# Patient Record
Sex: MALE | Race: WHITE | NOT HISPANIC OR LATINO | Employment: OTHER | URBAN - METROPOLITAN AREA
[De-identification: names, ages, dates, MRNs, and addresses within clinical notes are randomized per-mention and may not be internally consistent; named-entity substitution may affect disease eponyms.]

---

## 2017-04-13 ENCOUNTER — HOSPITAL ENCOUNTER (OUTPATIENT)
Dept: FAMILY MEDICINE CLINIC | Facility: CLINIC | Age: 47
Setting detail: SPECIMEN
Discharge: HOME | End: 2017-04-13
Attending: PHYSICIAN ASSISTANT | Admitting: PHYSICIAN ASSISTANT

## 2017-04-13 LAB
ALBUMIN SERPL-MCNC: 3.8 G/DL (ref 3.5–4.8)
ALBUMIN/GLOB SERPL: 1.4 {RATIO} (ref 1–1.7)
ALP SERPL-CCNC: 61 IU/L (ref 32–91)
ALT SERPL-CCNC: 16 IU/L (ref 17–63)
ANION GAP SERPL CALC-SCNC: 12 MMOL/L (ref 10–20)
AST SERPL-CCNC: 16 IU/L (ref 15–41)
BASOPHILS # BLD AUTO: 0.1 10*3/UL (ref 0–0.2)
BASOPHILS NFR BLD AUTO: 1 % (ref 0–2)
BILIRUB SERPL-MCNC: 0.4 MG/DL (ref 0.3–1.2)
BUN SERPL-MCNC: 9 MG/DL (ref 8–20)
BUN/CREAT SERPL: 10 (ref 6.2–20.3)
CALCIUM SERPL-MCNC: 9.4 MG/DL (ref 8.9–10.3)
CHLORIDE SERPL-SCNC: 109 MMOL/L (ref 101–111)
CHOLEST SERPL-MCNC: 123 MG/DL
CHOLEST/HDLC SERPL: 3.2 {RATIO}
CONV CO2: 26 MMOL/L (ref 22–32)
CONV LDL CHOLESTEROL DIRECT: 71 MG/DL (ref 0–100)
CONV PLATELETS GIANT IN BLOOD BY LIGHT MICROSCOPY: (no result)
CONV SMEAR REVIEW: (no result)
CONV TOTAL PROTEIN: 6.6 G/DL (ref 6.1–7.9)
CREAT UR-MCNC: 0.9 MG/DL (ref 0.7–1.2)
DIFFERENTIAL METHOD BLD: (no result)
EOSINOPHIL # BLD AUTO: 0.1 10*3/UL (ref 0–0.3)
EOSINOPHIL # BLD AUTO: 1 % (ref 0–3)
ERYTHROCYTE [DISTWIDTH] IN BLOOD BY AUTOMATED COUNT: 13.9 % (ref 11.5–14.5)
GLOBULIN UR ELPH-MCNC: 2.8 G/DL (ref 2.5–3.8)
GLUCOSE SERPL-MCNC: 108 MG/DL (ref 65–99)
HCT VFR BLD AUTO: 44.1 % (ref 40–54)
HDLC SERPL-MCNC: 39 MG/DL
HGB BLD-MCNC: 15.2 G/DL (ref 14–18)
LDLC/HDLC SERPL: 1.8 {RATIO}
LIPID INTERPRETATION: ABNORMAL
LYMPHOCYTES # BLD AUTO: 1.9 10*3/UL (ref 0.8–4.8)
LYMPHOCYTES NFR BLD AUTO: 23 % (ref 18–42)
MCH RBC QN AUTO: 32.2 PG (ref 26–32)
MCHC RBC AUTO-ENTMCNC: 34.5 G/DL (ref 32–36)
MCV RBC AUTO: 93.3 FL (ref 80–94)
MONOCYTES # BLD AUTO: 0.6 10*3/UL (ref 0.1–1.3)
MONOCYTES NFR BLD AUTO: 7 % (ref 2–11)
NEUTROPHILS # BLD AUTO: 5.5 10*3/UL (ref 2.3–8.6)
NEUTROPHILS NFR BLD AUTO: 68 % (ref 50–75)
NRBC BLD AUTO-RTO: 0 /100{WBCS}
PLATELET # BLD AUTO: 193 10*3/UL (ref 150–450)
PMV BLD AUTO: 9.8 FL (ref 7.4–10.4)
POTASSIUM SERPL-SCNC: 4 MMOL/L (ref 3.6–5.1)
PSA SERPL-MCNC: 1.5 NG/ML (ref 0–4)
RBC # BLD AUTO: 4.73 10*6/UL (ref 4.6–6)
SODIUM SERPL-SCNC: 143 MMOL/L (ref 136–144)
TRIGL SERPL-MCNC: 87 MG/DL
VLDLC SERPL CALC-MCNC: 13.8 MG/DL
WBC # BLD AUTO: 8.2 10*3/UL (ref 4.5–11.5)

## 2017-05-03 ENCOUNTER — HOSPITAL ENCOUNTER (OUTPATIENT)
Dept: PAIN MEDICINE | Facility: HOSPITAL | Age: 47
Discharge: HOME | End: 2017-05-03
Attending: ANESTHESIOLOGY | Admitting: ANESTHESIOLOGY

## 2017-05-03 LAB
AMPHETAMINES UR QL SCN: NEGATIVE
BZE UR QL SCN: NEGATIVE
CREAT 24H UR-MCNC: NORMAL MG/DL
METHADONE UR QL SCN: NEGATIVE
OPIATE CONFIRMATION URINE: NORMAL
THC SERPLBLD CFM-MCNC: NEGATIVE NG/ML

## 2017-05-22 ENCOUNTER — HOSPITAL ENCOUNTER (OUTPATIENT)
Dept: PAIN MEDICINE | Facility: HOSPITAL | Age: 47
Discharge: HOME | End: 2017-05-22
Attending: ANESTHESIOLOGY | Admitting: ANESTHESIOLOGY

## 2017-05-31 ENCOUNTER — HOSPITAL ENCOUNTER (OUTPATIENT)
Dept: PHYSICAL THERAPY | Facility: HOSPITAL | Age: 47
Setting detail: RECURRING SERIES
Discharge: HOME | End: 2017-07-24
Attending: FAMILY MEDICINE | Admitting: FAMILY MEDICINE

## 2017-07-21 ENCOUNTER — HOSPITAL ENCOUNTER (OUTPATIENT)
Dept: PAIN MEDICINE | Facility: HOSPITAL | Age: 47
Discharge: HOME | End: 2017-07-21
Attending: ANESTHESIOLOGY | Admitting: ANESTHESIOLOGY

## 2017-08-10 ENCOUNTER — HOSPITAL ENCOUNTER (OUTPATIENT)
Dept: OTHER | Facility: HOSPITAL | Age: 47
Discharge: HOME | End: 2017-08-10
Attending: INTERNAL MEDICINE | Admitting: INTERNAL MEDICINE

## 2017-08-10 LAB
ALBUMIN SERPL-MCNC: 4 G/DL (ref 3.5–4.8)
ALBUMIN/GLOB SERPL: 1.2 {RATIO} (ref 1–1.7)
ALP SERPL-CCNC: 73 IU/L (ref 32–91)
ALT SERPL-CCNC: 18 IU/L (ref 17–63)
ANION GAP SERPL CALC-SCNC: 13.2 MMOL/L (ref 10–20)
AST SERPL-CCNC: 21 IU/L (ref 15–41)
BASOPHILS # BLD AUTO: 0.1 10*3/UL (ref 0–0.2)
BASOPHILS NFR BLD AUTO: 2 % (ref 0–2)
BILIRUB SERPL-MCNC: 0.3 MG/DL (ref 0.3–1.2)
BUN SERPL-MCNC: 6 MG/DL (ref 8–20)
BUN/CREAT SERPL: 6.7 (ref 6.2–20.3)
CALCIUM SERPL-MCNC: 9.5 MG/DL (ref 8.9–10.3)
CHLORIDE SERPL-SCNC: 106 MMOL/L (ref 101–111)
CONV CO2: 26 MMOL/L (ref 22–32)
CONV TOTAL PROTEIN: 7.3 G/DL (ref 6.1–7.9)
CREAT UR-MCNC: 0.9 MG/DL (ref 0.7–1.2)
CRP SERPL-MCNC: 0.17 MG/DL (ref 0–0.7)
DIFFERENTIAL METHOD BLD: (no result)
EOSINOPHIL # BLD AUTO: 0.1 10*3/UL (ref 0–0.3)
EOSINOPHIL # BLD AUTO: 2 % (ref 0–3)
ERYTHROCYTE [DISTWIDTH] IN BLOOD BY AUTOMATED COUNT: 14.1 % (ref 11.5–14.5)
ERYTHROCYTE [SEDIMENTATION RATE] IN BLOOD BY WESTERGREN METHOD: 12 MM/HR (ref 0–15)
GLOBULIN UR ELPH-MCNC: 3.3 G/DL (ref 2.5–3.8)
GLUCOSE SERPL-MCNC: 118 MG/DL (ref 65–99)
HCT VFR BLD AUTO: 46.9 % (ref 40–54)
HGB BLD-MCNC: 15.8 G/DL (ref 14–18)
LYMPHOCYTES # BLD AUTO: 2.3 10*3/UL (ref 0.8–4.8)
LYMPHOCYTES NFR BLD AUTO: 28 % (ref 18–42)
MCH RBC QN AUTO: 32.2 PG (ref 26–32)
MCHC RBC AUTO-ENTMCNC: 33.6 G/DL (ref 32–36)
MCV RBC AUTO: 95.8 FL (ref 80–94)
MONOCYTES # BLD AUTO: 0.6 10*3/UL (ref 0.1–1.3)
MONOCYTES NFR BLD AUTO: 8 % (ref 2–11)
NEUTROPHILS # BLD AUTO: 4.9 10*3/UL (ref 2.3–8.6)
NEUTROPHILS NFR BLD AUTO: 60 % (ref 50–75)
NRBC BLD AUTO-RTO: 0 /100{WBCS}
NRBC/RBC NFR BLD MANUAL: 0 10*3/UL
PLATELET # BLD AUTO: 231 10*3/UL (ref 150–450)
PMV BLD AUTO: 9.3 FL (ref 7.4–10.4)
POTASSIUM SERPL-SCNC: 4.2 MMOL/L (ref 3.6–5.1)
RBC # BLD AUTO: 4.89 10*6/UL (ref 4.6–6)
SODIUM SERPL-SCNC: 141 MMOL/L (ref 136–144)
URATE SERPL-MCNC: 6.1 MG/DL (ref 4.8–8.7)
WBC # BLD AUTO: 8.1 10*3/UL (ref 4.5–11.5)

## 2017-08-11 LAB
HIV1+2 AB SERPL QL IA: NORMAL

## 2017-08-21 ENCOUNTER — HOSPITAL ENCOUNTER (OUTPATIENT)
Dept: PAIN MEDICINE | Facility: HOSPITAL | Age: 47
Discharge: HOME | End: 2017-08-21
Attending: ANESTHESIOLOGY | Admitting: ANESTHESIOLOGY

## 2017-09-07 ENCOUNTER — HOSPITAL ENCOUNTER (OUTPATIENT)
Dept: LAB | Facility: HOSPITAL | Age: 47
Discharge: HOME | End: 2017-09-07
Attending: INTERNAL MEDICINE | Admitting: INTERNAL MEDICINE

## 2017-09-21 ENCOUNTER — CONVERSION ENCOUNTER (OUTPATIENT)
Dept: BEHAVIORAL HEALTH | Facility: OTHER | Age: 47
End: 2017-09-21

## 2017-10-09 ENCOUNTER — CONVERSION ENCOUNTER (OUTPATIENT)
Dept: BEHAVIORAL HEALTH | Facility: OTHER | Age: 47
End: 2017-10-09

## 2017-10-20 ENCOUNTER — HOSPITAL ENCOUNTER (OUTPATIENT)
Dept: PAIN MEDICINE | Facility: HOSPITAL | Age: 47
Discharge: HOME | End: 2017-10-20
Attending: ANESTHESIOLOGY | Admitting: ANESTHESIOLOGY

## 2017-11-16 ENCOUNTER — CONVERSION ENCOUNTER (OUTPATIENT)
Dept: BEHAVIORAL HEALTH | Facility: OTHER | Age: 47
End: 2017-11-16

## 2017-12-14 ENCOUNTER — CONVERSION ENCOUNTER (OUTPATIENT)
Dept: BEHAVIORAL HEALTH | Facility: OTHER | Age: 47
End: 2017-12-14

## 2017-12-23 ENCOUNTER — HOSPITAL ENCOUNTER (OUTPATIENT)
Dept: MRI IMAGING | Facility: HOSPITAL | Age: 47
Discharge: HOME | End: 2017-12-23
Attending: ORTHOPAEDIC SURGERY | Admitting: ORTHOPAEDIC SURGERY

## 2018-01-17 ENCOUNTER — CONVERSION ENCOUNTER (OUTPATIENT)
Dept: BEHAVIORAL HEALTH | Facility: OTHER | Age: 48
End: 2018-01-17

## 2018-01-18 ENCOUNTER — HOSPITAL ENCOUNTER (OUTPATIENT)
Dept: LAB | Facility: HOSPITAL | Age: 48
Discharge: HOME | End: 2018-01-18
Attending: INTERNAL MEDICINE | Admitting: INTERNAL MEDICINE

## 2018-01-18 ENCOUNTER — HOSPITAL ENCOUNTER (OUTPATIENT)
Dept: PAIN MEDICINE | Facility: HOSPITAL | Age: 48
Discharge: HOME | End: 2018-01-18
Attending: ANESTHESIOLOGY | Admitting: ANESTHESIOLOGY

## 2018-01-18 LAB
ALBUMIN SERPL-MCNC: 3.8 G/DL (ref 3.5–4.8)
ALBUMIN/GLOB SERPL: 1.2 {RATIO} (ref 1–1.7)
ALP SERPL-CCNC: 51 IU/L (ref 32–91)
ALT SERPL-CCNC: 13 IU/L (ref 17–63)
AMPHETAMINES UR QL SCN: NEGATIVE
ANION GAP SERPL CALC-SCNC: 12.1 MMOL/L (ref 10–20)
AST SERPL-CCNC: 19 IU/L (ref 15–41)
BASOPHILS # BLD AUTO: 0.1 10*3/UL (ref 0–0.2)
BASOPHILS NFR BLD AUTO: 1 % (ref 0–2)
BILIRUB SERPL-MCNC: 0.3 MG/DL (ref 0.3–1.2)
BUN SERPL-MCNC: 14 MG/DL (ref 8–20)
BUN/CREAT SERPL: 15.6 (ref 6.2–20.3)
BZE UR QL SCN: NEGATIVE
CALCIUM SERPL-MCNC: 9.3 MG/DL (ref 8.9–10.3)
CHLORIDE SERPL-SCNC: 108 MMOL/L (ref 101–111)
CONV CO2: 23 MMOL/L (ref 22–32)
CONV TOTAL PROTEIN: 6.9 G/DL (ref 6.1–7.9)
CREAT 24H UR-MCNC: NORMAL MG/DL
CREAT UR-MCNC: 0.9 MG/DL (ref 0.7–1.2)
CRP SERPL-MCNC: 0.73 MG/DL (ref 0–0.7)
DIFFERENTIAL METHOD BLD: (no result)
EOSINOPHIL # BLD AUTO: 0.1 10*3/UL (ref 0–0.3)
EOSINOPHIL # BLD AUTO: 1 % (ref 0–3)
ERYTHROCYTE [DISTWIDTH] IN BLOOD BY AUTOMATED COUNT: 13.9 % (ref 11.5–14.5)
ERYTHROCYTE [SEDIMENTATION RATE] IN BLOOD BY WESTERGREN METHOD: 19 MM/HR (ref 0–15)
GLOBULIN UR ELPH-MCNC: 3.1 G/DL (ref 2.5–3.8)
GLUCOSE SERPL-MCNC: 71 MG/DL (ref 65–99)
HCT VFR BLD AUTO: 40.9 % (ref 40–54)
HGB BLD-MCNC: 13.9 G/DL (ref 14–18)
LYMPHOCYTES # BLD AUTO: 3.1 10*3/UL (ref 0.8–4.8)
LYMPHOCYTES NFR BLD AUTO: 36 % (ref 18–42)
MCH RBC QN AUTO: 32 PG (ref 26–32)
MCHC RBC AUTO-ENTMCNC: 33.9 G/DL (ref 32–36)
MCV RBC AUTO: 94.6 FL (ref 80–94)
METHADONE UR QL SCN: NEGATIVE
MONOCYTES # BLD AUTO: 0.7 10*3/UL (ref 0.1–1.3)
MONOCYTES NFR BLD AUTO: 8 % (ref 2–11)
NEUTROPHILS # BLD AUTO: 4.7 10*3/UL (ref 2.3–8.6)
NEUTROPHILS NFR BLD AUTO: 54 % (ref 50–75)
NRBC BLD AUTO-RTO: 0 /100{WBCS}
NRBC/RBC NFR BLD MANUAL: 0 10*3/UL
OPIATE CONFIRMATION URINE: NORMAL
PLATELET # BLD AUTO: 256 10*3/UL (ref 150–450)
PMV BLD AUTO: 9 FL (ref 7.4–10.4)
POTASSIUM SERPL-SCNC: 4.1 MMOL/L (ref 3.6–5.1)
RBC # BLD AUTO: 4.33 10*6/UL (ref 4.6–6)
SODIUM SERPL-SCNC: 139 MMOL/L (ref 136–144)
THC SERPLBLD CFM-MCNC: NEGATIVE NG/ML
WBC # BLD AUTO: 8.8 10*3/UL (ref 4.5–11.5)

## 2018-01-19 ENCOUNTER — HOSPITAL ENCOUNTER (OUTPATIENT)
Dept: LAB | Facility: HOSPITAL | Age: 48
Discharge: HOME | End: 2018-01-19
Attending: INTERNAL MEDICINE | Admitting: INTERNAL MEDICINE

## 2018-01-20 LAB
AMPICILLIN SUSC ISLT: ABNORMAL
AZTREONAM SUSC ISLT: ABNORMAL
BACTERIA ISLT: ABNORMAL
BACTERIA SPEC AEROBE CULT: ABNORMAL
BILIRUB UR QL STRIP: NEGATIVE MG/DL
CASTS URNS QL MICRO: ABNORMAL /[LPF]
CEFAZOLIN SUSC ISLT: ABNORMAL
CEFEPIME SUSC ISLT: ABNORMAL
CEFTRIAXONE SUSC ISLT: ABNORMAL
CIPROFLOXACIN SUSC ISLT: ABNORMAL
COLONY COUNT: ABNORMAL
COLOR UR: YELLOW
CONV BACTERIA IN URINE MICRO: ABNORMAL
CONV CLARITY OF URINE: ABNORMAL
CONV HYALINE CASTS IN URINE MICRO: 5 /[LPF] (ref 0–5)
CONV PROTEIN IN URINE BY AUTOMATED TEST STRIP: NEGATIVE MG/DL
CONV SMALL ROUND CELLS: ABNORMAL /[HPF]
CONV UROBILINOGEN IN URINE BY AUTOMATED TEST STRIP: 0.2 MG/DL
CULTURE INDICATED?: ABNORMAL
ERTAPENEM SUSC ISLT: ABNORMAL
GLUCOSE UR QL: NEGATIVE MG/DL
HGB UR QL STRIP: ABNORMAL
KETONES UR QL STRIP: NEGATIVE MG/DL
LEUKOCYTE ESTERASE UR QL STRIP: ABNORMAL
LEVOFLOXACIN SUSC ISLT: ABNORMAL
Lab: ABNORMAL
MEROPENEM SUSC ISLT: ABNORMAL
MICRO REPORT STATUS: ABNORMAL
NITRITE UR QL STRIP: NEGATIVE
NITROFURANTOIN SUSC ISLT: ABNORMAL
PH UR STRIP.AUTO: 6 [PH] (ref 4.5–8)
PIP+TAZO SUSC ISLT: ABNORMAL
RBC #/AREA URNS HPF: 1 /[HPF] (ref 0–3)
SP GR UR: 1.02 (ref 1–1.03)
SPECIMEN SOURCE: ABNORMAL
SPERM URNS QL MICRO: ABNORMAL /[HPF]
SQUAMOUS SPT QL MICRO: 0 /[HPF] (ref 0–5)
SUSC METH SPEC: ABNORMAL
TETRACYCLINE SUSC ISLT: ABNORMAL
TOBRAMYCIN SUSC ISLT: ABNORMAL
TRIMETHOPRIM/SULFA: ABNORMAL
UNIDENT CRYS URNS QL MICRO: ABNORMAL /[HPF]
WBC #/AREA URNS HPF: 51 /[HPF] (ref 0–5)
YEAST SPEC QL WET PREP: ABNORMAL /[HPF]

## 2018-05-15 ENCOUNTER — HOSPITAL ENCOUNTER (OUTPATIENT)
Dept: PAIN MEDICINE | Facility: HOSPITAL | Age: 48
Discharge: HOME | End: 2018-05-15
Attending: ANESTHESIOLOGY | Admitting: ANESTHESIOLOGY

## 2018-09-06 ENCOUNTER — HOSPITAL ENCOUNTER (OUTPATIENT)
Dept: FAMILY MEDICINE CLINIC | Facility: CLINIC | Age: 48
Setting detail: SPECIMEN
Discharge: HOME | End: 2018-09-06
Attending: NURSE PRACTITIONER | Admitting: NURSE PRACTITIONER

## 2018-09-21 ENCOUNTER — HOSPITAL ENCOUNTER (OUTPATIENT)
Dept: LAB | Facility: HOSPITAL | Age: 48
Discharge: HOME | End: 2018-09-21
Attending: NURSE PRACTITIONER | Admitting: NURSE PRACTITIONER

## 2018-09-21 LAB
ALBUMIN SERPL-MCNC: 3.8 G/DL (ref 3.5–4.8)
ALBUMIN/GLOB SERPL: 1.2 {RATIO} (ref 1–1.7)
ALP SERPL-CCNC: 61 IU/L (ref 32–91)
ALT SERPL-CCNC: 14 IU/L (ref 17–63)
ANION GAP SERPL CALC-SCNC: 10 MMOL/L (ref 10–20)
AST SERPL-CCNC: 18 IU/L (ref 15–41)
BILIRUB SERPL-MCNC: 0.9 MG/DL (ref 0.3–1.2)
BUN SERPL-MCNC: 18 MG/DL (ref 8–20)
BUN/CREAT SERPL: 18 (ref 6.2–20.3)
CALCIUM SERPL-MCNC: 9.1 MG/DL (ref 8.9–10.3)
CHLORIDE SERPL-SCNC: 109 MMOL/L (ref 101–111)
CHOLEST SERPL-MCNC: 103 MG/DL
CHOLEST/HDLC SERPL: 3.7 {RATIO}
CONV CO2: 25 MMOL/L (ref 22–32)
CONV LDL CHOLESTEROL DIRECT: 54 MG/DL (ref 0–100)
CONV TOTAL PROTEIN: 7 G/DL (ref 6.1–7.9)
CREAT UR-MCNC: 1 MG/DL (ref 0.7–1.2)
GLOBULIN UR ELPH-MCNC: 3.2 G/DL (ref 2.5–3.8)
GLUCOSE SERPL-MCNC: 98 MG/DL (ref 65–99)
HDLC SERPL-MCNC: 28 MG/DL
LDLC/HDLC SERPL: 1.9 {RATIO}
LIPID INTERPRETATION: ABNORMAL
POTASSIUM SERPL-SCNC: 4 MMOL/L (ref 3.6–5.1)
SODIUM SERPL-SCNC: 140 MMOL/L (ref 136–144)
TRIGL SERPL-MCNC: 112 MG/DL
VLDLC SERPL CALC-MCNC: 21.6 MG/DL

## 2018-10-11 ENCOUNTER — HOSPITAL ENCOUNTER (OUTPATIENT)
Dept: PAIN MEDICINE | Facility: HOSPITAL | Age: 48
Discharge: HOME | End: 2018-10-11
Attending: ANESTHESIOLOGY | Admitting: ANESTHESIOLOGY

## 2018-10-11 LAB
AMPHETAMINES UR QL SCN: NEGATIVE
BARBITURATES UR QL SCN: NEGATIVE
BENZODIAZ UR QL SCN: NEGATIVE
BZE UR QL SCN: NEGATIVE
CREAT 24H UR-MCNC: 195.5 MG/DL
METHADONE UR QL SCN: NEGATIVE
OPIATE CONFIRMATION URINE: NORMAL
OPIATES TESTED UR SCN: NEGATIVE
PCP UR QL: NEGATIVE
THC SERPLBLD CFM-MCNC: NEGATIVE NG/ML

## 2018-12-10 ENCOUNTER — HOSPITAL ENCOUNTER (OUTPATIENT)
Dept: PAIN MEDICINE | Facility: HOSPITAL | Age: 48
Discharge: HOME | End: 2018-12-10
Attending: ANESTHESIOLOGY | Admitting: ANESTHESIOLOGY

## 2019-03-15 ENCOUNTER — HOSPITAL ENCOUNTER (OUTPATIENT)
Dept: FAMILY MEDICINE CLINIC | Facility: CLINIC | Age: 49
Setting detail: SPECIMEN
Discharge: HOME | End: 2019-03-15
Attending: NURSE PRACTITIONER | Admitting: NURSE PRACTITIONER

## 2019-03-15 LAB
ALBUMIN SERPL-MCNC: 3.8 G/DL (ref 3.5–4.8)
ALBUMIN/GLOB SERPL: 1 {RATIO} (ref 1–1.7)
ALP SERPL-CCNC: 64 IU/L (ref 32–91)
ALT SERPL-CCNC: 14 IU/L (ref 17–63)
ANION GAP SERPL CALC-SCNC: 14.9 MMOL/L (ref 10–20)
AST SERPL-CCNC: 20 IU/L (ref 15–41)
BILIRUB SERPL-MCNC: 0.5 MG/DL (ref 0.3–1.2)
BUN SERPL-MCNC: 14 MG/DL (ref 8–20)
BUN/CREAT SERPL: 12.7 (ref 6.2–20.3)
CALCIUM SERPL-MCNC: 9.3 MG/DL (ref 8.9–10.3)
CHLORIDE SERPL-SCNC: 101 MMOL/L (ref 101–111)
CHOLEST SERPL-MCNC: 120 MG/DL
CHOLEST/HDLC SERPL: 3.4 {RATIO}
CONV CO2: 23 MMOL/L (ref 22–32)
CONV LDL CHOLESTEROL DIRECT: 67 MG/DL (ref 0–100)
CONV TOTAL PROTEIN: 7.5 G/DL (ref 6.1–7.9)
CREAT UR-MCNC: 1.1 MG/DL (ref 0.7–1.2)
GLOBULIN UR ELPH-MCNC: 3.7 G/DL (ref 2.5–3.8)
GLUCOSE SERPL-MCNC: 111 MG/DL (ref 65–99)
HDLC SERPL-MCNC: 35 MG/DL
LDLC/HDLC SERPL: 1.9 {RATIO}
LIPID INTERPRETATION: ABNORMAL
POTASSIUM SERPL-SCNC: 3.9 MMOL/L (ref 3.6–5.1)
SODIUM SERPL-SCNC: 135 MMOL/L (ref 136–144)
TRIGL SERPL-MCNC: 137 MG/DL
VLDLC SERPL CALC-MCNC: 17.6 MG/DL

## 2019-04-29 ENCOUNTER — HOSPITAL ENCOUNTER (OUTPATIENT)
Dept: PAIN MEDICINE | Facility: HOSPITAL | Age: 49
Discharge: HOME | End: 2019-04-29
Attending: ANESTHESIOLOGY | Admitting: ANESTHESIOLOGY

## 2019-04-29 LAB
AMPHETAMINES UR QL SCN: NEGATIVE
BARBITURATES UR QL SCN: NEGATIVE
BENZODIAZ UR QL SCN: NEGATIVE
BZE UR QL SCN: NEGATIVE
CREAT 24H UR-MCNC: NORMAL MG/DL
METHADONE UR QL SCN: NEGATIVE
OPIATE CONFIRMATION URINE: NORMAL
OPIATES TESTED UR SCN: NEGATIVE
PCP UR QL: NEGATIVE
THC SERPLBLD CFM-MCNC: NEGATIVE NG/ML

## 2019-06-04 VITALS
HEART RATE: 85 BPM | HEART RATE: 65 BPM | SYSTOLIC BLOOD PRESSURE: 121 MMHG | WEIGHT: 157 LBS | BODY MASS INDEX: 20.15 KG/M2 | WEIGHT: 157 LBS | HEIGHT: 74 IN | DIASTOLIC BLOOD PRESSURE: 83 MMHG | DIASTOLIC BLOOD PRESSURE: 74 MMHG | HEART RATE: 85 BPM | DIASTOLIC BLOOD PRESSURE: 83 MMHG | BODY MASS INDEX: 20.79 KG/M2 | BODY MASS INDEX: 20.15 KG/M2 | SYSTOLIC BLOOD PRESSURE: 109 MMHG | HEART RATE: 54 BPM | SYSTOLIC BLOOD PRESSURE: 108 MMHG | DIASTOLIC BLOOD PRESSURE: 76 MMHG | HEIGHT: 74 IN | HEART RATE: 82 BPM | BODY MASS INDEX: 20.02 KG/M2 | SYSTOLIC BLOOD PRESSURE: 124 MMHG | HEIGHT: 74 IN | WEIGHT: 156 LBS | WEIGHT: 162 LBS | HEIGHT: 74 IN | DIASTOLIC BLOOD PRESSURE: 76 MMHG | SYSTOLIC BLOOD PRESSURE: 117 MMHG

## 2019-06-13 NOTE — PROGRESS NOTES
History of Present Illness:   Chief Complaint: Patient is a 47 year old white male who arrived on time today for routine follow up for medication management and monitoring of his depression and anxiety. He is single and lives with his GF of 1 year.  He is on disability.  He is living   in [place] again in .  He was seeing a therapist, named Floyd, in [place], but he would like to see someone in [place].  He reports feeling restless at night, and he thinks his medication needs adjusting.  He also has psoriatic arthritis and the   rheumatologist is starting him on injection of Cimizia.  He has chronic back pain, as well, and walks with a cane.  He has CAD with CABG in 2015.      Symptomatology: Depression 7/10  Anxiety 5/10  Not sleeping well, restless and nightmares.   Denies SI/HI  No AVH      PAST PSYCHIATRIC HISTORY     Previous Psychiatric Diagnoses   Patient has no prior history of psychiatric illness or treatment.    Past Hospitalizations or Residential Treatment   Comments: Never hospitalized     Past Outpatient Treatment   Treatment Type: Psychotherapy  [facility].   Comments: He says Floyd Guerrero at [facility]    Prior Psychiatric Medications   Comments: Brintellex    SOCIAL HISTORY   Single  Number of marriages: 1  Number of children: 1  Number of grandkids: 0  Comments: Single  Children: one  Occupation: Disabled    Current Living Situation   Lives with: children, significant other    Education   Level: high school graduate    Employment   Job Status: unemployed  Job length: out of work 2 years     Alcohol use   Freq. drinking: nondrinker  Smoking History   Smoking Hx: Current every day smoker  Pack per day: 1/2    Illicit Drug Use   Illicit Drugs used: no    FAMILY HISTORY OF MENTAL DISORDERS   fh Grandparents: Unknown or Undiagnosed Psychiatric Disorder  fh Mother: Unknown or Undiagnosed Psychiatric Disorder  fh Father: Unknown or Undiagnosed Psychiatric  Disorder  fh Siblings: Unknown or Undiagnosed Psychiatric Disorder  Comments: His daughters has ADHD     Approx length of hospitalization     Interval History No Change    Active Medications (reviewed today):  VITAMIN D (ERGOCALCIFEROL) 11018 UNIT ORAL CAPS (ERGOCALCIFEROL) Take 1 tablet weekly for 12 weeks then take 2000 IU daily over the counter  VOLTAREN 1 % TRANS GEL (DICLOFENAC SODIUM) Apply 4 g to painful area twice daily  VITAMIN D3 1000 UNIT ORAL TABS (CHOLECALCIFEROL) Take 1 tablet by mouth daily  PREDNISONE 10 MG ORAL TABS (PREDNISONE) take one tablet po daily  TIZANIDINE HCL 4 MG ORAL TABS (TIZANIDINE HCL) take one tab at bedtime  LYRICA 200 MG ORAL CAPS (PREGABALIN) One p.o. twice a day  TRAZODONE  MG ORAL TABS (TRAZODONE HCL) Take one tablet at bedtime  TRINTELLIX 20 MG ORAL TABS (VORTIOXETINE HBR) Take 1 tablet by mouth in the morning  ATORVASTATIN CALCIUM 10 MG ORAL TABS (ATORVASTATIN CALCIUM) Take 1 tablet by mouth daily  PRAZOSIN HCL 2 MG ORAL CAPS (PRAZOSIN HCL) Take one capsule at bedtime for nightmares.  METOPROLOL TARTRATE 25 MG ORAL TABS (METOPROLOL TARTRATE) Take 1 tablet by mouth daily  MONTELUKAST SODIUM 10 MG ORAL TABS (MONTELUKAST SODIUM) Take 1 tablet by mouth daily  SPIRIVA RESPIMAT 2.5 MCG/ACT INH AERS (TIOTROPIUM BROMIDE MONOHYDRATE) 2 inhalations once daily  VENTOLIN  (90 BASE) MCG/ACT INH AERS (ALBUTEROL SULFATE) 1 puff every 4-6 hours as needed  SYMBICORT 160-4.5 MCG/ACT INH AERO (BUDESONIDE-FORMOTEROL FUMARATE) inhale 2 puffs twice daily  ASPIR-81 81 MG ORAL TBEC (ASPIRIN) Take 1 tablet by mouth daily  NITROSTAT 0.4 MG SL SUBL (NITROGLYCERIN) One tablet sublingual every 5 minutes as needed Chest Pain; If no relief after 3 doses, call MD    Current Allergies (reviewed today):  * NKDA (Critical)    Current Medications (including medications started today):   VITAMIN D (ERGOCALCIFEROL) 80319 UNIT ORAL CAPS (ERGOCALCIFEROL) Take 1 tablet weekly for 12 weeks then take 2000  IU daily over the counter  VOLTAREN 1 % TRANS GEL (DICLOFENAC SODIUM) Apply 4 g to painful area twice daily  VITAMIN D3 1000 UNIT ORAL TABS (CHOLECALCIFEROL) Take 1 tablet by mouth daily  PREDNISONE 10 MG ORAL TABS (PREDNISONE) take one tablet po daily  TIZANIDINE HCL 4 MG ORAL TABS (TIZANIDINE HCL) take one tab at bedtime  LYRICA 200 MG ORAL CAPS (PREGABALIN) One p.o. twice a day  TRAZODONE  MG ORAL TABS (TRAZODONE HCL) Take one tablet at bedtime  TRINTELLIX 20 MG ORAL TABS (VORTIOXETINE HBR) Take 1 tablet by mouth in the morning  ATORVASTATIN CALCIUM 10 MG ORAL TABS (ATORVASTATIN CALCIUM) Take 1 tablet by mouth daily  PRAZOSIN HCL 2 MG ORAL CAPS (PRAZOSIN HCL) Take one capsule at bedtime for nightmares.  METOPROLOL TARTRATE 25 MG ORAL TABS (METOPROLOL TARTRATE) Take 1 tablet by mouth daily  MONTELUKAST SODIUM 10 MG ORAL TABS (MONTELUKAST SODIUM) Take 1 tablet by mouth daily  SPIRIVA RESPIMAT 2.5 MCG/ACT INH AERS (TIOTROPIUM BROMIDE MONOHYDRATE) 2 inhalations once daily  VENTOLIN  (90 BASE) MCG/ACT INH AERS (ALBUTEROL SULFATE) 1 puff every 4-6 hours as needed  SYMBICORT 160-4.5 MCG/ACT INH AERO (BUDESONIDE-FORMOTEROL FUMARATE) inhale 2 puffs twice daily  ASPIR-81 81 MG ORAL TBEC (ASPIRIN) Take 1 tablet by mouth daily  NITROSTAT 0.4 MG SL SUBL (NITROGLYCERIN) One tablet sublingual every 5 minutes as needed Chest Pain; If no relief after 3 doses, call MD    Medication Side Effects None    Mental Status Examination    General Appearance:   WNL      Behavior: WNL, good eye contact      Attitude/Cooperation:   WNL, cooperative and appropriately verbal      Speech  : WNL, normal rate      Thought Processes:   WNL, goal directed and associations logical      Thought Content/Perceptions A: WNL      Thought Content/Perceptions B:   WNL      Risk Assessment:    Suicidality:   not present    Homicidality:   not present      Affect:   expressive and appro. to content with full range      Mood:   depressed,  anxious      Insight/Judgement:   intact insight and judgement      Cognitive Functioning and Sensorium:   WNL      Fund of Knowledge:   adequate      Significant Personality Traits:   no evidence of pathological traits at this time    judged reliable      Assessment  Problem List Updates for today's visit   1. Assessed Mood disorder disorder 2nd to a [facility] (ICD-296.90) as stable  2. Assessed Insomnia, unspecified (ICD-780.52) as stable  3. Assessed POSTTRAUMATIC STRESS DISORDER (ICD-309.81) as stable  4. Assessed Chronic pain syndrome (ICD-338.4) as stable    Plan  Medication Comments for today's visit: Risks and benefits of medications were discussed.  Patient verbalized understanding.    Orders for today's visit  1. Ordered Ofc Vst, Est Level III [64151]  Instructions for today's visit  Patient reports feeling more restless at night, so he feels worse during the day since he is not sleeping.    Increase Prazosin to 2mg qhs for nightmares.  Increase Trazodone to 100mg qhs  Will continue Trintellix at 20mg for now, but may need to add another medication/change at next follow up.    Disposition: return to clinic in 3 months    Medications:  TRINTELLIX 20 MG ORAL TABS (VORTIOXETINE HBR) Take 1 tablet by mouth in the morning  #30[Tablet] x 2      Entered by: Carlita CABRERA      Authorized by:  Renetta Cole MD      Electronically signed by:   Carlita CABRERA on 09/21/2017      Method used:    [facility]               [id number] (retail)              [address], [place]  [id number]              Ph: [phone]              Fax: [phone]      Note to Pharmacy: Route: ORAL;       RxID:   [id number]  TRAZODONE  MG ORAL TABS (TRAZODONE HCL) Take one tablet at bedtime  #30[Tablet] x 2      Entered by: Carlita CABRERA      Authorized by:  Renetta Cole MD      Electronically signed by:   Carlita CABRERA on 09/21/2017      Method used:     Electronically to               [facility] [id number]* (retail)              [address], [place]  [id number]              Ph: [phone]              Fax: [phone]      Note to Pharmacy: Route: ORAL;       RxID:   [id number]  PRAZOSIN HCL 2 MG ORAL CAPS (PRAZOSIN HCL) Take one capsule at bedtime for nightmares.  #30[Capsule] x 1      Entered by: Carlita CABRERA      Authorized by:  Renetta Cole MD      Electronically signed by:   Carlita CABRERA on 09/21/2017      Method used:    Electronically to               [facility] [id number]* (retail)              [address] [facility], [place]  [id number]              Ph: [phone]              Fax: [phone]      Note to Pharmacy: Route: ORAL;       RxID:   [id number]        ]      Electronically signed by Carlita CABRERA on 09/21/2017 at 11:49 AM  Electronically signed by Renetta Cole MD on 09/21/2017 at 1:01 PM  ________________________________________________________________________       Disclaimer: Converted Note message may not contain all data elements that existed in the legacy source system. Please see [employer] [facility] for the original note details.

## 2019-06-21 RX ORDER — ERGOCALCIFEROL 1.25 MG/1
CAPSULE ORAL
Qty: 12 CAPSULE | Refills: 0 | Status: SHIPPED | OUTPATIENT
Start: 2019-06-21 | End: 2019-10-21 | Stop reason: ALTCHOICE

## 2019-06-23 RX ORDER — AMITRIPTYLINE HYDROCHLORIDE 25 MG/1
TABLET, FILM COATED ORAL
Qty: 90 TABLET | Refills: 0 | Status: SHIPPED | OUTPATIENT
Start: 2019-06-23 | End: 2019-08-08 | Stop reason: DRUGHIGH

## 2019-07-15 RX ORDER — NITROGLYCERIN 0.4 MG/1
TABLET SUBLINGUAL
Qty: 25 TABLET | Refills: 0 | Status: SHIPPED | OUTPATIENT
Start: 2019-07-15 | End: 2019-11-22 | Stop reason: SDUPTHER

## 2019-07-22 ENCOUNTER — OFFICE VISIT (OUTPATIENT)
Dept: FAMILY MEDICINE CLINIC | Facility: CLINIC | Age: 49
End: 2019-07-22

## 2019-07-22 VITALS
RESPIRATION RATE: 20 BRPM | SYSTOLIC BLOOD PRESSURE: 127 MMHG | DIASTOLIC BLOOD PRESSURE: 84 MMHG | WEIGHT: 161.2 LBS | BODY MASS INDEX: 29.66 KG/M2 | HEART RATE: 66 BPM | TEMPERATURE: 98.5 F | OXYGEN SATURATION: 100 % | HEIGHT: 62 IN

## 2019-07-22 DIAGNOSIS — F17.200 TOBACCO DEPENDENCE SYNDROME: ICD-10-CM

## 2019-07-22 DIAGNOSIS — K30 INDIGESTION: Primary | ICD-10-CM

## 2019-07-22 PROBLEM — M79.7 FIBROMYOSITIS: Status: ACTIVE | Noted: 2018-12-10

## 2019-07-22 PROBLEM — M79.2 NEUROPATHIC PAIN: Status: ACTIVE | Noted: 2017-05-03

## 2019-07-22 PROBLEM — E55.9 VITAMIN D DEFICIENCY: Status: ACTIVE | Noted: 2017-08-11

## 2019-07-22 PROBLEM — Z79.899 OTHER LONG TERM (CURRENT) DRUG THERAPY: Status: ACTIVE | Noted: 2017-05-03

## 2019-07-22 PROBLEM — Z95.1 STATUS POST CORONARY ARTERY BYPASS GRAFT: Status: ACTIVE | Noted: 2018-09-26

## 2019-07-22 PROBLEM — J44.9 CHRONIC OBSTRUCTIVE PULMONARY DISEASE: Status: ACTIVE | Noted: 2017-04-13

## 2019-07-22 PROBLEM — G89.4 CHRONIC PAIN DISORDER: Status: ACTIVE | Noted: 2017-05-19

## 2019-07-22 PROBLEM — I25.810 ATHEROSCLEROSIS OF CORONARY ARTERY BYPASS GRAFT: Status: ACTIVE | Noted: 2017-04-13

## 2019-07-22 PROBLEM — Z95.5 STATUS POST CORONARY ARTERY STENT PLACEMENT: Status: ACTIVE | Noted: 2018-09-26

## 2019-07-22 PROBLEM — I10 BENIGN ESSENTIAL HYPERTENSION: Status: ACTIVE | Noted: 2017-04-13

## 2019-07-22 PROBLEM — L40.50 PSORIATIC ARTHRITIS: Status: ACTIVE | Noted: 2017-08-09

## 2019-07-22 PROBLEM — Z23 NEED FOR PROPHYLACTIC VACCINATION AGAINST STREPTOCOCCUS PNEUMONIAE (PNEUMOCOCCUS) AND INFLUENZA: Status: ACTIVE | Noted: 2017-08-09

## 2019-07-22 PROBLEM — G43.909 MIGRAINE HEADACHE: Status: ACTIVE | Noted: 2017-11-28

## 2019-07-22 PROBLEM — J30.2 SEASONAL ALLERGIES: Status: ACTIVE | Noted: 2017-11-28

## 2019-07-22 PROCEDURE — 99213 OFFICE O/P EST LOW 20 MIN: CPT | Performed by: NURSE PRACTITIONER

## 2019-07-22 RX ORDER — MONTELUKAST SODIUM 10 MG/1
10 TABLET ORAL DAILY
Refills: 0 | COMMUNITY
Start: 2019-05-31 | End: 2019-08-20 | Stop reason: SDUPTHER

## 2019-07-22 RX ORDER — PRAZOSIN HYDROCHLORIDE 2 MG/1
CAPSULE ORAL
COMMUNITY
Start: 2018-12-27 | End: 2019-08-08 | Stop reason: SDUPTHER

## 2019-07-22 RX ORDER — ATORVASTATIN CALCIUM 20 MG/1
20 TABLET, FILM COATED ORAL DAILY
Refills: 0 | COMMUNITY
Start: 2019-05-24 | End: 2019-08-20 | Stop reason: SDUPTHER

## 2019-07-22 RX ORDER — CYCLOBENZAPRINE HCL 10 MG
TABLET ORAL
Refills: 5 | COMMUNITY
Start: 2019-04-16 | End: 2019-09-23

## 2019-07-22 RX ORDER — OMEPRAZOLE 20 MG/1
20 CAPSULE, DELAYED RELEASE ORAL DAILY
Qty: 90 CAPSULE | Refills: 1 | Status: SHIPPED | OUTPATIENT
Start: 2019-07-22 | End: 2020-02-12

## 2019-07-22 RX ORDER — NAPROXEN 500 MG/1
500 TABLET ORAL 2 TIMES DAILY
Refills: 3 | COMMUNITY
Start: 2019-06-12 | End: 2019-11-21 | Stop reason: SDUPTHER

## 2019-07-22 RX ORDER — ASPIRIN 81 MG/1
TABLET ORAL
COMMUNITY
Start: 2017-04-13 | End: 2021-01-06 | Stop reason: SDUPTHER

## 2019-07-22 RX ORDER — OMEPRAZOLE 20 MG/1
20 CAPSULE, DELAYED RELEASE ORAL DAILY
Qty: 30 CAPSULE | Refills: 1 | Status: SHIPPED | OUTPATIENT
Start: 2019-07-22 | End: 2019-07-22 | Stop reason: SDUPTHER

## 2019-07-22 NOTE — PROGRESS NOTES
Subjective   Frank Barger is a 48 y.o. male.     Chief Complaint   Patient presents with   • Nausea     Epigastric / Spomach Pain usually after eating, Omeprazole and Dicyclome helps        HPI  Having nausea after eating. He took omeprazole and dicyclomine that was his SO. He had his teeth removed of recent has new teeth. He drinks soft drinks all the time.       The following portions of the patient's history were reviewed and updated as appropriate: allergies, current medications, past family history, past medical history, past social history, past surgical history and problem list.      Current Outpatient Medications:   •  amitriptyline (ELAVIL) 25 MG tablet, TAKE 1 TABLET BY MOUTH EVERY NIGHT AT BEDTIME FOR MIGRAINE PROPHYLAXIS, Disp: 90 tablet, Rfl: 0  •  aspirin (ASPIR) 81 MG EC tablet, ASPIRIN 81 81 MG TBEC, Disp: , Rfl:   •  metoprolol tartrate (LOPRESSOR) 25 MG tablet, Daily., Disp: , Rfl:   •  nitroglycerin (NITROSTAT) 0.4 MG SL tablet, DISSOLVE 1 TABLET UNDER THE TONGUE EVERY 5 MINUTES AS NEEDED FOR CHEST PAIN UP TO TOTAL 3 TABLETS. IF NO RELIEF GO TO EMERGENCY ROOM, Disp: 25 tablet, Rfl: 0  •  prazosin (MINIPRESS) 2 MG capsule, PRAZOSIN HCL 2 MG CAPS, Disp: , Rfl:   •  vitamin D (ERGOCALCIFEROL) 65469 units capsule capsule, TAKE 1 CAPSULE BY MOUTH EVERY WEEK WITH FOOD, Disp: 12 capsule, Rfl: 0  •  Vortioxetine HBr (TRINTELLIX) 20 MG tablet, Daily., Disp: , Rfl:   •  atorvastatin (LIPITOR) 20 MG tablet, Take 20 mg by mouth Daily., Disp: , Rfl: 0  •  cyclobenzaprine (FLEXERIL) 10 MG tablet, TK 1 T PO  AT BEDTIME FOR 1 WEEK THEN 3 TIMES DAILY, Disp: , Rfl: 5  •  LYRICA 200 MG capsule, TK ONE C PO  TID, Disp: , Rfl: 5  •  montelukast (SINGULAIR) 10 MG tablet, Take 10 mg by mouth Daily., Disp: , Rfl: 0  •  naproxen (NAPROSYN) 500 MG tablet, Take 500 mg by mouth 2 (Two) Times a Day., Disp: , Rfl: 3  •  omeprazole (PRILOSEC) 20 MG capsule, Take 1 capsule by mouth Daily., Disp: 30 capsule, Rfl:  1    Recent Results (from the past 4032 hour(s))   Lipid Panel    Collection Time: 03/15/19  1:34 PM   Result Value Ref Range    Total Cholesterol 120 <200 mg/dL    Triglycerides 137 <150 mg/dL    HDL Cholesterol 35 (L) >39 mg/dL    LDL Cholesterol  67 0 - 100 mg/dL    VLDL Cholesterol 17.6 <21 mg/dL    LDL/HDL Ratio 1.9     Chol/HDL Ratio 3.4     Lipid Interpretation       Total Cholesterol, Total Triglycerides, LDL Cholesterol,and HDL Cholesterol:     Lipid Interpretation       TOTAL CHOLESTEROL                    HDL CHOLESTEROL  Desirable:               Lipid Interpretation       <200 mg/dL     Low HDL:            <40 mg/dL  Borderline High:   200-239 mg/dL     Lipid Interpretation          Normal:           40-60 mg/dL  High:           > or = 240 mg/dL        Lipid Interpretation       Desirable:          >60 mg/dL  TOTAL TRIGLYCERIDE                   LDL    Lipid Interpretation       CHOLESTEROL  Normal:               <150 mg/dL     Optimal:           <100 mg/dL    Lipid Interpretation        Borderline High:   150-199 mg/dL     Low Risk:       100-129 mg/dL  High:         Lipid Interpretation               200-499 mg/dL     Borderline High:130-159 mg/dL  Very High:      > or =    Lipid Interpretation       500 mg/dL     High:           160-189 mg/dL                                        Lipid Interpretation         Very High:   > or = 190 mg/dL  The following ratios of LDL to HDL and Total    Lipid Interpretation       Cholesterol to HDL are for information only:  LDL/HDL RATIO                        Lipid Interpretation          TOTAL CHOLESTEROL/HDL RATIO  Desirable:              <5           Low Risk:     Lipid Interpretation            3.3-4.4   Optimal:        < or = 3.5           Average Risk:   4.4-7.1        Lipid Interpretation                                          Medium Risk:    7.1-11                          Lipid Interpretation                   High Risk:         >11       Comprehensive  Metabolic Panel    Collection Time: 03/15/19  1:34 PM   Result Value Ref Range    Sodium 135 (L) 136 - 144 mmol/L    Potassium 3.9 3.6 - 5.1 mmol/L    Chloride 101 101 - 111 mmol/L    CO2 23 22 - 32 mmol/L    Glucose 111 (H) 65 - 99 mg/dL    BUN 14 8 - 20 mg/dL    Creatinine 1.1 0.7 - 1.2 mg/dl    Calcium 9.3 8.9 - 10.3 mg/dL    Total Protein 7.5 6.1 - 7.9 g/dL    Albumin 3.8 3.5 - 4.8 g/dL    Total Bilirubin 0.5 0.3 - 1.2 mg/dL    Alkaline Phosphatase 64 32 - 91 IU/L    AST (SGOT) 20 15 - 41 IU/L    ALT (SGPT) 14 (L) 17 - 63 IU/L    Anion Gap 14.9 10 - 20    BUN/Creatinine Ratio 12.7 6.2 - 20.3    GFR MDRD Non African American >60 >60 mL/min/1.73m2    GFR MDRD African American >60 >60 mL/min/1.73m2    Globulin 3.7 2.5 - 3.8 G/dL    A/G Ratio 1.0 1.0 - 1.7   Pain Management Profile (13 Drugs) Urine -    Collection Time: 04/29/19  2:50 PM   Result Value Ref Range    Creatinine, Urine  mg/dL     127.6Urine creatinines <20 mg/dL may express a dilution effect which can cause false negatives for the drug screen.    Amphetamine, Urine Qual  NEGATIVE NEGATIVE    Barbiturates Screen, Urine  NEGATIVE NEGATIVE    Cocaine Screen, Urine  NEGATIVE NEGATIVE    Opiate Screen, Urine  NEGATIVE NEGATIVE    Benzodiazepine Screen, Urine  NEGATIVE NEGATIVE    Phencyclidine (PCP), Urine  NEGATIVE NEGATIVE    THC, Screen  NEGATIVE NEGATIVE    Methadone Screen, Urine  NEGATIVE NEGATIVE    Opiate Confirmation, Urine PENDING          Review of Systems   Constitutional: Negative for chills and fever.   HENT: Negative for congestion and sinus pressure.    Eyes: Negative for blurred vision and pain.   Respiratory: Negative for cough and shortness of breath.    Cardiovascular: Negative for chest pain and leg swelling.   Gastrointestinal: Positive for GERD and indigestion. Negative for abdominal pain and nausea.   Endocrine: Negative for cold intolerance, heat intolerance, polydipsia, polyphagia and polyuria.   Skin: Negative for dry skin, rash  "and bruise.   Psychiatric/Behavioral: Negative for dysphoric mood and stress.       Objective     /84 (BP Location: Left arm, Patient Position: Sitting, Cuff Size: Adult)   Pulse 66   Temp 98.5 °F (36.9 °C) (Oral)   Resp 20   Ht 157.5 cm (62\")   Wt 73.1 kg (161 lb 3.2 oz)   SpO2 100%   BMI 29.48 kg/m²     Physical Exam   Constitutional: He is oriented to person, place, and time. He appears well-developed and well-nourished.   HENT:   Head: Normocephalic and atraumatic.   Eyes: Conjunctivae and EOM are normal. Pupils are equal, round, and reactive to light.   Neck: Normal range of motion. Neck supple.   Cardiovascular: Normal rate, regular rhythm, normal heart sounds and intact distal pulses.   Pulmonary/Chest: Effort normal and breath sounds normal.   Abdominal: Soft. Bowel sounds are normal. He exhibits no distension and no ascites. There is tenderness in the epigastric area.       Tender with palpation epigastric region. Bowel sounds all quads.      Musculoskeletal: Normal range of motion.   Neurological: He is alert and oriented to person, place, and time.   Skin: Skin is warm and dry.   Psychiatric: He has a normal mood and affect. His behavior is normal.   Nursing note and vitals reviewed.        Assessment/Plan   Frank was seen today for nausea.    Diagnoses and all orders for this visit:    Indigestion  -     Ambulatory Referral to Gastroenterology    Tobacco dependence syndrome    Other orders  -     omeprazole (PRILOSEC) 20 MG capsule; Take 1 capsule by mouth Daily.      Patient Instructions   Stop smoking   Drink water stop soft drinks  Follow up with [facility] for evalutation      Amada Marte, APRN    07/22/19      "

## 2019-08-08 ENCOUNTER — OFFICE VISIT (OUTPATIENT)
Dept: PSYCHIATRY | Facility: CLINIC | Age: 49
End: 2019-08-08

## 2019-08-08 DIAGNOSIS — F43.10 POSTTRAUMATIC STRESS DISORDER: ICD-10-CM

## 2019-08-08 DIAGNOSIS — F43.10 POSTTRAUMATIC STRESS DISORDER: Primary | ICD-10-CM

## 2019-08-08 DIAGNOSIS — F33.1 MAJOR DEPRESSIVE DISORDER, RECURRENT EPISODE, MODERATE (HCC): ICD-10-CM

## 2019-08-08 PROCEDURE — 99213 OFFICE O/P EST LOW 20 MIN: CPT | Performed by: PHYSICIAN ASSISTANT

## 2019-08-08 RX ORDER — AMITRIPTYLINE HYDROCHLORIDE 75 MG/1
75 TABLET, FILM COATED ORAL NIGHTLY
Qty: 90 TABLET | Refills: 1 | Status: SHIPPED | OUTPATIENT
Start: 2019-08-08 | End: 2020-01-27

## 2019-08-08 RX ORDER — PRAZOSIN HYDROCHLORIDE 2 MG/1
2 CAPSULE ORAL NIGHTLY
Qty: 30 CAPSULE | Refills: 3 | Status: SHIPPED | OUTPATIENT
Start: 2019-08-08 | End: 2019-08-08 | Stop reason: SDUPTHER

## 2019-08-08 RX ORDER — AMITRIPTYLINE HYDROCHLORIDE 75 MG/1
75 TABLET, FILM COATED ORAL NIGHTLY
Qty: 30 TABLET | Refills: 3 | Status: SHIPPED | OUTPATIENT
Start: 2019-08-08 | End: 2019-08-08 | Stop reason: SDUPTHER

## 2019-08-08 RX ORDER — ARIPIPRAZOLE 5 MG/1
5 TABLET ORAL NIGHTLY
Qty: 90 TABLET | Refills: 1 | Status: SHIPPED | OUTPATIENT
Start: 2019-08-08 | End: 2020-01-27

## 2019-08-08 RX ORDER — PRAZOSIN HYDROCHLORIDE 2 MG/1
2 CAPSULE ORAL NIGHTLY
Qty: 90 CAPSULE | Refills: 1 | Status: SHIPPED | OUTPATIENT
Start: 2019-08-08 | End: 2021-01-06 | Stop reason: SDUPTHER

## 2019-08-08 RX ORDER — ARIPIPRAZOLE 5 MG/1
5 TABLET ORAL NIGHTLY
Qty: 30 TABLET | Refills: 3 | Status: SHIPPED | OUTPATIENT
Start: 2019-08-08 | End: 2019-08-08 | Stop reason: SDUPTHER

## 2019-08-08 NOTE — PROGRESS NOTES
Subjective   Frank Barger is a 49 y.o.white male who presents today for follow up    Chief Complaint:  Depression, anxiety and PTSD    History of Present Illness:   He is now going to therapist at [facility]  Still has dreams once in a while about the kid he had to shoot while in the   Feeling more depressed, cries  Anxious  Trouble sleeping, still taking elavil for migraines at 25mg    The following portions of the patient's history were reviewed and updated as appropriate: allergies, current medications, past family history, past medical history, past social history, past surgical history and problem list.    PAST PSYCHIATRIC HISTORY  Axis I  Affective/Bipoloar Disorder, Anxiety/Panic Disorder, Posttraumatic Stress  Axis II  None    PAST OUTPATIENT TREATMENT  Diagnosis treated:  Affective Disorder, Anxiety/Panic Disorder, Post-Traumatic Stress  Treatment Type:  Individual Therapy, Medication Management  Prior Psychiatric Medications:  Zoloft  Brintellix  Trintellix   Support Groups:  None  Sequelae Of Mental Disorder:  social isolation, family disruption, emotional distress      Interval History  No Change    Side Effects  None      Past Medical History:  Past Medical History:   Diagnosis Date   • Back pain        Social History:  Social History     Socioeconomic History   • Marital status: Single     Spouse name: Not on file   • Number of children: Not on file   • Years of education: Not on file   • Highest education level: Not on file   Tobacco Use   • Smoking status: Current Every Day Smoker   Substance and Sexual Activity   • Alcohol use: No     Frequency: Never   • Drug use: No   • Sexual activity: Defer       Family History:  Family History   Problem Relation Age of Onset   • Diabetes Mother    • Heart disease Father        Past Surgical History:  Past Surgical History:   Procedure Laterality Date   • CORONARY ANGIOPLASTY WITH STENT PLACEMENT     • CORONARY ARTERY BYPASS GRAFT         Problem  List:  Patient Active Problem List   Diagnosis   • Atherosclerosis of coronary artery bypass graft   • Benign essential hypertension   • Chronic obstructive pulmonary disease (CMS/HCC)   • Chronic pain disorder   • Coronary artery disease   • Degeneration of intervertebral disc of lumbar region   • Fibromyositis   • Hyperlipidemia   • Migraine headache   • Need for prophylactic vaccination against Streptococcus pneumoniae (pneumococcus) and influenza   • Neuropathic pain   • Other long term (current) drug therapy   • Posttraumatic stress disorder   • Psoriasis   • Psoriatic arthritis (CMS/HCC)   • Seasonal allergies   • Vitamin D deficiency   • Tobacco dependence syndrome   • Status post coronary artery stent placement   • Status post coronary artery bypass graft   • Indigestion       Allergy:   Allergies   Allergen Reactions   • Hydrocodone-Acetaminophen GI Intolerance   • Oxycodone GI Intolerance   • Hydrocodone Nausea And Vomiting        Discontinued Medications:  Medications Discontinued During This Encounter   Medication Reason   • amitriptyline (ELAVIL) 25 MG tablet Dose adjustment   • Vortioxetine HBr (TRINTELLIX) 20 MG tablet Reorder   • prazosin (MINIPRESS) 2 MG capsule Reorder       Current Medications:   Current Outpatient Medications   Medication Sig Dispense Refill   • prazosin (MINIPRESS) 2 MG capsule Take 1 capsule by mouth Every Night. 30 capsule 3   • Vortioxetine HBr (TRINTELLIX) 20 MG tablet Take 20 mg by mouth Daily. 30 tablet 3   • amitriptyline (ELAVIL) 75 MG tablet Take 1 tablet by mouth Every Night. 30 tablet 3   • ARIPiprazole (ABILIFY) 5 MG tablet Take 1 tablet by mouth Every Night. 30 tablet 3   • aspirin (ASPIR) 81 MG EC tablet ASPIRIN 81 81 MG TBEC     • atorvastatin (LIPITOR) 20 MG tablet Take 20 mg by mouth Daily.  0   • cyclobenzaprine (FLEXERIL) 10 MG tablet TK 1 T PO  AT BEDTIME FOR 1 WEEK THEN 3 TIMES DAILY  5   • LYRICA 200 MG capsule TK ONE C PO  TID  5   • metoprolol tartrate  (LOPRESSOR) 25 MG tablet Daily.     • montelukast (SINGULAIR) 10 MG tablet Take 10 mg by mouth Daily.  0   • naproxen (NAPROSYN) 500 MG tablet Take 500 mg by mouth 2 (Two) Times a Day.  3   • nitroglycerin (NITROSTAT) 0.4 MG SL tablet DISSOLVE 1 TABLET UNDER THE TONGUE EVERY 5 MINUTES AS NEEDED FOR CHEST PAIN UP TO TOTAL 3 TABLETS. IF NO RELIEF GO TO EMERGENCY ROOM 25 tablet 0   • omeprazole (priLOSEC) 20 MG capsule TAKE 1 CAPSULE BY MOUTH DAILY 90 capsule 1   • vitamin D (ERGOCALCIFEROL) 40092 units capsule capsule TAKE 1 CAPSULE BY MOUTH EVERY WEEK WITH FOOD 12 capsule 0     No current facility-administered medications for this visit.          Review of Symptoms:    Psychiatric/Behavioral: Negative for agitation, behavioral problems, confusion, decreased concentration, dysphoric mood, hallucinations, self-injury, sleep disturbance and suicidal ideas. The patient is not nervous/anxious and is not hyperactive.        Physical Exam:   There were no vitals taken for this visit.    Mental Status Exam:   Hygiene:   good  Cooperation:  Cooperative  Eye Contact:  Good  Psychomotor Behavior:  Appropriate  Affect:  Appropriate  Mood: anxious  Hopelessness: Denies  Speech:  Normal  Thought Process:  Goal directed  Thought Content:  Normal  Suicidal:  None  Homicidal:  None  Hallucinations:  None  Delusion:  None  Memory:  Intact  Orientation:  Person, Place, Time and Situation  Reliability:  good  Insight:  Good  Judgement:  Good  Impulse Control:  Good  Physical/Medical Issues:  Yes arthritis       PHQ-9 Depression Screening  Little interest or pleasure in doing things? 2   Feeling down, depressed, or hopeless? 2   Trouble falling or staying asleep, or sleeping too much? 2   Feeling tired or having little energy? 1   Poor appetite or overeating? 1   Feeling bad about yourself - or that you are a failure or have let yourself or your family down? 1   Trouble concentrating on things, such as reading the newspaper or watching  television? 1   Moving or speaking so slowly that other people could have noticed? Or the opposite - being so fidgety or restless that you have been moving around a lot more than usual? 0   Thoughts that you would be better off dead, or of hurting yourself in some way? 0   PHQ-9 Total Score 10   If you checked off any problems, how difficult have these problems made it for you to do your work, take care of things at home, or get along with other people? Somewhat difficult           Current every day smoker less than 3 minutes spent counseling Not agreeable to stopping    I advised Frank of the risks of tobacco use.     Lab Results:   No visits with results within 3 Month(s) from this visit.   Latest known visit with results is:   Hospital Outpatient Visit on 04/29/2019   Component Date Value Ref Range Status   • Creatinine, Urine 04/29/2019 127.6Urine creatinines <20 mg/dL may express a dilution effect which can cause false negatives for the drug screen.  mg/dL Final   • Amphetamine, Urine Qual 04/29/2019  NEGATIVE  NEGATIVE Final   • Barbiturates Screen, Urine 04/29/2019  NEGATIVE  NEGATIVE Final   • Cocaine Screen, Urine 04/29/2019  NEGATIVE  NEGATIVE Final   • Opiate Screen, Urine 04/29/2019  NEGATIVE  NEGATIVE Final   • Benzodiazepine Screen, Urine 04/29/2019  NEGATIVE  NEGATIVE Final   • Phencyclidine (PCP), Urine 04/29/2019  NEGATIVE  NEGATIVE Final   • THC, Screen 04/29/2019  NEGATIVE  NEGATIVE Final   • Methadone Screen, Urine 04/29/2019  NEGATIVE  NEGATIVE Final   • Opiate Confirmation, Urine 04/29/2019 PENDING   Incomplete       Assessment/Plan   Problems Addressed this Visit        Other    Posttraumatic stress disorder - Primary    Relevant Medications    amitriptyline (ELAVIL) 75 MG tablet    Vortioxetine HBr (TRINTELLIX) 20 MG tablet    prazosin (MINIPRESS) 2 MG capsule    ARIPiprazole (ABILIFY) 5 MG tablet      Other Visit Diagnoses     Major depressive disorder, recurrent episode, moderate  (CMS/[facility])        Relevant Medications    amitriptyline (ELAVIL) 75 MG tablet    Vortioxetine HBr (TRINTELLIX) 20 MG tablet    ARIPiprazole (ABILIFY) 5 MG tablet          Visit Diagnoses:    ICD-10-CM ICD-9-CM   1. Posttraumatic stress disorder F43.10 309.81   2. Major depressive disorder, recurrent episode, moderate (CMS/HCC) F33.1 296.32       TREATMENT PLAN/GOALS: Continue supportive psychotherapy efforts and medications as indicated. Treatment and medication options discussed during today's visit. Patient ackowledged and verbally consented to continue with current treatment plan and was educated on the importance of compliance with treatment and follow-up appointments.    MEDICATION ISSUES:  INSPECT reviewed as expected  Discussed medication options and treatment plan of prescribed medication as well as the risks, benefits, and side effects including potential falls, possible impaired driving and metabolic adversities among others. Patient is agreeable to call the office with any worsening of symptoms or onset of side effects. Patient is agreeable to call 911 or go to the nearest ER should he/she begin having SI/HI. No medication side effects or related complaints today.     Patient has been doing fairly well, but reports some increasing depression.  Continue counseling at [facility]  Increase Elavil to 75 mg at bedtime  Continue prazosin and Trintellix Owen Lakisha at current dosages  Add Abilify 5 mg nightly    MEDS ORDERED DURING VISIT:  New Medications Ordered This Visit   Medications   • amitriptyline (ELAVIL) 75 MG tablet     Sig: Take 1 tablet by mouth Every Night.     Dispense:  30 tablet     Refill:  3   • Vortioxetine HBr (TRINTELLIX) 20 MG tablet     Sig: Take 20 mg by mouth Daily.     Dispense:  30 tablet     Refill:  3   • prazosin (MINIPRESS) 2 MG capsule     Sig: Take 1 capsule by mouth Every Night.     Dispense:  30 capsule     Refill:  3   • ARIPiprazole (ABILIFY) 5 MG tablet     Sig: Take 1 tablet  by mouth Every Night.     Dispense:  30 tablet     Refill:  3       Return in about 3 months (around 11/8/2019).         This document has been electronically signed by Carlita Mcdermott PA-C  August 8, 2019 4:37 PM

## 2019-08-14 ENCOUNTER — TRANSCRIBE ORDERS (OUTPATIENT)
Dept: ADMINISTRATIVE | Facility: HOSPITAL | Age: 49
End: 2019-08-14

## 2019-08-14 ENCOUNTER — ON CAMPUS - OUTPATIENT (AMBULATORY)
Dept: URBAN - METROPOLITAN AREA HOSPITAL 2 | Facility: HOSPITAL | Age: 49
End: 2019-08-14

## 2019-08-14 VITALS
SYSTOLIC BLOOD PRESSURE: 119 MMHG | TEMPERATURE: 97.5 F | HEART RATE: 71 BPM | DIASTOLIC BLOOD PRESSURE: 71 MMHG | RESPIRATION RATE: 16 BRPM | DIASTOLIC BLOOD PRESSURE: 82 MMHG | SYSTOLIC BLOOD PRESSURE: 111 MMHG | HEART RATE: 69 BPM | WEIGHT: 161 LBS | DIASTOLIC BLOOD PRESSURE: 69 MMHG | DIASTOLIC BLOOD PRESSURE: 74 MMHG | DIASTOLIC BLOOD PRESSURE: 87 MMHG | SYSTOLIC BLOOD PRESSURE: 134 MMHG | DIASTOLIC BLOOD PRESSURE: 83 MMHG | HEART RATE: 72 BPM | HEART RATE: 70 BPM | HEIGHT: 74 IN | RESPIRATION RATE: 18 BRPM | OXYGEN SATURATION: 100 % | OXYGEN SATURATION: 96 % | OXYGEN SATURATION: 98 % | SYSTOLIC BLOOD PRESSURE: 121 MMHG

## 2019-08-14 DIAGNOSIS — R10.13 EPIGASTRIC PAIN: Primary | ICD-10-CM

## 2019-08-14 DIAGNOSIS — R10.13 EPIGASTRIC PAIN: ICD-10-CM

## 2019-08-14 DIAGNOSIS — K21.9 GASTRO-ESOPHAGEAL REFLUX DISEASE WITHOUT ESOPHAGITIS: ICD-10-CM

## 2019-08-14 PROBLEM — R13.10 DYSPHAGIA: Status: ACTIVE | Noted: 2019-08-14

## 2019-08-14 PROCEDURE — 43235 EGD DIAGNOSTIC BRUSH WASH: CPT | Performed by: INTERNAL MEDICINE

## 2019-08-16 ENCOUNTER — HOSPITAL ENCOUNTER (OUTPATIENT)
Dept: CT IMAGING | Facility: HOSPITAL | Age: 49
Discharge: HOME | End: 2019-08-16
Admitting: INTERNAL MEDICINE

## 2019-08-16 DIAGNOSIS — R10.13 EPIGASTRIC PAIN: ICD-10-CM

## 2019-08-16 LAB — CREAT BLDA-MCNC: 1 MG/DL (ref 0.6–1.3)

## 2019-08-16 PROCEDURE — 74160 CT ABDOMEN W/CONTRAST: CPT

## 2019-08-16 PROCEDURE — 82565 ASSAY OF CREATININE: CPT

## 2019-08-16 PROCEDURE — 0 IOPAMIDOL PER 1 ML: Performed by: INTERNAL MEDICINE

## 2019-08-16 RX ADMIN — IOPAMIDOL 100 ML: 755 INJECTION, SOLUTION INTRAVENOUS at 15:45

## 2019-08-21 RX ORDER — MONTELUKAST SODIUM 10 MG/1
TABLET ORAL
Qty: 90 TABLET | Refills: 0 | Status: SHIPPED | OUTPATIENT
Start: 2019-08-21 | End: 2019-09-23 | Stop reason: SDUPTHER

## 2019-08-21 RX ORDER — ATORVASTATIN CALCIUM 20 MG/1
TABLET, FILM COATED ORAL
Qty: 90 TABLET | Refills: 0 | Status: SHIPPED | OUTPATIENT
Start: 2019-08-21 | End: 2019-11-17 | Stop reason: SDUPTHER

## 2019-09-16 ENCOUNTER — OFFICE VISIT (OUTPATIENT)
Dept: FAMILY MEDICINE CLINIC | Facility: CLINIC | Age: 49
End: 2019-09-16

## 2019-09-16 VITALS
BODY MASS INDEX: 22.39 KG/M2 | WEIGHT: 165.3 LBS | SYSTOLIC BLOOD PRESSURE: 137 MMHG | OXYGEN SATURATION: 100 % | HEART RATE: 105 BPM | RESPIRATION RATE: 20 BRPM | DIASTOLIC BLOOD PRESSURE: 97 MMHG | TEMPERATURE: 98 F | HEIGHT: 72 IN

## 2019-09-16 DIAGNOSIS — F43.10 POSTTRAUMATIC STRESS DISORDER: ICD-10-CM

## 2019-09-16 DIAGNOSIS — E55.9 VITAMIN D DEFICIENCY: ICD-10-CM

## 2019-09-16 DIAGNOSIS — E78.5 HYPERLIPIDEMIA, UNSPECIFIED HYPERLIPIDEMIA TYPE: ICD-10-CM

## 2019-09-16 DIAGNOSIS — F17.200 TOBACCO DEPENDENCE SYNDROME: ICD-10-CM

## 2019-09-16 DIAGNOSIS — I25.118 CORONARY ARTERY DISEASE OF NATIVE HEART WITH STABLE ANGINA PECTORIS, UNSPECIFIED VESSEL OR LESION TYPE (HCC): Primary | ICD-10-CM

## 2019-09-16 DIAGNOSIS — G89.4 CHRONIC PAIN DISORDER: ICD-10-CM

## 2019-09-16 DIAGNOSIS — K30 INDIGESTION: ICD-10-CM

## 2019-09-16 PROCEDURE — 99214 OFFICE O/P EST MOD 30 MIN: CPT | Performed by: NURSE PRACTITIONER

## 2019-09-23 ENCOUNTER — TELEPHONE (OUTPATIENT)
Dept: PAIN MEDICINE | Facility: HOSPITAL | Age: 49
End: 2019-09-23

## 2019-09-23 RX ORDER — TIZANIDINE 4 MG/1
4 TABLET ORAL NIGHTLY PRN
Qty: 90 TABLET | Refills: 0 | Status: SHIPPED | OUTPATIENT
Start: 2019-09-23 | End: 2020-04-13

## 2019-09-24 RX ORDER — MONTELUKAST SODIUM 10 MG/1
TABLET ORAL
Qty: 90 TABLET | Refills: 0 | Status: SHIPPED | OUTPATIENT
Start: 2019-09-24 | End: 2020-02-12

## 2019-10-15 ENCOUNTER — TELEPHONE (OUTPATIENT)
Dept: PAIN MEDICINE | Facility: CLINIC | Age: 49
End: 2019-10-15

## 2019-10-15 RX ORDER — PREGABALIN 200 MG/1
CAPSULE ORAL
Qty: 90 CAPSULE | Refills: 0 | OUTPATIENT
Start: 2019-10-15

## 2019-10-16 RX ORDER — PREGABALIN 200 MG/1
CAPSULE ORAL
Qty: 90 CAPSULE | Refills: 0 | OUTPATIENT
Start: 2019-10-16

## 2019-10-21 ENCOUNTER — OFFICE VISIT (OUTPATIENT)
Dept: PAIN MEDICINE | Facility: CLINIC | Age: 49
End: 2019-10-21

## 2019-10-21 VITALS
TEMPERATURE: 97.4 F | WEIGHT: 172 LBS | SYSTOLIC BLOOD PRESSURE: 129 MMHG | HEIGHT: 74 IN | HEART RATE: 67 BPM | RESPIRATION RATE: 16 BRPM | BODY MASS INDEX: 22.07 KG/M2 | DIASTOLIC BLOOD PRESSURE: 79 MMHG

## 2019-10-21 DIAGNOSIS — Z79.899 HIGH RISK MEDICATION USE: ICD-10-CM

## 2019-10-21 DIAGNOSIS — M47.817 LUMBOSACRAL SPONDYLOSIS WITHOUT MYELOPATHY: ICD-10-CM

## 2019-10-21 DIAGNOSIS — G89.4 CHRONIC PAIN SYNDROME: ICD-10-CM

## 2019-10-21 DIAGNOSIS — L40.50 PSORIATIC ARTHRITIS (HCC): ICD-10-CM

## 2019-10-21 DIAGNOSIS — M54.16 LUMBAR RADICULITIS: ICD-10-CM

## 2019-10-21 DIAGNOSIS — M79.2 NEUROPATHIC PAIN: ICD-10-CM

## 2019-10-21 DIAGNOSIS — M25.50 POLYARTHRALGIA: ICD-10-CM

## 2019-10-21 PROCEDURE — 99214 OFFICE O/P EST MOD 30 MIN: CPT | Performed by: ANESTHESIOLOGY

## 2019-10-21 PROCEDURE — G0463 HOSPITAL OUTPT CLINIC VISIT: HCPCS | Performed by: ANESTHESIOLOGY

## 2019-10-21 RX ORDER — MULTIPLE VITAMINS W/ MINERALS TAB 9MG-400MCG
1 TAB ORAL DAILY
COMMUNITY
End: 2021-01-19

## 2019-10-21 RX ORDER — PREGABALIN 200 MG/1
200 CAPSULE ORAL 3 TIMES DAILY
Qty: 90 CAPSULE | Refills: 5 | Status: SHIPPED | OUTPATIENT
Start: 2019-10-21 | End: 2020-04-13 | Stop reason: SDUPTHER

## 2019-10-21 NOTE — PROGRESS NOTES
CC back pain, bilateral leg pain,    49-year-old male with multiple comorbidities including CAD S/P CABG 2015 on Plavix, chronic pain from  psoriatic arthritis lumbar DDD spondylosis and stenosis with neurogenic claudication, BLE neuropathic pain,   psoriasis arthritis on DMARD here for follow up.   Continued  polyarthralgia,  generalized myofascial pain,  low back pain radiating to bilateral hips and Right  lower extremity has sharp shooting pain worse with walking and prolonged standing relieved by rest.  Denies new weakness saddle anesthesia bladder  or bowel incontinence.  Tried PT with mild relief.  Utilizing cane for ambulation   declines injections    Utilizes   Flexeril, Lyrica  with good relief functional benefit.    L-spine MRI 2015:  Degenerative changes worse at L4-5 with central disc protrusion and annular tear.  Mild foraminal narrowing.  L5-S1 with small right paracentral focal disc protrusion and annular tear.  Mild neural foraminal narrowing right more than  Left. Mild-to-moderate facet arthropathy throughout.  T-spine MRI 2015:  Degenerative changes, T7-T8  small left paracentral disc protrusion with slight flattening of the anterior surface of or aid on the left.    Pain Assessment   Location of Pain: R Shoulder, L Shoulder, Upper Back, Lower Back, R Hip, L Hip, R Leg, L Leg  Description of Pain: Dull/Aching, Throbbing, Stabbing  Previous Pain Rating : 10  Current Pain Ratin  Aggravating Factors: Activity  Alleviating Factors: Rest, Medication  Previous Treatments: Physical Therapy  Previous Diagnostic Studies: MRI    The following were reviewed   has a past medical history of Back pain and Leg pain.     has a past surgical history that includes Coronary artery bypass graft and Coronary angioplasty with stent.    Social History     Tobacco Use   • Smoking status: Current Every Day Smoker     Packs/day: 0.50   • Smokeless tobacco: Never Used   Substance Use Topics   • Alcohol use: No      Frequency: Never       Review of Systems        See HPI    General       Denies fever/chills, fatigue and sleep problems.    Eyes       Denies blurry vision and double vision.    ENT       Denies decreased hearing, sore throat and ears ringing.    CV       Denies chest pain and fainting.    Resp       Denies shortness of breath and cough.    GI       Denies heartburn, constipation, nausea, vomiting and diarrhea.           Denies pain on urination, incontinence and increased frequency.    MS       Denies joint swelling, cramps and weakness.       Back pain, bilateral lower extremity pain.    Derm       Denies rash and itching.    Neuro       Denies numbness, tingling, loss of balance and history of seizures.    Psych       Denies anxiety and depression.    Endo       Denies weight change and thirsty all the time.    Heme       Denies easy bruising, bleeding and enlarged lymph nodes.    Vitals:    10/21/19 1301   BP: 129/79   Pulse: 67   Resp: 16   Temp: 97.4 °F (36.3 °C)       Physical Exam   General  General appearance: Poorly groomed, looking older than stated age, no acute distress  Gait and station: Assistive Device  Comment: cane    Mental Status Exam   Mental Status: AAO x3  Behavior: Appropriate    Thoracolumbar   ROM: decreased rotation/extension  Straight Leg Raise: Positive  Radiculopathy: Right, Left  Palpation: Tender  Paravertebral    Neuro   Reflexes: R Arm 1+  L arm 1+  R Leg 1+  L Leg 1+    Strength: Arms Normal  Strength: Legs Normal     Eyes:      Clear conjunctivae,      Pupils rounds and reactive  ENT:      Clear oropharynx,       Mucosa moist/pink       Nose: no deformity  Chest Wall:      Non tender      No deformities or masses noted.    Respiratory:          No dyspnea  Heart:      Regular rate and rhythm, no murmurs, rubs, or gallops   Pulses:      Pulses 2+, symmetric  Extremities:      No clubbing, cyanosis, edema, or deformity noted   Neurologic:      No focal deficits      Skin:       Intact without lesions or rashes.  No mass  Cervical Nodes:      No adenopathy noted.    Global pain scale and PHQ-9 on chart                         opioid risk tool low risk        Assessment /Plan  Frank was seen today for back pain, leg pain and follow-up.    Diagnoses and all orders for this visit:    Psoriatic arthritis (CMS/HCC)    Neuropathic pain    Chronic pain syndrome    Lumbosacral spondylosis without myelopathy    Lumbar radiculitis    Polyarthralgia    High risk medication use    Other orders  -     pregabalin (LYRICA) 200 MG capsule; Take 1 capsule by mouth 3 (Three) Times a Day.    Summary  49-year-old male with multiple comorbidities including CAD status post CABG on Plavix, chronic back pain from DDD spondylosis with radiculitis and stenosis with neurogenic claudication, BLE neuropathic pain seen in f/u.  Chronic pain from polyarthralgia /psoriatic arthritis  back pain from DDD spondylosis.  Currently off DMARD      Continue Lyrica  200 mg 3 times daily.  UDS  and inspect reviewed.  Discussed risk of tolerance, dependence, respiratory depression, coma and death associated with use of oral opioids for treatment of chronic nonmalignant pain.       continue flexeril  Continue compounded anti-inflammatory/neuropathic cream with ketamine.    RTC in 6 months.

## 2019-11-12 ENCOUNTER — OFFICE VISIT (OUTPATIENT)
Dept: PSYCHIATRY | Facility: CLINIC | Age: 49
End: 2019-11-12

## 2019-11-12 DIAGNOSIS — F43.10 POSTTRAUMATIC STRESS DISORDER: Primary | ICD-10-CM

## 2019-11-12 DIAGNOSIS — F33.1 MAJOR DEPRESSIVE DISORDER, RECURRENT EPISODE, MODERATE (HCC): ICD-10-CM

## 2019-11-12 PROCEDURE — 99213 OFFICE O/P EST LOW 20 MIN: CPT | Performed by: PHYSICIAN ASSISTANT

## 2019-11-12 NOTE — PROGRESS NOTES
Subjective   Frank Barger is a 49 y.o.white male who presents today for follow up    Chief Complaint:  Depression, anxiety and PTSD    History of Present Illness:   He continues to go totherapist at [facility], sees her tomorrow, every two or three weeks, Laura  His girlfriend is with him today, says he is doing much better.  Adding Abilify helped his mood a lot.  No nightmares since taking Prazoin  Depression is improved  Denies Si/HI  Anxiety 4/10  Sleeping much better    The following portions of the patient's history were reviewed and updated as appropriate: allergies, current medications, past family history, past medical history, past social history, past surgical history and problem list.    PAST PSYCHIATRIC HISTORY  Axis I  Affective/Bipoloar Disorder, Anxiety/Panic Disorder, Posttraumatic Stress  Axis II  None    PAST OUTPATIENT TREATMENT  Diagnosis treated:  Affective Disorder, Anxiety/Panic Disorder, Post-Traumatic Stress  Treatment Type:  Individual Therapy, Medication Management  Prior Psychiatric Medications:  Zoloft  Brintellix  Trintellix   Prazosin  Elavil  Abilify  Support Groups:  None  Sequelae Of Mental Disorder:  social isolation, family disruption, emotional distress      Interval History  No Change    Side Effects  None      Past Medical History:  Past Medical History:   Diagnosis Date   • Anxiety    • Back pain    • Depression    • Leg pain        Social History:  Social History     Socioeconomic History   • Marital status: Single     Spouse name: Not on file   • Number of children: Not on file   • Years of education: Not on file   • Highest education level: Not on file   Tobacco Use   • Smoking status: Current Every Day Smoker     Packs/day: 0.25   • Smokeless tobacco: Never Used   Substance and Sexual Activity   • Alcohol use: No     Frequency: Never   • Drug use: No   • Sexual activity: Yes     Partners: Female       Family History:  Family History   Problem Relation Age of Onset    • Diabetes Mother    • Heart disease Father    • Anxiety disorder Daughter    • Depression Daughter        Past Surgical History:  Past Surgical History:   Procedure Laterality Date   • CORONARY ANGIOPLASTY WITH STENT PLACEMENT     • CORONARY ARTERY BYPASS GRAFT         Problem List:  Patient Active Problem List   Diagnosis   • Atherosclerosis of coronary artery bypass graft   • Benign essential hypertension   • Chronic obstructive pulmonary disease (CMS/HCC)   • Chronic pain disorder   • Coronary artery disease   • Degeneration of intervertebral disc of lumbar region   • Fibromyositis   • Hyperlipidemia   • Migraine headache   • Need for prophylactic vaccination against Streptococcus pneumoniae (pneumococcus) and influenza   • Neuropathic pain   • Other long term (current) drug therapy   • Posttraumatic stress disorder   • Psoriasis   • Psoriatic arthritis (CMS/HCC)   • Seasonal allergies   • Tobacco dependence syndrome   • Status post coronary artery stent placement   • Status post coronary artery bypass graft   • Indigestion       Allergy:   Allergies   Allergen Reactions   • Hydrocodone-Acetaminophen GI Intolerance   • Oxycodone GI Intolerance   • Hydrocodone Nausea And Vomiting        Discontinued Medications:  There are no discontinued medications.    Current Medications:   Current Outpatient Medications   Medication Sig Dispense Refill   • amitriptyline (ELAVIL) 75 MG tablet TAKE 1 TABLET BY MOUTH EVERY NIGHT 90 tablet 1   • ARIPiprazole (ABILIFY) 5 MG tablet TAKE 1 TABLET BY MOUTH EVERY NIGHT 90 tablet 1   • aspirin (ASPIR) 81 MG EC tablet ASPIRIN 81 81 MG TBEC     • atorvastatin (LIPITOR) 20 MG tablet TAKE 1 TABLET BY MOUTH DAILY 90 tablet 0   • metoprolol tartrate (LOPRESSOR) 25 MG tablet TAKE 1 TABLET BY MOUTH DAILY 90 tablet 0   • montelukast (SINGULAIR) 10 MG tablet TAKE 1 TABLET BY MOUTH DAILY 90 tablet 0   • Multiple Vitamins-Minerals (MULTIVITAMIN WITH MINERALS) tablet tablet Take 1 tablet by mouth  Daily.     • naproxen (NAPROSYN) 500 MG tablet Take 500 mg by mouth 2 (Two) Times a Day.  3   • nitroglycerin (NITROSTAT) 0.4 MG SL tablet DISSOLVE 1 TABLET UNDER THE TONGUE EVERY 5 MINUTES AS NEEDED FOR CHEST PAIN UP TO TOTAL 3 TABLETS. IF NO RELIEF GO TO EMERGENCY ROOM 25 tablet 0   • omeprazole (priLOSEC) 20 MG capsule TAKE 1 CAPSULE BY MOUTH DAILY 90 capsule 1   • prazosin (MINIPRESS) 2 MG capsule TAKE 1 CAPSULE BY MOUTH EVERY NIGHT 90 capsule 1   • pregabalin (LYRICA) 200 MG capsule Take 1 capsule by mouth 3 (Three) Times a Day. 90 capsule 5   • tiZANidine (ZANAFLEX) 4 MG tablet Take 1 tablet by mouth At Night As Needed for Muscle Spasms. 90 tablet 0   • Vortioxetine HBr (TRINTELLIX) 20 MG tablet Take 20 mg by mouth Daily. 30 tablet 3     No current facility-administered medications for this visit.          Review of Symptoms:    Psychiatric/Behavioral: Negative for agitation, behavioral problems, confusion, decreased concentration, dysphoric mood, hallucinations, self-injury, sleep disturbance and suicidal ideas. The patient is not nervous/anxious and is not hyperactive.        Physical Exam:   There were no vitals taken for this visit.    Mental Status Exam:   Hygiene:   good  Cooperation:  Cooperative  Eye Contact:  Good  Psychomotor Behavior:  Appropriate  Affect:  Appropriate  Mood: Normal  Hopelessness: Denies  Speech:  Normal  Thought Process:  Goal directed  Thought Content:  Normal  Suicidal:  None  Homicidal:  None  Hallucinations:  None  Delusion:  None  Memory:  Intact  Orientation:  Person, Place, Time and Situation  Reliability:  good  Insight:  Good  Judgement:  Good  Impulse Control:  Good  Physical/Medical Issues:  Yes arthritis       PHQ-9 Depression Screening  Little interest or pleasure in doing things? 1   Feeling down, depressed, or hopeless? 1   Trouble falling or staying asleep, or sleeping too much? 1   Feeling tired or having little energy? 2   Poor appetite or overeating? 0   Feeling  bad about yourself - or that you are a failure or have let yourself or your family down? 1   Trouble concentrating on things, such as reading the newspaper or watching television? 1   Moving or speaking so slowly that other people could have noticed? Or the opposite - being so fidgety or restless that you have been moving around a lot more than usual? 0   Thoughts that you would be better off dead, or of hurting yourself in some way? 0   PHQ-9 Total Score 7   If you checked off any problems, how difficult have these problems made it for you to do your work, take care of things at home, or get along with other people?             Currently smoking, has decreased to 1/4 ppd and trying to quit.      I advised Frank of the risks of tobacco use.     Lab Results:   Hospital Outpatient Visit on 08/16/2019   Component Date Value Ref Range Status   • Creatinine 08/16/2019 1.00  0.60 - 1.30 mg/dL Final    Serial Number: [id number]:  [id number]   • GFR MDRD  08/16/2019   0 - 60 mL/min/1.73 sq.M Final    Serial Number: [id number]:  [id number]   • GFR MDRD Non  08/16/2019   0 - 60 mL/min/1.73 sq.M Final    Serial Number: [id number]:  [id number]       Assessment/Plan   Problems Addressed this Visit     None          Visit Diagnoses:  No diagnosis found.    TREATMENT PLAN/GOALS: Continue supportive psychotherapy efforts and medications as indicated. Treatment and medication options discussed during today's visit. Patient ackowledged and verbally consented to continue with current treatment plan and was educated on the importance of compliance with treatment and follow-up appointments.    MEDICATION ISSUES:  INSPECT reviewed as expected  Discussed medication options and treatment plan of prescribed medication as well as the risks, benefits, and side effects including potential falls, possible impaired driving and metabolic adversities among others. Patient is agreeable to call the office with any  worsening of symptoms or onset of side effects. Patient is agreeable to call 911 or go to the nearest ER should he/she begin having SI/HI. No medication side effects or related complaints today.     Adding  Abilify 5 mg daily has really helped, continue at current dosing but no refills needed today.  Continue Trintellix, Elavil and prazosin at current dosages with no changes.  No refills needed today.      MEDS ORDERED DURING VISIT:  No orders of the defined types were placed in this encounter.      Return in about 4 months (around 3/12/2020).         This document has been electronically signed by Carlita Mcdermott PA-C  November 12, 2019 3:30 PM

## 2019-11-14 ENCOUNTER — TELEPHONE (OUTPATIENT)
Dept: FAMILY MEDICINE CLINIC | Facility: CLINIC | Age: 49
End: 2019-11-14

## 2019-11-18 RX ORDER — ATORVASTATIN CALCIUM 20 MG/1
TABLET, FILM COATED ORAL
Qty: 90 TABLET | Refills: 0 | Status: SHIPPED | OUTPATIENT
Start: 2019-11-18 | End: 2020-02-12

## 2019-11-22 RX ORDER — NAPROXEN 500 MG/1
TABLET ORAL
Qty: 60 TABLET | Refills: 0 | Status: SHIPPED | OUTPATIENT
Start: 2019-11-22 | End: 2019-12-22

## 2019-11-22 RX ORDER — NITROGLYCERIN 0.4 MG/1
TABLET SUBLINGUAL
Qty: 25 TABLET | Refills: 0 | Status: SHIPPED | OUTPATIENT
Start: 2019-11-22 | End: 2021-01-12 | Stop reason: SDUPTHER

## 2019-12-18 RX ORDER — TIZANIDINE 4 MG/1
4 TABLET ORAL NIGHTLY PRN
Qty: 90 TABLET | Refills: 0 | OUTPATIENT
Start: 2019-12-18

## 2019-12-22 RX ORDER — NAPROXEN 500 MG/1
TABLET ORAL
Qty: 60 TABLET | Refills: 0 | Status: SHIPPED | OUTPATIENT
Start: 2019-12-22 | End: 2020-01-19

## 2020-01-19 RX ORDER — NAPROXEN 500 MG/1
TABLET ORAL
Qty: 60 TABLET | Refills: 0 | Status: SHIPPED | OUTPATIENT
Start: 2020-01-19 | End: 2020-03-14

## 2020-01-26 DIAGNOSIS — F33.1 MAJOR DEPRESSIVE DISORDER, RECURRENT EPISODE, MODERATE (HCC): ICD-10-CM

## 2020-01-26 DIAGNOSIS — F43.10 POSTTRAUMATIC STRESS DISORDER: ICD-10-CM

## 2020-01-27 RX ORDER — ARIPIPRAZOLE 5 MG/1
5 TABLET ORAL NIGHTLY
Qty: 90 TABLET | Refills: 0 | Status: SHIPPED | OUTPATIENT
Start: 2020-01-27 | End: 2020-03-26

## 2020-01-27 RX ORDER — AMITRIPTYLINE HYDROCHLORIDE 75 MG/1
75 TABLET, FILM COATED ORAL NIGHTLY
Qty: 90 TABLET | Refills: 0 | Status: SHIPPED | OUTPATIENT
Start: 2020-01-27 | End: 2020-03-26

## 2020-02-12 RX ORDER — OMEPRAZOLE 20 MG/1
CAPSULE, DELAYED RELEASE ORAL
Qty: 90 CAPSULE | Refills: 1 | Status: SHIPPED | OUTPATIENT
Start: 2020-02-12 | End: 2020-07-31

## 2020-02-12 RX ORDER — MONTELUKAST SODIUM 10 MG/1
TABLET ORAL
Qty: 90 TABLET | Refills: 0 | Status: SHIPPED | OUTPATIENT
Start: 2020-02-12 | End: 2020-05-07

## 2020-02-12 RX ORDER — ATORVASTATIN CALCIUM 20 MG/1
TABLET, FILM COATED ORAL
Qty: 90 TABLET | Refills: 0 | Status: SHIPPED | OUTPATIENT
Start: 2020-02-12 | End: 2020-05-07

## 2020-02-19 ENCOUNTER — OFFICE VISIT (OUTPATIENT)
Dept: PSYCHIATRY | Facility: CLINIC | Age: 50
End: 2020-02-19

## 2020-02-19 DIAGNOSIS — F33.1 MAJOR DEPRESSIVE DISORDER, RECURRENT EPISODE, MODERATE (HCC): ICD-10-CM

## 2020-02-19 DIAGNOSIS — F43.10 POSTTRAUMATIC STRESS DISORDER: Primary | ICD-10-CM

## 2020-02-19 PROCEDURE — 99213 OFFICE O/P EST LOW 20 MIN: CPT | Performed by: PHYSICIAN ASSISTANT

## 2020-02-19 NOTE — PROGRESS NOTES
Subjective   Frank Barger is a 49 y.o.white male who presents today for follow up    Chief Complaint:  Depression, anxiety and PTSD    History of Present Illness:   He continues to see therapist at [facility], sees her every two or three weeks, Laura  His girlfriend is with him again today, says he is still doing well  Adding Abilify helped his mood a lot.  Depression low 2/10  Denies Si/HI  Anxiety 4/10  Sleeping well, no nightmares  Had hearing check, losing his hearing, Dr. Valdes is sending him to ENT    The following portions of the patient's history were reviewed and updated as appropriate: allergies, current medications, past family history, past medical history, past social history, past surgical history and problem list.    PAST PSYCHIATRIC HISTORY  Axis I  Affective/Bipoloar Disorder, Anxiety/Panic Disorder, Posttraumatic Stress  Axis II  None    PAST OUTPATIENT TREATMENT  Diagnosis treated:  Affective Disorder, Anxiety/Panic Disorder, Post-Traumatic Stress  Treatment Type:  Individual Therapy, Medication Management  Prior Psychiatric Medications:  Zoloft  Brintellix  Trintellix   Prazosin  Elavil  Abilify  Support Groups:  None  Sequelae Of Mental Disorder:  social isolation, family disruption, emotional distress      Interval History  No Change    Side Effects  None    Past psychiatric history reviewed and compared to 11/12/1 on visit and appropriate updates were made.    Past Medical History:  Past Medical History:   Diagnosis Date   • Anxiety    • Back pain    • Depression    • Leg pain        Social History:  Social History     Socioeconomic History   • Marital status: Single     Spouse name: Not on file   • Number of children: Not on file   • Years of education: Not on file   • Highest education level: Not on file   Tobacco Use   • Smoking status: Current Every Day Smoker     Packs/day: 0.25   • Smokeless tobacco: Never Used   Substance and Sexual Activity   • Alcohol use: No     Frequency:  Never   • Drug use: No   • Sexual activity: Yes     Partners: Female       Family History:  Family History   Problem Relation Age of Onset   • Diabetes Mother    • Heart disease Father    • Anxiety disorder Daughter    • Depression Daughter        Past Surgical History:  Past Surgical History:   Procedure Laterality Date   • CORONARY ANGIOPLASTY WITH STENT PLACEMENT     • CORONARY ARTERY BYPASS GRAFT         Problem List:  Patient Active Problem List   Diagnosis   • Atherosclerosis of coronary artery bypass graft   • Benign essential hypertension   • Chronic obstructive pulmonary disease (CMS/HCC)   • Chronic pain disorder   • Coronary artery disease   • Degeneration of intervertebral disc of lumbar region   • Fibromyositis   • Hyperlipidemia   • Migraine headache   • Need for prophylactic vaccination against Streptococcus pneumoniae (pneumococcus) and influenza   • Neuropathic pain   • Other long term (current) drug therapy   • Posttraumatic stress disorder   • Psoriasis   • Psoriatic arthritis (CMS/HCC)   • Seasonal allergies   • Tobacco dependence syndrome   • Status post coronary artery stent placement   • Status post coronary artery bypass graft   • Indigestion       Allergy:   Allergies   Allergen Reactions   • Hydrocodone-Acetaminophen GI Intolerance   • Oxycodone GI Intolerance   • Hydrocodone Nausea And Vomiting        Discontinued Medications:  Medications Discontinued During This Encounter   Medication Reason   • Vortioxetine HBr (TRINTELLIX) 20 MG tablet Reorder       Current Medications:   Current Outpatient Medications   Medication Sig Dispense Refill   • amitriptyline (ELAVIL) 75 MG tablet TAKE 1 TABLET BY MOUTH EVERY NIGHT 90 tablet 0   • ARIPiprazole (ABILIFY) 5 MG tablet TAKE 1 TABLET BY MOUTH EVERY NIGHT 90 tablet 0   • aspirin (ASPIR) 81 MG EC tablet ASPIRIN 81 81 MG TBEC     • atorvastatin (LIPITOR) 20 MG tablet TAKE 1 TABLET BY MOUTH DAILY 90 tablet 0   • metoprolol tartrate (LOPRESSOR) 25 MG  tablet TAKE 1 TABLET BY MOUTH DAILY 90 tablet 0   • montelukast (SINGULAIR) 10 MG tablet TAKE 1 TABLET BY MOUTH DAILY 90 tablet 0   • Multiple Vitamins-Minerals (MULTIVITAMIN WITH MINERALS) tablet tablet Take 1 tablet by mouth Daily.     • naproxen (NAPROSYN) 500 MG tablet TAKE 1 TABLET BY MOUTH TWICE DAILY 60 tablet 0   • nitroglycerin (NITROSTAT) 0.4 MG SL tablet DISSOLVE 1 TABLET UNDER THE TONGUE EVERY 5 MINUTES AS NEEDED FOR CHEST PAIN UP TO TOTAL 3 TABLETS. IF NO RELIEF GO TO EMERGENCY ROOM 25 tablet 0   • omeprazole (priLOSEC) 20 MG capsule TAKE 1 CAPSULE BY MOUTH EVERY DAY 90 capsule 1   • prazosin (MINIPRESS) 2 MG capsule TAKE 1 CAPSULE BY MOUTH EVERY NIGHT 90 capsule 1   • pregabalin (LYRICA) 200 MG capsule Take 1 capsule by mouth 3 (Three) Times a Day. 90 capsule 5   • tiZANidine (ZANAFLEX) 4 MG tablet Take 1 tablet by mouth At Night As Needed for Muscle Spasms. 90 tablet 0   • Vortioxetine HBr (TRINTELLIX) 20 MG tablet Take 20 mg by mouth Daily. 90 tablet 1     No current facility-administered medications for this visit.          Review of Symptoms:    Psychiatric/Behavioral: Negative for agitation, behavioral problems, confusion, decreased concentration, dysphoric mood, hallucinations, self-injury, sleep disturbance and suicidal ideas. The patient is not nervous/anxious and is not hyperactive.        Physical Exam:   There were no vitals taken for this visit.    Mental Status Exam:   Hygiene:   good  Cooperation:  Cooperative  Eye Contact:  Good  Psychomotor Behavior:  Appropriate  Affect:  Appropriate  Mood: Normal  Hopelessness: Denies  Speech:  Normal  Thought Process:  Goal directed  Thought Content:  Normal  Suicidal:  None  Homicidal:  None  Hallucinations:  None  Delusion:  None  Memory:  Intact  Orientation:  Person, Place, Time and Situation  Reliability:  good  Insight:  Good  Judgement:  Good  Impulse Control:  Good  Physical/Medical Issues:  Yes arthritis     Mental status exam was reviewed  and compared to 11/12/19 visit and no changes were necessary, the exam was the same.    PHQ-9 Depression Screening  Little interest or pleasure in doing things? 1   Feeling down, depressed, or hopeless? 1   Trouble falling or staying asleep, or sleeping too much? 0   Feeling tired or having little energy? 1   Poor appetite or overeating? 0   Feeling bad about yourself - or that you are a failure or have let yourself or your family down? 0   Trouble concentrating on things, such as reading the newspaper or watching television? 1   Moving or speaking so slowly that other people could have noticed? Or the opposite - being so fidgety or restless that you have been moving around a lot more than usual? 0   Thoughts that you would be better off dead, or of hurting yourself in some way? 0   PHQ-9 Total Score 4   If you checked off any problems, how difficult have these problems made it for you to do your work, take care of things at home, or get along with other people? Not difficult at all           Currently smoking, has decreased to 1/4 ppd and trying to quit.      I advised Frank of the risks of tobacco use.     Lab Results:   No visits with results within 3 Month(s) from this visit.   Latest known visit with results is:   Hospital Outpatient Visit on 08/16/2019   Component Date Value Ref Range Status   • Creatinine 08/16/2019 1.00  0.60 - 1.30 mg/dL Final    Serial Number: [id number]:  [id number]   • GFR MDRD  08/16/2019   0 - 60 mL/min/1.73 sq.M Final    Serial Number: [id number]:  [id number]   • GFR MDRD Non  08/16/2019   0 - 60 mL/min/1.73 sq.M Final    Serial Number: [id number]:  [id number]       Assessment/Plan   Problems Addressed this Visit        Other    Posttraumatic stress disorder - Primary    Relevant Medications    Vortioxetine HBr (TRINTELLIX) 20 MG tablet      Other Visit Diagnoses     Major depressive disorder, recurrent episode, moderate (CMS/HCC)        Relevant  Medications    Vortioxetine HBr (TRINTELLIX) 20 MG tablet          Visit Diagnoses:    ICD-10-CM ICD-9-CM   1. Posttraumatic stress disorder F43.10 309.81   2. Major depressive disorder, recurrent episode, moderate (CMS/HCC) F33.1 296.32       TREATMENT PLAN/GOALS: Continue supportive psychotherapy efforts and medications as indicated. Treatment and medication options discussed during today's visit. Patient ackowledged and verbally consented to continue with current treatment plan and was educated on the importance of compliance with treatment and follow-up appointments.    MEDICATION ISSUES:  INSPECT reviewed as expected  Discussed medication options and treatment plan of prescribed medication as well as the risks, benefits, and side effects including potential falls, possible impaired driving and metabolic adversities among others. Patient is agreeable to call the office with any worsening of symptoms or onset of side effects. Patient is agreeable to call 911 or go to the nearest ER should he/she begin having SI/HI. No medication side effects or related complaints today.     Continue current medication with no changes.  The patient and his girlfriend both agree he is doing well.  Continue Abilify, Trintellix and prazosin at current dosages, but only the Trintellix needed refills today.    MEDS ORDERED DURING VISIT:  New Medications Ordered This Visit   Medications   • Vortioxetine HBr (TRINTELLIX) 20 MG tablet     Sig: Take 20 mg by mouth Daily.     Dispense:  90 tablet     Refill:  1       Return in about 3 months (around 5/19/2020).         This document has been electronically signed by Carlita Mcdermott PA-C  February 19, 2020 3:08 PM

## 2020-03-02 ENCOUNTER — OFFICE VISIT (OUTPATIENT)
Dept: FAMILY MEDICINE CLINIC | Facility: CLINIC | Age: 50
End: 2020-03-02

## 2020-03-02 VITALS
DIASTOLIC BLOOD PRESSURE: 79 MMHG | RESPIRATION RATE: 18 BRPM | TEMPERATURE: 97.5 F | HEART RATE: 74 BPM | HEIGHT: 74 IN | BODY MASS INDEX: 22.48 KG/M2 | SYSTOLIC BLOOD PRESSURE: 122 MMHG | OXYGEN SATURATION: 99 % | WEIGHT: 175.2 LBS

## 2020-03-02 DIAGNOSIS — I25.118 CORONARY ARTERY DISEASE OF NATIVE HEART WITH STABLE ANGINA PECTORIS, UNSPECIFIED VESSEL OR LESION TYPE (HCC): ICD-10-CM

## 2020-03-02 DIAGNOSIS — G89.4 CHRONIC PAIN DISORDER: ICD-10-CM

## 2020-03-02 DIAGNOSIS — I10 BENIGN ESSENTIAL HYPERTENSION: Primary | ICD-10-CM

## 2020-03-02 DIAGNOSIS — H93.12 TINNITUS OF LEFT EAR: ICD-10-CM

## 2020-03-02 DIAGNOSIS — E78.5 HYPERLIPIDEMIA, UNSPECIFIED HYPERLIPIDEMIA TYPE: ICD-10-CM

## 2020-03-02 PROBLEM — K30 INDIGESTION: Status: RESOLVED | Noted: 2019-07-22 | Resolved: 2020-03-02

## 2020-03-02 PROBLEM — Z79.899 OTHER LONG TERM (CURRENT) DRUG THERAPY: Status: RESOLVED | Noted: 2017-05-03 | Resolved: 2020-03-02

## 2020-03-02 PROBLEM — G43.909 MIGRAINE HEADACHE: Status: RESOLVED | Noted: 2017-11-28 | Resolved: 2020-03-02

## 2020-03-02 PROBLEM — J44.9 CHRONIC OBSTRUCTIVE PULMONARY DISEASE (HCC): Status: RESOLVED | Noted: 2017-04-13 | Resolved: 2020-03-02

## 2020-03-02 PROBLEM — K21.9 GASTROESOPHAGEAL REFLUX DISEASE WITHOUT ESOPHAGITIS: Status: ACTIVE | Noted: 2020-03-02

## 2020-03-02 PROCEDURE — 99214 OFFICE O/P EST MOD 30 MIN: CPT | Performed by: FAMILY MEDICINE

## 2020-03-02 RX ORDER — NEOMYCIN SULFATE, POLYMYXIN B SULFATE AND DEXAMETHASONE 3.5; 10000; 1 MG/ML; [USP'U]/ML; MG/ML
SUSPENSION/ DROPS OPHTHALMIC
COMMUNITY
Start: 2020-01-17 | End: 2021-01-19

## 2020-03-02 NOTE — PATIENT INSTRUCTIONS
Continue your current medications and treatment.    Have the follow up labs done and call for results.    Follow up with the ENT doctor and PT.    Encourage you to stop smoking.    Follow up in the office in 6 months.

## 2020-03-02 NOTE — PROGRESS NOTES
Subjective   Frank Barger is a 49 y.o. male.     Chief Complaint   Patient presents with   • Hypertension     6 MTH F/U, wants ENT referral   • Hyperlipidemia   • Back Pain     wants PT referral       HPI  Chief complaint: Hypertension hyperlipidemia coronary artery disease gastroesophageal reflux disease tinnitus fibromyositis posttraumatic stress disorder    The patient is a 49-year-old white male comes in for follow-up and maintenance of his current problems which include    1.  Hypertension-stable-patient is on prazosin 2 mg daily and metoprolol 25 mg twice a day..  He denied headache lightheadedness dizziness or chest pain.    2.  Hyperlipidemia-stable-patient on Lipitor 20 mg daily.  He denies myalgias and arthralgias.  He denied nausea or anorexia.    3.  Coronary artery disease-stable-patient on aspirin and metoprolol.  He denies chest pain shortness of breath orthopnea or PND.    4.  Chronic pain syndrome-stable-patient is currently on Zanaflex Lyrica and amitriptyline.  He states his back pain is worse today.  He denied fever bowel or bladder problems.  He denied radicular symptoms.  He requests a referral to physical therapy.    5.  Tinnitus-new-patient states he developed sudden onset of ringing in the left ear several days ago.  He denied deafness.  He denied decreased hearing.  He denied whirling dizziness.  Patient requested referral to ENT.    6.  Bipolar disease-stable-patient is currently on Abilify 5 mg daily Trintellix 20 mg daily and amitriptyline.  He denied anxiousness agitation depressed or suicidal thoughts.      The following portions of the patient's history were reviewed and updated as appropriate: allergies, current medications, past family history, past medical history, past social history, past surgical history and problem list.    Review of Systems    Objective     /79 (BP Location: Right arm, Patient Position: Sitting, Cuff Size: Adult)   Pulse 74   Temp 97.5 °F (36.4  "°C) (Oral)   Resp 18   Ht 188 cm (74\")   Wt 79.5 kg (175 lb 3.2 oz)   SpO2 99%   BMI 22.49 kg/m²     Physical Exam   Constitutional: He is oriented to person, place, and time.   Chronically ill appearing   HENT:   Head: Normocephalic and atraumatic.   edentulous   Eyes: Pupils are equal, round, and reactive to light. Conjunctivae and EOM are normal.   Neck: Normal range of motion. Neck supple.   Cardiovascular: Normal rate, regular rhythm, normal heart sounds and intact distal pulses.   Pulmonary/Chest: Effort normal and breath sounds normal.   Abdominal: Soft. Bowel sounds are normal.   Musculoskeletal: Normal range of motion.   Neurological: He is alert and oriented to person, place, and time.   Skin: Skin is warm and dry.   Psychiatric: He has a normal mood and affect. His behavior is normal.   Nursing note and vitals reviewed.        Assessment/Plan   Frank was seen today for hypertension, hyperlipidemia and back pain.    Diagnoses and all orders for this visit:    Benign essential hypertension  -     CBC & Differential; Future  -     Comprehensive Metabolic Panel; Future    Hyperlipidemia, unspecified hyperlipidemia type  -     Lipid Panel; Future    Coronary artery disease of native heart with stable angina pectoris, unspecified vessel or lesion type (CMS/HCC)    Chronic pain disorder  -     Ambulatory Referral to Physical Therapy Evaluate and treat    Tinnitus of left ear  -     Ambulatory Referral to ENT (Otolaryngology)      Patient Instructions   Continue your current medications and treatment.    Have the follow up labs done and call for results.    Follow up with the ENT doctor and PT.    Encourage you to stop smoking.    Follow up in the office in 6 months.      Bashir Valdes Jr., MD    03/02/20  "

## 2020-03-04 ENCOUNTER — LAB (OUTPATIENT)
Dept: LAB | Facility: HOSPITAL | Age: 50
End: 2020-03-04

## 2020-03-04 DIAGNOSIS — I10 BENIGN ESSENTIAL HYPERTENSION: ICD-10-CM

## 2020-03-04 DIAGNOSIS — E78.5 HYPERLIPIDEMIA, UNSPECIFIED HYPERLIPIDEMIA TYPE: ICD-10-CM

## 2020-03-04 LAB
ALBUMIN SERPL-MCNC: 4.5 G/DL (ref 3.5–5.2)
ALBUMIN/GLOB SERPL: 1.6 G/DL
ALP SERPL-CCNC: 69 U/L (ref 39–117)
ALT SERPL W P-5'-P-CCNC: 12 U/L (ref 1–41)
ANION GAP SERPL CALCULATED.3IONS-SCNC: 13.5 MMOL/L (ref 5–15)
AST SERPL-CCNC: 18 U/L (ref 1–40)
BASOPHILS # BLD AUTO: 0.1 10*3/MM3 (ref 0–0.2)
BASOPHILS NFR BLD AUTO: 1.8 % (ref 0–1.5)
BILIRUB SERPL-MCNC: 0.2 MG/DL (ref 0.2–1.2)
BUN BLD-MCNC: 17 MG/DL (ref 6–20)
BUN/CREAT SERPL: 17.9 (ref 7–25)
CALCIUM SPEC-SCNC: 9.8 MG/DL (ref 8.6–10.5)
CHLORIDE SERPL-SCNC: 103 MMOL/L (ref 98–107)
CHOLEST SERPL-MCNC: 111 MG/DL (ref 0–200)
CO2 SERPL-SCNC: 22.5 MMOL/L (ref 22–29)
CREAT BLD-MCNC: 0.95 MG/DL (ref 0.76–1.27)
DEPRECATED RDW RBC AUTO: 51.7 FL (ref 37–54)
EOSINOPHIL # BLD AUTO: 0.11 10*3/MM3 (ref 0–0.4)
EOSINOPHIL NFR BLD AUTO: 2 % (ref 0.3–6.2)
ERYTHROCYTE [DISTWIDTH] IN BLOOD BY AUTOMATED COUNT: 18.6 % (ref 12.3–15.4)
GFR SERPL CREATININE-BSD FRML MDRD: 84 ML/MIN/1.73
GLOBULIN UR ELPH-MCNC: 2.9 GM/DL
GLUCOSE BLD-MCNC: 112 MG/DL (ref 65–99)
HCT VFR BLD AUTO: 34.4 % (ref 37.5–51)
HDLC SERPL-MCNC: 38 MG/DL (ref 40–60)
HGB BLD-MCNC: 10.5 G/DL (ref 13–17.7)
IMM GRANULOCYTES # BLD AUTO: 0.01 10*3/MM3 (ref 0–0.05)
IMM GRANULOCYTES NFR BLD AUTO: 0.2 % (ref 0–0.5)
LDLC SERPL CALC-MCNC: 54 MG/DL (ref 0–100)
LDLC/HDLC SERPL: 1.43 {RATIO}
LYMPHOCYTES # BLD AUTO: 1.94 10*3/MM3 (ref 0.7–3.1)
LYMPHOCYTES NFR BLD AUTO: 35.1 % (ref 19.6–45.3)
MCH RBC QN AUTO: 23.3 PG (ref 26.6–33)
MCHC RBC AUTO-ENTMCNC: 30.5 G/DL (ref 31.5–35.7)
MCV RBC AUTO: 76.4 FL (ref 79–97)
MONOCYTES # BLD AUTO: 0.61 10*3/MM3 (ref 0.1–0.9)
MONOCYTES NFR BLD AUTO: 11.1 % (ref 5–12)
NEUTROPHILS # BLD AUTO: 2.75 10*3/MM3 (ref 1.7–7)
NEUTROPHILS NFR BLD AUTO: 49.8 % (ref 42.7–76)
NRBC BLD AUTO-RTO: 0 /100 WBC (ref 0–0.2)
PLATELET # BLD AUTO: 275 10*3/MM3 (ref 140–450)
PMV BLD AUTO: 10.5 FL (ref 6–12)
POTASSIUM BLD-SCNC: 4.3 MMOL/L (ref 3.5–5.2)
PROT SERPL-MCNC: 7.4 G/DL (ref 6–8.5)
RBC # BLD AUTO: 4.5 10*6/MM3 (ref 4.14–5.8)
SODIUM BLD-SCNC: 139 MMOL/L (ref 136–145)
TRIGL SERPL-MCNC: 94 MG/DL (ref 0–150)
VLDLC SERPL-MCNC: 18.8 MG/DL (ref 5–40)
WBC NRBC COR # BLD: 5.52 10*3/MM3 (ref 3.4–10.8)

## 2020-03-04 PROCEDURE — 80053 COMPREHEN METABOLIC PANEL: CPT

## 2020-03-04 PROCEDURE — 80061 LIPID PANEL: CPT

## 2020-03-04 PROCEDURE — 36415 COLL VENOUS BLD VENIPUNCTURE: CPT

## 2020-03-04 PROCEDURE — 85025 COMPLETE CBC W/AUTO DIFF WBC: CPT

## 2020-03-10 ENCOUNTER — TREATMENT (OUTPATIENT)
Dept: PHYSICAL THERAPY | Facility: CLINIC | Age: 50
End: 2020-03-10

## 2020-03-10 DIAGNOSIS — G89.4 CHRONIC PAIN DISORDER: Primary | ICD-10-CM

## 2020-03-10 PROCEDURE — 97530 THERAPEUTIC ACTIVITIES: CPT | Performed by: PHYSICAL THERAPIST

## 2020-03-10 PROCEDURE — 97163 PT EVAL HIGH COMPLEX 45 MIN: CPT | Performed by: PHYSICAL THERAPIST

## 2020-03-10 NOTE — PROGRESS NOTES
Physical Therapy Initial Evaluation and Plan of Care    Patient: Frank Barger   : 1970  Diagnosis/ICD-10 Code:  Chronic pain disorder [G89.4]  Referring practitioner: Bashir Valdes Jr., MD  Date of Initial Visit: 3/10/2020  Today's Date: 3/10/2020  Patient seen for 1 sessions           Subjective Questionnaire: QuickDASH: 77%  Oswestry: 66%    Subjective Evaluation    History of Present Illness  Mechanism of injury: Presents today with back and right LE pain. Reports he had MRI down 2-3 years ago that showed L4-5 disc is gone and has degeneration all the way up his spine - says no surgeon will touch his back. Reports low back pain and gets shooting pain down his right leg. Is currently seeing pain management. Says he had therapy before and it made him worse, says the only thing that helped temporarily was a TENS unit - has one at home but lost it. Has some ok days, but mostly has really bad days. Walking, sit to stand, and picking up items makes his pain worse. Pain started in , insidious onset - had very physical jobs. Difficulty getting dressed and taking care of himself due to pain. Bending forward makes his back worse. Reports N/T in his right leg on the front of his thigh and numbness from the knee down (been there since )      Patient Occupation: on disability Quality of life: fair    Pain  Current pain ratin  At best pain ratin  At worst pain rating: 10  Alleviating factors: sitting back in recliner.  Aggravating factors: ambulation, repetitive movement, outstretched reach, standing, stairs, lifting and movement  Progression: no change    Patient Goals  Patient goals for therapy: decreased pain, increased strength and independence with ADLs/IADLs         O2: 99%   HR: 100 BPM     Precautions: hx of CABG, HTN, COPD, emphysema     Objective       Postural Observations  Seated posture: poor  Standing posture: poor  Correction of posture: makes symptoms worse    Additional Postural  Observation Details  Increased thoracic kyphosis, shifted to LLE, decreased lumbar lordosis, forward flexed posture overall.     Transfers: increased time to complete and requires BUE assist.   Bed mobility: increased time to complete and painful    Neurological Testing     Sensation     Lumbar   Left   Intact: light touch    Right   Paresthesia: light touch    Comments   Right light touch: reports tingling anterior thigh & numbess anterior shin    Active Range of Motion     Additional Active Range of Motion Details  In standing: reports LBP and posterior RLE shooting pain.   Lumbar AROM:  Flexion: major limitation and painful  Extension: major limitation and painful  Left lateral flexion: moderate limitation and painful        Right lateral flexion: moderate limitation and painful.     Hip AROM:  RLE flexion: 70 deg  LLE flexion: 100 deg    Strength/Myotome Testing     Additional Strength Details  Grossly BLE at least 3/5 - unable to tolerate formal MMT due to high pain level.    Tests     Lumbar     Right   Positive passive SLR.        Assessment & Plan     Assessment  Impairments: abnormal or restricted ROM, activity intolerance, impaired physical strength, lacks appropriate home exercise program and pain with function  Assessment details: The patient is a 49 y.o. male who presents to physical therapy today for low back and RLE pain. Patient was very painful throughout session and therefore examination findings were limited during today's session. Upon initial evaluation, the patient demonstrates the following impairments: decreased lumbar ROM, decreased hip ROM, decreased strength, impaired posture, impaired mechanics with transitional movements. Due to these impairments, the patient is unable to complete any functional activities or movements without high pain level. The patient would benefit from skilled PT services to address functional limitations and impairments and to improve patient quality of life.       Prognosis: fair  Functional Limitations: carrying objects, lifting, sleeping, walking, pulling, pushing, uncomfortable because of pain, moving in bed, sitting, standing, stooping, reaching overhead and unable to perform repetitive tasks  Goals  Plan Goals: STG:  Pt will be independent and compliant with initial HEP in 3 weeks.  Pt will report a 25% improvement in symptoms since starting therapy in 3 weeks.  Pt will report pain level at worst <8 during standing activity in 3 weeks.  Pt will be independent with postural corrections in 2 weeks in order to decrease strain on back.   LTG:  Pt will be independent with final HEP for self-management of condition by DC.  Pt will improve score on Oswesty to less than 50% by DC.   Pt will report a 50% improvement in symptoms by DC in order to allow return to PLOF.      Plan  Therapy options: will be seen for skilled physical therapy services  Planned modality interventions: cryotherapy, electrical stimulation/Russian stimulation, TENS, traction and thermotherapy (hydrocollator packs)  Planned therapy interventions: abdominal trunk stabilization, body mechanics training, flexibility, functional ROM exercises, gait training, joint mobilization, home exercise program, manual therapy, neuromuscular re-education, postural training, soft tissue mobilization, spinal/joint mobilization, strengthening, stretching, therapeutic activities and motor coordination training  Frequency: 2x week  Treatment plan discussed with: patient  Plan details: 30 visits        See flowsheets for treatment detail. Extensive education provided to patient x 10 minutes regarding posture in sitting and standing as well as body mechanics when picking items up off floor and maintaining lumbar lordosis with functional activities.        History # of Personal Factors and/or Comorbidities: HIGH (3+)  Examination of Body System(s): # of elements: HIGH (4+)  Clinical Presentation: UNSTABLE   Clinical Decision  Making: HIGH      Timed:         Manual Therapy:         mins  57147;     Therapeutic Exercise:         mins  99425;     Neuromuscular Miranda:        mins  47163;    Therapeutic Activity:     10     mins  81548;     Gait Training:           mins  89783;     Ultrasound:          mins  39864;    Ionto                                   mins   95665  Self Care                            mins   54692      Un-Timed:  Electrical Stimulation:         mins  89251 ( );  Dry Needling          mins self-pay  Traction          mins 92084  Low Eval          Mins  65065  Mod Eval          Mins  42672  High Eval                       45     Mins  09929  Re-Eval                               mins  76459      Timed Treatment:   10   mins   Total Treatment:     55   mins    PT SIGNATURE: Pamela Godfrey, BEN   DATE TREATMENT INITIATED: 3/10/2020    Initial Certification  Certification Period: 6/8/2020  I certify that the therapy services are furnished while this patient is under my care.  The services outlined above are required by this patient, and will be reviewed every 90 days.     PHYSICIAN: Bashir Valdes Jr., MD      DATE:     Please sign and return via fax to [phone].. Thank you, [facility].

## 2020-03-14 RX ORDER — NAPROXEN 500 MG/1
TABLET ORAL
Qty: 60 TABLET | Refills: 0 | Status: SHIPPED | OUTPATIENT
Start: 2020-03-14 | End: 2020-04-13

## 2020-03-26 DIAGNOSIS — F33.1 MAJOR DEPRESSIVE DISORDER, RECURRENT EPISODE, MODERATE (HCC): ICD-10-CM

## 2020-03-26 DIAGNOSIS — F43.10 POSTTRAUMATIC STRESS DISORDER: ICD-10-CM

## 2020-03-26 RX ORDER — ARIPIPRAZOLE 5 MG/1
5 TABLET ORAL NIGHTLY
Qty: 90 TABLET | Refills: 0 | Status: SHIPPED | OUTPATIENT
Start: 2020-03-26 | End: 2020-05-07

## 2020-03-26 RX ORDER — AMITRIPTYLINE HYDROCHLORIDE 75 MG/1
75 TABLET, FILM COATED ORAL NIGHTLY
Qty: 90 TABLET | Refills: 0 | Status: SHIPPED | OUTPATIENT
Start: 2020-03-26 | End: 2020-07-01

## 2020-04-13 ENCOUNTER — RESULTS ENCOUNTER (OUTPATIENT)
Dept: PAIN MEDICINE | Facility: CLINIC | Age: 50
End: 2020-04-13

## 2020-04-13 ENCOUNTER — OFFICE VISIT (OUTPATIENT)
Dept: PAIN MEDICINE | Facility: CLINIC | Age: 50
End: 2020-04-13

## 2020-04-13 VITALS
RESPIRATION RATE: 16 BRPM | DIASTOLIC BLOOD PRESSURE: 88 MMHG | HEART RATE: 79 BPM | SYSTOLIC BLOOD PRESSURE: 138 MMHG | OXYGEN SATURATION: 100 % | HEIGHT: 74 IN | TEMPERATURE: 98.7 F | BODY MASS INDEX: 22.2 KG/M2 | WEIGHT: 173 LBS

## 2020-04-13 DIAGNOSIS — L40.9 PSORIASIS: ICD-10-CM

## 2020-04-13 DIAGNOSIS — M25.50 POLYARTHRALGIA: ICD-10-CM

## 2020-04-13 DIAGNOSIS — Z79.899 HIGH RISK MEDICATION USE: ICD-10-CM

## 2020-04-13 DIAGNOSIS — G89.4 CHRONIC PAIN SYNDROME: Primary | ICD-10-CM

## 2020-04-13 DIAGNOSIS — M79.2 NEUROPATHIC PAIN: ICD-10-CM

## 2020-04-13 PROCEDURE — 99214 OFFICE O/P EST MOD 30 MIN: CPT | Performed by: ANESTHESIOLOGY

## 2020-04-13 RX ORDER — PREGABALIN 200 MG/1
200 CAPSULE ORAL 3 TIMES DAILY
Qty: 90 CAPSULE | Refills: 5 | Status: SHIPPED | OUTPATIENT
Start: 2020-04-13 | End: 2020-10-08 | Stop reason: SDUPTHER

## 2020-04-13 RX ORDER — CYCLOBENZAPRINE HCL 10 MG
10 TABLET ORAL 3 TIMES DAILY PRN
Qty: 90 TABLET | Refills: 5 | Status: SHIPPED | OUTPATIENT
Start: 2020-04-13 | End: 2021-01-19

## 2020-04-13 RX ORDER — NAPROXEN 500 MG/1
TABLET ORAL
Qty: 60 TABLET | Refills: 0 | Status: SHIPPED | OUTPATIENT
Start: 2020-04-13 | End: 2020-05-11

## 2020-04-13 RX ORDER — CYCLOBENZAPRINE HCL 10 MG
10 TABLET ORAL 3 TIMES DAILY
COMMUNITY
Start: 2020-04-10 | End: 2020-04-13 | Stop reason: SDUPTHER

## 2020-04-13 NOTE — PROGRESS NOTES
CC back pain, bilateral leg pain,    49-year-old male with multiple comorbidities including CAD S/P CABG 2015 on Plavix, chronic pain from  psoriatic arthritis lumbar DDD spondylosis and stenosis with neurogenic claudication, BLE neuropathic pain,   psoriasis arthritis on DMARD here for follow up.  Denies new complaints today  Chronic  polyarthralgia,  generalized myofascial pain,  low back pain radiating to bilateral hips and Right  lower extremity has sharp shooting pain worse with walking and prolonged standing relieved by rest.  Denies new weakness saddle anesthesia bladder  or bowel incontinence.  Tried PT with mild relief.  Utilizing cane for ambulation   declines injections    Utilizes   Flexeril, Lyrica  with good relief functional benefit.    L-spine MRI 2015:  Degenerative changes worse at L4-5 with central disc protrusion and annular tear.  Mild foraminal narrowing.  L5-S1 with small right paracentral focal disc protrusion and annular tear.  Mild neural foraminal narrowing right more than  Left. Mild-to-moderate facet arthropathy throughout.  T-spine MRI 2015:  Degenerative changes, T7-T8  small left paracentral disc protrusion with slight flattening of the anterior surface of or aid on the left.    Pain Assessment   Location of Pain: R Shoulder, L Shoulder, Upper Back, Lower Back, R Hip, L Hip, R Leg, L Leg  Description of Pain: Dull/Aching, Throbbing, Stabbing  Previous Pain Rating : 10  Current Pain Ratin  Aggravating Factors: Activity  Alleviating Factors: Rest, Medication  Previous Treatments: Physical Therapy  Previous Diagnostic Studies: MRI  PEG Assessment   What number best describes your pain on average in the past week?9  What number best describes how, during the past week, pain has interfered with your enjoyment of life?9  What number best describes how, during the past week, pain has interfered with your general activity? 10    The following were reviewed   has a past medical history of  Allergic, Anxiety, Arthritis, Asthma, Back pain, Depression, Emphysema of lung (CMS/HCC), Headache, Injury of back, Leg pain, and Shortness of breath.     has a past surgical history that includes Coronary artery bypass graft and Coronary angioplasty with stent.    Social History     Tobacco Use   • Smoking status: Current Every Day Smoker     Packs/day: 0.25   • Smokeless tobacco: Never Used   Substance Use Topics   • Alcohol use: No     Frequency: Never       Review of Systems        See HPI    General       Denies fever/chills, fatigue and sleep problems.    Eyes       Denies blurry vision and double vision.    ENT       Denies decreased hearing, sore throat and ears ringing.    CV       Denies chest pain and fainting.    Resp       Denies shortness of breath and cough.    GI       Denies heartburn, constipation, nausea, vomiting and diarrhea.           Denies pain on urination, incontinence and increased frequency.    MS       Denies joint swelling, cramps and weakness.       Back pain, bilateral lower extremity pain.    Derm       Denies rash and itching.    Neuro       Denies numbness, tingling, loss of balance and history of seizures.    Psych       Denies anxiety and depression.    Endo       Denies weight change and thirsty all the time.    Heme       Denies easy bruising, bleeding and enlarged lymph nodes.    Vitals:    04/13/20 1327   BP: 138/88   Pulse: 79   Resp: 16   Temp: 98.7 °F (37.1 °C)   SpO2: 100%       Physical Exam   General  General appearance: Poorly groomed, looking older than stated age, no acute distress  Gait and station: Assistive Device  Comment: cane    Mental Status Exam   Mental Status: AAO x3  Behavior: Appropriate    Thoracolumbar   ROM: decreased rotation/extension  Straight Leg Raise: Positive  Radiculopathy: Right, Left  Palpation: Tender  Paravertebral    Neuro   Reflexes: R Arm 1+  L arm 1+  R Leg 1+  L Leg 1+    Strength: Arms Normal  Strength: Legs Normal     Eyes:       Clear conjunctivae,      Pupils rounds and reactive  ENT:      Clear oropharynx,       Mucosa moist/pink       Nose: no deformity  Chest Wall:      Non tender      No deformities or masses noted.    Respiratory:          No dyspnea  Heart:      Regular rate and rhythm, no murmurs, rubs, or gallops   Pulses:      Pulses 2+, symmetric  Extremities:      No clubbing, cyanosis, edema, or deformity noted   Neurologic:      No focal deficits      Skin:      Intact without lesions or rashes.  No mass  Cervical Nodes:      No adenopathy noted.    PHQ-9 on chart                         opioid risk tool low risk      Assessment /Plan  Frank was seen today for back pain, leg pain, elbow pain and follow-up.    Diagnoses and all orders for this visit:    Chronic pain syndrome  -     pregabalin (LYRICA) 200 MG capsule; Take 1 capsule by mouth 3 (Three) Times a Day.    Polyarthralgia  -     pregabalin (LYRICA) 200 MG capsule; Take 1 capsule by mouth 3 (Three) Times a Day.    Neuropathic pain  -     pregabalin (LYRICA) 200 MG capsule; Take 1 capsule by mouth 3 (Three) Times a Day.    Psoriasis    High risk medication use    Other orders  -     cyclobenzaprine (FLEXERIL) 10 MG tablet; Take 1 tablet by mouth 3 (Three) Times a Day As Needed for Muscle Spasms.    Summary  49-year-old male with multiple comorbidities including CAD status post CABG on Plavix, chronic back pain from DDD spondylosis with radiculitis and stenosis with neurogenic claudication, BLE neuropathic pain seen in f/u.  Chronic pain from polyarthralgia /psoriatic arthritis  back pain from DDD spondylosis.  Currently off DMARD      Continue Lyrica  200 mg 3 times daily.  UDS  and inspect reviewed.  Discussed risk of tolerance, dependence, respiratory depression, coma and death associated with use of oral opioids for treatment of chronic nonmalignant pain.       continue flexeril  Continue compounded anti-inflammatory/neuropathic cream with ketamine.     Discussed  CDC guidelines/precautions regarding coronavirus epidemic    RTC in 6 months.

## 2020-05-07 DIAGNOSIS — F43.10 POSTTRAUMATIC STRESS DISORDER: ICD-10-CM

## 2020-05-07 DIAGNOSIS — F33.1 MAJOR DEPRESSIVE DISORDER, RECURRENT EPISODE, MODERATE (HCC): ICD-10-CM

## 2020-05-07 RX ORDER — ATORVASTATIN CALCIUM 20 MG/1
TABLET, FILM COATED ORAL
Qty: 90 TABLET | Refills: 1 | Status: SHIPPED | OUTPATIENT
Start: 2020-05-07 | End: 2020-10-25

## 2020-05-07 RX ORDER — ARIPIPRAZOLE 5 MG/1
5 TABLET ORAL NIGHTLY
Qty: 90 TABLET | Refills: 0 | Status: SHIPPED | OUTPATIENT
Start: 2020-05-07 | End: 2020-09-24

## 2020-05-07 RX ORDER — MONTELUKAST SODIUM 10 MG/1
TABLET ORAL
Qty: 90 TABLET | Refills: 1 | Status: SHIPPED | OUTPATIENT
Start: 2020-05-07 | End: 2020-10-25

## 2020-05-11 RX ORDER — NAPROXEN 500 MG/1
TABLET ORAL
Qty: 60 TABLET | Refills: 0 | Status: SHIPPED | OUTPATIENT
Start: 2020-05-11 | End: 2020-06-08

## 2020-05-27 ENCOUNTER — OFFICE VISIT (OUTPATIENT)
Dept: PSYCHIATRY | Facility: CLINIC | Age: 50
End: 2020-05-27

## 2020-05-27 DIAGNOSIS — F33.1 MAJOR DEPRESSIVE DISORDER, RECURRENT EPISODE, MODERATE (HCC): ICD-10-CM

## 2020-05-27 DIAGNOSIS — F43.10 POSTTRAUMATIC STRESS DISORDER: Primary | ICD-10-CM

## 2020-05-27 PROCEDURE — 99442 PR PHYS/QHP TELEPHONE EVALUATION 11-20 MIN: CPT | Performed by: PHYSICIAN ASSISTANT

## 2020-05-27 NOTE — PROGRESS NOTES
Subjective   Frank Barger is a 49 y.o.white male who presents today for follow up     You have chosen to receive care through a telephone visit. Do you consent to use a telephone visit for your medical care today? Yes    Chief Complaint:  Depression, anxiety and PTSD    History of Present Illness:   He got  on Mother's Day, which is also his fiance's birthday  [facility] but now every 4 wks, going phone visits during COVID  Adding Abilify helped his mood a lot.  Depression low 2/10  Denies Si/HI  Anxiety 4/10  Sleeping well, no nightmares, holding the prazosin for now    The following portions of the patient's history were reviewed and updated as appropriate: allergies, current medications, past family history, past medical history, past social history, past surgical history and problem list.    PAST PSYCHIATRIC HISTORY  Axis I  Affective/Bipoloar Disorder, Anxiety/Panic Disorder, Posttraumatic Stress  Axis II  None    PAST OUTPATIENT TREATMENT  Diagnosis treated:  Affective Disorder, Anxiety/Panic Disorder, Post-Traumatic Stress  Treatment Type:  Individual Therapy, Medication Management  Prior Psychiatric Medications:  Zoloft  Brintellix  Trintellix   Prazosin  Elavil  Abilify  Support Groups:  None  Sequelae Of Mental Disorder:  social isolation, family disruption, emotional distress      Interval History  No Change    Side Effects  None    Past psychiatric history reviewed and compared to 2/19/20 visit and appropriate updates were made.    Past Medical History:  Past Medical History:   Diagnosis Date   • Allergic    • Anxiety    • Arthritis    • Asthma    • Back pain    • Depression    • Emphysema of lung (CMS/HCC)    • Headache    • Injury of back    • Leg pain    • Shortness of breath        Social History:  Social History     Socioeconomic History   • Marital status: Single     Spouse name: Not on file   • Number of children: Not on file   • Years of  education: Not on file   • Highest education level: Not on file   Tobacco Use   • Smoking status: Current Every Day Smoker     Packs/day: 0.25   • Smokeless tobacco: Never Used   Substance and Sexual Activity   • Alcohol use: No     Frequency: Never   • Drug use: No   • Sexual activity: Yes     Partners: Female       Family History:  Family History   Problem Relation Age of Onset   • Diabetes Mother    • Heart disease Father    • Anxiety disorder Daughter    • Depression Daughter        Past Surgical History:  Past Surgical History:   Procedure Laterality Date   • CORONARY ANGIOPLASTY WITH STENT PLACEMENT     • CORONARY ARTERY BYPASS GRAFT         Problem List:  Patient Active Problem List   Diagnosis   • Atherosclerosis of coronary artery bypass graft   • Benign essential hypertension   • Chronic pain disorder   • Coronary artery disease   • Degeneration of intervertebral disc of lumbar region   • Fibromyositis   • Hyperlipidemia   • Need for prophylactic vaccination against Streptococcus pneumoniae (pneumococcus) and influenza   • Neuropathic pain   • Posttraumatic stress disorder   • Psoriasis   • Psoriatic arthritis (CMS/HCC)   • Seasonal allergies   • Tobacco dependence syndrome   • Status post coronary artery stent placement   • Status post coronary artery bypass graft   • Gastroesophageal reflux disease without esophagitis   • Tinnitus of left ear       Allergy:   Allergies   Allergen Reactions   • Hydrocodone Nausea And Vomiting   • Hydrocodone-Acetaminophen GI Intolerance   • Oxycodone GI Intolerance        Discontinued Medications:  There are no discontinued medications.    Current Medications:   Current Outpatient Medications   Medication Sig Dispense Refill   • amitriptyline (ELAVIL) 75 MG tablet TAKE 1 TABLET BY MOUTH EVERY NIGHT 90 tablet 0   • ARIPiprazole (ABILIFY) 5 MG tablet TAKE 1 TABLET BY MOUTH EVERY NIGHT 90 tablet 0   • aspirin (ASPIR) 81 MG EC tablet ASPIRIN 81 81 MG TBEC     • atorvastatin  (LIPITOR) 20 MG tablet TAKE 1 TABLET BY MOUTH DAILY 90 tablet 1   • cyclobenzaprine (FLEXERIL) 10 MG tablet Take 1 tablet by mouth 3 (Three) Times a Day As Needed for Muscle Spasms. 90 tablet 5   • metoprolol tartrate (LOPRESSOR) 25 MG tablet TAKE 1 TABLET BY MOUTH DAILY 90 tablet 1   • montelukast (SINGULAIR) 10 MG tablet TAKE 1 TABLET BY MOUTH DAILY 90 tablet 1   • Multiple Vitamins-Minerals (MULTIVITAMIN WITH MINERALS) tablet tablet Take 1 tablet by mouth Daily.     • naproxen (NAPROSYN) 500 MG tablet TAKE 1 TABLET BY MOUTH TWICE DAILY 60 tablet 0   • neomycin-polymyxin-dexamethasone (MAXITROL) 3.5-59085-9.1 ophthalmic suspension INSTILL 1 DROP INTO THE OS QID X5 DAYS     • nitroglycerin (NITROSTAT) 0.4 MG SL tablet DISSOLVE 1 TABLET UNDER THE TONGUE EVERY 5 MINUTES AS NEEDED FOR CHEST PAIN UP TO TOTAL 3 TABLETS. IF NO RELIEF GO TO EMERGENCY ROOM 25 tablet 0   • omeprazole (priLOSEC) 20 MG capsule TAKE 1 CAPSULE BY MOUTH EVERY DAY 90 capsule 1   • prazosin (MINIPRESS) 2 MG capsule TAKE 1 CAPSULE BY MOUTH EVERY NIGHT 90 capsule 1   • pregabalin (LYRICA) 200 MG capsule Take 1 capsule by mouth 3 (Three) Times a Day. 90 capsule 5   • Vortioxetine HBr (TRINTELLIX) 20 MG tablet Take 20 mg by mouth Daily. 90 tablet 1     No current facility-administered medications for this visit.          Review of Symptoms:    Psychiatric/Behavioral: Negative for agitation, behavioral problems, confusion, decreased concentration, dysphoric mood, hallucinations, self-injury, sleep disturbance and suicidal ideas. The patient is not nervous/anxious and is not hyperactive.        Physical Exam:   There were no vitals taken for this visit.    Mental Status Exam:   Hygiene:   Unable to evaluate via telephone  Cooperation:  Cooperative  Eye Contact: No eye contact via telephone  Psychomotor Behavior:  Appropriate  Affect:  Appropriate  Mood: Normal  Hopelessness: Denies  Speech:  Normal  Thought Process:  Goal directed  Thought Content:   Normal  Suicidal:  None  Homicidal:  None  Hallucinations:  None  Delusion:  None  Memory:  Intact  Orientation:  Person, Place, Time and Situation  Reliability:  good  Insight:  Good  Judgement:  Good  Impulse Control:  Good  Physical/Medical Issues:  Yes arthritis     Mental status exam was reviewed and compared to 2/19/20 visit and appropriate updates were made.    PHQ-9 Depression Screening  Little interest or pleasure in doing things? 0   Feeling down, depressed, or hopeless? 1   Trouble falling or staying asleep, or sleeping too much?     Feeling tired or having little energy?     Poor appetite or overeating?     Feeling bad about yourself - or that you are a failure or have let yourself or your family down?     Trouble concentrating on things, such as reading the newspaper or watching television?     Moving or speaking so slowly that other people could have noticed? Or the opposite - being so fidgety or restless that you have been moving around a lot more than usual?     Thoughts that you would be better off dead, or of hurting yourself in some way?     PHQ-9 Total Score 1   If you checked off any problems, how difficult have these problems made it for you to do your work, take care of things at home, or get along with other people?             Currently smoking, has decreased to 1/4 ppd and trying to quit.      I advised Frank of the risks of tobacco use.     Lab Results:   Lab on 03/04/2020   Component Date Value Ref Range Status   • Glucose 03/04/2020 112* 65 - 99 mg/dL Final   • BUN 03/04/2020 17  6 - 20 mg/dL Final   • Creatinine 03/04/2020 0.95  0.76 - 1.27 mg/dL Final   • Sodium 03/04/2020 139  136 - 145 mmol/L Final   • Potassium 03/04/2020 4.3  3.5 - 5.2 mmol/L Final   • Chloride 03/04/2020 103  98 - 107 mmol/L Final   • CO2 03/04/2020 22.5  22.0 - 29.0 mmol/L Final   • Calcium 03/04/2020 9.8  8.6 - 10.5 mg/dL Final   • Total Protein 03/04/2020 7.4  6.0 - 8.5 g/dL Final   • Albumin 03/04/2020 4.50   3.50 - 5.20 g/dL Final   • ALT (SGPT) 03/04/2020 12  1 - 41 U/L Final   • AST (SGOT) 03/04/2020 18  1 - 40 U/L Final   • Alkaline Phosphatase 03/04/2020 69  39 - 117 U/L Final   • Total Bilirubin 03/04/2020 0.2  0.2 - 1.2 mg/dL Final   • eGFR Non African Amer 03/04/2020 84  >60 mL/min/1.73 Final   • Globulin 03/04/2020 2.9  gm/dL Final   • A/G Ratio 03/04/2020 1.6  g/dL Final   • BUN/Creatinine Ratio 03/04/2020 17.9  7.0 - 25.0 Final   • Anion Gap 03/04/2020 13.5  5.0 - 15.0 mmol/L Final   • Total Cholesterol 03/04/2020 111  0 - 200 mg/dL Final   • Triglycerides 03/04/2020 94  0 - 150 mg/dL Final   • HDL Cholesterol 03/04/2020 38* 40 - 60 mg/dL Final   • LDL Cholesterol  03/04/2020 54  0 - 100 mg/dL Final   • VLDL Cholesterol 03/04/2020 18.8  5 - 40 mg/dL Final   • LDL/HDL Ratio 03/04/2020 1.43   Final   • WBC 03/04/2020 5.52  3.40 - 10.80 10*3/mm3 Final   • RBC 03/04/2020 4.50  4.14 - 5.80 10*6/mm3 Final   • Hemoglobin 03/04/2020 10.5* 13.0 - 17.7 g/dL Final   • Hematocrit 03/04/2020 34.4* 37.5 - 51.0 % Final   • MCV 03/04/2020 76.4* 79.0 - 97.0 fL Final   • MCH 03/04/2020 23.3* 26.6 - 33.0 pg Final   • MCHC 03/04/2020 30.5* 31.5 - 35.7 g/dL Final   • RDW 03/04/2020 18.6* 12.3 - 15.4 % Final   • RDW-SD 03/04/2020 51.7  37.0 - 54.0 fl Final   • MPV 03/04/2020 10.5  6.0 - 12.0 fL Final   • Platelets 03/04/2020 275  140 - 450 10*3/mm3 Final   • Neutrophil % 03/04/2020 49.8  42.7 - 76.0 % Final   • Lymphocyte % 03/04/2020 35.1  19.6 - 45.3 % Final   • Monocyte % 03/04/2020 11.1  5.0 - 12.0 % Final   • Eosinophil % 03/04/2020 2.0  0.3 - 6.2 % Final   • Basophil % 03/04/2020 1.8* 0.0 - 1.5 % Final   • Immature Grans % 03/04/2020 0.2  0.0 - 0.5 % Final   • Neutrophils, Absolute 03/04/2020 2.75  1.70 - 7.00 10*3/mm3 Final   • Lymphocytes, Absolute 03/04/2020 1.94  0.70 - 3.10 10*3/mm3 Final   • Monocytes, Absolute 03/04/2020 0.61  0.10 - 0.90 10*3/mm3 Final   • Eosinophils, Absolute 03/04/2020 0.11  0.00 - 0.40 10*3/mm3  Final   • Basophils, Absolute 03/04/2020 0.10  0.00 - 0.20 10*3/mm3 Final   • Immature Grans, Absolute 03/04/2020 0.01  0.00 - 0.05 10*3/mm3 Final   • nRBC 03/04/2020 0.0  0.0 - 0.2 /100 WBC Final       Assessment/Plan   Problems Addressed this Visit        Other    Posttraumatic stress disorder - Primary      Other Visit Diagnoses     Major depressive disorder, recurrent episode, moderate (CMS/HCC)              Visit Diagnoses:    ICD-10-CM ICD-9-CM   1. Posttraumatic stress disorder F43.10 309.81   2. Major depressive disorder, recurrent episode, moderate (CMS/HCC) F33.1 296.32       TREATMENT PLAN/GOALS: Continue supportive psychotherapy efforts and medications as indicated. Treatment and medication options discussed during today's visit. Patient ackowledged and verbally consented to continue with current treatment plan and was educated on the importance of compliance with treatment and follow-up appointments.    MEDICATION ISSUES:  INSPECT reviewed as expected  Discussed medication options and treatment plan of prescribed medication as well as the risks, benefits, and side effects including potential falls, possible impaired driving and metabolic adversities among others. Patient is agreeable to call the office with any worsening of symptoms or onset of side effects. Patient is agreeable to call 911 or go to the nearest ER should he/she begin having SI/HI. No medication side effects or related complaints today.     Continue current medication with no changes.  The patient and his girlfriend both agree he is doing well.  Continue Abilify, Trintellix and Elavil at current dosages, but no refills needed today.  He has some prazosin remaining but holding it for now b/c has not had nightmares since .      MEDS ORDERED DURING VISIT:  No orders of the defined types were placed in this encounter.      Return in about 4 months (around 9/27/2020).    This visit has been rescheduled as a phone visit to comply with  patient safety concerns in accordance with CDC recommendations. Total time of discussion was 15 minutes.      This document has been electronically signed by Carlita Mcdermott PA-C  May 27, 2020 16:02

## 2020-06-08 RX ORDER — NAPROXEN 500 MG/1
TABLET ORAL
Qty: 60 TABLET | Refills: 2 | Status: SHIPPED | OUTPATIENT
Start: 2020-06-08 | End: 2020-08-31

## 2020-06-28 RX ORDER — AMITRIPTYLINE HYDROCHLORIDE 25 MG/1
TABLET, FILM COATED ORAL
Qty: 90 TABLET | Refills: 1 | Status: SHIPPED | OUTPATIENT
Start: 2020-06-28 | End: 2020-07-01 | Stop reason: DRUGHIGH

## 2020-07-01 DIAGNOSIS — F33.1 MAJOR DEPRESSIVE DISORDER, RECURRENT EPISODE, MODERATE (HCC): ICD-10-CM

## 2020-07-01 RX ORDER — AMITRIPTYLINE HYDROCHLORIDE 75 MG/1
75 TABLET, FILM COATED ORAL NIGHTLY
Qty: 90 TABLET | Refills: 0 | Status: SHIPPED | OUTPATIENT
Start: 2020-07-01 | End: 2020-09-24

## 2020-07-07 ENCOUNTER — TELEPHONE (OUTPATIENT)
Dept: FAMILY MEDICINE CLINIC | Facility: CLINIC | Age: 50
End: 2020-07-07

## 2020-07-07 DIAGNOSIS — R19.7 DIARRHEA, UNSPECIFIED TYPE: Primary | ICD-10-CM

## 2020-07-07 NOTE — TELEPHONE ENCOUNTER
The patient came in the office today stating his wife is currently admitted in the hospital with C-Diff. They told him it's highly contagious and he would like to be tested for it to make sure he does not have it. Currently is not having any symptoms.

## 2020-07-08 ENCOUNTER — LAB (OUTPATIENT)
Dept: LAB | Facility: HOSPITAL | Age: 50
End: 2020-07-08

## 2020-07-08 DIAGNOSIS — R19.7 DIARRHEA, UNSPECIFIED TYPE: ICD-10-CM

## 2020-07-08 LAB
ADV 40+41 DNA STL QL NAA+NON-PROBE: NOT DETECTED
ASTRO TYP 1-8 RNA STL QL NAA+NON-PROBE: NOT DETECTED
C CAYETANENSIS DNA STL QL NAA+NON-PROBE: NOT DETECTED
CAMPY SP DNA.DIARRHEA STL QL NAA+PROBE: NOT DETECTED
CRYPTOSP STL CULT: NOT DETECTED
E COLI DNA SPEC QL NAA+PROBE: NOT DETECTED
E HISTOLYT AG STL-ACNC: NOT DETECTED
EAEC PAA PLAS AGGR+AATA ST NAA+NON-PRB: DETECTED
EC STX1 + STX2 GENES STL NAA+PROBE: NOT DETECTED
EPEC EAE GENE STL QL NAA+NON-PROBE: NOT DETECTED
ETEC LTA+ST1A+ST1B TOX ST NAA+NON-PROBE: NOT DETECTED
G LAMBLIA DNA SPEC QL NAA+PROBE: NOT DETECTED
LACTOFERRIN STL QL LA: NEGATIVE
NOROVIRUS GI+II RNA STL QL NAA+NON-PROBE: NOT DETECTED
P SHIGELLOIDES DNA STL QL NAA+PROBE: NOT DETECTED
RV RNA STL NAA+PROBE: NOT DETECTED
SALMONELLA DNA SPEC QL NAA+PROBE: NOT DETECTED
SAPO I+II+IV+V RNA STL QL NAA+NON-PROBE: NOT DETECTED
SHIGELLA SP+EIEC IPAH STL QL NAA+PROBE: NOT DETECTED
V CHOLERAE DNA SPEC QL NAA+PROBE: NOT DETECTED
VIBRIO DNA SPEC NAA+PROBE: NOT DETECTED
YERSINIA STL CULT: NOT DETECTED

## 2020-07-08 PROCEDURE — 0097U HC BIOFIRE FILMARRAY GI PANEL: CPT

## 2020-07-08 PROCEDURE — 83630 LACTOFERRIN FECAL (QUAL): CPT

## 2020-07-09 ENCOUNTER — TELEPHONE (OUTPATIENT)
Dept: FAMILY MEDICINE CLINIC | Facility: CLINIC | Age: 50
End: 2020-07-09

## 2020-07-09 NOTE — TELEPHONE ENCOUNTER
I spoke with the patient.  He has traveler's diarrhea.  He has no fever or bloody stools.  He is getting better on his own.  I recommended fluids.  He is to call if his symptoms do not improve.

## 2020-07-14 ENCOUNTER — TELEPHONE (OUTPATIENT)
Dept: FAMILY MEDICINE CLINIC | Facility: CLINIC | Age: 50
End: 2020-07-14

## 2020-07-14 ENCOUNTER — OFFICE VISIT (OUTPATIENT)
Dept: FAMILY MEDICINE CLINIC | Facility: CLINIC | Age: 50
End: 2020-07-14

## 2020-07-14 VITALS
RESPIRATION RATE: 20 BRPM | HEIGHT: 74 IN | SYSTOLIC BLOOD PRESSURE: 121 MMHG | BODY MASS INDEX: 22.66 KG/M2 | OXYGEN SATURATION: 98 % | WEIGHT: 176.6 LBS | HEART RATE: 82 BPM | DIASTOLIC BLOOD PRESSURE: 82 MMHG

## 2020-07-14 DIAGNOSIS — G43.011 INTRACTABLE MIGRAINE WITHOUT AURA AND WITH STATUS MIGRAINOSUS: Primary | ICD-10-CM

## 2020-07-14 PROCEDURE — 96372 THER/PROPH/DIAG INJ SC/IM: CPT | Performed by: NURSE PRACTITIONER

## 2020-07-14 PROCEDURE — 99213 OFFICE O/P EST LOW 20 MIN: CPT | Performed by: NURSE PRACTITIONER

## 2020-07-14 RX ORDER — KETOROLAC TROMETHAMINE 30 MG/ML
60 INJECTION, SOLUTION INTRAMUSCULAR; INTRAVENOUS ONCE
Status: COMPLETED | OUTPATIENT
Start: 2020-07-14 | End: 2020-07-14

## 2020-07-14 RX ORDER — SUMATRIPTAN 50 MG/1
TABLET, FILM COATED ORAL
Qty: 10 TABLET | Refills: 0 | Status: SHIPPED | OUTPATIENT
Start: 2020-07-14 | End: 2020-08-03

## 2020-07-14 RX ADMIN — KETOROLAC TROMETHAMINE 60 MG: 30 INJECTION, SOLUTION INTRAMUSCULAR; INTRAVENOUS at 13:58

## 2020-07-14 NOTE — PROGRESS NOTES
Subjective   Frank Barger is a 49 y.o. male.     Chief Complaint   Patient presents with   • Migraine       HPI  Patient is here for his migraine headache. Hurts temple to temple and goes up . Has had 2 this month.  Noise makes it worse. Eyes are sensitive to light.   No nausea or vomiting. No fever. Can make him nauseated.   Pain scale 6. Took otc migraine tablets did not help.  He has had migraines since he was child.   Currently on on lyrica for pain and naproxyn.   No head injury. No loc.           The following portions of the patient's history were reviewed and updated as appropriate: allergies, current medications, past family history, past medical history, past social history, past surgical history and problem list.      Current Outpatient Medications:   •  amitriptyline (ELAVIL) 75 MG tablet, Take 1 tablet by mouth Every Night., Disp: 90 tablet, Rfl: 0  •  ARIPiprazole (ABILIFY) 5 MG tablet, TAKE 1 TABLET BY MOUTH EVERY NIGHT, Disp: 90 tablet, Rfl: 0  •  aspirin (ASPIR) 81 MG EC tablet, ASPIRIN 81 81 MG TBEC, Disp: , Rfl:   •  atorvastatin (LIPITOR) 20 MG tablet, TAKE 1 TABLET BY MOUTH DAILY, Disp: 90 tablet, Rfl: 1  •  cyclobenzaprine (FLEXERIL) 10 MG tablet, Take 1 tablet by mouth 3 (Three) Times a Day As Needed for Muscle Spasms., Disp: 90 tablet, Rfl: 5  •  metoprolol tartrate (LOPRESSOR) 25 MG tablet, TAKE 1 TABLET BY MOUTH DAILY, Disp: 90 tablet, Rfl: 1  •  montelukast (SINGULAIR) 10 MG tablet, TAKE 1 TABLET BY MOUTH DAILY, Disp: 90 tablet, Rfl: 1  •  Multiple Vitamins-Minerals (MULTIVITAMIN WITH MINERALS) tablet tablet, Take 1 tablet by mouth Daily., Disp: , Rfl:   •  naproxen (NAPROSYN) 500 MG tablet, TAKE 1 TABLET BY MOUTH TWICE DAILY, Disp: 60 tablet, Rfl: 2  •  neomycin-polymyxin-dexamethasone (MAXITROL) 3.5-29897-5.1 ophthalmic suspension, INSTILL 1 DROP INTO THE OS QID X5 DAYS, Disp: , Rfl:   •  nitroglycerin (NITROSTAT) 0.4 MG SL tablet, DISSOLVE 1 TABLET UNDER THE TONGUE EVERY 5 MINUTES  AS NEEDED FOR CHEST PAIN UP TO TOTAL 3 TABLETS. IF NO RELIEF GO TO EMERGENCY ROOM, Disp: 25 tablet, Rfl: 0  •  omeprazole (priLOSEC) 20 MG capsule, TAKE 1 CAPSULE BY MOUTH EVERY DAY, Disp: 90 capsule, Rfl: 1  •  prazosin (MINIPRESS) 2 MG capsule, TAKE 1 CAPSULE BY MOUTH EVERY NIGHT, Disp: 90 capsule, Rfl: 1  •  pregabalin (LYRICA) 200 MG capsule, Take 1 capsule by mouth 3 (Three) Times a Day., Disp: 90 capsule, Rfl: 5  •  SUMAtriptan (Imitrex) 50 MG tablet, Take one tablet at onset of headache. May repeat dose one time in 2 hours if headache not relieved., Disp: 10 tablet, Rfl: 0  •  Vortioxetine HBr (TRINTELLIX) 20 MG tablet, Take 20 mg by mouth Daily., Disp: 90 tablet, Rfl: 1    Current Facility-Administered Medications:   •  ketorolac (TORADOL) injection 60 mg, 60 mg, Intramuscular, Once, Amada Marte, APRN    Recent Results (from the past 4032 hour(s))   Comprehensive Metabolic Panel    Collection Time: 03/04/20 10:00 AM   Result Value Ref Range    Glucose 112 (H) 65 - 99 mg/dL    BUN 17 6 - 20 mg/dL    Creatinine 0.95 0.76 - 1.27 mg/dL    Sodium 139 136 - 145 mmol/L    Potassium 4.3 3.5 - 5.2 mmol/L    Chloride 103 98 - 107 mmol/L    CO2 22.5 22.0 - 29.0 mmol/L    Calcium 9.8 8.6 - 10.5 mg/dL    Total Protein 7.4 6.0 - 8.5 g/dL    Albumin 4.50 3.50 - 5.20 g/dL    ALT (SGPT) 12 1 - 41 U/L    AST (SGOT) 18 1 - 40 U/L    Alkaline Phosphatase 69 39 - 117 U/L    Total Bilirubin 0.2 0.2 - 1.2 mg/dL    eGFR Non African Amer 84 >60 mL/min/1.73    Globulin 2.9 gm/dL    A/G Ratio 1.6 g/dL    BUN/Creatinine Ratio 17.9 7.0 - 25.0    Anion Gap 13.5 5.0 - 15.0 mmol/L   Lipid Panel    Collection Time: 03/04/20 10:00 AM   Result Value Ref Range    Total Cholesterol 111 0 - 200 mg/dL    Triglycerides 94 0 - 150 mg/dL    HDL Cholesterol 38 (L) 40 - 60 mg/dL    LDL Cholesterol  54 0 - 100 mg/dL    VLDL Cholesterol 18.8 5 - 40 mg/dL    LDL/HDL Ratio 1.43    CBC Auto Differential    Collection Time: 03/04/20 10:00 AM   Result  Value Ref Range    WBC 5.52 3.40 - 10.80 10*3/mm3    RBC 4.50 4.14 - 5.80 10*6/mm3    Hemoglobin 10.5 (L) 13.0 - 17.7 g/dL    Hematocrit 34.4 (L) 37.5 - 51.0 %    MCV 76.4 (L) 79.0 - 97.0 fL    MCH 23.3 (L) 26.6 - 33.0 pg    MCHC 30.5 (L) 31.5 - 35.7 g/dL    RDW 18.6 (H) 12.3 - 15.4 %    RDW-SD 51.7 37.0 - 54.0 fl    MPV 10.5 6.0 - 12.0 fL    Platelets 275 140 - 450 10*3/mm3    Neutrophil % 49.8 42.7 - 76.0 %    Lymphocyte % 35.1 19.6 - 45.3 %    Monocyte % 11.1 5.0 - 12.0 %    Eosinophil % 2.0 0.3 - 6.2 %    Basophil % 1.8 (H) 0.0 - 1.5 %    Immature Grans % 0.2 0.0 - 0.5 %    Neutrophils, Absolute 2.75 1.70 - 7.00 10*3/mm3    Lymphocytes, Absolute 1.94 0.70 - 3.10 10*3/mm3    Monocytes, Absolute 0.61 0.10 - 0.90 10*3/mm3    Eosinophils, Absolute 0.11 0.00 - 0.40 10*3/mm3    Basophils, Absolute 0.10 0.00 - 0.20 10*3/mm3    Immature Grans, Absolute 0.01 0.00 - 0.05 10*3/mm3    nRBC 0.0 0.0 - 0.2 /100 WBC   Gastrointestinal Panel, PCR - Stool, Per Rectum    Collection Time: 07/08/20  9:00 AM   Result Value Ref Range    Campylobacter Not Detected Not Detected    Plesiomonas shigelloides Not Detected Not Detected    Salmonella Not Detected Not Detected    Vibrio Not Detected Not Detected    Vibrio cholerae Not Detected Not Detected    Yersinia enterocolitica Not Detected Not Detected    Enteroaggregative E. coli (EAEC) Detected (A) Not Detected    Enteropathogenic E. coli (EPEC) Not Detected Not Detected    Enterotoxigenic E. coli (ETEC) lt/st Not Detected Not Detected    Shiga-like toxin-producing E. coli (STEC) stx1/stx2 Not Detected Not Detected    E. coli O157 Not Detected Not Detected    Shigella/Enteroinvasive E. coli (EIEC) Not Detected Not Detected    Cryptosporidium Not Detected Not Detected    Cyclospora cayetanensis Not Detected Not Detected    Entamoeba histolytica Not Detected Not Detected    Giardia lamblia Not Detected Not Detected    Adenovirus F40/41 Not Detected Not Detected    Astrovirus Not Detected  "Not Detected    Norovirus GI/GII Not Detected Not Detected    Rotavirus A Not Detected Not Detected    Sapovirus (I, II, IV or V) Not Detected Not Detected   Fecal Lactoferrin - Stool, Per Rectum    Collection Time: 07/08/20 10:28 AM   Result Value Ref Range    Lactoferrin, Qual Negative Negative         Review of Systems   Constitutional: Negative for fever.   Gastrointestinal: Positive for nausea.   Genitourinary: Negative for frequency.   Neurological: Positive for headache. Negative for seizures, syncope and numbness.       Objective     /82 (BP Location: Left arm, Patient Position: Sitting, Cuff Size: Adult)   Pulse 82   Resp 20   Ht 188 cm (74\")   Wt 80.1 kg (176 lb 9.6 oz)   SpO2 98%   BMI 22.67 kg/m²     Physical Exam   Constitutional: He is oriented to person, place, and time. He appears well-developed and well-nourished.   HENT:   Head: Normocephalic and atraumatic.   Right Ear: External ear normal.   Left Ear: External ear normal.   Mouth/Throat: No oropharyngeal exudate.   Eyes: Pupils are equal, round, and reactive to light. Conjunctivae and EOM are normal.   Neck: Normal range of motion.   Cardiovascular: Normal rate and regular rhythm.   Pulmonary/Chest: Effort normal and breath sounds normal.   Abdominal: Soft. Bowel sounds are normal.   Musculoskeletal: Normal range of motion.   Neurological: He is alert and oriented to person, place, and time. He displays normal reflexes. No cranial nerve deficit or sensory deficit. He exhibits normal muscle tone.   Skin: Skin is warm and dry.   Psychiatric: He has a normal mood and affect. His behavior is normal. Judgment and thought content normal.   Nursing note and vitals reviewed.        Assessment/Plan   Frank was seen today for migraine.    Diagnoses and all orders for this visit:    Intractable migraine without aura and with status migrainosus  -     ketorolac (TORADOL) injection 60 mg    Other orders  -     SUMAtriptan (Imitrex) 50 MG tablet; " Take one tablet at onset of headache. May repeat dose one time in 2 hours if headache not relieved.      Patient Instructions   Stop smoking  Use imitrex for migraines.         Amada Marte, APRN    07/14/20

## 2020-07-31 DIAGNOSIS — F33.1 MAJOR DEPRESSIVE DISORDER, RECURRENT EPISODE, MODERATE (HCC): ICD-10-CM

## 2020-07-31 RX ORDER — OMEPRAZOLE 20 MG/1
CAPSULE, DELAYED RELEASE ORAL
Qty: 90 CAPSULE | Refills: 0 | Status: SHIPPED | OUTPATIENT
Start: 2020-07-31 | End: 2020-10-25

## 2020-08-02 RX ORDER — VORTIOXETINE 20 MG/1
TABLET, FILM COATED ORAL
Qty: 90 TABLET | Refills: 1 | Status: SHIPPED | OUTPATIENT
Start: 2020-08-02 | End: 2021-01-06 | Stop reason: SDUPTHER

## 2020-08-03 RX ORDER — SUMATRIPTAN 50 MG/1
TABLET, FILM COATED ORAL
Qty: 10 TABLET | Refills: 0 | Status: SHIPPED | OUTPATIENT
Start: 2020-08-03

## 2020-08-10 ENCOUNTER — TELEPHONE (OUTPATIENT)
Dept: FAMILY MEDICINE CLINIC | Facility: CLINIC | Age: 50
End: 2020-08-10

## 2020-08-10 NOTE — TELEPHONE ENCOUNTER
I spoke with the patient.  I am familiar with provider's writing notes excusing peiple from wearing masks.

## 2020-08-10 NOTE — TELEPHONE ENCOUNTER
Patient states he needs a letter stating he has health problems that prevent him from being able to wear a mask to [employer]. Please call patient.

## 2020-08-31 RX ORDER — NAPROXEN 500 MG/1
TABLET ORAL
Qty: 60 TABLET | Refills: 0 | Status: SHIPPED | OUTPATIENT
Start: 2020-08-31 | End: 2020-09-24

## 2020-09-02 ENCOUNTER — LAB (OUTPATIENT)
Dept: LAB | Facility: HOSPITAL | Age: 50
End: 2020-09-02

## 2020-09-02 ENCOUNTER — OFFICE VISIT (OUTPATIENT)
Dept: FAMILY MEDICINE CLINIC | Facility: CLINIC | Age: 50
End: 2020-09-02

## 2020-09-02 VITALS
RESPIRATION RATE: 20 BRPM | WEIGHT: 178.4 LBS | HEIGHT: 74 IN | DIASTOLIC BLOOD PRESSURE: 68 MMHG | OXYGEN SATURATION: 98 % | BODY MASS INDEX: 22.89 KG/M2 | TEMPERATURE: 97.5 F | HEART RATE: 69 BPM | SYSTOLIC BLOOD PRESSURE: 106 MMHG

## 2020-09-02 DIAGNOSIS — F43.10 POSTTRAUMATIC STRESS DISORDER: ICD-10-CM

## 2020-09-02 DIAGNOSIS — K21.9 GASTROESOPHAGEAL REFLUX DISEASE WITHOUT ESOPHAGITIS: ICD-10-CM

## 2020-09-02 DIAGNOSIS — L40.50 PSORIATIC ARTHRITIS (HCC): ICD-10-CM

## 2020-09-02 DIAGNOSIS — I25.118 CORONARY ARTERY DISEASE OF NATIVE HEART WITH STABLE ANGINA PECTORIS, UNSPECIFIED VESSEL OR LESION TYPE (HCC): ICD-10-CM

## 2020-09-02 DIAGNOSIS — E78.5 HYPERLIPIDEMIA, UNSPECIFIED HYPERLIPIDEMIA TYPE: ICD-10-CM

## 2020-09-02 DIAGNOSIS — I10 BENIGN ESSENTIAL HYPERTENSION: ICD-10-CM

## 2020-09-02 DIAGNOSIS — G89.4 CHRONIC PAIN DISORDER: ICD-10-CM

## 2020-09-02 DIAGNOSIS — I10 BENIGN ESSENTIAL HYPERTENSION: Primary | ICD-10-CM

## 2020-09-02 DIAGNOSIS — L40.9 PSORIASIS: ICD-10-CM

## 2020-09-02 PROBLEM — Z23 NEED FOR PROPHYLACTIC VACCINATION AGAINST STREPTOCOCCUS PNEUMONIAE (PNEUMOCOCCUS) AND INFLUENZA: Status: RESOLVED | Noted: 2017-08-09 | Resolved: 2020-09-02

## 2020-09-02 LAB
ALBUMIN SERPL-MCNC: 4.2 G/DL (ref 3.5–5.2)
ALBUMIN/GLOB SERPL: 1.4 G/DL
ALP SERPL-CCNC: 83 U/L (ref 39–117)
ALT SERPL W P-5'-P-CCNC: 14 U/L (ref 1–41)
ANION GAP SERPL CALCULATED.3IONS-SCNC: 9.1 MMOL/L (ref 5–15)
AST SERPL-CCNC: 19 U/L (ref 1–40)
BASOPHILS # BLD AUTO: 0.09 10*3/MM3 (ref 0–0.2)
BASOPHILS NFR BLD AUTO: 1.3 % (ref 0–1.5)
BILIRUB SERPL-MCNC: 0.3 MG/DL (ref 0–1.2)
BUN SERPL-MCNC: 11 MG/DL (ref 6–20)
BUN/CREAT SERPL: 9.7 (ref 7–25)
CALCIUM SPEC-SCNC: 9.4 MG/DL (ref 8.6–10.5)
CHLORIDE SERPL-SCNC: 104 MMOL/L (ref 98–107)
CO2 SERPL-SCNC: 24.9 MMOL/L (ref 22–29)
CREAT SERPL-MCNC: 1.13 MG/DL (ref 0.76–1.27)
DEPRECATED RDW RBC AUTO: 51.8 FL (ref 37–54)
EOSINOPHIL # BLD AUTO: 0.11 10*3/MM3 (ref 0–0.4)
EOSINOPHIL NFR BLD AUTO: 1.6 % (ref 0.3–6.2)
ERYTHROCYTE [DISTWIDTH] IN BLOOD BY AUTOMATED COUNT: 17.3 % (ref 12.3–15.4)
GFR SERPL CREATININE-BSD FRML MDRD: 69 ML/MIN/1.73
GLOBULIN UR ELPH-MCNC: 2.9 GM/DL
GLUCOSE SERPL-MCNC: 68 MG/DL (ref 65–99)
HCT VFR BLD AUTO: 34.9 % (ref 37.5–51)
HGB BLD-MCNC: 10.8 G/DL (ref 13–17.7)
IMM GRANULOCYTES # BLD AUTO: 0.02 10*3/MM3 (ref 0–0.05)
IMM GRANULOCYTES NFR BLD AUTO: 0.3 % (ref 0–0.5)
LYMPHOCYTES # BLD AUTO: 2.48 10*3/MM3 (ref 0.7–3.1)
LYMPHOCYTES NFR BLD AUTO: 35.1 % (ref 19.6–45.3)
MCH RBC QN AUTO: 25.3 PG (ref 26.6–33)
MCHC RBC AUTO-ENTMCNC: 30.9 G/DL (ref 31.5–35.7)
MCV RBC AUTO: 81.7 FL (ref 79–97)
MONOCYTES # BLD AUTO: 0.61 10*3/MM3 (ref 0.1–0.9)
MONOCYTES NFR BLD AUTO: 8.6 % (ref 5–12)
NEUTROPHILS NFR BLD AUTO: 3.75 10*3/MM3 (ref 1.7–7)
NEUTROPHILS NFR BLD AUTO: 53.1 % (ref 42.7–76)
NRBC BLD AUTO-RTO: 0 /100 WBC (ref 0–0.2)
PLATELET # BLD AUTO: 291 10*3/MM3 (ref 140–450)
PMV BLD AUTO: 10.7 FL (ref 6–12)
POTASSIUM SERPL-SCNC: 4.1 MMOL/L (ref 3.5–5.2)
PROT SERPL-MCNC: 7.1 G/DL (ref 6–8.5)
RBC # BLD AUTO: 4.27 10*6/MM3 (ref 4.14–5.8)
SODIUM SERPL-SCNC: 138 MMOL/L (ref 136–145)
WBC # BLD AUTO: 7.06 10*3/MM3 (ref 3.4–10.8)

## 2020-09-02 PROCEDURE — 99214 OFFICE O/P EST MOD 30 MIN: CPT | Performed by: FAMILY MEDICINE

## 2020-09-02 PROCEDURE — 80053 COMPREHEN METABOLIC PANEL: CPT

## 2020-09-02 PROCEDURE — 85025 COMPLETE CBC W/AUTO DIFF WBC: CPT

## 2020-09-02 PROCEDURE — 36415 COLL VENOUS BLD VENIPUNCTURE: CPT

## 2020-09-02 NOTE — PATIENT INSTRUCTIONS
Continue you current medications and treatment.    Have the follow up labs done and call for results.    Get a colonoscopy.    Follow up in the office in 3 months.    Follow up with ENT regarding the hearing loss and the ringing in your ears.    Use the ear drops and follow up in the office in 10 days for ear irrigation.

## 2020-09-02 NOTE — PROGRESS NOTES
"Subjective   Frank Barger is a 50 y.o. male.     Chief Complaint   Patient presents with   • Hypertension   • Hyperlipidemia   • Heartburn       HPI  Chief complaint: Hypertension hyperlipidemia allergic rhinitis coronary artery disease depression tinnitus    The patient is a 50-year-old white male comes in for follow-up and maintenance of his current problems which include    1.  Hypertension-stable-patient metoprolol 25 mg twice a day.  Denies headache lightheadedness dizziness or chest pain.    2.  Hyperlipidemia-stable-patient on Lipitor 20 mg daily.  Denies myalgias no arthralgias.  Denies nausea or anorexia    3.  Coronary artery disease-stable-patient on aspirin 81 mg daily metoprolol.  He denies chest pain shortness breath orthopnea or PND.    4.  Depression-stable-patient is currently on Trintellix 20 mg daily Lyrica 200 mg 3 times a day Minipress 2 mg at at nighttime.  He denies anxiousness agitation depressed or suicidal thoughts.    5.  Psoriasis-stable-patient was going to see dermatology or rheumatology because of psoriasis.  Patient is not currently taking any medication list.  Uses topical creams for this.  He had psoriatic lesions in his scalp and in the area around his navel.    6.  Tinnitus-new-patient states he has had problems with tinnitus for the past several weeks.  He states he does have slight decreased hearing in the left ear.  I recommended with ENT for electronystagmogram and hearing testing.    7.  Gastroesophageal reflux disease-stable-patient on Prilosec.      The following portions of the patient's history were reviewed and updated as appropriate: allergies, current medications, past family history, past medical history, past social history, past surgical history and problem list.    Review of Systems    Objective     /68 (BP Location: Left arm, Patient Position: Sitting, Cuff Size: Adult)   Pulse 69   Temp 97.5 °F (36.4 °C) (Infrared)   Resp 20   Ht 188 cm (74\")   " Wt 80.9 kg (178 lb 6.4 oz)   SpO2 98%   BMI 22.91 kg/m²     Physical Exam   Constitutional: He is oriented to person, place, and time. He appears well-developed and well-nourished.   HENT:   Head: Normocephalic and atraumatic.   Right Ear: cerumen impaction is present.  Left Ear: An impacted cerumen is present.  Eyes: Pupils are equal, round, and reactive to light. Conjunctivae and EOM are normal.   Neck: Normal range of motion. Neck supple.   Cardiovascular: Normal rate, regular rhythm, normal heart sounds and intact distal pulses.   Pulmonary/Chest: Effort normal and breath sounds normal.   Abdominal: Soft. Bowel sounds are normal.   Musculoskeletal: Normal range of motion.   Neurological: He is alert and oriented to person, place, and time.   Skin: Skin is warm and dry.   Patient has skin lesions consistent with psoriasis in the scalp and in the area around the bellybutton.   Psychiatric: He has a normal mood and affect. His behavior is normal.   Nursing note and vitals reviewed.        Assessment/Plan   Frank was seen today for hypertension, hyperlipidemia and heartburn.    Diagnoses and all orders for this visit:    Benign essential hypertension  -     CBC & Differential; Future  -     Comprehensive Metabolic Panel; Future    Psoriatic arthritis (CMS/HCC)    Hyperlipidemia, unspecified hyperlipidemia type    Coronary artery disease of native heart with stable angina pectoris, unspecified vessel or lesion type (CMS/HCC)    Gastroesophageal reflux disease without esophagitis    Chronic pain disorder    Psoriasis    Posttraumatic stress disorder    Other orders  -     carbamide peroxide (Debrox) 6.5 % otic solution; Administer 5 drops into both ears 2 (Two) Times a Day.      Patient Instructions   Continue you current medications and treatment.    Have the follow up labs done and call for results.    Get a colonoscopy.    Follow up in the office in 3 months.    Follow up with ENT regarding the hearing loss and  the ringing in your ears.    Use the ear drops and follow up in the office in 10 days for ear irrigation.        Bashir Valdes Jr., MD    09/02/20

## 2020-09-10 ENCOUNTER — OFFICE VISIT (OUTPATIENT)
Dept: FAMILY MEDICINE CLINIC | Facility: CLINIC | Age: 50
End: 2020-09-10

## 2020-09-10 VITALS
HEIGHT: 74 IN | OXYGEN SATURATION: 99 % | HEART RATE: 79 BPM | BODY MASS INDEX: 23.05 KG/M2 | DIASTOLIC BLOOD PRESSURE: 76 MMHG | WEIGHT: 179.6 LBS | TEMPERATURE: 97.3 F | SYSTOLIC BLOOD PRESSURE: 129 MMHG | RESPIRATION RATE: 20 BRPM

## 2020-09-10 DIAGNOSIS — H93.12 TINNITUS OF LEFT EAR: Primary | ICD-10-CM

## 2020-09-10 DIAGNOSIS — H61.23 CERUMEN DEBRIS ON TYMPANIC MEMBRANE OF BOTH EARS: ICD-10-CM

## 2020-09-10 DIAGNOSIS — H91.93 BILATERAL DEAFNESS: ICD-10-CM

## 2020-09-10 PROCEDURE — 99213 OFFICE O/P EST LOW 20 MIN: CPT | Performed by: FAMILY MEDICINE

## 2020-09-10 PROCEDURE — 69210 REMOVE IMPACTED EAR WAX UNI: CPT | Performed by: FAMILY MEDICINE

## 2020-09-10 NOTE — PATIENT INSTRUCTIONS
Continue your current medications and treatment.    Follow upin the office in 6 months.    Have the follow up hearing tests done and call for results.    Follow up in the office in 6 months.

## 2020-09-10 NOTE — PROGRESS NOTES
"Subjective   Frank Barger is a 50 y.o. male.     Chief Complaint   Patient presents with   • Cerumen Impaction   • Tinnitus   • Hearing Problem       HPI  Chief complaint: Cerumen impaction tinnitus hearing loss    The patient is a 50-year-old white male comes in for follow-up and maintenance of his current problems which include    1.  Cerumen impaction-stable-patient was seen by me several days ago in the office for follow-up.  During that visit he was found to have a cerumen impaction.  The patient was treated with Debrox drops.  The patient is here for follow-up irrigation.    2.  Tinnitus-unchanged-patient has a history of tinnitus.  Evaluation is been ongoing.  Patient does have hearing loss left worse on the left than the right.  He is being evaluated for possible hearing aids.    3.  Deafness-unchanged-as noted above patient has a history of hearing loss.  He has been evaluated by ear nose and throat for possible hearing aids.  Patient's hearing loss is worse on the left than the right.      The following portions of the patient's history were reviewed and updated as appropriate: allergies, current medications, past family history, past medical history, past social history, past surgical history and problem list.    Review of Systems    Objective     /76 (BP Location: Right arm, Patient Position: Sitting, Cuff Size: Adult)   Pulse 79   Temp 97.3 °F (36.3 °C) (Temporal)   Resp 20   Ht 188 cm (74\")   Wt 81.5 kg (179 lb 9.6 oz)   SpO2 99%   BMI 23.06 kg/m²     Physical Exam   Constitutional: He is oriented to person, place, and time. He appears well-developed and well-nourished.   HENT:   Head: Normocephalic and atraumatic.   Right Ear: Decreased hearing is noted. cerumen impaction is present.  Left Ear: Decreased hearing is noted. An impacted cerumen is present.  Eyes: Pupils are equal, round, and reactive to light. Conjunctivae and EOM are normal.   Neck: Normal range of motion. Neck " supple.   Cardiovascular: Normal rate, regular rhythm, normal heart sounds and intact distal pulses.   Pulmonary/Chest: Effort normal and breath sounds normal.   Abdominal: Soft. Bowel sounds are normal.   Musculoskeletal: Normal range of motion.   Neurological: He is alert and oriented to person, place, and time.   Skin: Skin is warm and dry.   Psychiatric: He has a normal mood and affect. His behavior is normal.   Nursing note and vitals reviewed.        Assessment/Plan   Frank was seen today for cerumen impaction, tinnitus and hearing problem.    Diagnoses and all orders for this visit:    Tinnitus of left ear    Bilateral deafness-the patient was advised to follow-up with ENT for hearing testing as part of evaluation for hearing aids.    Cerumen debris on tympanic membrane of both ears-I remove the earwax from both ears using irrigation and instrumentation.      Patient Instructions   Continue your current medications and treatment.    Follow upin the office in 6 months.    Have the follow up hearing tests done and call for results.    Follow up in the office in 6 months.      Bashir Valdes Jr., MD    09/10/20

## 2020-09-24 DIAGNOSIS — F43.10 POSTTRAUMATIC STRESS DISORDER: ICD-10-CM

## 2020-09-24 DIAGNOSIS — F33.1 MAJOR DEPRESSIVE DISORDER, RECURRENT EPISODE, MODERATE (HCC): ICD-10-CM

## 2020-09-24 RX ORDER — AMITRIPTYLINE HYDROCHLORIDE 75 MG/1
75 TABLET, FILM COATED ORAL NIGHTLY
Qty: 90 TABLET | Refills: 0 | Status: SHIPPED | OUTPATIENT
Start: 2020-09-24 | End: 2020-10-08 | Stop reason: ALTCHOICE

## 2020-09-24 RX ORDER — ARIPIPRAZOLE 5 MG/1
5 TABLET ORAL NIGHTLY
Qty: 90 TABLET | Refills: 0 | Status: SHIPPED | OUTPATIENT
Start: 2020-09-24 | End: 2020-12-11

## 2020-09-24 RX ORDER — NAPROXEN 500 MG/1
TABLET ORAL
Qty: 60 TABLET | Refills: 0 | Status: SHIPPED | OUTPATIENT
Start: 2020-09-24 | End: 2020-10-25

## 2020-09-27 DIAGNOSIS — G89.4 CHRONIC PAIN SYNDROME: ICD-10-CM

## 2020-09-27 DIAGNOSIS — M79.2 NEUROPATHIC PAIN: ICD-10-CM

## 2020-09-27 DIAGNOSIS — M25.50 POLYARTHRALGIA: ICD-10-CM

## 2020-09-28 RX ORDER — PREGABALIN 200 MG/1
CAPSULE ORAL
Qty: 90 CAPSULE | OUTPATIENT
Start: 2020-09-28

## 2020-09-29 ENCOUNTER — TELEPHONE (OUTPATIENT)
Dept: PAIN MEDICINE | Facility: HOSPITAL | Age: 50
End: 2020-09-29

## 2020-09-29 RX ORDER — AMITRIPTYLINE HYDROCHLORIDE 25 MG/1
TABLET, FILM COATED ORAL
Qty: 90 TABLET | Refills: 1 | OUTPATIENT
Start: 2020-09-29

## 2020-10-08 ENCOUNTER — OFFICE VISIT (OUTPATIENT)
Dept: PAIN MEDICINE | Facility: CLINIC | Age: 50
End: 2020-10-08

## 2020-10-08 VITALS — HEIGHT: 74 IN | BODY MASS INDEX: 21.94 KG/M2 | WEIGHT: 171 LBS | RESPIRATION RATE: 16 BRPM

## 2020-10-08 DIAGNOSIS — L40.50 PSORIATIC ARTHRITIS (HCC): ICD-10-CM

## 2020-10-08 DIAGNOSIS — M79.2 NEUROPATHIC PAIN: ICD-10-CM

## 2020-10-08 DIAGNOSIS — Z79.899 HIGH RISK MEDICATION USE: ICD-10-CM

## 2020-10-08 DIAGNOSIS — M25.50 POLYARTHRALGIA: ICD-10-CM

## 2020-10-08 DIAGNOSIS — G89.4 CHRONIC PAIN SYNDROME: Primary | ICD-10-CM

## 2020-10-08 PROCEDURE — 99443 PR PHYS/QHP TELEPHONE EVALUATION 21-30 MIN: CPT | Performed by: ANESTHESIOLOGY

## 2020-10-08 RX ORDER — PREGABALIN 200 MG/1
200 CAPSULE ORAL 3 TIMES DAILY
Qty: 90 CAPSULE | Refills: 5 | Status: SHIPPED | OUTPATIENT
Start: 2020-10-08 | End: 2022-07-12

## 2020-10-08 RX ORDER — AMITRIPTYLINE HYDROCHLORIDE 25 MG/1
TABLET, FILM COATED ORAL
COMMUNITY
Start: 2020-09-27 | End: 2020-12-16 | Stop reason: SDUPTHER

## 2020-10-12 NOTE — PROGRESS NOTES
CC back pain, bilateral leg pain,  50-year-old male with multiple comorbidities including CAD S/P CABG 2015 on Plavix, chronic pain from  psoriatic arthritis lumbar DDD spondylosis and stenosis with neurogenic claudication, BLE neuropathic pain,   psoriasis arthritis on DMARD here for follow up.  Continues to have good relief with Lyrica.  No major changes in the last 6 months.    Chronic  polyarthralgia,  generalized myofascial pain,  low back pain radiating to bilateral hips and Right  lower extremity has sharp shooting pain worse with walking and prolonged standing relieved by rest.  Denies new weakness saddle anesthesia bladder  or bowel incontinence.  Tried PT with mild relief.  Utilizing cane for ambulation   declines injections    Utilizes   Flexeril, Lyrica  with good relief functional benefit.    L-spine MRI 2015:  Degenerative changes worse at L4-5 with central disc protrusion and annular tear.  Mild foraminal narrowing.  L5-S1 with small right paracentral focal disc protrusion and annular tear.  Mild neural foraminal narrowing right more than  Left. Mild-to-moderate facet arthropathy throughout.  T-spine MRI 2015:  Degenerative changes, T7-T8  small left paracentral disc protrusion with slight flattening of the anterior surface of or aid on the left.    Pain Assessment   Location of Pain: R Shoulder, L Shoulder, Upper Back, Lower Back, R Hip, L Hip, R Leg, L Leg  Description of Pain: Dull/Aching, Throbbing, Stabbing  Previous Pain Rating : 8  Current Pain Ratin  Aggravating Factors: Activity  Alleviating Factors: Rest, Medication  Previous Treatments: Physical Therapy  Previous Diagnostic Studies: MRI  PEG Assessment   What number best describes your pain on average in the past week?7  What number best describes how, during the past week, pain has interfered with your enjoyment of life?9  What number best describes how, during the past week, pain has interfered with your general activity? 7    The  following were reviewed   has a past medical history of Allergic, Allergies, Anxiety, Arthritis, Asthma, Back pain, Depression, Emphysema of lung (CMS/HCC), Headache, Injury of back, Leg pain, and Shortness of breath.     has a past surgical history that includes Coronary artery bypass graft and Coronary angioplasty with stent.    Social History     Tobacco Use   • Smoking status: Current Every Day Smoker     Packs/day: 0.50   • Smokeless tobacco: Never Used   Substance Use Topics   • Alcohol use: No     Frequency: Never       Review of Systems        See HPI    General       Denies fever/chills, fatigue and sleep problems.    Eyes       Denies blurry vision and double vision.    ENT       Denies decreased hearing, sore throat and ears ringing.    CV       Denies chest pain and fainting.    Resp       Denies shortness of breath and cough.    GI       Denies heartburn, constipation, nausea, vomiting and diarrhea.           Denies pain on urination, incontinence and increased frequency.    MS       Denies joint swelling, cramps and weakness.       Back pain, bilateral lower extremity pain.    Derm       Denies rash and itching.    Neuro       Denies numbness, tingling, loss of balance and history of seizures.    Psych       Denies anxiety and depression.    Endo       Denies weight change and thirsty all the time.    Heme       Denies easy bruising, bleeding and enlarged lymph nodes.    Vitals:    10/08/20 1317   Resp: 16       Physical Exam   No acute distress    PHQ-9 on chart                         opioid risk tool low risk      Assessment /Plan  Frank was seen today for back pain, joint pain, neuralgia and follow-up.    Diagnoses and all orders for this visit:    Chronic pain syndrome  -     pregabalin (LYRICA) 200 MG capsule; Take 1 capsule by mouth 3 (Three) Times a Day.    Polyarthralgia    Neuropathic pain  -     pregabalin (LYRICA) 200 MG capsule; Take 1 capsule by mouth 3 (Three) Times a  Day.    Psoriatic arthritis (CMS/HCC)    High risk medication use    Summary  50-year-old male with multiple comorbidities including CAD status post CABG on Plavix, chronic back pain from DDD spondylosis with radiculitis and stenosis with neurogenic claudication, BLE neuropathic pain seen in f/u.  Chronic pain from polyarthralgia /psoriatic arthritis  back pain from DDD spondylosis.  On  DMARD      Continue Lyrica  200 mg 3 times daily.  UDS  and inspect reviewed.  Discussed risk of tolerance, dependence, respiratory depression, coma and death associated with use of oral opioids for treatment of chronic nonmalignant pain.       continue flexeril  Continue compounded anti-inflammatory/neuropathic cream with ketamine.     telemedicine done to avoid coronavirus exposure.   A phone visit was used to complete visit. Total time  25 minutes    RTC in 6 months.

## 2020-10-25 RX ORDER — MONTELUKAST SODIUM 10 MG/1
TABLET ORAL
Qty: 90 TABLET | Refills: 1 | Status: SHIPPED | OUTPATIENT
Start: 2020-10-25 | End: 2020-12-16 | Stop reason: SDUPTHER

## 2020-10-25 RX ORDER — OMEPRAZOLE 20 MG/1
CAPSULE, DELAYED RELEASE ORAL
Qty: 90 CAPSULE | Refills: 0 | Status: SHIPPED | OUTPATIENT
Start: 2020-10-25 | End: 2020-12-16 | Stop reason: SDUPTHER

## 2020-10-25 RX ORDER — NAPROXEN 500 MG/1
TABLET ORAL
Qty: 60 TABLET | Refills: 0 | Status: SHIPPED | OUTPATIENT
Start: 2020-10-25 | End: 2020-12-16 | Stop reason: SDUPTHER

## 2020-10-25 RX ORDER — ATORVASTATIN CALCIUM 20 MG/1
TABLET, FILM COATED ORAL
Qty: 90 TABLET | Refills: 1 | Status: SHIPPED | OUTPATIENT
Start: 2020-10-25 | End: 2020-12-16 | Stop reason: SDUPTHER

## 2020-11-23 RX ORDER — NAPROXEN 500 MG/1
TABLET ORAL
Qty: 60 TABLET | Refills: 0 | OUTPATIENT
Start: 2020-11-23

## 2020-12-11 DIAGNOSIS — F33.1 MAJOR DEPRESSIVE DISORDER, RECURRENT EPISODE, MODERATE (HCC): ICD-10-CM

## 2020-12-11 DIAGNOSIS — F43.10 POSTTRAUMATIC STRESS DISORDER: ICD-10-CM

## 2020-12-11 RX ORDER — AMITRIPTYLINE HYDROCHLORIDE 75 MG/1
75 TABLET, FILM COATED ORAL NIGHTLY
Qty: 90 TABLET | Refills: 0 | OUTPATIENT
Start: 2020-12-11

## 2020-12-11 RX ORDER — ARIPIPRAZOLE 5 MG/1
5 TABLET ORAL NIGHTLY
Qty: 90 TABLET | Refills: 0 | Status: SHIPPED | OUTPATIENT
Start: 2020-12-11 | End: 2021-01-13

## 2020-12-11 NOTE — TELEPHONE ENCOUNTER
I got refill requests from his pharmacy for the Abilify and Elavil 75mg daily but note says it was discontinued at another provider's office.  Please clarify if he is taking Elavil or not.  I did send refills of the Abilify

## 2020-12-16 ENCOUNTER — OFFICE VISIT (OUTPATIENT)
Dept: FAMILY MEDICINE CLINIC | Facility: CLINIC | Age: 50
End: 2020-12-16

## 2020-12-16 VITALS
HEIGHT: 73 IN | DIASTOLIC BLOOD PRESSURE: 87 MMHG | WEIGHT: 181 LBS | OXYGEN SATURATION: 100 % | TEMPERATURE: 96.6 F | HEART RATE: 75 BPM | BODY MASS INDEX: 23.99 KG/M2 | SYSTOLIC BLOOD PRESSURE: 132 MMHG

## 2020-12-16 DIAGNOSIS — I25.9 ISCHEMIC HEART DISEASE DUE TO CORONARY ARTERY OBSTRUCTION (HCC): Primary | ICD-10-CM

## 2020-12-16 DIAGNOSIS — E78.2 MIXED HYPERLIPIDEMIA: Primary | ICD-10-CM

## 2020-12-16 DIAGNOSIS — J30.89 ENVIRONMENTAL AND SEASONAL ALLERGIES: ICD-10-CM

## 2020-12-16 DIAGNOSIS — G89.4 CHRONIC PAIN DISORDER: ICD-10-CM

## 2020-12-16 DIAGNOSIS — F32.1 MODERATE MAJOR DEPRESSION (HCC): ICD-10-CM

## 2020-12-16 DIAGNOSIS — I24.0 ISCHEMIC HEART DISEASE DUE TO CORONARY ARTERY OBSTRUCTION (HCC): Primary | ICD-10-CM

## 2020-12-16 DIAGNOSIS — L40.9 PSORIASIS: ICD-10-CM

## 2020-12-16 DIAGNOSIS — I25.9 ISCHEMIC HEART DISEASE DUE TO CORONARY ARTERY OBSTRUCTION (HCC): ICD-10-CM

## 2020-12-16 DIAGNOSIS — Z87.891 PERSONAL HISTORY OF TOBACCO USE, PRESENTING HAZARDS TO HEALTH: ICD-10-CM

## 2020-12-16 DIAGNOSIS — I24.0 ISCHEMIC HEART DISEASE DUE TO CORONARY ARTERY OBSTRUCTION (HCC): ICD-10-CM

## 2020-12-16 DIAGNOSIS — F43.10 POSTTRAUMATIC STRESS DISORDER: ICD-10-CM

## 2020-12-16 PROCEDURE — 99214 OFFICE O/P EST MOD 30 MIN: CPT | Performed by: FAMILY MEDICINE

## 2020-12-16 RX ORDER — CLOBETASOL PROPIONATE 0.5 MG/G
OINTMENT TOPICAL 2 TIMES DAILY
Qty: 60 G | Refills: 1 | Status: SHIPPED | OUTPATIENT
Start: 2020-12-16 | End: 2021-08-16

## 2020-12-16 RX ORDER — MONTELUKAST SODIUM 10 MG/1
10 TABLET ORAL NIGHTLY
Qty: 90 TABLET | Refills: 1 | Status: SHIPPED | OUTPATIENT
Start: 2020-12-16 | End: 2021-06-15

## 2020-12-16 RX ORDER — OMEPRAZOLE 20 MG/1
20 CAPSULE, DELAYED RELEASE ORAL
Qty: 90 CAPSULE | Refills: 1 | Status: SHIPPED | OUTPATIENT
Start: 2020-12-16 | End: 2021-06-22

## 2020-12-16 RX ORDER — AMITRIPTYLINE HYDROCHLORIDE 75 MG/1
75 TABLET, FILM COATED ORAL NIGHTLY
Qty: 30 TABLET | Refills: 2 | Status: SHIPPED | OUTPATIENT
Start: 2020-12-16 | End: 2021-03-02

## 2020-12-16 RX ORDER — NAPROXEN 500 MG/1
500 TABLET ORAL 2 TIMES DAILY PRN
Qty: 60 TABLET | Refills: 2 | Status: SHIPPED | OUTPATIENT
Start: 2020-12-16 | End: 2021-03-02

## 2020-12-16 RX ORDER — ATORVASTATIN CALCIUM 20 MG/1
20 TABLET, FILM COATED ORAL NIGHTLY
Qty: 90 TABLET | Refills: 1 | Status: SHIPPED | OUTPATIENT
Start: 2020-12-16 | End: 2021-01-06 | Stop reason: SDUPTHER

## 2020-12-16 NOTE — TELEPHONE ENCOUNTER
PT CALLED AND CAME BY THE OFFICE TODAY. HE STATES HE IS STILL TAKING THE amitriptyline 75 MG . PLEASE SEND TO PHARMACY.

## 2020-12-16 NOTE — TELEPHONE ENCOUNTER
Lab Results   Component Value Date    CHOL 111 03/04/2020    TRIG 94 03/04/2020    HDL 38 (L) 03/04/2020    LDL 54 03/04/2020     The ASCVD Risk score ([place] STRONG Jr., et al., 2013) failed to calculate for the following reasons:    The valid total cholesterol range is 130 to 320 mg/dL

## 2020-12-18 ENCOUNTER — TELEPHONE (OUTPATIENT)
Dept: FAMILY MEDICINE CLINIC | Facility: CLINIC | Age: 50
End: 2020-12-18

## 2020-12-18 NOTE — TELEPHONE ENCOUNTER
Chantix requires prior authorization. bupropioin hcl is an alternative that does not require prior authorization. Do you want to change or complete PA?

## 2020-12-21 NOTE — TELEPHONE ENCOUNTER
Please call patient and let him know that insurance will not cover Chantix without him first failing Wellbutrin. Would he be agreeable to trying it?

## 2021-01-06 ENCOUNTER — PATIENT MESSAGE (OUTPATIENT)
Dept: FAMILY MEDICINE CLINIC | Facility: CLINIC | Age: 51
End: 2021-01-06

## 2021-01-06 DIAGNOSIS — I24.0 ISCHEMIC HEART DISEASE DUE TO CORONARY ARTERY OBSTRUCTION (HCC): ICD-10-CM

## 2021-01-06 DIAGNOSIS — Z87.891 PERSONAL HISTORY OF TOBACCO USE, PRESENTING HAZARDS TO HEALTH: Primary | ICD-10-CM

## 2021-01-06 DIAGNOSIS — F33.1 MAJOR DEPRESSIVE DISORDER, RECURRENT EPISODE, MODERATE (HCC): ICD-10-CM

## 2021-01-06 DIAGNOSIS — I25.9 ISCHEMIC HEART DISEASE DUE TO CORONARY ARTERY OBSTRUCTION (HCC): ICD-10-CM

## 2021-01-06 DIAGNOSIS — F43.10 POSTTRAUMATIC STRESS DISORDER: ICD-10-CM

## 2021-01-06 DIAGNOSIS — E78.2 MIXED HYPERLIPIDEMIA: ICD-10-CM

## 2021-01-06 RX ORDER — VORTIOXETINE 20 MG/1
1 TABLET, FILM COATED ORAL DAILY
Qty: 30 TABLET | Refills: 0 | Status: SHIPPED | OUTPATIENT
Start: 2021-01-06 | End: 2021-08-23 | Stop reason: SDUPTHER

## 2021-01-06 RX ORDER — ATORVASTATIN CALCIUM 20 MG/1
20 TABLET, FILM COATED ORAL NIGHTLY
Qty: 90 TABLET | Refills: 1 | Status: SHIPPED | OUTPATIENT
Start: 2021-01-06 | End: 2021-09-10

## 2021-01-06 RX ORDER — PRAZOSIN HYDROCHLORIDE 2 MG/1
2 CAPSULE ORAL NIGHTLY
Qty: 30 CAPSULE | Refills: 0 | Status: SHIPPED | OUTPATIENT
Start: 2021-01-06 | End: 2021-01-31

## 2021-01-06 RX ORDER — ASPIRIN 81 MG/1
81 TABLET ORAL DAILY
Qty: 90 TABLET | Refills: 1 | Status: SHIPPED | OUTPATIENT
Start: 2021-01-06 | End: 2021-11-16

## 2021-01-06 NOTE — TELEPHONE ENCOUNTER
From: Frank Bargre  To: Brittni Shell MD  Sent: 1/6/2021 7:29 AM EST  Subject: Prescription Question    The following medication renewals have been denied:   - amitriptyline (ELAVIL) 75 MG tablet [Pharmacy Med Name: AMITRIPTYLINE 75MG TABLETS]   Or is this for heart

## 2021-01-06 NOTE — TELEPHONE ENCOUNTER
From: Frank Barger  To: Brittni Shell MD  Sent: 1/6/2021 7:26 AM EST  Subject: Prescription Question    I need for u to send whatever dr Valdes had me on new prescriptions. An the Leyla need an approval from you and if anything else please make sure I get meds

## 2021-01-10 RX ORDER — BUPROPION HYDROCHLORIDE 150 MG/1
150 TABLET ORAL DAILY
Qty: 90 TABLET | Refills: 1 | Status: SHIPPED | OUTPATIENT
Start: 2021-01-10 | End: 2021-06-22

## 2021-01-12 ENCOUNTER — PATIENT MESSAGE (OUTPATIENT)
Dept: FAMILY MEDICINE CLINIC | Facility: CLINIC | Age: 51
End: 2021-01-12

## 2021-01-12 ENCOUNTER — OFFICE VISIT (OUTPATIENT)
Dept: CARDIOLOGY | Facility: CLINIC | Age: 51
End: 2021-01-12

## 2021-01-12 VITALS
WEIGHT: 179 LBS | SYSTOLIC BLOOD PRESSURE: 121 MMHG | OXYGEN SATURATION: 99 % | BODY MASS INDEX: 23.72 KG/M2 | TEMPERATURE: 97.1 F | DIASTOLIC BLOOD PRESSURE: 82 MMHG | HEART RATE: 73 BPM | HEIGHT: 73 IN

## 2021-01-12 DIAGNOSIS — Z95.1 S/P CABG (CORONARY ARTERY BYPASS GRAFT): ICD-10-CM

## 2021-01-12 DIAGNOSIS — R07.89 CHEST DISCOMFORT: Primary | ICD-10-CM

## 2021-01-12 DIAGNOSIS — I10 ESSENTIAL HYPERTENSION: ICD-10-CM

## 2021-01-12 DIAGNOSIS — F33.1 MAJOR DEPRESSIVE DISORDER, RECURRENT EPISODE, MODERATE (HCC): ICD-10-CM

## 2021-01-12 DIAGNOSIS — Z95.820 STATUS POST ANGIOPLASTY WITH STENT: ICD-10-CM

## 2021-01-12 DIAGNOSIS — F43.10 POSTTRAUMATIC STRESS DISORDER: ICD-10-CM

## 2021-01-12 DIAGNOSIS — E78.5 DYSLIPIDEMIA: ICD-10-CM

## 2021-01-12 DIAGNOSIS — Z95.1 HX OF CABG: ICD-10-CM

## 2021-01-12 PROCEDURE — 93000 ELECTROCARDIOGRAM COMPLETE: CPT | Performed by: INTERNAL MEDICINE

## 2021-01-12 PROCEDURE — 99214 OFFICE O/P EST MOD 30 MIN: CPT | Performed by: INTERNAL MEDICINE

## 2021-01-12 RX ORDER — NITROGLYCERIN 0.4 MG/1
0.4 TABLET SUBLINGUAL
Qty: 25 TABLET | Refills: 0 | Status: SHIPPED | OUTPATIENT
Start: 2021-01-12 | End: 2021-01-21

## 2021-01-12 NOTE — PROGRESS NOTES
Encounter Date:01/12/2021      Patient ID: Frank Barger is a 50 y.o. male.    Chief Complaint:  New patient  Status post CABG  Status post stent  Hypertension  Dyslipidemia      History of Present Illness  The patient is a pleasant 50-year-old white male smoker he is here for evaluation of cardiovascular status and chest discomfort.  Patient had previous CABG and stent placement.  Back in November patient had an episode of chest discomfort substernal heaviness and tightness sensation relieved by 1 sublingual nitroglycerin.  The pain was somewhat similar to what he had prior to having stent and CABG.  Patient did not have further chest discomfort.  Substernal heaviness and tightness without any radiation of the discomfort into the neck or into the arms.  No other associated aggravating or elevating factors.    The patient is not having any shortness of breath, palpitations, dizziness or syncope.  Denies having any headache ,abdominal pain ,nausea, vomiting , diarrhea constipation, loss of weight or loss of appetite.  Denies having any excessive bruising ,hematuria or blood in the stool.    Review of all systems negative except as indicated.    Reviewed ROS.      Assessment and Plan     ]]]]]]]]]]]]]]]]]  Impression  ==========  -Chest discomfort suggestive of angina pectoris    -Status post CABG (2015) (triple-vessel according to patient) at [facility].    -Status post stent placement (details not available)-2014    -Dyslipidemia hypertension COPD PTSD    -Psoriasis    -Family history of coronary artery disease    -Smoker-abstinence from smoking    -Allergic to oxycodone hydrocodone  ==========  Plan  =========  EKG showed sinus rhythm without any ischemic changes.  Patient is having chest discomfort suggestive of angina pectoris.  Stress Cardiolite test  Echocardiogram  Medications were reviewed and updated.  Recent labs were reviewed.  9/2/2020-normal CBC normal CMP.  Continue  metoprolol 25 mg twice a day  Follow-up in the office on the same day.  Further plan will depend on patient's progress.  ]]]]]]]]]]]]]]           Diagnosis Plan   1. Chest discomfort  Adult Transthoracic Echo Complete W/ Cont if Necessary Per Protocol    Stress Test With Myocardial Perfusion One Day   2. S/P CABG (coronary artery bypass graft)  Adult Transthoracic Echo Complete W/ Cont if Necessary Per Protocol    Stress Test With Myocardial Perfusion One Day   3. Essential hypertension  Adult Transthoracic Echo Complete W/ Cont if Necessary Per Protocol    Stress Test With Myocardial Perfusion One Day   4. Dyslipidemia  Adult Transthoracic Echo Complete W/ Cont if Necessary Per Protocol    Stress Test With Myocardial Perfusion One Day   5. Status post angioplasty with stent     6. Hx of CABG     LAB RESULTS (LAST 7 DAYS)    CBC        BMP        CMP         BNP        TROPONIN        CoAg        Creatinine Clearance  CrCl cannot be calculated (Patient's most recent lab result is older than the maximum 30 days allowed.).    ABG        Radiology  No radiology results for the last day                The following portions of the patient's history were reviewed and updated as appropriate: allergies, current medications, past family history, past medical history, past social history, past surgical history and problem list.    Review of Systems   Constitution: Negative for malaise/fatigue.   Cardiovascular: Negative for chest pain, leg swelling, palpitations and syncope.   Respiratory: Negative for shortness of breath.    Skin: Negative for rash.   Gastrointestinal: Negative for nausea and vomiting.   Neurological: Negative for dizziness, light-headedness and numbness.         Current Outpatient Medications:   •  amitriptyline (ELAVIL) 75 MG tablet, Take 1 tablet by mouth Every Night., Disp: 30 tablet, Rfl: 2  •  ARIPiprazole (ABILIFY) 5 MG tablet, TAKE 1 TABLET BY MOUTH EVERY NIGHT, Disp: 90 tablet, Rfl: 0  •  aspirin  (aspirin) 81 MG EC tablet, Take 1 tablet by mouth Daily., Disp: 90 tablet, Rfl: 1  •  atorvastatin (LIPITOR) 20 MG tablet, Take 1 tablet by mouth Every Night., Disp: 90 tablet, Rfl: 1  •  buPROPion XL (Wellbutrin XL) 150 MG 24 hr tablet, Take 1 tablet by mouth Daily., Disp: 90 tablet, Rfl: 1  •  clobetasol (TEMOVATE) 0.05 % ointment, Apply  topically to the appropriate area as directed 2 (Two) Times a Day., Disp: 60 g, Rfl: 1  •  cyclobenzaprine (FLEXERIL) 10 MG tablet, Take 1 tablet by mouth 3 (Three) Times a Day As Needed for Muscle Spasms., Disp: 90 tablet, Rfl: 5  •  metoprolol tartrate (LOPRESSOR) 25 MG tablet, Take 1 tablet by mouth 2 (Two) Times a Day., Disp: 180 tablet, Rfl: 1  •  montelukast (SINGULAIR) 10 MG tablet, Take 1 tablet by mouth Every Night., Disp: 90 tablet, Rfl: 1  •  Multiple Vitamins-Minerals (MULTIVITAMIN WITH MINERALS) tablet tablet, Take 1 tablet by mouth Daily., Disp: , Rfl:   •  naproxen (NAPROSYN) 500 MG tablet, Take 1 tablet by mouth 2 (Two) Times a Day As Needed for Moderate Pain . Take with food!, Disp: 60 tablet, Rfl: 2  •  neomycin-polymyxin-dexamethasone (MAXITROL) 3.5-28997-2.1 ophthalmic suspension, INSTILL 1 DROP INTO THE OS QID X5 DAYS, Disp: , Rfl:   •  nitroglycerin (NITROSTAT) 0.4 MG SL tablet, Place 1 tablet under the tongue Every 5 (Five) Minutes As Needed for Chest Pain. Take no more than 3 doses in 15 minutes., Disp: 25 tablet, Rfl: 0  •  NON FORMULARY, Allergy injections, Disp: , Rfl:   •  omeprazole (priLOSEC) 20 MG capsule, Take 1 capsule by mouth Every Morning Before Breakfast. Take on an empty stomach!, Disp: 90 capsule, Rfl: 1  •  prazosin (MINIPRESS) 2 MG capsule, Take 1 capsule by mouth Every Night., Disp: 30 capsule, Rfl: 0  •  pregabalin (LYRICA) 200 MG capsule, Take 1 capsule by mouth 3 (Three) Times a Day., Disp: 90 capsule, Rfl: 5  •  SUMAtriptan (IMITREX) 50 MG tablet, TAKE 1 TABLET BY MOUTH AT ONSET OF HEADACHE. MAY REPEAT DOSE 1 TIME IN 2 HOURS IF  HEADACHE NOT RELIEVED, Disp: 10 tablet, Rfl: 0  •  varenicline (Chantix Starting Month Pak) 0.5 MG X 11 & 1 MG X 42 tablet, Take 0.5 mg one daily on days 1-3 and and 0.5 mg twice daily on days 4-7.Then 1 mg twice daily for a total of 12 weeks., Disp: 53 tablet, Rfl: 0  •  Vortioxetine HBr (Trintellix) 20 MG tablet, Take 20 mg by mouth Daily., Disp: 30 tablet, Rfl: 0    Allergies   Allergen Reactions   • Hydrocodone Nausea And Vomiting   • Hydrocodone-Acetaminophen GI Intolerance   • Oxycodone GI Intolerance       Family History   Problem Relation Age of Onset   • Diabetes Mother    • Heart disease Father    • Anxiety disorder Daughter    • Depression Daughter        Past Surgical History:   Procedure Laterality Date   • CORONARY ANGIOPLASTY WITH STENT PLACEMENT  04/2014   • CORONARY ARTERY BYPASS GRAFT  12/2015       Past Medical History:   Diagnosis Date   • Allergies     takes allergy injections   • Anxiety    • Arthritis    • Asthma    • Back pain    • Depression    • Emphysema of lung (CMS/HCC)    • Headache    • Leg pain        Family History   Problem Relation Age of Onset   • Diabetes Mother    • Heart disease Father    • Anxiety disorder Daughter    • Depression Daughter        Social History     Socioeconomic History   • Marital status: Single     Spouse name: Not on file   • Number of children: Not on file   • Years of education: Not on file   • Highest education level: Not on file   Tobacco Use   • Smoking status: Current Every Day Smoker     Packs/day: 0.50   • Smokeless tobacco: Never Used   Substance and Sexual Activity   • Alcohol use: No     Frequency: Never   • Drug use: No   • Sexual activity: Yes     Partners: Female           ECG 12 Lead    Date/Time: 1/12/2021 11:46 AM  Performed by: Jaimie Silva MD  Authorized by: Jaimie Silva MD   Comparison: compared with previous ECG   Comparison to previous ECG: Normal sinus rhythm nonspecific ST-T wave changes 60/min normal axis normal intervals  "no ectopy no prior tracing available for comparison.                Objective:       Physical Exam    /82 (BP Location: Left arm, Patient Position: Sitting, Cuff Size: Large Adult)   Pulse 73   Temp 97.1 °F (36.2 °C)   Ht 185.4 cm (73\")   Wt 81.2 kg (179 lb)   SpO2 99%   BMI 23.62 kg/m²   The patient is alert, oriented and in no distress.    Vital signs as noted above.    Head and neck revealed no carotid bruits or jugular venous distension.  No thyromegaly or lymphadenopathy is present.    Lungs clear.  No wheezing.  Breath sounds are normal bilaterally.    Heart normal first and second heart sounds.  No murmur..  No pericardial rub is present.  No gallop is present.    Abdomen soft and nontender.  No organomegaly is present.    Extremities revealed good peripheral pulses without any pedal edema.    Skin warm and dry.  Psoriatic patches.    Musculoskeletal system is grossly normal.    CNS grossly normal.        "

## 2021-01-13 RX ORDER — ARIPIPRAZOLE 5 MG/1
5 TABLET ORAL NIGHTLY
Qty: 90 TABLET | Refills: 0 | Status: SHIPPED | OUTPATIENT
Start: 2021-01-13 | End: 2021-05-22

## 2021-01-13 NOTE — TELEPHONE ENCOUNTER
From: Frank Barger  To: Brittni Shell MD  Sent: 1/12/2021 12:32 PM EST  Subject: Prescription Question    Aripiprazole 5mg Tablets  Price: Pending  Qty: 90  Waiting for prescriber approval  That's from [facility]

## 2021-01-14 ENCOUNTER — HOSPITAL ENCOUNTER (OUTPATIENT)
Dept: CARDIOLOGY | Facility: HOSPITAL | Age: 51
Discharge: HOME | End: 2021-01-14

## 2021-01-14 ENCOUNTER — OFFICE VISIT (OUTPATIENT)
Dept: CARDIOLOGY | Facility: CLINIC | Age: 51
End: 2021-01-14

## 2021-01-14 VITALS
WEIGHT: 179 LBS | BODY MASS INDEX: 23.72 KG/M2 | SYSTOLIC BLOOD PRESSURE: 134 MMHG | DIASTOLIC BLOOD PRESSURE: 71 MMHG | HEIGHT: 73 IN

## 2021-01-14 VITALS
HEART RATE: 71 BPM | HEIGHT: 73 IN | DIASTOLIC BLOOD PRESSURE: 84 MMHG | SYSTOLIC BLOOD PRESSURE: 120 MMHG | BODY MASS INDEX: 23.72 KG/M2 | WEIGHT: 179 LBS

## 2021-01-14 DIAGNOSIS — E78.5 DYSLIPIDEMIA: ICD-10-CM

## 2021-01-14 DIAGNOSIS — I10 ESSENTIAL HYPERTENSION: ICD-10-CM

## 2021-01-14 DIAGNOSIS — Z95.1 S/P CABG (CORONARY ARTERY BYPASS GRAFT): ICD-10-CM

## 2021-01-14 DIAGNOSIS — Z95.820 STATUS POST ANGIOPLASTY WITH STENT: ICD-10-CM

## 2021-01-14 DIAGNOSIS — R07.89 CHEST DISCOMFORT: ICD-10-CM

## 2021-01-14 DIAGNOSIS — R07.89 CHEST DISCOMFORT: Primary | ICD-10-CM

## 2021-01-14 DIAGNOSIS — R94.39 ABNORMAL NUCLEAR STRESS TEST: ICD-10-CM

## 2021-01-14 LAB
BH CV ECHO MEAS - ACS: 2.4 CM
BH CV ECHO MEAS - AO MAX PG (FULL): 1.4 MMHG
BH CV ECHO MEAS - AO MAX PG: 4.3 MMHG
BH CV ECHO MEAS - AO MEAN PG (FULL): 1 MMHG
BH CV ECHO MEAS - AO MEAN PG: 2.4 MMHG
BH CV ECHO MEAS - AO ROOT AREA (BSA CORRECTED): 1.8
BH CV ECHO MEAS - AO ROOT AREA: 10.5 CM^2
BH CV ECHO MEAS - AO ROOT DIAM: 3.7 CM
BH CV ECHO MEAS - AO V2 MAX: 103.4 CM/SEC
BH CV ECHO MEAS - AO V2 MEAN: 74.7 CM/SEC
BH CV ECHO MEAS - AO V2 VTI: 18.3 CM
BH CV ECHO MEAS - AVA(I,A): 3.4 CM^2
BH CV ECHO MEAS - AVA(I,D): 3.4 CM^2
BH CV ECHO MEAS - AVA(V,A): 3.2 CM^2
BH CV ECHO MEAS - AVA(V,D): 3.2 CM^2
BH CV ECHO MEAS - BSA(HAYCOCK): 2 M^2
BH CV ECHO MEAS - BSA: 2.1 M^2
BH CV ECHO MEAS - BZI_BMI: 23.6 KILOGRAMS/M^2
BH CV ECHO MEAS - BZI_METRIC_HEIGHT: 185.4 CM
BH CV ECHO MEAS - BZI_METRIC_WEIGHT: 81.2 KG
BH CV ECHO MEAS - EDV(CUBED): 66.1 ML
BH CV ECHO MEAS - EDV(MOD-SP4): 128.4 ML
BH CV ECHO MEAS - EDV(TEICH): 71.9 ML
BH CV ECHO MEAS - EF(CUBED): 23.4 %
BH CV ECHO MEAS - EF(MOD-SP4): 53.1 %
BH CV ECHO MEAS - EF(TEICH): 19 %
BH CV ECHO MEAS - ESV(CUBED): 50.7 ML
BH CV ECHO MEAS - ESV(MOD-SP4): 60.2 ML
BH CV ECHO MEAS - ESV(TEICH): 58.2 ML
BH CV ECHO MEAS - FS: 8.5 %
BH CV ECHO MEAS - IVS/LVPW: 1.2
BH CV ECHO MEAS - IVSD: 0.99 CM
BH CV ECHO MEAS - LA DIMENSION: 2.5 CM
BH CV ECHO MEAS - LA/AO: 0.68
BH CV ECHO MEAS - LV DIASTOLIC VOL/BSA (35-75): 62.5 ML/M^2
BH CV ECHO MEAS - LV MASS(C)D: 112.1 GRAMS
BH CV ECHO MEAS - LV MASS(C)DI: 54.6 GRAMS/M^2
BH CV ECHO MEAS - LV MAX PG: 2.9 MMHG
BH CV ECHO MEAS - LV MEAN PG: 1.3 MMHG
BH CV ECHO MEAS - LV SYSTOLIC VOL/BSA (12-30): 29.3 ML/M^2
BH CV ECHO MEAS - LV V1 MAX: 84.6 CM/SEC
BH CV ECHO MEAS - LV V1 MEAN: 53.7 CM/SEC
BH CV ECHO MEAS - LV V1 VTI: 15.7 CM
BH CV ECHO MEAS - LVIDD: 4 CM
BH CV ECHO MEAS - LVIDS: 3.7 CM
BH CV ECHO MEAS - LVOT AREA: 3.9 CM^2
BH CV ECHO MEAS - LVOT DIAM: 2.2 CM
BH CV ECHO MEAS - LVPWD: 0.82 CM
BH CV ECHO MEAS - MV A MAX VEL: 64.5 CM/SEC
BH CV ECHO MEAS - MV DEC SLOPE: 238 CM/SEC^2
BH CV ECHO MEAS - MV DEC TIME: 0.23 SEC
BH CV ECHO MEAS - MV E MAX VEL: 54.2 CM/SEC
BH CV ECHO MEAS - MV E/A: 0.84
BH CV ECHO MEAS - MV MAX PG: 2.2 MMHG
BH CV ECHO MEAS - MV MEAN PG: 1 MMHG
BH CV ECHO MEAS - MV V2 MAX: 74.4 CM/SEC
BH CV ECHO MEAS - MV V2 MEAN: 48 CM/SEC
BH CV ECHO MEAS - MV V2 VTI: 20.8 CM
BH CV ECHO MEAS - MVA(VTI): 3 CM^2
BH CV ECHO MEAS - PA ACC TIME: 0.18 SEC
BH CV ECHO MEAS - PA MAX PG (FULL): 3.8 MMHG
BH CV ECHO MEAS - PA MAX PG: 5.7 MMHG
BH CV ECHO MEAS - PA PR(ACCEL): -0.41 MMHG
BH CV ECHO MEAS - PA V2 MAX: 118.9 CM/SEC
BH CV ECHO MEAS - PI END-D VEL: 112.3 CM/SEC
BH CV ECHO MEAS - PI MAX PG: 10.7 MMHG
BH CV ECHO MEAS - PI MAX VEL: 163.2 CM/SEC
BH CV ECHO MEAS - RV MAX PG: 1.8 MMHG
BH CV ECHO MEAS - RV MEAN PG: 1.1 MMHG
BH CV ECHO MEAS - RV V1 MAX: 67.8 CM/SEC
BH CV ECHO MEAS - RV V1 MEAN: 51.4 CM/SEC
BH CV ECHO MEAS - RV V1 VTI: 13.2 CM
BH CV ECHO MEAS - RVDD: 3.1 CM
BH CV ECHO MEAS - SI(AO): 93.9 ML/M^2
BH CV ECHO MEAS - SI(CUBED): 7.5 ML/M^2
BH CV ECHO MEAS - SI(LVOT): 30.3 ML/M^2
BH CV ECHO MEAS - SI(MOD-SP4): 33.2 ML/M^2
BH CV ECHO MEAS - SI(TEICH): 6.7 ML/M^2
BH CV ECHO MEAS - SV(AO): 192.7 ML
BH CV ECHO MEAS - SV(CUBED): 15.4 ML
BH CV ECHO MEAS - SV(LVOT): 62.1 ML
BH CV ECHO MEAS - SV(MOD-SP4): 68.1 ML
BH CV ECHO MEAS - SV(TEICH): 13.7 ML
BH CV STRESS COMMENTS STAGE 1: NORMAL
BH CV STRESS DOSE REGADENOSON STAGE 1: 0.4
BH CV STRESS DURATION MIN STAGE 1: 0
BH CV STRESS DURATION SEC STAGE 1: 10
BH CV STRESS PROTOCOL 1: NORMAL
BH CV STRESS RECOVERY BP: NORMAL MMHG
BH CV STRESS RECOVERY HR: 97 BPM
BH CV STRESS STAGE 1: 1
LV EF 2D ECHO EST: 50 %
MAXIMAL PREDICTED HEART RATE: 170 BPM
MAXIMAL PREDICTED HEART RATE: 170 BPM
STRESS BASELINE BP: NORMAL MMHG
STRESS BASELINE HR: 72 BPM
STRESS TARGET HR: 145 BPM
STRESS TARGET HR: 145 BPM

## 2021-01-14 PROCEDURE — 0 TECHNETIUM SESTAMIBI: Performed by: INTERNAL MEDICINE

## 2021-01-14 PROCEDURE — 78452 HT MUSCLE IMAGE SPECT MULT: CPT | Performed by: INTERNAL MEDICINE

## 2021-01-14 PROCEDURE — 93016 CV STRESS TEST SUPVJ ONLY: CPT | Performed by: INTERNAL MEDICINE

## 2021-01-14 PROCEDURE — 93306 TTE W/DOPPLER COMPLETE: CPT

## 2021-01-14 PROCEDURE — A9500 TC99M SESTAMIBI: HCPCS | Performed by: INTERNAL MEDICINE

## 2021-01-14 PROCEDURE — 93018 CV STRESS TEST I&R ONLY: CPT | Performed by: INTERNAL MEDICINE

## 2021-01-14 PROCEDURE — 93017 CV STRESS TEST TRACING ONLY: CPT

## 2021-01-14 PROCEDURE — 78452 HT MUSCLE IMAGE SPECT MULT: CPT

## 2021-01-14 PROCEDURE — 99215 OFFICE O/P EST HI 40 MIN: CPT | Performed by: INTERNAL MEDICINE

## 2021-01-14 PROCEDURE — 93306 TTE W/DOPPLER COMPLETE: CPT | Performed by: INTERNAL MEDICINE

## 2021-01-14 PROCEDURE — 25010000002 REGADENOSON 0.4 MG/5ML SOLUTION: Performed by: INTERNAL MEDICINE

## 2021-01-14 RX ADMIN — REGADENOSON 0.4 MG: 0.08 INJECTION, SOLUTION INTRAVENOUS at 14:15

## 2021-01-14 RX ADMIN — TECHNETIUM TC 99M SESTAMIBI 1 DOSE: 1 INJECTION INTRAVENOUS at 13:15

## 2021-01-14 NOTE — PROGRESS NOTES
Encounter Date:01/14/2021  Last seen 1/12/2021      Patient ID: Frank Barger is a 50 y.o. male.    Chief Complaint:  Status post CABG  Status post stent  Hypertension  Dyslipidemia        History of Present Illness  Patient was recently seen with following history.  The patient is a pleasant 50-year-old white male smoker he is here for evaluation of cardiovascular status and chest discomfort.  Patient had previous CABG and stent placement.  Back in November patient had an episode of chest discomfort substernal heaviness and tightness sensation relieved by 1 sublingual nitroglycerin.  The pain was somewhat similar to what he had prior to having stent and CABG.  Patient did not have further chest discomfort.  Substernal heaviness and tightness without any radiation of the discomfort into the neck or into the arms.  No other associated aggravating or elevating factors.     The patient is not having any shortness of breath, palpitations, dizziness or syncope.  Denies having any headache ,abdominal pain ,nausea, vomiting , diarrhea constipation, loss of weight or loss of appetite.  Denies having any excessive bruising ,hematuria or blood in the stool.     Review of all systems negative except as indicated.     Reviewed ROS.        Assessment and Plan      ]]]]]]]]]]]]]]]]]  Impression  ==========  -Chest discomfort suggestive of angina pectoris    Echocardiogram 1/14/2021 revealed mild paradoxical septal motion and inferior wall hypokinesis with ejection fraction of 50%.  Lexiscan Cardiolite test-abnormal with mild posterior and inferior proximal ischemia-1/14/2021     -Status post CABG (2015) (triple-vessel according to patient) at [facility].     -Status post stent placement (details not available)-2014     -Dyslipidemia hypertension COPD PTSD     -Psoriasis     -Family history of coronary artery disease     -Smoker-abstinence from smoking     -Allergic to oxycodone  hydrocodone  ==========  Plan  =========  Recent EKG showed sinus rhythm without any ischemic changes.  Patient is having chest discomfort suggestive of angina pectoris.  Stress Cardiolite test-as above  Echocardiogram-as above  Medications were reviewed and updated.  Recent labs were reviewed.  9/2/2020-normal CBC normal CMP.  Continue metoprolol 25 mg twice a day    Hypertension  Continue metoprolol    Dyslipidemia-on statins    In view of symptoms and abnormal stress Cardiolite test patient was advised cardiac catheterization and coronary arteriography.  Risks and benefits pros and cons of the procedure were discussed with patient.    Further plan will depend on patient's progress.  ]]]]]]]]]]]]]]              No diagnosis found.LAB RESULTS (LAST 7 DAYS)    CBC        BMP        CMP         BNP        TROPONIN        CoAg        Creatinine Clearance  CrCl cannot be calculated (Patient's most recent lab result is older than the maximum 30 days allowed.).    ABG        Radiology  No radiology results for the last day                The following portions of the patient's history were reviewed and updated as appropriate: allergies, current medications, past family history, past medical history, past social history, past surgical history and problem list.    Review of Systems   Constitution: Negative for malaise/fatigue.   Cardiovascular: Negative for chest pain, leg swelling, palpitations and syncope.   Respiratory: Negative for shortness of breath.    Skin: Negative for rash.   Gastrointestinal: Negative for nausea and vomiting.   Neurological: Negative for dizziness, light-headedness and numbness.         Current Outpatient Medications:   •  amitriptyline (ELAVIL) 75 MG tablet, Take 1 tablet by mouth Every Night., Disp: 30 tablet, Rfl: 2  •  ARIPiprazole (ABILIFY) 5 MG tablet, TAKE 1 TABLET BY MOUTH EVERY NIGHT, Disp: 90 tablet, Rfl: 0  •  aspirin (aspirin) 81 MG EC tablet, Take 1 tablet by mouth Daily., Disp: 90  tablet, Rfl: 1  •  atorvastatin (LIPITOR) 20 MG tablet, Take 1 tablet by mouth Every Night., Disp: 90 tablet, Rfl: 1  •  buPROPion XL (Wellbutrin XL) 150 MG 24 hr tablet, Take 1 tablet by mouth Daily., Disp: 90 tablet, Rfl: 1  •  clobetasol (TEMOVATE) 0.05 % ointment, Apply  topically to the appropriate area as directed 2 (Two) Times a Day., Disp: 60 g, Rfl: 1  •  cyclobenzaprine (FLEXERIL) 10 MG tablet, Take 1 tablet by mouth 3 (Three) Times a Day As Needed for Muscle Spasms., Disp: 90 tablet, Rfl: 5  •  metoprolol tartrate (LOPRESSOR) 25 MG tablet, Take 1 tablet by mouth 2 (Two) Times a Day., Disp: 180 tablet, Rfl: 1  •  montelukast (SINGULAIR) 10 MG tablet, Take 1 tablet by mouth Every Night., Disp: 90 tablet, Rfl: 1  •  Multiple Vitamins-Minerals (MULTIVITAMIN WITH MINERALS) tablet tablet, Take 1 tablet by mouth Daily., Disp: , Rfl:   •  naproxen (NAPROSYN) 500 MG tablet, Take 1 tablet by mouth 2 (Two) Times a Day As Needed for Moderate Pain . Take with food!, Disp: 60 tablet, Rfl: 2  •  neomycin-polymyxin-dexamethasone (MAXITROL) 3.5-69208-9.1 ophthalmic suspension, INSTILL 1 DROP INTO THE OS QID X5 DAYS, Disp: , Rfl:   •  nitroglycerin (NITROSTAT) 0.4 MG SL tablet, Place 1 tablet under the tongue Every 5 (Five) Minutes As Needed for Chest Pain. Take no more than 3 doses in 15 minutes., Disp: 25 tablet, Rfl: 0  •  NON FORMULARY, Allergy injections, Disp: , Rfl:   •  omeprazole (priLOSEC) 20 MG capsule, Take 1 capsule by mouth Every Morning Before Breakfast. Take on an empty stomach!, Disp: 90 capsule, Rfl: 1  •  prazosin (MINIPRESS) 2 MG capsule, Take 1 capsule by mouth Every Night., Disp: 30 capsule, Rfl: 0  •  pregabalin (LYRICA) 200 MG capsule, Take 1 capsule by mouth 3 (Three) Times a Day., Disp: 90 capsule, Rfl: 5  •  SUMAtriptan (IMITREX) 50 MG tablet, TAKE 1 TABLET BY MOUTH AT ONSET OF HEADACHE. MAY REPEAT DOSE 1 TIME IN 2 HOURS IF HEADACHE NOT RELIEVED, Disp: 10 tablet, Rfl: 0  •  varenicline (Chantix  Starting Month Pak) 0.5 MG X 11 & 1 MG X 42 tablet, Take 0.5 mg one daily on days 1-3 and and 0.5 mg twice daily on days 4-7.Then 1 mg twice daily for a total of 12 weeks., Disp: 53 tablet, Rfl: 0  •  Vortioxetine HBr (Trintellix) 20 MG tablet, Take 20 mg by mouth Daily., Disp: 30 tablet, Rfl: 0  No current facility-administered medications for this visit.     Facility-Administered Medications Ordered in Other Visits:   •  [COMPLETED] regadenoson (LEXISCAN) injection 0.4 mg, 0.4 mg, Intravenous, Once in imaging, Jaimie Silva MD, 0.4 mg at 01/14/21 1415    Allergies   Allergen Reactions   • Hydrocodone Nausea And Vomiting   • Hydrocodone-Acetaminophen GI Intolerance   • Oxycodone GI Intolerance       Family History   Problem Relation Age of Onset   • Diabetes Mother    • Heart disease Father    • Anxiety disorder Daughter    • Depression Daughter        Past Surgical History:   Procedure Laterality Date   • CORONARY ANGIOPLASTY WITH STENT PLACEMENT  04/2014   • CORONARY ARTERY BYPASS GRAFT  12/2015       Past Medical History:   Diagnosis Date   • Allergies     takes allergy injections   • Anxiety    • Arthritis    • Asthma    • Back pain    • Depression    • Emphysema of lung (CMS/HCC)    • Headache    • Leg pain        Family History   Problem Relation Age of Onset   • Diabetes Mother    • Heart disease Father    • Anxiety disorder Daughter    • Depression Daughter        Social History     Socioeconomic History   • Marital status: Single     Spouse name: Not on file   • Number of children: Not on file   • Years of education: Not on file   • Highest education level: Not on file   Tobacco Use   • Smoking status: Current Every Day Smoker     Packs/day: 0.50   • Smokeless tobacco: Never Used   Substance and Sexual Activity   • Alcohol use: No     Frequency: Never   • Drug use: No   • Sexual activity: Yes     Partners: Female         Procedures      Objective:       Physical Exam    /84   Pulse 71   Ht  "185.4 cm (73\")   Wt 81.2 kg (179 lb)   BMI 23.62 kg/m²   The patient is alert, oriented and in no distress.    Vital signs as noted above.    Head and neck revealed no carotid bruits or jugular venous distension.  No thyromegaly or lymphadenopathy is present.    Lungs clear.  No wheezing.  Breath sounds are normal bilaterally.    Heart normal first and second heart sounds.  No murmur..  No pericardial rub is present.  No gallop is present.    Abdomen soft and nontender.  No organomegaly is present.    Extremities revealed good peripheral pulses without any pedal edema.    Skin warm and dry.    Musculoskeletal system is grossly normal.    CNS grossly normal.    Reviewed and unchanged from last visit.        "

## 2021-01-18 ENCOUNTER — APPOINTMENT (OUTPATIENT)
Dept: GENERAL RADIOLOGY | Facility: HOSPITAL | Age: 51
End: 2021-01-18

## 2021-01-18 ENCOUNTER — HOSPITAL ENCOUNTER (INPATIENT)
Facility: HOSPITAL | Age: 51
LOS: 3 days | Discharge: HOME | End: 2021-01-21
Attending: EMERGENCY MEDICINE | Admitting: HOSPITALIST

## 2021-01-18 DIAGNOSIS — I10 ESSENTIAL HYPERTENSION: ICD-10-CM

## 2021-01-18 DIAGNOSIS — R07.9 CHEST PAIN WITH HIGH RISK OF ACUTE CORONARY SYNDROME: ICD-10-CM

## 2021-01-18 DIAGNOSIS — Z95.1 S/P CABG (CORONARY ARTERY BYPASS GRAFT): Primary | ICD-10-CM

## 2021-01-18 PROCEDURE — 93005 ELECTROCARDIOGRAM TRACING: CPT | Performed by: EMERGENCY MEDICINE

## 2021-01-18 PROCEDURE — 85610 PROTHROMBIN TIME: CPT | Performed by: EMERGENCY MEDICINE

## 2021-01-18 PROCEDURE — 71045 X-RAY EXAM CHEST 1 VIEW: CPT

## 2021-01-18 PROCEDURE — 25010000002 MORPHINE PER 10 MG: Performed by: EMERGENCY MEDICINE

## 2021-01-18 PROCEDURE — 85025 COMPLETE CBC W/AUTO DIFF WBC: CPT | Performed by: EMERGENCY MEDICINE

## 2021-01-18 PROCEDURE — 99285 EMERGENCY DEPT VISIT HI MDM: CPT

## 2021-01-18 PROCEDURE — 85730 THROMBOPLASTIN TIME PARTIAL: CPT | Performed by: EMERGENCY MEDICINE

## 2021-01-18 PROCEDURE — 25010000002 ONDANSETRON PER 1 MG: Performed by: EMERGENCY MEDICINE

## 2021-01-18 PROCEDURE — 93005 ELECTROCARDIOGRAM TRACING: CPT

## 2021-01-18 RX ORDER — ONDANSETRON 2 MG/ML
4 INJECTION INTRAMUSCULAR; INTRAVENOUS ONCE
Status: COMPLETED | OUTPATIENT
Start: 2021-01-18 | End: 2021-01-18

## 2021-01-18 RX ORDER — DEXTROSE MONOHYDRATE 25 G/50ML
25-50 INJECTION, SOLUTION INTRAVENOUS
Status: DISCONTINUED | OUTPATIENT
Start: 2021-01-18 | End: 2021-01-19

## 2021-01-18 RX ORDER — ASPIRIN 325 MG
325 TABLET ORAL ONCE
Status: DISCONTINUED | OUTPATIENT
Start: 2021-01-18 | End: 2021-01-20 | Stop reason: SDUPTHER

## 2021-01-18 RX ORDER — NITROGLYCERIN 20 MG/100ML
10-50 INJECTION INTRAVENOUS
Status: DISCONTINUED | OUTPATIENT
Start: 2021-01-18 | End: 2021-01-20

## 2021-01-18 RX ORDER — MORPHINE SULFATE 4 MG/ML
4 INJECTION, SOLUTION INTRAMUSCULAR; INTRAVENOUS ONCE
Status: COMPLETED | OUTPATIENT
Start: 2021-01-18 | End: 2021-01-18

## 2021-01-18 RX ADMIN — NITROGLYCERIN 10 MCG/MIN: 20 INJECTION INTRAVENOUS at 23:54

## 2021-01-18 RX ADMIN — ONDANSETRON 4 MG: 2 INJECTION, SOLUTION INTRAMUSCULAR; INTRAVENOUS at 23:57

## 2021-01-18 RX ADMIN — MORPHINE SULFATE 4 MG: 4 INJECTION INTRAVENOUS at 23:57

## 2021-01-19 PROBLEM — R07.9 CHEST PAIN WITH HIGH RISK OF ACUTE CORONARY SYNDROME: Status: ACTIVE | Noted: 2021-01-19

## 2021-01-19 LAB
ALBUMIN SERPL-MCNC: 3.8 G/DL (ref 3.5–5.2)
ALBUMIN/GLOB SERPL: 1.3 G/DL
ALP SERPL-CCNC: 87 U/L (ref 39–117)
ALT SERPL W P-5'-P-CCNC: 7 U/L (ref 1–41)
ANION GAP SERPL CALCULATED.3IONS-SCNC: 12 MMOL/L (ref 5–15)
ANION GAP SERPL CALCULATED.3IONS-SCNC: 8 MMOL/L (ref 5–15)
APTT PPP: 21.6 SECONDS (ref 24–31)
APTT PPP: 27.4 SECONDS (ref 61–76.5)
APTT PPP: 50.4 SECONDS (ref 61–76.5)
AST SERPL-CCNC: 11 U/L (ref 1–40)
BASOPHILS # BLD AUTO: 0.1 10*3/MM3 (ref 0–0.2)
BASOPHILS NFR BLD AUTO: 1 % (ref 0–1.5)
BASOPHILS NFR BLD AUTO: 1.4 % (ref 0–1.5)
BASOPHILS NFR BLD AUTO: 1.4 % (ref 0–1.5)
BILIRUB SERPL-MCNC: 0.2 MG/DL (ref 0–1.2)
BUN SERPL-MCNC: 13 MG/DL (ref 6–20)
BUN SERPL-MCNC: 15 MG/DL (ref 6–20)
BUN/CREAT SERPL: 14 (ref 7–25)
BUN/CREAT SERPL: 17.2 (ref 7–25)
CALCIUM SPEC-SCNC: 8.8 MG/DL (ref 8.6–10.5)
CALCIUM SPEC-SCNC: 8.9 MG/DL (ref 8.6–10.5)
CHLORIDE SERPL-SCNC: 97 MMOL/L (ref 98–107)
CHLORIDE SERPL-SCNC: 97 MMOL/L (ref 98–107)
CO2 SERPL-SCNC: 24 MMOL/L (ref 22–29)
CO2 SERPL-SCNC: 25 MMOL/L (ref 22–29)
CREAT SERPL-MCNC: 0.87 MG/DL (ref 0.76–1.27)
CREAT SERPL-MCNC: 0.93 MG/DL (ref 0.76–1.27)
DEPRECATED RDW RBC AUTO: 49.4 FL (ref 37–54)
DEPRECATED RDW RBC AUTO: 49.9 FL (ref 37–54)
DEPRECATED RDW RBC AUTO: 50.3 FL (ref 37–54)
EOSINOPHIL # BLD AUTO: 0.1 10*3/MM3 (ref 0–0.4)
EOSINOPHIL NFR BLD AUTO: 1 % (ref 0.3–6.2)
EOSINOPHIL NFR BLD AUTO: 1.7 % (ref 0.3–6.2)
EOSINOPHIL NFR BLD AUTO: 1.7 % (ref 0.3–6.2)
ERYTHROCYTE [DISTWIDTH] IN BLOOD BY AUTOMATED COUNT: 17.9 % (ref 12.3–15.4)
ERYTHROCYTE [DISTWIDTH] IN BLOOD BY AUTOMATED COUNT: 18 % (ref 12.3–15.4)
ERYTHROCYTE [DISTWIDTH] IN BLOOD BY AUTOMATED COUNT: 18 % (ref 12.3–15.4)
GFR SERPL CREATININE-BSD FRML MDRD: 86 ML/MIN/1.73
GFR SERPL CREATININE-BSD FRML MDRD: 93 ML/MIN/1.73
GLOBULIN UR ELPH-MCNC: 2.9 GM/DL
GLUCOSE BLDC GLUCOMTR-MCNC: 110 MG/DL (ref 70–105)
GLUCOSE SERPL-MCNC: 102 MG/DL (ref 65–99)
GLUCOSE SERPL-MCNC: 109 MG/DL (ref 65–99)
HCT VFR BLD AUTO: 33.4 % (ref 37.5–51)
HCT VFR BLD AUTO: 35.2 % (ref 37.5–51)
HCT VFR BLD AUTO: 36.1 % (ref 37.5–51)
HGB BLD-MCNC: 11.2 G/DL (ref 13–17.7)
HGB BLD-MCNC: 11.7 G/DL (ref 13–17.7)
HGB BLD-MCNC: 11.8 G/DL (ref 13–17.7)
INR PPP: 0.99 (ref 0.93–1.1)
INR PPP: 1.04 (ref 0.93–1.1)
LYMPHOCYTES # BLD AUTO: 1.9 10*3/MM3 (ref 0.7–3.1)
LYMPHOCYTES # BLD AUTO: 2.2 10*3/MM3 (ref 0.7–3.1)
LYMPHOCYTES # BLD AUTO: 3 10*3/MM3 (ref 0.7–3.1)
LYMPHOCYTES NFR BLD AUTO: 25.5 % (ref 19.6–45.3)
LYMPHOCYTES NFR BLD AUTO: 34.1 % (ref 19.6–45.3)
LYMPHOCYTES NFR BLD AUTO: 35.7 % (ref 19.6–45.3)
MAGNESIUM SERPL-MCNC: 1.9 MG/DL (ref 1.6–2.6)
MCH RBC QN AUTO: 26.3 PG (ref 26.6–33)
MCH RBC QN AUTO: 26.3 PG (ref 26.6–33)
MCH RBC QN AUTO: 26.4 PG (ref 26.6–33)
MCHC RBC AUTO-ENTMCNC: 32.8 G/DL (ref 31.5–35.7)
MCHC RBC AUTO-ENTMCNC: 33.2 G/DL (ref 31.5–35.7)
MCHC RBC AUTO-ENTMCNC: 33.6 G/DL (ref 31.5–35.7)
MCV RBC AUTO: 78.4 FL (ref 79–97)
MCV RBC AUTO: 79.5 FL (ref 79–97)
MCV RBC AUTO: 80.3 FL (ref 79–97)
MONOCYTES # BLD AUTO: 0.6 10*3/MM3 (ref 0.1–0.9)
MONOCYTES # BLD AUTO: 0.6 10*3/MM3 (ref 0.1–0.9)
MONOCYTES # BLD AUTO: 0.9 10*3/MM3 (ref 0.1–0.9)
MONOCYTES NFR BLD AUTO: 10.6 % (ref 5–12)
MONOCYTES NFR BLD AUTO: 8.3 % (ref 5–12)
MONOCYTES NFR BLD AUTO: 9.3 % (ref 5–12)
NEUTROPHILS NFR BLD AUTO: 3.5 10*3/MM3 (ref 1.7–7)
NEUTROPHILS NFR BLD AUTO: 4.2 10*3/MM3 (ref 1.7–7)
NEUTROPHILS NFR BLD AUTO: 4.8 10*3/MM3 (ref 1.7–7)
NEUTROPHILS NFR BLD AUTO: 50.6 % (ref 42.7–76)
NEUTROPHILS NFR BLD AUTO: 53.5 % (ref 42.7–76)
NEUTROPHILS NFR BLD AUTO: 64.2 % (ref 42.7–76)
NRBC BLD AUTO-RTO: 0 /100 WBC (ref 0–0.2)
NRBC BLD AUTO-RTO: 0.1 /100 WBC (ref 0–0.2)
NRBC BLD AUTO-RTO: 0.2 /100 WBC (ref 0–0.2)
PLATELET # BLD AUTO: 233 10*3/MM3 (ref 140–450)
PLATELET # BLD AUTO: 244 10*3/MM3 (ref 140–450)
PLATELET # BLD AUTO: 274 10*3/MM3 (ref 140–450)
PMV BLD AUTO: 8.1 FL (ref 6–12)
PMV BLD AUTO: 8.1 FL (ref 6–12)
PMV BLD AUTO: 9.2 FL (ref 6–12)
POTASSIUM SERPL-SCNC: 4.1 MMOL/L (ref 3.5–5.2)
POTASSIUM SERPL-SCNC: 4.2 MMOL/L (ref 3.5–5.2)
PROT SERPL-MCNC: 6.7 G/DL (ref 6–8.5)
PROTHROMBIN TIME: 10.9 SECONDS (ref 9.6–11.7)
PROTHROMBIN TIME: 11.4 SECONDS (ref 9.6–11.7)
QT INTERVAL: 369 MS
QT INTERVAL: 371 MS
RBC # BLD AUTO: 4.26 10*6/MM3 (ref 4.14–5.8)
RBC # BLD AUTO: 4.43 10*6/MM3 (ref 4.14–5.8)
RBC # BLD AUTO: 4.5 10*6/MM3 (ref 4.14–5.8)
SARS-COV-2 RNA PNL SPEC NAA+PROBE: NOT DETECTED
SODIUM SERPL-SCNC: 130 MMOL/L (ref 136–145)
SODIUM SERPL-SCNC: 133 MMOL/L (ref 136–145)
TROPONIN T SERPL-MCNC: <0.01 NG/ML (ref 0–0.03)
WBC # BLD AUTO: 6.5 10*3/MM3 (ref 3.4–10.8)
WBC # BLD AUTO: 7.4 10*3/MM3 (ref 3.4–10.8)
WBC # BLD AUTO: 8.3 10*3/MM3 (ref 3.4–10.8)

## 2021-01-19 PROCEDURE — 25010000002 MORPHINE PER 10 MG: Performed by: HOSPITALIST

## 2021-01-19 PROCEDURE — 25010000002 MORPHINE PER 10 MG: Performed by: INTERNAL MEDICINE

## 2021-01-19 PROCEDURE — 84484 ASSAY OF TROPONIN QUANT: CPT | Performed by: EMERGENCY MEDICINE

## 2021-01-19 PROCEDURE — G0378 HOSPITAL OBSERVATION PER HR: HCPCS

## 2021-01-19 PROCEDURE — 83735 ASSAY OF MAGNESIUM: CPT | Performed by: INTERNAL MEDICINE

## 2021-01-19 PROCEDURE — 25010000002 ENOXAPARIN PER 10 MG: Performed by: PHYSICIAN ASSISTANT

## 2021-01-19 PROCEDURE — 85610 PROTHROMBIN TIME: CPT | Performed by: INTERNAL MEDICINE

## 2021-01-19 PROCEDURE — 82962 GLUCOSE BLOOD TEST: CPT

## 2021-01-19 PROCEDURE — 84484 ASSAY OF TROPONIN QUANT: CPT | Performed by: PHYSICIAN ASSISTANT

## 2021-01-19 PROCEDURE — 85730 THROMBOPLASTIN TIME PARTIAL: CPT | Performed by: INTERNAL MEDICINE

## 2021-01-19 PROCEDURE — 25010000002 HEPARIN (PORCINE) 25000-0.45 UT/250ML-% SOLUTION: Performed by: INTERNAL MEDICINE

## 2021-01-19 PROCEDURE — 85025 COMPLETE CBC W/AUTO DIFF WBC: CPT | Performed by: INTERNAL MEDICINE

## 2021-01-19 PROCEDURE — 80053 COMPREHEN METABOLIC PANEL: CPT | Performed by: EMERGENCY MEDICINE

## 2021-01-19 PROCEDURE — 93005 ELECTROCARDIOGRAM TRACING: CPT | Performed by: INTERNAL MEDICINE

## 2021-01-19 PROCEDURE — 99233 SBSQ HOSP IP/OBS HIGH 50: CPT | Performed by: INTERNAL MEDICINE

## 2021-01-19 PROCEDURE — 85730 THROMBOPLASTIN TIME PARTIAL: CPT | Performed by: HOSPITALIST

## 2021-01-19 PROCEDURE — 85025 COMPLETE CBC W/AUTO DIFF WBC: CPT | Performed by: PHYSICIAN ASSISTANT

## 2021-01-19 PROCEDURE — 87635 SARS-COV-2 COVID-19 AMP PRB: CPT | Performed by: EMERGENCY MEDICINE

## 2021-01-19 PROCEDURE — 99223 1ST HOSP IP/OBS HIGH 75: CPT | Performed by: HOSPITALIST

## 2021-01-19 RX ORDER — BUPROPION HYDROCHLORIDE 150 MG/1
150 TABLET ORAL DAILY
Status: DISCONTINUED | OUTPATIENT
Start: 2021-01-19 | End: 2021-01-21 | Stop reason: HOSPADM

## 2021-01-19 RX ORDER — SODIUM CHLORIDE 0.9 % (FLUSH) 0.9 %
3-10 SYRINGE (ML) INJECTION AS NEEDED
Status: DISCONTINUED | OUTPATIENT
Start: 2021-01-19 | End: 2021-01-20 | Stop reason: HOSPADM

## 2021-01-19 RX ORDER — ARIPIPRAZOLE 5 MG/1
5 TABLET ORAL NIGHTLY
Status: DISCONTINUED | OUTPATIENT
Start: 2021-01-19 | End: 2021-01-21 | Stop reason: HOSPADM

## 2021-01-19 RX ORDER — ATORVASTATIN CALCIUM 20 MG/1
20 TABLET, FILM COATED ORAL NIGHTLY
Status: DISCONTINUED | OUTPATIENT
Start: 2021-01-19 | End: 2021-01-21 | Stop reason: HOSPADM

## 2021-01-19 RX ORDER — CYCLOBENZAPRINE HCL 10 MG
10 TABLET ORAL NIGHTLY
Status: DISCONTINUED | OUTPATIENT
Start: 2021-01-19 | End: 2021-01-21 | Stop reason: HOSPADM

## 2021-01-19 RX ORDER — CYCLOBENZAPRINE HCL 10 MG
TABLET ORAL
Qty: 90 TABLET | Refills: 5 | Status: SHIPPED | OUTPATIENT
Start: 2021-01-19 | End: 2021-08-16

## 2021-01-19 RX ORDER — ONDANSETRON 4 MG/1
4 TABLET, FILM COATED ORAL EVERY 6 HOURS PRN
Status: DISCONTINUED | OUTPATIENT
Start: 2021-01-19 | End: 2021-01-21 | Stop reason: HOSPADM

## 2021-01-19 RX ORDER — POTASSIUM CHLORIDE 20 MEQ/1
40 TABLET, EXTENDED RELEASE ORAL AS NEEDED
Status: DISCONTINUED | OUTPATIENT
Start: 2021-01-19 | End: 2021-01-21 | Stop reason: HOSPADM

## 2021-01-19 RX ORDER — SODIUM CHLORIDE 0.9 % (FLUSH) 0.9 %
3 SYRINGE (ML) INJECTION EVERY 12 HOURS SCHEDULED
Status: DISCONTINUED | OUTPATIENT
Start: 2021-01-19 | End: 2021-01-20 | Stop reason: HOSPADM

## 2021-01-19 RX ORDER — MAGNESIUM SULFATE HEPTAHYDRATE 40 MG/ML
2 INJECTION, SOLUTION INTRAVENOUS AS NEEDED
Status: DISCONTINUED | OUTPATIENT
Start: 2021-01-19 | End: 2021-01-21 | Stop reason: HOSPADM

## 2021-01-19 RX ORDER — BUTALBITAL, ACETAMINOPHEN AND CAFFEINE 50; 325; 40 MG/1; MG/1; MG/1
1 TABLET ORAL 3 TIMES DAILY PRN
Status: DISCONTINUED | OUTPATIENT
Start: 2021-01-19 | End: 2021-01-21 | Stop reason: HOSPADM

## 2021-01-19 RX ORDER — TERAZOSIN 5 MG/1
5 CAPSULE ORAL NIGHTLY
Status: DISCONTINUED | OUTPATIENT
Start: 2021-01-19 | End: 2021-01-21 | Stop reason: HOSPADM

## 2021-01-19 RX ORDER — PANTOPRAZOLE SODIUM 40 MG/1
40 TABLET, DELAYED RELEASE ORAL EVERY MORNING
Status: DISCONTINUED | OUTPATIENT
Start: 2021-01-20 | End: 2021-01-21 | Stop reason: HOSPADM

## 2021-01-19 RX ORDER — ASPIRIN 81 MG/1
81 TABLET ORAL DAILY
Status: DISCONTINUED | OUTPATIENT
Start: 2021-01-20 | End: 2021-01-21 | Stop reason: HOSPADM

## 2021-01-19 RX ORDER — ACETAMINOPHEN 325 MG/1
650 TABLET ORAL EVERY 4 HOURS PRN
Status: DISCONTINUED | OUTPATIENT
Start: 2021-01-19 | End: 2021-01-21 | Stop reason: HOSPADM

## 2021-01-19 RX ORDER — HEPARIN SODIUM 10000 [USP'U]/100ML
12 INJECTION, SOLUTION INTRAVENOUS
Status: DISCONTINUED | OUTPATIENT
Start: 2021-01-19 | End: 2021-01-20

## 2021-01-19 RX ORDER — MORPHINE SULFATE 4 MG/ML
1 INJECTION, SOLUTION INTRAMUSCULAR; INTRAVENOUS EVERY 4 HOURS PRN
Status: DISCONTINUED | OUTPATIENT
Start: 2021-01-19 | End: 2021-01-21 | Stop reason: HOSPADM

## 2021-01-19 RX ORDER — ONDANSETRON 2 MG/ML
4 INJECTION INTRAMUSCULAR; INTRAVENOUS EVERY 6 HOURS PRN
Status: DISCONTINUED | OUTPATIENT
Start: 2021-01-19 | End: 2021-01-21 | Stop reason: HOSPADM

## 2021-01-19 RX ORDER — OXCARBAZEPINE 150 MG/1
150 TABLET, FILM COATED ORAL EVERY 12 HOURS SCHEDULED
Status: DISCONTINUED | OUTPATIENT
Start: 2021-01-19 | End: 2021-01-21 | Stop reason: HOSPADM

## 2021-01-19 RX ORDER — BUTALBITAL, ACETAMINOPHEN AND CAFFEINE 50; 325; 40 MG/1; MG/1; MG/1
1 TABLET ORAL 3 TIMES DAILY PRN
COMMUNITY
End: 2021-06-15

## 2021-01-19 RX ORDER — LORATADINE 10 MG/1
10 TABLET ORAL DAILY
COMMUNITY

## 2021-01-19 RX ORDER — METOPROLOL SUCCINATE 25 MG/1
25 TABLET, EXTENDED RELEASE ORAL EVERY 12 HOURS SCHEDULED
Status: DISCONTINUED | OUTPATIENT
Start: 2021-01-19 | End: 2021-01-19

## 2021-01-19 RX ORDER — ACETAMINOPHEN 650 MG/1
650 SUPPOSITORY RECTAL EVERY 4 HOURS PRN
Status: DISCONTINUED | OUTPATIENT
Start: 2021-01-19 | End: 2021-01-21 | Stop reason: HOSPADM

## 2021-01-19 RX ORDER — NITROGLYCERIN 0.4 MG/1
0.4 TABLET SUBLINGUAL
Status: DISCONTINUED | OUTPATIENT
Start: 2021-01-19 | End: 2021-01-21 | Stop reason: HOSPADM

## 2021-01-19 RX ORDER — ALUMINA, MAGNESIA, AND SIMETHICONE 2400; 2400; 240 MG/30ML; MG/30ML; MG/30ML
15 SUSPENSION ORAL EVERY 6 HOURS PRN
Status: DISCONTINUED | OUTPATIENT
Start: 2021-01-19 | End: 2021-01-21 | Stop reason: HOSPADM

## 2021-01-19 RX ORDER — SODIUM CHLORIDE 0.9 % (FLUSH) 0.9 %
10 SYRINGE (ML) INJECTION EVERY 12 HOURS SCHEDULED
Status: DISCONTINUED | OUTPATIENT
Start: 2021-01-19 | End: 2021-01-21 | Stop reason: HOSPADM

## 2021-01-19 RX ORDER — SODIUM CHLORIDE 0.9 % (FLUSH) 0.9 %
10 SYRINGE (ML) INJECTION AS NEEDED
Status: DISCONTINUED | OUTPATIENT
Start: 2021-01-19 | End: 2021-01-21 | Stop reason: HOSPADM

## 2021-01-19 RX ORDER — PREGABALIN 100 MG/1
200 CAPSULE ORAL EVERY 8 HOURS SCHEDULED
Status: DISCONTINUED | OUTPATIENT
Start: 2021-01-19 | End: 2021-01-21 | Stop reason: HOSPADM

## 2021-01-19 RX ORDER — OXCARBAZEPINE 300 MG/1
150 TABLET, FILM COATED ORAL 2 TIMES DAILY
COMMUNITY
End: 2021-03-25

## 2021-01-19 RX ORDER — CHOLECALCIFEROL (VITAMIN D3) 125 MCG
5 CAPSULE ORAL NIGHTLY PRN
Status: DISCONTINUED | OUTPATIENT
Start: 2021-01-19 | End: 2021-01-21 | Stop reason: HOSPADM

## 2021-01-19 RX ORDER — ACETAMINOPHEN 160 MG/5ML
650 SOLUTION ORAL EVERY 4 HOURS PRN
Status: DISCONTINUED | OUTPATIENT
Start: 2021-01-19 | End: 2021-01-21 | Stop reason: HOSPADM

## 2021-01-19 RX ORDER — MAGNESIUM SULFATE 1 G/100ML
1 INJECTION INTRAVENOUS AS NEEDED
Status: DISCONTINUED | OUTPATIENT
Start: 2021-01-19 | End: 2021-01-21 | Stop reason: HOSPADM

## 2021-01-19 RX ORDER — MONTELUKAST SODIUM 10 MG/1
10 TABLET ORAL NIGHTLY
Status: DISCONTINUED | OUTPATIENT
Start: 2021-01-19 | End: 2021-01-21 | Stop reason: HOSPADM

## 2021-01-19 RX ADMIN — MORPHINE SULFATE 1 MG: 4 INJECTION INTRAVENOUS at 23:30

## 2021-01-19 RX ADMIN — ACETAMINOPHEN 650 MG: 325 TABLET, FILM COATED ORAL at 13:57

## 2021-01-19 RX ADMIN — OXCARBAZEPINE 150 MG: 150 TABLET, FILM COATED ORAL at 21:36

## 2021-01-19 RX ADMIN — METOPROLOL SUCCINATE 25 MG: 25 TABLET, EXTENDED RELEASE ORAL at 12:20

## 2021-01-19 RX ADMIN — AMITRIPTYLINE HYDROCHLORIDE 75 MG: 50 TABLET, FILM COATED ORAL at 21:36

## 2021-01-19 RX ADMIN — Medication 3 ML: at 21:43

## 2021-01-19 RX ADMIN — ARIPIPRAZOLE 5 MG: 5 TABLET ORAL at 21:35

## 2021-01-19 RX ADMIN — MORPHINE SULFATE 1 MG: 4 INJECTION INTRAVENOUS at 19:28

## 2021-01-19 RX ADMIN — TERAZOSIN HYDROCHLORIDE 5 MG: 5 CAPSULE ORAL at 21:35

## 2021-01-19 RX ADMIN — Medication 10 ML: at 21:35

## 2021-01-19 RX ADMIN — PREGABALIN 200 MG: 100 CAPSULE ORAL at 21:35

## 2021-01-19 RX ADMIN — Medication 10 ML: at 06:46

## 2021-01-19 RX ADMIN — MONTELUKAST 10 MG: 10 TABLET, FILM COATED ORAL at 21:35

## 2021-01-19 RX ADMIN — CYCLOBENZAPRINE HYDROCHLORIDE 10 MG: 10 TABLET, FILM COATED ORAL at 21:36

## 2021-01-19 RX ADMIN — MORPHINE SULFATE 1 MG: 4 INJECTION INTRAVENOUS at 14:27

## 2021-01-19 RX ADMIN — BUPROPION HYDROCHLORIDE 150 MG: 150 TABLET, EXTENDED RELEASE ORAL at 18:02

## 2021-01-19 RX ADMIN — ATORVASTATIN CALCIUM 20 MG: 20 TABLET, FILM COATED ORAL at 21:35

## 2021-01-19 RX ADMIN — METOPROLOL TARTRATE 25 MG: 25 TABLET, FILM COATED ORAL at 21:36

## 2021-01-19 RX ADMIN — HEPARIN SODIUM 12 UNITS/KG/HR: 10000 INJECTION, SOLUTION INTRAVENOUS at 15:19

## 2021-01-19 RX ADMIN — Medication 10 ML: at 11:25

## 2021-01-19 RX ADMIN — HEPARIN SODIUM 13 UNITS/KG/HR: 10000 INJECTION, SOLUTION INTRAVENOUS at 23:05

## 2021-01-19 RX ADMIN — ENOXAPARIN SODIUM 80 MG: 80 INJECTION SUBCUTANEOUS at 06:46

## 2021-01-19 NOTE — PLAN OF CARE
Problem: Adult Inpatient Plan of Care  Goal: Plan of Care Review  Outcome: Ongoing, Progressing  Goal: Patient-Specific Goal (Individualized)  Outcome: Ongoing, Progressing  Goal: Absence of Hospital-Acquired Illness or Injury  Outcome: Ongoing, Progressing  Intervention: Identify and Manage Fall Risk  Recent Flowsheet Documentation  Taken 1/19/2021 1600 by Ina Burris, RN  Safety Promotion/Fall Prevention: safety round/check completed  Goal: Optimal Comfort and Wellbeing  Outcome: Ongoing, Progressing  Goal: Readiness for Transition of Care  Outcome: Ongoing, Progressing  Intervention: Mutually Develop Transition Plan  Recent Flowsheet Documentation  Taken 1/19/2021 1656 by Ina Burris, RN  Transportation Anticipated: car, drives self  Patient/Family Anticipated Services at Transition: none  Patient/Family Anticipates Transition to: home  Taken 1/19/2021 1655 by Ina Burris, RN  Equipment Currently Used at Home: none   Goal Outcome Evaluation:

## 2021-01-19 NOTE — NURSING NOTE
Dr Silva saw patient states cardiac cath in am on  1/20. Ok for [facility] diet and NPO at midnight.

## 2021-01-19 NOTE — ED NOTES
L sided chest pain that started x1 hour ago, describes the pain as a squeezing pain that radiates into abdomen. Reports that he was scheduled to undergo a heart cath this Friday due to an abnormal stress test. Sees [facility] for cardiology. Reports some nausea, no vomiting. History of triple bypass and stent placement in the past.      Florecita Akins, LPN  01/18/21 2295

## 2021-01-19 NOTE — ED PROVIDER NOTES
Subjective   50-year-old male complaining of chest pain associated with shortness of breath and brief diaphoresis this evening.  He states that he was not exerting himself when the pain started.  He states it improved with nitroglycerin.  The patient states he recently saw Dr. Agosto for recurrent episodes of chest pain and decreasing exercise tolerance.  He was told that he needed a cardiac catheterization and has one scheduled in the coming week.  He reports no fever chills or cough.  Reports no leg pain or swelling.  He reports no paroxysmal nocturnal dyspnea he reports that he has had to decrease his activity in the last couple of days due to fears of having more chest pain          Review of Systems   Constitutional: Positive for fatigue. Negative for chills and fever.   HENT: Negative for trouble swallowing.    Respiratory: Positive for chest tightness and shortness of breath.    Cardiovascular: Positive for chest pain. Negative for leg swelling.   Gastrointestinal: Positive for nausea. Negative for blood in stool.   Hematological: Does not bruise/bleed easily.   All other systems reviewed and are negative.      Past Medical History:   Diagnosis Date   • Allergies     takes allergy injections   • Anxiety    • Arthritis    • Asthma    • Back pain    • Depression    • Emphysema of lung (CMS/HCC)    • Headache    • Leg pain    Patient had coronary bypass grafting in 2015 he had stents prior to that.  He has had no stress test or cardiac testing since his CABG  Allergies   Allergen Reactions   • Hydrocodone Nausea And Vomiting   • Hydrocodone-Acetaminophen GI Intolerance   • Oxycodone GI Intolerance       Past Surgical History:   Procedure Laterality Date   • CORONARY ANGIOPLASTY WITH STENT PLACEMENT  04/2014   • CORONARY ARTERY BYPASS GRAFT  12/2015       Family History   Problem Relation Age of Onset   • Diabetes Mother    • Heart disease Father    • Anxiety disorder Daughter    • Depression Daughter         Social History     Socioeconomic History   • Marital status: Single     Spouse name: Not on file   • Number of children: Not on file   • Years of education: Not on file   • Highest education level: Not on file   Tobacco Use   • Smoking status: Current Every Day Smoker     Packs/day: 0.50   • Smokeless tobacco: Never Used   Substance and Sexual Activity   • Alcohol use: No     Frequency: Never   • Drug use: No   • Sexual activity: Yes     Partners: Female     States that he still smokes between a half and a pack a day      Objective   Physical Exam  Alert Lm Coma Scale 15   HEENT: Pupils equal and reactive to light. Conjunctivae are not injected. normal tympanic membranes. Oropharynx and nares are normal.   Neck: Supple. Midline trachea. No JVD. No goiter.   Chest: Clear and equal breath sounds bilaterally regular rate and rhythm without murmur or rub.  Nontender chest wall no S3 or S4   Abdomen: Positive bowel sounds nontender nondistended. No rebound or peritoneal signs. No CVA tenderness.   Extremities no clubbing cyanosis or edema motor sensory exam is normal the full range of motion is intact   skin: Warm and dry, no rashes or petechia.   Lymphatic: No regional lymphadenopathy. No calf pain, swelling or Zuleyma's sign    Procedures           ED Course  ED Course as of Jan 19 0234   Tue Jan 19, 2021   0017 I examined the patient using the appropriate personal protective equipment.          [TH]      ED Course User Index  [TH] Dmitry Bazzi MD                                   Labs Reviewed   COMPREHENSIVE METABOLIC PANEL - Abnormal; Notable for the following components:       Result Value    Glucose 102 (*)     Sodium 130 (*)     Chloride 97 (*)     All other components within normal limits    Narrative:     GFR Normal >60  Chronic Kidney Disease <60  Kidney Failure <15     APTT - Abnormal; Notable for the following components:    PTT 21.6 (*)     All other components within normal limits   CBC WITH  AUTO DIFFERENTIAL - Abnormal; Notable for the following components:    Hemoglobin 11.8 (*)     Hematocrit 36.1 (*)     MCH 26.3 (*)     RDW 18.0 (*)     All other components within normal limits   POCT GLUCOSE FINGERSTICK - Abnormal; Notable for the following components:    Glucose 110 (*)     All other components within normal limits   COVID-19,[facility],NASAL SWAB NO TRANSPORT MEDIA 2 HR TAT - Normal    Narrative:     Fact sheet for providers: https://www.fda.gov/media/559847/download     Fact sheet for patients: https://www.fda.gov/media/106342/download    Test performed by PCR.    Consider negative results in combination with clinical observations, patient history, and epidemiological information.   PROTIME-INR - Normal   TROPONIN (IN-HOUSE) - Normal    Narrative:     Troponin T Reference Range:  <= 0.03 ng/mL-   Negative for AMI  >0.03 ng/mL-     Abnormal for myocardial necrosis.  Clinicians would have to utilize clinical acumen, EKG, Troponin and serial changes to determine if it is an Acute Myocardial Infarction or myocardial injury due to an underlying chronic condition.       Results may be falsely decreased if patient taking Biotin.     CBC AND DIFFERENTIAL    Narrative:     The following orders were created for panel order CBC & Differential.  Procedure                               Abnormality         Status                     ---------                               -----------         ------                     CBC Auto Differential[[id number]]        Abnormal            Final result                 Please view results for these tests on the individual orders.     Medications   nitroglycerin (TRIDIL) 200 mcg/ml infusion (10 mcg/min Intravenous Currently Infusing 1/19/21 0212)   aspirin tablet 325 mg (325 mg Oral Not Given 1/18/21 2357)   enoxaparin (LOVENOX) syringe 80 mg (has no administration in time range)   Morphine sulfate (PF) injection 4 mg (4 mg Intravenous Given 1/18/21 2357)    ondansetron (ZOFRAN) injection 4 mg (4 mg Intravenous Given 1/18/21 1647)     ER care was greatly prolonged by the lab's difficulty in obtaining and resulting a comprehensive metabolic panel and troponin        MDM  Number of Diagnoses or Management Options     Amount and/or Complexity of Data Reviewed  Clinical lab tests: reviewed  Tests in the radiology section of CPT®: reviewed  Tests in the medicine section of CPT®: reviewed  Independent visualization of images, tracings, or specimens: yes    Risk of Complications, Morbidity, and/or Mortality  Presenting problems: high  Diagnostic procedures: high  Management options: high  General comments: Pain was controlled with IV nitroglycerin and patient was given a dose of enoxaparin he also received aspirin.  The patient will be admitted and we obtain cardiology consultation as well as serial troponin and EKG.  The patient was agreeable to this plan of treatment        Final diagnoses:   Chest pain with high risk of acute coronary syndrome            Dmitry Bazzi MD  01/19/21 1251

## 2021-01-19 NOTE — H&P (VIEW-ONLY)
Referring Provider: Bebeto Iraheta,*  Reason for Consultation:  Chest pain  Unstable angina  Status post CABG    Patient Care Team:  Brittni Shell MD as PCP - General (Family Medicine)  Jaimie Silva MD as Consulting Physician (Cardiology)    Chief complaint  Chest pain    Subjective .     History of present illness:  Frank Barger is a 50 y.o. male who presents with history of multiple cardiac and noncardiac problems came to the emergency room with history of chest discomfort and shortness of breath and sweating yesterday evening.  Patient was not exerting himself when he started having the chest discomfort.  Chest discomfort was relieved by nitroglycerin.  Patient recently was having increased chest discomfort and recently had abnormal stress Cardiolite test.  Patient was scheduled to have cardiac catheterization this Friday however patient started having this chest discomfort and came to the emergency room.  EKG showed no acute changes.  Patient was started on intravenous nitroglycerin.  Troponin levels are negative.  No other associated aggravating or elevating factors.  Symptoms were significant and intermittent..           ROS      Patient is not having any palpitations, dizziness or syncope.  Denies having any headache ,abdominal pain ,nausea, vomiting , diarrhea constipation, loss of weight or loss of appetite.  Denies having any excessive bruising ,hematuria or blood in the stool.    Review of all systems negative except as indicated      History  Past Medical History:   Diagnosis Date   • Allergies     takes allergy injections   • Anxiety    • Arthritis    • Asthma    • Back pain    • Depression    • Emphysema of lung (CMS/[facility])    • Headache    • Leg pain        Past Surgical History:   Procedure Laterality Date   • CORONARY ANGIOPLASTY WITH STENT PLACEMENT  04/2014   • CORONARY ARTERY BYPASS GRAFT  12/2015       Family History   Problem Relation Age of Onset   • Diabetes Mother  "   • Heart disease Father    • Anxiety disorder Daughter    • Depression Daughter        Social History     Tobacco Use   • Smoking status: Current Every Day Smoker     Packs/day: 0.50   • Smokeless tobacco: Never Used   Substance Use Topics   • Alcohol use: No     Frequency: Never   • Drug use: No        (Not in a hospital admission)        Hydrocodone, Hydrocodone-acetaminophen, and Oxycodone    Scheduled Meds:aspirin, 325 mg, Oral, Once  enoxaparin, 1 mg/kg, Subcutaneous, Once  sodium chloride, 10 mL, Intravenous, Q12H      Continuous Infusions:nitroglycerin, 10-50 mcg/min, Last Rate: 10 mcg/min (01/19/21 0444)      PRN Meds:.•  acetaminophen **OR** acetaminophen **OR** acetaminophen  •  aluminum-magnesium hydroxide-simethicone  •  magnesium sulfate **OR** magnesium sulfate in D5W 1g/100mL (PREMIX)  •  melatonin  •  nitroglycerin  •  ondansetron **OR** ondansetron  •  potassium chloride  •  sodium chloride    Objective     VITAL SIGNS  Vitals:    01/19/21 0206 01/19/21 0336 01/19/21 0436 01/19/21 0606   BP: 107/64 105/67 114/72 110/60   Pulse: 69 71 66 67   Resp: 15 15 16 15   Temp:       TempSrc:       SpO2: 92% 92% 94% 92%   Weight:       Height:           Flowsheet Rows      First Filed Value   Admission Height  188 cm (74\") Documented at 01/18/2021 2302   Admission Weight  80.7 kg (178 lb) Documented at 01/18/2021 2302            Intake/Output Summary (Last 24 hours) at 1/19/2021 0635  Last data filed at 1/19/2021 0600  Gross per 24 hour   Intake --   Output 600 ml   Net -600 ml        TELEMETRY: Sinus rhythm morning    Physical Exam:  The patient is alert, oriented and in no distress.  Vital signs as noted above.  Head and neck revealed no carotid bruits or jugular venous distention.  No thyromegaly or lymph adenopathy is present  Lungs clear.  No wheezing.  Breath sounds are normal bilaterally.  Heart normal first and second heart sounds.No murmur.  No precordial rub is present.  No gallop is " present.  Abdomen soft and nontender.  No organomegaly is present.  Extremities with good peripheral pulses without any pedal edema.  Skin warm and dry.  Musculoskeletal system is grossly normal  CNS grossly normal      Results Review:   I reviewed the patient's new clinical results.  Lab Results (last 24 hours)     Procedure Component Value Units Date/Time    Troponin [[id number]]  (Normal) Collected: 01/19/21 0300    Specimen: Blood Updated: 01/19/21 0342     Troponin T <0.010 ng/mL     Narrative:      Troponin T Reference Range:  <= 0.03 ng/mL-   Negative for AMI  >0.03 ng/mL-     Abnormal for myocardial necrosis.  Clinicians would have to utilize clinical acumen, EKG, Troponin and serial changes to determine if it is an Acute Myocardial Infarction or myocardial injury due to an underlying chronic condition.       Results may be falsely decreased if patient taking Biotin.      Basic Metabolic Panel [[id number]]  (Abnormal) Collected: 01/19/21 0300    Specimen: Blood Updated: 01/19/21 0340     Glucose 109 mg/dL      BUN 15 mg/dL      Creatinine 0.87 mg/dL      Sodium 133 mmol/L      Potassium 4.2 mmol/L      Chloride 97 mmol/L      CO2 24.0 mmol/L      Calcium 8.8 mg/dL      eGFR Non African Amer 93 mL/min/1.73      BUN/Creatinine Ratio 17.2     Anion Gap 12.0 mmol/L     Narrative:      GFR Normal >60  Chronic Kidney Disease <60  Kidney Failure <15      CBC Auto Differential [[id number]]  (Abnormal) Collected: 01/19/21 0300    Specimen: Blood Updated: 01/19/21 0315     WBC 6.50 10*3/mm3      RBC 4.26 10*6/mm3      Hemoglobin 11.2 g/dL      Hematocrit 33.4 %      MCV 78.4 fL      MCH 26.3 pg      MCHC 33.6 g/dL      RDW 17.9 %      RDW-SD 49.4 fl      MPV 8.1 fL      Platelets 233 10*3/mm3      Neutrophil % 53.5 %      Lymphocyte % 34.1 %      Monocyte % 9.3 %      Eosinophil % 1.7 %      Basophil % 1.4 %      Neutrophils, Absolute 3.50 10*3/mm3      Lymphocytes, Absolute 2.20 10*3/mm3      Monocytes, Absolute 0.60  10*3/mm3      Eosinophils, Absolute 0.10 10*3/mm3      Basophils, Absolute 0.10 10*3/mm3      nRBC 0.2 /100 WBC     Comprehensive Metabolic Panel [[id number]]  (Abnormal) Collected: 01/19/21 0122    Specimen: Blood Updated: 01/19/21 0157     Glucose 102 mg/dL      BUN 13 mg/dL      Creatinine 0.93 mg/dL      Sodium 130 mmol/L      Potassium 4.1 mmol/L      Chloride 97 mmol/L      CO2 25.0 mmol/L      Calcium 8.9 mg/dL      Total Protein 6.7 g/dL      Albumin 3.80 g/dL      ALT (SGPT) 7 U/L      AST (SGOT) 11 U/L      Alkaline Phosphatase 87 U/L      Total Bilirubin 0.2 mg/dL      eGFR Non African Amer 86 mL/min/1.73      Globulin 2.9 gm/dL      A/G Ratio 1.3 g/dL      BUN/Creatinine Ratio 14.0     Anion Gap 8.0 mmol/L     Narrative:      GFR Normal >60  Chronic Kidney Disease <60  Kidney Failure <15      Troponin [[id number]]  (Normal) Collected: 01/19/21 0122    Specimen: Blood Updated: 01/19/21 0157     Troponin T <0.010 ng/mL     Narrative:      Troponin T Reference Range:  <= 0.03 ng/mL-   Negative for AMI  >0.03 ng/mL-     Abnormal for myocardial necrosis.  Clinicians would have to utilize clinical acumen, EKG, Troponin and serial changes to determine if it is an Acute Myocardial Infarction or myocardial injury due to an underlying chronic condition.       Results may be falsely decreased if patient taking Biotin.      COVID-19,[facility] IN-HOUSE,Nasal Swab No Transport Media 3-4 HR TAT - Swab, Nasal Cavity [[id number]]  (Normal) Collected: 01/19/21 0010    Specimen: Swab from Nasal Cavity Updated: 01/19/21 0057     COVID19 Not Detected    Narrative:      Fact sheet for providers: https://www.fda.gov/media/902957/download     Fact sheet for patients: https://www.fda.gov/media/448525/download    Test performed by PCR.    Consider negative results in combination with clinical observations, patient history, and epidemiological information.    Protime-INR [[id number]]  (Normal) Collected: 01/18/21 4932    Specimen:  Blood Updated: 01/19/21 0013     Protime 10.9 Seconds      INR 0.99    aPTT [[id number]]  (Abnormal) Collected: 01/18/21 2355    Specimen: Blood Updated: 01/19/21 0013     PTT 21.6 seconds     POC Glucose Once [[id number]]  (Abnormal) Collected: 01/19/21 0010    Specimen: Blood Updated: 01/19/21 0011     Glucose 110 mg/dL      Comment: Serial Number: [id number]:  [id number]       CBC & Differential [[id number]]  (Abnormal) Collected: 01/18/21 2355    Specimen: Blood Updated: 01/19/21 0001    Narrative:      The following orders were created for panel order CBC & Differential.  Procedure                               Abnormality         Status                     ---------                               -----------         ------                     CBC Auto Differential[[id number]]        Abnormal            Final result                 Please view results for these tests on the individual orders.    CBC Auto Differential [[id number]]  (Abnormal) Collected: 01/18/21 2355    Specimen: Blood Updated: 01/19/21 0001     WBC 8.30 10*3/mm3      RBC 4.50 10*6/mm3      Hemoglobin 11.8 g/dL      Hematocrit 36.1 %      MCV 80.3 fL      MCH 26.3 pg      MCHC 32.8 g/dL      RDW 18.0 %      RDW-SD 50.3 fl      MPV 9.2 fL      Platelets 274 10*3/mm3      Neutrophil % 50.6 %      Lymphocyte % 35.7 %      Monocyte % 10.6 %      Eosinophil % 1.7 %      Basophil % 1.4 %      Neutrophils, Absolute 4.20 10*3/mm3      Lymphocytes, Absolute 3.00 10*3/mm3      Monocytes, Absolute 0.90 10*3/mm3      Eosinophils, Absolute 0.10 10*3/mm3      Basophils, Absolute 0.10 10*3/mm3      nRBC 0.1 /100 WBC           Imaging Results (Last 24 Hours)     Procedure Component Value Units Date/Time    XR Chest 1 View [[id number]] Resulted: 01/19/21 0133     Updated: 01/19/21 0133      LAB RESULTS (LAST 7 DAYS)    CBC  Results from last 7 days   Lab Units 01/19/21  0300 01/18/21 2355   WBC 10*3/mm3 6.50 8.30   RBC 10*6/mm3 4.26 4.50   HEMOGLOBIN g/dL 11.2*  11.8*   HEMATOCRIT % 33.4* 36.1*   MCV fL 78.4* 80.3   PLATELETS 10*3/mm3 233 274       BMP  Results from last 7 days   Lab Units 01/19/21  0300 01/19/21  0122   SODIUM mmol/L 133* 130*   POTASSIUM mmol/L 4.2 4.1   CHLORIDE mmol/L 97* 97*   CO2 mmol/L 24.0 25.0   BUN mg/dL 15 13   CREATININE mg/dL 0.87 0.93   GLUCOSE mg/dL 109* 102*       CMP   Results from last 7 days   Lab Units 01/19/21  0300 01/19/21  0122   SODIUM mmol/L 133* 130*   POTASSIUM mmol/L 4.2 4.1   CHLORIDE mmol/L 97* 97*   CO2 mmol/L 24.0 25.0   BUN mg/dL 15 13   CREATININE mg/dL 0.87 0.93   GLUCOSE mg/dL 109* 102*   ALBUMIN g/dL  --  3.80   BILIRUBIN mg/dL  --  0.2   ALK PHOS U/L  --  87   AST (SGOT) U/L  --  11   ALT (SGPT) U/L  --  7         BNP        TROPONIN  Results from last 7 days   Lab Units 01/19/21  0300   TROPONIN T ng/mL <0.010       CoAg  Results from last 7 days   Lab Units 01/18/21  2355   INR  0.99   APTT seconds 21.6*       Creatinine Clearance  Estimated Creatinine Clearance: 115.9 mL/min (by C-G formula based on SCr of 0.87 mg/dL).    ABG        Radiology  No radiology results for the last day            EKG          I personally viewed and interpreted the patient's EKG/Telemetry data: Normal sinus rhythm normal ECG    ECHOCARDIOGRAM:    Results for orders placed during the hospital encounter of 01/14/21   Adult Transthoracic Echo Complete W/ Cont if Necessary Per Protocol    Narrative · Estimated left ventricular EF = 50% Left ventricular systolic function   is normal.     Indications  Chest pain    Technically satisfactory study.  Mitral valve is structurally normal.  Tricuspid valve is structurally normal.  Aortic valve is structurally normal.  Pulmonic valve could not be well visualized.  No evidence for mitral tricuspid or aortic regurgitation is seen by   Doppler study.  Left atrium is normal in size.  Right atrium is normal in size.  Left ventricle is normal in size with mild paradoxical septal motion and   inferior wall  hypokinesis with ejection fraction of 50%.  Right ventricle is normal in size.  Atrial septum is intact.  Aorta is normal.  No pericardial effusion or intracardiac thrombus is seen.    Impression  Structurally and functionally normal cardiac valves.  Left ventricular is normal in size and mild paradoxical septal motion is   present with inferior wall hypokinesis with ejection fraction of 50%.               Cardiolite (Tc-99m Sestamibi) stress test      OTHER:     Assessment/Plan     Active Problems:    Chest pain with high risk of acute coronary syndrome           Assessment and Plan      ]]]]]]]]]]]]]]]]]  Impression  ==========  -Chest discomfort suggestive of unstable angina pectoris.  EKG showed no acute changes.  Troponin levels are negative.     Echocardiogram 1/14/2021 revealed mild paradoxical septal motion and inferior wall hypokinesis with ejection fraction of 50%.  Lexiscan Cardiolite test-abnormal with mild posterior and inferior proximal ischemia-1/14/2021     -Status post CABG (2015) (triple-vessel according to patient) at [facility].     -Status post stent placement (details not available)-2014     -Dyslipidemia hypertension COPD PTSD     -Psoriasis     -Family history of coronary artery disease     -Smoker-abstinence from smoking     -Allergic to oxycodone hydrocodone  ==========  Plan  =========  Unstable angina  EKG showed no acute changes.  Troponin levels are negative.  Recent stress Cardiolite test-as above  Recent echocardiogram-as above  Recent labs were reviewed.  9/2/2020-normal CBC normal CMP.  Continue intravenous nitroglycerin.  Patient to have cardiac catheterization and coronary arteriography soon possible.  Recent benefits pros and cons of the procedure were discussed with patient.     Hypertension  Continue metoprolol     Dyslipidemia-on statins     Patient is scheduled to have cardiac catheterization tomorrow around 10-11 AM.    Further plan will depend on  patient's progress.  ]]]]]]]]]]]]]]              Jaimie Silva MD  01/19/21  06:35 EST

## 2021-01-19 NOTE — H&P
[facility] Medicine Services      Patient Name: Frank Barger  : 1970  MRN: [id number]  Primary Care Physician: Brittni Shell MD  Date of admission: 2021    Patient Care Team:  Brittni Shell MD as PCP - General (Family Medicine)  Jaimie Silva MD as Consulting Physician (Cardiology)          Subjective   History Present Illness     Chief Complaint:   Chief Complaint   Patient presents with   • Chest Pain     The patient is a 50 years old male with history of CAD s/p CABG x3, COPD, PTSD, psoriasis and chronic pain.    The patient experienced intermittent left-sided chest pain that radiated to the left lateral wall while sitting on the couch in the evening of 2021.  Chest pain was relieved with nitroglycerin and currently is on nitroglycerin drip.    The patient denies fevers, chills, sweats, nausea, vomiting, abdominal pain, constipation or diarrhea      Review of Systems   All other systems reviewed and are negative.          Personal History     Past Medical History:   Past Medical History:   Diagnosis Date   • Allergies     takes allergy injections   • Anxiety    • Arthritis    • Asthma    • Back pain    • Depression    • Emphysema of lung (CMS/[facility] [place])    • Headache    • Leg pain        Surgical History:      Past Surgical History:   Procedure Laterality Date   • CORONARY ANGIOPLASTY WITH STENT PLACEMENT  2014   • CORONARY ARTERY BYPASS GRAFT  2015           Family History: family history includes Anxiety disorder in his daughter; Depression in his daughter; Diabetes in his mother; Heart disease in his father. Otherwise pertinent FHx was reviewed and unremarkable.     Social History:  reports that he has been smoking. He has been smoking about 0.50 packs per day. He has never used smokeless tobacco. He reports that he does not drink alcohol or use drugs.      Medications:  Prior to Admission medications    Medication Sig Start Date End Date  Taking? Authorizing Provider   amitriptyline (ELAVIL) 75 MG tablet Take 1 tablet by mouth Every Night. 12/16/20  Yes Carlita Mcdermott PA-C   ARIPiprazole (ABILIFY) 5 MG tablet TAKE 1 TABLET BY MOUTH EVERY NIGHT 1/13/21  Yes Carlita Mcdermott PA-C   aspirin (aspirin) 81 MG EC tablet Take 1 tablet by mouth Daily. 1/6/21  Yes Brittni Shell MD   atorvastatin (LIPITOR) 20 MG tablet Take 1 tablet by mouth Every Night. 1/6/21  Yes Brittni Shell MD   buPROPion XL (Wellbutrin XL) 150 MG 24 hr tablet Take 1 tablet by mouth Daily. 1/10/21  Yes Brittni Shell MD   butalbital-acetaminophen-caffeine (FIORICET, ESGIC) -40 MG per tablet Take 1 tablet by mouth 3 (Three) Times a Day As Needed for Headache.   Yes Brandon Adams MD   clobetasol (TEMOVATE) 0.05 % ointment Apply  topically to the appropriate area as directed 2 (Two) Times a Day. 12/16/20  Yes Brittni Shell MD   loratadine (CLARITIN) 10 MG tablet Take 10 mg by mouth Daily.   Yes Brandon Adams MD   metoprolol tartrate (LOPRESSOR) 25 MG tablet Take 1 tablet by mouth 2 (Two) Times a Day. 1/6/21  Yes Brittni Shell MD   montelukast (SINGULAIR) 10 MG tablet Take 1 tablet by mouth Every Night. 12/16/20  Yes Brittni Shell MD   naproxen (NAPROSYN) 500 MG tablet Take 1 tablet by mouth 2 (Two) Times a Day As Needed for Moderate Pain . Take with food! 12/16/20  Yes Brittni Shell MD   nitroglycerin (NITROSTAT) 0.4 MG SL tablet Place 1 tablet under the tongue Every 5 (Five) Minutes As Needed for Chest Pain. Take no more than 3 doses in 15 minutes. 1/12/21  Yes Jaimie Silva MD   NON FORMULARY Allergy injections   Yes Brandon Adams MD   omeprazole (priLOSEC) 20 MG capsule Take 1 capsule by mouth Every Morning Before Breakfast. Take on an empty stomach! 12/16/20  Yes Brittni Shell MD   OXcarbazepine (TRILEPTAL) 300 MG tablet Take 150 mg by mouth 2 (Two) Times a Day.   Yes Provider, Historical,  MD   prazosin (MINIPRESS) 2 MG capsule Take 1 capsule by mouth Every Night. 1/6/21  Yes Carlita Mcdermott PA-C   pregabalin (LYRICA) 200 MG capsule Take 1 capsule by mouth 3 (Three) Times a Day. 10/8/20  Yes Charleen Garcia MD   SUMAtriptan (IMITREX) 50 MG tablet TAKE 1 TABLET BY MOUTH AT ONSET OF HEADACHE. MAY REPEAT DOSE 1 TIME IN 2 HOURS IF HEADACHE NOT RELIEVED 8/3/20  Yes Bashir Valdes Jr., MD   cyclobenzaprine (FLEXERIL) 10 MG tablet TAKE 1 TABLET BY MOUTH EVERY NIGHT AT BEDTIME FOR 1 WEEK THEN THREE TIMES DAILY 1/19/21   Charleen Garcia MD   varenicline (Chantix Starting Month Pak) 0.5 MG X 11 & 1 MG X 42 tablet Take 0.5 mg one daily on days 1-3 and and 0.5 mg twice daily on days 4-7.Then 1 mg twice daily for a total of 12 weeks. 12/16/20   Brittni Shell MD   Vortioxetine HBr (Trintellix) 20 MG tablet Take 20 mg by mouth Daily. 1/6/21   Carlita Mcdermott PA-C   cyclobenzaprine (FLEXERIL) 10 MG tablet Take 1 tablet by mouth 3 (Three) Times a Day As Needed for Muscle Spasms. 4/13/20 1/19/21  Charleen Garcia MD   Multiple Vitamins-Minerals (MULTIVITAMIN WITH MINERALS) tablet tablet Take 1 tablet by mouth Daily.  1/19/21  Brandon Adams MD   neomycin-polymyxin-dexamethasone (MAXITROL) 3.5-73734-3.1 ophthalmic suspension INSTILL 1 DROP INTO THE OS QID X5 DAYS 1/17/20 1/19/21  Brandon Adams MD       Allergies:    Allergies   Allergen Reactions   • Hydrocodone Nausea And Vomiting   • Hydrocodone-Acetaminophen GI Intolerance   • Oxycodone GI Intolerance       Objective   Objective     Vital Signs  Temp:  [98.1 °F (36.7 °C)] 98.1 °F (36.7 °C)  Heart Rate:  [64-86] 84  Resp:  [15-18] 16  BP: (105-147)/(60-87) 123/78  SpO2:  [92 %-98 %] 95 %  on   ;      Body mass index is 22.85 kg/m².    Physical Exam  HENT:      Head: Normocephalic.      Nose: Nose normal.   Eyes:      Extraocular Movements: Extraocular movements intact.      Pupils: Pupils are equal, round, and reactive to light.    Neck:      Musculoskeletal: Normal range of motion.   Cardiovascular:      Rate and Rhythm: Normal rate.   Pulmonary:      Effort: Pulmonary effort is normal.   Abdominal:      General: Bowel sounds are normal.      Palpations: Abdomen is soft.   Musculoskeletal: Normal range of motion.   Lymphadenopathy:      Cervical: No cervical adenopathy.   Skin:     General: Skin is warm.      Findings: No rash.   Neurological:      Mental Status: He is alert and oriented to person, place, and time.   Psychiatric:         Mood and Affect: Mood is anxious.           Results Review:  I have personally reviewed most recent lab results .    Results from last 7 days   Lab Units 01/19/21  0300 01/18/21  2355   WBC 10*3/mm3 6.50 8.30   HEMOGLOBIN g/dL 11.2* 11.8*   HEMATOCRIT % 33.4* 36.1*   PLATELETS 10*3/mm3 233 274   INR   --  0.99     Results from last 7 days   Lab Units 01/19/21  0752 01/19/21  0300 01/19/21  0122   SODIUM mmol/L  --  133* 130*   POTASSIUM mmol/L  --  4.2 4.1   CHLORIDE mmol/L  --  97* 97*   CO2 mmol/L  --  24.0 25.0   BUN mg/dL  --  15 13   CREATININE mg/dL  --  0.87 0.93   GLUCOSE mg/dL  --  109* 102*   CALCIUM mg/dL  --  8.8 8.9   ALT (SGPT) U/L  --   --  7   AST (SGOT) U/L  --   --  11   TROPONIN T ng/mL <0.010 <0.010 <0.010     Estimated Creatinine Clearance: 115.9 mL/min (by C-G formula based on SCr of 0.87 mg/dL).  Brief Urine Lab Results     None          Microbiology Results (last 10 days)     Procedure Component Value - Date/Time    COVID-19,[facility] IN-HOUSE,Nasal Swab No Transport Media 3-4 HR TAT - Swab, Nasal Cavity [[id number]]  (Normal) Collected: 01/19/21 0010    Lab Status: Final result Specimen: Swab from Nasal Cavity Updated: 01/19/21 0057     COVID19 Not Detected    Narrative:      Fact sheet for providers: https://www.fda.gov/media/907344/download     Fact sheet for patients: https://www.fda.gov/media/717006/download    Test performed by PCR.    Consider negative results in combination  with clinical observations, patient history, and epidemiological information.          ECG/EMG Results (most recent)     Procedure Component Value Units Date/Time    ECG 12 Lead [[id number]] Collected: 01/18/21 2259     Updated: 01/18/21 2301     QT Interval 371 ms     Narrative:      HEART RATE= 82  bpm  RR Interval= 728  ms  [place] Interval= 157  ms  P Horizontal Axis= 47  deg  P Front Axis= 53  deg  QRSD Interval= 99  ms  QT Interval= 371  ms  QRS Axis= 2  deg  T Wave Axis= 25  deg  - NORMAL ECG -  Sinus rhythm  Electronically Signed By:   Date and Time of Study: 2021-01-18 22:59:56    ECG 12 Lead [[id number]] Collected: 01/19/21 1007     Updated: 01/19/21 1012     QT Interval 369 ms     Narrative:      HEART RATE= 75  bpm  RR Interval= 796  ms  KY Interval= 166  ms  P Horizontal Axis= 47  deg  P Front Axis= 40  deg  QRSD Interval= 88  ms  QT Interval= 369  ms  QRS Axis= -14  deg  T Wave Axis= 15  deg  - NORMAL ECG -  Sinus rhythm  When compared with ECG of 18-Jan-2021 22:59:56,  No significant change  Electronically Signed By:   Date and Time of Study: 2021-01-19 10:07:35               Results for orders placed during the hospital encounter of 01/14/21   Adult Transthoracic Echo Complete W/ Cont if Necessary Per Protocol    Narrative · Estimated left ventricular EF = 50% Left ventricular systolic function   is normal.     Indications  Chest pain    Technically satisfactory study.  Mitral valve is structurally normal.  Tricuspid valve is structurally normal.  Aortic valve is structurally normal.  Pulmonic valve could not be well visualized.  No evidence for mitral tricuspid or aortic regurgitation is seen by   Doppler study.  Left atrium is normal in size.  Right atrium is normal in size.  Left ventricle is normal in size with mild paradoxical septal motion and   inferior wall hypokinesis with ejection fraction of 50%.  Right ventricle is normal in size.  Atrial septum is intact.  Aorta is normal.  No pericardial  effusion or intracardiac thrombus is seen.    Impression  Structurally and functionally normal cardiac valves.  Left ventricular is normal in size and mild paradoxical septal motion is   present with inferior wall hypokinesis with ejection fraction of 50%.       Xr Chest 1 View    Result Date: 1/19/2021  Questionable mild pulmonary vascular congestion.  Electronically Signed By-Malik Slade MD On:1/19/2021 7:15 AM This report was finalized on [id number] by  Malik Slade MD.        Estimated Creatinine Clearance: 115.9 mL/min (by C-G formula based on SCr of 0.87 mg/dL).    Assessment/Plan   Assessment/Plan       Active Hospital Problems    Diagnosis  POA   • Chest pain with high risk of acute coronary syndrome [R07.9]  Yes   • S/P CABG (coronary artery bypass graft) [Z95.1]  Not Applicable   • Essential hypertension [I10]  Unknown      Resolved Hospital Problems   No resolved problems to display.     1.  Unstable angina:  -Continue to cycle cardiac enzymes every 6 hours  -Cardiology, consulted  -Possible LHC in a.m.    2. CAD s/p CABG x3 (2015):  -On aspirin, Lipitor, metoprolol at home  -Stress test 1/12/2021  -TTE 1/14/2021--> LVEF 50%    3.  COPD:  -Not in exacerbation    4.  Anxiety/PTSD:  -Continue Abilify            VTE Prophylaxis -   Mechanical Order History:      Ordered        01/19/21 0242  Place Sequential Compression Device  Once         01/19/21 0242  Maintain Sequential Compression Device  Continuous                 Pharmalogical Order History:      Ordered     Dose Route Frequency Stop    01/19/21 0218  enoxaparin (LOVENOX) syringe 80 mg      1 mg/kg SC Once 01/19/21 0646                CODE STATUS:    Code Status and Medical Interventions:   Ordered at: 01/19/21 0234     Code Status:    CPR     Medical Interventions (Level of Support Prior to Arrest):    Full       This patient has been examined wearing appropriate Personal Protective Equipment and discussed with nursing. 01/19/21      I discussed  the patient's findings and my recommendations with patient.      Signature: Electronically signed by Bebeto Iraheta DO, 01/19/21, 1:23 PM EST.    [facility] Hospitalist Team

## 2021-01-19 NOTE — CONSULTS
Referring Provider: Bebeto Iraheta,*  Reason for Consultation:  Chest pain  Unstable angina  Status post CABG    Patient Care Team:  Brittni Shell MD as PCP - General (Family Medicine)  Jaimie Silva MD as Consulting Physician (Cardiology)    Chief complaint  Chest pain    Subjective .     History of present illness:  Frank Barger is a 50 y.o. male who presents with history of multiple cardiac and noncardiac problems came to the emergency room with history of chest discomfort and shortness of breath and sweating yesterday evening.  Patient was not exerting himself when he started having the chest discomfort.  Chest discomfort was relieved by nitroglycerin.  Patient recently was having increased chest discomfort and recently had abnormal stress Cardiolite test.  Patient was scheduled to have cardiac catheterization this Friday however patient started having this chest discomfort and came to the emergency room.  EKG showed no acute changes.  Patient was started on intravenous nitroglycerin.  Troponin levels are negative.  No other associated aggravating or elevating factors.  Symptoms were significant and intermittent..           ROS      Patient is not having any palpitations, dizziness or syncope.  Denies having any headache ,abdominal pain ,nausea, vomiting , diarrhea constipation, loss of weight or loss of appetite.  Denies having any excessive bruising ,hematuria or blood in the stool.    Review of all systems negative except as indicated      History  Past Medical History:   Diagnosis Date   • Allergies     takes allergy injections   • Anxiety    • Arthritis    • Asthma    • Back pain    • Depression    • Emphysema of lung (CMS/[facility])    • Headache    • Leg pain        Past Surgical History:   Procedure Laterality Date   • CORONARY ANGIOPLASTY WITH STENT PLACEMENT  04/2014   • CORONARY ARTERY BYPASS GRAFT  12/2015       Family History   Problem Relation Age of Onset   • Diabetes Mother  "   • Heart disease Father    • Anxiety disorder Daughter    • Depression Daughter        Social History     Tobacco Use   • Smoking status: Current Every Day Smoker     Packs/day: 0.50   • Smokeless tobacco: Never Used   Substance Use Topics   • Alcohol use: No     Frequency: Never   • Drug use: No        (Not in a hospital admission)        Hydrocodone, Hydrocodone-acetaminophen, and Oxycodone    Scheduled Meds:aspirin, 325 mg, Oral, Once  enoxaparin, 1 mg/kg, Subcutaneous, Once  sodium chloride, 10 mL, Intravenous, Q12H      Continuous Infusions:nitroglycerin, 10-50 mcg/min, Last Rate: 10 mcg/min (01/19/21 0444)      PRN Meds:.•  acetaminophen **OR** acetaminophen **OR** acetaminophen  •  aluminum-magnesium hydroxide-simethicone  •  magnesium sulfate **OR** magnesium sulfate in D5W 1g/100mL (PREMIX)  •  melatonin  •  nitroglycerin  •  ondansetron **OR** ondansetron  •  potassium chloride  •  sodium chloride    Objective     VITAL SIGNS  Vitals:    01/19/21 0206 01/19/21 0336 01/19/21 0436 01/19/21 0606   BP: 107/64 105/67 114/72 110/60   Pulse: 69 71 66 67   Resp: 15 15 16 15   Temp:       TempSrc:       SpO2: 92% 92% 94% 92%   Weight:       Height:           Flowsheet Rows      First Filed Value   Admission Height  188 cm (74\") Documented at 01/18/2021 2302   Admission Weight  80.7 kg (178 lb) Documented at 01/18/2021 2302            Intake/Output Summary (Last 24 hours) at 1/19/2021 0635  Last data filed at 1/19/2021 0600  Gross per 24 hour   Intake --   Output 600 ml   Net -600 ml        TELEMETRY: Sinus rhythm morning    Physical Exam:  The patient is alert, oriented and in no distress.  Vital signs as noted above.  Head and neck revealed no carotid bruits or jugular venous distention.  No thyromegaly or lymph adenopathy is present  Lungs clear.  No wheezing.  Breath sounds are normal bilaterally.  Heart normal first and second heart sounds.No murmur.  No precordial rub is present.  No gallop is " present.  Abdomen soft and nontender.  No organomegaly is present.  Extremities with good peripheral pulses without any pedal edema.  Skin warm and dry.  Musculoskeletal system is grossly normal  CNS grossly normal      Results Review:   I reviewed the patient's new clinical results.  Lab Results (last 24 hours)     Procedure Component Value Units Date/Time    Troponin [[id number]]  (Normal) Collected: 01/19/21 0300    Specimen: Blood Updated: 01/19/21 0342     Troponin T <0.010 ng/mL     Narrative:      Troponin T Reference Range:  <= 0.03 ng/mL-   Negative for AMI  >0.03 ng/mL-     Abnormal for myocardial necrosis.  Clinicians would have to utilize clinical acumen, EKG, Troponin and serial changes to determine if it is an Acute Myocardial Infarction or myocardial injury due to an underlying chronic condition.       Results may be falsely decreased if patient taking Biotin.      Basic Metabolic Panel [[id number]]  (Abnormal) Collected: 01/19/21 0300    Specimen: Blood Updated: 01/19/21 0340     Glucose 109 mg/dL      BUN 15 mg/dL      Creatinine 0.87 mg/dL      Sodium 133 mmol/L      Potassium 4.2 mmol/L      Chloride 97 mmol/L      CO2 24.0 mmol/L      Calcium 8.8 mg/dL      eGFR Non African Amer 93 mL/min/1.73      BUN/Creatinine Ratio 17.2     Anion Gap 12.0 mmol/L     Narrative:      GFR Normal >60  Chronic Kidney Disease <60  Kidney Failure <15      CBC Auto Differential [[id number]]  (Abnormal) Collected: 01/19/21 0300    Specimen: Blood Updated: 01/19/21 0315     WBC 6.50 10*3/mm3      RBC 4.26 10*6/mm3      Hemoglobin 11.2 g/dL      Hematocrit 33.4 %      MCV 78.4 fL      MCH 26.3 pg      MCHC 33.6 g/dL      RDW 17.9 %      RDW-SD 49.4 fl      MPV 8.1 fL      Platelets 233 10*3/mm3      Neutrophil % 53.5 %      Lymphocyte % 34.1 %      Monocyte % 9.3 %      Eosinophil % 1.7 %      Basophil % 1.4 %      Neutrophils, Absolute 3.50 10*3/mm3      Lymphocytes, Absolute 2.20 10*3/mm3      Monocytes, Absolute 0.60  10*3/mm3      Eosinophils, Absolute 0.10 10*3/mm3      Basophils, Absolute 0.10 10*3/mm3      nRBC 0.2 /100 WBC     Comprehensive Metabolic Panel [[id number]]  (Abnormal) Collected: 01/19/21 0122    Specimen: Blood Updated: 01/19/21 0157     Glucose 102 mg/dL      BUN 13 mg/dL      Creatinine 0.93 mg/dL      Sodium 130 mmol/L      Potassium 4.1 mmol/L      Chloride 97 mmol/L      CO2 25.0 mmol/L      Calcium 8.9 mg/dL      Total Protein 6.7 g/dL      Albumin 3.80 g/dL      ALT (SGPT) 7 U/L      AST (SGOT) 11 U/L      Alkaline Phosphatase 87 U/L      Total Bilirubin 0.2 mg/dL      eGFR Non African Amer 86 mL/min/1.73      Globulin 2.9 gm/dL      A/G Ratio 1.3 g/dL      BUN/Creatinine Ratio 14.0     Anion Gap 8.0 mmol/L     Narrative:      GFR Normal >60  Chronic Kidney Disease <60  Kidney Failure <15      Troponin [[id number]]  (Normal) Collected: 01/19/21 0122    Specimen: Blood Updated: 01/19/21 0157     Troponin T <0.010 ng/mL     Narrative:      Troponin T Reference Range:  <= 0.03 ng/mL-   Negative for AMI  >0.03 ng/mL-     Abnormal for myocardial necrosis.  Clinicians would have to utilize clinical acumen, EKG, Troponin and serial changes to determine if it is an Acute Myocardial Infarction or myocardial injury due to an underlying chronic condition.       Results may be falsely decreased if patient taking Biotin.      COVID-19,[facility] IN-HOUSE,Nasal Swab No Transport Media 3-4 HR TAT - Swab, Nasal Cavity [[id number]]  (Normal) Collected: 01/19/21 0010    Specimen: Swab from Nasal Cavity Updated: 01/19/21 0057     COVID19 Not Detected    Narrative:      Fact sheet for providers: https://www.fda.gov/media/869239/download     Fact sheet for patients: https://www.fda.gov/media/426576/download    Test performed by PCR.    Consider negative results in combination with clinical observations, patient history, and epidemiological information.    Protime-INR [[id number]]  (Normal) Collected: 01/18/21 0174    Specimen:  Blood Updated: 01/19/21 0013     Protime 10.9 Seconds      INR 0.99    aPTT [[id number]]  (Abnormal) Collected: 01/18/21 2355    Specimen: Blood Updated: 01/19/21 0013     PTT 21.6 seconds     POC Glucose Once [[id number]]  (Abnormal) Collected: 01/19/21 0010    Specimen: Blood Updated: 01/19/21 0011     Glucose 110 mg/dL      Comment: Serial Number: [id number]:  [id number]       CBC & Differential [[id number]]  (Abnormal) Collected: 01/18/21 2355    Specimen: Blood Updated: 01/19/21 0001    Narrative:      The following orders were created for panel order CBC & Differential.  Procedure                               Abnormality         Status                     ---------                               -----------         ------                     CBC Auto Differential[[id number]]        Abnormal            Final result                 Please view results for these tests on the individual orders.    CBC Auto Differential [[id number]]  (Abnormal) Collected: 01/18/21 2355    Specimen: Blood Updated: 01/19/21 0001     WBC 8.30 10*3/mm3      RBC 4.50 10*6/mm3      Hemoglobin 11.8 g/dL      Hematocrit 36.1 %      MCV 80.3 fL      MCH 26.3 pg      MCHC 32.8 g/dL      RDW 18.0 %      RDW-SD 50.3 fl      MPV 9.2 fL      Platelets 274 10*3/mm3      Neutrophil % 50.6 %      Lymphocyte % 35.7 %      Monocyte % 10.6 %      Eosinophil % 1.7 %      Basophil % 1.4 %      Neutrophils, Absolute 4.20 10*3/mm3      Lymphocytes, Absolute 3.00 10*3/mm3      Monocytes, Absolute 0.90 10*3/mm3      Eosinophils, Absolute 0.10 10*3/mm3      Basophils, Absolute 0.10 10*3/mm3      nRBC 0.1 /100 WBC           Imaging Results (Last 24 Hours)     Procedure Component Value Units Date/Time    XR Chest 1 View [[id number]] Resulted: 01/19/21 0133     Updated: 01/19/21 0133      LAB RESULTS (LAST 7 DAYS)    CBC  Results from last 7 days   Lab Units 01/19/21  0300 01/18/21 2355   WBC 10*3/mm3 6.50 8.30   RBC 10*6/mm3 4.26 4.50   HEMOGLOBIN g/dL 11.2*  11.8*   HEMATOCRIT % 33.4* 36.1*   MCV fL 78.4* 80.3   PLATELETS 10*3/mm3 233 274       BMP  Results from last 7 days   Lab Units 01/19/21  0300 01/19/21  0122   SODIUM mmol/L 133* 130*   POTASSIUM mmol/L 4.2 4.1   CHLORIDE mmol/L 97* 97*   CO2 mmol/L 24.0 25.0   BUN mg/dL 15 13   CREATININE mg/dL 0.87 0.93   GLUCOSE mg/dL 109* 102*       CMP   Results from last 7 days   Lab Units 01/19/21  0300 01/19/21  0122   SODIUM mmol/L 133* 130*   POTASSIUM mmol/L 4.2 4.1   CHLORIDE mmol/L 97* 97*   CO2 mmol/L 24.0 25.0   BUN mg/dL 15 13   CREATININE mg/dL 0.87 0.93   GLUCOSE mg/dL 109* 102*   ALBUMIN g/dL  --  3.80   BILIRUBIN mg/dL  --  0.2   ALK PHOS U/L  --  87   AST (SGOT) U/L  --  11   ALT (SGPT) U/L  --  7         BNP        TROPONIN  Results from last 7 days   Lab Units 01/19/21  0300   TROPONIN T ng/mL <0.010       CoAg  Results from last 7 days   Lab Units 01/18/21  2355   INR  0.99   APTT seconds 21.6*       Creatinine Clearance  Estimated Creatinine Clearance: 115.9 mL/min (by C-G formula based on SCr of 0.87 mg/dL).    ABG        Radiology  No radiology results for the last day            EKG          I personally viewed and interpreted the patient's EKG/Telemetry data: Normal sinus rhythm normal ECG    ECHOCARDIOGRAM:    Results for orders placed during the hospital encounter of 01/14/21   Adult Transthoracic Echo Complete W/ Cont if Necessary Per Protocol    Narrative · Estimated left ventricular EF = 50% Left ventricular systolic function   is normal.     Indications  Chest pain    Technically satisfactory study.  Mitral valve is structurally normal.  Tricuspid valve is structurally normal.  Aortic valve is structurally normal.  Pulmonic valve could not be well visualized.  No evidence for mitral tricuspid or aortic regurgitation is seen by   Doppler study.  Left atrium is normal in size.  Right atrium is normal in size.  Left ventricle is normal in size with mild paradoxical septal motion and   inferior wall  hypokinesis with ejection fraction of 50%.  Right ventricle is normal in size.  Atrial septum is intact.  Aorta is normal.  No pericardial effusion or intracardiac thrombus is seen.    Impression  Structurally and functionally normal cardiac valves.  Left ventricular is normal in size and mild paradoxical septal motion is   present with inferior wall hypokinesis with ejection fraction of 50%.               Cardiolite (Tc-99m Sestamibi) stress test      OTHER:     Assessment/Plan     Active Problems:    Chest pain with high risk of acute coronary syndrome           Assessment and Plan      ]]]]]]]]]]]]]]]]]  Impression  ==========  -Chest discomfort suggestive of unstable angina pectoris.  EKG showed no acute changes.  Troponin levels are negative.     Echocardiogram 1/14/2021 revealed mild paradoxical septal motion and inferior wall hypokinesis with ejection fraction of 50%.  Lexiscan Cardiolite test-abnormal with mild posterior and inferior proximal ischemia-1/14/2021     -Status post CABG (2015) (triple-vessel according to patient) at [facility].     -Status post stent placement (details not available)-2014     -Dyslipidemia hypertension COPD PTSD     -Psoriasis     -Family history of coronary artery disease     -Smoker-abstinence from smoking     -Allergic to oxycodone hydrocodone  ==========  Plan  =========  Unstable angina  EKG showed no acute changes.  Troponin levels are negative.  Recent stress Cardiolite test-as above  Recent echocardiogram-as above  Recent labs were reviewed.  9/2/2020-normal CBC normal CMP.  Continue intravenous nitroglycerin.  Patient to have cardiac catheterization and coronary arteriography soon possible.  Recent benefits pros and cons of the procedure were discussed with patient.     Hypertension  Continue metoprolol     Dyslipidemia-on statins     Patient is scheduled to have cardiac catheterization tomorrow around 10-11 AM.    Further plan will depend on  patient's progress.  ]]]]]]]]]]]]]]              Jaimie Silva MD  01/19/21  06:35 EST

## 2021-01-20 PROBLEM — I20.0 UNSTABLE ANGINA (HCC): Status: ACTIVE | Noted: 2021-01-20

## 2021-01-20 LAB
ACT BLD: 136 SECONDS (ref 89–137)
ACT BLD: 153 SECONDS (ref 89–137)
ACT BLD: 180 SECONDS (ref 89–137)
ACT BLD: 180 SECONDS (ref 89–137)
ANION GAP SERPL CALCULATED.3IONS-SCNC: 7 MMOL/L (ref 5–15)
APTT PPP: 59.1 SECONDS (ref 61–76.5)
BASOPHILS # BLD AUTO: 0.1 10*3/MM3 (ref 0–0.2)
BASOPHILS NFR BLD AUTO: 1.9 % (ref 0–1.5)
BUN SERPL-MCNC: 11 MG/DL (ref 6–20)
BUN/CREAT SERPL: 9.9 (ref 7–25)
CALCIUM SPEC-SCNC: 9.1 MG/DL (ref 8.6–10.5)
CHLORIDE SERPL-SCNC: 103 MMOL/L (ref 98–107)
CO2 SERPL-SCNC: 29 MMOL/L (ref 22–29)
CREAT SERPL-MCNC: 1.11 MG/DL (ref 0.76–1.27)
DEPRECATED RDW RBC AUTO: 49.4 FL (ref 37–54)
EOSINOPHIL # BLD AUTO: 0.1 10*3/MM3 (ref 0–0.4)
EOSINOPHIL NFR BLD AUTO: 1.8 % (ref 0.3–6.2)
ERYTHROCYTE [DISTWIDTH] IN BLOOD BY AUTOMATED COUNT: 17.8 % (ref 12.3–15.4)
GFR SERPL CREATININE-BSD FRML MDRD: 70 ML/MIN/1.73
GLUCOSE SERPL-MCNC: 117 MG/DL (ref 65–99)
HCT VFR BLD AUTO: 35.1 % (ref 37.5–51)
HGB BLD-MCNC: 11.5 G/DL (ref 13–17.7)
INR PPP: 1.04 (ref 0.93–1.1)
LYMPHOCYTES # BLD AUTO: 2.4 10*3/MM3 (ref 0.7–3.1)
LYMPHOCYTES NFR BLD AUTO: 42.4 % (ref 19.6–45.3)
MAGNESIUM SERPL-MCNC: 2.1 MG/DL (ref 1.6–2.6)
MCH RBC QN AUTO: 25.9 PG (ref 26.6–33)
MCHC RBC AUTO-ENTMCNC: 32.8 G/DL (ref 31.5–35.7)
MCV RBC AUTO: 79 FL (ref 79–97)
MONOCYTES # BLD AUTO: 0.6 10*3/MM3 (ref 0.1–0.9)
MONOCYTES NFR BLD AUTO: 9.6 % (ref 5–12)
NEUTROPHILS NFR BLD AUTO: 2.5 10*3/MM3 (ref 1.7–7)
NEUTROPHILS NFR BLD AUTO: 44.3 % (ref 42.7–76)
NRBC BLD AUTO-RTO: 0.1 /100 WBC (ref 0–0.2)
PLATELET # BLD AUTO: 237 10*3/MM3 (ref 140–450)
PMV BLD AUTO: 8.3 FL (ref 6–12)
POTASSIUM SERPL-SCNC: 4.3 MMOL/L (ref 3.5–5.2)
PROTHROMBIN TIME: 11.4 SECONDS (ref 9.6–11.7)
RBC # BLD AUTO: 4.44 10*6/MM3 (ref 4.14–5.8)
SODIUM SERPL-SCNC: 139 MMOL/L (ref 136–145)
TROPONIN T SERPL-MCNC: <0.01 NG/ML (ref 0–0.03)
TROPONIN T SERPL-MCNC: <0.01 NG/ML (ref 0–0.03)
WBC # BLD AUTO: 5.7 10*3/MM3 (ref 3.4–10.8)

## 2021-01-20 PROCEDURE — C1887 CATHETER, GUIDING: HCPCS | Performed by: INTERNAL MEDICINE

## 2021-01-20 PROCEDURE — C1894 INTRO/SHEATH, NON-LASER: HCPCS | Performed by: INTERNAL MEDICINE

## 2021-01-20 PROCEDURE — 25010000002 HEPARIN (PORCINE) PER 1000 UNITS: Performed by: INTERNAL MEDICINE

## 2021-01-20 PROCEDURE — B2111ZZ FLUOROSCOPY OF MULTIPLE CORONARY ARTERIES USING LOW OSMOLAR CONTRAST: ICD-10-PCS | Performed by: INTERNAL MEDICINE

## 2021-01-20 PROCEDURE — 93459 L HRT ART/GRFT ANGIO: CPT | Performed by: INTERNAL MEDICINE

## 2021-01-20 PROCEDURE — B2131ZZ FLUOROSCOPY OF MULTIPLE CORONARY ARTERY BYPASS GRAFTS USING LOW OSMOLAR CONTRAST: ICD-10-PCS | Performed by: INTERNAL MEDICINE

## 2021-01-20 PROCEDURE — C1769 GUIDE WIRE: HCPCS | Performed by: INTERNAL MEDICINE

## 2021-01-20 PROCEDURE — 99233 SBSQ HOSP IP/OBS HIGH 50: CPT | Performed by: INTERNAL MEDICINE

## 2021-01-20 PROCEDURE — 4A023N7 MEASUREMENT OF CARDIAC SAMPLING AND PRESSURE, LEFT HEART, PERCUTANEOUS APPROACH: ICD-10-PCS | Performed by: INTERNAL MEDICINE

## 2021-01-20 PROCEDURE — B2181ZZ FLUOROSCOPY OF LEFT INTERNAL MAMMARY BYPASS GRAFT USING LOW OSMOLAR CONTRAST: ICD-10-PCS | Performed by: INTERNAL MEDICINE

## 2021-01-20 PROCEDURE — 85730 THROMBOPLASTIN TIME PARTIAL: CPT | Performed by: HOSPITALIST

## 2021-01-20 PROCEDURE — 92928 PRQ TCAT PLMT NTRAC ST 1 LES: CPT | Performed by: INTERNAL MEDICINE

## 2021-01-20 PROCEDURE — 25010000002 MIDAZOLAM PER 1 MG: Performed by: INTERNAL MEDICINE

## 2021-01-20 PROCEDURE — 80048 BASIC METABOLIC PNL TOTAL CA: CPT | Performed by: INTERNAL MEDICINE

## 2021-01-20 PROCEDURE — C9600 PERC DRUG-EL COR STENT SING: HCPCS | Performed by: INTERNAL MEDICINE

## 2021-01-20 PROCEDURE — 84484 ASSAY OF TROPONIN QUANT: CPT | Performed by: PHYSICIAN ASSISTANT

## 2021-01-20 PROCEDURE — 027034Z DILATION OF CORONARY ARTERY, ONE ARTERY WITH DRUG-ELUTING INTRALUMINAL DEVICE, PERCUTANEOUS APPROACH: ICD-10-PCS | Performed by: INTERNAL MEDICINE

## 2021-01-20 PROCEDURE — 25010000002 FENTANYL CITRATE (PF) 100 MCG/2ML SOLUTION: Performed by: INTERNAL MEDICINE

## 2021-01-20 PROCEDURE — 0 IOPAMIDOL PER 1 ML: Performed by: INTERNAL MEDICINE

## 2021-01-20 PROCEDURE — 02JA3ZZ INSPECTION OF HEART, PERCUTANEOUS APPROACH: ICD-10-PCS | Performed by: INTERNAL MEDICINE

## 2021-01-20 PROCEDURE — 83735 ASSAY OF MAGNESIUM: CPT | Performed by: PHYSICIAN ASSISTANT

## 2021-01-20 PROCEDURE — 99152 MOD SED SAME PHYS/QHP 5/>YRS: CPT | Performed by: INTERNAL MEDICINE

## 2021-01-20 PROCEDURE — B2151ZZ FLUOROSCOPY OF LEFT HEART USING LOW OSMOLAR CONTRAST: ICD-10-PCS | Performed by: INTERNAL MEDICINE

## 2021-01-20 PROCEDURE — 85610 PROTHROMBIN TIME: CPT | Performed by: INTERNAL MEDICINE

## 2021-01-20 PROCEDURE — C1874 STENT, COATED/COV W/DEL SYS: HCPCS | Performed by: INTERNAL MEDICINE

## 2021-01-20 PROCEDURE — 99232 SBSQ HOSP IP/OBS MODERATE 35: CPT | Performed by: HOSPITALIST

## 2021-01-20 PROCEDURE — 85025 COMPLETE CBC W/AUTO DIFF WBC: CPT | Performed by: INTERNAL MEDICINE

## 2021-01-20 PROCEDURE — 99153 MOD SED SAME PHYS/QHP EA: CPT | Performed by: INTERNAL MEDICINE

## 2021-01-20 PROCEDURE — 85347 COAGULATION TIME ACTIVATED: CPT

## 2021-01-20 DEVICE — XIENCE SIERRA™ EVEROLIMUS ELUTING CORONARY STENT SYSTEM 2.25 MM X 28 MM / RAPID-EXCHANGE
Type: IMPLANTABLE DEVICE | Status: FUNCTIONAL
Brand: XIENCE SIERRA™

## 2021-01-20 RX ORDER — ALUMINA, MAGNESIA, AND SIMETHICONE 2400; 2400; 240 MG/30ML; MG/30ML; MG/30ML
15 SUSPENSION ORAL EVERY 6 HOURS PRN
Status: DISCONTINUED | OUTPATIENT
Start: 2021-01-20 | End: 2021-01-20 | Stop reason: SDUPTHER

## 2021-01-20 RX ORDER — LIDOCAINE HYDROCHLORIDE 20 MG/ML
INJECTION, SOLUTION INFILTRATION; PERINEURAL AS NEEDED
Status: DISCONTINUED | OUTPATIENT
Start: 2021-01-20 | End: 2021-01-20 | Stop reason: HOSPADM

## 2021-01-20 RX ORDER — CLOPIDOGREL BISULFATE 75 MG/1
TABLET ORAL AS NEEDED
Status: DISCONTINUED | OUTPATIENT
Start: 2021-01-20 | End: 2021-01-20 | Stop reason: HOSPADM

## 2021-01-20 RX ORDER — ONDANSETRON 4 MG/1
4 TABLET, FILM COATED ORAL EVERY 6 HOURS PRN
Status: DISCONTINUED | OUTPATIENT
Start: 2021-01-20 | End: 2021-01-20 | Stop reason: SDUPTHER

## 2021-01-20 RX ORDER — MIDAZOLAM HYDROCHLORIDE 1 MG/ML
INJECTION INTRAMUSCULAR; INTRAVENOUS AS NEEDED
Status: DISCONTINUED | OUTPATIENT
Start: 2021-01-20 | End: 2021-01-20 | Stop reason: HOSPADM

## 2021-01-20 RX ORDER — ONDANSETRON 2 MG/ML
4 INJECTION INTRAMUSCULAR; INTRAVENOUS EVERY 6 HOURS PRN
Status: DISCONTINUED | OUTPATIENT
Start: 2021-01-20 | End: 2021-01-20 | Stop reason: SDUPTHER

## 2021-01-20 RX ORDER — FENTANYL CITRATE 50 UG/ML
INJECTION, SOLUTION INTRAMUSCULAR; INTRAVENOUS AS NEEDED
Status: DISCONTINUED | OUTPATIENT
Start: 2021-01-20 | End: 2021-01-20 | Stop reason: HOSPADM

## 2021-01-20 RX ORDER — ACETAMINOPHEN 325 MG/1
650 TABLET ORAL EVERY 4 HOURS PRN
Status: DISCONTINUED | OUTPATIENT
Start: 2021-01-20 | End: 2021-01-20 | Stop reason: SDUPTHER

## 2021-01-20 RX ORDER — ASPIRIN 81 MG/1
81 TABLET ORAL DAILY
Status: DISCONTINUED | OUTPATIENT
Start: 2021-01-20 | End: 2021-01-20 | Stop reason: SDUPTHER

## 2021-01-20 RX ORDER — HEPARIN SODIUM 1000 [USP'U]/ML
INJECTION, SOLUTION INTRAVENOUS; SUBCUTANEOUS AS NEEDED
Status: DISCONTINUED | OUTPATIENT
Start: 2021-01-20 | End: 2021-01-20 | Stop reason: HOSPADM

## 2021-01-20 RX ORDER — CLOPIDOGREL BISULFATE 75 MG/1
75 TABLET ORAL DAILY
Status: DISCONTINUED | OUTPATIENT
Start: 2021-01-21 | End: 2021-01-21 | Stop reason: HOSPADM

## 2021-01-20 RX ORDER — SODIUM CHLORIDE 9 MG/ML
250 INJECTION, SOLUTION INTRAVENOUS ONCE AS NEEDED
Status: DISCONTINUED | OUTPATIENT
Start: 2021-01-20 | End: 2021-01-21 | Stop reason: HOSPADM

## 2021-01-20 RX ADMIN — Medication 10 ML: at 21:40

## 2021-01-20 RX ADMIN — AMITRIPTYLINE HYDROCHLORIDE 75 MG: 50 TABLET, FILM COATED ORAL at 21:39

## 2021-01-20 RX ADMIN — ASPIRIN 81 MG: 81 TABLET, COATED ORAL at 08:09

## 2021-01-20 RX ADMIN — ATORVASTATIN CALCIUM 20 MG: 20 TABLET, FILM COATED ORAL at 21:39

## 2021-01-20 RX ADMIN — MONTELUKAST 10 MG: 10 TABLET, FILM COATED ORAL at 21:39

## 2021-01-20 RX ADMIN — PREGABALIN 200 MG: 100 CAPSULE ORAL at 21:39

## 2021-01-20 RX ADMIN — Medication 3 ML: at 08:10

## 2021-01-20 RX ADMIN — Medication 10 ML: at 08:10

## 2021-01-20 RX ADMIN — PREGABALIN 200 MG: 100 CAPSULE ORAL at 14:48

## 2021-01-20 RX ADMIN — METOPROLOL TARTRATE 25 MG: 25 TABLET, FILM COATED ORAL at 08:09

## 2021-01-20 RX ADMIN — ARIPIPRAZOLE 5 MG: 5 TABLET ORAL at 21:39

## 2021-01-20 RX ADMIN — PANTOPRAZOLE SODIUM 40 MG: 40 TABLET, DELAYED RELEASE ORAL at 08:09

## 2021-01-20 RX ADMIN — OXCARBAZEPINE 150 MG: 150 TABLET, FILM COATED ORAL at 21:39

## 2021-01-20 RX ADMIN — METOPROLOL TARTRATE 25 MG: 25 TABLET, FILM COATED ORAL at 21:39

## 2021-01-20 RX ADMIN — BUPROPION HYDROCHLORIDE 150 MG: 150 TABLET, EXTENDED RELEASE ORAL at 08:09

## 2021-01-20 RX ADMIN — TERAZOSIN HYDROCHLORIDE 5 MG: 5 CAPSULE ORAL at 21:43

## 2021-01-20 RX ADMIN — CYCLOBENZAPRINE HYDROCHLORIDE 10 MG: 10 TABLET, FILM COATED ORAL at 21:39

## 2021-01-20 RX ADMIN — OXCARBAZEPINE 150 MG: 150 TABLET, FILM COATED ORAL at 08:09

## 2021-01-20 NOTE — PROGRESS NOTES
Referring Provider: Bebeto Iraheta,*    Reason for follow-up:  Unstable angina  Status post CABG     Patient Care Team:  Brittni Shell MD as PCP - General (Family Medicine)  Jaimie Silva MD as Consulting Physician (Cardiology)    Subjective .      ROS    Since I have last seen, the patient has been without any chest discomfort ,shortness of breath, palpitations, dizziness or syncope.  Denies having any headache ,abdominal pain ,nausea, vomiting , diarrhea constipation, loss of weight or loss of appetite.  Denies having any excessive bruising ,hematuria or blood in the stool.    Review of all systems negative except as indicated    History  Past Medical History:   Diagnosis Date   • Allergies     takes allergy injections   • Anxiety    • Arthritis    • Asthma    • Back pain    • Depression    • Emphysema of lung (CMS/HCC)    • Headache    • Leg pain        Past Surgical History:   Procedure Laterality Date   • CORONARY ANGIOPLASTY WITH STENT PLACEMENT  04/2014   • CORONARY ARTERY BYPASS GRAFT  12/2015       Family History   Problem Relation Age of Onset   • Diabetes Mother    • Heart disease Father    • Anxiety disorder Daughter    • Depression Daughter        Social History     Tobacco Use   • Smoking status: Current Every Day Smoker     Packs/day: 0.50   • Smokeless tobacco: Never Used   Substance Use Topics   • Alcohol use: No     Frequency: Never   • Drug use: No        Medications Prior to Admission   Medication Sig Dispense Refill Last Dose   • amitriptyline (ELAVIL) 75 MG tablet Take 1 tablet by mouth Every Night. 30 tablet 2    • ARIPiprazole (ABILIFY) 5 MG tablet TAKE 1 TABLET BY MOUTH EVERY NIGHT 90 tablet 0    • aspirin (aspirin) 81 MG EC tablet Take 1 tablet by mouth Daily. 90 tablet 1    • atorvastatin (LIPITOR) 20 MG tablet Take 1 tablet by mouth Every Night. 90 tablet 1    • buPROPion XL (Wellbutrin XL) 150 MG 24 hr tablet Take 1 tablet by mouth Daily. 90 tablet 1    •  butalbital-acetaminophen-caffeine (FIORICET, ESGIC) -40 MG per tablet Take 1 tablet by mouth 3 (Three) Times a Day As Needed for Headache.      • clobetasol (TEMOVATE) 0.05 % ointment Apply  topically to the appropriate area as directed 2 (Two) Times a Day. 60 g 1    • loratadine (CLARITIN) 10 MG tablet Take 10 mg by mouth Daily.      • metoprolol tartrate (LOPRESSOR) 25 MG tablet Take 1 tablet by mouth 2 (Two) Times a Day. 180 tablet 1    • montelukast (SINGULAIR) 10 MG tablet Take 1 tablet by mouth Every Night. 90 tablet 1    • naproxen (NAPROSYN) 500 MG tablet Take 1 tablet by mouth 2 (Two) Times a Day As Needed for Moderate Pain . Take with food! 60 tablet 2    • nitroglycerin (NITROSTAT) 0.4 MG SL tablet Place 1 tablet under the tongue Every 5 (Five) Minutes As Needed for Chest Pain. Take no more than 3 doses in 15 minutes. 25 tablet 0    • NON FORMULARY Allergy injections      • omeprazole (priLOSEC) 20 MG capsule Take 1 capsule by mouth Every Morning Before Breakfast. Take on an empty stomach! 90 capsule 1    • OXcarbazepine (TRILEPTAL) 300 MG tablet Take 150 mg by mouth 2 (Two) Times a Day.      • prazosin (MINIPRESS) 2 MG capsule Take 1 capsule by mouth Every Night. 30 capsule 0    • pregabalin (LYRICA) 200 MG capsule Take 1 capsule by mouth 3 (Three) Times a Day. 90 capsule 5    • SUMAtriptan (IMITREX) 50 MG tablet TAKE 1 TABLET BY MOUTH AT ONSET OF HEADACHE. MAY REPEAT DOSE 1 TIME IN 2 HOURS IF HEADACHE NOT RELIEVED 10 tablet 0    • cyclobenzaprine (FLEXERIL) 10 MG tablet TAKE 1 TABLET BY MOUTH EVERY NIGHT AT BEDTIME FOR 1 WEEK THEN THREE TIMES DAILY 90 tablet 5    • varenicline (Chantix Starting Month Pak) 0.5 MG X 11 & 1 MG X 42 tablet Take 0.5 mg one daily on days 1-3 and and 0.5 mg twice daily on days 4-7.Then 1 mg twice daily for a total of 12 weeks. 53 tablet 0    • Vortioxetine HBr (Trintellix) 20 MG tablet Take 20 mg by mouth Daily. 30 tablet 0        Allergies  Hydrocodone,  "Hydrocodone-acetaminophen, and Oxycodone    Scheduled Meds:amitriptyline, 75 mg, Oral, Nightly  ARIPiprazole, 5 mg, Oral, Nightly  aspirin, 81 mg, Oral, Daily  aspirin, 325 mg, Oral, Once  atorvastatin, 20 mg, Oral, Nightly  buPROPion XL, 150 mg, Oral, Daily  cyclobenzaprine, 10 mg, Oral, Nightly  metoprolol tartrate, 25 mg, Oral, BID  montelukast, 10 mg, Oral, Nightly  OXcarbazepine, 150 mg, Oral, Q12H  pantoprazole, 40 mg, Oral, QAM  pregabalin, 200 mg, Oral, Q8H  sodium chloride, 10 mL, Intravenous, Q12H  sodium chloride, 3 mL, Intravenous, Q12H  terazosin, 5 mg, Oral, Nightly      Continuous Infusions:heparin, 12 Units/kg/hr, Last Rate: 13 Units/kg/hr (01/20/21 0516)  nitroglycerin, 10-50 mcg/min, Last Rate: 15 mcg/min (01/20/21 0518)      PRN Meds:.•  acetaminophen **OR** acetaminophen **OR** acetaminophen  •  aluminum-magnesium hydroxide-simethicone  •  butalbital-acetaminophen-caffeine  •  heparin  •  heparin  •  magnesium sulfate **OR** magnesium sulfate in D5W 1g/100mL (PREMIX)  •  melatonin  •  Morphine  •  nitroglycerin  •  ondansetron **OR** ondansetron  •  potassium chloride  •  sodium chloride  •  sodium chloride    Objective     VITAL SIGNS  Vitals:    01/19/21 2135 01/19/21 2214 01/20/21 0254 01/20/21 0539   BP: 111/63 107/56 95/58 97/57   BP Location:  Left arm Left arm    Patient Position:  Lying Lying    Pulse: 66 81 63 65   Resp:  20 13 12   Temp:  97.5 °F (36.4 °C) 97.4 °F (36.3 °C) 97.7 °F (36.5 °C)   TempSrc:  Oral Oral    SpO2:  90% 93% 93%   Weight:    81 kg (178 lb 9.2 oz)   Height:           Flowsheet Rows      First Filed Value   Admission Height  188 cm (74\") Documented at 01/18/2021 2302   Admission Weight  80.7 kg (178 lb) Documented at 01/18/2021 2302            Intake/Output Summary (Last 24 hours) at 1/20/2021 0618  Last data filed at 1/19/2021 2214  Gross per 24 hour   Intake --   Output 1100 ml   Net -1100 ml        TELEMETRY: Sinus rhythm    Physical Exam:  The patient is alert, " oriented and in no distress.  Vital signs as noted above.  Head and neck revealed no carotid bruits or jugular venous distention.  No thyromegaly or lymphadenopathy is present  Lungs clear.  No wheezing.  Breath sounds are normal bilaterally.  Heart normal first and second heart sounds.  No murmur. No precordial rub is present.  No gallop is present.  Abdomen soft and nontender.  No organomegaly is present.  Extremities with good peripheral pulses without any pedal edema.  Skin warm and dry.  Musculoskeletal system is grossly normal  CNS grossly normal      Results Review:   I reviewed the patient's new clinical results.  Lab Results (last 24 hours)     Procedure Component Value Units Date/Time    Basic Metabolic Panel [[id number]]  (Abnormal) Collected: 01/20/21 0247    Specimen: Blood Updated: 01/20/21 0318     Glucose 117 mg/dL      BUN 11 mg/dL      Creatinine 1.11 mg/dL      Sodium 139 mmol/L      Potassium 4.3 mmol/L      Chloride 103 mmol/L      CO2 29.0 mmol/L      Calcium 9.1 mg/dL      eGFR Non African Amer 70 mL/min/1.73      BUN/Creatinine Ratio 9.9     Anion Gap 7.0 mmol/L     Narrative:      GFR Normal >60  Chronic Kidney Disease <60  Kidney Failure <15      Troponin [[id number]]  (Normal) Collected: 01/20/21 0247    Specimen: Blood Updated: 01/20/21 0318     Troponin T <0.010 ng/mL     Narrative:      Troponin T Reference Range:  <= 0.03 ng/mL-   Negative for AMI  >0.03 ng/mL-     Abnormal for myocardial necrosis.  Clinicians would have to utilize clinical acumen, EKG, Troponin and serial changes to determine if it is an Acute Myocardial Infarction or myocardial injury due to an underlying chronic condition.       Results may be falsely decreased if patient taking Biotin.      Magnesium [[id number]]  (Normal) Collected: 01/20/21 0247    Specimen: Blood Updated: 01/20/21 0318     Magnesium 2.1 mg/dL     CBC & Differential [[id number]]  (Abnormal) Collected: 01/20/21 0247    Specimen: Blood Updated:  01/20/21 0253    Narrative:      The following orders were created for panel order CBC & Differential.  Procedure                               Abnormality         Status                     ---------                               -----------         ------                     CBC Auto Differential[[id number]]        Abnormal            Final result                 Please view results for these tests on the individual orders.    CBC Auto Differential [[id number]]  (Abnormal) Collected: 01/20/21 0247    Specimen: Blood Updated: 01/20/21 0253     WBC 5.70 10*3/mm3      RBC 4.44 10*6/mm3      Hemoglobin 11.5 g/dL      Hematocrit 35.1 %      MCV 79.0 fL      MCH 25.9 pg      MCHC 32.8 g/dL      RDW 17.8 %      RDW-SD 49.4 fl      MPV 8.3 fL      Platelets 237 10*3/mm3      Neutrophil % 44.3 %      Lymphocyte % 42.4 %      Monocyte % 9.6 %      Eosinophil % 1.8 %      Basophil % 1.9 %      Neutrophils, Absolute 2.50 10*3/mm3      Lymphocytes, Absolute 2.40 10*3/mm3      Monocytes, Absolute 0.60 10*3/mm3      Eosinophils, Absolute 0.10 10*3/mm3      Basophils, Absolute 0.10 10*3/mm3      nRBC 0.1 /100 WBC     aPTT [[id number]]  (Abnormal) Collected: 01/19/21 2151    Specimen: Blood Updated: 01/19/21 2248     PTT 50.4 seconds     Protime-INR [[id number]]  (Normal) Collected: 01/19/21 1427    Specimen: Blood Updated: 01/19/21 1458     Protime 11.4 Seconds      INR 1.04    aPTT [[id number]]  (Abnormal) Collected: 01/19/21 1427    Specimen: Blood Updated: 01/19/21 1458     PTT 27.4 seconds     CBC & Differential [[id number]]  (Abnormal) Collected: 01/19/21 1427    Specimen: Blood Updated: 01/19/21 1443    Narrative:      The following orders were created for panel order CBC & Differential.  Procedure                               Abnormality         Status                     ---------                               -----------         ------                     CBC Auto Differential[[id number]]        Abnormal            Final  result                 Please view results for these tests on the individual orders.    CBC Auto Differential [[id number]]  (Abnormal) Collected: 01/19/21 1427    Specimen: Blood Updated: 01/19/21 1443     WBC 7.40 10*3/mm3      RBC 4.43 10*6/mm3      Hemoglobin 11.7 g/dL      Hematocrit 35.2 %      MCV 79.5 fL      MCH 26.4 pg      MCHC 33.2 g/dL      RDW 18.0 %      RDW-SD 49.9 fl      MPV 8.1 fL      Platelets 244 10*3/mm3      Neutrophil % 64.2 %      Lymphocyte % 25.5 %      Monocyte % 8.3 %      Eosinophil % 1.0 %      Basophil % 1.0 %      Neutrophils, Absolute 4.80 10*3/mm3      Lymphocytes, Absolute 1.90 10*3/mm3      Monocytes, Absolute 0.60 10*3/mm3      Eosinophils, Absolute 0.10 10*3/mm3      Basophils, Absolute 0.10 10*3/mm3      nRBC 0.0 /100 WBC     Magnesium [[id number]]  (Normal) Collected: 01/19/21 0752    Specimen: Blood Updated: 01/19/21 1022     Magnesium 1.9 mg/dL     Troponin [[id number]]  (Normal) Collected: 01/19/21 0752    Specimen: Blood Updated: 01/19/21 0841     Troponin T <0.010 ng/mL     Narrative:      Troponin T Reference Range:  <= 0.03 ng/mL-   Negative for AMI  >0.03 ng/mL-     Abnormal for myocardial necrosis.  Clinicians would have to utilize clinical acumen, EKG, Troponin and serial changes to determine if it is an Acute Myocardial Infarction or myocardial injury due to an underlying chronic condition.       Results may be falsely decreased if patient taking Biotin.            Imaging Results (Last 24 Hours)     Procedure Component Value Units Date/Time    XR Chest 1 View [[id number]] Collected: 01/19/21 0714     Updated: 01/19/21 0717    Narrative:      DATE OF EXAM:  1/18/2021 11:29 PM     PROCEDURE:  XR CHEST 1 VW-     INDICATIONS:  Chest pain, hypertension, heart disease.      COMPARISON:  8/10/2017.     TECHNIQUE:   Single radiographic view of the chest was obtained.     FINDINGS:  The heart size is normal for technique with postsurgical changes  apparent. The lung volumes  are slightly diminished. There may be slight  pulmonary vascular congestion when compared to the previous exam. There  is no airspace disease.  There is no pleural effusion or pneumothorax.       Impression:      Questionable mild pulmonary vascular congestion.     Electronically Signed By-Malik Slade MD On:1/19/2021 7:15 AM  This report was finalized on [id number] by  Malik Slade MD.      LAB RESULTS (LAST 7 DAYS)    CBC  Results from last 7 days   Lab Units 01/20/21  0247 01/19/21  1427 01/19/21  0300 01/18/21  2355   WBC 10*3/mm3 5.70 7.40 6.50 8.30   RBC 10*6/mm3 4.44 4.43 4.26 4.50   HEMOGLOBIN g/dL 11.5* 11.7* 11.2* 11.8*   HEMATOCRIT % 35.1* 35.2* 33.4* 36.1*   MCV fL 79.0 79.5 78.4* 80.3   PLATELETS 10*3/mm3 237 244 233 274       BMP  Results from last 7 days   Lab Units 01/20/21  0247 01/19/21  0752 01/19/21  0300 01/19/21  0122   SODIUM mmol/L 139  --  133* 130*   POTASSIUM mmol/L 4.3  --  4.2 4.1   CHLORIDE mmol/L 103  --  97* 97*   CO2 mmol/L 29.0  --  24.0 25.0   BUN mg/dL 11  --  15 13   CREATININE mg/dL 1.11  --  0.87 0.93   GLUCOSE mg/dL 117*  --  109* 102*   MAGNESIUM mg/dL 2.1 1.9  --   --        CMP   Results from last 7 days   Lab Units 01/20/21  0247 01/19/21  0300 01/19/21  0122   SODIUM mmol/L 139 133* 130*   POTASSIUM mmol/L 4.3 4.2 4.1   CHLORIDE mmol/L 103 97* 97*   CO2 mmol/L 29.0 24.0 25.0   BUN mg/dL 11 15 13   CREATININE mg/dL 1.11 0.87 0.93   GLUCOSE mg/dL 117* 109* 102*   ALBUMIN g/dL  --   --  3.80   BILIRUBIN mg/dL  --   --  0.2   ALK PHOS U/L  --   --  87   AST (SGOT) U/L  --   --  11   ALT (SGPT) U/L  --   --  7         BNP        TROPONIN  Results from last 7 days   Lab Units 01/20/21  0247   TROPONIN T ng/mL <0.010       CoAg  Results from last 7 days   Lab Units 01/19/21  2151 01/19/21  1427 01/18/21  2355   INR   --  1.04 0.99   APTT seconds 50.4* 27.4* 21.6*       Creatinine Clearance  Estimated Creatinine Clearance: 91.2 mL/min (by C-G formula based on SCr of 1.11  mg/dL).    ABG        Radiology  Xr Chest 1 View    Result Date: 1/19/2021  Questionable mild pulmonary vascular congestion.  Electronically Signed By-Malik Slade MD On:1/19/2021 7:15 AM This report was finalized on [id number] by  Malik Slade MD.              EKG              I personally viewed and interpreted the patient's EKG/Telemetry data:    ECHOCARDIOGRAM:    Results for orders placed during the hospital encounter of 01/14/21   Adult Transthoracic Echo Complete W/ Cont if Necessary Per Protocol    Narrative · Estimated left ventricular EF = 50% Left ventricular systolic function   is normal.     Indications  Chest pain    Technically satisfactory study.  Mitral valve is structurally normal.  Tricuspid valve is structurally normal.  Aortic valve is structurally normal.  Pulmonic valve could not be well visualized.  No evidence for mitral tricuspid or aortic regurgitation is seen by   Doppler study.  Left atrium is normal in size.  Right atrium is normal in size.  Left ventricle is normal in size with mild paradoxical septal motion and   inferior wall hypokinesis with ejection fraction of 50%.  Right ventricle is normal in size.  Atrial septum is intact.  Aorta is normal.  No pericardial effusion or intracardiac thrombus is seen.    Impression  Structurally and functionally normal cardiac valves.  Left ventricular is normal in size and mild paradoxical septal motion is   present with inferior wall hypokinesis with ejection fraction of 50%.           STRESS MYOVIEW:    Cardiolite (Tc-99m Sestamibi) stress test    CARDIAC CATHETERIZATION:            OTHER:         Assessment/Plan     Active Problems:    S/P CABG (coronary artery bypass graft)    Essential hypertension    Chest pain with high risk of acute coronary syndrome        ]]]]]]]]]]]]]]]]]  Impression  ==========  -Chest discomfort suggestive of unstable angina pectoris.  EKG showed no acute changes.  Troponin levels are negative.     Echocardiogram  1/14/2021 revealed mild paradoxical septal motion and inferior wall hypokinesis with ejection fraction of 50%.  Lexiscan Cardiolite test-abnormal with mild posterior and inferior proximal ischemia-1/14/2021     -Status post CABG (2015) (triple-vessel according to patient) at [facility].     -Status post stent placement (details not available)-2014     -Dyslipidemia hypertension COPD PTSD     -Psoriasis     -Family history of coronary artery disease     -Smoker-abstinence from smoking     -Allergic to oxycodone hydrocodone  ==========  Plan  =========  Unstable angina  EKG showed no acute changes.  Troponin levels are negative.  Recent stress Cardiolite test-as above  Recent echocardiogram-as above  Recent labs were reviewed.  9/2/2020-normal CBC normal CMP.  Continue intravenous nitroglycerin.  Patient to have cardiac catheterization and coronary arteriography soon possible.  Recent benefits pros and cons of the procedure were discussed with patient.     Hypertension  Continue metoprolol     Dyslipidemia-on statins     Patient is scheduled to have cardiac catheterization.     Further plan will depend on patient's progress.  ]]]]]]]]]]]]]]     Cardiac catheterization  Left ventricle size and contractility is normal with ejection fraction of 60%.    Left main coronary artery is normal.  Left anterior descending artery provided a moderate sized diagonal branch and left anterior descending artery has 80% disease proximal to the LIMA landing site.  Circumflex coronary artery is a moderately large vessel that provided a large marginal branch.  Circumflex coronary artery has 60 to 70% disease just distal to the marginal branch.  Ramus intermedius is a relatively small caliber vessel that has ostial 80 to 90% disease.  Right coronary artery is a large and dominant vessel.  Left ventricular branch has 60 to 70% disease at its ostium.    LIMA to left anterior descending artery is very small and atretic  with probably 90 to 95% disease just proximal to the insertion site.  SVG to diagonal branch is patent.  SVG to presumably ramus intermedius is totally occluded in the proximal segment.    RECOMMENDATIONS:  Patient has symptoms of unstable angina pectoris.  2 out of 3 grafts are patent.  However patent LIMA is extremely small in caliber and atraumatic with significant disease near the landing site.  Difficult to tell the source of patient's symptoms but likely from left anterior descending artery.  Patient to have intervention to left antidescending artery distal to the diagonal branch and proximal to the LIMA landing site.  Other source of pain could be from circumflex coronary artery left ventricular branch of RCA and ramus intermedius.  Suggest maximize medical treatment for disease in these vessels.  Modification of risk factors.    Jaimie Silva MD  01/20/21  06:18 EST

## 2021-01-20 NOTE — CONSULTS
Pt seen and given Cardiac Rehab brochure. Also given CIG, and handouts on HTN, HHD, Stress, Triglycerides, Cholesterol, and Anti platelets. Pt very drowsy. Ed materials left at bedside.

## 2021-01-20 NOTE — PROGRESS NOTES
"      [facility] Daily Progress Note      Hospitalist Team  LOS 0 days      Patient Care Team:  Brittni Shell MD as PCP - General (Family Medicine)  Jaimie Silva MD as Consulting Physician (Cardiology)    Patient Location: 2105/1      Subjective   Subjective     Chief Complaint / Subjective  Chief Complaint   Patient presents with   • Chest Pain         Brief Synopsis of Hospital Course/HPI    The patient is a 50 years old male with history of CAD s/p CABG x3, COPD, PTSD, psoriasis and chronic pain.     The patient experienced intermittent left-sided chest pain that radiated to the left lateral wall while sitting on the couch in the evening of 1/18/2021.  Chest pain was relieved with nitroglycerin and currently is on nitroglycerin drip.     The patient denied fevers, chills, sweats, nausea, vomiting, abdominal pain, constipation or diarrhea         Date::    1/20/21: Chest pain resolved s/p LHC with EBENEZER to LAD      Review of Systems   All other systems reviewed and are negative.        Objective   Objective      Vital Signs  Temp:  [97 °F (36.1 °C)-97.7 °F (36.5 °C)] 97.7 °F (36.5 °C)  Heart Rate:  [63-84] 65  Resp:  [12-20] 12  BP: ()/(56-83) 114/69  Oxygen Therapy  SpO2: 93 %  Pulse Oximetry Type: Continuous  Device (Oxygen Therapy): nasal cannula  Device (Oxygen Therapy): nasal cannula  Flow (L/min): 2  Flowsheet Rows      First Filed Value   Admission Height  188 cm (74\") Documented at 01/18/2021 2302   Admission Weight  80.7 kg (178 lb) Documented at 01/18/2021 2302        Intake & Output (last 3 days)       01/17 0701 - 01/18 0700 01/18 0701 - 01/19 0700 01/19 0701 - 01/20 0700 01/20 0701 - 01/21 0700    Urine (mL/kg/hr)  600 1100 (0.6) 1000 (1.8)    Total Output  600 1100 1000    Net  -600 -1100 -1000            Urine Unmeasured Occurrence   1 x         Lines, Drains & Airways    Active LDAs     Name:   Placement date:   Placement time:   Site:   Days: "    Peripheral IV 01/18/21 2308 Right Antecubital   01/18/21    2308    Antecubital   1    Arterial Sheath 6 Fr. Right Femoral   01/20/21    0956    Femoral   less than 1                  Physical Exam:    Physical Exam  HENT:      Head: Normocephalic.      Mouth/Throat:      Mouth: Mucous membranes are moist.   Eyes:      General: No scleral icterus.     Extraocular Movements: Extraocular movements intact.      Pupils: Pupils are equal, round, and reactive to light.   Neck:      Musculoskeletal: Normal range of motion.   Cardiovascular:      Rate and Rhythm: Normal rate and regular rhythm.   Pulmonary:      Effort: Pulmonary effort is normal.      Breath sounds: Normal breath sounds.   Abdominal:      General: Bowel sounds are normal.      Palpations: Abdomen is soft.   Musculoskeletal: Normal range of motion.   Skin:     General: Skin is warm and dry.   Neurological:      Mental Status: He is alert and oriented to person, place, and time. Mental status is at baseline.   Psychiatric:         Mood and Affect: Mood normal.               Procedures:    Procedure(s):  Left Heart Cath and coronary angiogram  Saphenous Vein Graft  Stent EBENEZER coronary          Results Review:     I reviewed the patient's new clinical results.      Lab Results (last 24 hours)     Procedure Component Value Units Date/Time    POC Activated Clotting Time [[id number]]  (Abnormal) Collected: 01/20/21 1229    Specimen: Arterial Blood Updated: 01/20/21 1244     Activated Clotting Time  180 Seconds      Comment: Serial Number: [id number]:  [id number]       POC Activated Clotting Time [[id number]]  (Abnormal) Collected: 01/20/21 1037    Specimen: Blood Updated: 01/20/21 1044     Activated Clotting Time  180 Seconds      Comment: Serial Number: [id number]:  [id number]       POC Activated Clotting Time [[id number]]  (Normal) Collected: 01/20/21 0958    Specimen: Arterial Blood Updated: 01/20/21 1044     Activated Clotting Time  136 Seconds      Comment:  Serial Number: [id number]:  [id number]       Troponin [[id number]]  (Normal) Collected: 01/20/21 0802    Specimen: Blood Updated: 01/20/21 0827     Troponin T <0.010 ng/mL     Narrative:      Troponin T Reference Range:  <= 0.03 ng/mL-   Negative for AMI  >0.03 ng/mL-     Abnormal for myocardial necrosis.  Clinicians would have to utilize clinical acumen, EKG, Troponin and serial changes to determine if it is an Acute Myocardial Infarction or myocardial injury due to an underlying chronic condition.       Results may be falsely decreased if patient taking Biotin.      aPTT [[id number]]  (Abnormal) Collected: 01/20/21 0635    Specimen: Blood from Arm, Left Updated: 01/20/21 0650     PTT 59.1 seconds     Protime-INR [[id number]]  (Normal) Collected: 01/20/21 0635    Specimen: Blood Updated: 01/20/21 0649     Protime 11.4 Seconds      INR 1.04    Basic Metabolic Panel [[id number]]  (Abnormal) Collected: 01/20/21 0247    Specimen: Blood Updated: 01/20/21 0318     Glucose 117 mg/dL      BUN 11 mg/dL      Creatinine 1.11 mg/dL      Sodium 139 mmol/L      Potassium 4.3 mmol/L      Chloride 103 mmol/L      CO2 29.0 mmol/L      Calcium 9.1 mg/dL      eGFR Non African Amer 70 mL/min/1.73      BUN/Creatinine Ratio 9.9     Anion Gap 7.0 mmol/L     Narrative:      GFR Normal >60  Chronic Kidney Disease <60  Kidney Failure <15      Troponin [[id number]]  (Normal) Collected: 01/20/21 0247    Specimen: Blood Updated: 01/20/21 0318     Troponin T <0.010 ng/mL     Narrative:      Troponin T Reference Range:  <= 0.03 ng/mL-   Negative for AMI  >0.03 ng/mL-     Abnormal for myocardial necrosis.  Clinicians would have to utilize clinical acumen, EKG, Troponin and serial changes to determine if it is an Acute Myocardial Infarction or myocardial injury due to an underlying chronic condition.       Results may be falsely decreased if patient taking Biotin.      Magnesium [[id number]]  (Normal) Collected: 01/20/21 0247    Specimen: Blood Updated:  01/20/21 0318     Magnesium 2.1 mg/dL     CBC & Differential [[id number]]  (Abnormal) Collected: 01/20/21 0247    Specimen: Blood Updated: 01/20/21 0253    Narrative:      The following orders were created for panel order CBC & Differential.  Procedure                               Abnormality         Status                     ---------                               -----------         ------                     CBC Auto Differential[[id number]]        Abnormal            Final result                 Please view results for these tests on the individual orders.    CBC Auto Differential [[id number]]  (Abnormal) Collected: 01/20/21 0247    Specimen: Blood Updated: 01/20/21 0253     WBC 5.70 10*3/mm3      RBC 4.44 10*6/mm3      Hemoglobin 11.5 g/dL      Hematocrit 35.1 %      MCV 79.0 fL      MCH 25.9 pg      MCHC 32.8 g/dL      RDW 17.8 %      RDW-SD 49.4 fl      MPV 8.3 fL      Platelets 237 10*3/mm3      Neutrophil % 44.3 %      Lymphocyte % 42.4 %      Monocyte % 9.6 %      Eosinophil % 1.8 %      Basophil % 1.9 %      Neutrophils, Absolute 2.50 10*3/mm3      Lymphocytes, Absolute 2.40 10*3/mm3      Monocytes, Absolute 0.60 10*3/mm3      Eosinophils, Absolute 0.10 10*3/mm3      Basophils, Absolute 0.10 10*3/mm3      nRBC 0.1 /100 WBC     aPTT [[id number]]  (Abnormal) Collected: 01/19/21 2151    Specimen: Blood Updated: 01/19/21 2248     PTT 50.4 seconds     Protime-INR [[id number]]  (Normal) Collected: 01/19/21 1427    Specimen: Blood Updated: 01/19/21 1458     Protime 11.4 Seconds      INR 1.04    aPTT [[id number]]  (Abnormal) Collected: 01/19/21 1427    Specimen: Blood Updated: 01/19/21 1458     PTT 27.4 seconds     CBC & Differential [[id number]]  (Abnormal) Collected: 01/19/21 1427    Specimen: Blood Updated: 01/19/21 1443    Narrative:      The following orders were created for panel order CBC & Differential.  Procedure                               Abnormality         Status                     ---------                                -----------         ------                     CBC Auto Differential[[id number]]        Abnormal            Final result                 Please view results for these tests on the individual orders.    CBC Auto Differential [[id number]]  (Abnormal) Collected: 01/19/21 1427    Specimen: Blood Updated: 01/19/21 1443     WBC 7.40 10*3/mm3      RBC 4.43 10*6/mm3      Hemoglobin 11.7 g/dL      Hematocrit 35.2 %      MCV 79.5 fL      MCH 26.4 pg      MCHC 33.2 g/dL      RDW 18.0 %      RDW-SD 49.9 fl      MPV 8.1 fL      Platelets 244 10*3/mm3      Neutrophil % 64.2 %      Lymphocyte % 25.5 %      Monocyte % 8.3 %      Eosinophil % 1.0 %      Basophil % 1.0 %      Neutrophils, Absolute 4.80 10*3/mm3      Lymphocytes, Absolute 1.90 10*3/mm3      Monocytes, Absolute 0.60 10*3/mm3      Eosinophils, Absolute 0.10 10*3/mm3      Basophils, Absolute 0.10 10*3/mm3      nRBC 0.0 /100 WBC         No results found for: HGBA1C  Results from last 7 days   Lab Units 01/20/21  0635 01/19/21  1427 01/18/21  2355   INR  1.04 1.04 0.99           No results found for: LIPASE  Lab Results   Component Value Date    CHOL 111 03/04/2020    TRIG 94 03/04/2020    HDL 38 (L) 03/04/2020    LDL 54 03/04/2020       No results found for: INTRAOP, PREDX, FINALDX, COMDX    Microbiology Results (last 10 days)     Procedure Component Value - Date/Time    COVID-19,[facility] IN-HOUSE,Nasal Swab No Transport Media 3-4 HR TAT - Swab, Nasal Cavity [[id number]]  (Normal) Collected: 01/19/21 0010    Lab Status: Final result Specimen: Swab from Nasal Cavity Updated: 01/19/21 0057     COVID19 Not Detected    Narrative:      Fact sheet for providers: https://www.fda.gov/media/578613/download     Fact sheet for patients: https://www.fda.gov/media/614795/download    Test performed by PCR.    Consider negative results in combination with clinical observations, patient history, and epidemiological information.          ECG/EMG Results (most recent)      Procedure Component Value Units Date/Time    ECG 12 Lead [[id number]] Collected: 01/19/21 1007     Updated: 01/19/21 1012     QT Interval 369 ms     Narrative:      HEART RATE= 75  bpm  RR Interval= 796  ms  MS Interval= 166  ms  P Horizontal Axis= 47  deg  P Front Axis= 40  deg  QRSD Interval= 88  ms  QT Interval= 369  ms  QRS Axis= -14  deg  T Wave Axis= 15  deg  - NORMAL ECG -  Sinus rhythm  When compared with ECG of 18-Jan-2021 22:59:56,  No significant change  Electronically Signed By:   Date and Time of Study: 2021-01-19 10:07:35    ECG 12 Lead [[id number]] Collected: 01/18/21 2259     Updated: 01/19/21 1626     QT Interval 371 ms     Narrative:      HEART RATE= 82  bpm  RR Interval= 728  ms  MS Interval= 157  ms  P Horizontal Axis= 47  deg  P Front Axis= 53  deg  QRSD Interval= 99  ms  QT Interval= 371  ms  QRS Axis= 2  deg  T Wave Axis= 25  deg  - NORMAL ECG -  Sinus rhythm  No previous ECG available for comparison  Electronically Signed By: Dmitry Bazzi (Anshu) 19-Jan-2021 16:17:30  Date and Time of Study: 2021-01-18 22:59:56               Results for orders placed during the hospital encounter of 01/14/21   Adult Transthoracic Echo Complete W/ Cont if Necessary Per Protocol    Narrative · Estimated left ventricular EF = 50% Left ventricular systolic function   is normal.     Indications  Chest pain    Technically satisfactory study.  Mitral valve is structurally normal.  Tricuspid valve is structurally normal.  Aortic valve is structurally normal.  Pulmonic valve could not be well visualized.  No evidence for mitral tricuspid or aortic regurgitation is seen by   Doppler study.  Left atrium is normal in size.  Right atrium is normal in size.  Left ventricle is normal in size with mild paradoxical septal motion and   inferior wall hypokinesis with ejection fraction of 50%.  Right ventricle is normal in size.  Atrial septum is intact.  Aorta is normal.  No pericardial effusion or intracardiac thrombus is  seen.    Impression  Structurally and functionally normal cardiac valves.  Left ventricular is normal in size and mild paradoxical septal motion is   present with inferior wall hypokinesis with ejection fraction of 50%.       Xr Chest 1 View    Result Date: 1/19/2021  Questionable mild pulmonary vascular congestion.  Electronically Signed By-Malik Slade MD On:1/19/2021 7:15 AM This report was finalized on [id number] by  Malik Slade MD.          Xrays, labs reviewed personally by physician.    Medication Review:   I have reviewed the patient's current medication list      Scheduled Meds  amitriptyline, 75 mg, Oral, Nightly  ARIPiprazole, 5 mg, Oral, Nightly  aspirin, 81 mg, Oral, Daily  atorvastatin, 20 mg, Oral, Nightly  buPROPion XL, 150 mg, Oral, Daily  [START ON 1/21/2021] clopidogrel, 75 mg, Oral, Daily  cyclobenzaprine, 10 mg, Oral, Nightly  metoprolol tartrate, 25 mg, Oral, BID  montelukast, 10 mg, Oral, Nightly  OXcarbazepine, 150 mg, Oral, Q12H  pantoprazole, 40 mg, Oral, QAM  pregabalin, 200 mg, Oral, Q8H  sodium chloride, 10 mL, Intravenous, Q12H  terazosin, 5 mg, Oral, Nightly        Meds Infusions       Meds PRN  •  acetaminophen **OR** acetaminophen **OR** acetaminophen  •  aluminum-magnesium hydroxide-simethicone  •  atropine  •  butalbital-acetaminophen-caffeine  •  magnesium sulfate **OR** magnesium sulfate in D5W 1g/100mL (PREMIX)  •  melatonin  •  Morphine  •  nitroglycerin  •  ondansetron **OR** ondansetron  •  potassium chloride  •  sodium chloride  •  sodium chloride        Assessment/Plan   Assessment/Plan     Active Hospital Problems    Diagnosis  POA   • **Unstable angina (CMS/HCC) [I20.0]  Yes     Priority: High   • Chest pain with high risk of acute coronary syndrome [R07.9]  Yes     Priority: High   • S/P CABG (coronary artery bypass graft) [Z95.1]  Not Applicable     Priority: Medium   • Status post angioplasty with stent [Z95.820]  Not Applicable     Priority: Medium   •  Gastroesophageal reflux disease without esophagitis [K21.9]  Yes     Priority: Medium   • Status post coronary artery bypass graft [Z95.1]  Not Applicable     Priority: Medium   • Essential hypertension [I10]  Yes     Priority: Medium   • Posttraumatic stress disorder [F43.10]  Yes     Priority: Medium   • Mixed hyperlipidemia [E78.2]  Yes     Priority: Medium   • Psoriasis [L40.9]  Yes     Priority: Medium      Resolved Hospital Problems   No resolved problems to display.       MEDICAL DECISION MAKING COMPLEXITY BY PROBLEM:     1.  Unstable angina:  -Acute MI ruled out with cardiac enzymes   -Cardiology, consulted  -s/p LHC 1/20/21-->s/p EBENEZER to RCA.     2. CAD s/p CABG x3 (2015):  -On aspirin, Lipitor, metoprolol at home  -Stress test 1/12/2021  -TTE 1/14/2021--> LVEF 50%     3.  COPD:  -Not in exacerbation     4.  Anxiety/PTSD:  -Continue Abilify     VTE Prophylaxis -   Mechanical Order History:      Ordered        01/19/21 0242  Place Sequential Compression Device  Once         01/19/21 0242  Maintain Sequential Compression Device  Continuous                 Pharmalogical Order History:      Ordered     Dose Route Frequency Stop    01/20/21 1020  heparin (porcine) injection  Status:  Discontinued      -- -- As Needed 01/20/21 1045    01/19/21 1420  heparin bolus from bag 4,800 Units      60 Units/kg IV Once 01/19/21 1520    01/19/21 1420  heparin 49585 units/250 mL (100 units/mL) in 0.45 % NaCl infusion  9.68 mL/hr,   Status:  Discontinued      12 Units/kg/hr IV Titrated 01/20/21 1106    01/19/21 1420  heparin bolus from bag 2,400 Units  Status:  Discontinued      30 Units/kg IV Every 6 Hours PRN 01/20/21 1106    01/19/21 1420  heparin bolus from bag 4,800 Units  Status:  Discontinued      60 Units/kg IV Every 6 Hours PRN 01/20/21 1106    01/19/21 0218  enoxaparin (LOVENOX) syringe 80 mg      1 mg/kg SC Once 01/19/21 0646                  Code Status -   Code Status and Medical Interventions:   Ordered at:  01/19/21 0234     Code Status:    CPR     Medical Interventions (Level of Support Prior to Arrest):    Full       This patient has been examined wearing appropriate Personal Protective Equipment and discussed with nursing. 01/20/21        Discharge Planning  defer        Electronically signed by Bebeto Iraheta DO, 01/20/21, 13:45 EST.  [facility] Hospitalist Team

## 2021-01-20 NOTE — PLAN OF CARE
"Goal Outcome Evaluation:  Plan of Care Reviewed With: patient     Outcome Summary: pt looking forward to \"feeling better\" after procedure Nitro gtt and heparin gtt continue to infuse  "

## 2021-01-20 NOTE — PLAN OF CARE
Pt had heart cath at beginning of shift. Stent placed in LAD. No complications when pulling sheath or after. Pt states chest pain is resolved. Will cont to monitor.             Problem: Adult Inpatient Plan of Care  Goal: Plan of Care Review  Outcome: Ongoing, Progressing  Goal: Patient-Specific Goal (Individualized)  Outcome: Ongoing, Progressing  Goal: Absence of Hospital-Acquired Illness or Injury  Outcome: Ongoing, Progressing  Intervention: Identify and Manage Fall Risk  Recent Flowsheet Documentation  Taken 1/20/2021 1800 by Helga Canchola RN  Safety Promotion/Fall Prevention:   safety round/check completed   clutter free environment maintained  Taken 1/20/2021 1730 by Helga Canchola RN  Safety Promotion/Fall Prevention:   safety round/check completed   clutter free environment maintained  Taken 1/20/2021 1630 by Helga Canchola RN  Safety Promotion/Fall Prevention: safety round/check completed  Taken 1/20/2021 1605 by Helga Canchola RN  Safety Promotion/Fall Prevention:   safety round/check completed   clutter free environment maintained  Taken 1/20/2021 1600 by Helga Canchola RN  Safety Promotion/Fall Prevention: safety round/check completed  Taken 1/20/2021 1515 by Helga Canchola RN  Safety Promotion/Fall Prevention: safety round/check completed  Taken 1/20/2021 1500 by Helga Canchola RN  Safety Promotion/Fall Prevention: safety round/check completed  Taken 1/20/2021 1445 by Helga Canchola RN  Safety Promotion/Fall Prevention: safety round/check completed  Taken 1/20/2021 1430 by Helga Canchola RN  Safety Promotion/Fall Prevention: safety round/check completed  Taken 1/20/2021 1418 by Helga Canchola RN  Safety Promotion/Fall Prevention: safety round/check completed  Taken 1/20/2021 1335 by Helga Canchola RN  Safety Promotion/Fall Prevention: safety round/check completed  Taken 1/20/2021 1320 by Helga Canchola RN  Safety Promotion/Fall Prevention: safety round/check completed  Taken 1/20/2021 1305  by Canchola, Helga, RN  Safety Promotion/Fall Prevention: safety round/check completed  Taken 1/20/2021 1250 by Helga Canchola RN  Safety Promotion/Fall Prevention: safety round/check completed  Taken 1/20/2021 1235 by Helga Canchola RN  Safety Promotion/Fall Prevention: safety round/check completed  Taken 1/20/2021 1220 by Helga Canchola RN  Safety Promotion/Fall Prevention: safety round/check completed  Taken 1/20/2021 1205 by Helga Canchola RN  Safety Promotion/Fall Prevention: safety round/check completed  Taken 1/20/2021 1150 by Helga Canchola RN  Safety Promotion/Fall Prevention: safety round/check completed  Taken 1/20/2021 1135 by Helga Canchola RN  Safety Promotion/Fall Prevention: safety round/check completed  Taken 1/20/2021 1120 by Helga Canchola RN  Safety Promotion/Fall Prevention: safety round/check completed  Taken 1/20/2021 1105 by Helga Canchola RN  Safety Promotion/Fall Prevention: safety round/check completed  Taken 1/20/2021 1050 by Helga Canchola RN  Safety Promotion/Fall Prevention: safety round/check completed  Taken 1/20/2021 0925 by Helga Canchola RN  Safety Promotion/Fall Prevention: (cath lab) patient off unit  Taken 1/20/2021 0750 by Helga Canchola RN  Safety Promotion/Fall Prevention:   safety round/check completed   room organization consistent   clutter free environment maintained   assistive device/personal items within reach  Intervention: Prevent Skin Injury  Recent Flowsheet Documentation  Taken 1/20/2021 1800 by Helga Canchola RN  Body Position: supine  Taken 1/20/2021 1630 by Helga Canchola RN  Body Position: supine  Taken 1/20/2021 1605 by Helga Canchola RN  Body Position: supine  Taken 1/20/2021 1600 by Helga Canchola RN  Body Position: supine  Taken 1/20/2021 1515 by Helga Canchola RN  Body Position: supine  Taken 1/20/2021 1500 by Helga Canchola RN  Body Position: supine  Taken 1/20/2021 1445 by Helga Canchola, RN  Body Position: supine  Taken 1/20/2021 1430  by Canchola, Helga, RN  Body Position: supine  Taken 1/20/2021 1335 by Helag Canchola RN  Body Position: supine  Taken 1/20/2021 1320 by Helga Canchola RN  Body Position: supine  Taken 1/20/2021 1305 by Helga Canchola RN  Body Position: supine  Taken 1/20/2021 1250 by Helga Canchola RN  Body Position: supine  Taken 1/20/2021 1235 by Helga Canchola RN  Body Position: supine  Taken 1/20/2021 1220 by Helga Canchola RN  Body Position: supine  Taken 1/20/2021 1205 by Helga Canchola RN  Body Position: supine  Taken 1/20/2021 1150 by Helga Canchola RN  Body Position: supine  Taken 1/20/2021 1135 by Helga Canchola RN  Body Position: supine  Taken 1/20/2021 1120 by Helga Canchola RN  Body Position: supine  Taken 1/20/2021 1105 by Helga Canchola RN  Body Position: supine  Taken 1/20/2021 1050 by Helga Canchola RN  Body Position: supine  Taken 1/20/2021 0750 by Helga Canchola RN  Body Position: position changed independently  Intervention: Prevent Infection  Recent Flowsheet Documentation  Taken 1/20/2021 1605 by Helga Canchola RN  Infection Prevention: personal protective equipment utilized  Taken 1/20/2021 1205 by Helga Canchola RN  Infection Prevention: personal protective equipment utilized  Taken 1/20/2021 0750 by Helga Canchola RN  Infection Prevention: personal protective equipment utilized  Goal: Optimal Comfort and Wellbeing  Outcome: Ongoing, Progressing  Intervention: Provide Person-Centered Care  Recent Flowsheet Documentation  Taken 1/20/2021 1205 by Helga Canchola RN  Trust Relationship/Rapport: care explained  Taken 1/20/2021 0750 by Helga Canchola RN  Trust Relationship/Rapport: (conset reviewed and signed)   care explained   choices provided   questions answered  Goal: Readiness for Transition of Care  Outcome: Ongoing, Progressing     Problem: Chest Pain  Goal: Resolution of Chest Pain Symptoms  Outcome: Ongoing, Progressing  Intervention: Manage Acute Chest Pain  Recent Flowsheet  Documentation  Taken 1/20/2021 0750 by Helga Canchola, RN  Chest Pain Intervention: (nitro drip) see MAR   Goal Outcome Evaluation:  Plan of Care Reviewed With: patient

## 2021-01-20 NOTE — PROGRESS NOTES
Discharge Planning Assessment   [place]     Patient Name: Frank Barger  MRN: [id number]  Today's Date: 1/20/2021    Admit Date: 1/18/2021    Discharge Needs Assessment     Row Name 01/20/21 1240       Living Environment    Lives With  spouse    Current Living Arrangements  home/apartment/condo    Quality of Family Relationships  supportive    Able to Return to Prior Arrangements  yes       Resource/Environmental Concerns    Transportation Concerns  car, none       Transition Planning    Patient/Family Anticipates Transition to  home    Patient/Family Anticipated Services at Transition  none    Transportation Anticipated  car, drives self       Discharge Needs Assessment    Readmission Within the Last 30 Days  no previous admission in last 30 days    Equipment Currently Used at Home  none        Discharge Plan     Row Name 01/20/21 1240       Plan    Plan  D/C Plan :  Home with spouse.    Patient/Family in Agreement with Plan  yes    Plan Comments  Pt was admitted for a chest pain and went for a cath and got a stent .        Continued Care and Services - Admitted Since 1/18/2021    Coordination has not been started for this encounter.       Expected Discharge Date and Time     Expected Discharge Date Expected Discharge Time    Jan 21, 2021         Demographic Summary     Row Name 01/20/21 1240       General Information    Admission Type  observation    Arrived From  emergency department    Required Notices Provided  Observation Status Notice    Preferred Language  English     Used During This Interaction  no        Functional Status     Row Name 01/20/21 1240       Functional Status    Usual Activity Tolerance  good    Current Activity Tolerance  good       Mental Status    General Appearance WDL  WDL       Mental Status Summary    Recent Changes in Mental Status/Cognitive Functioning  no changes                Leigh Savage RN

## 2021-01-21 ENCOUNTER — TELEPHONE (OUTPATIENT)
Dept: CARDIAC REHAB | Facility: HOSPITAL | Age: 51
End: 2021-01-21

## 2021-01-21 ENCOUNTER — PATIENT MESSAGE (OUTPATIENT)
Dept: FAMILY MEDICINE CLINIC | Facility: CLINIC | Age: 51
End: 2021-01-21

## 2021-01-21 ENCOUNTER — READMISSION MANAGEMENT (OUTPATIENT)
Dept: CALL CENTER | Facility: HOSPITAL | Age: 51
End: 2021-01-21

## 2021-01-21 ENCOUNTER — TRANSCRIBE ORDERS (OUTPATIENT)
Dept: CARDIAC REHAB | Facility: HOSPITAL | Age: 51
End: 2021-01-21

## 2021-01-21 VITALS
DIASTOLIC BLOOD PRESSURE: 83 MMHG | HEART RATE: 88 BPM | WEIGHT: 174.16 LBS | SYSTOLIC BLOOD PRESSURE: 130 MMHG | OXYGEN SATURATION: 97 % | HEIGHT: 74 IN | BODY MASS INDEX: 22.35 KG/M2 | TEMPERATURE: 98.2 F | RESPIRATION RATE: 18 BRPM

## 2021-01-21 DIAGNOSIS — Z95.5 STATUS POST INSERTION OF DRUG ELUTING CORONARY ARTERY STENT: Primary | ICD-10-CM

## 2021-01-21 PROBLEM — I20.0 UNSTABLE ANGINA (HCC): Status: RESOLVED | Noted: 2021-01-20 | Resolved: 2021-01-21

## 2021-01-21 LAB
ANION GAP SERPL CALCULATED.3IONS-SCNC: 8 MMOL/L (ref 5–15)
APTT PPP: 23.8 SECONDS (ref 61–76.5)
BASOPHILS # BLD AUTO: 0.1 10*3/MM3 (ref 0–0.2)
BASOPHILS NFR BLD AUTO: 1.2 % (ref 0–1.5)
BUN SERPL-MCNC: 12 MG/DL (ref 6–20)
BUN/CREAT SERPL: 10.9 (ref 7–25)
CALCIUM SPEC-SCNC: 8.6 MG/DL (ref 8.6–10.5)
CHLORIDE SERPL-SCNC: 101 MMOL/L (ref 98–107)
CHOLEST SERPL-MCNC: 112 MG/DL (ref 0–200)
CO2 SERPL-SCNC: 25 MMOL/L (ref 22–29)
CREAT SERPL-MCNC: 1.1 MG/DL (ref 0.76–1.27)
DEPRECATED RDW RBC AUTO: 49.4 FL (ref 37–54)
EOSINOPHIL # BLD AUTO: 0.1 10*3/MM3 (ref 0–0.4)
EOSINOPHIL NFR BLD AUTO: 1 % (ref 0.3–6.2)
ERYTHROCYTE [DISTWIDTH] IN BLOOD BY AUTOMATED COUNT: 17.8 % (ref 12.3–15.4)
GFR SERPL CREATININE-BSD FRML MDRD: 71 ML/MIN/1.73
GLUCOSE SERPL-MCNC: 109 MG/DL (ref 65–99)
HCT VFR BLD AUTO: 32.5 % (ref 37.5–51)
HDLC SERPL-MCNC: 25 MG/DL (ref 40–60)
HGB BLD-MCNC: 10.8 G/DL (ref 13–17.7)
LDLC SERPL CALC-MCNC: 24 MG/DL (ref 0–100)
LDLC/HDLC SERPL: -0.2 {RATIO}
LYMPHOCYTES # BLD AUTO: 1.4 10*3/MM3 (ref 0.7–3.1)
LYMPHOCYTES NFR BLD AUTO: 22.5 % (ref 19.6–45.3)
MAGNESIUM SERPL-MCNC: 1.9 MG/DL (ref 1.6–2.6)
MCH RBC QN AUTO: 26.2 PG (ref 26.6–33)
MCHC RBC AUTO-ENTMCNC: 33.2 G/DL (ref 31.5–35.7)
MCV RBC AUTO: 78.9 FL (ref 79–97)
MONOCYTES # BLD AUTO: 0.6 10*3/MM3 (ref 0.1–0.9)
MONOCYTES NFR BLD AUTO: 10.1 % (ref 5–12)
NEUTROPHILS NFR BLD AUTO: 4.1 10*3/MM3 (ref 1.7–7)
NEUTROPHILS NFR BLD AUTO: 65.2 % (ref 42.7–76)
NRBC BLD AUTO-RTO: 0.2 /100 WBC (ref 0–0.2)
PLATELET # BLD AUTO: 217 10*3/MM3 (ref 140–450)
PMV BLD AUTO: 8.5 FL (ref 6–12)
POTASSIUM SERPL-SCNC: 3.7 MMOL/L (ref 3.5–5.2)
QT INTERVAL: 363 MS
RBC # BLD AUTO: 4.12 10*6/MM3 (ref 4.14–5.8)
SODIUM SERPL-SCNC: 134 MMOL/L (ref 136–145)
TRIGL SERPL-MCNC: 460 MG/DL (ref 0–150)
TROPONIN T SERPL-MCNC: <0.01 NG/ML (ref 0–0.03)
VLDLC SERPL-MCNC: 63 MG/DL (ref 5–40)
WBC # BLD AUTO: 6.3 10*3/MM3 (ref 3.4–10.8)

## 2021-01-21 PROCEDURE — 99238 HOSP IP/OBS DSCHRG MGMT 30/<: CPT | Performed by: HOSPITALIST

## 2021-01-21 PROCEDURE — 85730 THROMBOPLASTIN TIME PARTIAL: CPT | Performed by: INTERNAL MEDICINE

## 2021-01-21 PROCEDURE — 83735 ASSAY OF MAGNESIUM: CPT | Performed by: INTERNAL MEDICINE

## 2021-01-21 PROCEDURE — 84484 ASSAY OF TROPONIN QUANT: CPT | Performed by: PHYSICIAN ASSISTANT

## 2021-01-21 PROCEDURE — 80048 BASIC METABOLIC PNL TOTAL CA: CPT | Performed by: INTERNAL MEDICINE

## 2021-01-21 PROCEDURE — 80061 LIPID PANEL: CPT | Performed by: INTERNAL MEDICINE

## 2021-01-21 PROCEDURE — 99232 SBSQ HOSP IP/OBS MODERATE 35: CPT | Performed by: INTERNAL MEDICINE

## 2021-01-21 PROCEDURE — 93005 ELECTROCARDIOGRAM TRACING: CPT | Performed by: INTERNAL MEDICINE

## 2021-01-21 PROCEDURE — 85025 COMPLETE CBC W/AUTO DIFF WBC: CPT | Performed by: HOSPITALIST

## 2021-01-21 RX ORDER — CLOPIDOGREL BISULFATE 75 MG/1
75 TABLET ORAL DAILY
Qty: 30 TABLET | Refills: 0 | Status: SHIPPED | OUTPATIENT
Start: 2021-01-21 | End: 2021-02-15 | Stop reason: SDUPTHER

## 2021-01-21 RX ORDER — NITROGLYCERIN 0.4 MG/1
TABLET SUBLINGUAL
Qty: 25 TABLET | Refills: 0 | Status: SHIPPED | OUTPATIENT
Start: 2021-01-21 | End: 2021-03-18 | Stop reason: SDUPTHER

## 2021-01-21 RX ADMIN — Medication 10 ML: at 07:47

## 2021-01-21 RX ADMIN — CLOPIDOGREL BISULFATE 75 MG: 75 TABLET ORAL at 07:46

## 2021-01-21 RX ADMIN — METOPROLOL TARTRATE 25 MG: 25 TABLET, FILM COATED ORAL at 07:46

## 2021-01-21 RX ADMIN — OXCARBAZEPINE 150 MG: 150 TABLET, FILM COATED ORAL at 07:46

## 2021-01-21 RX ADMIN — PREGABALIN 200 MG: 100 CAPSULE ORAL at 05:22

## 2021-01-21 RX ADMIN — ASPIRIN 81 MG: 81 TABLET, COATED ORAL at 07:47

## 2021-01-21 RX ADMIN — BUPROPION HYDROCHLORIDE 150 MG: 150 TABLET, EXTENDED RELEASE ORAL at 07:47

## 2021-01-21 RX ADMIN — PANTOPRAZOLE SODIUM 40 MG: 40 TABLET, DELAYED RELEASE ORAL at 05:22

## 2021-01-21 NOTE — PROGRESS NOTES
Referring Provider: Bebeto Iraheta,*    Reason for follow-up:  Unstable angina  Status post CABG     Patient Care Team:  Brittni Shell MD as PCP - General (Family Medicine)  Jaimie Silva MD as Consulting Physician (Cardiology)    Subjective .  Feeling better     ROS    Since I have last seen, the patient has been without any chest discomfort ,shortness of breath, palpitations, dizziness or syncope.  Denies having any headache ,abdominal pain ,nausea, vomiting , diarrhea constipation, loss of weight or loss of appetite.  Denies having any excessive bruising ,hematuria or blood in the stool.    Review of all systems negative except as indicated    History  Past Medical History:   Diagnosis Date   • Allergies     takes allergy injections   • Anxiety    • Arthritis    • Asthma    • Back pain    • Depression    • Emphysema of lung (CMS/HCC)    • Headache    • Leg pain        Past Surgical History:   Procedure Laterality Date   • CORONARY ANGIOPLASTY WITH STENT PLACEMENT  04/2014   • CORONARY ARTERY BYPASS GRAFT  12/2015       Family History   Problem Relation Age of Onset   • Diabetes Mother    • Heart disease Father    • Anxiety disorder Daughter    • Depression Daughter        Social History     Tobacco Use   • Smoking status: Current Every Day Smoker     Packs/day: 0.50   • Smokeless tobacco: Never Used   Substance Use Topics   • Alcohol use: No     Frequency: Never   • Drug use: No        Medications Prior to Admission   Medication Sig Dispense Refill Last Dose   • amitriptyline (ELAVIL) 75 MG tablet Take 1 tablet by mouth Every Night. 30 tablet 2    • ARIPiprazole (ABILIFY) 5 MG tablet TAKE 1 TABLET BY MOUTH EVERY NIGHT 90 tablet 0    • aspirin (aspirin) 81 MG EC tablet Take 1 tablet by mouth Daily. 90 tablet 1    • atorvastatin (LIPITOR) 20 MG tablet Take 1 tablet by mouth Every Night. 90 tablet 1    • buPROPion XL (Wellbutrin XL) 150 MG 24 hr tablet Take 1 tablet by mouth Daily. 90 tablet 1     • butalbital-acetaminophen-caffeine (FIORICET, ESGIC) -40 MG per tablet Take 1 tablet by mouth 3 (Three) Times a Day As Needed for Headache.      • clobetasol (TEMOVATE) 0.05 % ointment Apply  topically to the appropriate area as directed 2 (Two) Times a Day. 60 g 1    • loratadine (CLARITIN) 10 MG tablet Take 10 mg by mouth Daily.      • metoprolol tartrate (LOPRESSOR) 25 MG tablet Take 1 tablet by mouth 2 (Two) Times a Day. 180 tablet 1    • montelukast (SINGULAIR) 10 MG tablet Take 1 tablet by mouth Every Night. 90 tablet 1    • naproxen (NAPROSYN) 500 MG tablet Take 1 tablet by mouth 2 (Two) Times a Day As Needed for Moderate Pain . Take with food! 60 tablet 2    • nitroglycerin (NITROSTAT) 0.4 MG SL tablet Place 1 tablet under the tongue Every 5 (Five) Minutes As Needed for Chest Pain. Take no more than 3 doses in 15 minutes. 25 tablet 0    • NON FORMULARY Allergy injections      • omeprazole (priLOSEC) 20 MG capsule Take 1 capsule by mouth Every Morning Before Breakfast. Take on an empty stomach! 90 capsule 1    • OXcarbazepine (TRILEPTAL) 300 MG tablet Take 150 mg by mouth 2 (Two) Times a Day.      • prazosin (MINIPRESS) 2 MG capsule Take 1 capsule by mouth Every Night. 30 capsule 0    • pregabalin (LYRICA) 200 MG capsule Take 1 capsule by mouth 3 (Three) Times a Day. 90 capsule 5    • SUMAtriptan (IMITREX) 50 MG tablet TAKE 1 TABLET BY MOUTH AT ONSET OF HEADACHE. MAY REPEAT DOSE 1 TIME IN 2 HOURS IF HEADACHE NOT RELIEVED 10 tablet 0    • cyclobenzaprine (FLEXERIL) 10 MG tablet TAKE 1 TABLET BY MOUTH EVERY NIGHT AT BEDTIME FOR 1 WEEK THEN THREE TIMES DAILY 90 tablet 5    • varenicline (Chantix Starting Month Pak) 0.5 MG X 11 & 1 MG X 42 tablet Take 0.5 mg one daily on days 1-3 and and 0.5 mg twice daily on days 4-7.Then 1 mg twice daily for a total of 12 weeks. 53 tablet 0    • Vortioxetine HBr (Trintellix) 20 MG tablet Take 20 mg by mouth Daily. 30 tablet 0        Allergies  Hydrocodone,  "Hydrocodone-acetaminophen, and Oxycodone    Scheduled Meds:amitriptyline, 75 mg, Oral, Nightly  ARIPiprazole, 5 mg, Oral, Nightly  aspirin, 81 mg, Oral, Daily  atorvastatin, 20 mg, Oral, Nightly  buPROPion XL, 150 mg, Oral, Daily  clopidogrel, 75 mg, Oral, Daily  cyclobenzaprine, 10 mg, Oral, Nightly  metoprolol tartrate, 25 mg, Oral, BID  montelukast, 10 mg, Oral, Nightly  OXcarbazepine, 150 mg, Oral, Q12H  pantoprazole, 40 mg, Oral, QAM  pregabalin, 200 mg, Oral, Q8H  sodium chloride, 10 mL, Intravenous, Q12H  terazosin, 5 mg, Oral, Nightly      Continuous Infusions:   PRN Meds:.•  acetaminophen **OR** acetaminophen **OR** acetaminophen  •  aluminum-magnesium hydroxide-simethicone  •  atropine  •  butalbital-acetaminophen-caffeine  •  magnesium sulfate **OR** magnesium sulfate in D5W 1g/100mL (PREMIX)  •  melatonin  •  Morphine  •  nitroglycerin  •  ondansetron **OR** ondansetron  •  potassium chloride  •  sodium chloride  •  sodium chloride    Objective     VITAL SIGNS  Vitals:    01/20/21 2144 01/20/21 2145 01/21/21 0204 01/21/21 0517   BP: 108/59  112/61 94/64   BP Location:   Left arm Left arm   Patient Position:   Lying Lying   Pulse: 75 81 70 86   Resp:  20 12 18   Temp:  98.7 °F (37.1 °C) 97.2 °F (36.2 °C) 98.7 °F (37.1 °C)   TempSrc:  Axillary Oral Oral   SpO2:  100% 98% 97%   Weight:    79 kg (174 lb 2.6 oz)   Height:           Flowsheet Rows      First Filed Value   Admission Height  188 cm (74\") Documented at 01/18/2021 2302   Admission Weight  80.7 kg (178 lb) Documented at 01/18/2021 2302            Intake/Output Summary (Last 24 hours) at 1/21/2021 0645  Last data filed at 1/21/2021 0517  Gross per 24 hour   Intake 1080 ml   Output 1700 ml   Net -620 ml        TELEMETRY: Sinus rhythm    Physical Exam:  The patient is alert, oriented and in no distress.  Vital signs as noted above.  Head and neck revealed no carotid bruits or jugular venous distention.  No thyromegaly or lymphadenopathy is " present  Lungs clear.  No wheezing.  Breath sounds are normal bilaterally.  Heart normal first and second heart sounds.  No murmur. No precordial rub is present.  No gallop is present.  Abdomen soft and nontender.  No organomegaly is present.  Extremities with good peripheral pulses without any pedal edema.  Cardiac cath site looks normal.  Skin warm and dry.  Musculoskeletal system is grossly normal  CNS grossly normal      Results Review:   I reviewed the patient's new clinical results.  Lab Results (last 24 hours)     Procedure Component Value Units Date/Time    Magnesium [[id number]]  (Normal) Collected: 01/21/21 0239    Specimen: Blood Updated: 01/21/21 0328     Magnesium 1.9 mg/dL     Basic Metabolic Panel [[id number]]  (Abnormal) Collected: 01/21/21 0239    Specimen: Blood Updated: 01/21/21 0328     Glucose 109 mg/dL      BUN 12 mg/dL      Creatinine 1.10 mg/dL      Sodium 134 mmol/L      Potassium 3.7 mmol/L      Chloride 101 mmol/L      CO2 25.0 mmol/L      Calcium 8.6 mg/dL      eGFR Non African Amer 71 mL/min/1.73      BUN/Creatinine Ratio 10.9     Anion Gap 8.0 mmol/L     Narrative:      GFR Normal >60  Chronic Kidney Disease <60  Kidney Failure <15      Lipid Panel [[id number]]  (Abnormal) Collected: 01/21/21 0239    Specimen: Blood Updated: 01/21/21 0328     Total Cholesterol 112 mg/dL      Triglycerides 460 mg/dL      HDL Cholesterol 25 mg/dL      LDL Cholesterol  24 mg/dL      VLDL Cholesterol 63 mg/dL      LDL/HDL Ratio -0.20    Narrative:      Cholesterol Reference Ranges  (U.S. Department of Health and Human Services ATP III Classifications)    Desirable          <200 mg/dL  Borderline High    200-239 mg/dL  High Risk          >240 mg/dL      Triglyceride Reference Ranges  (U.S. Department of Health and Human Services ATP III Classifications)    Normal           <150 mg/dL  Borderline High  150-199 mg/dL  High             200-499 mg/dL  Very High        >500 mg/dL    HDL Reference Ranges  (U.S.  Department of Health and Human Services ATP III Classifcations)    Low     <40 mg/dl (major risk factor for CHD)  High    >60 mg/dl ('negative' risk factor for CHD)        LDL Reference Ranges  (U.S. Department of Health and Human Services ATP III Classifcations)    Optimal          <100 mg/dL  Near Optimal     100-129 mg/dL  Borderline High  130-159 mg/dL  High             160-189 mg/dL  Very High        >189 mg/dL    aPTT [[id number]]  (Abnormal) Collected: 01/21/21 0239    Specimen: Blood Updated: 01/21/21 0317     PTT 23.8 seconds     CBC & Differential [[id number]]  (Abnormal) Collected: 01/21/21 0239    Specimen: Blood Updated: 01/21/21 0310    Narrative:      The following orders were created for panel order CBC & Differential.  Procedure                               Abnormality         Status                     ---------                               -----------         ------                     CBC Auto Differential[[id number]]        Abnormal            Final result                 Please view results for these tests on the individual orders.    CBC Auto Differential [[id number]]  (Abnormal) Collected: 01/21/21 0239    Specimen: Blood Updated: 01/21/21 0310     WBC 6.30 10*3/mm3      RBC 4.12 10*6/mm3      Hemoglobin 10.8 g/dL      Hematocrit 32.5 %      MCV 78.9 fL      MCH 26.2 pg      MCHC 33.2 g/dL      RDW 17.8 %      RDW-SD 49.4 fl      MPV 8.5 fL      Platelets 217 10*3/mm3      Neutrophil % 65.2 %      Lymphocyte % 22.5 %      Monocyte % 10.1 %      Eosinophil % 1.0 %      Basophil % 1.2 %      Neutrophils, Absolute 4.10 10*3/mm3      Lymphocytes, Absolute 1.40 10*3/mm3      Monocytes, Absolute 0.60 10*3/mm3      Eosinophils, Absolute 0.10 10*3/mm3      Basophils, Absolute 0.10 10*3/mm3      nRBC 0.2 /100 WBC     POC Activated Clotting Time [[id number]]  (Abnormal) Collected: 01/20/21 1347    Specimen: Arterial Blood Updated: 01/20/21 1353     Activated Clotting Time  153 Seconds      Comment: Serial  Number: [id number]:  [id number]       POC Activated Clotting Time [[id number]]  (Abnormal) Collected: 01/20/21 1229    Specimen: Arterial Blood Updated: 01/20/21 1244     Activated Clotting Time  180 Seconds      Comment: Serial Number: [id number]:  [id number]       POC Activated Clotting Time [[id number]]  (Abnormal) Collected: 01/20/21 1037    Specimen: Blood Updated: 01/20/21 1044     Activated Clotting Time  180 Seconds      Comment: Serial Number: [id number]:  [id number]       POC Activated Clotting Time [[id number]]  (Normal) Collected: 01/20/21 0958    Specimen: Arterial Blood Updated: 01/20/21 1044     Activated Clotting Time  136 Seconds      Comment: Serial Number: [id number]:  [id number]       Troponin [[id number]]  (Normal) Collected: 01/20/21 0802    Specimen: Blood Updated: 01/20/21 0827     Troponin T <0.010 ng/mL     Narrative:      Troponin T Reference Range:  <= 0.03 ng/mL-   Negative for AMI  >0.03 ng/mL-     Abnormal for myocardial necrosis.  Clinicians would have to utilize clinical acumen, EKG, Troponin and serial changes to determine if it is an Acute Myocardial Infarction or myocardial injury due to an underlying chronic condition.       Results may be falsely decreased if patient taking Biotin.      aPTT [[id number]]  (Abnormal) Collected: 01/20/21 0635    Specimen: Blood from Arm, Left Updated: 01/20/21 0650     PTT 59.1 seconds     Protime-INR [[id number]]  (Normal) Collected: 01/20/21 0635    Specimen: Blood Updated: 01/20/21 0649     Protime 11.4 Seconds      INR 1.04          Imaging Results (Last 24 Hours)     ** No results found for the last 24 hours. **      LAB RESULTS (LAST 7 DAYS)    CBC  Results from last 7 days   Lab Units 01/21/21  0239 01/20/21  0247 01/19/21  1427 01/19/21  0300 01/18/21  2355   WBC 10*3/mm3 6.30 5.70 7.40 6.50 8.30   RBC 10*6/mm3 4.12* 4.44 4.43 4.26 4.50   HEMOGLOBIN g/dL 10.8* 11.5* 11.7* 11.2* 11.8*   HEMATOCRIT % 32.5* 35.1* 35.2* 33.4* 36.1*   MCV fL 78.9*  79.0 79.5 78.4* 80.3   PLATELETS 10*3/mm3 217 237 244 233 274       BMP  Results from last 7 days   Lab Units 01/21/21  0239 01/20/21  0247 01/19/21  0752 01/19/21  0300 01/19/21  0122   SODIUM mmol/L 134* 139  --  133* 130*   POTASSIUM mmol/L 3.7 4.3  --  4.2 4.1   CHLORIDE mmol/L 101 103  --  97* 97*   CO2 mmol/L 25.0 29.0  --  24.0 25.0   BUN mg/dL 12 11  --  15 13   CREATININE mg/dL 1.10 1.11  --  0.87 0.93   GLUCOSE mg/dL 109* 117*  --  109* 102*   MAGNESIUM mg/dL 1.9 2.1 1.9  --   --        CMP   Results from last 7 days   Lab Units 01/21/21  0239 01/20/21  0247 01/19/21  0300 01/19/21  0122   SODIUM mmol/L 134* 139 133* 130*   POTASSIUM mmol/L 3.7 4.3 4.2 4.1   CHLORIDE mmol/L 101 103 97* 97*   CO2 mmol/L 25.0 29.0 24.0 25.0   BUN mg/dL 12 11 15 13   CREATININE mg/dL 1.10 1.11 0.87 0.93   GLUCOSE mg/dL 109* 117* 109* 102*   ALBUMIN g/dL  --   --   --  3.80   BILIRUBIN mg/dL  --   --   --  0.2   ALK PHOS U/L  --   --   --  87   AST (SGOT) U/L  --   --   --  11   ALT (SGPT) U/L  --   --   --  7         BNP        TROPONIN  Results from last 7 days   Lab Units 01/20/21  0802   TROPONIN T ng/mL <0.010       CoAg  Results from last 7 days   Lab Units 01/21/21  0239 01/20/21  0635 01/19/21  2151 01/19/21  1427 01/18/21  2355   INR   --  1.04  --  1.04 0.99   APTT seconds 23.8* 59.1* 50.4* 27.4* 21.6*       Creatinine Clearance  Estimated Creatinine Clearance: 89.8 mL/min (by C-G formula based on SCr of 1.1 mg/dL).    ABG        Radiology  No radiology results for the last day            EKG                  I personally viewed and interpreted the patient's EKG/Telemetry data:    ECHOCARDIOGRAM:    Results for orders placed during the hospital encounter of 01/14/21   Adult Transthoracic Echo Complete W/ Cont if Necessary Per Protocol    Narrative · Estimated left ventricular EF = 50% Left ventricular systolic function   is normal.     Indications  Chest pain    Technically satisfactory study.  Mitral valve is  structurally normal.  Tricuspid valve is structurally normal.  Aortic valve is structurally normal.  Pulmonic valve could not be well visualized.  No evidence for mitral tricuspid or aortic regurgitation is seen by   Doppler study.  Left atrium is normal in size.  Right atrium is normal in size.  Left ventricle is normal in size with mild paradoxical septal motion and   inferior wall hypokinesis with ejection fraction of 50%.  Right ventricle is normal in size.  Atrial septum is intact.  Aorta is normal.  No pericardial effusion or intracardiac thrombus is seen.    Impression  Structurally and functionally normal cardiac valves.  Left ventricular is normal in size and mild paradoxical septal motion is   present with inferior wall hypokinesis with ejection fraction of 50%.           STRESS MYOVIEW:    Cardiolite (Tc-99m Sestamibi) stress test    CARDIAC CATHETERIZATION:            OTHER:         Assessment/Plan     Principal Problem:    Unstable angina (CMS/HCC)  Active Problems:    S/P CABG (coronary artery bypass graft)    Status post angioplasty with stent    Essential hypertension    Mixed hyperlipidemia    Posttraumatic stress disorder    Psoriasis    Status post coronary artery bypass graft    Gastroesophageal reflux disease without esophagitis    Chest pain with high risk of acute coronary syndrome        ]]]]]]]]]]]]]]]]]  Impression  ==========  -Chest discomfort suggestive of unstable angina pectoris.  EKG showed no acute changes.  Troponin levels are negative.     Echocardiogram 1/14/2021 revealed mild paradoxical septal motion and inferior wall hypokinesis with ejection fraction of 50%.  Lexiscan Cardiolite test-abnormal with mild posterior and inferior proximal ischemia-1/14/2021     -Status post CABG (2015) (triple-vessel according to patient) at [facility].     -Status post stent placement (details not available)-2014    Status post stent to LAD between diagonal branch and  landing site of LIMA.  1/20/2021    Cardiac catheterization 1/21/2021  Left ventricle size and contractility is normal with ejection fraction of 60%.  Left main coronary artery is normal.  Left anterior descending artery provided a moderate sized diagonal branch and left anterior descending artery has 80% disease proximal to the LIMA landing site.  Circumflex coronary artery is a moderately large vessel that provided a large marginal branch.  Circumflex coronary artery has 60 to 70% disease just distal to the marginal branch.  Ramus intermedius is a relatively small caliber vessel that has ostial 80 to 90% disease.  Right coronary artery is a large and dominant vessel.  Left ventricular branch has 60 to 70% disease at its ostium.  LIMA to left anterior descending artery is very small and atretic with probably 90 to 95% disease just proximal to the insertion site.  SVG to diagonal branch is patent.  SVG to presumably ramus intermedius is totally occluded in the proximal segment.    Patient has symptoms of unstable angina pectoris.  2 out of 3 grafts are patent.  However patent LIMA is extremely small in caliber and atraumatic with significant disease near the landing site.  Difficult to tell the source of patient's symptoms but likely from left anterior descending artery.  Patient to have intervention to left antidescending artery distal to the diagonal branch and proximal to the LIMA landing site.  Other source of pain could be from circumflex coronary artery left ventricular branch of RCA and ramus intermedius.  Suggest maximize medical treatment for disease in these vessels.  Modification of risk factors.     -Dyslipidemia hypertension COPD PTSD     -Psoriasis     -Family history of coronary artery disease     -Smoker-abstinence from smoking     -Allergic to oxycodone hydrocodone  ==========  Plan  =========  Unstable angina  EKG showed no acute changes.  Troponin levels are negative.  Recent stress Cardiolite  test-as above  Recent echocardiogram-as above  Recent labs were reviewed.  9/2/2020-normal CBC normal CMP.    Patient had cardiac catheterization and stent placement to LAD 1/20/2021.  Patient is feeling better.    Hypertension  Continue metoprolol     Dyslipidemia-on statins     Okay with discharge plans.    Follow-up in the office in 2 weeks.    Further plan will depend on patient's progress.  ]]]]]]]]]]]]]]               Jaimie Silva MD  01/21/21  06:45 EST

## 2021-01-21 NOTE — PLAN OF CARE
Goal Outcome Evaluation:  Plan of Care Reviewed With: patient  Progress: improving  Outcome Summary: Pt admitted to PCU for chest pain. Had heart cath yesterday with Dr. Silva. Stent to the LAD. Site is clean, dry and intact with no signs of bruising or bleeding. BR up at 2030. Pt ambulated well. Educated pt about cath site. Pt verbalized understanding. Plan is to return home upon D/C.

## 2021-01-21 NOTE — PROGRESS NOTES
Case Management Discharge Note                Selected Continued Care - Discharged on 1/21/2021 Admission date: 1/18/2021 - Discharge disposition: Home or Self Care                 Final Discharge Disposition Code: 01 - home or self-care

## 2021-01-21 NOTE — DISCHARGE SUMMARY
[facility]  DISCHARGE SUMMARY        Prepared For PCP:  Brittni Shell MD    Patient Name: Frank Barger  : 1970  MRN: [id number]      Date of Admission:   2021    Date of Discharge:  2021    Length of stay:  LOS: 1 day     Hospital Course     Presenting Problem:   Essential hypertension [I10]  S/P CABG (coronary artery bypass graft) [Z95.1]  Chest pain with high risk of acute coronary syndrome [R07.9]  Chest pain with high risk of acute coronary syndrome [R07.9]      Active Hospital Problems    Diagnosis  POA   • Chest pain with high risk of acute coronary syndrome [R07.9]  Yes     Priority: High   • Status post angioplasty with stent [Z95.820]  Not Applicable     Priority: High   • S/P CABG (coronary artery bypass graft) [Z95.1]  Not Applicable     Priority: Medium   • Gastroesophageal reflux disease without esophagitis [K21.9]  Yes     Priority: Medium   • Status post coronary artery bypass graft [Z95.1]  Not Applicable     Priority: Medium   • Essential hypertension [I10]  Yes     Priority: Medium   • Posttraumatic stress disorder [F43.10]  Yes     Priority: Medium   • Mixed hyperlipidemia [E78.2]  Yes     Priority: Medium   • Psoriasis [L40.9]  Yes     Priority: Medium      Resolved Hospital Problems    Diagnosis Date Resolved POA   • **Unstable angina (CMS/HCC) [I20.0] 2021 Yes     Priority: High           Hospital Course:      The patient was admitted to the medical floor on nitroglycerin drip and heparin drip.  Acute MI was ruled out with cardiac enzymes.  Left heart catheterization was done on 2021 and EBENEZER to RCA was placed.  Chest pain resolved after LHC and the patient was observed overnight and will be discharged home in the morning of 2021.      Problem list during hospitalization    1.  Unstable angina:  -Acute MI ruled out with cardiac enzymes   -Cardiology, consulted  -s/p LHC 21-->s/p EBENEZER to RCA.     2. CAD  s/p CABG x3 (2015):  -On aspirin, Lipitor, metoprolol at home  - s/p Stress test 1/12/2021--> abnormal and was scheduled for outpatient LHC for 1/22/21 but came to the ED before could be done  -TTE 1/14/2021--> LVEF 50%     3.  COPD:  -Not in exacerbation     4.  Anxiety/PTSD:  -Continue Abilify          Recommendation for Outpatient Providers:             Reasons For Change In Medications and Indications for New Medications:        Day of Discharge     HPI:     The patient is a 50 years old male with history of CAD s/p CABG x3, COPD, PTSD, psoriasis and chronic pain.     The patient experienced intermittent left-sided chest pain that radiated to the left lateral wall while sitting on the couch in the evening of 1/18/2021.  Chest pain was relieved with nitroglycerin and currently is on nitroglycerin drip.     The patient denied fevers, chills, sweats, nausea, vomiting, abdominal pain, constipation or diarrhea     The patient has history of abnormal stress test on 1/14/2021 and was scheduled to have outpatient LHC on 1/22/2021 but came to the ED on 1/18/2021 because of intractable chest pain          Vital Signs:   Temp:  [97.2 °F (36.2 °C)-98.7 °F (37.1 °C)] 98.2 °F (36.8 °C)  Heart Rate:  [68-88] 88  Resp:  [12-20] 18  BP: ()/(51-83) 130/83     Physical Exam:  Physical Exam  HENT:      Head: Normocephalic.      Nose: Nose normal.   Eyes:      General: No scleral icterus.     Extraocular Movements: Extraocular movements intact.      Pupils: Pupils are equal, round, and reactive to light.   Neck:      Musculoskeletal: Normal range of motion.   Cardiovascular:      Rate and Rhythm: Normal rate and regular rhythm.   Pulmonary:      Effort: Pulmonary effort is normal.      Breath sounds: Normal breath sounds.   Abdominal:      General: Bowel sounds are normal.      Palpations: Abdomen is soft.   Musculoskeletal: Normal range of motion.   Skin:     General: Skin is warm.   Neurological:      Mental Status: He is  alert and oriented to person, place, and time. Mental status is at baseline.   Psychiatric:         Mood and Affect: Mood normal.         Pertinent  and/or Most Recent Results     Results from last 7 days   Lab Units 01/21/21  0239 01/20/21  0247 01/19/21  1427 01/19/21  0300 01/19/21  0122 01/18/21  2355   WBC 10*3/mm3 6.30 5.70 7.40 6.50  --  8.30   HEMOGLOBIN g/dL 10.8* 11.5* 11.7* 11.2*  --  11.8*   HEMATOCRIT % 32.5* 35.1* 35.2* 33.4*  --  36.1*   PLATELETS 10*3/mm3 217 237 244 233  --  274   SODIUM mmol/L 134* 139  --  133* 130*  --    POTASSIUM mmol/L 3.7 4.3  --  4.2 4.1  --    CHLORIDE mmol/L 101 103  --  97* 97*  --    CO2 mmol/L 25.0 29.0  --  24.0 25.0  --    BUN mg/dL 12 11  --  15 13  --    CREATININE mg/dL 1.10 1.11  --  0.87 0.93  --    GLUCOSE mg/dL 109* 117*  --  109* 102*  --    CALCIUM mg/dL 8.6 9.1  --  8.8 8.9  --      Results from last 7 days   Lab Units 01/21/21  0239 01/20/21  0635 01/19/21  2151 01/19/21  1427 01/19/21  0122 01/18/21  2355   BILIRUBIN mg/dL  --   --   --   --  0.2  --    ALK PHOS U/L  --   --   --   --  87  --    ALT (SGPT) U/L  --   --   --   --  7  --    AST (SGOT) U/L  --   --   --   --  11  --    PROTIME Seconds  --  11.4  --  11.4  --  10.9   INR   --  1.04  --  1.04  --  0.99   APTT seconds 23.8* 59.1* 50.4* 27.4*  --  21.6*     Results from last 7 days   Lab Units 01/21/21 0239   CHOLESTEROL mg/dL 112   TRIGLYCERIDES mg/dL 460*   HDL CHOL mg/dL 25*     Results from last 7 days   Lab Units 01/21/21  0851 01/20/21  0802 01/20/21  0247 01/19/21  0752 01/19/21  0300   TROPONIN T ng/mL <0.010 <0.010 <0.010 <0.010 <0.010       Brief Urine Lab Results     None          Microbiology Results Abnormal     Procedure Component Value - Date/Time    COVID-19,[facility] IN-HOUSE,Nasal Swab No Transport Media 3-4 HR TAT - Swab, Nasal Cavity [[id number]]  (Normal) Collected: 01/19/21 0010    Lab Status: Final result Specimen: Swab from Nasal Cavity Updated: 01/19/21 0057     COVID19  Not Detected    Narrative:      Fact sheet for providers: https://www.fda.gov/media/505546/download     Fact sheet for patients: https://www.fda.gov/media/204366/download    Test performed by PCR.    Consider negative results in combination with clinical observations, patient history, and epidemiological information.          Xr Chest 1 View    Result Date: 1/19/2021  Impression: Questionable mild pulmonary vascular congestion.  Electronically Signed By-Malik Slade MD On:1/19/2021 7:15 AM This report was finalized on [id number] by  Malik Slade MD.                Results for orders placed during the hospital encounter of 01/14/21   Adult Transthoracic Echo Complete W/ Cont if Necessary Per Protocol    Narrative · Estimated left ventricular EF = 50% Left ventricular systolic function   is normal.     Indications  Chest pain    Technically satisfactory study.  Mitral valve is structurally normal.  Tricuspid valve is structurally normal.  Aortic valve is structurally normal.  Pulmonic valve could not be well visualized.  No evidence for mitral tricuspid or aortic regurgitation is seen by   Doppler study.  Left atrium is normal in size.  Right atrium is normal in size.  Left ventricle is normal in size with mild paradoxical septal motion and   inferior wall hypokinesis with ejection fraction of 50%.  Right ventricle is normal in size.  Atrial septum is intact.  Aorta is normal.  No pericardial effusion or intracardiac thrombus is seen.    Impression  Structurally and functionally normal cardiac valves.  Left ventricular is normal in size and mild paradoxical septal motion is   present with inferior wall hypokinesis with ejection fraction of 50%.               Test Results Pending at Discharge        Procedures Performed  Procedure(s):  Left Heart Cath and coronary angiogram  Saphenous Vein Graft  Stent EBENEZER coronary         Consults:   Consults     Date and Time Order Name Status Description    1/19/2021 0242  Inpatient Cardiology Consult Completed     1/19/2021 0218 Cardiology (on-call MD unless specified) Completed     1/19/2021 0218 Hospitalist (on-call MD unless specified) Completed             Discharge Details        Discharge Medications      New Medications      Instructions Start Date   clopidogrel 75 MG tablet  Commonly known as: PLAVIX   75 mg, Oral, Daily         Changes to Medications      Instructions Start Date   cyclobenzaprine 10 MG tablet  Commonly known as: FLEXERIL  What changed: See the new instructions.   TAKE 1 TABLET BY MOUTH EVERY NIGHT AT BEDTIME FOR 1 WEEK THEN THREE TIMES DAILY         Continue These Medications      Instructions Start Date   amitriptyline 75 MG tablet  Commonly known as: ELAVIL   75 mg, Oral, Nightly      ARIPiprazole 5 MG tablet  Commonly known as: ABILIFY   5 mg, Oral, Nightly      aspirin 81 MG EC tablet   81 mg, Oral, Daily      atorvastatin 20 MG tablet  Commonly known as: LIPITOR   20 mg, Oral, Nightly      buPROPion  MG 24 hr tablet  Commonly known as: Wellbutrin XL   150 mg, Oral, Daily      butalbital-acetaminophen-caffeine -40 MG per tablet  Commonly known as: FIORICET, ESGIC   1 tablet, Oral, 3 Times Daily PRN      Chantix Starting Month Aniceto 0.5 MG X 11 & 1 MG X 42 tablet  Generic drug: varenicline   Take 0.5 mg one daily on days 1-3 and and 0.5 mg twice daily on days 4-7.Then 1 mg twice daily for a total of 12 weeks.      clobetasol 0.05 % ointment  Commonly known as: TEMOVATE   Topical, 2 Times Daily      loratadine 10 MG tablet  Commonly known as: CLARITIN   10 mg, Oral, Daily      metoprolol tartrate 25 MG tablet  Commonly known as: LOPRESSOR   25 mg, Oral, 2 Times Daily      montelukast 10 MG tablet  Commonly known as: SINGULAIR   10 mg, Oral, Nightly      naproxen 500 MG tablet  Commonly known as: NAPROSYN   500 mg, Oral, 2 Times Daily PRN, Take with food!      nitroglycerin 0.4 MG SL tablet  Commonly known as: NITROSTAT   0.4 mg, Sublingual, Every  5 Minutes PRN, Take no more than 3 doses in 15 minutes.      NON FORMULARY   Allergy injections       omeprazole 20 MG capsule  Commonly known as: priLOSEC   20 mg, Oral, Every Morning Before Breakfast, Take on an empty stomach!      OXcarbazepine 300 MG tablet  Commonly known as: TRILEPTAL   150 mg, Oral, 2 Times Daily      prazosin 2 MG capsule  Commonly known as: MINIPRESS   2 mg, Oral, Nightly      pregabalin 200 MG capsule  Commonly known as: LYRICA   200 mg, Oral, 3 Times Daily      SUMAtriptan 50 MG tablet  Commonly known as: IMITREX   TAKE 1 TABLET BY MOUTH AT ONSET OF HEADACHE. MAY REPEAT DOSE 1 TIME IN 2 HOURS IF HEADACHE NOT RELIEVED      Trintellix 20 MG tablet  Generic drug: Vortioxetine HBr   20 mg, Oral, Daily             Allergies   Allergen Reactions   • Hydrocodone Nausea And Vomiting   • Hydrocodone-Acetaminophen GI Intolerance   • Oxycodone GI Intolerance         Discharge Disposition:  Home or Self Care    Diet:  Hospital:  Diet Order   Procedures   • Diet Cardiac; Healthy Heart         Discharge Activity: as tolerated        CODE STATUS:    Code Status and Medical Interventions:   Ordered at: 01/19/21 0234     Code Status:    CPR     Medical Interventions (Level of Support Prior to Arrest):    Full         Follow-up Appointments  Future Appointments   Date Time Provider Department Center   2/3/2021  3:45 PM Carlita Mcdermott PA-C [facility] None   6/16/2021  9:45 AM Brittni Shell MD [facility]       Additional Instructions for the Follow-ups that You Need to Schedule     Discharge Follow-up with PCP   As directed       Currently Documented PCP:    Brittni Shell MD    PCP Phone Number:    [phone]     Follow Up Details: 2 weeks                 Condition on Discharge:      Stable      This patient has been examined wearing appropriate Personal Protective Equipment and discussed with pulmonologist and nursing. 01/21/21      Electronically signed by Bebeto Iraheta  DO, 01/21/21, 11:07 AM EST.      Time: I spent  15 minutes on this discharge activity which included face-to-face encounter with the patient/reviewing the data in the system/coordination of the care with the nursing staff as well as consultants/documentation/entering orders.

## 2021-01-21 NOTE — TELEPHONE ENCOUNTER
Returning call, he reached out to us. Just discharged today. Interested in cardiac rehab. Will start paperwork. When collected will call to schedule.

## 2021-01-21 NOTE — OUTREACH NOTE
Prep Survey      Responses   [facility] facility patient discharged from?  [facility]   Is LACE score < 7 ?  Yes   Emergency Room discharge w/ pulse ox?  No   Eligibility  [facility]   Date of Admission  01/18/21   Date of Discharge  01/21/21   Discharge Disposition  Home or Self Care   Discharge diagnosis  Unstable angina    Does the patient have one of the following disease processes/diagnoses(primary or secondary)?  Other   Does the patient have Home health ordered?  No   Is there a DME ordered?  No   Prep survey completed?  Yes          Esther Jacobo RN

## 2021-01-21 NOTE — TELEPHONE ENCOUNTER
From: Frank Barger  To: Brittni Shell MD  Sent: 1/21/2021 1:27 PM EST  Subject: Prescription Question    Wellbutrin 150 mg daily I was told it need dr verdugo

## 2021-01-22 ENCOUNTER — TRANSITIONAL CARE MANAGEMENT TELEPHONE ENCOUNTER (OUTPATIENT)
Dept: CALL CENTER | Facility: HOSPITAL | Age: 51
End: 2021-01-22

## 2021-01-22 ENCOUNTER — TELEPHONE (OUTPATIENT)
Dept: PSYCHIATRY | Facility: CLINIC | Age: 51
End: 2021-01-22

## 2021-01-22 NOTE — OUTREACH NOTE
Call Center TCM Note      Responses   [facility] patient discharged from?  [facility]   Does the patient have one of the following disease processes/diagnoses(primary or secondary)?  Other   TCM attempt successful?  Yes   Call start time  1147   Call end time  1153   Discharge diagnosis  Unstable angina    Meds reviewed with patient/caregiver?  Yes   Is the patient having any side effects they believe may be caused by any medication additions or changes?  No   Does the patient have all medications ordered at discharge?  Yes   Is the patient taking all medications as directed (includes completed medication regime)?  Yes   Does the patient have a primary care provider?   Yes   Does the patient have an appointment with their PCP within 7 days of discharge?  No   Comments regarding PCP  Patient prefers to make own followups with cards and PCP. He states he will do so today.    What is preventing the patient from scheduling follow up appointments within 7 days of discharge?  Haven't had time, Waiting on return call, Earlier appointment not available   Has the patient kept scheduled appointments due by today?  N/A   Comments  No further chest pain, no SOB, Heart cath site to groin without hematoma or bleeding.    Has home health visited the patient within 72 hours of discharge?  N/A   Psychosocial issues?  No   Did the patient receive a copy of their discharge instructions?  Yes   Nursing interventions  Reviewed instructions with patient   What is the patient's perception of their health status since discharge?  Improving   Is the patient/caregiver able to teach back signs and symptoms related to disease process for when to call PCP?  Yes   Is the patient/caregiver able to teach back signs and symptoms related to disease process for when to call 911?  Yes   Is the patient/caregiver able to teach back the hierarchy of who to call/visit for symptoms/problems? PCP, Specialist, Home health nurse, Urgent Care, ED, 911  Yes    If the patient is a current smoker, are they able to teach back resources for cessation?  Not a smoker   TCM call completed?  Yes          Jordy Alberts RN    1/22/2021, 11:53 EST

## 2021-01-22 NOTE — TELEPHONE ENCOUNTER
This is just FYI from patient. He wanted to let you know that he just got out of hospital because they had to put another stint in. So they put him on Plavix, wanted you to know.

## 2021-01-26 ENCOUNTER — TELEPHONE (OUTPATIENT)
Dept: CARDIAC REHAB | Facility: HOSPITAL | Age: 51
End: 2021-01-26

## 2021-01-31 DIAGNOSIS — F43.10 POSTTRAUMATIC STRESS DISORDER: ICD-10-CM

## 2021-01-31 RX ORDER — PRAZOSIN HYDROCHLORIDE 2 MG/1
2 CAPSULE ORAL NIGHTLY
Qty: 30 CAPSULE | Refills: 0 | Status: SHIPPED | OUTPATIENT
Start: 2021-01-31 | End: 2021-03-02

## 2021-02-01 ENCOUNTER — OFFICE VISIT (OUTPATIENT)
Dept: CARDIOLOGY | Facility: CLINIC | Age: 51
End: 2021-02-01

## 2021-02-01 VITALS
WEIGHT: 189 LBS | DIASTOLIC BLOOD PRESSURE: 76 MMHG | TEMPERATURE: 97.7 F | HEIGHT: 73 IN | BODY MASS INDEX: 25.05 KG/M2 | OXYGEN SATURATION: 98 % | SYSTOLIC BLOOD PRESSURE: 126 MMHG | HEART RATE: 73 BPM

## 2021-02-01 DIAGNOSIS — Z95.820 STATUS POST ANGIOPLASTY WITH STENT: ICD-10-CM

## 2021-02-01 DIAGNOSIS — I10 ESSENTIAL HYPERTENSION: ICD-10-CM

## 2021-02-01 DIAGNOSIS — E78.5 DYSLIPIDEMIA: ICD-10-CM

## 2021-02-01 DIAGNOSIS — Z95.1 HX OF CABG: Primary | ICD-10-CM

## 2021-02-01 PROCEDURE — 99214 OFFICE O/P EST MOD 30 MIN: CPT | Performed by: INTERNAL MEDICINE

## 2021-02-01 PROCEDURE — 93000 ELECTROCARDIOGRAM COMPLETE: CPT | Performed by: INTERNAL MEDICINE

## 2021-02-01 NOTE — PROGRESS NOTES
Encounter Date:02/01/2021  Last seen 1/21/2021      Patient ID: Frank Barger is a 50 y.o. male.    Chief Complaint:  Status post CABG  Status post stent  Hypertension  Dyslipidemia      History of Present Illness  Patient recently was admitted to [facility] with symptoms of unstable angina pectoris.  Patient ruled out for MI.  Patient subsequently had cardiac catheterization and stent placement and was discharged home.    Since I have last seen, the patient has been without any chest discomfort ,shortness of breath, palpitations, dizziness or syncope.  Denies having any headache ,abdominal pain ,nausea, vomiting , diarrhea constipation, loss of weight or loss of appetite.  Denies having any excessive bruising ,hematuria or blood in the stool.    Review of all systems negative except as indicated.    Reviewed ROS.    Assessment and Plan     ]]]]]]]]]]]]]]]]]  Impression  ==========  -Status post CABG (2015) (triple-vessel according to patient) at [facility].     -Status post stent placement (details not available)-2014     Status post stent to LAD between diagonal branch and landing site of LIMA.  1/20/2021     Cardiac catheterization 1/21/2021  Left ventricle size and contractility is normal with ejection fraction of 60%.  Left main coronary artery is normal.  Left anterior descending artery provided a moderate sized diagonal branch and left anterior descending artery has 80% disease proximal to the LIMA landing site.  Circumflex coronary artery is a moderately large vessel that provided a large marginal branch.  Circumflex coronary artery has 60 to 70% disease just distal to the marginal branch.  Ramus intermedius is a relatively small caliber vessel that has ostial 80 to 90% disease.  Right coronary artery is a large and dominant vessel.  Left ventricular branch has 60 to 70% disease at its ostium.  LIMA to left anterior descending artery is very small and atretic with  probably 90 to 95% disease just proximal to the insertion site.  SVG to diagonal branch is patent.  SVG to presumably ramus intermedius is totally occluded in the proximal segment.     2 out of 3 grafts are patent.  However patent LIMA is extremely small in caliber and atraumatic with significant disease near the landing site.  Difficult to tell the source of patient's symptoms but likely from left anterior descending artery.  Patient to have intervention to left antidescending artery distal to the diagonal branch and proximal to the LIMA landing site.  Other source of pain could be from circumflex coronary artery left ventricular branch of RCA and ramus intermedius.  Suggest maximize medical treatment for disease in these vessels.  Modification of risk factors.    Echocardiogram 1/14/2021 revealed mild paradoxical septal motion and inferior wall hypokinesis with ejection fraction of 50%.  Lexiscan Cardiolite test-abnormal with mild posterior and inferior proximal ischemia-1/14/2021     -Dyslipidemia hypertension COPD PTSD     -Psoriasis     -Family history of coronary artery disease     -Smoker-abstinence from smoking     -Allergic to oxycodone hydrocodone  ==========  Plan  =========  Status post CABG  Recent unstable angina-improved  EKG showed sinus rhythm without ischemic changes.    Status post recent stent placement.  Patient is not having any angina pectoris or congestive heart failure.    2 out of 3 grafts are patent.  However patent LIMA is extremely small in caliber and atraumatic with significant disease near the landing site.  Difficult to tell the source of patient's symptoms but likely from left anterior descending artery.  Patient to have intervention to left antidescending artery distal to the diagonal branch and proximal to the LIMA landing site.  Other source of pain could be from circumflex coronary artery left ventricular branch of RCA and ramus intermedius.  Suggest maximize medical treatment  for disease in these vessels.  Modification of risk factors.    Hypertension-well-controlled 126/76  Continue metoprolol tartrate 25 mg a day    Dyslipidemia-continue atorvastatin    Follow-up in the office in 6 weeks with EKG    Further plan will depend on patient's progress.  ]]]]]]]]]]]]]]              Diagnosis Plan   1. Hx of CABG     2. Status post angioplasty with stent     3. Dyslipidemia     4. Essential hypertension     LAB RESULTS (LAST 7 DAYS)    CBC        BMP        CMP         BNP        TROPONIN        CoAg        Creatinine Clearance  Estimated Creatinine Clearance: 97.4 mL/min (by C-G formula based on SCr of 1.1 mg/dL).    ABG        Radiology  No radiology results for the last day                The following portions of the patient's history were reviewed and updated as appropriate: allergies, current medications, past family history, past medical history, past social history, past surgical history and problem list.    Review of Systems   Constitution: Negative for malaise/fatigue.   Cardiovascular: Negative for chest pain, leg swelling, palpitations and syncope.   Respiratory: Positive for shortness of breath.    Skin: Negative for rash.   Gastrointestinal: Negative for nausea and vomiting.   Neurological: Negative for dizziness, light-headedness and numbness.         Current Outpatient Medications:   •  amitriptyline (ELAVIL) 75 MG tablet, Take 1 tablet by mouth Every Night., Disp: 30 tablet, Rfl: 2  •  ARIPiprazole (ABILIFY) 5 MG tablet, TAKE 1 TABLET BY MOUTH EVERY NIGHT, Disp: 90 tablet, Rfl: 0  •  atorvastatin (LIPITOR) 20 MG tablet, Take 1 tablet by mouth Every Night., Disp: 90 tablet, Rfl: 1  •  buPROPion XL (Wellbutrin XL) 150 MG 24 hr tablet, Take 1 tablet by mouth Daily., Disp: 90 tablet, Rfl: 1  •  butalbital-acetaminophen-caffeine (FIORICET, ESGIC) -40 MG per tablet, Take 1 tablet by mouth 3 (Three) Times a Day As Needed for Headache., Disp: , Rfl:   •  clobetasol (TEMOVATE)  0.05 % ointment, Apply  topically to the appropriate area as directed 2 (Two) Times a Day., Disp: 60 g, Rfl: 1  •  clopidogrel (PLAVIX) 75 MG tablet, Take 1 tablet by mouth Daily., Disp: 30 tablet, Rfl: 0  •  cyclobenzaprine (FLEXERIL) 10 MG tablet, TAKE 1 TABLET BY MOUTH EVERY NIGHT AT BEDTIME FOR 1 WEEK THEN THREE TIMES DAILY, Disp: 90 tablet, Rfl: 5  •  loratadine (CLARITIN) 10 MG tablet, Take 10 mg by mouth Daily., Disp: , Rfl:   •  metoprolol tartrate (LOPRESSOR) 25 MG tablet, Take 1 tablet by mouth 2 (Two) Times a Day., Disp: 180 tablet, Rfl: 1  •  montelukast (SINGULAIR) 10 MG tablet, Take 1 tablet by mouth Every Night., Disp: 90 tablet, Rfl: 1  •  naproxen (NAPROSYN) 500 MG tablet, Take 1 tablet by mouth 2 (Two) Times a Day As Needed for Moderate Pain . Take with food!, Disp: 60 tablet, Rfl: 2  •  nitroglycerin (NITROSTAT) 0.4 MG SL tablet, PLACE ONE TABLET UNDER TONGUE AS NEEDED FOR CHEST PAIN EVERY 5 MINUTES. TAKE NO MORE THAN 3 DOSES IN 15 MINUTES., Disp: 25 tablet, Rfl: 0  •  NON FORMULARY, Allergy injections, Disp: , Rfl:   •  omeprazole (priLOSEC) 20 MG capsule, Take 1 capsule by mouth Every Morning Before Breakfast. Take on an empty stomach!, Disp: 90 capsule, Rfl: 1  •  OXcarbazepine (TRILEPTAL) 300 MG tablet, Take 150 mg by mouth 2 (Two) Times a Day., Disp: , Rfl:   •  prazosin (MINIPRESS) 2 MG capsule, TAKE 1 CAPSULE BY MOUTH EVERY NIGHT, Disp: 30 capsule, Rfl: 0  •  pregabalin (LYRICA) 200 MG capsule, Take 1 capsule by mouth 3 (Three) Times a Day., Disp: 90 capsule, Rfl: 5  •  SUMAtriptan (IMITREX) 50 MG tablet, TAKE 1 TABLET BY MOUTH AT ONSET OF HEADACHE. MAY REPEAT DOSE 1 TIME IN 2 HOURS IF HEADACHE NOT RELIEVED, Disp: 10 tablet, Rfl: 0  •  varenicline (Chantix Starting Month Aniceto) 0.5 MG X 11 & 1 MG X 42 tablet, Take 0.5 mg one daily on days 1-3 and and 0.5 mg twice daily on days 4-7.Then 1 mg twice daily for a total of 12 weeks., Disp: 53 tablet, Rfl: 0  •  Vortioxetine HBr (Trintellix) 20 MG  tablet, Take 20 mg by mouth Daily., Disp: 30 tablet, Rfl: 0  •  aspirin (aspirin) 81 MG EC tablet, Take 1 tablet by mouth Daily., Disp: 90 tablet, Rfl: 1    Allergies   Allergen Reactions   • Hydrocodone Nausea And Vomiting   • Hydrocodone-Acetaminophen GI Intolerance   • Oxycodone GI Intolerance       Family History   Problem Relation Age of Onset   • Diabetes Mother    • Heart disease Father    • Anxiety disorder Daughter    • Depression Daughter        Past Surgical History:   Procedure Laterality Date   • CARDIAC CATHETERIZATION N/A 1/20/2021    Procedure: Left Heart Cath and coronary angiogram;  Surgeon: Jaimie Silva MD;  Location: [facility] CATH INVASIVE LOCATION;  Service: Cardiovascular;  Laterality: N/A;   • CARDIAC CATHETERIZATION N/A 1/20/2021    Procedure: Saphenous Vein Graft;  Surgeon: Jaimie Silva MD;  Location: [facility] CATH INVASIVE LOCATION;  Service: Cardiovascular;  Laterality: N/A;   • CARDIAC CATHETERIZATION N/A 1/20/2021    Procedure: Stent EBENEZER coronary;  Surgeon: Herve Rodriguez MD;  Location: [facility] CATH INVASIVE LOCATION;  Service: Cardiology;  Laterality: N/A;   • CORONARY ANGIOPLASTY WITH STENT PLACEMENT  04/2014   • CORONARY ARTERY BYPASS GRAFT  12/2015       Past Medical History:   Diagnosis Date   • Allergies     takes allergy injections   • Anxiety    • Arthritis    • Asthma    • Back pain    • Depression    • Emphysema of lung (CMS/HCC)    • Headache    • Leg pain        Family History   Problem Relation Age of Onset   • Diabetes Mother    • Heart disease Father    • Anxiety disorder Daughter    • Depression Daughter        Social History     Socioeconomic History   • Marital status: Single     Spouse name: Not on file   • Number of children: Not on file   • Years of education: Not on file   • Highest education level: Not on file   Tobacco Use   • Smoking status: Current Every Day Smoker     Packs/day: 0.50   • Smokeless tobacco: Never Used   Substance and Sexual  "Activity   • Alcohol use: No     Frequency: Never   • Drug use: No   • Sexual activity: Yes     Partners: Female           ECG 12 Lead    Date/Time: 2/1/2021 1:46 PM  Performed by: Jaimie Silva MD  Authorized by: Jaimie Silva MD   Comparison: compared with previous ECG   Similar to previous ECG  Comparison to previous ECG: Normal sinus rhythm normal ECG 69/min normal axis normal intervals no ectopy no significant change from 1/21/2021                Objective:       Physical Exam    /76 (BP Location: Left arm, Patient Position: Sitting, Cuff Size: Large Adult)   Pulse 73   Temp 97.7 °F (36.5 °C)   Ht 185.4 cm (73\")   Wt 85.7 kg (189 lb)   SpO2 98%   BMI 24.94 kg/m²   The patient is alert, oriented and in no distress.    Vital signs as noted above.    Head and neck revealed no carotid bruits or jugular venous distension.  No thyromegaly or lymphadenopathy is present.    Lungs clear.  No wheezing.  Breath sounds are normal bilaterally.    Heart normal first and second heart sounds.  No murmur..  No pericardial rub is present.  No gallop is present.    Abdomen soft and nontender.  No organomegaly is present.    Extremities revealed good peripheral pulses without any pedal edema.    Skin warm and dry.    Musculoskeletal system is grossly normal.    CNS grossly normal.        "

## 2021-02-02 ENCOUNTER — TELEPHONE (OUTPATIENT)
Dept: CARDIAC REHAB | Facility: HOSPITAL | Age: 51
End: 2021-02-02

## 2021-02-02 NOTE — TELEPHONE ENCOUNTER
Called pt to remind of appointment for initial visit tomorrow at 1pm and asked questions for initial assessment. Pt states he will arrive a few minutes early for registration.

## 2021-02-03 ENCOUNTER — OFFICE VISIT (OUTPATIENT)
Dept: PSYCHIATRY | Facility: CLINIC | Age: 51
End: 2021-02-03

## 2021-02-03 ENCOUNTER — OFFICE VISIT (OUTPATIENT)
Dept: CARDIAC REHAB | Facility: HOSPITAL | Age: 51
End: 2021-02-03

## 2021-02-03 DIAGNOSIS — F32.1 MODERATE MAJOR DEPRESSION (HCC): Chronic | ICD-10-CM

## 2021-02-03 DIAGNOSIS — F43.10 POSTTRAUMATIC STRESS DISORDER: Primary | Chronic | ICD-10-CM

## 2021-02-03 DIAGNOSIS — Z95.5 S/P DRUG ELUTING CORONARY STENT PLACEMENT: Primary | ICD-10-CM

## 2021-02-03 PROCEDURE — 93798 PHYS/QHP OP CAR RHAB W/ECG: CPT

## 2021-02-03 PROCEDURE — 99214 OFFICE O/P EST MOD 30 MIN: CPT | Performed by: PHYSICIAN ASSISTANT

## 2021-02-03 NOTE — PATIENT INSTRUCTIONS
Managing Post-Traumatic Stress Disorder  If you have been diagnosed with post-traumatic stress disorder (PTSD), you may be relieved that you now know why you have felt or behaved a certain way. Still, you may feel overwhelmed about the treatment ahead. You may also wonder how to get the support you need and how to deal with the condition day-to-day.  If you are living with PTSD, there are ways to help you recover from it and manage your symptoms.  How to manage lifestyle changes  Managing stress  Stress is your body's reaction to life changes and events, both good and bad. Stress can make PTSD worse. Take the following steps to manage stress:  · Talk with your health care provider or a counselor if you would like to learn more about techniques to reduce your stress. He or she may suggest some stress reduction techniques such as:  ? Muscle relaxation exercises.  ? Regular exercise.  ? Meditation, yoga, or other mind-body exercises.  ? Breathing exercises.  ? Listening to quiet music.  ? Spending time outside.  · Maintain a healthy lifestyle. Eat a healthy diet, exercise regularly, get plenty of sleep, and take time to relax.  · Spend time with others. Talk with them about how you are feeling and what kind of support you need. Try not to isolate yourself, even though you may feel like doing that. Isolating yourself can delay your recovery.  · Do activities and hobbies that you enjoy.  · Pace yourself when doing stressful things. Take breaks, and reward yourself when you finish. Make sure that you do not overload your schedule.    Medicines  Your health care provider may suggest certain medicines if he or she feels that they will help to improve your condition. Medicines for depression (antidepressants) or severe loss of contact with reality (antipsychotics) may be used to treat PTSD. Avoid using alcohol and other substances that may prevent your medicines from working properly. It is also important to:  · Talk with  your pharmacist or health care provider about all medicines that you take, their possible side effects, and which medicines are safe to take together.  · Make it your goal to take part in all treatment decisions (shared decision-making). Ask about possible side effects of medicines that your health care provider recommends, and tell him or her how you feel about having those side effects. It is best if shared decision-making with your health care provider is part of your total treatment plan.  If your health care provider prescribes a medicine, you may not notice the full benefits of it for 4-8 weeks. Most people who are treated for PTSD need to take medicine for at least 6-12 months after they feel better. If you are taking medicines as part of your treatment, do not stop taking medicines before you ask your health care provider if it is safe to stop. You may need to have the medicine slowly decreased (tapered) over time to lower the risk of harmful side effects.  Relationships  Many people who have PTSD have difficulty trusting others. Make an effort to:  · Take risks and develop trust with close friends and family members. Developing trust in others can help you feel safe and connect you with emotional support.  · Be open and honest about your feelings.  · Have fun and relax in safe spaces, such as with friends and family.  · Think about going to couples counseling, family education classes, or family therapy. Your loved ones may not always know how to be supportive. Therapy can be helpful for everyone.  How to recognize changes in your condition  Be aware of your symptoms and how often you have them. The following symptoms mean that you need to seek help for your PTSD:  · You feel suspicious and angry.  · You have repeated flashbacks.  · You avoid going out or being with others.  · You have an increasing number of fights with close friends or family members, such as your spouse.  · You have thoughts about  hurting yourself or others.  · You cannot get relief from feelings of depression or anxiety.  Follow these instructions at home:  Lifestyle  · Exercise regularly. Try to do 30 or more minutes of physical activity on most days of the week.  · Try to get 7-9 hours of sleep each night. To help with sleep:  ? Keep your bedroom cool and dark.  ? Avoid screen time before bedtime. This means avoiding use of your TV, computer, tablet, and cell phone.  · Practice self-soothing skills and use them daily.  · Try to have fun and seek humor in your life.  Eating and drinking  · Do not eat a heavy meal during the hour before you go to bed.  · Do not drink alcohol or caffeinated drinks before bed.  · Avoid using alcohol or drugs.  General instructions  · If your PTSD is affecting your marriage or family, seek help from a family therapist.  · Take over-the-counter and prescription medicines only as told by your health care provider.  · Make sure to let all of your health care providers know that you have PTSD. This is especially important if you are having surgery or need to be admitted to the hospital.  · Keep all follow-up visits as told by your health care providers. This is important.  Where to find support  Talking to others  · Explain that PTSD is a mental health problem. It is something that a person can develop after experiencing or seeing a life-threatening event. Tell them that PTSD makes you feel stress like you did during the event.  · Talk to your loved ones about the symptoms you have. Also tell them what things or situations can cause symptoms to start (are triggers for you).  · Assure your loved ones that there are treatments to help PTSD. Discuss possibly seeking family therapy or couples therapy.  · If you are worried or fearful about seeking treatment, ask for support.  · Keep daily contact with at least one trusted friend or family member.  Finances  Not all insurance plans cover mental health care, so it is  important to check with your insurance carrier. If paying for co-pays or counseling services is a problem, search for a local or [facility] mental health care center. Public mental health care services may be offered there at a low cost or no cost when you are not able to see a private health care provider. If you are a , contact a local veterans organization or [facility] for more information.  If you are taking medicine for PTSD, you may be able to get the genericform, which may be less expensive than brand-name medicine. Some makers of prescription medicines also offer help to patients who cannot afford the medicines that they need.  Therapy and support groups  · Find a support group in your community. Often, groups are available for  veterans, trauma victims, and family members or caregivers.  · Look into volunteer opportunities. Taking part in these can help you feel more connected to your community.  · Contact a local organization to find out if you are eligible for a service dog.  Where to find more information  Go to this website to find more information about PTSD, treatment of PTSD, and how to get support:  · National Center for PTSD: www.ptsd.va.gov  Contact a health care provider if:  · Your symptoms get worse or do not get better.  Get help right away if:  · You have thoughts about hurting yourself or others.  If you ever feel like you may hurt yourself or others, or have thoughts about taking your own life, get help right away. You can go to your nearest emergency department or call:  · Your local emergency services (911 in the [place]  · A suicide crisis helpline, such as the National Suicide Prevention Lifeline at 1-854.415.5056. This is open 24-hours a day.  Summary  · If you are living with PTSD, there are ways to help you recover from it and manage your symptoms.  · Find supportive environments and people who understand PTSD. Spend time in those places, and maintain contact with  those people.  · Work with your health care team to create a plan for managing PTSD. The plan should include counseling, stress reduction techniques, and healthy lifestyle habits.  This information is not intended to replace advice given to you by your health care provider. Make sure you discuss any questions you have with your health care provider.  Document Revised: 04/10/2020 Document Reviewed: 04/19/2018  [employer]vier Patient Education © 2020 [employer]

## 2021-02-03 NOTE — PROGRESS NOTES
Subjective   Frank Barger is a 50 y.o.white male who presents today for follow up in the office    Chief Complaint:  Depression, anxiety and PTSD    History of Present Illness:   He canceled his last two appts, last visit in May 2020  He is on Medicare now  Had cardiac cath with a stent in January and now scheduled for cardiac rehab twice weekly  He continues to see therapist at [facility], but now every 6 wks  Adding Abilify helped his mood a lot.  Depression low 2/10  Denies Si/HI  Anxiety 4/10  Sleeping well, no nightmares, holding the prazosin for now    The following portions of the patient's history were reviewed and updated as appropriate: allergies, current medications, past family history, past medical history, past social history, past surgical history and problem list.    PAST PSYCHIATRIC HISTORY  Axis I  Affective/Bipoloar Disorder, Anxiety/Panic Disorder, Posttraumatic Stress  Axis II  None    PAST OUTPATIENT TREATMENT  Diagnosis treated:  Affective Disorder, Anxiety/Panic Disorder, Post-Traumatic Stress ([employer],   Treatment Type:  Individual Therapy, Medication Management  Prior Psychiatric Medications:  Zoloft  Brintellix  Trintellix   Prazosin  Elavil  Abilify  Support Groups:  None  Sequelae Of Mental Disorder:  social isolation, family disruption, emotional distress      Interval History  No Change    Side Effects  None    Past psychiatric history reviewed and compared to 5/27/20 visit and appropriate updates were made.    Past Medical History:  Past Medical History:   Diagnosis Date   • Allergies     takes allergy injections   • Arthritis    • Asthma    • Back pain    • Depression    • Emphysema of lung (CMS/[facility])    • Headache    • Leg pain        Social History:  Social History     Socioeconomic History   • Marital status: Single     Spouse name: Not on file   • Number of children: Not on file   • Years of education: Not on file   • Highest education level: Not on file   Tobacco Use    • Smoking status: Current Every Day Smoker     Packs/day: 0.50   • Smokeless tobacco: Never Used   Substance and Sexual Activity   • Alcohol use: No     Frequency: Never   • Drug use: No   • Sexual activity: Yes     Partners: Female       Family History:  Family History   Problem Relation Age of Onset   • Diabetes Mother    • Heart disease Father    • Anxiety disorder Daughter    • Depression Daughter        Past Surgical History:  Past Surgical History:   Procedure Laterality Date   • CARDIAC CATHETERIZATION N/A 1/20/2021    Procedure: Left Heart Cath and coronary angiogram;  Surgeon: Jaimie Silva MD;  Location: [facility] CATH INVASIVE LOCATION;  Service: Cardiovascular;  Laterality: N/A;   • CARDIAC CATHETERIZATION N/A 1/20/2021    Procedure: Saphenous Vein Graft;  Surgeon: Jaimie Silva MD;  Location: [facility] CATH INVASIVE LOCATION;  Service: Cardiovascular;  Laterality: N/A;   • CARDIAC CATHETERIZATION N/A 1/20/2021    Procedure: Stent EBENEZER coronary;  Surgeon: Herve Rodriguez MD;  Location: [facility] CATH INVASIVE LOCATION;  Service: Cardiology;  Laterality: N/A;   • CORONARY ANGIOPLASTY WITH STENT PLACEMENT  04/2014   • CORONARY ARTERY BYPASS GRAFT  12/2015       Problem List:  Patient Active Problem List   Diagnosis   • Atherosclerosis of coronary artery bypass graft   • Essential hypertension   • Chronic pain disorder   • Coronary artery disease   • Degeneration of intervertebral disc of lumbar region   • Fibromyositis   • Mixed hyperlipidemia   • Neuropathic pain   • Posttraumatic stress disorder   • Psoriasis   • Psoriatic arthritis (CMS/HCC)   • Environmental and seasonal allergies   • Personal history of tobacco use, presenting hazards to health   • Status post coronary artery stent placement   • Status post coronary artery bypass graft   • Gastroesophageal reflux disease without esophagitis   • Tinnitus of left ear   • Intractable migraine without aura and with status migrainosus   • Cerumen  debris on tympanic membrane of both ears   • Bilateral deafness   • Moderate major depression (CMS/HCC)   • Ischemic heart disease due to coronary artery obstruction (CMS/HCC)   • Chest discomfort   • S/P CABG (coronary artery bypass graft)   • Status post angioplasty with stent   • Dyslipidemia   • Abnormal nuclear stress test   • Chest pain with high risk of acute coronary syndrome       Allergy:   Allergies   Allergen Reactions   • Hydrocodone Nausea And Vomiting   • Hydrocodone-Acetaminophen GI Intolerance   • Oxycodone GI Intolerance        Discontinued Medications:  There are no discontinued medications.    Current Medications:   Current Outpatient Medications   Medication Sig Dispense Refill   • amitriptyline (ELAVIL) 75 MG tablet Take 1 tablet by mouth Every Night. 30 tablet 2   • ARIPiprazole (ABILIFY) 5 MG tablet TAKE 1 TABLET BY MOUTH EVERY NIGHT 90 tablet 0   • aspirin (aspirin) 81 MG EC tablet Take 1 tablet by mouth Daily. 90 tablet 1   • atorvastatin (LIPITOR) 20 MG tablet Take 1 tablet by mouth Every Night. 90 tablet 1   • buPROPion XL (Wellbutrin XL) 150 MG 24 hr tablet Take 1 tablet by mouth Daily. 90 tablet 1   • butalbital-acetaminophen-caffeine (FIORICET, ESGIC) -40 MG per tablet Take 1 tablet by mouth 3 (Three) Times a Day As Needed for Headache.     • clobetasol (TEMOVATE) 0.05 % ointment Apply  topically to the appropriate area as directed 2 (Two) Times a Day. 60 g 1   • clopidogrel (PLAVIX) 75 MG tablet Take 1 tablet by mouth Daily. 30 tablet 0   • cyclobenzaprine (FLEXERIL) 10 MG tablet TAKE 1 TABLET BY MOUTH EVERY NIGHT AT BEDTIME FOR 1 WEEK THEN THREE TIMES DAILY 90 tablet 5   • loratadine (CLARITIN) 10 MG tablet Take 10 mg by mouth Daily.     • metoprolol tartrate (LOPRESSOR) 25 MG tablet Take 1 tablet by mouth 2 (Two) Times a Day. 180 tablet 1   • montelukast (SINGULAIR) 10 MG tablet Take 1 tablet by mouth Every Night. 90 tablet 1   • naproxen (NAPROSYN) 500 MG tablet Take 1 tablet  by mouth 2 (Two) Times a Day As Needed for Moderate Pain . Take with food! 60 tablet 2   • nitroglycerin (NITROSTAT) 0.4 MG SL tablet PLACE ONE TABLET UNDER TONGUE AS NEEDED FOR CHEST PAIN EVERY 5 MINUTES. TAKE NO MORE THAN 3 DOSES IN 15 MINUTES. 25 tablet 0   • NON FORMULARY Allergy injections     • omeprazole (priLOSEC) 20 MG capsule Take 1 capsule by mouth Every Morning Before Breakfast. Take on an empty stomach! 90 capsule 1   • OXcarbazepine (TRILEPTAL) 300 MG tablet Take 150 mg by mouth 2 (Two) Times a Day.     • prazosin (MINIPRESS) 2 MG capsule TAKE 1 CAPSULE BY MOUTH EVERY NIGHT 30 capsule 0   • pregabalin (LYRICA) 200 MG capsule Take 1 capsule by mouth 3 (Three) Times a Day. 90 capsule 5   • SUMAtriptan (IMITREX) 50 MG tablet TAKE 1 TABLET BY MOUTH AT ONSET OF HEADACHE. MAY REPEAT DOSE 1 TIME IN 2 HOURS IF HEADACHE NOT RELIEVED 10 tablet 0   • varenicline (Chantix Starting Month Pak) 0.5 MG X 11 & 1 MG X 42 tablet Take 0.5 mg one daily on days 1-3 and and 0.5 mg twice daily on days 4-7.Then 1 mg twice daily for a total of 12 weeks. 53 tablet 0   • Vortioxetine HBr (Trintellix) 20 MG tablet Take 20 mg by mouth Daily. 30 tablet 0     No current facility-administered medications for this visit.          Review of Symptoms:    Psychiatric/Behavioral: Negative for agitation, behavioral problems, confusion, decreased concentration, dysphoric mood, hallucinations, self-injury, sleep disturbance and suicidal ideas. The patient is not nervous/anxious and is not hyperactive.        Physical Exam:   There were no vitals taken for this visit.    Mental Status Exam:   Hygiene:   Good  Cooperation:  Cooperative  Eye Contact: Good  Psychomotor Behavior:  Appropriate  Affect:  Appropriate  Mood: Normal  Hopelessness: Denies  Speech:  Normal  Thought Process:  Goal directed  Thought Content:  Normal  Suicidal:  None  Homicidal:  None  Hallucinations:  None  Delusion:  None  Memory:  Intact  Orientation:  Person, Place,  Time and Situation  Reliability:  good  Insight:  Good  Judgement:  Good  Impulse Control:  Good  Physical/Medical Issues:  Yes arthritis     Mental status exam was reviewed and compared to 5/27/20 visit and appropriate updates were made.    PHQ-9 Depression Screening  Little interest or pleasure in doing things? 1   Feeling down, depressed, or hopeless? 1   Trouble falling or staying asleep, or sleeping too much? 0   Feeling tired or having little energy? 1   Poor appetite or overeating? 0   Feeling bad about yourself - or that you are a failure or have let yourself or your family down? 1   Trouble concentrating on things, such as reading the newspaper or watching television? 1   Moving or speaking so slowly that other people could have noticed? Or the opposite - being so fidgety or restless that you have been moving around a lot more than usual? 0   Thoughts that you would be better off dead, or of hurting yourself in some way? 0   PHQ-9 Total Score 5   If you checked off any problems, how difficult have these problems made it for you to do your work, take care of things at home, or get along with other people? Somewhat difficult           Currently smoking, has decreased to 1/4 ppd and trying to quit.      I advised Frank of the risks of tobacco use.     Lab Results:   Admission on 01/18/2021, Discharged on 01/21/2021   Component Date Value Ref Range Status   • QT Interval 01/18/2021 371  ms Final   • Glucose 01/19/2021 102* 65 - 99 mg/dL Final   • BUN 01/19/2021 13  6 - 20 mg/dL Final   • Creatinine 01/19/2021 0.93  0.76 - 1.27 mg/dL Final   • Sodium 01/19/2021 130* 136 - 145 mmol/L Final   • Potassium 01/19/2021 4.1  3.5 - 5.2 mmol/L Final   • Chloride 01/19/2021 97* 98 - 107 mmol/L Final   • CO2 01/19/2021 25.0  22.0 - 29.0 mmol/L Final   • Calcium 01/19/2021 8.9  8.6 - 10.5 mg/dL Final   • Total Protein 01/19/2021 6.7  6.0 - 8.5 g/dL Final   • Albumin 01/19/2021 3.80  3.50 - 5.20 g/dL Final   • ALT (SGPT)  01/19/2021 7  1 - 41 U/L Final   • AST (SGOT) 01/19/2021 11  1 - 40 U/L Final   • Alkaline Phosphatase 01/19/2021 87  39 - 117 U/L Final   • Total Bilirubin 01/19/2021 0.2  0.0 - 1.2 mg/dL Final   • eGFR Non  Amer 01/19/2021 86  >60 mL/min/1.73 Final   • Globulin 01/19/2021 2.9  gm/dL Final   • A/G Ratio 01/19/2021 1.3  g/dL Final   • BUN/Creatinine Ratio 01/19/2021 14.0  7.0 - 25.0 Final   • Anion Gap 01/19/2021 8.0  5.0 - 15.0 mmol/L Final   • Protime 01/18/2021 10.9  9.6 - 11.7 Seconds Final   • INR 01/18/2021 0.99  0.93 - 1.10 Final   • PTT 01/18/2021 21.6* 24.0 - 31.0 seconds Final   • Troponin T 01/19/2021 <0.010  0.000 - 0.030 ng/mL Final   • WBC 01/18/2021 8.30  3.40 - 10.80 10*3/mm3 Final   • RBC 01/18/2021 4.50  4.14 - 5.80 10*6/mm3 Final   • Hemoglobin 01/18/2021 11.8* 13.0 - 17.7 g/dL Final   • Hematocrit 01/18/2021 36.1* 37.5 - 51.0 % Final   • MCV 01/18/2021 80.3  79.0 - 97.0 fL Final   • MCH 01/18/2021 26.3* 26.6 - 33.0 pg Final   • MCHC 01/18/2021 32.8  31.5 - 35.7 g/dL Final   • RDW 01/18/2021 18.0* 12.3 - 15.4 % Final   • RDW-SD 01/18/2021 50.3  37.0 - 54.0 fl Final   • MPV 01/18/2021 9.2  6.0 - 12.0 fL Final   • Platelets 01/18/2021 274  140 - 450 10*3/mm3 Final   • Neutrophil % 01/18/2021 50.6  42.7 - 76.0 % Final   • Lymphocyte % 01/18/2021 35.7  19.6 - 45.3 % Final   • Monocyte % 01/18/2021 10.6  5.0 - 12.0 % Final   • Eosinophil % 01/18/2021 1.7  0.3 - 6.2 % Final   • Basophil % 01/18/2021 1.4  0.0 - 1.5 % Final   • Neutrophils, Absolute 01/18/2021 4.20  1.70 - 7.00 10*3/mm3 Final   • Lymphocytes, Absolute 01/18/2021 3.00  0.70 - 3.10 10*3/mm3 Final   • Monocytes, Absolute 01/18/2021 0.90  0.10 - 0.90 10*3/mm3 Final   • Eosinophils, Absolute 01/18/2021 0.10  0.00 - 0.40 10*3/mm3 Final   • Basophils, Absolute 01/18/2021 0.10  0.00 - 0.20 10*3/mm3 Final   • nRBC 01/18/2021 0.1  0.0 - 0.2 /100 WBC Final   • Glucose 01/19/2021 110* 70 - 105 mg/dL Final    Serial Number:  [id number]:  [id number]   • COVID19 01/19/2021 Not Detected  Not Detected - Ref. Range Final   • Troponin T 01/19/2021 <0.010  0.000 - 0.030 ng/mL Final   • Troponin T 01/19/2021 <0.010  0.000 - 0.030 ng/mL Final   • Glucose 01/19/2021 109* 65 - 99 mg/dL Final   • BUN 01/19/2021 15  6 - 20 mg/dL Final   • Creatinine 01/19/2021 0.87  0.76 - 1.27 mg/dL Final   • Sodium 01/19/2021 133* 136 - 145 mmol/L Final   • Potassium 01/19/2021 4.2  3.5 - 5.2 mmol/L Final   • Chloride 01/19/2021 97* 98 - 107 mmol/L Final   • CO2 01/19/2021 24.0  22.0 - 29.0 mmol/L Final   • Calcium 01/19/2021 8.8  8.6 - 10.5 mg/dL Final   • eGFR Non  Amer 01/19/2021 93  >60 mL/min/1.73 Final   • BUN/Creatinine Ratio 01/19/2021 17.2  7.0 - 25.0 Final   • Anion Gap 01/19/2021 12.0  5.0 - 15.0 mmol/L Final   • WBC 01/19/2021 6.50  3.40 - 10.80 10*3/mm3 Final   • RBC 01/19/2021 4.26  4.14 - 5.80 10*6/mm3 Final   • Hemoglobin 01/19/2021 11.2* 13.0 - 17.7 g/dL Final   • Hematocrit 01/19/2021 33.4* 37.5 - 51.0 % Final   • MCV 01/19/2021 78.4* 79.0 - 97.0 fL Final   • MCH 01/19/2021 26.3* 26.6 - 33.0 pg Final   • MCHC 01/19/2021 33.6  31.5 - 35.7 g/dL Final   • RDW 01/19/2021 17.9* 12.3 - 15.4 % Final   • RDW-SD 01/19/2021 49.4  37.0 - 54.0 fl Final   • MPV 01/19/2021 8.1  6.0 - 12.0 fL Final   • Platelets 01/19/2021 233  140 - 450 10*3/mm3 Final   • Neutrophil % 01/19/2021 53.5  42.7 - 76.0 % Final   • Lymphocyte % 01/19/2021 34.1  19.6 - 45.3 % Final   • Monocyte % 01/19/2021 9.3  5.0 - 12.0 % Final   • Eosinophil % 01/19/2021 1.7  0.3 - 6.2 % Final   • Basophil % 01/19/2021 1.4  0.0 - 1.5 % Final   • Neutrophils, Absolute 01/19/2021 3.50  1.70 - 7.00 10*3/mm3 Final   • Lymphocytes, Absolute 01/19/2021 2.20  0.70 - 3.10 10*3/mm3 Final   • Monocytes, Absolute 01/19/2021 0.60  0.10 - 0.90 10*3/mm3 Final   • Eosinophils, Absolute 01/19/2021 0.10  0.00 - 0.40 10*3/mm3 Final   • Basophils, Absolute 01/19/2021 0.10  0.00 - 0.20 10*3/mm3 Final   •  nRBC 01/19/2021 0.2  0.0 - 0.2 /100 WBC Final   • Magnesium 01/19/2021 1.9  1.6 - 2.6 mg/dL Final   • QT Interval 01/19/2021 369  ms Preliminary   • Protime 01/19/2021 11.4  9.6 - 11.7 Seconds Final   • INR 01/19/2021 1.04  0.93 - 1.10 Final   • PTT 01/19/2021 27.4* 61.0 - 76.5 seconds Final   • WBC 01/19/2021 7.40  3.40 - 10.80 10*3/mm3 Final   • RBC 01/19/2021 4.43  4.14 - 5.80 10*6/mm3 Final   • Hemoglobin 01/19/2021 11.7* 13.0 - 17.7 g/dL Final   • Hematocrit 01/19/2021 35.2* 37.5 - 51.0 % Final   • MCV 01/19/2021 79.5  79.0 - 97.0 fL Final   • MCH 01/19/2021 26.4* 26.6 - 33.0 pg Final   • MCHC 01/19/2021 33.2  31.5 - 35.7 g/dL Final   • RDW 01/19/2021 18.0* 12.3 - 15.4 % Final   • RDW-SD 01/19/2021 49.9  37.0 - 54.0 fl Final   • MPV 01/19/2021 8.1  6.0 - 12.0 fL Final   • Platelets 01/19/2021 244  140 - 450 10*3/mm3 Final   • Neutrophil % 01/19/2021 64.2  42.7 - 76.0 % Final   • Lymphocyte % 01/19/2021 25.5  19.6 - 45.3 % Final   • Monocyte % 01/19/2021 8.3  5.0 - 12.0 % Final   • Eosinophil % 01/19/2021 1.0  0.3 - 6.2 % Final   • Basophil % 01/19/2021 1.0  0.0 - 1.5 % Final   • Neutrophils, Absolute 01/19/2021 4.80  1.70 - 7.00 10*3/mm3 Final   • Lymphocytes, Absolute 01/19/2021 1.90  0.70 - 3.10 10*3/mm3 Final   • Monocytes, Absolute 01/19/2021 0.60  0.10 - 0.90 10*3/mm3 Final   • Eosinophils, Absolute 01/19/2021 0.10  0.00 - 0.40 10*3/mm3 Final   • Basophils, Absolute 01/19/2021 0.10  0.00 - 0.20 10*3/mm3 Final   • nRBC 01/19/2021 0.0  0.0 - 0.2 /100 WBC Final   • PTT 01/19/2021 50.4* 61.0 - 76.5 seconds Final   • Troponin T 01/20/2021 <0.010  0.000 - 0.030 ng/mL Final   • PTT 01/20/2021 59.1* 61.0 - 76.5 seconds Final   • Magnesium 01/20/2021 2.1  1.6 - 2.6 mg/dL Final   • Glucose 01/20/2021 117* 65 - 99 mg/dL Final   • BUN 01/20/2021 11  6 - 20 mg/dL Final   • Creatinine 01/20/2021 1.11  0.76 - 1.27 mg/dL Final   • Sodium 01/20/2021 139  136 - 145 mmol/L Final   • Potassium 01/20/2021 4.3  3.5 - 5.2 mmol/L  Final   • Chloride 01/20/2021 103  98 - 107 mmol/L Final   • CO2 01/20/2021 29.0  22.0 - 29.0 mmol/L Final   • Calcium 01/20/2021 9.1  8.6 - 10.5 mg/dL Final   • eGFR Non  Amer 01/20/2021 70  >60 mL/min/1.73 Final   • BUN/Creatinine Ratio 01/20/2021 9.9  7.0 - 25.0 Final   • Anion Gap 01/20/2021 7.0  5.0 - 15.0 mmol/L Final   • Protime 01/20/2021 11.4  9.6 - 11.7 Seconds Final   • INR 01/20/2021 1.04  0.93 - 1.10 Final   • WBC 01/20/2021 5.70  3.40 - 10.80 10*3/mm3 Final   • RBC 01/20/2021 4.44  4.14 - 5.80 10*6/mm3 Final   • Hemoglobin 01/20/2021 11.5* 13.0 - 17.7 g/dL Final   • Hematocrit 01/20/2021 35.1* 37.5 - 51.0 % Final   • MCV 01/20/2021 79.0  79.0 - 97.0 fL Final   • MCH 01/20/2021 25.9* 26.6 - 33.0 pg Final   • MCHC 01/20/2021 32.8  31.5 - 35.7 g/dL Final   • RDW 01/20/2021 17.8* 12.3 - 15.4 % Final   • RDW-SD 01/20/2021 49.4  37.0 - 54.0 fl Final   • MPV 01/20/2021 8.3  6.0 - 12.0 fL Final   • Platelets 01/20/2021 237  140 - 450 10*3/mm3 Final   • Neutrophil % 01/20/2021 44.3  42.7 - 76.0 % Final   • Lymphocyte % 01/20/2021 42.4  19.6 - 45.3 % Final   • Monocyte % 01/20/2021 9.6  5.0 - 12.0 % Final   • Eosinophil % 01/20/2021 1.8  0.3 - 6.2 % Final   • Basophil % 01/20/2021 1.9* 0.0 - 1.5 % Final   • Neutrophils, Absolute 01/20/2021 2.50  1.70 - 7.00 10*3/mm3 Final   • Lymphocytes, Absolute 01/20/2021 2.40  0.70 - 3.10 10*3/mm3 Final   • Monocytes, Absolute 01/20/2021 0.60  0.10 - 0.90 10*3/mm3 Final   • Eosinophils, Absolute 01/20/2021 0.10  0.00 - 0.40 10*3/mm3 Final   • Basophils, Absolute 01/20/2021 0.10  0.00 - 0.20 10*3/mm3 Final   • nRBC 01/20/2021 0.1  0.0 - 0.2 /100 WBC Final   • Troponin T 01/20/2021 <0.010  0.000 - 0.030 ng/mL Final   • Activated Clotting Time  01/20/2021 136  89 - 137 Seconds Final    Serial Number: [id number]:  [id number]   • Activated Clotting Time  01/20/2021 180* 89 - 137 Seconds Final    Serial Number: [id number]:  [id number]   • Activated Clotting Time  01/20/2021  180* 89 - 137 Seconds Final    Serial Number: [id number]:  [id number]   • Activated Clotting Time  01/20/2021 153* 89 - 137 Seconds Final    Serial Number: [id number]:  [id number]   • Magnesium 01/21/2021 1.9  1.6 - 2.6 mg/dL Final   • PTT 01/21/2021 23.8* 61.0 - 76.5 seconds Final   • Glucose 01/21/2021 109* 65 - 99 mg/dL Final   • BUN 01/21/2021 12  6 - 20 mg/dL Final   • Creatinine 01/21/2021 1.10  0.76 - 1.27 mg/dL Final   • Sodium 01/21/2021 134* 136 - 145 mmol/L Final   • Potassium 01/21/2021 3.7  3.5 - 5.2 mmol/L Final   • Chloride 01/21/2021 101  98 - 107 mmol/L Final   • CO2 01/21/2021 25.0  22.0 - 29.0 mmol/L Final   • Calcium 01/21/2021 8.6  8.6 - 10.5 mg/dL Final   • eGFR Non  Amer 01/21/2021 71  >60 mL/min/1.73 Final   • BUN/Creatinine Ratio 01/21/2021 10.9  7.0 - 25.0 Final   • Anion Gap 01/21/2021 8.0  5.0 - 15.0 mmol/L Final   • Total Cholesterol 01/21/2021 112  0 - 200 mg/dL Final   • Triglycerides 01/21/2021 460* 0 - 150 mg/dL Final   • HDL Cholesterol 01/21/2021 25* 40 - 60 mg/dL Final   • LDL Cholesterol  01/21/2021 24  0 - 100 mg/dL Final   • VLDL Cholesterol 01/21/2021 63* 5 - 40 mg/dL Final   • LDL/HDL Ratio 01/21/2021 -0.20   Final   • WBC 01/21/2021 6.30  3.40 - 10.80 10*3/mm3 Final   • RBC 01/21/2021 4.12* 4.14 - 5.80 10*6/mm3 Final   • Hemoglobin 01/21/2021 10.8* 13.0 - 17.7 g/dL Final   • Hematocrit 01/21/2021 32.5* 37.5 - 51.0 % Final   • MCV 01/21/2021 78.9* 79.0 - 97.0 fL Final   • MCH 01/21/2021 26.2* 26.6 - 33.0 pg Final   • MCHC 01/21/2021 33.2  31.5 - 35.7 g/dL Final   • RDW 01/21/2021 17.8* 12.3 - 15.4 % Final   • RDW-SD 01/21/2021 49.4  37.0 - 54.0 fl Final   • MPV 01/21/2021 8.5  6.0 - 12.0 fL Final   • Platelets 01/21/2021 217  140 - 450 10*3/mm3 Final   • Neutrophil % 01/21/2021 65.2  42.7 - 76.0 % Final   • Lymphocyte % 01/21/2021 22.5  19.6 - 45.3 % Final   • Monocyte % 01/21/2021 10.1  5.0 - 12.0 % Final   • Eosinophil % 01/21/2021 1.0  0.3 - 6.2 % Final   • Basophil  % 01/21/2021 1.2  0.0 - 1.5 % Final   • Neutrophils, Absolute 01/21/2021 4.10  1.70 - 7.00 10*3/mm3 Final   • Lymphocytes, Absolute 01/21/2021 1.40  0.70 - 3.10 10*3/mm3 Final   • Monocytes, Absolute 01/21/2021 0.60  0.10 - 0.90 10*3/mm3 Final   • Eosinophils, Absolute 01/21/2021 0.10  0.00 - 0.40 10*3/mm3 Final   • Basophils, Absolute 01/21/2021 0.10  0.00 - 0.20 10*3/mm3 Final   • nRBC 01/21/2021 0.2  0.0 - 0.2 /100 WBC Final   • QT Interval 01/21/2021 363  ms Final   • Troponin T 01/21/2021 <0.010  0.000 - 0.030 ng/mL Final   Hospital Outpatient Visit on 01/14/2021   Component Date Value Ref Range Status   • Target HR (85%) 01/14/2021 145  bpm Final   • Max. Pred. HR (100%) 01/14/2021 170  bpm Final   • BH CV STRESS PROTOCOL 1 01/14/2021 Pharmacologic   Final   • Stage 1 01/14/2021 1   Final   • Duration Min Stage 1 01/14/2021 0   Final   • Duration Sec Stage 1 01/14/2021 10   Final   • Stress Dose Regadenoson Stage 1 01/14/2021 0.4   Final   • Stress Comments Stage 1 01/14/2021 10 sec bolus injection   Final   • Baseline HR 01/14/2021 72  bpm Final   • Baseline BP 01/14/2021 120/84  mmHg Final   • Recovery HR 01/14/2021 97  bpm Final   • Recovery BP 01/14/2021 120/80  mmHg Final   Hospital Outpatient Visit on 01/14/2021   Component Date Value Ref Range Status   • BSA 01/14/2021 2.1  m^2 Final   • RVIDd 01/14/2021 3.1  cm Final   • IVSd 01/14/2021 0.99  cm Final   • LVIDd 01/14/2021 4.0  cm Final   • LVIDs 01/14/2021 3.7  cm Final   • LVPWd 01/14/2021 0.82  cm Final   • IVS/LVPW 01/14/2021 1.2   Final   • FS 01/14/2021 8.5  % Final   • EDV(Teich) 01/14/2021 71.9  ml Final   • ESV(Teich) 01/14/2021 58.2  ml Final   • EF(Teich) 01/14/2021 19.0  % Final   • EDV(cubed) 01/14/2021 66.1  ml Final   • ESV(cubed) 01/14/2021 50.7  ml Final   • EF(cubed) 01/14/2021 23.4  % Final   • LV mass(C)d 01/14/2021 112.1  grams Final   • LV mass(C)dI 01/14/2021 54.6  grams/m^2 Final   • SV(Teich) 01/14/2021 13.7  ml Final   •  SI(Teich) 01/14/2021 6.7  ml/m^2 Final   • SV(cubed) 01/14/2021 15.4  ml Final   • SI(cubed) 01/14/2021 7.5  ml/m^2 Final   • Ao root diam 01/14/2021 3.7  cm Final   • Ao root area 01/14/2021 10.5  cm^2 Final   • ACS 01/14/2021 2.4  cm Final   • LA dimension 01/14/2021 2.5  cm Final   • LA/Ao 01/14/2021 0.68   Final   • LVOT diam 01/14/2021 2.2  cm Final   • LVOT area 01/14/2021 3.9  cm^2 Final   • EDV(MOD-sp4) 01/14/2021 128.4  ml Final   • ESV(MOD-sp4) 01/14/2021 60.2  ml Final   • EF(MOD-sp4) 01/14/2021 53.1  % Final   • SV(MOD-sp4) 01/14/2021 68.1  ml Final   • SI(MOD-sp4) 01/14/2021 33.2  ml/m^2 Final   • Ao root area (BSA corrected) 01/14/2021 1.8   Final   • LV Recio Vol (BSA corrected) 01/14/2021 62.5  ml/m^2 Final   • LV Sys Vol (BSA corrected) 01/14/2021 29.3  ml/m^2 Final   • MV E max lee 01/14/2021 54.2  cm/sec Final   • MV A max lee 01/14/2021 64.5  cm/sec Final   • MV E/A 01/14/2021 0.84   Final   • MV V2 max 01/14/2021 74.4  cm/sec Final   • MV max PG 01/14/2021 2.2  mmHg Final   • MV V2 mean 01/14/2021 48.0  cm/sec Final   • MV mean PG 01/14/2021 1.0  mmHg Final   • MV V2 VTI 01/14/2021 20.8  cm Final   • MVA(VTI) 01/14/2021 3.0  cm^2 Final   • MV dec slope 01/14/2021 238.0  cm/sec^2 Final   • MV dec time 01/14/2021 0.23  sec Final   • Ao pk lee 01/14/2021 103.4  cm/sec Final   • Ao max PG 01/14/2021 4.3  mmHg Final   • Ao max PG (full) 01/14/2021 1.4  mmHg Final   • Ao V2 mean 01/14/2021 74.7  cm/sec Final   • Ao mean PG 01/14/2021 2.4  mmHg Final   • Ao mean PG (full) 01/14/2021 1.0  mmHg Final   • Ao V2 VTI 01/14/2021 18.3  cm Final   • HUBER(I,A) 01/14/2021 3.4  cm^2 Final   • HUBER(I,D) 01/14/2021 3.4  cm^2 Final   • HUBER(V,A) 01/14/2021 3.2  cm^2 Final   • HUBER(V,D) 01/14/2021 3.2  cm^2 Final   • LV V1 max PG 01/14/2021 2.9  mmHg Final   • LV V1 mean PG 01/14/2021 1.3  mmHg Final   • LV V1 max 01/14/2021 84.6  cm/sec Final   • LV V1 mean 01/14/2021 53.7  cm/sec Final   • LV V1 VTI 01/14/2021 15.7  cm  Final   • SV(Ao) 01/14/2021 192.7  ml Final   • SI(Ao) 01/14/2021 93.9  ml/m^2 Final   • SV(LVOT) 01/14/2021 62.1  ml Final   • SI(LVOT) 01/14/2021 30.3  ml/m^2 Final   • PA V2 max 01/14/2021 118.9  cm/sec Final   • PA max PG 01/14/2021 5.7  mmHg Final   • PA max PG (full) 01/14/2021 3.8  mmHg Final   • PA acc time 01/14/2021 0.18  sec Final   • PI end-d lee 01/14/2021 112.3  cm/sec Final   • PI max lee 01/14/2021 163.2  cm/sec Final   • PI max PG 01/14/2021 10.7  mmHg Final   • RV V1 max PG 01/14/2021 1.8  mmHg Final   • RV V1 mean PG 01/14/2021 1.1  mmHg Final   • RV V1 max 01/14/2021 67.8  cm/sec Final   • RV V1 mean 01/14/2021 51.4  cm/sec Final   • RV V1 VTI 01/14/2021 13.2  cm Final   • PA pr(Accel) 01/14/2021 -0.41  mmHg Final   • BH CV ECHO VER[place]NICA - BZI_BMI 01/14/2021 23.6  kilograms/m^2 Final   • BH CV ECHO VER[place]NICA - BSA([place]) 01/14/2021 2.0  m^2 Final   • BH CV ECHO VER[place]NICA - BZI_METRIC_WEIGHT 01/14/2021 81.2  kg Final   • BH CV ECHO VER[place]NICA - BZI_METRIC_HEIGHT 01/14/2021 185.4  cm Final   • Target HR (85%) 01/14/2021 145  bpm Final   • Max. Pred. HR (100%) 01/14/2021 170  bpm Final   • Echo EF Estimated 01/14/2021 50  % Final       Assessment/Plan   Problems Addressed this Visit        Other    Posttraumatic stress disorder - Primary (Chronic)    Moderate major depression (CMS/HCC) (Chronic)      Diagnoses       Codes Comments    Posttraumatic stress disorder    -  Primary ICD-10-CM: F43.10  ICD-9-CM: 309.81     Moderate major depression (CMS/HCC)     ICD-10-CM: F32.1  ICD-9-CM: 296.22           Visit Diagnoses:    ICD-10-CM ICD-9-CM   1. Posttraumatic stress disorder  F43.10 309.81   2. Moderate major depression (CMS/HCC)  F32.1 296.22       TREATMENT PLAN/GOALS: Continue supportive psychotherapy efforts and medications as indicated. Treatment and medication options discussed during today's visit. Patient ackowledged and verbally consented to continue with current treatment plan and was educated on the  importance of compliance with treatment and follow-up appointments.    MEDICATION ISSUES:  INSPECT reviewed as expected  Discussed medication options and treatment plan of prescribed medication as well as the risks, benefits, and side effects including potential falls, possible impaired driving and metabolic adversities among others. Patient is agreeable to call the office with any worsening of symptoms or onset of side effects. Patient is agreeable to call 911 or go to the nearest ER should he/she begin having SI/HI. No medication side effects or related complaints today.     Continue current medication with no changes.  The patient says he is doing well.  Continue Abilify 5mg daily and Trintellix 20mg daily for depression, no refills needed.   Continue Wellbutrin  mg QAM, rx per Dr. Shell  Continue Elavil 75mg at bedtime for sleep, but no refills needed today.    He restarted the prazosin 2mg at bedtime for nightmares, no refill needed today.      MEDS ORDERED DURING VISIT:  No orders of the defined types were placed in this encounter.      Return in about 4 months (around 6/3/2021).      This document has been electronically signed by Carlita Mcdermott PA-C  February 8, 2021 11:37 EST

## 2021-02-04 ENCOUNTER — TELEPHONE (OUTPATIENT)
Dept: FAMILY MEDICINE CLINIC | Facility: CLINIC | Age: 51
End: 2021-02-04

## 2021-02-04 NOTE — TELEPHONE ENCOUNTER
Prior Authorization for Montelukast 10 mg has been approved from 11/5/2020 until 2/4/2022. Approval notice has been faxed to pharmacy.

## 2021-02-05 ENCOUNTER — TREATMENT (OUTPATIENT)
Dept: CARDIAC REHAB | Facility: HOSPITAL | Age: 51
End: 2021-02-05

## 2021-02-05 DIAGNOSIS — Z95.5 S/P DRUG ELUTING CORONARY STENT PLACEMENT: Primary | ICD-10-CM

## 2021-02-05 PROCEDURE — 93798 PHYS/QHP OP CAR RHAB W/ECG: CPT

## 2021-02-08 ENCOUNTER — TREATMENT (OUTPATIENT)
Dept: CARDIAC REHAB | Facility: HOSPITAL | Age: 51
End: 2021-02-08

## 2021-02-08 DIAGNOSIS — Z95.5 S/P DRUG ELUTING CORONARY STENT PLACEMENT: Primary | ICD-10-CM

## 2021-02-08 PROCEDURE — 93798 PHYS/QHP OP CAR RHAB W/ECG: CPT

## 2021-02-10 ENCOUNTER — APPOINTMENT (OUTPATIENT)
Dept: CARDIAC REHAB | Facility: HOSPITAL | Age: 51
End: 2021-02-10

## 2021-02-12 ENCOUNTER — TREATMENT (OUTPATIENT)
Dept: CARDIAC REHAB | Facility: HOSPITAL | Age: 51
End: 2021-02-12

## 2021-02-12 DIAGNOSIS — Z95.5 S/P DRUG ELUTING CORONARY STENT PLACEMENT: Primary | ICD-10-CM

## 2021-02-12 PROCEDURE — 93798 PHYS/QHP OP CAR RHAB W/ECG: CPT

## 2021-02-15 ENCOUNTER — TELEPHONE (OUTPATIENT)
Dept: CARDIOLOGY | Facility: CLINIC | Age: 51
End: 2021-02-15

## 2021-02-15 ENCOUNTER — APPOINTMENT (OUTPATIENT)
Dept: CARDIAC REHAB | Facility: HOSPITAL | Age: 51
End: 2021-02-15

## 2021-02-15 ENCOUNTER — OFFICE VISIT (OUTPATIENT)
Dept: FAMILY MEDICINE CLINIC | Facility: CLINIC | Age: 51
End: 2021-02-15

## 2021-02-15 VITALS
OXYGEN SATURATION: 100 % | WEIGHT: 183 LBS | HEART RATE: 83 BPM | DIASTOLIC BLOOD PRESSURE: 69 MMHG | SYSTOLIC BLOOD PRESSURE: 110 MMHG | HEIGHT: 73 IN | BODY MASS INDEX: 24.25 KG/M2 | TEMPERATURE: 96.8 F

## 2021-02-15 DIAGNOSIS — I24.0 ISCHEMIC HEART DISEASE DUE TO CORONARY ARTERY OBSTRUCTION (HCC): Primary | ICD-10-CM

## 2021-02-15 DIAGNOSIS — Z87.891 PERSONAL HISTORY OF TOBACCO USE, PRESENTING HAZARDS TO HEALTH: ICD-10-CM

## 2021-02-15 DIAGNOSIS — Z09 HOSPITAL DISCHARGE FOLLOW-UP: ICD-10-CM

## 2021-02-15 DIAGNOSIS — I25.9 ISCHEMIC HEART DISEASE DUE TO CORONARY ARTERY OBSTRUCTION (HCC): Primary | ICD-10-CM

## 2021-02-15 PROCEDURE — 99213 OFFICE O/P EST LOW 20 MIN: CPT | Performed by: FAMILY MEDICINE

## 2021-02-15 RX ORDER — CLOPIDOGREL BISULFATE 75 MG/1
75 TABLET ORAL DAILY
Qty: 90 TABLET | Refills: 1 | Status: SHIPPED | OUTPATIENT
Start: 2021-02-15

## 2021-02-15 NOTE — PATIENT INSTRUCTIONS
Mediterranean Diet  A Mediterranean diet refers to food and lifestyle choices that are based on the traditions of countries located on the [place]. This way of eating has been shown to help prevent certain conditions and improve outcomes for people who have chronic diseases, like kidney disease and heart disease.  What are tips for following this plan?  Lifestyle  · Cook and eat meals together with your family, when possible.  · Drink enough fluid to keep your urine clear or pale yellow.  · Be physically active every day. This includes:  ? Aerobic exercise like running or swimming.  ? Leisure activities like gardening, walking, or housework.  · Get 7-8 hours of sleep each night.  · If recommended by your health care provider, drink red wine in moderation. This means 1 glass a day for nonpregnant women and 2 glasses a day for men. A glass of wine equals 5 oz (150 mL).  Reading food labels    · Check the serving size of packaged foods. For foods such as rice and pasta, the serving size refers to the amount of cooked product, not dry.  · Check the total fat in packaged foods. Avoid foods that have saturated fat or trans fats.  · Check the ingredients list for added sugars, such as corn syrup.  Shopping  · At the grocery store, buy most of your food from the areas near the walls of the store. This includes:  ? Fresh fruits and vegetables (produce).  ? Grains, beans, nuts, and seeds. Some of these may be available in unpackaged forms or large amounts (in bulk).  ? Fresh seafood.  ? Poultry and eggs.  ? Low-fat dairy products.  · Buy whole ingredients instead of prepackaged foods.  · Buy fresh fruits and vegetables in-season from local farmers markets.  · Buy frozen fruits and vegetables in resealable bags.  · If you do not have access to quality fresh seafood, buy precooked frozen shrimp or canned fish, such as tuna, salmon, or sardines.  · Buy small amounts of raw or cooked vegetables, salads, or olives from  the deli or salad bar at your store.  · Stock your pantry so you always have certain foods on hand, such as olive oil, canned tuna, canned tomatoes, rice, pasta, and beans.  Cooking  · Cook foods with extra-virgin olive oil instead of using butter or other vegetable oils.  · Have meat as a side dish, and have vegetables or grains as your main dish. This means having meat in small portions or adding small amounts of meat to foods like pasta or stew.  · Use beans or vegetables instead of meat in common dishes like chili or lasagna.  · [place] with different cooking methods. Try roasting or broiling vegetables instead of steaming or sautéeing them.  · Add frozen vegetables to soups, stews, pasta, or rice.  · Add nuts or seeds for added healthy fat at each meal. You can add these to yogurt, salads, or vegetable dishes.  · Marinate fish or vegetables using olive oil, lemon juice, garlic, and fresh herbs.  Meal planning    · Plan to eat 1 vegetarian meal one day each week. Try to work up to 2 vegetarian meals, if possible.  · Eat seafood 2 or more times a week.  · Have healthy snacks readily available, such as:  ? Vegetable sticks with hummus.  ? Greek yogurt.  ? Fruit and nut trail mix.  · Eat balanced meals throughout the week. This includes:  ? Fruit: 2-3 servings a day  ? Vegetables: 4-5 servings a day  ? Low-fat dairy: 2 servings a day  ? Fish, poultry, or lean meat: 1 serving a day  ? Beans and legumes: 2 or more servings a week  ? Nuts and seeds: 1-2 servings a day  ? Whole grains: 6-8 servings a day  ? Extra-virgin olive oil: 3-4 servings a day  · Limit red meat and sweets to only a few servings a month  What are my food choices?  · Mediterranean diet  ? Recommended  § Grains: Whole-grain pasta. Brown rice. Bulgar wheat. Polenta. Couscous. Whole-wheat bread. Oatmeal. Quinoa.  § Vegetables: Artichokes. Beets. Broccoli. Cabbage. Carrots. Eggplant. Green beans. Chard. Kale. Spinach. Onions. Leeks. Peas. Squash.  Tomatoes. Peppers. Radishes.  § Fruits: Apples. Apricots. Avocado. Berries. Bananas. Cherries. Dates. Figs. Grapes. Radu. Melon. Oranges. Peaches. Plums. Pomegranate.  § Meats and other protein foods: Beans. Almonds. Sunflower seeds. Pine nuts. Peanuts. Cod. [place]. Scallops. Shrimp. Tuna. Tilapia. Clams. Oysters. Eggs.  § Dairy: Low-fat milk. Cheese. Greek yogurt.  § Beverages: Water. Red wine. Herbal tea.  § Fats and oils: Extra virgin olive oil. Avocado oil. Grape seed oil.  § Sweets and desserts: Greek yogurt with honey. Baked apples. Poached pears. Trail mix.  § Seasoning and other foods: Basil. Cilantro. Coriander. Cumin. Mint. Parsley. [place]. Rosemary. Tarragon. Garlic. Oregano. Thyme. Pepper. Balsalmic vinegar. Tahini. Hummus. Tomato sauce. Olives. Mushrooms.  ? Limit these  § Grains: Prepackaged pasta or rice dishes. Prepackaged cereal with added sugar.  § Vegetables: Deep fried potatoes (french fries).  § Fruits: Fruit canned in syrup.  § Meats and other protein foods: Beef. Pork. Lamb. Poultry with skin. Hot dogs. [place].  § Dairy: Ice cream. Sour cream. Whole milk.  § Beverages: Juice. Sugar-sweetened soft drinks. Beer. Liquor and spirits.  § Fats and oils: Butter. Canola oil. Vegetable oil. Beef fat (tallow). Lard.  § Sweets and desserts: Cookies. Cakes. Pies. Candy.  § Seasoning and other foods: Mayonnaise. Premade sauces and marinades.  The items listed may not be a complete list. Talk with your dietitian about what dietary choices are right for you.  Summary  · The Mediterranean diet includes both food and lifestyle choices.  · Eat a variety of fresh fruits and vegetables, beans, nuts, seeds, and whole grains.  · Limit the amount of red meat and sweets that you eat.  · Talk with your health care provider about whether it is safe for you to drink red wine in moderation. This means 1 glass a day for nonpregnant women and 2 glasses a day for men. A glass of wine equals 5 oz (150 mL).  This information  is not intended to replace advice given to you by your health care provider. Make sure you discuss any questions you have with your health care provider.  Document Revised: 08/17/2017 Document Reviewed: 08/10/2017  [employer] Patient Education © 2020 [employer]      Exercising to Stay Healthy  To become healthy and stay healthy, it is recommended that you do moderate-intensity and vigorous-intensity exercise. You can tell that you are exercising at a moderate intensity if your heart starts beating faster and you start breathing faster but can still hold a conversation. You can tell that you are exercising at a vigorous intensity if you are breathing much harder and faster and cannot hold a conversation while exercising.  Exercising regularly is important. It has many health benefits, such as:  · Improving overall fitness, flexibility, and endurance.  · Increasing bone density.  · Helping with weight control.  · Decreasing body fat.  · Increasing muscle strength.  · Reducing stress and tension.  · Improving overall health.  How often should I exercise?  Choose an activity that you enjoy, and set realistic goals. Your health care provider can help you make an activity plan that works for you.  Exercise regularly as told by your health care provider. This may include:  · Doing strength training two times a week, such as:  ? Lifting weights.  ? Using resistance bands.  ? Push-ups.  ? Sit-ups.  ? Yoga.  · Doing a certain intensity of exercise for a given amount of time. Choose from these options:  ? A total of 150 minutes of moderate-intensity exercise every week.  ? A total of 75 minutes of vigorous-intensity exercise every week.  ? A mix of moderate-intensity and vigorous-intensity exercise every week.  Children, pregnant women, people who have not exercised regularly, people who are overweight, and older adults may need to talk with a health care provider about what activities are safe to do. If you have a medical  condition, be sure to talk with your health care provider before you start a new exercise program.  What are some exercise ideas?  Moderate-intensity exercise ideas include:  · Walking 1 mile (1.6 km) in about 15 minutes.  · Biking.  · Hiking.  · Golfing.  · Dancing.  · Water aerobics.  Vigorous-intensity exercise ideas include:  · Walking 4.5 miles (7.2 km) or more in about 1 hour.  · Jogging or running 5 miles (8 km) in about 1 hour.  · Biking 10 miles (16.1 km) or more in about 1 hour.  · Lap swimming.  · Roller-skating or in-line skating.  · Cross-country skiing.  · Vigorous competitive sports, such as football, basketball, and soccer.  · Jumping rope.  · Aerobic dancing.  What are some everyday activities that can help me to get exercise?  · Yard work, such as:  ? Pushing a .  ? Raking and bagging leaves.  · Washing your car.  · Pushing a stroller.  · Shoveling snow.  · Gardening.  · Washing windows or floors.  How can I be more active in my day-to-day activities?  · Use stairs instead of an elevator.  · Take a walk during your lunch break.  · If you drive, park your car farther away from your work or school.  · If you take public transportation, get off one stop early and walk the rest of the way.  · Stand up or walk around during all of your indoor phone calls.  · Get up, stretch, and walk around every 30 minutes throughout the day.  · Enjoy exercise with a friend. Support to continue exercising will help you keep a regular routine of activity.  What guidelines can I follow while exercising?  · Before you start a new exercise program, talk with your health care provider.  · Do not exercise so much that you hurt yourself, feel dizzy, or get very short of breath.  · Wear comfortable clothes and wear shoes with good support.  · Drink plenty of water while you exercise to prevent dehydration or heat stroke.  · Work out until your breathing and your heartbeat get faster.  Where to find more  information  · [place] Department of Health and Human Services: www.hhs.gov  · Centers for Disease Control and Prevention (CDC): www.cdc.gov  Summary  · Exercising regularly is important. It will improve your overall fitness, flexibility, and endurance.  · Regular exercise also will improve your overall health. It can help you control your weight, reduce stress, and improve your bone density.  · Do not exercise so much that you hurt yourself, feel dizzy, or get very short of breath.  · Before you start a new exercise program, talk with your health care provider.  This information is not intended to replace advice given to you by your health care provider. Make sure you discuss any questions you have with your health care provider.  Document Revised: 11/30/2018 Document Reviewed: 11/08/2018  Elsevier Patient Education © 2020 [employer]      Coping with Quitting Smoking    Quitting smoking is a physical and mental challenge. You will face cravings, withdrawal symptoms, and temptation. Before quitting, work with your health care provider to make a plan that can help you cope. Preparation can help you quit and keep you from giving in.  How can I cope with cravings?  Cravings usually last for 5-10 minutes. If you get through it, the craving will pass. Consider taking the following actions to help you cope with cravings:  · Keep your mouth busy:  ? Chew sugar-free gum.  ? Suck on hard candies or a straw.  ? Brush your teeth.  · Keep your hands and body busy:  ? Immediately change to a different activity when you feel a craving.  ? Squeeze or play with a ball.  ? Do an activity or a hobby, like making bead jewelry, practicing needlepoint, or working with wood.  ? Mix up your normal routine.  ? Take a short exercise break. Go for a quick walk or run up and down stairs.  ? Spend time in public places where smoking is not allowed.  · Focus on doing something kind or helpful for someone else.  · Call a friend or family member  to talk during a craving.  · Join a support group.  · Call a quit line, such as 4-800-QUIT-NOW.  · Talk with your health care provider about medicines that might help you cope with cravings and make quitting easier for you.  How can I deal with withdrawal symptoms?  Your body may experience negative effects as it tries to get used to not having nicotine in the system. These effects are called withdrawal symptoms. They may include:  · Feeling hungrier than normal.  · Trouble concentrating.  · Irritability.  · Trouble sleeping.  · Feeling depressed.  · Restlessness and agitation.  · Craving a cigarette.  To manage withdrawal symptoms:  · Avoid places, people, and activities that trigger your cravings.  · Remember why you want to quit.  · Get plenty of sleep.  · Avoid coffee and other caffeinated drinks. These may worsen some of your symptoms.  How can I handle social situations?  Social situations can be difficult when you are quitting smoking, especially in the first few weeks. To manage this, you can:  · Avoid parties, bars, and other social situations where people might be smoking.  · Avoid alcohol.  · Leave right away if you have the urge to smoke.  · Explain to your family and friends that you are quitting smoking. Ask for understanding and support.  · Plan activities with friends or family where smoking is not an option.  What are some ways I can cope with stress?  Wanting to smoke may cause stress, and stress can make you want to smoke. Find ways to manage your stress. Relaxation techniques can help. For example:  · Breathe slowly and deeply, in through your nose and out through your mouth.  · Listen to soothing, relaxing music.  · Talk with a family member or friend about your stress.  · Light a candle.  · Soak in a bath or take a shower.  · Think about a peaceful place.  What are some ways I can prevent weight gain?  Be aware that many people gain weight after they quit smoking. However, not everyone does. To  keep from gaining weight, have a plan in place before you quit and stick to the plan after you quit. Your plan should include:  · Having healthy snacks. When you have a craving, it may help to:  ? Eat plain popcorn, crunchy carrots, celery, or other cut vegetables.  ? Chew sugar-free gum.  · Changing how you eat:  ? Eat small portion sizes at meals.  ? Eat 4-6 small meals throughout the day instead of 1-2 large meals a day.  ? Be mindful when you eat. Do not watch television or do other things that might distract you as you eat.  · Exercising regularly:  ? Make time to exercise each day. If you do not have time for a long workout, do short bouts of exercise for 5-10 minutes several times a day.  ? Do some form of strengthening exercise, like weight lifting, and some form of aerobic exercise, like running or swimming.  · Drinking plenty of water or other low-calorie or no-calorie drinks. Drink 6-8 glasses of water daily, or as much as instructed by your health care provider.  Summary  · Quitting smoking is a physical and mental challenge. You will face cravings, withdrawal symptoms, and temptation to smoke again. Preparation can help you as you go through these challenges.  · You can cope with cravings by keeping your mouth busy (such as by chewing gum), keeping your body and hands busy, and making calls to family, friends, or a helpline for people who want to quit smoking.  · You can cope with withdrawal symptoms by avoiding places where people smoke, avoiding drinks with caffeine, and getting plenty of rest.  · Ask your health care provider about the different ways to prevent weight gain, avoid stress, and handle social situations.  This information is not intended to replace advice given to you by your health care provider. Make sure you discuss any questions you have with your health care provider.  Document Revised: 11/30/2018 Document Reviewed: 12/15/2017  Elsevier Patient Education © 2020 [employer]

## 2021-02-15 NOTE — TELEPHONE ENCOUNTER
Pt stopped by office to give information on stent he had placed in 2014  Placed @ [facility] in 4/4/2014  Bypass [facility] 2015  Stent card scanned into chart.

## 2021-02-15 NOTE — PROGRESS NOTES
Subjective:     Frank Barger is a 50 y.o. male who presents for  Chief Complaint   Patient presents with   • Hospital Follow Up Visit      male smoker with a concurrent medical history of coronary artery disease presents for hospital follow-up.  Patient was admitted to the hospital in late January 2021 with complaints of chest pain.  Underwent successful PCI of mid LAD with drug-eluting stent.  Patient was advised to continue dual antiplatelet therapy, antihypertensive therapy, and statin.  Patient is normotensive in office. Continues to smoke, over 0.5 PPD.     Past Medical Hx:  Past Medical History:   Diagnosis Date   • Allergies     takes allergy injections   • Arthritis    • Asthma    • Back pain    • Depression    • Emphysema of lung (CMS/HCC)    • Headache    • Leg pain        Past Surgical Hx:  Past Surgical History:   Procedure Laterality Date   • CARDIAC CATHETERIZATION N/A 1/20/2021    Procedure: Left Heart Cath and coronary angiogram;  Surgeon: Jaimie Silva MD;  Location: [facility] CATH INVASIVE LOCATION;  Service: Cardiovascular;  Laterality: N/A;   • CARDIAC CATHETERIZATION N/A 1/20/2021    Procedure: Saphenous Vein Graft;  Surgeon: Jaimie Silva MD;  Location: [facility] CATH INVASIVE LOCATION;  Service: Cardiovascular;  Laterality: N/A;   • CARDIAC CATHETERIZATION N/A 1/20/2021    Procedure: Stent EBENEZER coronary;  Surgeon: Herve Rodriguez MD;  Location: [facility] CATH INVASIVE LOCATION;  Service: Cardiology;  Laterality: N/A;   • CORONARY ANGIOPLASTY WITH STENT PLACEMENT  04/2014   • CORONARY ARTERY BYPASS GRAFT  12/2015       Family Hx:  Family History   Problem Relation Age of Onset   • Diabetes Mother    • Heart disease Father    • Anxiety disorder Daughter    • Depression Daughter         Social History:  Social History     Socioeconomic History   • Marital status: Single     Spouse name: Not on file   • Number of children: Not on file   • Years of education: Not on file   •  Highest education level: Not on file   Tobacco Use   • Smoking status: Current Every Day Smoker     Packs/day: 0.50   • Smokeless tobacco: Never Used   Substance and Sexual Activity   • Alcohol use: No     Frequency: Never   • Drug use: No   • Sexual activity: Yes     Partners: Female       Allergies:  Hydrocodone, Hydrocodone-acetaminophen, and Oxycodone    Current Meds:    Current Outpatient Medications:   •  amitriptyline (ELAVIL) 75 MG tablet, Take 1 tablet by mouth Every Night., Disp: 30 tablet, Rfl: 2  •  ARIPiprazole (ABILIFY) 5 MG tablet, TAKE 1 TABLET BY MOUTH EVERY NIGHT, Disp: 90 tablet, Rfl: 0  •  aspirin (aspirin) 81 MG EC tablet, Take 1 tablet by mouth Daily., Disp: 90 tablet, Rfl: 1  •  atorvastatin (LIPITOR) 20 MG tablet, Take 1 tablet by mouth Every Night., Disp: 90 tablet, Rfl: 1  •  buPROPion XL (Wellbutrin XL) 150 MG 24 hr tablet, Take 1 tablet by mouth Daily., Disp: 90 tablet, Rfl: 1  •  butalbital-acetaminophen-caffeine (FIORICET, ESGIC) -40 MG per tablet, Take 1 tablet by mouth 3 (Three) Times a Day As Needed for Headache., Disp: , Rfl:   •  clobetasol (TEMOVATE) 0.05 % ointment, Apply  topically to the appropriate area as directed 2 (Two) Times a Day., Disp: 60 g, Rfl: 1  •  clopidogrel (PLAVIX) 75 MG tablet, Take 1 tablet by mouth Daily., Disp: 30 tablet, Rfl: 0  •  cyclobenzaprine (FLEXERIL) 10 MG tablet, TAKE 1 TABLET BY MOUTH EVERY NIGHT AT BEDTIME FOR 1 WEEK THEN THREE TIMES DAILY, Disp: 90 tablet, Rfl: 5  •  loratadine (CLARITIN) 10 MG tablet, Take 10 mg by mouth Daily., Disp: , Rfl:   •  metoprolol tartrate (LOPRESSOR) 25 MG tablet, Take 1 tablet by mouth 2 (Two) Times a Day., Disp: 180 tablet, Rfl: 1  •  montelukast (SINGULAIR) 10 MG tablet, Take 1 tablet by mouth Every Night., Disp: 90 tablet, Rfl: 1  •  naproxen (NAPROSYN) 500 MG tablet, Take 1 tablet by mouth 2 (Two) Times a Day As Needed for Moderate Pain . Take with food!, Disp: 60 tablet, Rfl: 2  •  nitroglycerin  "(NITROSTAT) 0.4 MG SL tablet, PLACE ONE TABLET UNDER TONGUE AS NEEDED FOR CHEST PAIN EVERY 5 MINUTES. TAKE NO MORE THAN 3 DOSES IN 15 MINUTES., Disp: 25 tablet, Rfl: 0  •  NON FORMULARY, Allergy injections, Disp: , Rfl:   •  omeprazole (priLOSEC) 20 MG capsule, Take 1 capsule by mouth Every Morning Before Breakfast. Take on an empty stomach!, Disp: 90 capsule, Rfl: 1  •  OXcarbazepine (TRILEPTAL) 300 MG tablet, Take 150 mg by mouth 2 (Two) Times a Day., Disp: , Rfl:   •  prazosin (MINIPRESS) 2 MG capsule, TAKE 1 CAPSULE BY MOUTH EVERY NIGHT, Disp: 30 capsule, Rfl: 0  •  pregabalin (LYRICA) 200 MG capsule, Take 1 capsule by mouth 3 (Three) Times a Day., Disp: 90 capsule, Rfl: 5  •  SUMAtriptan (IMITREX) 50 MG tablet, TAKE 1 TABLET BY MOUTH AT ONSET OF HEADACHE. MAY REPEAT DOSE 1 TIME IN 2 HOURS IF HEADACHE NOT RELIEVED, Disp: 10 tablet, Rfl: 0  •  varenicline (Chantix Starting Month Pak) 0.5 MG X 11 & 1 MG X 42 tablet, Take 0.5 mg one daily on days 1-3 and and 0.5 mg twice daily on days 4-7.Then 1 mg twice daily for a total of 12 weeks., Disp: 53 tablet, Rfl: 0  •  Vortioxetine HBr (Trintellix) 20 MG tablet, Take 20 mg by mouth Daily., Disp: 30 tablet, Rfl: 0    The following portions of the patient's history were reviewed and updated as appropriate: allergies, current medications, past family history, past medical history, past social history, past surgical history and problem list.    Review of Systems  Review of Systems    Objective:     /69 (BP Location: Left arm, Patient Position: Sitting, Cuff Size: Large Adult)   Pulse 83   Temp 96.8 °F (36 °C) (Temporal)   Ht 185.4 cm (73\")   Wt 83 kg (183 lb)   SpO2 100%   BMI 24.14 kg/m²     Physical Exam  Vitals signs reviewed.   Constitutional:       General: He is not in acute distress.     Appearance: He is well-developed. He is not diaphoretic.   HENT:      Head: Normocephalic and atraumatic.   Cardiovascular:      Rate and Rhythm: Normal rate and regular " rhythm.      Heart sounds: Normal heart sounds.   Pulmonary:      Effort: Pulmonary effort is normal.      Breath sounds: Normal breath sounds. No wheezing.   Skin:     General: Skin is warm and dry.   Neurological:      Mental Status: He is alert and oriented to person, place, and time.   Psychiatric:         Mood and Affect: Mood normal.         Behavior: Behavior normal.         Lab Results   Component Value Date    WBC 6.30 01/21/2021    HGB 10.8 (L) 01/21/2021    HCT 32.5 (L) 01/21/2021    MCV 78.9 (L) 01/21/2021     01/21/2021     Lab Results   Component Value Date    GLUCOSE 109 (H) 01/21/2021    BUN 12 01/21/2021    CREATININE 1.10 01/21/2021    EGFRIFNONA 71 01/21/2021    BCR 10.9 01/21/2021    K 3.7 01/21/2021    CO2 25.0 01/21/2021    CALCIUM 8.6 01/21/2021    ALBUMIN 3.80 01/19/2021    LABIL2 1.0 03/15/2019    AST 11 01/19/2021    ALT 7 01/19/2021     Lab Results   Component Value Date    CHOL 112 01/21/2021    TRIG 460 (H) 01/21/2021    HDL 25 (L) 01/21/2021    LDL 24 01/21/2021     The ASCVD Risk score (Ahsan TAE Jr., et al., 2013) failed to calculate for the following reasons:    The valid total cholesterol range is 130 to 320 mg/dL    Assessment/Plan:     Diagnoses and all orders for this visit:    1. Ischemic heart disease due to coronary artery obstruction (CMS/[facility]) (Primary)  Comments:  BP at goal, <140/90.  LDL at goal, <100.  Continue dual antiplatelet therapy, statin, & antihypertive therapy.  Encouraged smoking cessation.  Orders:  -     clopidogrel (PLAVIX) 75 MG tablet; Take 1 tablet by mouth Daily.  Dispense: 90 tablet; Refill: 1    2. Personal history of tobacco use, presenting hazards to health  Comments:  Discussed health risks associated with tobacco use.    Encouraged smoking cessation.    Continue Wellbutrin to help with smoking cessation efforts.    3. Hospital discharge follow-up  Comments:  Medications reviewed.  Trintellix samples provided.  Patient given $10 to cover the  cost of his prescription co-pays.    Current outpatient and discharge medications have been reconciled for the patient.  Reviewed by: Brittni Shell MD    Follow-up:     Return in about 4 months (around 6/15/2021) for Recheck BP & HLD.      Signature    Brittni Shell MD  Family Medicine  [facility]        This document has been electronically signed by Brittni Shell MD on February 15, 2021 11:02 EST

## 2021-02-17 ENCOUNTER — APPOINTMENT (OUTPATIENT)
Dept: CARDIAC REHAB | Facility: HOSPITAL | Age: 51
End: 2021-02-17

## 2021-02-19 ENCOUNTER — APPOINTMENT (OUTPATIENT)
Dept: CARDIAC REHAB | Facility: HOSPITAL | Age: 51
End: 2021-02-19

## 2021-02-22 ENCOUNTER — APPOINTMENT (OUTPATIENT)
Dept: CARDIAC REHAB | Facility: HOSPITAL | Age: 51
End: 2021-02-22

## 2021-02-24 ENCOUNTER — TREATMENT (OUTPATIENT)
Dept: CARDIAC REHAB | Facility: HOSPITAL | Age: 51
End: 2021-02-24

## 2021-02-24 DIAGNOSIS — Z95.5 S/P DRUG ELUTING CORONARY STENT PLACEMENT: Primary | ICD-10-CM

## 2021-02-24 PROCEDURE — 93798 PHYS/QHP OP CAR RHAB W/ECG: CPT

## 2021-02-26 ENCOUNTER — TREATMENT (OUTPATIENT)
Dept: CARDIAC REHAB | Facility: HOSPITAL | Age: 51
End: 2021-02-26

## 2021-02-26 DIAGNOSIS — Z95.5 S/P DRUG ELUTING CORONARY STENT PLACEMENT: Primary | ICD-10-CM

## 2021-02-26 PROCEDURE — 93798 PHYS/QHP OP CAR RHAB W/ECG: CPT

## 2021-03-01 ENCOUNTER — APPOINTMENT (OUTPATIENT)
Dept: CARDIAC REHAB | Facility: HOSPITAL | Age: 51
End: 2021-03-01

## 2021-03-02 DIAGNOSIS — G89.4 CHRONIC PAIN DISORDER: ICD-10-CM

## 2021-03-02 DIAGNOSIS — F43.10 POSTTRAUMATIC STRESS DISORDER: ICD-10-CM

## 2021-03-02 RX ORDER — AMITRIPTYLINE HYDROCHLORIDE 75 MG/1
75 TABLET, FILM COATED ORAL NIGHTLY
Qty: 90 TABLET | Refills: 1 | Status: SHIPPED | OUTPATIENT
Start: 2021-03-02 | End: 2021-06-20

## 2021-03-02 RX ORDER — PRAZOSIN HYDROCHLORIDE 2 MG/1
2 CAPSULE ORAL NIGHTLY
Qty: 90 CAPSULE | Refills: 1 | Status: SHIPPED | OUTPATIENT
Start: 2021-03-02 | End: 2021-03-25 | Stop reason: DRUGHIGH

## 2021-03-02 RX ORDER — NAPROXEN 500 MG/1
500 TABLET ORAL 2 TIMES DAILY PRN
Qty: 60 TABLET | Refills: 3 | Status: SHIPPED | OUTPATIENT
Start: 2021-03-02 | End: 2021-06-22

## 2021-03-03 ENCOUNTER — TREATMENT (OUTPATIENT)
Dept: CARDIAC REHAB | Facility: HOSPITAL | Age: 51
End: 2021-03-03

## 2021-03-03 DIAGNOSIS — Z95.5 S/P DRUG ELUTING CORONARY STENT PLACEMENT: Primary | ICD-10-CM

## 2021-03-03 PROCEDURE — 93798 PHYS/QHP OP CAR RHAB W/ECG: CPT

## 2021-03-05 ENCOUNTER — TREATMENT (OUTPATIENT)
Dept: CARDIAC REHAB | Facility: HOSPITAL | Age: 51
End: 2021-03-05

## 2021-03-05 DIAGNOSIS — Z95.5 S/P DRUG ELUTING CORONARY STENT PLACEMENT: Primary | ICD-10-CM

## 2021-03-05 PROCEDURE — 93798 PHYS/QHP OP CAR RHAB W/ECG: CPT

## 2021-03-08 ENCOUNTER — TREATMENT (OUTPATIENT)
Dept: CARDIAC REHAB | Facility: HOSPITAL | Age: 51
End: 2021-03-08

## 2021-03-08 DIAGNOSIS — Z95.5 S/P DRUG ELUTING CORONARY STENT PLACEMENT: Primary | ICD-10-CM

## 2021-03-08 PROCEDURE — 93798 PHYS/QHP OP CAR RHAB W/ECG: CPT

## 2021-03-10 ENCOUNTER — APPOINTMENT (OUTPATIENT)
Dept: CARDIAC REHAB | Facility: HOSPITAL | Age: 51
End: 2021-03-10

## 2021-03-12 ENCOUNTER — TREATMENT (OUTPATIENT)
Dept: CARDIAC REHAB | Facility: HOSPITAL | Age: 51
End: 2021-03-12

## 2021-03-12 DIAGNOSIS — Z95.5 S/P DRUG ELUTING CORONARY STENT PLACEMENT: Primary | ICD-10-CM

## 2021-03-12 PROCEDURE — 93798 PHYS/QHP OP CAR RHAB W/ECG: CPT

## 2021-03-15 ENCOUNTER — APPOINTMENT (OUTPATIENT)
Dept: CARDIAC REHAB | Facility: HOSPITAL | Age: 51
End: 2021-03-15

## 2021-03-17 ENCOUNTER — APPOINTMENT (OUTPATIENT)
Dept: CARDIAC REHAB | Facility: HOSPITAL | Age: 51
End: 2021-03-17

## 2021-03-17 ENCOUNTER — TELEPHONE (OUTPATIENT)
Dept: CARDIAC REHAB | Facility: HOSPITAL | Age: 51
End: 2021-03-17

## 2021-03-18 RX ORDER — NITROGLYCERIN 0.4 MG/1
0.4 TABLET SUBLINGUAL
Qty: 25 TABLET | Refills: 0 | Status: SHIPPED | OUTPATIENT
Start: 2021-03-18 | End: 2021-04-12

## 2021-03-19 ENCOUNTER — APPOINTMENT (OUTPATIENT)
Dept: CARDIAC REHAB | Facility: HOSPITAL | Age: 51
End: 2021-03-19

## 2021-03-24 ENCOUNTER — APPOINTMENT (OUTPATIENT)
Dept: CARDIAC REHAB | Facility: HOSPITAL | Age: 51
End: 2021-03-24

## 2021-03-25 ENCOUNTER — LAB (OUTPATIENT)
Dept: FAMILY MEDICINE CLINIC | Facility: CLINIC | Age: 51
End: 2021-03-25

## 2021-03-25 ENCOUNTER — OFFICE VISIT (OUTPATIENT)
Dept: FAMILY MEDICINE CLINIC | Facility: CLINIC | Age: 51
End: 2021-03-25

## 2021-03-25 VITALS
WEIGHT: 184 LBS | OXYGEN SATURATION: 100 % | HEIGHT: 73 IN | TEMPERATURE: 97.3 F | DIASTOLIC BLOOD PRESSURE: 84 MMHG | SYSTOLIC BLOOD PRESSURE: 126 MMHG | BODY MASS INDEX: 24.39 KG/M2 | HEART RATE: 81 BPM

## 2021-03-25 DIAGNOSIS — F43.10 PTSD (POST-TRAUMATIC STRESS DISORDER): ICD-10-CM

## 2021-03-25 DIAGNOSIS — I95.1 ORTHOSTATIC HYPOTENSION: Primary | ICD-10-CM

## 2021-03-25 DIAGNOSIS — D64.9 ANEMIA, UNSPECIFIED TYPE: ICD-10-CM

## 2021-03-25 DIAGNOSIS — T44.4X5A: ICD-10-CM

## 2021-03-25 LAB
BASOPHILS # BLD AUTO: 0.1 10*3/MM3 (ref 0–0.2)
BASOPHILS NFR BLD AUTO: 1.4 % (ref 0–1.5)
DEPRECATED RDW RBC AUTO: 44.4 FL (ref 37–54)
EOSINOPHIL # BLD AUTO: 0.12 10*3/MM3 (ref 0–0.4)
EOSINOPHIL NFR BLD AUTO: 1.6 % (ref 0.3–6.2)
ERYTHROCYTE [DISTWIDTH] IN BLOOD BY AUTOMATED COUNT: 15.9 % (ref 12.3–15.4)
FERRITIN SERPL-MCNC: 13.3 NG/ML (ref 30–400)
HCT VFR BLD AUTO: 36.7 % (ref 37.5–51)
HGB BLD-MCNC: 11.4 G/DL (ref 13–17.7)
IMM GRANULOCYTES # BLD AUTO: 0.02 10*3/MM3 (ref 0–0.05)
IMM GRANULOCYTES NFR BLD AUTO: 0.3 % (ref 0–0.5)
IRON 24H UR-MRATE: 39 MCG/DL (ref 59–158)
IRON SATN MFR SERPL: 8 % (ref 20–50)
LYMPHOCYTES # BLD AUTO: 1.88 10*3/MM3 (ref 0.7–3.1)
LYMPHOCYTES NFR BLD AUTO: 25.4 % (ref 19.6–45.3)
MCH RBC QN AUTO: 24.5 PG (ref 26.6–33)
MCHC RBC AUTO-ENTMCNC: 31.1 G/DL (ref 31.5–35.7)
MCV RBC AUTO: 78.8 FL (ref 79–97)
MONOCYTES # BLD AUTO: 0.73 10*3/MM3 (ref 0.1–0.9)
MONOCYTES NFR BLD AUTO: 9.9 % (ref 5–12)
NEUTROPHILS NFR BLD AUTO: 4.54 10*3/MM3 (ref 1.7–7)
NEUTROPHILS NFR BLD AUTO: 61.4 % (ref 42.7–76)
NRBC BLD AUTO-RTO: 0 /100 WBC (ref 0–0.2)
PLATELET # BLD AUTO: 355 10*3/MM3 (ref 140–450)
PMV BLD AUTO: 9.5 FL (ref 6–12)
RBC # BLD AUTO: 4.66 10*6/MM3 (ref 4.14–5.8)
TIBC SERPL-MCNC: 489 MCG/DL (ref 298–536)
TRANSFERRIN SERPL-MCNC: 328 MG/DL (ref 200–360)
WBC # BLD AUTO: 7.39 10*3/MM3 (ref 3.4–10.8)

## 2021-03-25 PROCEDURE — 36415 COLL VENOUS BLD VENIPUNCTURE: CPT | Performed by: FAMILY MEDICINE

## 2021-03-25 PROCEDURE — 82728 ASSAY OF FERRITIN: CPT | Performed by: FAMILY MEDICINE

## 2021-03-25 PROCEDURE — 85025 COMPLETE CBC W/AUTO DIFF WBC: CPT | Performed by: FAMILY MEDICINE

## 2021-03-25 PROCEDURE — 84466 ASSAY OF TRANSFERRIN: CPT | Performed by: FAMILY MEDICINE

## 2021-03-25 PROCEDURE — 83540 ASSAY OF IRON: CPT | Performed by: FAMILY MEDICINE

## 2021-03-25 PROCEDURE — 99214 OFFICE O/P EST MOD 30 MIN: CPT | Performed by: FAMILY MEDICINE

## 2021-03-25 RX ORDER — PRAZOSIN HYDROCHLORIDE 1 MG/1
1 CAPSULE ORAL NIGHTLY
Qty: 90 CAPSULE | Refills: 1 | Status: SHIPPED | OUTPATIENT
Start: 2021-03-25 | End: 2021-06-22

## 2021-03-25 NOTE — PROGRESS NOTES
Subjective:     Frank Barger is a 50 y.o. male who presents for  Chief Complaint   Patient presents with   • Dizziness      male smoker with a concurrent medical history of coronary artery disease status post PCI of LAD presents with complaints of dizziness, described as a spinning sensation and the floor is moving. Reports a hx of vertigo that was self limiting. Symptoms have worsened in the last 2-3 weeks and more prevalent when standing or upon rising. Patient is normotensive in office.    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 Currently taking Trintellix, Abilify, Elavil, and Prazosin.    Of note, patient is also followed by neurology for migraine headaches and was prescribed Fioricet PRN.    Past Medical Hx:  Past Medical History:   Diagnosis Date   • Allergies     takes allergy injections   • Arthritis    • Asthma    • Back pain    • Depression    • Emphysema of lung (CMS/HCC)    • Headache    • Leg pain        Past Surgical Hx:  Past Surgical History:   Procedure Laterality Date   • CARDIAC CATHETERIZATION N/A 1/20/2021    Procedure: Left Heart Cath and coronary angiogram;  Surgeon: Jaimie Silva MD;  Location: [facility] CATH INVASIVE LOCATION;  Service: Cardiovascular;  Laterality: N/A;   • CARDIAC CATHETERIZATION N/A 1/20/2021    Procedure: Saphenous Vein Graft;  Surgeon: Jaimie Silva MD;  Location: [facility] CATH INVASIVE LOCATION;  Service: Cardiovascular;  Laterality: N/A;   • CARDIAC CATHETERIZATION N/A 1/20/2021    Procedure: Stent EBENEZER coronary;  Surgeon: Herve Rodriguez MD;  Location: [facility] CATH INVASIVE LOCATION;  Service: Cardiology;  Laterality: N/A;   • CORONARY ANGIOPLASTY WITH STENT PLACEMENT  04/2014   • CORONARY ARTERY BYPASS GRAFT  12/2015       Family Hx:  Family History   Problem Relation Age of Onset   • Diabetes Mother    • Heart disease Father    • Anxiety disorder Daughter    • Depression Daughter         Social  History:  Social History     Socioeconomic History   • Marital status: Single     Spouse name: Not on file   • Number of children: Not on file   • Years of education: Not on file   • Highest education level: Not on file   Tobacco Use   • Smoking status: Current Every Day Smoker     Packs/day: 0.50   • Smokeless tobacco: Never Used   Vaping Use   • Vaping Use: Never used   Substance and Sexual Activity   • Alcohol use: No   • Drug use: No   • Sexual activity: Yes     Partners: Female       Allergies:  Hydrocodone, Hydrocodone-acetaminophen, and Oxycodone    Current Meds:    Current Outpatient Medications:   •  amitriptyline (ELAVIL) 75 MG tablet, TAKE 1 TABLET BY MOUTH EVERY NIGHT, Disp: 90 tablet, Rfl: 1  •  ARIPiprazole (ABILIFY) 5 MG tablet, TAKE 1 TABLET BY MOUTH EVERY NIGHT, Disp: 90 tablet, Rfl: 0  •  aspirin (aspirin) 81 MG EC tablet, Take 1 tablet by mouth Daily., Disp: 90 tablet, Rfl: 1  •  atorvastatin (LIPITOR) 20 MG tablet, Take 1 tablet by mouth Every Night., Disp: 90 tablet, Rfl: 1  •  buPROPion XL (Wellbutrin XL) 150 MG 24 hr tablet, Take 1 tablet by mouth Daily., Disp: 90 tablet, Rfl: 1  •  butalbital-acetaminophen-caffeine (FIORICET, ESGIC) -40 MG per tablet, Take 1 tablet by mouth 3 (Three) Times a Day As Needed for Headache., Disp: , Rfl:   •  clobetasol (TEMOVATE) 0.05 % ointment, Apply  topically to the appropriate area as directed 2 (Two) Times a Day., Disp: 60 g, Rfl: 1  •  clopidogrel (PLAVIX) 75 MG tablet, Take 1 tablet by mouth Daily., Disp: 90 tablet, Rfl: 1  •  cyclobenzaprine (FLEXERIL) 10 MG tablet, TAKE 1 TABLET BY MOUTH EVERY NIGHT AT BEDTIME FOR 1 WEEK THEN THREE TIMES DAILY, Disp: 90 tablet, Rfl: 5  •  loratadine (CLARITIN) 10 MG tablet, Take 10 mg by mouth Daily., Disp: , Rfl:   •  metoprolol tartrate (LOPRESSOR) 25 MG tablet, Take 1 tablet by mouth 2 (Two) Times a Day., Disp: 180 tablet, Rfl: 1  •  montelukast (SINGULAIR) 10 MG tablet, Take 1 tablet by mouth Every Night.,  "Disp: 90 tablet, Rfl: 1  •  naproxen (NAPROSYN) 500 MG tablet, Take 1 tablet by mouth 2 (Two) Times a Day As Needed for Moderate Pain . Take with food!, Disp: 60 tablet, Rfl: 3  •  nitroglycerin (NITROSTAT) 0.4 MG SL tablet, Place 1 tablet under the tongue Every 5 (Five) Minutes As Needed for Chest Pain. Take no more than 3 doses in 15 minutes., Disp: 25 tablet, Rfl: 0  •  NON FORMULARY, Allergy injections, Disp: , Rfl:   •  omeprazole (priLOSEC) 20 MG capsule, Take 1 capsule by mouth Every Morning Before Breakfast. Take on an empty stomach!, Disp: 90 capsule, Rfl: 1  •  OXcarbazepine (TRILEPTAL) 300 MG tablet, Take 150 mg by mouth 2 (Two) Times a Day., Disp: , Rfl:   •  prazosin (MINIPRESS) 2 MG capsule, TAKE 1 CAPSULE BY MOUTH EVERY NIGHT, Disp: 90 capsule, Rfl: 1  •  pregabalin (LYRICA) 200 MG capsule, Take 1 capsule by mouth 3 (Three) Times a Day., Disp: 90 capsule, Rfl: 5  •  SUMAtriptan (IMITREX) 50 MG tablet, TAKE 1 TABLET BY MOUTH AT ONSET OF HEADACHE. MAY REPEAT DOSE 1 TIME IN 2 HOURS IF HEADACHE NOT RELIEVED, Disp: 10 tablet, Rfl: 0  •  varenicline (Chantix Starting Month Pak) 0.5 MG X 11 & 1 MG X 42 tablet, Take 0.5 mg one daily on days 1-3 and and 0.5 mg twice daily on days 4-7.Then 1 mg twice daily for a total of 12 weeks., Disp: 53 tablet, Rfl: 0  •  Vortioxetine HBr (Trintellix) 20 MG tablet, Take 20 mg by mouth Daily., Disp: 30 tablet, Rfl: 0    The following portions of the patient's history were reviewed and updated as appropriate: allergies, current medications, past family history, past medical history, past social history, past surgical history and problem list.    Review of Systems  Review of Systems   Neurological: Positive for dizziness.       Objective:     /84 (BP Location: Left arm, Patient Position: Sitting, Cuff Size: Small Adult)   Pulse 81   Temp 97.3 °F (36.3 °C) (Infrared)   Ht 185.4 cm (73\")   Wt 83.5 kg (184 lb)   SpO2 100%   BMI 24.28 kg/m²     Vitals:    03/25/21 0940 " 03/25/21 1014 03/25/21 1015 03/25/21 1016   Orthostatic BP:  129/81 130/84 118/80   Orthostatic Pulse:  63 66 72   Patient Position: Sitting Lying Sitting Standing     Physical Exam  Vitals reviewed.   Constitutional:       General: He is not in acute distress.     Appearance: He is well-developed. He is not diaphoretic.   HENT:      Head: Normocephalic and atraumatic.      Right Ear: Tympanic membrane and ear canal normal.      Left Ear: Tympanic membrane and ear canal normal.   Cardiovascular:      Rate and Rhythm: Normal rate and regular rhythm.      Heart sounds: Normal heart sounds.   Pulmonary:      Effort: Pulmonary effort is normal.      Breath sounds: Normal breath sounds. No wheezing.   Abdominal:      General: Bowel sounds are normal.      Palpations: Abdomen is soft.      Tenderness: There is no abdominal tenderness.   Skin:     General: Skin is warm and dry.   Neurological:      Mental Status: He is alert and oriented to person, place, and time.   Psychiatric:         Mood and Affect: Mood normal.         Behavior: Behavior normal.         Lab Results   Component Value Date    WBC 6.30 01/21/2021    HGB 10.8 (L) 01/21/2021    HCT 32.5 (L) 01/21/2021    MCV 78.9 (L) 01/21/2021     01/21/2021     No results found for: IRON, TIBC, FERRITIN    Lab Results   Component Value Date    GLUCOSE 109 (H) 01/21/2021    BUN 12 01/21/2021    CREATININE 1.10 01/21/2021    EGFRIFNONA 71 01/21/2021    BCR 10.9 01/21/2021    K 3.7 01/21/2021    CO2 25.0 01/21/2021    CALCIUM 8.6 01/21/2021    ALBUMIN 3.80 01/19/2021    LABIL2 1.0 03/15/2019    AST 11 01/19/2021    ALT 7 01/19/2021     Lab Results   Component Value Date    CHOL 112 01/21/2021    TRIG 460 (H) 01/21/2021    HDL 25 (L) 01/21/2021    LDL 24 01/21/2021     The ASCVD Risk score (Ahsan STRONG Jr., et al., 2013) failed to calculate for the following reasons:    The valid total cholesterol range is 130 to 320 mg/dL    Assessment/Plan:     Diagnoses and all orders  for this visit:    1. Orthostatic hypotension (Primary)  Comments:  Discussed pathophysiology & supportive care.  Likely exacerbated by concurrent alpha & BB use.  Advised decreasing dose of prazosin.  Educational info in AVS.  Orders:  -     CBC w AUTO Differential  -     Iron Profile  -     Ferritin    2. PTSD (post-traumatic stress disorder)  Comments:  Followed by behavioral health.  Patient would like to continue prazosin.  Advised to decrease dose to 1 mg nightly.  Orders:  -     prazosin (MINIPRESS) 1 MG capsule; Take 1 capsule by mouth Every Night.  Dispense: 90 capsule; Refill: 1    3. Adverse effect of alpha adrenoceptor blocking drug, initial encounter  Comments:  Discussed side effects associated with alpha-blocker, prazosin.  Interested in continuing therapy for PTSD/nightmares.  Orders:  -     prazosin (MINIPRESS) 1 MG capsule; Take 1 capsule by mouth Every Night.  Dispense: 90 capsule; Refill: 1    4. Anemia, unspecified type  Comments:  Likely postoperative changes following left heart catheterization.  Plan to recheck levels.  Orders:  -     CBC w AUTO Differential  -     Iron Profile  -     Ferritin      Follow-up:     Return if symptoms worsen or fail to improve, for KEEP APPOINTMENT IN JUNE..        Signature    Brittni Shell MD  Family Medicine  [facility]        This document has been electronically signed by Brittni Shell MD on March 25, 2021 09:50 EDT

## 2021-03-25 NOTE — PATIENT INSTRUCTIONS
Orthostatic Hypotension  Blood pressure is a measurement of how strongly, or weakly, your blood is pressing against the walls of your arteries. Orthostatic hypotension is a sudden drop in blood pressure that happens when you quickly change positions, such as when you get up from sitting or lying down.  Arteries are blood vessels that carry blood from your heart throughout your body. When blood pressure is too low, you may not get enough blood to your brain or to the rest of your organs. This can cause weakness, light-headedness, rapid heartbeat, and fainting. This can last for just a few seconds or for up to a few minutes. Orthostatic hypotension is usually not a serious problem. However, if it happens frequently or gets worse, it may be a sign of something more serious.  What are the causes?  This condition may be caused by:  · Sudden changes in posture, such as standing up quickly after you have been sitting or lying down.  · Blood loss.  · Loss of body fluids (dehydration).  · Heart problems.  · Hormone (endocrine) problems.  · Pregnancy.  · Severe infection.  · Lack of certain nutrients.  · Severe allergic reactions (anaphylaxis).  · Certain medicines, such as blood pressure medicine or medicines that make the body lose excess fluids (diuretics). Sometimes, this condition can be caused by not taking medicine as directed, such as taking too much of a certain medicine.  What increases the risk?  The following factors may make you more likely to develop this condition:  · Age. Risk increases as you get older.  · Conditions that affect the heart or the central nervous system.  · Taking certain medicines, such as blood pressure medicine or diuretics.  · Being pregnant.  What are the signs or symptoms?  Symptoms of this condition may include:  · Weakness.  · Light-headedness.  · Dizziness.  · Blurred vision.  · Fatigue.  · Rapid heartbeat.  · Fainting, in severe cases.  How is this diagnosed?  This condition is  "diagnosed based on:  · Your medical history.  · Your symptoms.  · Your blood pressure measurement. Your health care provider will check your blood pressure when you are:  ? Lying down.  ? Sitting.  ? Standing.  A blood pressure reading is recorded as two numbers, such as \"120 over 80\" (or 120/80). The first (\"top\") number is called the systolic pressure. It is a measure of the pressure in your arteries as your heart beats. The second (\"bottom\") number is called the diastolic pressure. It is a measure of the pressure in your arteries when your heart relaxes between beats. Blood pressure is measured in a unit called mm Hg. Healthy blood pressure for most adults is 120/80. If your blood pressure is below 90/60, you may be diagnosed with hypotension.  Other information or tests that may be used to diagnose orthostatic hypotension include:  · Your other vital signs, such as your heart rate and temperature.  · Blood tests.  · Tilt table test. For this test, you will be safely secured to a table that moves you from a lying position to an upright position. Your heart rhythm and blood pressure will be monitored during the test.  How is this treated?  This condition may be treated by:  · Changing your diet. This may involve eating more salt (sodium) or drinking more water.  · Taking medicines to raise your blood pressure.  · Changing the dosage of certain medicines you are taking that might be lowering your blood pressure.  · Wearing compression stockings. These stockings help to prevent blood clots and reduce swelling in your legs.  In some cases, you may need to go to the hospital for:  · Fluid replacement. This means you will receive fluids through an IV.  · Blood replacement. This means you will receive donated blood through an IV (transfusion).  · Treating an infection or heart problems, if this applies.  · Monitoring. You may need to be monitored while medicines that you are taking wear off.  Follow these instructions " at home:  Eating and drinking    · Drink enough fluid to keep your urine pale yellow.  · Eat a healthy diet, and follow instructions from your health care provider about eating or drinking restrictions. A healthy diet includes:  ? Fresh fruits and vegetables.  ? Whole grains.  ? Lean meats.  ? Low-fat dairy products.  · Eat extra salt only as directed. Do not add extra salt to your diet unless your health care provider told you to do that.  · Eat frequent, small meals.  · Avoid standing up suddenly after eating.  Medicines  · Take over-the-counter and prescription medicines only as told by your health care provider.  ? Follow instructions from your health care provider about changing the dosage of your current medicines, if this applies.  ? Do not stop or adjust any of your medicines on your own.  General instructions    · Wear compression stockings as told by your health care provider.  · Get up slowly from lying down or sitting positions. This gives your blood pressure a chance to adjust.  · Avoid hot showers and excessive heat as directed by your health care provider.  · Return to your normal activities as told by your health care provider. Ask your health care provider what activities are safe for you.  · Do not use any products that contain nicotine or tobacco, such as cigarettes, e-cigarettes, and chewing tobacco. If you need help quitting, ask your health care provider.  · Keep all follow-up visits as told by your health care provider. This is important.  Contact a health care provider if you:  · Vomit.  · Have diarrhea.  · Have a fever for more than 2-3 days.  · Feel more thirsty than usual.  · Feel weak and tired.  Get help right away if you:  · Have chest pain.  · Have a fast or irregular heartbeat.  · Develop numbness in any part of your body.  · Cannot move your arms or your legs.  · Have trouble speaking.  · Become sweaty or feel light-headed.  · Faint.  · Feel short of breath.  · Have trouble staying  awake.  · Feel confused.  Summary  · Orthostatic hypotension is a sudden drop in blood pressure that happens when you quickly change positions.  · Orthostatic hypotension is usually not a serious problem.  · It is diagnosed by having your blood pressure taken lying down, sitting, and then standing.  · It may be treated by changing your diet or adjusting your medicines.  This information is not intended to replace advice given to you by your health care provider. Make sure you discuss any questions you have with your health care provider.  Document Revised: 06/13/2019 Document Reviewed: 06/13/2019  [employer] Patient Education © 2021 [employer]    Managing Post-Traumatic Stress Disorder  If you have been diagnosed with post-traumatic stress disorder (PTSD), you may be relieved that you now know why you have felt or behaved a certain way. Still, you may feel overwhelmed about the treatment ahead. You may also wonder how to get the support you need and how to deal with the condition day-to-day.  If you are living with PTSD, there are ways to help you recover from it and manage your symptoms.  How to manage lifestyle changes  Managing stress  Stress is your body's reaction to life changes and events, both good and bad. Stress can make PTSD worse. Take the following steps to manage stress:  · Talk with your health care provider or a counselor if you would like to learn more about techniques to reduce your stress. He or she may suggest some stress reduction techniques such as:  ? Muscle relaxation exercises.  ? Regular exercise.  ? Meditation, yoga, or other mind-body exercises.  ? Breathing exercises.  ? Listening to quiet music.  ? Spending time outside.  · Maintain a healthy lifestyle. Eat a healthy diet, exercise regularly, get plenty of sleep, and take time to relax.  · Spend time with others. Talk with them about how you are feeling and what kind of support you need. Try not to isolate yourself, even though you may  feel like doing that. Isolating yourself can delay your recovery.  · Do activities and hobbies that you enjoy.  · Pace yourself when doing stressful things. Take breaks, and reward yourself when you finish. Make sure that you do not overload your schedule.    Medicines  Your health care provider may suggest certain medicines if he or she feels that they will help to improve your condition. Medicines for depression (antidepressants) or severe loss of contact with reality (antipsychotics) may be used to treat PTSD. Avoid using alcohol and other substances that may prevent your medicines from working properly. It is also important to:  · Talk with your pharmacist or health care provider about all medicines that you take, their possible side effects, and which medicines are safe to take together.  · Make it your goal to take part in all treatment decisions (shared decision-making). Ask about possible side effects of medicines that your health care provider recommends, and tell him or her how you feel about having those side effects. It is best if shared decision-making with your health care provider is part of your total treatment plan.  If your health care provider prescribes a medicine, you may not notice the full benefits of it for 4-8 weeks. Most people who are treated for PTSD need to take medicine for at least 6-12 months after they feel better. If you are taking medicines as part of your treatment, do not stop taking medicines before you ask your health care provider if it is safe to stop. You may need to have the medicine slowly decreased (tapered) over time to lower the risk of harmful side effects.  Relationships  Many people who have PTSD have difficulty trusting others. Make an effort to:  · Take risks and develop trust with close friends and family members. Developing trust in others can help you feel safe and connect you with emotional support.  · Be open and honest about your feelings.  · Have fun and  relax in safe spaces, such as with friends and family.  · Think about going to couples counseling, family education classes, or family therapy. Your loved ones may not always know how to be supportive. Therapy can be helpful for everyone.  How to recognize changes in your condition  Be aware of your symptoms and how often you have them. The following symptoms mean that you need to seek help for your PTSD:  · You feel suspicious and angry.  · You have repeated flashbacks.  · You avoid going out or being with others.  · You have an increasing number of fights with close friends or family members, such as your spouse.  · You have thoughts about hurting yourself or others.  · You cannot get relief from feelings of depression or anxiety.  Follow these instructions at home:  Lifestyle  · Exercise regularly. Try to do 30 or more minutes of physical activity on most days of the week.  · Try to get 7-9 hours of sleep each night. To help with sleep:  ? Keep your bedroom cool and dark.  ? Avoid screen time before bedtime. This means avoiding use of your TV, computer, tablet, and cell phone.  · Practice self-soothing skills and use them daily.  · Try to have fun and seek humor in your life.  Eating and drinking  · Do not eat a heavy meal during the hour before you go to bed.  · Do not drink alcohol or caffeinated drinks before bed.  · Avoid using alcohol or drugs.  General instructions  · If your PTSD is affecting your marriage or family, seek help from a family therapist.  · Take over-the-counter and prescription medicines only as told by your health care provider.  · Make sure to let all of your health care providers know that you have PTSD. This is especially important if you are having surgery or need to be admitted to the hospital.  · Keep all follow-up visits as told by your health care providers. This is important.  Where to find support  Talking to others  · Explain that PTSD is a mental health problem. It is something  that a person can develop after experiencing or seeing a life-threatening event. Tell them that PTSD makes you feel stress like you did during the event.  · Talk to your loved ones about the symptoms you have. Also tell them what things or situations can cause symptoms to start (are triggers for you).  · Assure your loved ones that there are treatments to help PTSD. Discuss possibly seeking family therapy or couples therapy.  · If you are worried or fearful about seeking treatment, ask for support.  · Keep daily contact with at least one trusted friend or family member.  Finances  Not all insurance plans cover mental health care, so it is important to check with your insurance carrier. If paying for co-pays or counseling services is a problem, search for a local or [facility] mental [facility] care center. Public mental health care services may be offered there at a low cost or no cost when you are not able to see a private health care provider. If you are a , contact a local veterans organization or [facility] for more information.  If you are taking medicine for PTSD, you may be able to get the genericform, which may be less expensive than brand-name medicine. Some makers of prescription medicines also offer help to patients who cannot afford the medicines that they need.  Therapy and support groups  · Find a support group in your community. Often, groups are available for  veterans, trauma victims, and family members or caregivers.  · Look into volunteer opportunities. Taking part in these can help you feel more connected to your community.  · Contact a local organization to find out if you are eligible for a service dog.  Where to find more information  Go to this website to find more information about PTSD, treatment of PTSD, and how to get support:  · National Center for PTSD: www.ptsd.va.gov  Contact a health care provider if:  · Your symptoms get worse or do not get better.  Get help right away  if:  · You have thoughts about hurting yourself or others.  If you ever feel like you may hurt yourself or others, or have thoughts about taking your own life, get help right away. You can go to your nearest emergency department or call:  · Your local emergency services (911 in the [place]  · A suicide crisis helpline, such as the National Suicide Prevention Lifeline at 1-705.165.9141. This is open 24-hours a day.  Summary  · If you are living with PTSD, there are ways to help you recover from it and manage your symptoms.  · Find supportive environments and people who understand PTSD. Spend time in those places, and maintain contact with those people.  · Work with your health care team to create a plan for managing PTSD. The plan should include counseling, stress reduction techniques, and healthy lifestyle habits.  This information is not intended to replace advice given to you by your health care provider. Make sure you discuss any questions you have with your health care provider.  Document Revised: 04/10/2020 Document Reviewed: 04/19/2018  [employer] Patient Education © 2021 [employer]

## 2021-03-26 ENCOUNTER — APPOINTMENT (OUTPATIENT)
Dept: CARDIAC REHAB | Facility: HOSPITAL | Age: 51
End: 2021-03-26

## 2021-03-26 DIAGNOSIS — Z12.11 ENCOUNTER FOR SCREENING FOR MALIGNANT NEOPLASM OF COLON: Primary | ICD-10-CM

## 2021-03-26 DIAGNOSIS — D50.9 IRON DEFICIENCY ANEMIA, UNSPECIFIED IRON DEFICIENCY ANEMIA TYPE: ICD-10-CM

## 2021-03-26 RX ORDER — LANOLIN ALCOHOL/MO/W.PET/CERES
325 CREAM (GRAM) TOPICAL EVERY OTHER DAY
Qty: 45 TABLET | Refills: 1 | Status: SHIPPED | OUTPATIENT
Start: 2021-03-26 | End: 2021-06-18 | Stop reason: SDUPTHER

## 2021-03-29 ENCOUNTER — APPOINTMENT (OUTPATIENT)
Dept: CARDIAC REHAB | Facility: HOSPITAL | Age: 51
End: 2021-03-29

## 2021-03-29 ENCOUNTER — TELEPHONE (OUTPATIENT)
Dept: CARDIAC REHAB | Facility: HOSPITAL | Age: 51
End: 2021-03-29

## 2021-03-29 NOTE — TELEPHONE ENCOUNTER
Lvm with patient to see if coming to cardiac rehab today and if not how he was doing. MARIA T Savage RRT

## 2021-03-31 ENCOUNTER — APPOINTMENT (OUTPATIENT)
Dept: CARDIAC REHAB | Facility: HOSPITAL | Age: 51
End: 2021-03-31

## 2021-04-02 ENCOUNTER — APPOINTMENT (OUTPATIENT)
Dept: CARDIAC REHAB | Facility: HOSPITAL | Age: 51
End: 2021-04-02

## 2021-04-05 ENCOUNTER — APPOINTMENT (OUTPATIENT)
Dept: CARDIAC REHAB | Facility: HOSPITAL | Age: 51
End: 2021-04-05

## 2021-04-07 ENCOUNTER — APPOINTMENT (OUTPATIENT)
Dept: CARDIAC REHAB | Facility: HOSPITAL | Age: 51
End: 2021-04-07

## 2021-04-07 ENCOUNTER — TELEPHONE (OUTPATIENT)
Dept: CARDIAC REHAB | Facility: HOSPITAL | Age: 51
End: 2021-04-07

## 2021-04-07 NOTE — TELEPHONE ENCOUNTER
Has not been to cardiac rehab since March 12, 2021. Did call one time after receiving Covid Vaccine on March 13. Has not returned since. Left voicemail to call facility and let know if returning.

## 2021-04-09 ENCOUNTER — APPOINTMENT (OUTPATIENT)
Dept: CARDIAC REHAB | Facility: HOSPITAL | Age: 51
End: 2021-04-09

## 2021-04-12 ENCOUNTER — APPOINTMENT (OUTPATIENT)
Dept: CARDIAC REHAB | Facility: HOSPITAL | Age: 51
End: 2021-04-12

## 2021-04-12 ENCOUNTER — OFFICE VISIT (OUTPATIENT)
Dept: CARDIOLOGY | Facility: CLINIC | Age: 51
End: 2021-04-12

## 2021-04-12 VITALS
HEIGHT: 73 IN | SYSTOLIC BLOOD PRESSURE: 143 MMHG | HEART RATE: 84 BPM | DIASTOLIC BLOOD PRESSURE: 93 MMHG | BODY MASS INDEX: 23.72 KG/M2 | WEIGHT: 179 LBS | OXYGEN SATURATION: 99 % | TEMPERATURE: 98.6 F

## 2021-04-12 DIAGNOSIS — Z95.820 STATUS POST ANGIOPLASTY WITH STENT: ICD-10-CM

## 2021-04-12 DIAGNOSIS — I10 ESSENTIAL HYPERTENSION: ICD-10-CM

## 2021-04-12 DIAGNOSIS — Z95.1 HX OF CABG: Primary | ICD-10-CM

## 2021-04-12 PROCEDURE — 99214 OFFICE O/P EST MOD 30 MIN: CPT | Performed by: INTERNAL MEDICINE

## 2021-04-12 RX ORDER — NITROGLYCERIN 0.4 MG/1
TABLET SUBLINGUAL
Qty: 25 TABLET | Refills: 0 | Status: SHIPPED | OUTPATIENT
Start: 2021-04-12 | End: 2021-05-12

## 2021-04-12 NOTE — PROGRESS NOTES
Encounter Date:04/12/2021  Last seen 2/1/2021      Patient ID: Frank Barger is a 50 y.o. male.    Chief Complaint:  Status post CABG  Status post stent  Hypertension  Dyslipidemia     History of Present Illness  Since I have last seen, the patient has been without any chest discomfort , unusual shortness of breath, palpitations, dizziness or syncope.  Denies having any headache ,abdominal pain ,nausea, vomiting , diarrhea constipation, loss of weight or loss of appetite.  Denies having any excessive bruising ,hematuria or blood in the stool.    Review of all systems negative except as indicated.    Reviewed ROS.    Assessment and Plan      ]]]]]]]]]]]]]]]]]  Impression  ==========  -Status post CABG (2015) (triple-vessel according to patient) at [facility].     -Status post stent placement 4/4/2014 at [facility]  Status post stent to LAD between diagonal branch and landing site of LIMA.  1/20/2021-[facility]     Cardiac catheterization 1/21/2021  Left ventricle size and contractility is normal with ejection fraction of 60%.  Left main coronary artery is normal.  Left anterior descending artery provided a moderate sized diagonal branch and left anterior descending artery has 80% disease proximal to the LIMA landing site.  Circumflex coronary artery is a moderately large vessel that provided a large marginal branch.  Circumflex coronary artery has 60 to 70% disease just distal to the marginal branch.  Ramus intermedius is a relatively small caliber vessel that has ostial 80 to 90% disease.  Right coronary artery is a large and dominant vessel.  Left ventricular branch has 60 to 70% disease at its ostium.  LIMA to left anterior descending artery is very small and atretic with probably 90 to 95% disease just proximal to the insertion site.  SVG to diagonal branch is patent.  SVG to presumably ramus intermedius is totally occluded in the proximal segment.     2 out of  3 grafts are patent.  However patent LIMA is extremely small in caliber and atraumatic with significant disease near the landing site.  Difficult to tell the source of patient's symptoms but likely from left anterior descending artery.  Patient to have intervention to left antidescending artery distal to the diagonal branch and proximal to the LIMA landing site.  Other source of pain could be from circumflex coronary artery left ventricular branch of RCA and ramus intermedius.  Suggest maximize medical treatment for disease in these vessels.  Modification of risk factors.     Echocardiogram 1/14/2021 revealed mild paradoxical septal motion and inferior wall hypokinesis with ejection fraction of 50%.  Lexiscan Cardiolite test-abnormal with mild posterior and inferior proximal ischemia-1/14/2021     -Dyslipidemia hypertension COPD PTSD     -Psoriasis     -Family history of coronary artery disease     -Smoker-abstinence from smoking     -Allergic to oxycodone hydrocodone  ==========  Plan  =========  Status post CABG  Patient is not having any angina pectoris or congestive heart failure    Status post recent stent placement.  Patient is not having any angina pectoris or congestive heart failure.     Hypertension-well-controlled 126/76  Continue metoprolol tartrate 25 mg a day     Dyslipidemia-continue atorvastatin     Follow-up in the office in 3 months with EKG    Medications were reviewed and updated.  Patient was advised and educated him regarding taking Plavix on a regular basis.  Patient was given prescription for Plavix.  Further plan will depend on patient's progress.  ]]]]]]]]]]]]]]        Diagnosis Plan   1. Hx of CABG     2. Status post angioplasty with stent     3. Essential hypertension     LAB RESULTS (LAST 7 DAYS)    CBC        BMP        CMP         BNP        TROPONIN        CoAg        Creatinine Clearance  CrCl cannot be calculated (Patient's most recent lab result is older than the maximum 30 days  allowed.).    ABG        Radiology  No radiology results for the last day                The following portions of the patient's history were reviewed and updated as appropriate: allergies, current medications, past family history, past medical history, past social history, past surgical history and problem list.    Review of Systems   Constitutional: Negative for malaise/fatigue.   Cardiovascular: Negative for chest pain, leg swelling, palpitations and syncope.   Respiratory: Positive for shortness of breath.    Skin: Negative for rash.   Gastrointestinal: Negative for nausea and vomiting.   Neurological: Negative for dizziness, light-headedness and numbness.         Current Outpatient Medications:   •  amitriptyline (ELAVIL) 75 MG tablet, TAKE 1 TABLET BY MOUTH EVERY NIGHT, Disp: 90 tablet, Rfl: 1  •  ARIPiprazole (ABILIFY) 5 MG tablet, TAKE 1 TABLET BY MOUTH EVERY NIGHT, Disp: 90 tablet, Rfl: 0  •  aspirin (aspirin) 81 MG EC tablet, Take 1 tablet by mouth Daily., Disp: 90 tablet, Rfl: 1  •  atorvastatin (LIPITOR) 20 MG tablet, Take 1 tablet by mouth Every Night., Disp: 90 tablet, Rfl: 1  •  buPROPion XL (Wellbutrin XL) 150 MG 24 hr tablet, Take 1 tablet by mouth Daily., Disp: 90 tablet, Rfl: 1  •  butalbital-acetaminophen-caffeine (FIORICET, ESGIC) -40 MG per tablet, Take 1 tablet by mouth 3 (Three) Times a Day As Needed for Headache., Disp: , Rfl:   •  clobetasol (TEMOVATE) 0.05 % ointment, Apply  topically to the appropriate area as directed 2 (Two) Times a Day., Disp: 60 g, Rfl: 1  •  clopidogrel (PLAVIX) 75 MG tablet, Take 1 tablet by mouth Daily., Disp: 90 tablet, Rfl: 1  •  cyclobenzaprine (FLEXERIL) 10 MG tablet, TAKE 1 TABLET BY MOUTH EVERY NIGHT AT BEDTIME FOR 1 WEEK THEN THREE TIMES DAILY, Disp: 90 tablet, Rfl: 5  •  ferrous sulfate 325 (65 FE) MG EC tablet, Take 1 tablet by mouth Every Other Day., Disp: 45 tablet, Rfl: 1  •  loratadine (CLARITIN) 10 MG tablet, Take 10 mg by mouth Daily., Disp: ,  Rfl:   •  metoprolol tartrate (LOPRESSOR) 25 MG tablet, Take 1 tablet by mouth 2 (Two) Times a Day., Disp: 180 tablet, Rfl: 1  •  montelukast (SINGULAIR) 10 MG tablet, Take 1 tablet by mouth Every Night., Disp: 90 tablet, Rfl: 1  •  naproxen (NAPROSYN) 500 MG tablet, Take 1 tablet by mouth 2 (Two) Times a Day As Needed for Moderate Pain . Take with food!, Disp: 60 tablet, Rfl: 3  •  nitroglycerin (NITROSTAT) 0.4 MG SL tablet, DISSOLVE ONE TABLET UNDER THE TONGUE EVERY 5 MINUTES AS NEEDED FOF CHEST PAIN. MAX 3 DOSES IN 15 MINUTES, Disp: 25 tablet, Rfl: 0  •  NON FORMULARY, Allergy injections, Disp: , Rfl:   •  omeprazole (priLOSEC) 20 MG capsule, Take 1 capsule by mouth Every Morning Before Breakfast. Take on an empty stomach!, Disp: 90 capsule, Rfl: 1  •  prazosin (MINIPRESS) 1 MG capsule, Take 1 capsule by mouth Every Night., Disp: 90 capsule, Rfl: 1  •  pregabalin (LYRICA) 200 MG capsule, Take 1 capsule by mouth 3 (Three) Times a Day., Disp: 90 capsule, Rfl: 5  •  SUMAtriptan (IMITREX) 50 MG tablet, TAKE 1 TABLET BY MOUTH AT ONSET OF HEADACHE. MAY REPEAT DOSE 1 TIME IN 2 HOURS IF HEADACHE NOT RELIEVED, Disp: 10 tablet, Rfl: 0  •  varenicline (Chantix Starting Month Pak) 0.5 MG X 11 & 1 MG X 42 tablet, Take 0.5 mg one daily on days 1-3 and and 0.5 mg twice daily on days 4-7.Then 1 mg twice daily for a total of 12 weeks., Disp: 53 tablet, Rfl: 0  •  Vortioxetine HBr (Trintellix) 20 MG tablet, Take 20 mg by mouth Daily., Disp: 30 tablet, Rfl: 0    Allergies   Allergen Reactions   • Hydrocodone Nausea And Vomiting   • Hydrocodone-Acetaminophen GI Intolerance   • Oxycodone GI Intolerance       Family History   Problem Relation Age of Onset   • Diabetes Mother    • Heart disease Father    • Anxiety disorder Daughter    • Depression Daughter        Past Surgical History:   Procedure Laterality Date   • CARDIAC CATHETERIZATION N/A 1/20/2021    Procedure: Left Heart Cath and coronary angiogram;  Surgeon: Jaimie Silva,  "MD;  Location: [facility] CATH INVASIVE LOCATION;  Service: Cardiovascular;  Laterality: N/A;   • CARDIAC CATHETERIZATION N/A 1/20/2021    Procedure: Saphenous Vein Graft;  Surgeon: Jaimie Silva MD;  Location: [facility] CATH INVASIVE LOCATION;  Service: Cardiovascular;  Laterality: N/A;   • CARDIAC CATHETERIZATION N/A 1/20/2021    Procedure: Stent EBENEZER coronary;  Surgeon: Herve Rodriguez MD;  Location: [facility] CATH INVASIVE LOCATION;  Service: Cardiology;  Laterality: N/A;   • CORONARY ANGIOPLASTY WITH STENT PLACEMENT  04/2014   • CORONARY ARTERY BYPASS GRAFT  12/2015       Past Medical History:   Diagnosis Date   • Allergies     takes allergy injections   • Arthritis    • Asthma    • Back pain    • Depression    • Emphysema of lung (CMS/HCC)    • Headache    • Leg pain        Family History   Problem Relation Age of Onset   • Diabetes Mother    • Heart disease Father    • Anxiety disorder Daughter    • Depression Daughter        Social History     Socioeconomic History   • Marital status: Single     Spouse name: Not on file   • Number of children: Not on file   • Years of education: Not on file   • Highest education level: Not on file   Tobacco Use   • Smoking status: Current Every Day Smoker     Packs/day: 0.50   • Smokeless tobacco: Never Used   Vaping Use   • Vaping Use: Never used   Substance and Sexual Activity   • Alcohol use: No   • Drug use: No   • Sexual activity: Yes     Partners: Female         Procedures      Objective:       Physical Exam    /93   Pulse 84   Temp 98.6 °F (37 °C)   Ht 185.4 cm (73\")   Wt 81.2 kg (179 lb)   SpO2 99%   BMI 23.62 kg/m²   The patient is alert, oriented and in no distress.    Vital signs as noted above.    Head and neck revealed no carotid bruits or jugular venous distension.  No thyromegaly or lymphadenopathy is present.    Lungs clear.  No wheezing.  Breath sounds are normal bilaterally.    Heart normal first and second heart sounds.  No murmur..  No " pericardial rub is present.  No gallop is present.    Abdomen soft and nontender.  No organomegaly is present.    Extremities revealed good peripheral pulses without any pedal edema.    Skin warm and dry.    Musculoskeletal system is grossly normal.    CNS grossly normal.    Reviewed and unchanged from last visit.

## 2021-04-14 ENCOUNTER — APPOINTMENT (OUTPATIENT)
Dept: CARDIAC REHAB | Facility: HOSPITAL | Age: 51
End: 2021-04-14

## 2021-04-16 ENCOUNTER — APPOINTMENT (OUTPATIENT)
Dept: CARDIAC REHAB | Facility: HOSPITAL | Age: 51
End: 2021-04-16

## 2021-04-19 ENCOUNTER — APPOINTMENT (OUTPATIENT)
Dept: CARDIAC REHAB | Facility: HOSPITAL | Age: 51
End: 2021-04-19

## 2021-04-21 ENCOUNTER — APPOINTMENT (OUTPATIENT)
Dept: CARDIAC REHAB | Facility: HOSPITAL | Age: 51
End: 2021-04-21

## 2021-04-23 ENCOUNTER — APPOINTMENT (OUTPATIENT)
Dept: CARDIAC REHAB | Facility: HOSPITAL | Age: 51
End: 2021-04-23

## 2021-04-26 ENCOUNTER — APPOINTMENT (OUTPATIENT)
Dept: CARDIAC REHAB | Facility: HOSPITAL | Age: 51
End: 2021-04-26

## 2021-04-27 ENCOUNTER — OFFICE VISIT (OUTPATIENT)
Dept: FAMILY MEDICINE CLINIC | Facility: CLINIC | Age: 51
End: 2021-04-27

## 2021-04-27 VITALS
BODY MASS INDEX: 23.86 KG/M2 | SYSTOLIC BLOOD PRESSURE: 119 MMHG | OXYGEN SATURATION: 98 % | HEART RATE: 89 BPM | HEIGHT: 73 IN | DIASTOLIC BLOOD PRESSURE: 82 MMHG | TEMPERATURE: 97.1 F | WEIGHT: 180 LBS

## 2021-04-27 DIAGNOSIS — M47.816 ARTHROPATHY OF LUMBAR FACET JOINT: ICD-10-CM

## 2021-04-27 DIAGNOSIS — R42 DIZZINESS: Primary | ICD-10-CM

## 2021-04-27 DIAGNOSIS — M17.11 ARTHROPATHY OF RIGHT KNEE: ICD-10-CM

## 2021-04-27 PROCEDURE — 99213 OFFICE O/P EST LOW 20 MIN: CPT | Performed by: FAMILY MEDICINE

## 2021-05-03 ENCOUNTER — TELEPHONE (OUTPATIENT)
Dept: FAMILY MEDICINE CLINIC | Facility: CLINIC | Age: 51
End: 2021-05-03

## 2021-05-03 NOTE — TELEPHONE ENCOUNTER
PATIENT CALLING STATING HE IS NEEDING A LIST OF ALL HIS MEDICATIONS FAXED TO THE GASTROENTROLOGIST THAT DR. YANG REFERRED HIM TO HE DOES NOT KNOW THE NAME OF THE DR    PLEASE ADVISE  [phone]

## 2021-05-10 PROBLEM — M17.11 ARTHROPATHY OF RIGHT KNEE: Status: ACTIVE | Noted: 2021-05-10

## 2021-05-10 PROBLEM — D50.9 IRON DEFICIENCY ANEMIA: Status: ACTIVE | Noted: 2021-05-10

## 2021-05-10 PROBLEM — M47.816 ARTHROPATHY OF LUMBAR FACET JOINT: Status: ACTIVE | Noted: 2021-05-10

## 2021-05-12 RX ORDER — NITROGLYCERIN 0.4 MG/1
TABLET SUBLINGUAL
Qty: 25 TABLET | Refills: 0 | Status: SHIPPED | OUTPATIENT
Start: 2021-05-12 | End: 2021-06-07

## 2021-05-21 DIAGNOSIS — F33.1 MAJOR DEPRESSIVE DISORDER, RECURRENT EPISODE, MODERATE (HCC): ICD-10-CM

## 2021-05-21 DIAGNOSIS — F43.10 POSTTRAUMATIC STRESS DISORDER: ICD-10-CM

## 2021-05-22 RX ORDER — ARIPIPRAZOLE 5 MG/1
5 TABLET ORAL NIGHTLY
Qty: 90 TABLET | Refills: 0 | Status: SHIPPED | OUTPATIENT
Start: 2021-05-22 | End: 2021-08-23 | Stop reason: SDUPTHER

## 2021-06-07 RX ORDER — NITROGLYCERIN 0.4 MG/1
TABLET SUBLINGUAL
Qty: 25 TABLET | Refills: 0 | Status: SHIPPED | OUTPATIENT
Start: 2021-06-07 | End: 2021-06-30

## 2021-06-11 ENCOUNTER — OFFICE (AMBULATORY)
Dept: URBAN - METROPOLITAN AREA CLINIC 64 | Facility: CLINIC | Age: 51
End: 2021-06-11

## 2021-06-11 VITALS
HEART RATE: 107 BPM | SYSTOLIC BLOOD PRESSURE: 144 MMHG | WEIGHT: 174 LBS | HEIGHT: 74 IN | DIASTOLIC BLOOD PRESSURE: 83 MMHG

## 2021-06-11 DIAGNOSIS — Z12.11 ENCOUNTER FOR SCREENING FOR MALIGNANT NEOPLASM OF COLON: ICD-10-CM

## 2021-06-11 DIAGNOSIS — R19.4 CHANGE IN BOWEL HABIT: ICD-10-CM

## 2021-06-11 PROCEDURE — 99214 OFFICE O/P EST MOD 30 MIN: CPT | Performed by: NURSE PRACTITIONER

## 2021-06-14 ENCOUNTER — TELEPHONE (OUTPATIENT)
Dept: CARDIOLOGY | Facility: CLINIC | Age: 51
End: 2021-06-14

## 2021-06-15 ENCOUNTER — LAB (OUTPATIENT)
Dept: FAMILY MEDICINE CLINIC | Facility: CLINIC | Age: 51
End: 2021-06-15

## 2021-06-15 ENCOUNTER — OFFICE VISIT (OUTPATIENT)
Dept: FAMILY MEDICINE CLINIC | Facility: CLINIC | Age: 51
End: 2021-06-15

## 2021-06-15 VITALS
DIASTOLIC BLOOD PRESSURE: 99 MMHG | SYSTOLIC BLOOD PRESSURE: 161 MMHG | HEIGHT: 73 IN | WEIGHT: 175 LBS | HEART RATE: 82 BPM | OXYGEN SATURATION: 99 % | BODY MASS INDEX: 23.19 KG/M2 | RESPIRATION RATE: 16 BRPM | TEMPERATURE: 99 F

## 2021-06-15 DIAGNOSIS — I24.0 ISCHEMIC HEART DISEASE DUE TO CORONARY ARTERY OBSTRUCTION (HCC): ICD-10-CM

## 2021-06-15 DIAGNOSIS — I10 ESSENTIAL HYPERTENSION: Primary | ICD-10-CM

## 2021-06-15 DIAGNOSIS — Z87.891 PERSONAL HISTORY OF TOBACCO USE, PRESENTING HAZARDS TO HEALTH: ICD-10-CM

## 2021-06-15 DIAGNOSIS — D50.9 IRON DEFICIENCY ANEMIA, UNSPECIFIED IRON DEFICIENCY ANEMIA TYPE: ICD-10-CM

## 2021-06-15 DIAGNOSIS — I25.9 ISCHEMIC HEART DISEASE DUE TO CORONARY ARTERY OBSTRUCTION (HCC): ICD-10-CM

## 2021-06-15 PROBLEM — J44.9 COPD (CHRONIC OBSTRUCTIVE PULMONARY DISEASE): Status: ACTIVE | Noted: 2021-06-15

## 2021-06-15 PROBLEM — Z87.39 HISTORY OF SCOLIOSIS: Status: ACTIVE | Noted: 2021-06-15

## 2021-06-15 LAB — FERRITIN SERPL-MCNC: 12.7 NG/ML (ref 30–400)

## 2021-06-15 PROCEDURE — 36415 COLL VENOUS BLD VENIPUNCTURE: CPT | Performed by: FAMILY MEDICINE

## 2021-06-15 PROCEDURE — 99214 OFFICE O/P EST MOD 30 MIN: CPT | Performed by: FAMILY MEDICINE

## 2021-06-15 PROCEDURE — 82728 ASSAY OF FERRITIN: CPT | Performed by: FAMILY MEDICINE

## 2021-06-15 RX ORDER — OXCARBAZEPINE 600 MG/1
600 TABLET, FILM COATED ORAL 2 TIMES DAILY
COMMUNITY
Start: 2021-06-13 | End: 2022-08-23

## 2021-06-15 RX ORDER — LISINOPRIL 10 MG/1
10 TABLET ORAL DAILY
Qty: 30 TABLET | Refills: 5 | Status: SHIPPED | OUTPATIENT
Start: 2021-06-15 | End: 2021-07-14

## 2021-06-15 NOTE — PATIENT INSTRUCTIONS
"https://www.nhlbi.nih.gov/files/docs/public/heart/dash_brief.pdf\">   DASH Eating Plan  DASH stands for Dietary Approaches to Stop Hypertension. The DASH eating plan is a healthy eating plan that has been shown to:  · Reduce high blood pressure (hypertension).  · Reduce your risk for type 2 diabetes, heart disease, and stroke.  · Help with weight loss.  What are tips for following this plan?  Reading food labels  · Check food labels for the amount of salt (sodium) per serving. Choose foods with less than 5 percent of the Daily Value of sodium. Generally, foods with less than 300 milligrams (mg) of sodium per serving fit into this eating plan.  · To find whole grains, look for the word \"whole\" as the first word in the ingredient list.  Shopping  · Buy products labeled as \"low-sodium\" or \"no salt added.\"  · Buy fresh foods. Avoid canned foods and pre-made or frozen meals.  Cooking  · Avoid adding salt when cooking. Use salt-free seasonings or herbs instead of table salt or sea salt. Check with your health care provider or pharmacist before using salt substitutes.  · Do not toney foods. Cook foods using healthy methods such as baking, boiling, grilling, roasting, and broiling instead.  · Cook with heart-healthy oils, such as olive, canola, avocado, soybean, or sunflower oil.  Meal planning    · Eat a balanced diet that includes:  ? 4 or more servings of fruits and 4 or more servings of vegetables each day. Try to fill one-half of your plate with fruits and vegetables.  ? 6-8 servings of whole grains each day.  ? Less than 6 oz (170 g) of lean meat, poultry, or fish each day. A 3-oz (85-g) serving of meat is about the same size as a deck of cards. One egg equals 1 oz (28 g).  ? 2-3 servings of low-fat dairy each day. One serving is 1 cup (237 mL).  ? 1 serving of nuts, seeds, or beans 5 times each week.  ? 2-3 servings of heart-healthy fats. Healthy fats called omega-3 fatty acids are found in foods such as walnuts, " flaxseeds, fortified milks, and eggs. These fats are also found in cold-water fish, such as sardines, salmon, and mackerel.  · Limit how much you eat of:  ? Canned or prepackaged foods.  ? Food that is high in trans fat, such as some fried foods.  ? Food that is high in saturated fat, such as fatty meat.  ? Desserts and other sweets, sugary drinks, and other foods with added sugar.  ? Full-fat dairy products.  · Do not salt foods before eating.  · Do not eat more than 4 egg yolks a week.  · Try to eat at least 2 vegetarian meals a week.  · Eat more home-cooked food and less restaurant, buffet, and fast food.  Lifestyle  · When eating at a restaurant, ask that your food be prepared with less salt or no salt, if possible.  · If you drink alcohol:  ? Limit how much you use to:  § 0-1 drink a day for women who are not pregnant.  § 0-2 drinks a day for men.  ? Be aware of how much alcohol is in your drink. In the [place] one drink equals one 12 oz bottle of beer (355 mL), one 5 oz glass of wine (148 mL), or one 1½ oz glass of hard liquor (44 mL).  General information  · Avoid eating more than 2,300 mg of salt a day. If you have hypertension, you may need to reduce your sodium intake to 1,500 mg a day.  · Work with your health care provider to maintain a healthy body weight or to lose weight. Ask what an ideal weight is for you.  · Get at least 30 minutes of exercise that causes your heart to beat faster (aerobic exercise) most days of the week. Activities may include walking, swimming, or biking.  · Work with your health care provider or dietitian to adjust your eating plan to your individual calorie needs.  What foods should I eat?  Fruits  All fresh, dried, or frozen fruit. Canned fruit in natural juice (without added sugar).  Vegetables  Fresh or frozen vegetables (raw, steamed, roasted, or grilled). Low-sodium or reduced-sodium tomato and vegetable juice. Low-sodium or reduced-sodium tomato sauce and tomato paste.  Low-sodium or reduced-sodium canned vegetables.  Grains  Whole-grain or whole-wheat bread. Whole-grain or whole-wheat pasta. Brown rice. Oatmeal. Quinoa. Bulgur. Whole-grain and low-sodium cereals. Nicole bread. Low-fat, low-sodium crackers. Whole-wheat flour tortillas.  Meats and other proteins  Skinless chicken or turkey. Ground chicken or turkey. Pork with fat trimmed off. Fish and seafood. Egg whites. Dried beans, peas, or lentils. Unsalted nuts, nut butters, and seeds. Unsalted canned beans. Lean cuts of beef with fat trimmed off. Low-sodium, lean precooked or cured meat, such as sausages or meat loaves.  Dairy  Low-fat (1%) or fat-free (skim) milk. Reduced-fat, low-fat, or fat-free cheeses. Nonfat, low-sodium ricotta or cottage cheese. Low-fat or nonfat yogurt. Low-fat, low-sodium cheese.  Fats and oils  Soft margarine without trans fats. Vegetable oil. Reduced-fat, low-fat, or light mayonnaise and salad dressings (reduced-sodium). Canola, safflower, olive, avocado, soybean, and sunflower oils. Avocado.  Seasonings and condiments  Herbs. Spices. Seasoning mixes without salt.  Other foods  Unsalted popcorn and pretzels. Fat-free sweets.  The items listed above may not be a complete list of foods and beverages you can eat. Contact a dietitian for more information.  What foods should I avoid?  Fruits  Canned fruit in a light or heavy syrup. Fried fruit. Fruit in cream or butter sauce.  Vegetables  Creamed or fried vegetables. Vegetables in a cheese sauce. Regular canned vegetables (not low-sodium or reduced-sodium). Regular canned tomato sauce and paste (not low-sodium or reduced-sodium). Regular tomato and vegetable juice (not low-sodium or reduced-sodium). Pickles. Olives.  Grains  Baked goods made with fat, such as croissants, muffins, or some breads. Dry pasta or rice meal packs.  Meats and other proteins  Fatty cuts of meat. Ribs. Fried meat. Metzger. Bologna, salami, and other precooked or cured meats, such as  sausages or meat loaves. Fat from the back of a pig (fatback). Bratwurst. Salted nuts and seeds. Canned beans with added salt. Canned or smoked fish. Whole eggs or egg yolks. Chicken or turkey with skin.  Dairy  Whole or 2% milk, cream, and half-and-half. Whole or full-fat cream cheese. Whole-fat or sweetened yogurt. Full-fat cheese. Nondairy creamers. Whipped toppings. Processed cheese and cheese spreads.  Fats and oils  Butter. Stick margarine. Lard. Shortening. Ghee. Metzger fat. Tropical oils, such as coconut, palm kernel, or palm oil.  Seasonings and condiments  Onion salt, garlic salt, seasoned salt, table salt, and sea salt. Worcestershire sauce. Tartar sauce. Barbecue sauce. Teriyaki sauce. Soy sauce, including reduced-sodium. Steak sauce. Canned and packaged gravies. Fish sauce. Oyster sauce. Cocktail sauce. Store-bought horseradish. Ketchup. Mustard. Meat flavorings and tenderizers. Bouillon cubes. Hot sauces. Pre-made or packaged marinades. Pre-made or packaged taco seasonings. Relishes. Regular salad dressings.  Other foods  Salted popcorn and pretzels.  The items listed above may not be a complete list of foods and beverages you should avoid. Contact a dietitian for more information.  Where to find more information  · National Heart, Lung, and Blood [place]: www.nhlbi.nih.gov  · American Heart Association: www.heart.org  · Academy of Nutrition and Dietetics: www.eatright.org  · National Kidney Foundation: www.kidney.org  Summary  · The DASH eating plan is a healthy eating plan that has been shown to reduce high blood pressure (hypertension). It may also reduce your risk for type 2 diabetes, heart disease, and stroke.  · When on the DASH eating plan, aim to eat more fresh fruits and vegetables, whole grains, lean proteins, low-fat dairy, and heart-healthy fats.  · With the DASH eating plan, you should limit salt (sodium) intake to 2,300 mg a day. If you have hypertension, you may need to reduce your  sodium intake to 1,500 mg a day.  · Work with your health care provider or dietitian to adjust your eating plan to your individual calorie needs.  This information is not intended to replace advice given to you by your health care provider. Make sure you discuss any questions you have with your health care provider.  Document Revised: 11/20/2020 Document Reviewed: 11/20/2020  [employer] Patient Education © 2021 [employer]      Exercising to Stay Healthy  To become healthy and stay healthy, it is recommended that you do moderate-intensity and vigorous-intensity exercise. You can tell that you are exercising at a moderate intensity if your heart starts beating faster and you start breathing faster but can still hold a conversation. You can tell that you are exercising at a vigorous intensity if you are breathing much harder and faster and cannot hold a conversation while exercising.  Exercising regularly is important. It has many health benefits, such as:  · Improving overall fitness, flexibility, and endurance.  · Increasing bone density.  · Helping with weight control.  · Decreasing body fat.  · Increasing muscle strength.  · Reducing stress and tension.  · Improving overall health.  How often should I exercise?  Choose an activity that you enjoy, and set realistic goals. Your health care provider can help you make an activity plan that works for you.  Exercise regularly as told by your health care provider. This may include:  · Doing strength training two times a week, such as:  ? Lifting weights.  ? Using resistance bands.  ? Push-ups.  ? Sit-ups.  ? Yoga.  · Doing a certain intensity of exercise for a given amount of time. Choose from these options:  ? A total of 150 minutes of moderate-intensity exercise every week.  ? A total of 75 minutes of vigorous-intensity exercise every week.  ? A mix of moderate-intensity and vigorous-intensity exercise every week.  Children, pregnant women, people who have not  exercised regularly, people who are overweight, and older adults may need to talk with a health care provider about what activities are safe to do. If you have a medical condition, be sure to talk with your health care provider before you start a new exercise program.  What are some exercise ideas?  Moderate-intensity exercise ideas include:  · Walking 1 mile (1.6 km) in about 15 minutes.  · Biking.  · Hiking.  · Golfing.  · Dancing.  · Water aerobics.  Vigorous-intensity exercise ideas include:  · Walking 4.5 miles (7.2 km) or more in about 1 hour.  · Jogging or running 5 miles (8 km) in about 1 hour.  · Biking 10 miles (16.1 km) or more in about 1 hour.  · Lap swimming.  · Roller-skating or in-line skating.  · Cross-country skiing.  · Vigorous competitive sports, such as football, basketball, and soccer.  · Jumping rope.  · Aerobic dancing.  What are some everyday activities that can help me to get exercise?  · Yard work, such as:  ? Pushing a .  ? Raking and bagging leaves.  · Washing your car.  · Pushing a stroller.  · Shoveling snow.  · Gardening.  · Washing windows or floors.  How can I be more active in my day-to-day activities?  · Use stairs instead of an elevator.  · Take a walk during your lunch break.  · If you drive, park your car farther away from your work or school.  · If you take public transportation, get off one stop early and walk the rest of the way.  · Stand up or walk around during all of your indoor phone calls.  · Get up, stretch, and walk around every 30 minutes throughout the day.  · Enjoy exercise with a friend. Support to continue exercising will help you keep a regular routine of activity.  What guidelines can I follow while exercising?  · Before you start a new exercise program, talk with your health care provider.  · Do not exercise so much that you hurt yourself, feel dizzy, or get very short of breath.  · Wear comfortable clothes and wear shoes with good support.  · Drink  plenty of water while you exercise to prevent dehydration or heat stroke.  · Work out until your breathing and your heartbeat get faster.  Where to find more information  · [place] Department of Health and Human Services: www.hhs.gov  · Centers for Disease Control and Prevention (CDC): www.cdc.gov  Summary  · Exercising regularly is important. It will improve your overall fitness, flexibility, and endurance.  · Regular exercise also will improve your overall health. It can help you control your weight, reduce stress, and improve your bone density.  · Do not exercise so much that you hurt yourself, feel dizzy, or get very short of breath.  · Before you start a new exercise program, talk with your health care provider.  This information is not intended to replace advice given to you by your health care provider. Make sure you discuss any questions you have with your health care provider.  Document Revised: 11/30/2018 Document Reviewed: 11/08/2018  [employer] Patient Education © 2021 [employer]      Managing the Challenge of Quitting Smoking  Quitting smoking is a physical and mental challenge. You will face cravings, withdrawal symptoms, and temptation. Before quitting, work with your health care provider to make a plan that can help you manage quitting. Preparation can help you quit and keep you from giving in.  How to manage lifestyle changes  Managing stress  Stress can make you want to smoke, and wanting to smoke may cause stress. It is important to find ways to manage your stress. You might try some of the following:  · Practice relaxation techniques.  ? Breathe slowly and deeply, in through your nose and out through your mouth.  ? Listen to music.  ? Soak in a bath or take a shower.  ? Imagine a peaceful place or vacation.  · Get some support.  ? Talk with family or friends about your stress.  ? Join a support group.  ? Talk with a counselor or therapist.  · Get some physical activity.  ? Go for a walk, run, or  bike ride.  ? Play a favorite sport.  ? Practice yoga.    Medicines  Talk with your health care provider about medicines that might help you deal with cravings and make quitting easier for you.  Relationships  Social situations can be difficult when you are quitting smoking. To manage this, you can:  · Avoid parties and other social situations where people might be smoking.  · Avoid alcohol.  · Leave right away if you have the urge to smoke.  · Explain to your family and friends that you are quitting smoking. Ask for support and let them know you might be a bit grumpy.  · Plan activities where smoking is not an option.  General instructions  Be aware that many people gain weight after they quit smoking. However, not everyone does. To keep from gaining weight, have a plan in place before you quit and stick to the plan after you quit. Your plan should include:  · Having healthy snacks. When you have a craving, it may help to:  ? Eat popcorn, carrots, celery, or other cut vegetables.  ? Chew sugar-free gum.  · Changing how you eat.  ? Eat small portion sizes at meals.  ? Eat 4-6 small meals throughout the day instead of 1-2 large meals a day.  ? Be mindful when you eat. Do not watch television or do other things that might distract you as you eat.  · Exercising regularly.  ? Make time to exercise each day. If you do not have time for a long workout, do short bouts of exercise for 5-10 minutes several times a day.  ? Do some form of strengthening exercise, such as weight lifting.  ? Do some exercise that gets your heart beating and causes you to breathe deeply, such as walking fast, running, swimming, or biking. This is very important.  · Drinking plenty of water or other low-calorie or no-calorie drinks. Drink 6-8 glasses of water daily.    How to recognize withdrawal symptoms  Your body and mind may experience discomfort as you try to get used to not having nicotine in your system. These effects are called withdrawal  symptoms. They may include:  · Feeling hungrier than normal.  · Having trouble concentrating.  · Feeling irritable or restless.  · Having trouble sleeping.  · Feeling depressed.  · Craving a cigarette.  To manage withdrawal symptoms:  · Avoid places, people, and activities that trigger your cravings.  · Remember why you want to quit.  · Get plenty of sleep.  · Avoid coffee and other caffeinated drinks. These may worsen some of your symptoms.  These symptoms may surprise you. But be assured that they are normal to have when quitting smoking.  How to manage cravings  Come up with a plan for how to deal with your cravings. The plan should include the following:  · A definition of the specific situation you want to deal with.  · An alternative action you will take.  · A clear idea for how this action will help.  · The name of someone who might help you with this.  Cravings usually last for 5-10 minutes. Consider taking the following actions to help you with your plan to deal with cravings:  · Keep your mouth busy.  ? Chew sugar-free gum.  ? Suck on hard candies or a straw.  ? Brush your teeth.  · Keep your hands and body busy.  ? Change to a different activity right away.  ? Squeeze or play with a ball.  ? Do an activity or a hobby, such as making bead jewelry, practicing needlepoint, or working with wood.  ? Mix up your normal routine.  ? Take a short exercise break. Go for a quick walk or run up and down stairs.  · Focus on doing something kind or helpful for someone else.  · Call a friend or family member to talk during a craving.  · Join a support group.  · Contact a quitline.  Where to find support  To get help or find a support group:  · Call the National Cancer [place]'s Smoking Quitline: 1-800-QUIT NOW (329-0250)  · Visit the website of the Substance Abuse and Mental Health Services Administration: www.samhsa.gov  · Text QUIT to SmokefreeTXT: 918084  Where to find more information  Visit these websites to find  more information on quitting smoking:  · National Cancer [place]: www.smokefree.gov  · American Lung Association: www.lung.org  · American Cancer Society: www.cancer.org  · Centers for Disease Control and Prevention: www.cdc.gov  · American Heart Association: www.heart.org  Contact a health care provider if:  · You want to change your plan for quitting.  · The medicines you are taking are not helping.  · Your eating feels out of control or you cannot sleep.  Get help right away if:  · You feel depressed or become very anxious.  Summary  · Quitting smoking is a physical and mental challenge. You will face cravings, withdrawal symptoms, and temptation to smoke again. Preparation can help you as you go through these challenges.  · Try different techniques to manage stress, handle social situations, and prevent weight gain.  · You can deal with cravings by keeping your mouth busy (such as by chewing gum), keeping your hands and body busy, calling family or friends, or contacting a quitline for people who want to quit smoking.  · You can deal with withdrawal symptoms by avoiding places where people smoke, getting plenty of rest, and avoiding drinks with caffeine.  This information is not intended to replace advice given to you by your health care provider. Make sure you discuss any questions you have with your health care provider.  Document Revised: 10/06/2020 Document Reviewed: 10/06/2020  [employer] Patient Education © 2021 [employer]

## 2021-06-15 NOTE — PROGRESS NOTES
Subjective:     Frank Barger is a 50 y.o. male who presents for  Chief Complaint   Patient presents with   • Hypotension     4 MONTH FOLLOW UP     Anemic smoker with a concurrent medical history of ischemic heart disease and hypertension presents for follow-up.  Patient is hypertensive in office despite Lopressor 25 mg twice daily. Of note, patient continues to smoke, increased to 1.5 PPD.     Cannot afford prescription iron supplement. Taking OTC supplement.    Past Medical Hx:  Past Medical History:   Diagnosis Date   • Allergies     takes allergy injections   • Arthritis    • Asthma    • Back pain    • Depression    • Emphysema of lung (CMS/HCC)    • Headache    • Leg pain        Past Surgical Hx:  Past Surgical History:   Procedure Laterality Date   • CARDIAC CATHETERIZATION N/A 1/20/2021    Procedure: Left Heart Cath and coronary angiogram;  Surgeon: Jaimie Silva MD;  Location: [facility] CATH INVASIVE LOCATION;  Service: Cardiovascular;  Laterality: N/A;   • CARDIAC CATHETERIZATION N/A 1/20/2021    Procedure: Saphenous Vein Graft;  Surgeon: Jaimie Silva MD;  Location: [facility] CATH INVASIVE LOCATION;  Service: Cardiovascular;  Laterality: N/A;   • CARDIAC CATHETERIZATION N/A 1/20/2021    Procedure: Stent EBENEZER coronary;  Surgeon: Herve Rodriguez MD;  Location: [facility] CATH INVASIVE LOCATION;  Service: Cardiology;  Laterality: N/A;   • CORONARY ANGIOPLASTY WITH STENT PLACEMENT  04/2014   • CORONARY ARTERY BYPASS GRAFT  12/2015       Social History:  he  reports that he has been smoking. He has been smoking about 0.50 packs per day. He has never used smokeless tobacco. He reports that he does not drink alcohol and does not use drugs.    Current Meds:    Current Outpatient Medications:   •  amitriptyline (ELAVIL) 75 MG tablet, TAKE 1 TABLET BY MOUTH EVERY NIGHT, Disp: 90 tablet, Rfl: 1  •  ARIPiprazole (ABILIFY) 5 MG tablet, TAKE 1 TABLET BY MOUTH EVERY NIGHT, Disp: 90 tablet, Rfl: 0  •  aspirin  (aspirin) 81 MG EC tablet, Take 1 tablet by mouth Daily., Disp: 90 tablet, Rfl: 1  •  atorvastatin (LIPITOR) 20 MG tablet, Take 1 tablet by mouth Every Night., Disp: 90 tablet, Rfl: 1  •  buPROPion XL (Wellbutrin XL) 150 MG 24 hr tablet, Take 1 tablet by mouth Daily., Disp: 90 tablet, Rfl: 1  •  butalbital-acetaminophen-caffeine (FIORICET, ESGIC) -40 MG per tablet, Take 1 tablet by mouth 3 (Three) Times a Day As Needed for Headache., Disp: , Rfl:   •  clobetasol (TEMOVATE) 0.05 % ointment, Apply  topically to the appropriate area as directed 2 (Two) Times a Day., Disp: 60 g, Rfl: 1  •  clopidogrel (PLAVIX) 75 MG tablet, Take 1 tablet by mouth Daily., Disp: 90 tablet, Rfl: 1  •  cyclobenzaprine (FLEXERIL) 10 MG tablet, TAKE 1 TABLET BY MOUTH EVERY NIGHT AT BEDTIME FOR 1 WEEK THEN THREE TIMES DAILY, Disp: 90 tablet, Rfl: 5  •  ferrous sulfate 325 (65 FE) MG EC tablet, Take 1 tablet by mouth Every Other Day., Disp: 45 tablet, Rfl: 1  •  loratadine (CLARITIN) 10 MG tablet, Take 10 mg by mouth Daily., Disp: , Rfl:   •  metoprolol tartrate (LOPRESSOR) 25 MG tablet, Take 1 tablet by mouth 2 (Two) Times a Day., Disp: 180 tablet, Rfl: 1  •  montelukast (SINGULAIR) 10 MG tablet, Take 1 tablet by mouth Every Night., Disp: 90 tablet, Rfl: 1  •  naproxen (NAPROSYN) 500 MG tablet, Take 1 tablet by mouth 2 (Two) Times a Day As Needed for Moderate Pain . Take with food!, Disp: 60 tablet, Rfl: 3  •  nitroglycerin (NITROSTAT) 0.4 MG SL tablet, DISSOLVE ONE TABLET UNDER THE TONGUE EVERY 5 MINUTES AS NEEDED FOF CHEST PAIN. MAX 3 DOSES IN 15 MINUTES, Disp: 25 tablet, Rfl: 0  •  NON FORMULARY, Allergy injections, Disp: , Rfl:   •  omeprazole (priLOSEC) 20 MG capsule, Take 1 capsule by mouth Every Morning Before Breakfast. Take on an empty stomach!, Disp: 90 capsule, Rfl: 1  •  prazosin (MINIPRESS) 1 MG capsule, Take 1 capsule by mouth Every Night., Disp: 90 capsule, Rfl: 1  •  pregabalin (LYRICA) 200 MG capsule, Take 1 capsule by  "mouth 3 (Three) Times a Day., Disp: 90 capsule, Rfl: 5  •  SUMAtriptan (IMITREX) 50 MG tablet, TAKE 1 TABLET BY MOUTH AT ONSET OF HEADACHE. MAY REPEAT DOSE 1 TIME IN 2 HOURS IF HEADACHE NOT RELIEVED, Disp: 10 tablet, Rfl: 0  •  Vortioxetine HBr (Trintellix) 20 MG tablet, Take 20 mg by mouth Daily., Disp: 30 tablet, Rfl: 0      Review of Systems  Review of Systems   Musculoskeletal: Positive for arthralgias and back pain (lower back).   Psychiatric/Behavioral: Positive for stress.       Objective:     /99 (BP Location: Left arm, Patient Position: Sitting, Cuff Size: Adult)   Pulse 82   Temp 99 °F (37.2 °C) (Temporal)   Resp 16   Ht 185.4 cm (73\")   Wt 79.4 kg (175 lb)   SpO2 99%   BMI 23.09 kg/m²     Physical Exam  Vitals reviewed.   Constitutional:       General: He is not in acute distress.     Appearance: He is well-developed. He is not diaphoretic.   HENT:      Head: Normocephalic and atraumatic.   Cardiovascular:      Rate and Rhythm: Normal rate and regular rhythm.      Heart sounds: Normal heart sounds.   Pulmonary:      Effort: Pulmonary effort is normal.      Breath sounds: Normal breath sounds. No wheezing.   Skin:     Findings: Rash (psoriatic plaque on forehead) present.   Neurological:      Mental Status: He is alert and oriented to person, place, and time.   Psychiatric:         Mood and Affect: Mood normal.         Behavior: Behavior normal.         Lab Results   Component Value Date    WBC 7.39 03/25/2021    HGB 11.4 (L) 03/25/2021    HCT 36.7 (L) 03/25/2021    MCV 78.8 (L) 03/25/2021     03/25/2021     Lab Results   Component Value Date    IRON 39 (L) 03/25/2021    TIBC 489 03/25/2021    FERRITIN 13.30 (L) 03/25/2021     Lab Results   Component Value Date    GLUCOSE 109 (H) 01/21/2021    BUN 12 01/21/2021    CREATININE 1.10 01/21/2021    EGFRIFNONA 71 01/21/2021    BCR 10.9 01/21/2021    K 3.7 01/21/2021    CO2 25.0 01/21/2021    CALCIUM 8.6 01/21/2021    ALBUMIN 3.80 01/19/2021 "    LABIL2 1.0 03/15/2019    AST 11 01/19/2021    ALT 7 01/19/2021     Lab Results   Component Value Date    CHOL 112 01/21/2021    TRIG 460 (H) 01/21/2021    HDL 25 (L) 01/21/2021    LDL 24 01/21/2021     The ASCVD Risk score (Ahsan STRONG Jr., et al., 2013) failed to calculate for the following reasons:    The valid total cholesterol range is 130 to 320 mg/dL       Assessment/Plan:     Problem List Items Addressed This Visit        Cardiac and Vasculature    Essential hypertension - Primary    Overview     BP not at goal, <140/90.  Encouraged low-Na+ diet & 150 min exercise/week.   Continue Lopressor 25 mg twice daily.   Start lisinopril 10 mg.  Patient to monitor ambulatory BP.  Monitoring renal function.         Relevant Medications    lisinopril (PRINIVIL,ZESTRIL) 10 MG tablet    Ischemic heart disease due to coronary artery obstruction (CMS/HCC)    Overview     Status post CABG and PCI.  BP goal, <140/90.  Continue Lopressor 25 mg twice daily. Start ACE-I.  LDL at goal, <100.  Continue atorvastatin 20 mg nightly.  Continue dual antiplatelet therapy.  Encouraged smoking cessation.         Relevant Medications    lisinopril (PRINIVIL,ZESTRIL) 10 MG tablet    Other Relevant Orders    Ferritin (Completed)       Hematology and Neoplasia    Iron deficiency anemia    Overview     Continue ferrous sulfate 325 mg every other day.  Monitoring CBC and iron profile regularly.         Relevant Orders    Ferritin (Completed)       Tobacco    Personal history of tobacco use, presenting hazards to health    Overview     Discussed health risks associated with tobacco use.    Encouraged smoking cessation.  Insurance would not cover Chantix.  Continue Wellbutrin 150 mg daily to help with smoking cessation.                Follow-up:     Return in about 6 weeks (around 7/27/2021) for Recheck BP.

## 2021-06-18 ENCOUNTER — TELEPHONE (OUTPATIENT)
Dept: FAMILY MEDICINE CLINIC | Facility: CLINIC | Age: 51
End: 2021-06-18

## 2021-06-18 DIAGNOSIS — D50.9 IRON DEFICIENCY ANEMIA, UNSPECIFIED IRON DEFICIENCY ANEMIA TYPE: ICD-10-CM

## 2021-06-18 RX ORDER — LANOLIN ALCOHOL/MO/W.PET/CERES
325 CREAM (GRAM) TOPICAL EVERY OTHER DAY
Qty: 45 TABLET | Refills: 1 | Status: SHIPPED | OUTPATIENT
Start: 2021-06-18 | End: 2021-08-16

## 2021-06-19 DIAGNOSIS — T44.4X5A: ICD-10-CM

## 2021-06-19 DIAGNOSIS — F43.10 PTSD (POST-TRAUMATIC STRESS DISORDER): ICD-10-CM

## 2021-06-20 RX ORDER — AMITRIPTYLINE HYDROCHLORIDE 75 MG/1
75 TABLET, FILM COATED ORAL NIGHTLY
Qty: 90 TABLET | Refills: 0 | Status: SHIPPED | OUTPATIENT
Start: 2021-06-20 | End: 2021-08-23 | Stop reason: ALTCHOICE

## 2021-06-22 ENCOUNTER — TELEPHONE (OUTPATIENT)
Dept: FAMILY MEDICINE CLINIC | Facility: CLINIC | Age: 51
End: 2021-06-22

## 2021-06-22 DIAGNOSIS — D50.9 IRON DEFICIENCY ANEMIA, UNSPECIFIED IRON DEFICIENCY ANEMIA TYPE: ICD-10-CM

## 2021-06-22 DIAGNOSIS — G89.4 CHRONIC PAIN DISORDER: ICD-10-CM

## 2021-06-22 DIAGNOSIS — Z87.891 PERSONAL HISTORY OF TOBACCO USE, PRESENTING HAZARDS TO HEALTH: ICD-10-CM

## 2021-06-22 DIAGNOSIS — I24.0 ISCHEMIC HEART DISEASE DUE TO CORONARY ARTERY OBSTRUCTION (HCC): ICD-10-CM

## 2021-06-22 DIAGNOSIS — I25.9 ISCHEMIC HEART DISEASE DUE TO CORONARY ARTERY OBSTRUCTION (HCC): ICD-10-CM

## 2021-06-22 RX ORDER — OMEPRAZOLE 20 MG/1
20 CAPSULE, DELAYED RELEASE ORAL
Qty: 90 CAPSULE | Refills: 1 | Status: SHIPPED | OUTPATIENT
Start: 2021-06-22 | End: 2021-12-16

## 2021-06-22 RX ORDER — BUPROPION HYDROCHLORIDE 150 MG/1
150 TABLET ORAL DAILY
Qty: 90 TABLET | Refills: 1 | Status: SHIPPED | OUTPATIENT
Start: 2021-06-22 | End: 2021-12-16

## 2021-06-22 RX ORDER — LANOLIN ALCOHOL/MO/W.PET/CERES
325 CREAM (GRAM) TOPICAL EVERY OTHER DAY
Qty: 45 TABLET | Refills: 1 | Status: CANCELLED | OUTPATIENT
Start: 2021-06-22

## 2021-06-22 RX ORDER — NAPROXEN 500 MG/1
500 TABLET ORAL 2 TIMES DAILY PRN
Qty: 60 TABLET | Refills: 3 | Status: SHIPPED | OUTPATIENT
Start: 2021-06-22 | End: 2021-10-18

## 2021-06-22 RX ORDER — PRAZOSIN HYDROCHLORIDE 1 MG/1
1 CAPSULE ORAL NIGHTLY
Qty: 90 CAPSULE | Refills: 1 | Status: SHIPPED | OUTPATIENT
Start: 2021-06-22 | End: 2021-08-16

## 2021-06-22 NOTE — TELEPHONE ENCOUNTER
PATIENT CAN NOT AFFORD ferrous sulfate 325 (65 FE) MG EC tablet.    HE ALSO CAN NOT AFFORD THE PREP FOR HIS COLONOSCOPY TOMORROW SO HE IS GOING TO CANCEL IT.

## 2021-06-22 NOTE — TELEPHONE ENCOUNTER
No, he does not want IV infusion. He is asking if there is an equivalent otc product he can get? He can use a card through his insurance and get otc product paid for. Usually he can get the [place] brand at [facility] or the [employer] brand equate. He should be able to use his card and get [place] brand but it is only  27mg. Can he get this and maybe take 2 tablets daily?

## 2021-06-23 NOTE — TELEPHONE ENCOUNTER
Please call patient and let him know that the [employer] brand available at [facility] should have an iron supplement that reads iron 65 mg (ferrous sulfate 325 mg).  100 tablets is $3.96.

## 2021-06-29 PROBLEM — R07.89 CHEST DISCOMFORT: Status: RESOLVED | Noted: 2021-01-14 | Resolved: 2021-06-29

## 2021-06-30 RX ORDER — NITROGLYCERIN 0.4 MG/1
TABLET SUBLINGUAL
Qty: 25 TABLET | Refills: 2 | Status: SHIPPED | OUTPATIENT
Start: 2021-06-30 | End: 2021-08-16

## 2021-07-14 ENCOUNTER — OFFICE VISIT (OUTPATIENT)
Dept: FAMILY MEDICINE CLINIC | Facility: CLINIC | Age: 51
End: 2021-07-14

## 2021-07-14 VITALS
OXYGEN SATURATION: 98 % | HEART RATE: 65 BPM | TEMPERATURE: 97.3 F | DIASTOLIC BLOOD PRESSURE: 81 MMHG | BODY MASS INDEX: 22.73 KG/M2 | SYSTOLIC BLOOD PRESSURE: 129 MMHG | WEIGHT: 177 LBS

## 2021-07-14 DIAGNOSIS — I10 ESSENTIAL HYPERTENSION: Primary | ICD-10-CM

## 2021-07-14 PROCEDURE — 99213 OFFICE O/P EST LOW 20 MIN: CPT | Performed by: NURSE PRACTITIONER

## 2021-07-14 NOTE — PATIENT INSTRUCTIONS
Ok not to take lisinopril  Cont metoprolol twice daily  Follow up with cardiology  Call for any issues or concerns

## 2021-07-14 NOTE — PROGRESS NOTES
Subjective   Frank Barger is a 50 y.o. male.     Pt is here today to follow up on HTN.  He was seen by his PCP about 6 weeks ago and his BP was elevated so he was started on lisinopril 10mg daily.  He is also on metoprolol 25mg bid.  He states that he tried taking the lisinopril but it caused him light headedness and made him hypotensive.    He tried to cut the medication in half and it continued to lower his BP.  He has not been on the lisinopril for over a month.  Without the lisinopril his BP has averaged 120-140/80-90.  Denies CP, SOA, dizziness, HA.  He sees Dr. Silva next week.        The following portions of the patient's history were reviewed and updated as appropriate: allergies, current medications, past family history, past medical history, past social history, past surgical history and problem list.    Review of Systems   Constitutional: Negative for chills, fatigue and fever.   Respiratory: Negative for chest tightness and shortness of breath.    Cardiovascular: Negative for chest pain and palpitations.   Neurological: Negative for dizziness and headache.       Objective   /81 (BP Location: Left arm, Patient Position: Sitting, Cuff Size: Adult)   Pulse 65   Temp 97.3 °F (36.3 °C) (Tympanic)   Wt 80.3 kg (177 lb)   SpO2 98%   BMI 22.73 kg/m²   Physical Exam  Constitutional:       Appearance: Normal appearance. He is not ill-appearing.   HENT:      Head: Normocephalic and atraumatic.   Cardiovascular:      Rate and Rhythm: Normal rate and regular rhythm.      Heart sounds: No murmur heard.     Pulmonary:      Effort: Pulmonary effort is normal. No respiratory distress.      Breath sounds: Normal breath sounds.   Skin:     General: Skin is warm and dry.   Neurological:      General: No focal deficit present.      Mental Status: He is oriented to person, place, and time.   Psychiatric:         Mood and Affect: Mood normal.         Behavior: Behavior normal.         Thought Content: Thought  content normal.         Judgment: Judgment normal.           Assessment/Plan     Diagnoses and all orders for this visit:    1. Essential hypertension (Primary)  Comments:  stable  currently on metoprolol 25mg bid  didnt tolerate lisinopril-hypotension  BP good today  cont to take metoprolol  sees cardio next week

## 2021-07-27 ENCOUNTER — OFFICE VISIT (OUTPATIENT)
Dept: FAMILY MEDICINE CLINIC | Facility: CLINIC | Age: 51
End: 2021-07-27

## 2021-07-27 VITALS
HEART RATE: 75 BPM | WEIGHT: 172 LBS | HEIGHT: 74 IN | SYSTOLIC BLOOD PRESSURE: 121 MMHG | TEMPERATURE: 97.5 F | BODY MASS INDEX: 22.07 KG/M2 | OXYGEN SATURATION: 98 % | DIASTOLIC BLOOD PRESSURE: 80 MMHG

## 2021-07-27 DIAGNOSIS — Z87.891 PERSONAL HISTORY OF TOBACCO USE, PRESENTING HAZARDS TO HEALTH: ICD-10-CM

## 2021-07-27 DIAGNOSIS — I10 ESSENTIAL HYPERTENSION: Primary | ICD-10-CM

## 2021-07-27 PROCEDURE — 99213 OFFICE O/P EST LOW 20 MIN: CPT | Performed by: FAMILY MEDICINE

## 2021-07-27 NOTE — PATIENT INSTRUCTIONS
Managing the Challenge of Quitting Smoking  Quitting smoking is a physical and mental challenge. You will face cravings, withdrawal symptoms, and temptation. Before quitting, work with your health care provider to make a plan that can help you manage quitting. Preparation can help you quit and keep you from giving in.  How to manage lifestyle changes  Managing stress  Stress can make you want to smoke, and wanting to smoke may cause stress. It is important to find ways to manage your stress. You might try some of the following:  · Practice relaxation techniques.  ? Breathe slowly and deeply, in through your nose and out through your mouth.  ? Listen to music.  ? Soak in a bath or take a shower.  ? Imagine a peaceful place or vacation.  · Get some support.  ? Talk with family or friends about your stress.  ? Join a support group.  ? Talk with a counselor or therapist.  · Get some physical activity.  ? Go for a walk, run, or bike ride.  ? Play a favorite sport.  ? Practice yoga.    Medicines  Talk with your health care provider about medicines that might help you deal with cravings and make quitting easier for you.  Relationships  Social situations can be difficult when you are quitting smoking. To manage this, you can:  · Avoid parties and other social situations where people might be smoking.  · Avoid alcohol.  · Leave right away if you have the urge to smoke.  · Explain to your family and friends that you are quitting smoking. Ask for support and let them know you might be a bit grumpy.  · Plan activities where smoking is not an option.  General instructions  Be aware that many people gain weight after they quit smoking. However, not everyone does. To keep from gaining weight, have a plan in place before you quit and stick to the plan after you quit. Your plan should include:  · Having healthy snacks. When you have a craving, it may help to:  ? Eat popcorn, carrots, celery, or other cut vegetables.  ? Chew  sugar-free gum.  · Changing how you eat.  ? Eat small portion sizes at meals.  ? Eat 4-6 small meals throughout the day instead of 1-2 large meals a day.  ? Be mindful when you eat. Do not watch television or do other things that might distract you as you eat.  · Exercising regularly.  ? Make time to exercise each day. If you do not have time for a long workout, do short bouts of exercise for 5-10 minutes several times a day.  ? Do some form of strengthening exercise, such as weight lifting.  ? Do some exercise that gets your heart beating and causes you to breathe deeply, such as walking fast, running, swimming, or biking. This is very important.  · Drinking plenty of water or other low-calorie or no-calorie drinks. Drink 6-8 glasses of water daily.    How to recognize withdrawal symptoms  Your body and mind may experience discomfort as you try to get used to not having nicotine in your system. These effects are called withdrawal symptoms. They may include:  · Feeling hungrier than normal.  · Having trouble concentrating.  · Feeling irritable or restless.  · Having trouble sleeping.  · Feeling depressed.  · Craving a cigarette.  To manage withdrawal symptoms:  · Avoid places, people, and activities that trigger your cravings.  · Remember why you want to quit.  · Get plenty of sleep.  · Avoid coffee and other caffeinated drinks. These may worsen some of your symptoms.  These symptoms may surprise you. But be assured that they are normal to have when quitting smoking.  How to manage cravings  Come up with a plan for how to deal with your cravings. The plan should include the following:  · A definition of the specific situation you want to deal with.  · An alternative action you will take.  · A clear idea for how this action will help.  · The name of someone who might help you with this.  Cravings usually last for 5-10 minutes. Consider taking the following actions to help you with your plan to deal with  cravings:  · Keep your mouth busy.  ? Chew sugar-free gum.  ? Suck on hard candies or a straw.  ? Brush your teeth.  · Keep your hands and body busy.  ? Change to a different activity right away.  ? Squeeze or play with a ball.  ? Do an activity or a hobby, such as making bead jewelry, practicing needlepoint, or working with wood.  ? Mix up your normal routine.  ? Take a short exercise break. Go for a quick walk or run up and down stairs.  · Focus on doing something kind or helpful for someone else.  · Call a friend or family member to talk during a craving.  · Join a support group.  · Contact a quitline.  Where to find support  To get help or find a support group:  · Call the [employer] [place]'s Smoking Quitline: 5-521-QUIT NOW (320-1854)  · Visit the website of the Substance Abuse and Mental Health Services Administration: www.samhsa.gov  · Text QUIT to SmokefreeTXT: 564473  Where to find more information  Visit these websites to find more information on quitting smoking:  · [employer] [place]: www.smokefree.gov  · American Lung Association: www.lung.org  · American Cancer Society: www.cancer.org  · Centers for Disease Control and Prevention: www.cdc.gov  · American Heart Association: www.heart.org  Contact a health care provider if:  · You want to change your plan for quitting.  · The medicines you are taking are not helping.  · Your eating feels out of control or you cannot sleep.  Get help right away if:  · You feel depressed or become very anxious.  Summary  · Quitting smoking is a physical and mental challenge. You will face cravings, withdrawal symptoms, and temptation to smoke again. Preparation can help you as you go through these challenges.  · Try different techniques to manage stress, handle social situations, and prevent weight gain.  · You can deal with cravings by keeping your mouth busy (such as by chewing gum), keeping your hands and body busy, calling family or friends, or  contacting a quitline for people who want to quit smoking.  · You can deal with withdrawal symptoms by avoiding places where people smoke, getting plenty of rest, and avoiding drinks with caffeine.  This information is not intended to replace advice given to you by your health care provider. Make sure you discuss any questions you have with your health care provider.  Document Revised: 10/06/2020 Document Reviewed: 10/06/2020  Elsevier Patient Education © 2021 [employer]       ADVANCE CARE PLANNING  Conversations that Matter  PLANNING GUIDE    LOOKING BACK ...  Who we are, what we believe, and what we value are all shaped by experiences we have had. Pentecostalism, family traditions, jobs and friends affect us deeply.    Has anything happened in your past that shaped your feelings about medical treatment?    Think about an experience you may have had with a family member or friend who was faced with a decision about medical care near the end of life. What was positive about that experience? What do you wish would have been done differently?    HERE AND NOW ...  Do you have any significant health problems now? What kinds of things bring you such ely that, should a health problem prevent you from doing them any more, life would have little meaning? What short- or long-term goals do you have? How might medical treatment help you or hinder you in accomplishing those goals?    WHAT ABOUT TOMORROW?  What significant health problems do you fear may affect you in the future? How do you feel about the possibility of having to go to a nursing home? How would decisions be made if you could not make them?    WHO SHOULD MAKE DECISIONS?  An important part of planning is to consider whether or not you could appoint someone to make your healthcare decisions if you could not make them yourself. Many people select a close family member, but you are free to pick anyone you think could best represent you. Whoever you appoint should have  "all of the following qualifications:    • Can be trusted.  • Is willing to accept this responsibility.  • Is willing to follow the values and instructions you have discussed.  • Is able to make complex, difficult decisions.    It is helpful, but not required, to appoint one or more alternate persons in case your first choice becomes unable or unwilling to represent you. It is best if only one person has authority at a time, but you can instruct your representatives to discuss decisions together if time permits.    WHAT FUTURE DECISIONS NEED TO BE CONSIDERED?  Providing instructions for future healthcare decisions may seem like an impossible task. How can anyone plan for all the possibilities? You cannot … but you do not have to.    You need to plan for situations where you:  1. Become unexpectedly incapable of making your own decisions  2. It is clear you will have little or no recovery, and  3. The injury or loss of function is significant.    Such a situation might arise because of an injury to the brain from an accident, a stroke, or a slowly progressive disease such as Alzheimer's.    To plan for this type of situation, many people state, \"If I'm going to be a vegetable, let me go,\" or \"No heroics,\" or \"Don't keep me alive on machines.\" While these remarks are a beginning, they simply are too vague to guide decision-making.    You need to completely describe under what circumstances your goals for medical care should be changed from attempting to prolong life to being allowed to die. In some situations, certain treatments may not make sense because they will not help, but other treatments will be of important benefit.    Consider these three questions:  1. When would it make sense to continue certain treatments in an effort to prolong life and seek recovery?  2. When would it make sense to stop or withhold certain treatments and accept death when it comes?  3. Under any circumstance, what kind of comfort care " would you want, including medication, spiritual and environmental options?    Making these choices requires understanding the information, weighing the benefits and burdens from your perspective, and then discussing your choices with those closest to you.    WHAT'S NEXT?  How do you make sure that your choices are respected? First talk, about them with your family, friends, clergy and physician, then put your choices in writing. Information about putting your plans into writing -- in an advance directive -- is available from your healthcare organization or .    Do you have any significant health problems? What health problems do you fear in the future?     Consider what frightens you most about medical treatment. What role does Jewish, heather or spirituality play in how you live your life? How does cost influence your decisions about medical care?   In terms of future medical care, under what circumstances would you want the goals of medical treatment to switch from attempting to prolong life to focusing on comfort?     Describe these circumstances in as much detail as possible.   Ask yourself, what will most help me live well at this point in my life?     Share your views with the person or people who would make your medical decisions if you could not make them.     THERE'S NO EASY WAY TO PLAN FOR FUTURE HEALTHCARE CHOICES.  It's a process that involves thinking and talking about complex and sensitive issues.    The questions that follow will help in the advance care planning process. This is a guide for your own benefit; it's not a test, and there are no right or wrong answers. It does not need to be completed all at once. You may use it to share your feelings with healthcare providers, your family and your friends. The answers to these questions will help those you love make choices for you when you cannot make them yourself.    These are things I need to tell my loved ones:  What is your idea of comfort  care? Describe how you would want medications to be used to provide comfort. What type of spiritual care would you want?     I need to learn about: ____________________________  I need to ask my healthcare provider: ________________

## 2021-07-27 NOTE — PROGRESS NOTES
"  Subjective:     Frank Barger is a 50 y.o. male who presents for  Chief Complaint   Patient presents with   • Hypertension     6 week follow up      Anemic smoker with a concurrent medical history of ischemic heart disease and hypertension presents for follow-up.  Patient is normotensive in office with Lopressor 25 mg twice daily. Could not tolerate lisinopril due to hypotension and syncope.     Of note, patient continues to smoke tobacco, reduced from 1.5 PPD to 1 PPD. Reports that Wellbutrin makes him crave cigarettes.    Cannot afford prescription iron supplement. Taking OTC supplement.    Past Medical Hx:  Past Medical History:   Diagnosis Date   • Abdominal pain    • Allergies     takes allergy injections   • Anesthesia complication     states heart \"fluttering\" after anesthesia x 1- was kept in hospital 3 days s/p heart stent placement   • Anxiety    • Arthritis    • Asthma    • Chronic back pain    • Constipation 06/2021   • DDD (degenerative disc disease), lumbar    • Depression    • Emphysema of lung (CMS/HCC)    • GERD (gastroesophageal reflux disease)    • Headache    • Leg pain    • Lumbar spine pain    • Nocturia    • PTSD (post-traumatic stress disorder)    • Under care of pain management specialist     pain management of [place]       Past Surgical Hx:  Past Surgical History:   Procedure Laterality Date   • CARDIAC CATHETERIZATION N/A 1/20/2021    Procedure: Left Heart Cath and coronary angiogram;  Surgeon: Jaimie Silva MD;  Location: [facility] CATH INVASIVE LOCATION;  Service: Cardiovascular;  Laterality: N/A;   • CARDIAC CATHETERIZATION N/A 1/20/2021    Procedure: Saphenous Vein Graft;  Surgeon: Jaimie Silva MD;  Location: [facility] CATH INVASIVE LOCATION;  Service: Cardiovascular;  Laterality: N/A;   • CARDIAC CATHETERIZATION N/A 1/20/2021    Procedure: Stent EBENEZER coronary;  Surgeon: Herve Rodriguez MD;  Location: [facility] CATH INVASIVE LOCATION;  Service: Cardiology;  Laterality: " N/A;   • CORONARY ANGIOPLASTY WITH STENT PLACEMENT  04/2014    2 in 2014, 1 in 12/2020   • CORONARY ARTERY BYPASS GRAFT  12/2015    x 3       Social History:  he  reports that he has been smoking. He has been smoking about 1.50 packs per day. He has never used smokeless tobacco. He reports that he does not drink alcohol and does not use drugs.    Current Meds:    Current Outpatient Medications:   •  amitriptyline (ELAVIL) 75 MG tablet, TAKE 1 TABLET BY MOUTH EVERY NIGHT, Disp: 90 tablet, Rfl: 0  •  ARIPiprazole (ABILIFY) 5 MG tablet, TAKE 1 TABLET BY MOUTH EVERY NIGHT, Disp: 90 tablet, Rfl: 0  •  aspirin (aspirin) 81 MG EC tablet, Take 1 tablet by mouth Daily. (Patient taking differently: Take 81 mg by mouth Daily. No instructions per [facility].  Will call them), Disp: 90 tablet, Rfl: 1  •  atorvastatin (LIPITOR) 20 MG tablet, Take 1 tablet by mouth Every Night., Disp: 90 tablet, Rfl: 1  •  buPROPion XL (WELLBUTRIN XL) 150 MG 24 hr tablet, Take 1 tablet by mouth Daily., Disp: 90 tablet, Rfl: 1  •  clobetasol (TEMOVATE) 0.05 % ointment, Apply  topically to the appropriate area as directed 2 (Two) Times a Day., Disp: 60 g, Rfl: 1  •  clopidogrel (PLAVIX) 75 MG tablet, Take 1 tablet by mouth Daily. (Patient taking differently: Take 75 mg by mouth Daily. Instructed per [facility] to stop 3 days prior to procedure(pt states ok'd per Dr. Silva)), Disp: 90 tablet, Rfl: 1  •  cyclobenzaprine (FLEXERIL) 10 MG tablet, TAKE 1 TABLET BY MOUTH EVERY NIGHT AT BEDTIME FOR 1 WEEK THEN THREE TIMES DAILY (Patient taking differently: Take 10 mg by mouth 3 (Three) Times a Day.), Disp: 90 tablet, Rfl: 5  •  ferrous sulfate 325 (65 FE) MG EC tablet, Take 1 tablet by mouth Every Other Day., Disp: 45 tablet, Rfl: 1  •  loratadine (CLARITIN) 10 MG tablet, Take 10 mg by mouth Daily., Disp: , Rfl:   •  metoprolol tartrate (LOPRESSOR) 25 MG tablet, Take 1 tablet by mouth 2 (Two) Times a Day., Disp: 180 tablet, Rfl: 1  •  naproxen (NAPROSYN) 500 MG tablet,  "Take 1 tablet by mouth 2 (Two) Times a Day As Needed for Moderate Pain . Take with food!, Disp: 60 tablet, Rfl: 3  •  nitroglycerin (NITROSTAT) 0.4 MG SL tablet, DISSOLVE ONE TABLET UNDER THE TONGUE EVERY 5 MINUTES AS NEEDED FOF CHEST PAIN. MAX 3 DOSES IN 15 MINUTES, Disp: 25 tablet, Rfl: 2  •  NON FORMULARY, Allergy injections, Disp: , Rfl:   •  omeprazole (priLOSEC) 20 MG capsule, Take 1 capsule by mouth Every Morning Before Breakfast., Disp: 90 capsule, Rfl: 1  •  OXcarbazepine (TRILEPTAL) 600 MG tablet, Take 600 mg by mouth 2 (Two) Times a Day. Take dos, Disp: , Rfl:   •  prazosin (MINIPRESS) 1 MG capsule, Take 1 capsule by mouth Every Night., Disp: 90 capsule, Rfl: 1  •  pregabalin (LYRICA) 200 MG capsule, Take 1 capsule by mouth 3 (Three) Times a Day., Disp: 90 capsule, Rfl: 5  •  SUMAtriptan (IMITREX) 50 MG tablet, TAKE 1 TABLET BY MOUTH AT ONSET OF HEADACHE. MAY REPEAT DOSE 1 TIME IN 2 HOURS IF HEADACHE NOT RELIEVED, Disp: 10 tablet, Rfl: 0  •  Vortioxetine HBr (Trintellix) 20 MG tablet, Take 20 mg by mouth Daily., Disp: 30 tablet, Rfl: 0      Review of Systems  Review of Systems   Musculoskeletal: Positive for arthralgias.   Psychiatric/Behavioral: Positive for stress.       Objective:     /80 (BP Location: Left arm, Patient Position: Sitting, Cuff Size: Small Adult)   Pulse 75   Temp 97.5 °F (36.4 °C) (Infrared)   Ht 188 cm (74\")   Wt 78 kg (172 lb)   SpO2 98%   BMI 22.08 kg/m²     Physical Exam  Vitals reviewed.   Constitutional:       General: He is not in acute distress.     Appearance: He is well-developed. He is not diaphoretic.   HENT:      Head: Normocephalic and atraumatic.   Cardiovascular:      Rate and Rhythm: Normal rate and regular rhythm.      Heart sounds: Normal heart sounds.   Pulmonary:      Effort: Pulmonary effort is normal.      Breath sounds: Normal breath sounds. No wheezing.   Skin:     Findings: Rash (psoriatic plaque on forehead) present.   Neurological:      Mental " Status: He is alert and oriented to person, place, and time.   Psychiatric:         Mood and Affect: Mood normal.         Behavior: Behavior normal.         Lab Results   Component Value Date    WBC 7.39 03/25/2021    HGB 11.4 (L) 03/25/2021    HCT 36.7 (L) 03/25/2021    MCV 78.8 (L) 03/25/2021     03/25/2021     Lab Results   Component Value Date    IRON 39 (L) 03/25/2021    TIBC 489 03/25/2021    FERRITIN 12.70 (L) 06/15/2021     Lab Results   Component Value Date    GLUCOSE 109 (H) 01/21/2021    BUN 12 01/21/2021    CREATININE 1.10 01/21/2021    EGFRIFNONA 71 01/21/2021    BCR 10.9 01/21/2021    K 3.7 01/21/2021    CO2 25.0 01/21/2021    CALCIUM 8.6 01/21/2021    ALBUMIN 3.80 01/19/2021    LABIL2 1.0 03/15/2019    AST 11 01/19/2021    ALT 7 01/19/2021     Lab Results   Component Value Date    CHOL 112 01/21/2021    TRIG 460 (H) 01/21/2021    HDL 25 (L) 01/21/2021    LDL 24 01/21/2021     The ASCVD Risk score (Golden TAE Jr., et al., 2013) failed to calculate for the following reasons:    The valid total cholesterol range is 130 to 320 mg/dL       Assessment/Plan:     Problem List Items Addressed This Visit        Cardiac and Vasculature    Essential hypertension - Primary    Overview     BP at goal, <140/90.  Encouraged smoking cessation, low-Na+ diet, & 150 min exercise/week.   Continue Lopressor 25 mg twice daily.   Patient to monitor ambulatory BP.  Monitoring renal function.            Tobacco    Personal history of tobacco use, presenting hazards to health    Overview     Discussed health risks associated with tobacco use.    Encouraged smoking cessation.  Insurance would not cover Chantix.  Reports non-efficacy with Wellbutrin 150 mg.  Interested in starting NRT.             Follow-up:     Return in about 3 months (around 10/27/2021) for Medicare Wellness.

## 2021-08-16 ENCOUNTER — APPOINTMENT (OUTPATIENT)
Dept: GENERAL RADIOLOGY | Facility: HOSPITAL | Age: 51
End: 2021-08-16

## 2021-08-16 ENCOUNTER — HOSPITAL ENCOUNTER (INPATIENT)
Facility: HOSPITAL | Age: 51
LOS: 1 days | Discharge: LEFT BEFORE DISCHARGE | End: 2021-08-17
Attending: EMERGENCY MEDICINE | Admitting: STUDENT IN AN ORGANIZED HEALTH CARE EDUCATION/TRAINING PROGRAM

## 2021-08-16 ENCOUNTER — APPOINTMENT (OUTPATIENT)
Dept: CT IMAGING | Facility: HOSPITAL | Age: 51
End: 2021-08-16

## 2021-08-16 DIAGNOSIS — S53.104A ELBOW DISLOCATION, RIGHT, INITIAL ENCOUNTER: ICD-10-CM

## 2021-08-16 DIAGNOSIS — S53.104A: ICD-10-CM

## 2021-08-16 DIAGNOSIS — S42.402A CLOSED FRACTURE OF LEFT ELBOW, INITIAL ENCOUNTER: Primary | ICD-10-CM

## 2021-08-16 DIAGNOSIS — S52.102A CLOSED FRACTURE OF PROXIMAL END OF LEFT RADIUS, UNSPECIFIED FRACTURE MORPHOLOGY, INITIAL ENCOUNTER: ICD-10-CM

## 2021-08-16 DIAGNOSIS — S52.022A OLECRANON FRACTURE, LEFT, CLOSED, INITIAL ENCOUNTER: ICD-10-CM

## 2021-08-16 DIAGNOSIS — R55 SYNCOPE, UNSPECIFIED SYNCOPE TYPE: ICD-10-CM

## 2021-08-16 DIAGNOSIS — S51.001A: ICD-10-CM

## 2021-08-16 LAB
ANION GAP SERPL CALCULATED.3IONS-SCNC: 14 MMOL/L (ref 5–15)
APTT PPP: 24.7 SECONDS (ref 24–31)
BASOPHILS # BLD AUTO: 0.1 10*3/MM3 (ref 0–0.2)
BASOPHILS NFR BLD AUTO: 0.9 % (ref 0–1.5)
BUN SERPL-MCNC: 17 MG/DL (ref 6–20)
BUN/CREAT SERPL: 15.6 (ref 7–25)
CALCIUM SPEC-SCNC: 9.2 MG/DL (ref 8.6–10.5)
CHLORIDE SERPL-SCNC: 99 MMOL/L (ref 98–107)
CO2 SERPL-SCNC: 20 MMOL/L (ref 22–29)
CREAT SERPL-MCNC: 1.09 MG/DL (ref 0.76–1.27)
DEPRECATED RDW RBC AUTO: 51.6 FL (ref 37–54)
EOSINOPHIL # BLD AUTO: 0.1 10*3/MM3 (ref 0–0.4)
EOSINOPHIL NFR BLD AUTO: 0.5 % (ref 0.3–6.2)
ERYTHROCYTE [DISTWIDTH] IN BLOOD BY AUTOMATED COUNT: 17.4 % (ref 12.3–15.4)
GFR SERPL CREATININE-BSD FRML MDRD: 71 ML/MIN/1.73
GLUCOSE SERPL-MCNC: 111 MG/DL (ref 65–99)
HCT VFR BLD AUTO: 40.1 % (ref 37.5–51)
HGB BLD-MCNC: 13.4 G/DL (ref 13–17.7)
HOLD SPECIMEN: NORMAL
HOLD SPECIMEN: NORMAL
INR PPP: 1.03 (ref 0.93–1.1)
LYMPHOCYTES # BLD AUTO: 1.7 10*3/MM3 (ref 0.7–3.1)
LYMPHOCYTES NFR BLD AUTO: 13.1 % (ref 19.6–45.3)
MCH RBC QN AUTO: 28.7 PG (ref 26.6–33)
MCHC RBC AUTO-ENTMCNC: 33.4 G/DL (ref 31.5–35.7)
MCV RBC AUTO: 86 FL (ref 79–97)
MONOCYTES # BLD AUTO: 1 10*3/MM3 (ref 0.1–0.9)
MONOCYTES NFR BLD AUTO: 7.5 % (ref 5–12)
NEUTROPHILS NFR BLD AUTO: 10.1 10*3/MM3 (ref 1.7–7)
NEUTROPHILS NFR BLD AUTO: 78 % (ref 42.7–76)
NRBC BLD AUTO-RTO: 0.1 /100 WBC (ref 0–0.2)
PLATELET # BLD AUTO: 258 10*3/MM3 (ref 140–450)
PMV BLD AUTO: 8.5 FL (ref 6–12)
POTASSIUM SERPL-SCNC: 3.9 MMOL/L (ref 3.5–5.2)
PROTHROMBIN TIME: 11.4 SECONDS (ref 9.6–11.7)
RBC # BLD AUTO: 4.66 10*6/MM3 (ref 4.14–5.8)
SARS-COV-2 RNA PNL SPEC NAA+PROBE: NOT DETECTED
SODIUM SERPL-SCNC: 133 MMOL/L (ref 136–145)
TROPONIN T SERPL-MCNC: <0.01 NG/ML (ref 0–0.03)
WBC # BLD AUTO: 13 10*3/MM3 (ref 3.4–10.8)

## 2021-08-16 PROCEDURE — 73070 X-RAY EXAM OF ELBOW: CPT

## 2021-08-16 PROCEDURE — 25010000002 HYDROMORPHONE PER 4 MG: Performed by: EMERGENCY MEDICINE

## 2021-08-16 PROCEDURE — 85610 PROTHROMBIN TIME: CPT | Performed by: EMERGENCY MEDICINE

## 2021-08-16 PROCEDURE — 84484 ASSAY OF TROPONIN QUANT: CPT | Performed by: EMERGENCY MEDICINE

## 2021-08-16 PROCEDURE — 70486 CT MAXILLOFACIAL W/O DYE: CPT

## 2021-08-16 PROCEDURE — 71045 X-RAY EXAM CHEST 1 VIEW: CPT

## 2021-08-16 PROCEDURE — 80048 BASIC METABOLIC PNL TOTAL CA: CPT | Performed by: EMERGENCY MEDICINE

## 2021-08-16 PROCEDURE — 93005 ELECTROCARDIOGRAM TRACING: CPT | Performed by: NURSE PRACTITIONER

## 2021-08-16 PROCEDURE — 73110 X-RAY EXAM OF WRIST: CPT

## 2021-08-16 PROCEDURE — 99222 1ST HOSP IP/OBS MODERATE 55: CPT | Performed by: NURSE PRACTITIONER

## 2021-08-16 PROCEDURE — 99285 EMERGENCY DEPT VISIT HI MDM: CPT

## 2021-08-16 PROCEDURE — 25010000003 CEFAZOLIN PER 500 MG: Performed by: EMERGENCY MEDICINE

## 2021-08-16 PROCEDURE — 25010000002 HYDROMORPHONE PER 4 MG: Performed by: NURSE PRACTITIONER

## 2021-08-16 PROCEDURE — 85025 COMPLETE CBC W/AUTO DIFF WBC: CPT | Performed by: EMERGENCY MEDICINE

## 2021-08-16 PROCEDURE — 70450 CT HEAD/BRAIN W/O DYE: CPT

## 2021-08-16 PROCEDURE — 73090 X-RAY EXAM OF FOREARM: CPT

## 2021-08-16 PROCEDURE — 93005 ELECTROCARDIOGRAM TRACING: CPT | Performed by: EMERGENCY MEDICINE

## 2021-08-16 PROCEDURE — U0003 INFECTIOUS AGENT DETECTION BY NUCLEIC ACID (DNA OR RNA); SEVERE ACUTE RESPIRATORY SYNDROME CORONAVIRUS 2 (SARS-COV-2) (CORONAVIRUS DISEASE [COVID-19]), AMPLIFIED PROBE TECHNIQUE, MAKING USE OF HIGH THROUGHPUT TECHNOLOGIES AS DESCRIBED BY CMS-2020-01-R: HCPCS | Performed by: ORTHOPAEDIC SURGERY

## 2021-08-16 PROCEDURE — 72125 CT NECK SPINE W/O DYE: CPT

## 2021-08-16 PROCEDURE — 85730 THROMBOPLASTIN TIME PARTIAL: CPT | Performed by: EMERGENCY MEDICINE

## 2021-08-16 PROCEDURE — 99284 EMERGENCY DEPT VISIT MOD MDM: CPT

## 2021-08-16 PROCEDURE — 25010000002 ONDANSETRON PER 1 MG: Performed by: EMERGENCY MEDICINE

## 2021-08-16 RX ORDER — SODIUM CHLORIDE 9 MG/ML
100 INJECTION, SOLUTION INTRAVENOUS CONTINUOUS
Status: DISCONTINUED | OUTPATIENT
Start: 2021-08-16 | End: 2021-08-18 | Stop reason: HOSPADM

## 2021-08-16 RX ORDER — NITROGLYCERIN 0.4 MG/1
0.4 TABLET SUBLINGUAL
COMMUNITY
End: 2022-11-28

## 2021-08-16 RX ORDER — ONDANSETRON 2 MG/ML
4 INJECTION INTRAMUSCULAR; INTRAVENOUS ONCE
Status: COMPLETED | OUTPATIENT
Start: 2021-08-16 | End: 2021-08-16

## 2021-08-16 RX ORDER — TERAZOSIN 5 MG/1
5 CAPSULE ORAL NIGHTLY
Status: DISCONTINUED | OUTPATIENT
Start: 2021-08-17 | End: 2021-08-18 | Stop reason: HOSPADM

## 2021-08-16 RX ORDER — NICOTINE 21 MG/24HR
1 PATCH, TRANSDERMAL 24 HOURS TRANSDERMAL
Status: DISCONTINUED | OUTPATIENT
Start: 2021-08-17 | End: 2021-08-18 | Stop reason: HOSPADM

## 2021-08-16 RX ORDER — HYDROMORPHONE HCL 110MG/55ML
1 PATIENT CONTROLLED ANALGESIA SYRINGE INTRAVENOUS EVERY 4 HOURS PRN
Status: DISCONTINUED | OUTPATIENT
Start: 2021-08-16 | End: 2021-08-18 | Stop reason: HOSPADM

## 2021-08-16 RX ORDER — PRAZOSIN HYDROCHLORIDE 2 MG/1
1 CAPSULE ORAL NIGHTLY
COMMUNITY
End: 2021-09-13

## 2021-08-16 RX ORDER — PANTOPRAZOLE SODIUM 40 MG/1
40 TABLET, DELAYED RELEASE ORAL
Refills: 1 | Status: DISCONTINUED | OUTPATIENT
Start: 2021-08-17 | End: 2021-08-18 | Stop reason: HOSPADM

## 2021-08-16 RX ORDER — MORPHINE SULFATE 4 MG/ML
4 INJECTION, SOLUTION INTRAMUSCULAR; INTRAVENOUS
Status: CANCELLED | OUTPATIENT
Start: 2021-08-16 | End: 2021-08-26

## 2021-08-16 RX ORDER — CYCLOBENZAPRINE HCL 10 MG
10 TABLET ORAL 3 TIMES DAILY
COMMUNITY
End: 2021-11-12

## 2021-08-16 RX ORDER — ONDANSETRON 2 MG/ML
4 INJECTION INTRAMUSCULAR; INTRAVENOUS EVERY 6 HOURS PRN
Status: DISCONTINUED | OUTPATIENT
Start: 2021-08-16 | End: 2021-08-17

## 2021-08-16 RX ORDER — SODIUM CHLORIDE 9 MG/ML
75 INJECTION, SOLUTION INTRAVENOUS CONTINUOUS
Status: CANCELLED | OUTPATIENT
Start: 2021-08-16

## 2021-08-16 RX ORDER — FERROUS SULFATE 325(65) MG
325 TABLET ORAL
COMMUNITY
End: 2022-01-12 | Stop reason: SDUPTHER

## 2021-08-16 RX ORDER — HYDROMORPHONE HCL 110MG/55ML
1 PATIENT CONTROLLED ANALGESIA SYRINGE INTRAVENOUS ONCE
Status: COMPLETED | OUTPATIENT
Start: 2021-08-16 | End: 2021-08-16

## 2021-08-16 RX ORDER — ATORVASTATIN CALCIUM 20 MG/1
20 TABLET, FILM COATED ORAL NIGHTLY
Status: DISCONTINUED | OUTPATIENT
Start: 2021-08-17 | End: 2021-08-18 | Stop reason: HOSPADM

## 2021-08-16 RX ORDER — ETOMIDATE 2 MG/ML
0.3 INJECTION INTRAVENOUS ONCE
Status: COMPLETED | OUTPATIENT
Start: 2021-08-16 | End: 2021-08-16

## 2021-08-16 RX ORDER — SODIUM CHLORIDE 0.9 % (FLUSH) 0.9 %
10 SYRINGE (ML) INJECTION AS NEEDED
Status: DISCONTINUED | OUTPATIENT
Start: 2021-08-16 | End: 2021-08-18 | Stop reason: HOSPADM

## 2021-08-16 RX ORDER — CLOPIDOGREL BISULFATE 75 MG/1
75 TABLET ORAL DAILY
Status: DISCONTINUED | OUTPATIENT
Start: 2021-08-17 | End: 2021-08-18 | Stop reason: HOSPADM

## 2021-08-16 RX ORDER — BUPROPION HYDROCHLORIDE 150 MG/1
150 TABLET ORAL DAILY
Status: DISCONTINUED | OUTPATIENT
Start: 2021-08-17 | End: 2021-08-18 | Stop reason: HOSPADM

## 2021-08-16 RX ADMIN — ATORVASTATIN CALCIUM 20 MG: 20 TABLET, FILM COATED ORAL at 23:47

## 2021-08-16 RX ADMIN — ETOMIDATE 12 MG: 40 INJECTION, SOLUTION INTRAVENOUS at 19:56

## 2021-08-16 RX ADMIN — CEFAZOLIN SODIUM 1 G: 1 INJECTION, POWDER, FOR SOLUTION INTRAMUSCULAR; INTRAVENOUS at 19:22

## 2021-08-16 RX ADMIN — HYDROMORPHONE HYDROCHLORIDE 1 MG: 2 INJECTION, SOLUTION INTRAMUSCULAR; INTRAVENOUS; SUBCUTANEOUS at 17:29

## 2021-08-16 RX ADMIN — HYDROMORPHONE HYDROCHLORIDE 1 MG: 2 INJECTION, SOLUTION INTRAMUSCULAR; INTRAVENOUS; SUBCUTANEOUS at 23:47

## 2021-08-16 RX ADMIN — AMITRIPTYLINE HYDROCHLORIDE 75 MG: 50 TABLET, FILM COATED ORAL at 23:47

## 2021-08-16 RX ADMIN — TERAZOSIN HYDROCHLORIDE 5 MG: 5 CAPSULE ORAL at 23:47

## 2021-08-16 RX ADMIN — Medication 10 ML: at 23:47

## 2021-08-16 RX ADMIN — ONDANSETRON 4 MG: 2 INJECTION INTRAMUSCULAR; INTRAVENOUS at 17:29

## 2021-08-16 RX ADMIN — METOPROLOL TARTRATE 25 MG: 25 TABLET, FILM COATED ORAL at 23:47

## 2021-08-16 NOTE — ED NOTES
Patient given warm blanket and non-slip hospital socks as requested. Spouse at bedside.     Arianna Merino, RN  08/16/21 [id number]

## 2021-08-16 NOTE — ED PROVIDER NOTES
"Subjective   Chief complaint: Syncope    31-year-old male presents after syncopal episode.  Patient states he was going to get himself a drink out of a cooler in the next thing he knew he was waking up on the ground.  He had fallen onto concrete.  He injured both arms in the fall.  He has a deformity and open wound to the right elbow.  He is also having pain in the left elbow and radiating down to the left wrist.  He has been able to ambulate.  He denies any chest pain or shortness of breath.  He states he was feeling fine prior to the syncopal episode.      History provided by:  Patient      Review of Systems   Constitutional: Negative for fever.   HENT: Negative for congestion and sore throat.    Eyes: Negative for redness.   Respiratory: Negative for cough and shortness of breath.    Cardiovascular: Negative for chest pain.   Gastrointestinal: Negative for abdominal pain, diarrhea and vomiting.   Genitourinary: Negative for dysuria.   Musculoskeletal:        Bilateral arm injuries   Skin: Negative for rash.   Neurological: Positive for syncope. Negative for dizziness and headaches.   Psychiatric/Behavioral: Negative for confusion.       Past Medical History:   Diagnosis Date   • Abdominal pain    • Allergies     takes allergy injections   • Anesthesia complication     states heart \"fluttering\" after anesthesia x 1- was kept in hospital 3 days s/p heart stent placement   • Anxiety    • Arthritis    • Asthma    • Chronic back pain    • Constipation 06/2021   • DDD (degenerative disc disease), lumbar    • Depression    • Emphysema of lung (CMS/[facility])    • GERD (gastroesophageal reflux disease)    • Headache    • Leg pain    • Lumbar spine pain    • Nocturia    • PTSD (post-traumatic stress disorder)    • Under care of pain management specialist     pain management of [facility]       Allergies   Allergen Reactions   • Hydrocodone Nausea And Vomiting   • Hydrocodone-Acetaminophen GI Intolerance   • Oxycodone GI " "Intolerance       Past Surgical History:   Procedure Laterality Date   • CARDIAC CATHETERIZATION N/A 1/20/2021    Procedure: Left Heart Cath and coronary angiogram;  Surgeon: Jaimie Silva MD;  Location: [facility] CATH INVASIVE LOCATION;  Service: Cardiovascular;  Laterality: N/A;   • CARDIAC CATHETERIZATION N/A 1/20/2021    Procedure: Saphenous Vein Graft;  Surgeon: Jaimie Silva MD;  Location: [facility] CATH INVASIVE LOCATION;  Service: Cardiovascular;  Laterality: N/A;   • CARDIAC CATHETERIZATION N/A 1/20/2021    Procedure: Stent EBENEZER coronary;  Surgeon: Herve Rodriguez MD;  Location: [facility] CATH INVASIVE LOCATION;  Service: Cardiology;  Laterality: N/A;   • CORONARY ANGIOPLASTY WITH STENT PLACEMENT  04/2014    2 in 2014, 1 in 12/2020   • CORONARY ARTERY BYPASS GRAFT  12/2015    x 3       Family History   Problem Relation Age of Onset   • Diabetes Mother    • Heart disease Father    • Anxiety disorder Daughter    • Depression Daughter        Social History     Socioeconomic History   • Marital status: Single     Spouse name: Not on file   • Number of children: Not on file   • Years of education: Not on file   • Highest education level: Not on file   Tobacco Use   • Smoking status: Current Every Day Smoker     Packs/day: 1.50   • Smokeless tobacco: Never Used   • Tobacco comment: do not smoke dos   Vaping Use   • Vaping Use: Never used   Substance and Sexual Activity   • Alcohol use: No   • Drug use: No   • Sexual activity: Defer     Partners: Female       /93   Pulse 85   Temp 98.2 °F (36.8 °C) (Oral)   Resp 18   Ht 188 cm (74\")   Wt 78 kg (172 lb)   SpO2 100%   BMI 22.08 kg/m²       Objective   Physical Exam  Vitals and nursing note reviewed.   Constitutional:       Appearance: Normal appearance. He is well-developed.   HENT:      Head: Normocephalic.      Comments: Mild abrasion to the nose  Eyes:      Extraocular Movements: Extraocular movements intact.      Pupils: Pupils are equal, " round, and reactive to light.   Cardiovascular:      Rate and Rhythm: Normal rate and regular rhythm.      Heart sounds: Normal heart sounds.   Pulmonary:      Effort: Pulmonary effort is normal. No respiratory distress.      Breath sounds: Normal breath sounds.   Abdominal:      General: Bowel sounds are normal.      Palpations: Abdomen is soft.      Tenderness: There is no abdominal tenderness.   Musculoskeletal:      Cervical back: No tenderness.      Comments: There is an obvious deformity to the right elbow with an open wound on the medial aspect of the elbow.  Neurovascular intact distally.  There is swelling and tenderness to the left elbow as well.  There is some left wrist swelling and tenderness.  Neurovascular intact distally.  Extremities otherwise unremarkable.   Skin:     General: Skin is warm and dry.   Neurological:      General: No focal deficit present.      Mental Status: He is alert and oriented to person, place, and time.         FX Dislocation    Date/Time: 8/16/2021 8:39 PM  Performed by: Cheo Beck MD  Authorized by: Cheo Beck MD     Consent:     Consent obtained:  Verbal    Consent given by:  Patient  Injury:     Injury location:  Elbow    Elbow injury location:  R elbow    Elbow fracture type comment:  Complete radiocapitellar and ulnar trochlear dislocation  Pre-procedure assessment:     Neurological function: normal      Distal perfusion: normal      Range of motion: reduced    Sedation:     Sedation type:  Moderate (conscious) sedation (Etomidate 12 mg IV)  Procedure details:     Manipulation performed: yes      Reduction successful: yes      X-ray confirmed reduction: yes      Immobilization:  Splint    Splint type:  Long arm    Supplies used:  Ortho-Glass  Post-procedure details:     Neurological function: normal      Distal perfusion: normal      Patient tolerance of procedure:  Tolerated well, no immediate complications    FX Dislocation    Date/Time: 8/16/2021 8:43  PM  Performed by: Cheo Beck MD  Authorized by: Cheo Beck MD     Consent:     Consent obtained:  Verbal    Consent given by:  Patient  Injury:     Injury location:  Elbow    Elbow injury location:  L elbow    Elbow fracture type: olecranon process fracture    Pre-procedure assessment:     Neurological function: normal      Distal perfusion: normal      Range of motion: reduced    Sedation:     Sedation type:  Moderate (conscious) sedation (Etomidate 12 mg IV)  Procedure details:     Manipulation performed: no      Immobilization:  Splint    Splint type:  Long arm    Supplies used:  Ortho-Glass  Post-procedure details:     Neurological function: normal      Distal perfusion: normal      Patient tolerance of procedure:  Tolerated well, no immediate complications               ED Course      My interpretation of EKG shows sinus rhythm, rate of 92, no ST elevation     Results for orders placed or performed during the hospital encounter of 08/16/21   Basic Metabolic Panel    Specimen: Blood   Result Value Ref Range    Glucose 111 (H) 65 - 99 mg/dL    BUN 17 6 - 20 mg/dL    Creatinine 1.09 0.76 - 1.27 mg/dL    Sodium 133 (L) 136 - 145 mmol/L    Potassium 3.9 3.5 - 5.2 mmol/L    Chloride 99 98 - 107 mmol/L    CO2 20.0 (L) 22.0 - 29.0 mmol/L    Calcium 9.2 8.6 - 10.5 mg/dL    eGFR Non African Amer 71 >60 mL/min/1.73    BUN/Creatinine Ratio 15.6 7.0 - 25.0    Anion Gap 14.0 5.0 - 15.0 mmol/L   Protime-INR    Specimen: Blood   Result Value Ref Range    Protime 11.4 9.6 - 11.7 Seconds    INR 1.03 0.93 - 1.10   aPTT    Specimen: Blood   Result Value Ref Range    PTT 24.7 24.0 - 31.0 seconds   Troponin    Specimen: Blood   Result Value Ref Range    Troponin T <0.010 0.000 - 0.030 ng/mL   CBC Auto Differential    Specimen: Blood   Result Value Ref Range    WBC 13.00 (H) 3.40 - 10.80 10*3/mm3    RBC 4.66 4.14 - 5.80 10*6/mm3    Hemoglobin 13.4 13.0 - 17.7 g/dL    Hematocrit 40.1 37.5 - 51.0 %    MCV 86.0 79.0 - 97.0 fL     MCH 28.7 26.6 - 33.0 pg    MCHC 33.4 31.5 - 35.7 g/dL    RDW 17.4 (H) 12.3 - 15.4 %    RDW-SD 51.6 37.0 - 54.0 fl    MPV 8.5 6.0 - 12.0 fL    Platelets 258 140 - 450 10*3/mm3    Neutrophil % 78.0 (H) 42.7 - 76.0 %    Lymphocyte % 13.1 (L) 19.6 - 45.3 %    Monocyte % 7.5 5.0 - 12.0 %    Eosinophil % 0.5 0.3 - 6.2 %    Basophil % 0.9 0.0 - 1.5 %    Neutrophils, Absolute 10.10 (H) 1.70 - 7.00 10*3/mm3    Lymphocytes, Absolute 1.70 0.70 - 3.10 10*3/mm3    Monocytes, Absolute 1.00 (H) 0.10 - 0.90 10*3/mm3    Eosinophils, Absolute 0.10 0.00 - 0.40 10*3/mm3    Basophils, Absolute 0.10 0.00 - 0.20 10*3/mm3    nRBC 0.1 0.0 - 0.2 /100 WBC   Gold Top - SST   Result Value Ref Range    Extra Tube Hold for add-ons.    Green Top (Gel)   Result Value Ref Range    Extra Tube Hold for add-ons.      XR Elbow 2 View Right    Result Date: 8/16/2021  Interval reduction of right elbow dislocation. No fracture identified.  Electronically Signed By-Bertha Wood MD On:8/16/2021 8:25 PM This report was finalized on [id number] by  Bertha Wood MD.    CT Head Without Contrast    Result Date: 8/16/2021  No evidence of hemorrhage, mass effect or midline shift. No acute process identified.  Electronically Signed By-Bertha Wood MD On:8/16/2021 6:21 PM This report was finalized on [id number] by  Bertha Wood MD.    CT Cervical Spine Without Contrast    Result Date: 8/16/2021  No acute osseous abnormality.  Electronically Signed By-Bertha Wood MD On:8/16/2021 7:04 PM This report was finalized on [id number] by  Bertha Wood MD.    XR Chest 1 View    Result Date: 8/16/2021  No acute cardiopulmonary process.  Electronically Signed By-Bertha Wood MD On:8/16/2021 7:03 PM This report was finalized on [id number] by  Bertha Wood MD.    CT Facial Bones Without Contrast    Result Date: 8/16/2021   No acute osseous abnormality.  Electronically Signed By-Bertha Wood MD On:8/16/2021 6:26 PM This report was finalized on [id number] by   Bertha Wood MD.    XR Elbow 2 View Bilateral    Addendum Date: 8/16/2021    2 images of the right elbow and 2 images of the left elbow were obtained. I was not aware that they were placed on the same series.  The right elbow demonstrates complete dislocation of the radiocapitellar and ulnar trochlear joints. No definite fracture identified.  The left elbow demonstrates displaced fractures of the olecranon, the radius with a nondisplaced fracture of the supracondylar distal humerus.  Electronically Signed By-Bertha Wood MD On:8/16/2021 8:27 PM This report was finalized on [id number] by  Bertha Wood MD.    Result Date: 8/16/2021   1. Extensively comminuted and displaced fracture of the olecranon as described above. 2. Suspected nondisplaced supracondylar fracture of the distal humerus extending through the trochlea and extending within the intercondylar region. 3. Complete dislocation of the radiocapitellar and ulnar trochlear joints.  Electronically Signed By-Bertha Wood MD On:8/16/2021 7:01 PM This report was finalized on [id number] by  Bertha Wood MD.    XR Forearm 2 View Bilateral    Result Date: 8/16/2021  No acute osseous abnormality of the remaining forearm or the bilateral wrists.  Electronically Signed By-Bertha Wood MD On:8/16/2021 7:02 PM This report was finalized on [id number] by  Bertha Wood MD.    XR Wrist 3+ View Bilateral    Result Date: 8/16/2021  No acute osseous abnormality of the remaining forearm or the bilateral wrists.  Electronically Signed By-Bertha Wood MD On:8/16/2021 7:02 PM This report was finalized on [id number] by  Bertha Wood MD.                                    MDM   Patient had the above evaluation.  Results were discussed with the patient.  Patient remained hemodynamically stable in the emergency room.  He did have some hypertension but did not have any hypotension or dizziness.  He has had no further syncopal episodes.  EKG shows a sinus rhythm.  He has  had no arrhythmias.  Troponin is negative.  Chest x-ray shows no acute disease.  CT head, face, C-spine showed no acute injuries.  Patient was found to have multiple comminuted fractures of the left elbow as detailed above.  He also has a complete right elbow dislocation of both the radiocapitellar and ulnar trochlear joints.  The right elbow dislocation was reduced and both arms were splinted with a long-arm splint.  Of note the right elbow dislocation did appear to be open with a small open wound to the medial aspect of the elbow joint.  Patient was started on cefazolin.  I discussed with Dr. August who requests a CT of the left elbow which has been ordered.  He will consult on the patient.  I also discussed with the nurse practitioner on-call for the hospitalist who agreed to admit.      Final diagnoses:   Syncope, unspecified syncope type   Elbow dislocation, right, initial encounter   Olecranon fracture, left, closed, initial encounter   Closed fracture of proximal end of left radius, unspecified fracture morphology, initial encounter       ED Disposition  ED Disposition     ED Disposition Condition Comment    Decision to Admit  Level of Care: Med/Surg [1]   Diagnosis: Dislocation of elbow, right, open, initial encounter [[id number]]   Admitting Physician: DESTIN HAMLIN [[id number]]   Bed Request Comments: cardiac monitor for syncope   Certification: I Certify That Inpatient Hospital Services Are Medically Necessary For Greater Than 2 Midnights            No follow-up provider specified.       Medication List      No changes were made to your prescriptions during this visit.          Cheo Beck MD  08/16/21 [id number]

## 2021-08-17 ENCOUNTER — APPOINTMENT (OUTPATIENT)
Dept: GENERAL RADIOLOGY | Facility: HOSPITAL | Age: 51
End: 2021-08-17

## 2021-08-17 ENCOUNTER — ANESTHESIA (OUTPATIENT)
Dept: PERIOP | Facility: HOSPITAL | Age: 51
End: 2021-08-17

## 2021-08-17 ENCOUNTER — ANESTHESIA EVENT (OUTPATIENT)
Dept: PERIOP | Facility: HOSPITAL | Age: 51
End: 2021-08-17

## 2021-08-17 ENCOUNTER — APPOINTMENT (OUTPATIENT)
Dept: CARDIOLOGY | Facility: HOSPITAL | Age: 51
End: 2021-08-17

## 2021-08-17 ENCOUNTER — APPOINTMENT (OUTPATIENT)
Dept: CT IMAGING | Facility: HOSPITAL | Age: 51
End: 2021-08-17

## 2021-08-17 VITALS
HEART RATE: 74 BPM | BODY MASS INDEX: 21.56 KG/M2 | HEIGHT: 74 IN | SYSTOLIC BLOOD PRESSURE: 124 MMHG | WEIGHT: 168 LBS | DIASTOLIC BLOOD PRESSURE: 73 MMHG | OXYGEN SATURATION: 93 % | TEMPERATURE: 97.7 F | RESPIRATION RATE: 14 BRPM

## 2021-08-17 LAB
ABO GROUP BLD: NORMAL
ANION GAP SERPL CALCULATED.3IONS-SCNC: 12 MMOL/L (ref 5–15)
BH CV ECHO MEAS - % IVS THICK: 27.7 %
BH CV ECHO MEAS - % LVPW THICK: 34.9 %
BH CV ECHO MEAS - ACS: 2.1 CM
BH CV ECHO MEAS - AO MAX PG (FULL): 1.2 MMHG
BH CV ECHO MEAS - AO MAX PG: 3.8 MMHG
BH CV ECHO MEAS - AO MEAN PG (FULL): 0.98 MMHG
BH CV ECHO MEAS - AO MEAN PG: 2.2 MMHG
BH CV ECHO MEAS - AO ROOT AREA (BSA CORRECTED): 1.7
BH CV ECHO MEAS - AO ROOT AREA: 9.7 CM^2
BH CV ECHO MEAS - AO ROOT DIAM: 3.5 CM
BH CV ECHO MEAS - AO V2 MAX: 97.3 CM/SEC
BH CV ECHO MEAS - AO V2 MEAN: 72.5 CM/SEC
BH CV ECHO MEAS - AO V2 VTI: 19.6 CM
BH CV ECHO MEAS - AVA(I,A): 3.4 CM^2
BH CV ECHO MEAS - AVA(I,D): 3.4 CM^2
BH CV ECHO MEAS - AVA(V,A): 3.3 CM^2
BH CV ECHO MEAS - AVA(V,D): 3.3 CM^2
BH CV ECHO MEAS - BSA(HAYCOCK): 2 M^2
BH CV ECHO MEAS - BSA: 2 M^2
BH CV ECHO MEAS - BZI_BMI: 21.6 KILOGRAMS/M^2
BH CV ECHO MEAS - BZI_METRIC_HEIGHT: 188 CM
BH CV ECHO MEAS - BZI_METRIC_WEIGHT: 76.2 KG
BH CV ECHO MEAS - EDV(CUBED): 94.7 ML
BH CV ECHO MEAS - EDV(MOD-SP4): 97.1 ML
BH CV ECHO MEAS - EDV(TEICH): 95.2 ML
BH CV ECHO MEAS - EF(CUBED): 67.3 %
BH CV ECHO MEAS - EF(MOD-BP): 59 %
BH CV ECHO MEAS - EF(MOD-SP4): 59.4 %
BH CV ECHO MEAS - EF(TEICH): 59 %
BH CV ECHO MEAS - ESV(CUBED): 30.9 ML
BH CV ECHO MEAS - ESV(MOD-SP4): 39.5 ML
BH CV ECHO MEAS - ESV(TEICH): 39.1 ML
BH CV ECHO MEAS - FS: 31.1 %
BH CV ECHO MEAS - IVS/LVPW: 1
BH CV ECHO MEAS - IVSD: 0.97 CM
BH CV ECHO MEAS - IVSS: 1.2 CM
BH CV ECHO MEAS - LA DIMENSION(2D): 3 CM
BH CV ECHO MEAS - LV DIASTOLIC VOL/BSA (35-75): 48.1 ML/M^2
BH CV ECHO MEAS - LV MASS(C)D: 149.4 GRAMS
BH CV ECHO MEAS - LV MASS(C)DI: 74 GRAMS/M^2
BH CV ECHO MEAS - LV MASS(C)S: 127.1 GRAMS
BH CV ECHO MEAS - LV MASS(C)SI: 63 GRAMS/M^2
BH CV ECHO MEAS - LV MAX PG: 2.6 MMHG
BH CV ECHO MEAS - LV MEAN PG: 1.3 MMHG
BH CV ECHO MEAS - LV SYSTOLIC VOL/BSA (12-30): 19.6 ML/M^2
BH CV ECHO MEAS - LV V1 MAX: 81.1 CM/SEC
BH CV ECHO MEAS - LV V1 MEAN: 52.8 CM/SEC
BH CV ECHO MEAS - LV V1 VTI: 16.5 CM
BH CV ECHO MEAS - LVIDD: 4.6 CM
BH CV ECHO MEAS - LVIDS: 3.1 CM
BH CV ECHO MEAS - LVOT AREA: 4 CM^2
BH CV ECHO MEAS - LVOT DIAM: 2.3 CM
BH CV ECHO MEAS - LVPWD: 0.96 CM
BH CV ECHO MEAS - LVPWS: 1.3 CM
BH CV ECHO MEAS - MV A MAX VEL: 58.2 CM/SEC
BH CV ECHO MEAS - MV DEC SLOPE: 266.8 CM/SEC^2
BH CV ECHO MEAS - MV DEC TIME: 0.23 SEC
BH CV ECHO MEAS - MV E MAX VEL: 61.8 CM/SEC
BH CV ECHO MEAS - MV E/A: 1.1
BH CV ECHO MEAS - MV MAX PG: 2.8 MMHG
BH CV ECHO MEAS - MV MEAN PG: 1.1 MMHG
BH CV ECHO MEAS - MV V2 MAX: 83.9 CM/SEC
BH CV ECHO MEAS - MV V2 MEAN: 47.5 CM/SEC
BH CV ECHO MEAS - MV V2 VTI: 22.4 CM
BH CV ECHO MEAS - MVA(VTI): 2.9 CM^2
BH CV ECHO MEAS - PA ACC TIME: 0.14 SEC
BH CV ECHO MEAS - PA MAX PG (FULL): 3.3 MMHG
BH CV ECHO MEAS - PA MAX PG: 5.1 MMHG
BH CV ECHO MEAS - PA MEAN PG (FULL): 2.3 MMHG
BH CV ECHO MEAS - PA MEAN PG: 3.3 MMHG
BH CV ECHO MEAS - PA PR(ACCEL): 15.2 MMHG
BH CV ECHO MEAS - PA V2 MAX: 112.4 CM/SEC
BH CV ECHO MEAS - PA V2 MEAN: 85.5 CM/SEC
BH CV ECHO MEAS - PA V2 VTI: 25.5 CM
BH CV ECHO MEAS - RV MAX PG: 1.8 MMHG
BH CV ECHO MEAS - RV MEAN PG: 0.95 MMHG
BH CV ECHO MEAS - RV V1 MAX: 66.7 CM/SEC
BH CV ECHO MEAS - RV V1 MEAN: 46.5 CM/SEC
BH CV ECHO MEAS - RV V1 VTI: 15.1 CM
BH CV ECHO MEAS - RVDD: 2.8 CM
BH CV ECHO MEAS - SI(AO): 94 ML/M^2
BH CV ECHO MEAS - SI(CUBED): 31.6 ML/M^2
BH CV ECHO MEAS - SI(LVOT): 32.6 ML/M^2
BH CV ECHO MEAS - SI(MOD-SP4): 28.6 ML/M^2
BH CV ECHO MEAS - SI(TEICH): 27.8 ML/M^2
BH CV ECHO MEAS - SV(AO): 189.7 ML
BH CV ECHO MEAS - SV(CUBED): 63.7 ML
BH CV ECHO MEAS - SV(LVOT): 65.8 ML
BH CV ECHO MEAS - SV(MOD-SP4): 57.6 ML
BH CV ECHO MEAS - SV(TEICH): 56.2 ML
BLD GP AB SCN SERPL QL: NEGATIVE
BUN SERPL-MCNC: 17 MG/DL (ref 6–20)
BUN/CREAT SERPL: 15.6 (ref 7–25)
CALCIUM SPEC-SCNC: 8.7 MG/DL (ref 8.6–10.5)
CHLORIDE SERPL-SCNC: 102 MMOL/L (ref 98–107)
CO2 SERPL-SCNC: 22 MMOL/L (ref 22–29)
CREAT SERPL-MCNC: 1.09 MG/DL (ref 0.76–1.27)
DEPRECATED RDW RBC AUTO: 52.9 FL (ref 37–54)
ERYTHROCYTE [DISTWIDTH] IN BLOOD BY AUTOMATED COUNT: 17.7 % (ref 12.3–15.4)
ETHANOL UR QL: <0.01 %
GFR SERPL CREATININE-BSD FRML MDRD: 71 ML/MIN/1.73
GLUCOSE SERPL-MCNC: 112 MG/DL (ref 65–99)
HCT VFR BLD AUTO: 33 % (ref 37.5–51)
HGB BLD-MCNC: 11.2 G/DL (ref 13–17.7)
IRON 24H UR-MRATE: 151 MCG/DL (ref 59–158)
IRON SATN MFR SERPL: 34 % (ref 20–50)
LV EF 2D ECHO EST: 60 %
MAXIMAL PREDICTED HEART RATE: 169 BPM
MCH RBC QN AUTO: 29.5 PG (ref 26.6–33)
MCHC RBC AUTO-ENTMCNC: 33.8 G/DL (ref 31.5–35.7)
MCV RBC AUTO: 87.3 FL (ref 79–97)
MRSA DNA SPEC QL NAA+PROBE: NORMAL
PLATELET # BLD AUTO: 193 10*3/MM3 (ref 140–450)
PMV BLD AUTO: 8.6 FL (ref 6–12)
POTASSIUM SERPL-SCNC: 4.2 MMOL/L (ref 3.5–5.2)
RBC # BLD AUTO: 3.78 10*6/MM3 (ref 4.14–5.8)
RH BLD: POSITIVE
SODIUM SERPL-SCNC: 136 MMOL/L (ref 136–145)
STRESS TARGET HR: 144 BPM
T&S EXPIRATION DATE: NORMAL
TIBC SERPL-MCNC: 450 MCG/DL (ref 298–536)
TRANSFERRIN SERPL-MCNC: 302 MG/DL (ref 200–360)
TROPONIN T SERPL-MCNC: <0.01 NG/ML (ref 0–0.03)
WBC # BLD AUTO: 6.1 10*3/MM3 (ref 3.4–10.8)

## 2021-08-17 PROCEDURE — 73070 X-RAY EXAM OF ELBOW: CPT

## 2021-08-17 PROCEDURE — 99222 1ST HOSP IP/OBS MODERATE 55: CPT | Performed by: INTERNAL MEDICINE

## 2021-08-17 PROCEDURE — 25010000002 CEFAZOLIN PER 500 MG: Performed by: ORTHOPAEDIC SURGERY

## 2021-08-17 PROCEDURE — 3E1U38Z IRRIGATION OF JOINTS USING IRRIGATING SUBSTANCE, PERCUTANEOUS APPROACH: ICD-10-PCS | Performed by: ORTHOPAEDIC SURGERY

## 2021-08-17 PROCEDURE — 25010000002 HYDROMORPHONE PER 4 MG: Performed by: NURSE PRACTITIONER

## 2021-08-17 PROCEDURE — 63710000001 ONDANSETRON PER 8 MG: Performed by: ORTHOPAEDIC SURGERY

## 2021-08-17 PROCEDURE — 0PSKXZZ REPOSITION RIGHT ULNA, EXTERNAL APPROACH: ICD-10-PCS | Performed by: ORTHOPAEDIC SURGERY

## 2021-08-17 PROCEDURE — 86901 BLOOD TYPING SEROLOGIC RH(D): CPT

## 2021-08-17 PROCEDURE — 36415 COLL VENOUS BLD VENIPUNCTURE: CPT | Performed by: NURSE PRACTITIONER

## 2021-08-17 PROCEDURE — 25010000002 PROPOFOL 10 MG/ML EMULSION

## 2021-08-17 PROCEDURE — 80048 BASIC METABOLIC PNL TOTAL CA: CPT | Performed by: STUDENT IN AN ORGANIZED HEALTH CARE EDUCATION/TRAINING PROGRAM

## 2021-08-17 PROCEDURE — 93306 TTE W/DOPPLER COMPLETE: CPT

## 2021-08-17 PROCEDURE — 97162 PT EVAL MOD COMPLEX 30 MIN: CPT

## 2021-08-17 PROCEDURE — 0HQDXZZ REPAIR RIGHT LOWER ARM SKIN, EXTERNAL APPROACH: ICD-10-PCS | Performed by: ORTHOPAEDIC SURGERY

## 2021-08-17 PROCEDURE — 25010000002 ONDANSETRON PER 1 MG

## 2021-08-17 PROCEDURE — 84466 ASSAY OF TRANSFERRIN: CPT | Performed by: STUDENT IN AN ORGANIZED HEALTH CARE EDUCATION/TRAINING PROGRAM

## 2021-08-17 PROCEDURE — 97166 OT EVAL MOD COMPLEX 45 MIN: CPT

## 2021-08-17 PROCEDURE — 85027 COMPLETE CBC AUTOMATED: CPT | Performed by: STUDENT IN AN ORGANIZED HEALTH CARE EDUCATION/TRAINING PROGRAM

## 2021-08-17 PROCEDURE — 25010000002 MORPHINE PER 10 MG: Performed by: ORTHOPAEDIC SURGERY

## 2021-08-17 PROCEDURE — 93010 ELECTROCARDIOGRAM REPORT: CPT | Performed by: INTERNAL MEDICINE

## 2021-08-17 PROCEDURE — 86850 RBC ANTIBODY SCREEN: CPT | Performed by: INTERNAL MEDICINE

## 2021-08-17 PROCEDURE — 76000 FLUOROSCOPY <1 HR PHYS/QHP: CPT

## 2021-08-17 PROCEDURE — 25010000002 DEXAMETHASONE PER 1 MG

## 2021-08-17 PROCEDURE — 99232 SBSQ HOSP IP/OBS MODERATE 35: CPT | Performed by: STUDENT IN AN ORGANIZED HEALTH CARE EDUCATION/TRAINING PROGRAM

## 2021-08-17 PROCEDURE — 83540 ASSAY OF IRON: CPT | Performed by: STUDENT IN AN ORGANIZED HEALTH CARE EDUCATION/TRAINING PROGRAM

## 2021-08-17 PROCEDURE — 86901 BLOOD TYPING SEROLOGIC RH(D): CPT | Performed by: INTERNAL MEDICINE

## 2021-08-17 PROCEDURE — 93005 ELECTROCARDIOGRAM TRACING: CPT | Performed by: INTERNAL MEDICINE

## 2021-08-17 PROCEDURE — 84484 ASSAY OF TROPONIN QUANT: CPT | Performed by: NURSE PRACTITIONER

## 2021-08-17 PROCEDURE — 82077 ASSAY SPEC XCP UR&BREATH IA: CPT | Performed by: NURSE PRACTITIONER

## 2021-08-17 PROCEDURE — 93306 TTE W/DOPPLER COMPLETE: CPT | Performed by: INTERNAL MEDICINE

## 2021-08-17 PROCEDURE — 86900 BLOOD TYPING SEROLOGIC ABO: CPT

## 2021-08-17 PROCEDURE — 73200 CT UPPER EXTREMITY W/O DYE: CPT

## 2021-08-17 PROCEDURE — 97116 GAIT TRAINING THERAPY: CPT

## 2021-08-17 PROCEDURE — 25010000002 MIDAZOLAM PER 1 MG

## 2021-08-17 PROCEDURE — 87641 MR-STAPH DNA AMP PROBE: CPT | Performed by: INTERNAL MEDICINE

## 2021-08-17 PROCEDURE — 86900 BLOOD TYPING SEROLOGIC ABO: CPT | Performed by: INTERNAL MEDICINE

## 2021-08-17 RX ORDER — OXYCODONE HYDROCHLORIDE 5 MG/1
5 TABLET ORAL ONCE AS NEEDED
Status: DISCONTINUED | OUTPATIENT
Start: 2021-08-17 | End: 2021-08-17 | Stop reason: HOSPADM

## 2021-08-17 RX ORDER — CHOLECALCIFEROL (VITAMIN D3) 125 MCG
5 CAPSULE ORAL NIGHTLY PRN
Status: DISCONTINUED | OUTPATIENT
Start: 2021-08-17 | End: 2021-08-18 | Stop reason: HOSPADM

## 2021-08-17 RX ORDER — SODIUM CHLORIDE, SODIUM LACTATE, POTASSIUM CHLORIDE, CALCIUM CHLORIDE 600; 310; 30; 20 MG/100ML; MG/100ML; MG/100ML; MG/100ML
100 INJECTION, SOLUTION INTRAVENOUS CONTINUOUS
Status: DISCONTINUED | OUTPATIENT
Start: 2021-08-17 | End: 2021-08-18 | Stop reason: HOSPADM

## 2021-08-17 RX ORDER — SODIUM CHLORIDE 9 MG/ML
75 INJECTION, SOLUTION INTRAVENOUS CONTINUOUS
Status: DISCONTINUED | OUTPATIENT
Start: 2021-08-17 | End: 2021-08-18 | Stop reason: HOSPADM

## 2021-08-17 RX ORDER — MIDAZOLAM HYDROCHLORIDE 1 MG/ML
1 INJECTION INTRAMUSCULAR; INTRAVENOUS
Status: DISCONTINUED | OUTPATIENT
Start: 2021-08-17 | End: 2021-08-17 | Stop reason: HOSPADM

## 2021-08-17 RX ORDER — HYDROCODONE BITARTRATE AND ACETAMINOPHEN 5; 325 MG/1; MG/1
1 TABLET ORAL EVERY 6 HOURS PRN
Status: DISCONTINUED | OUTPATIENT
Start: 2021-08-17 | End: 2021-08-18 | Stop reason: HOSPADM

## 2021-08-17 RX ORDER — IPRATROPIUM BROMIDE AND ALBUTEROL SULFATE 2.5; .5 MG/3ML; MG/3ML
3 SOLUTION RESPIRATORY (INHALATION) ONCE AS NEEDED
Status: DISCONTINUED | OUTPATIENT
Start: 2021-08-17 | End: 2021-08-17 | Stop reason: HOSPADM

## 2021-08-17 RX ORDER — ACETAMINOPHEN 325 MG/1
325 TABLET ORAL EVERY 4 HOURS PRN
Status: DISCONTINUED | OUTPATIENT
Start: 2021-08-17 | End: 2021-08-18 | Stop reason: HOSPADM

## 2021-08-17 RX ORDER — LABETALOL HYDROCHLORIDE 5 MG/ML
5 INJECTION, SOLUTION INTRAVENOUS
Status: DISCONTINUED | OUTPATIENT
Start: 2021-08-17 | End: 2021-08-17 | Stop reason: HOSPADM

## 2021-08-17 RX ORDER — FLUMAZENIL 0.1 MG/ML
0.1 INJECTION INTRAVENOUS AS NEEDED
Status: DISCONTINUED | OUTPATIENT
Start: 2021-08-17 | End: 2021-08-17 | Stop reason: HOSPADM

## 2021-08-17 RX ORDER — OXYCODONE HYDROCHLORIDE 5 MG/1
10 TABLET ORAL EVERY 4 HOURS PRN
Status: DISCONTINUED | OUTPATIENT
Start: 2021-08-17 | End: 2021-08-17 | Stop reason: HOSPADM

## 2021-08-17 RX ORDER — FENTANYL CITRATE 50 UG/ML
25 INJECTION, SOLUTION INTRAMUSCULAR; INTRAVENOUS
Status: DISCONTINUED | OUTPATIENT
Start: 2021-08-17 | End: 2021-08-17 | Stop reason: HOSPADM

## 2021-08-17 RX ORDER — PROPOFOL 10 MG/ML
VIAL (ML) INTRAVENOUS AS NEEDED
Status: DISCONTINUED | OUTPATIENT
Start: 2021-08-17 | End: 2021-08-17 | Stop reason: SURG

## 2021-08-17 RX ORDER — HYDROCODONE BITARTRATE AND ACETAMINOPHEN 5; 325 MG/1; MG/1
1 TABLET ORAL EVERY 4 HOURS PRN
Status: DISCONTINUED | OUTPATIENT
Start: 2021-08-17 | End: 2021-08-18 | Stop reason: HOSPADM

## 2021-08-17 RX ORDER — HYDROCODONE BITARTRATE AND ACETAMINOPHEN 7.5; 325 MG/1; MG/1
1 TABLET ORAL EVERY 4 HOURS PRN
Status: DISCONTINUED | OUTPATIENT
Start: 2021-08-17 | End: 2021-08-18 | Stop reason: HOSPADM

## 2021-08-17 RX ORDER — POLYETHYLENE GLYCOL 3350 17 G/17G
17 POWDER, FOR SOLUTION ORAL DAILY
Status: DISCONTINUED | OUTPATIENT
Start: 2021-08-17 | End: 2021-08-18 | Stop reason: HOSPADM

## 2021-08-17 RX ORDER — NALOXONE HCL 0.4 MG/ML
0.4 VIAL (ML) INJECTION
Status: DISCONTINUED | OUTPATIENT
Start: 2021-08-17 | End: 2021-08-18 | Stop reason: HOSPADM

## 2021-08-17 RX ORDER — SODIUM CHLORIDE 0.9 % (FLUSH) 0.9 %
1-10 SYRINGE (ML) INJECTION AS NEEDED
Status: DISCONTINUED | OUTPATIENT
Start: 2021-08-17 | End: 2021-08-18 | Stop reason: HOSPADM

## 2021-08-17 RX ORDER — PROMETHAZINE HYDROCHLORIDE 25 MG/1
25 SUPPOSITORY RECTAL ONCE AS NEEDED
Status: DISCONTINUED | OUTPATIENT
Start: 2021-08-17 | End: 2021-08-17 | Stop reason: HOSPADM

## 2021-08-17 RX ORDER — DIPHENHYDRAMINE HYDROCHLORIDE 50 MG/ML
12.5 INJECTION INTRAMUSCULAR; INTRAVENOUS
Status: DISCONTINUED | OUTPATIENT
Start: 2021-08-17 | End: 2021-08-17 | Stop reason: HOSPADM

## 2021-08-17 RX ORDER — SODIUM CHLORIDE, SODIUM LACTATE, POTASSIUM CHLORIDE, CALCIUM CHLORIDE 600; 310; 30; 20 MG/100ML; MG/100ML; MG/100ML; MG/100ML
INJECTION, SOLUTION INTRAVENOUS CONTINUOUS PRN
Status: DISCONTINUED | OUTPATIENT
Start: 2021-08-17 | End: 2021-08-17 | Stop reason: SURG

## 2021-08-17 RX ORDER — MEPERIDINE HYDROCHLORIDE 25 MG/ML
12.5 INJECTION INTRAMUSCULAR; INTRAVENOUS; SUBCUTANEOUS
Status: DISCONTINUED | OUTPATIENT
Start: 2021-08-17 | End: 2021-08-17 | Stop reason: HOSPADM

## 2021-08-17 RX ORDER — MORPHINE SULFATE 4 MG/ML
4 INJECTION, SOLUTION INTRAMUSCULAR; INTRAVENOUS
Status: DISCONTINUED | OUTPATIENT
Start: 2021-08-17 | End: 2021-08-18 | Stop reason: HOSPADM

## 2021-08-17 RX ORDER — NALOXONE HCL 0.4 MG/ML
0.4 VIAL (ML) INJECTION AS NEEDED
Status: DISCONTINUED | OUTPATIENT
Start: 2021-08-17 | End: 2021-08-17 | Stop reason: HOSPADM

## 2021-08-17 RX ORDER — EPHEDRINE SULFATE 50 MG/ML
5 INJECTION, SOLUTION INTRAVENOUS ONCE AS NEEDED
Status: DISCONTINUED | OUTPATIENT
Start: 2021-08-17 | End: 2021-08-17 | Stop reason: HOSPADM

## 2021-08-17 RX ORDER — ONDANSETRON 2 MG/ML
INJECTION INTRAMUSCULAR; INTRAVENOUS AS NEEDED
Status: DISCONTINUED | OUTPATIENT
Start: 2021-08-17 | End: 2021-08-17 | Stop reason: SURG

## 2021-08-17 RX ORDER — PHENYLEPHRINE HCL IN 0.9% NACL 1 MG/10 ML
SYRINGE (ML) INTRAVENOUS AS NEEDED
Status: DISCONTINUED | OUTPATIENT
Start: 2021-08-17 | End: 2021-08-17 | Stop reason: SURG

## 2021-08-17 RX ORDER — MIDAZOLAM HYDROCHLORIDE 1 MG/ML
INJECTION INTRAMUSCULAR; INTRAVENOUS AS NEEDED
Status: DISCONTINUED | OUTPATIENT
Start: 2021-08-17 | End: 2021-08-17 | Stop reason: SURG

## 2021-08-17 RX ORDER — PROMETHAZINE HYDROCHLORIDE 25 MG/1
25 TABLET ORAL ONCE AS NEEDED
Status: DISCONTINUED | OUTPATIENT
Start: 2021-08-17 | End: 2021-08-17 | Stop reason: HOSPADM

## 2021-08-17 RX ORDER — FENTANYL CITRATE 50 UG/ML
50 INJECTION, SOLUTION INTRAMUSCULAR; INTRAVENOUS
Status: DISCONTINUED | OUTPATIENT
Start: 2021-08-17 | End: 2021-08-17 | Stop reason: HOSPADM

## 2021-08-17 RX ORDER — ONDANSETRON 2 MG/ML
4 INJECTION INTRAMUSCULAR; INTRAVENOUS ONCE AS NEEDED
Status: DISCONTINUED | OUTPATIENT
Start: 2021-08-17 | End: 2021-08-17 | Stop reason: HOSPADM

## 2021-08-17 RX ORDER — ONDANSETRON 4 MG/1
4 TABLET, FILM COATED ORAL EVERY 6 HOURS PRN
Status: DISCONTINUED | OUTPATIENT
Start: 2021-08-17 | End: 2021-08-18 | Stop reason: HOSPADM

## 2021-08-17 RX ORDER — ONDANSETRON 2 MG/ML
4 INJECTION INTRAMUSCULAR; INTRAVENOUS EVERY 6 HOURS PRN
Status: DISCONTINUED | OUTPATIENT
Start: 2021-08-17 | End: 2021-08-18 | Stop reason: HOSPADM

## 2021-08-17 RX ORDER — OXYCODONE HYDROCHLORIDE 5 MG/1
10 TABLET ORAL EVERY 4 HOURS PRN
Status: DISCONTINUED | OUTPATIENT
Start: 2021-08-17 | End: 2021-08-18 | Stop reason: HOSPADM

## 2021-08-17 RX ORDER — DEXAMETHASONE SODIUM PHOSPHATE 4 MG/ML
INJECTION, SOLUTION INTRA-ARTICULAR; INTRALESIONAL; INTRAMUSCULAR; INTRAVENOUS; SOFT TISSUE AS NEEDED
Status: DISCONTINUED | OUTPATIENT
Start: 2021-08-17 | End: 2021-08-17 | Stop reason: SURG

## 2021-08-17 RX ADMIN — NICOTINE 1 PATCH: 21 PATCH, EXTENDED RELEASE TRANSDERMAL at 21:20

## 2021-08-17 RX ADMIN — ATORVASTATIN CALCIUM 20 MG: 20 TABLET, FILM COATED ORAL at 21:20

## 2021-08-17 RX ADMIN — Medication 300 MCG: at 09:29

## 2021-08-17 RX ADMIN — HYDROCODONE BITARTRATE AND ACETAMINOPHEN 1 TABLET: 7.5; 325 TABLET ORAL at 18:22

## 2021-08-17 RX ADMIN — ONDANSETRON 4 MG: 2 INJECTION INTRAMUSCULAR; INTRAVENOUS at 09:50

## 2021-08-17 RX ADMIN — TERAZOSIN HYDROCHLORIDE 5 MG: 5 CAPSULE ORAL at 21:21

## 2021-08-17 RX ADMIN — Medication 5 MG: at 01:19

## 2021-08-17 RX ADMIN — METOPROLOL TARTRATE 25 MG: 25 TABLET, FILM COATED ORAL at 21:21

## 2021-08-17 RX ADMIN — SODIUM CHLORIDE 75 ML/HR: 9 INJECTION, SOLUTION INTRAVENOUS at 12:00

## 2021-08-17 RX ADMIN — CEFAZOLIN SODIUM 2 G: 10 INJECTION, POWDER, FOR SOLUTION INTRAVENOUS at 16:56

## 2021-08-17 RX ADMIN — Medication 300 MCG: at 09:34

## 2021-08-17 RX ADMIN — BUPROPION HYDROCHLORIDE 150 MG: 150 TABLET, EXTENDED RELEASE ORAL at 11:56

## 2021-08-17 RX ADMIN — AMITRIPTYLINE HYDROCHLORIDE 75 MG: 50 TABLET, FILM COATED ORAL at 21:20

## 2021-08-17 RX ADMIN — HYDROMORPHONE HYDROCHLORIDE 1 MG: 2 INJECTION, SOLUTION INTRAMUSCULAR; INTRAVENOUS; SUBCUTANEOUS at 09:06

## 2021-08-17 RX ADMIN — OXYCODONE 10 MG: 5 TABLET ORAL at 16:00

## 2021-08-17 RX ADMIN — MORPHINE SULFATE 4 MG: 4 INJECTION INTRAVENOUS at 21:20

## 2021-08-17 RX ADMIN — SODIUM CHLORIDE 100 ML/HR: 0.9 INJECTION, SOLUTION INTRAVENOUS at 00:17

## 2021-08-17 RX ADMIN — MIDAZOLAM 2 MG: 1 INJECTION INTRAMUSCULAR; INTRAVENOUS at 09:21

## 2021-08-17 RX ADMIN — NICOTINE 1 PATCH: 21 PATCH, EXTENDED RELEASE TRANSDERMAL at 01:19

## 2021-08-17 RX ADMIN — CLOPIDOGREL BISULFATE 75 MG: 75 TABLET ORAL at 11:55

## 2021-08-17 RX ADMIN — Medication 400 MCG: at 09:38

## 2021-08-17 RX ADMIN — POLYETHYLENE GLYCOL 3350 17 G: 17 POWDER, FOR SOLUTION ORAL at 11:55

## 2021-08-17 RX ADMIN — SODIUM CHLORIDE, SODIUM LACTATE, POTASSIUM CHLORIDE, AND CALCIUM CHLORIDE: .6; .31; .03; .02 INJECTION, SOLUTION INTRAVENOUS at 09:37

## 2021-08-17 RX ADMIN — ONDANSETRON HYDROCHLORIDE 4 MG: 4 TABLET, FILM COATED ORAL at 21:35

## 2021-08-17 RX ADMIN — DEXAMETHASONE SODIUM PHOSPHATE 4 MG: 4 INJECTION, SOLUTION INTRAMUSCULAR; INTRAVENOUS at 09:21

## 2021-08-17 RX ADMIN — METOPROLOL TARTRATE 25 MG: 25 TABLET, FILM COATED ORAL at 11:55

## 2021-08-17 RX ADMIN — PROPOFOL 200 MG: 10 INJECTION, EMULSION INTRAVENOUS at 09:21

## 2021-08-17 RX ADMIN — CEFAZOLIN SODIUM 2 G: 1 INJECTION, POWDER, FOR SOLUTION INTRAMUSCULAR; INTRAVENOUS at 09:21

## 2021-08-17 NOTE — PLAN OF CARE
Goal Outcome Evaluation:              Outcome Summary: Pt is 50 yo male admitted for fall after syncopal episode.  Pt d/o right elbow dislocation, left elbow fx.  Pt now s/p irrigation of closed reduction right elbow dislocation and closed reduction/splinting right elbow on 8/17/2021.  Further orthopedic consult pending for left UE.  CT head, CT C-spine, and CT facial bones (-) acute changes. PT reported orthostatic hypotension with standing, though unable to obtain accurate BP reading this date while standing.  Pt requires min assist for trasnfers and requires assist for all ADLs due to BUE in splints.  OT recommends IP rehab at discharge to improve independence before returning home.

## 2021-08-17 NOTE — CONSULTS
Orthopaedic & Hand Surgery  Left elbow pain consult Note  Dr. Yonis Mcdonough  [phone]    CC: Left elbow pain    HPI:  Patient is a 51 y.o. right-hand-dominant male who presents with bilateral elbow injury and currently left elbow pain    Pain is characterized as follows:    • Onset: 8/16/2021  • Location: Left elbow  • Quality: Sharp with some numbness and paresthesias in the hand  • Severity: Severe  • Modifying factors: Improved with splinting and narcotic pain control  • Context: Sustained injury on 8/16/2021 when he was at a belated birthday party for himself. He got up out of a chair and walked over to a cooler to get himself a cola when he sustained a syncopal episode. He states his friend told him he threw his arms up in the air and then fell forward, landing directly on the elbows and then striking his head and breaking his glasses. Noted immediate onset of pain in bilateral elbows with gross deformity and so he presented to the emergency department at [facility] in [place]. He was found to have a open elbow dislocation of the right side which was closed and then subsequently irrigated and closed reduced by Dr. Kyler Figueroa. He notes that he is currently recovering well from this with minimal pain in the elbow and good function of the hand.    On the left side, he was found to have a comminuted complex elbow fracture consisting of an intra-articular olecranon fracture with coronoid tip fracture as well as a multipart radial head fracture with dislocation. This was a closed injury. Given the complicated nature of the injury, Dr. August, requested my assistance with further management given subspecialty training.    On evaluation today, the patient notes he still has pain in the left elbow as well as numbness in the hand. He is noted some swelling of the hand, however, he is still able to move it as he attempts to hold a solid ball while feeding himself. Review of systems is as noted below. He also  "notes a history of triple bypass surgery in 2015 with previous cardiac arrhythmia following anesthesia.    MEDICAL HISTORY  Past Medical History:   Diagnosis Date   • Abdominal pain    • Allergies     takes allergy injections   • Anesthesia complication     states heart \"fluttering\" after anesthesia x 1- was kept in hospital 3 days s/p heart stent placement   • Anxiety    • Arthritis    • Asthma    • Chronic back pain    • Constipation 06/2021   • DDD (degenerative disc disease), lumbar    • Depression    • Emphysema of lung (CMS/HCC)    • GERD (gastroesophageal reflux disease)    • Headache    • Leg pain    • Lumbar spine pain    • Nocturia    • PTSD (post-traumatic stress disorder)    • Under care of pain management specialist     pain management of [place]   ·   Past Surgical History:   Procedure Laterality Date   • CARDIAC CATHETERIZATION N/A 1/20/2021    Procedure: Left Heart Cath and coronary angiogram;  Surgeon: Jaimie Silva MD;  Location: [facility] CATH INVASIVE LOCATION;  Service: Cardiovascular;  Laterality: N/A;   • CARDIAC CATHETERIZATION N/A 1/20/2021    Procedure: Saphenous Vein Graft;  Surgeon: Jaimie Silva MD;  Location: [facility] CATH INVASIVE LOCATION;  Service: Cardiovascular;  Laterality: N/A;   • CARDIAC CATHETERIZATION N/A 1/20/2021    Procedure: Stent EBENEZER coronary;  Surgeon: Herve Rodriguez MD;  Location: [facility] CATH INVASIVE LOCATION;  Service: Cardiology;  Laterality: N/A;   • CORONARY ANGIOPLASTY WITH STENT PLACEMENT  04/2014    2 in 2014, 1 in 12/2020   • CORONARY ARTERY BYPASS GRAFT  12/2015    x 3   ·   Prior to Admission medications    Medication Sig Start Date End Date Taking? Authorizing Provider   amitriptyline (ELAVIL) 75 MG tablet TAKE 1 TABLET BY MOUTH EVERY NIGHT 6/20/21  Yes Carlita Mcdermott PA-C   aspirin (aspirin) 81 MG EC tablet Take 1 tablet by mouth Daily. 1/6/21  Yes Brittni Shell MD   atorvastatin (LIPITOR) 20 MG tablet Take 1 tablet by mouth Every " Night. 1/6/21  Yes Brittni Shell MD   buPROPion XL (WELLBUTRIN XL) 150 MG 24 hr tablet Take 1 tablet by mouth Daily. 6/22/21  Yes Brittni Shell MD   clopidogrel (PLAVIX) 75 MG tablet Take 1 tablet by mouth Daily. 2/15/21  Yes Brittni Shell MD   cyclobenzaprine (FLEXERIL) 10 MG tablet Take 10 mg by mouth 3 (Three) Times a Day.   Yes Brandon Adams MD   ferrous sulfate 325 (65 FE) MG tablet Take 325 mg by mouth Daily With Breakfast.   Yes Brandon Adams MD   loratadine (CLARITIN) 10 MG tablet Take 10 mg by mouth Daily.   Yes Brandon Adams MD   metoprolol tartrate (LOPRESSOR) 25 MG tablet Take 1 tablet by mouth 2 (Two) Times a Day. 6/22/21  Yes Brittni Shell MD   naproxen (NAPROSYN) 500 MG tablet Take 1 tablet by mouth 2 (Two) Times a Day As Needed for Moderate Pain . Take with food! 6/22/21  Yes Brittni Shell MD   nitroglycerin (NITROSTAT) 0.4 MG SL tablet Place 0.4 mg under the tongue Every 5 (Five) Minutes As Needed for Chest Pain. Take no more than 3 doses in 15 minutes.   Yes Brandon Adams MD   NON FORMULARY Allergy injections   Yes Brandon Adams MD   omeprazole (priLOSEC) 20 MG capsule Take 1 capsule by mouth Every Morning Before Breakfast. 6/22/21  Yes Brittni Shell MD   prazosin (MINIPRESS) 2 MG capsule Take 2 mg by mouth Every Night.   Yes Brandon Adams MD   pregabalin (LYRICA) 200 MG capsule Take 1 capsule by mouth 3 (Three) Times a Day. 10/8/20  Yes Charleen Garcia MD   SUMAtriptan (IMITREX) 50 MG tablet TAKE 1 TABLET BY MOUTH AT ONSET OF HEADACHE. MAY REPEAT DOSE 1 TIME IN 2 HOURS IF HEADACHE NOT RELIEVED 8/3/20  Yes Bashri Valdes Jr., MD   ARIPiprazole (ABILIFY) 5 MG tablet TAKE 1 TABLET BY MOUTH EVERY NIGHT 5/22/21   Carlita Mcdermott, PA-C   OXcarbazepine (TRILEPTAL) 600 MG tablet Take 600 mg by mouth 2 (Two) Times a Day. Take dos 6/13/21   Provider, MD Brandon   Vortioxetine HBr (Trintellix) 20 MG  "tablet Take 20 mg by mouth Daily. 1/6/21   Carlita Mcdermott PA-C   ·   Allergies   Allergen Reactions   • Hydrocodone Nausea And Vomiting   • Hydrocodone-Acetaminophen GI Intolerance   • Oxycodone GI Intolerance   ·   Most Recent Immunizations   Administered Date(s) Administered   • Flu Vaccine Split Quad 12/26/2014   • Flulaval/Fluarix/Fluzone Quad 10/11/2020   • Pneumococcal Conjugate 13-Valent (PCV13) 10/13/2020   ·   Social History     Tobacco Use   • Smoking status: Current Every Day Smoker     Packs/day: 1.50   • Smokeless tobacco: Never Used   • Tobacco comment: do not smoke dos   Substance Use Topics   • Alcohol use: No   ·    Social History     Substance and Sexual Activity   Drug Use No   ·     REVIEW OF SYSTEMS:  · General: negative for fevers or chills  · Head: negative for headache  · Respiratory: negative for shortness of breath.   · Cardiovascular: negative for chest pain.   · Gastrointestinal: negative for nausea or vomiting.   · Neurological: Positive for some numbness and paresthesias in the left hand  · Psychiatric/Behavioral: negative for memory loss with the exception of his syncopal episode.   · All other systems reviewed and are negative    VITALS: /74 (BP Location: Right leg, Patient Position: Sitting)   Pulse 81   Temp 97.4 °F (36.3 °C) (Oral)   Resp 16   Ht 188 cm (74\")   Wt 76.2 kg (168 lb)   SpO2 95%   BMI 21.57 kg/m²  Body mass index is 21.57 kg/m².    PHYSICAL EXAM:   · CONSTITUTIONAL: No acute distress  · EXTREMITY: Left Upper Extremity  · INSPECTION: Currently in a posterior slab splint that is clean, dry and intact. Skin distally in the hand is warm and well-perfused. There is a small poke hole injury on the volar aspect of the thumb near the IP joint with dried blood around it but no further bleeding.  · Palpation: There is tenderness to palpation at the base of his thumb and across the dorsal aspect of his wrist. He states that this radiates up to the elbow which is " also tender to palpation. Not tender to palpation with squeeze in the remaining fingers. Splinting is not removed at the request of patient for comfort. Per patient report, no open injuries to the left upper extremity.  · Vascular: 2+ radial pulses palpated and brisk cap refill to all digits.  · Sensation: Notes diminished sensation in the hand including all digits but most marked at the index and small finger. Diminished but intact light touch in thumb, long and ring. Diminished also on the dorsal aspect of the hand.  · Motor: He is able to demonstrate finger abduction, extension and flexion, although limited by pain. He has trouble clearly demonstrating thumb opposition due to pain and splinting.  · Range of Motion / Stability: Range of motion of the elbow deferred due to known fracture and splinting. Currently, he moves his fingers through approximately 30% of a normal arc of motion, limited by pain at the elbow.  · Forearm is soft and compressible. No pain out of proportion with passive extension or flexion of the fingers.    RADIOLOGY REVIEW:   XR Elbow 2 View Right    Result Date: 8/17/2021  Right elbow and forearm soft tissue swelling with subcutaneous gas. No retained radiopaque foreign body is seen. No acute osseous abnormality of the right elbow  Electronically Signed By-Penny Jj MD On:8/17/2021 12:00 PM This report was finalized on [id number] by  Penny Jj MD.    XR Elbow 2 View Right    Result Date: 8/16/2021  Interval reduction of right elbow dislocation. No fracture identified.  Electronically Signed By-Bertha Wood MD On:8/16/2021 8:25 PM This report was finalized on [id number] by  Bertha Wood MD.    CT Upper Extremity Left Without Contrast    Result Date: 8/17/2021  1. Comminuted intra-articular obliquely oriented olecranon fracture. 2. Impacted intra-articular radial head fracture. 3. Small coronoid process fracture Electronically signed by:  Elliot Sen M.D.  8/16/2021  11:37 PM    XR Elbow 2 View Bilateral    Addendum Date: 8/16/2021    2 images of the right elbow and 2 images of the left elbow were obtained. I was not aware that they were placed on the same series.  The right elbow demonstrates complete dislocation of the radiocapitellar and ulnar trochlear joints. No definite fracture identified.  The left elbow demonstrates displaced fractures of the olecranon, the radius with a nondisplaced fracture of the supracondylar distal humerus.  Electronically Signed By-Bertha Wood MD On:8/16/2021 8:27 PM This report was finalized on [id number] by  Bertha Wood MD.    Result Date: 8/16/2021   1. Extensively comminuted and displaced fracture of the olecranon as described above. 2. Suspected nondisplaced supracondylar fracture of the distal humerus extending through the trochlea and extending within the intercondylar region. 3. Complete dislocation of the radiocapitellar and ulnar trochlear joints.  Electronically Signed By-Bertha Wood MD On:8/16/2021 7:01 PM This report was finalized on [id number] by  Bertha Wood MD.    XR Forearm 2 View Bilateral    Result Date: 8/16/2021  No acute osseous abnormality of the remaining forearm or the bilateral wrists.  Electronically Signed By-Bertha Wood MD On:8/16/2021 7:02 PM This report was finalized on [id number] by  Bertha Wood MD.    XR Wrist 3+ View Bilateral    Result Date: 8/16/2021  No acute osseous abnormality of the remaining forearm or the bilateral wrists.  Electronically Signed By-Bertha Wood MD On:8/16/2021 7:02 PM This report was finalized on [id number] by  Bertha Wood MD.    I have independently reviewed the imaging and agree with the radiologist assessment above. Of note, it is challenging to discern given imaging quality, but review of the CT scan suggest that there may be a subtle, nondisplaced fracture of the distal humerus that is intra-articular in nature, predominantly focused on the trochlear side.      LABS:   Results for the past 24 hours:   Recent Results (from the past 24 hour(s))   COVID-19,CEPHEID/SHIVANI/BDMAX,COR/POP/PAD/SAM IN-HOUSE(OR EMERGENT/ADD-ON),NP SWAB IN TRANSPORT MEDIA 3-4 HR TAT, RT-PCR - Swab, Nasopharynx    Collection Time: 08/16/21  9:30 PM    Specimen: Nasopharynx; Swab   Result Value Ref Range    COVID19 Not Detected Not Detected - Ref. Range   Ethanol    Collection Time: 08/17/21 12:17 AM    Specimen: Blood   Result Value Ref Range    Ethanol % <0.010 %   Troponin    Collection Time: 08/17/21 12:17 AM    Specimen: Blood   Result Value Ref Range    Troponin T <0.010 0.000 - 0.030 ng/mL   Type & Screen    Collection Time: 08/17/21 12:17 AM    Specimen: Blood   Result Value Ref Range    ABO Type O     RH type Positive     Antibody Screen Negative     T&S Expiration Date 8/20/2021 11:59:59 PM    Basic Metabolic Panel    Collection Time: 08/17/21 12:17 AM    Specimen: Blood   Result Value Ref Range    Glucose 112 (H) 65 - 99 mg/dL    BUN 17 6 - 20 mg/dL    Creatinine 1.09 0.76 - 1.27 mg/dL    Sodium 136 136 - 145 mmol/L    Potassium 4.2 3.5 - 5.2 mmol/L    Chloride 102 98 - 107 mmol/L    CO2 22.0 22.0 - 29.0 mmol/L    Calcium 8.7 8.6 - 10.5 mg/dL    eGFR Non African Amer 71 >60 mL/min/1.73    BUN/Creatinine Ratio 15.6 7.0 - 25.0    Anion Gap 12.0 5.0 - 15.0 mmol/L   Iron Profile    Collection Time: 08/17/21 12:17 AM    Specimen: Blood   Result Value Ref Range    Iron 151 59 - 158 mcg/dL    Iron Saturation 34 20 - 50 %    Transferrin 302 200 - 360 mg/dL    TIBC 450 298 - 536 mcg/dL   MRSA Screen, PCR (Inpatient) - Swab, Nares    Collection Time: 08/17/21  1:19 AM    Specimen: Nares; Swab   Result Value Ref Range    MRSA PCR No MRSA Detected No MRSA Detected   CBC (No Diff)    Collection Time: 08/17/21  7:51 AM    Specimen: Blood   Result Value Ref Range    WBC 6.10 3.40 - 10.80 10*3/mm3    RBC 3.78 (L) 4.14 - 5.80 10*6/mm3    Hemoglobin 11.2 (L) 13.0 - 17.7 g/dL    Hematocrit 33.0 (L) 37.5 -  51.0 %    MCV 87.3 79.0 - 97.0 fL    MCH 29.5 26.6 - 33.0 pg    MCHC 33.8 31.5 - 35.7 g/dL    RDW 17.7 (H) 12.3 - 15.4 %    RDW-SD 52.9 37.0 - 54.0 fl    MPV 8.6 6.0 - 12.0 fL    Platelets 193 140 - 450 10*3/mm3   ECG 12 Lead    Collection Time: 08/17/21  8:18 AM   Result Value Ref Range    QT Interval 410 ms   Adult Transthoracic Echo Complete W/ Cont if Necessary Per Protocol    Collection Time: 08/17/21  1:45 PM   Result Value Ref Range    BSA 2.0 m^2    RVIDd 2.8 cm    IVSd 0.97 cm    IVSs 1.2 cm    LVIDd 4.6 cm    LVIDs 3.1 cm    LVPWd 0.96 cm    BH CV ECHO VERÓNICA - LVPWS 1.3 cm    IVS/LVPW 1.0     FS 31.1 %    EDV(Teich) 95.2 ml    ESV(Teich) 39.1 ml    EF(Teich) 59.0 %    EDV(cubed) 94.7 ml    ESV(cubed) 30.9 ml    EF(cubed) 67.3 %    % IVS thick 27.7 %    % LVPW thick 34.9 %    LV mass(C)d 149.4 grams    LV mass(C)dI 74.0 grams/m^2    LV mass(C)s 127.1 grams    LV mass(C)sI 63.0 grams/m^2    SV(Teich) 56.2 ml    SI(Teich) 27.8 ml/m^2    SV(cubed) 63.7 ml    SI(cubed) 31.6 ml/m^2    Ao root diam 3.5 cm    Ao root area 9.7 cm^2    ACS 2.1 cm    LVOT diam 2.3 cm    LVOT area 4.0 cm^2    EDV(MOD-sp4) 97.1 ml    ESV(MOD-sp4) 39.5 ml    EF(MOD-sp4) 59.4 %    SV(MOD-sp4) 57.6 ml    SI(MOD-sp4) 28.6 ml/m^2    Ao root area (BSA corrected) 1.7     LV Recio Vol (BSA corrected) 48.1 ml/m^2    LV Sys Vol (BSA corrected) 19.6 ml/m^2    MV E max lee 61.8 cm/sec    MV A max lee 58.2 cm/sec    MV E/A 1.1     MV V2 max 83.9 cm/sec    MV max PG 2.8 mmHg    MV V2 mean 47.5 cm/sec    MV mean PG 1.1 mmHg    MV V2 VTI 22.4 cm    MVA(VTI) 2.9 cm^2    MV dec slope 266.8 cm/sec^2    MV dec time 0.23 sec    Ao pk lee 97.3 cm/sec    Ao max PG 3.8 mmHg    Ao max PG (full) 1.2 mmHg    Ao V2 mean 72.5 cm/sec    Ao mean PG 2.2 mmHg    Ao mean PG (full) 0.98 mmHg    Ao V2 VTI 19.6 cm    HUBER(I,A) 3.4 cm^2    HUBER(I,D) 3.4 cm^2    HUBER(V,A) 3.3 cm^2    HUBER(V,D) 3.3 cm^2    LV V1 max PG 2.6 mmHg    LV V1 mean PG 1.3 mmHg    LV V1 max 81.1 cm/sec     LV V1 mean 52.8 cm/sec    LV V1 VTI 16.5 cm    SV(Ao) 189.7 ml    SI(Ao) 94.0 ml/m^2    SV(LVOT) 65.8 ml    SI(LVOT) 32.6 ml/m^2    PA V2 max 112.4 cm/sec    PA max PG 5.1 mmHg    PA max PG (full) 3.3 mmHg    PA V2 mean 85.5 cm/sec    PA mean PG 3.3 mmHg    PA mean PG (full) 2.3 mmHg    PA V2 VTI 25.5 cm    PA acc time 0.14 sec    RV V1 max PG 1.8 mmHg    RV V1 mean PG 0.95 mmHg    RV V1 max 66.7 cm/sec    RV V1 mean 46.5 cm/sec    RV V1 VTI 15.1 cm    PA pr(Accel) 15.2 mmHg     CV ECHO VERÓNICA - BZI_BMI 21.6 kilograms/m^2     CV ECHO VERÓNICA - BSA(HAYCOCK) 2.0 m^2     CV ECHO VERÓNICA - BZI_METRIC_WEIGHT 76.2 kg     CV ECHO VERÓNICA - BZI_METRIC_HEIGHT 188.0 cm    Target HR (85%) 144 bpm    Max. Pred. HR (100%) 169 bpm    EF(MOD-bp) 59.0 %    LA dimension(2D) 3.0 cm    Echo EF Estimated 60 %       IMPRESSION:  Patient is a 51 y.o. male with comminuted left elbow fracture as well as open right elbow fracture that was previously managed by Dr. Kyler Figueroa today.    I discussed the nature of the left elbow injury with the patient including relevant anatomy, natural history and conservative and surgical treatment options. The risks, benefits and alternatives to surgery were discussed with patientin detail as noted below. In particular, I noted that he would have a likely prolonged course of recovery and he may always have some deficits in elbow function given the severity of the injury. In addition, I noted that the risk of anesthesia are higher for him in the average patient given a syncopal history as well as a cardiac history with arrhythmia following prior surgeries. Understanding these risks, my recommendation remains for surgical fixation of the elbow to allow for improved function and relief of pain. This would consist of open reduction and internal fixation of the olecranon fracture with radial head replacement. We will evaluate the elbow for stability intraoperatively and we may require fixation of collateral  ligaments and/or placement of an internal joint stabilizer if the elbow remains unstable following fixation. Finally, we will evaluate the distal humerus for evidence of fracture. If there is an unstable fracture that displaces, we will attempt to fix this, however, given the distal nature of the fracture, this may have limited purchase for screws. We will have to make an assessment of the risks versus benefits of fixation at the time of surgery. With respect to the numbness in his hand, I suspect that this is a concussive injury to the nerves with neuropraxia and we will follow this expectantly. I do not think he has other evidence of compartment syndrome as he is able to move his fingers through some range of motion and has soft compartments in his forearm. Following a thorough conversation, questions were solicited and answered to his satisfaction. He  voiced understanding of the conversation and requested surgery. Verbal and written consent were obtained prior to proceeding.    PLAN:   · Admited to: Crispin Pringle DO  · Diet: NPO after midnight, Regular for Now  · Weight Bearing:  · Right Upper Extremity: Deferred weightbearing restrictions to Dr. August.  · Left Upper Extremity: Non Weight Bearing  · Labs: None additional needed  · Imaging: None additional needed  · Surgery: Left elbow fixation consisting of open reduction internal fixation of left olecranon with radial head replacement. Possible ligamentous reconstruction with local tissue. Possible application of internal joint stabilizer. Possible open reduction internal fixation of intercondylar humerus fracture.  · Consent:   Please obtain surgical consent from the patient or their designated POA as follows:  • Diagnosis: Left elbow fracture dislocation  • Laterality: Left  • Procedure: Left elbow fixation consisting of open reduction internal fixation of left olecranon with radial head replacement. Possible ligamentous reconstruction with local tissue.  Possible application of internal joint stabilizer. Possible open reduction internal fixation of intercondylar humerus fracture.  • Risks: Pain, Bleeding, Infection, Complications of anesthesia, Damage to blood vessels, nerves and other surrounding structures, Stiffness, Scar, Incomplete resolution or recurrence of the condition, Further procedures, Nonunion or malunion, Hardware Failure and Unforeseen risks of surgery including stroke, heart stoppage or death  • Benefits: Improved function and relief of pain  • Alternatives: No surgery and Splints/bracing  · Disposition: Acute, per primary. Planning for surgery tomorrow afternoon. Orthopedics following along.    Yonis Mcdonough MD, PhD  Orthopaedic & Hand Surgery  [facility]  [phone] - [place] Office  [phone] - [facility]

## 2021-08-17 NOTE — PROGRESS NOTES
[facility] Medicine Services Daily Progress Note    Patient Name: Frank Barger  : 1970  MRN: [id number]  Primary Care Physician:  Brittni Shell MD  Date of admission: 2021      Subjective      Chief Complaint: elbow pain      Date    2021  Cardio cleared for surgery.  POD #0 right elbow I&D.  Plan left olecranon ORIF tomorrow.  Patient on abx.  Does not appear to be in distress.     ROS   Constitutional: Negative.   HENT: Negative.    Eyes: Negative.    Cardiovascular: Positive for syncope.   Respiratory: Negative.    Endocrine: Negative.    Hematologic/Lymphatic: Negative.    Skin: Positive for color change and itching.   Musculoskeletal: Positive for back pain and joint pain.   Gastrointestinal: Negative.    Genitourinary: Negative.    Psychiatric/Behavioral: Negative.    Allergic/Immunologic: Negative.        Objective      Vitals:   Temp:  [97.4 °F (36.3 °C)-98.2 °F (36.8 °C)] 97.4 °F (36.3 °C)  Heart Rate:  [70-95] 76  Resp:  [9-19] 10  BP: ()/() 114/61  Flow (L/min):  [6] 6    Physical Exam   Constitutional:       Appearance: Normal appearance. He is normal weight.   HENT:      Head: Normocephalic.      Right Ear: External ear normal.      Left Ear: External ear normal.      Nose:      Comments: Abrasion on bridge of nose     Mouth/Throat:      Mouth: Mucous membranes are moist.   Eyes:      Extraocular Movements: Extraocular movements intact.   Cardiovascular:      Rate and Rhythm: Normal rate and regular rhythm.      Pulses: Normal pulses.      Heart sounds: Normal heart sounds.   Pulmonary:      Effort: Pulmonary effort is normal.      Breath sounds: Normal breath sounds.   Abdominal:      Palpations: Abdomen is soft.   Genitourinary:     Comments: Deferred  Musculoskeletal:      Cervical back: Normal range of motion and neck supple.      Comments: Range of motion bilateral upper extremities limited due to fracture and displacement. Upper  extremities splinted bilaterally  Skin:     General: Skin is warm and dry.      Findings: Lesion present.      Comments: Plaque psoriasis with plaques on forehead, lower back, and abdomen   Neurological:      General: No focal deficit present.      Mental Status: He is alert and oriented to person, place, and time.   Psychiatric:         Mood and Affect: Mood normal.         Behavior: Behavior normal.         Thought Content: Thought content normal.         Judgment: Judgment normal.         Result Review    Result Review:  I have personally reviewed the results from the time of this admission to 8/17/2021 15:18 EDT and agree with these findings:  [x]  Laboratory  []  Microbiology  [x]  Radiology  [x]  EKG/Telemetry   []  Cardiology/Vascular   []  Pathology  []  Old records  []  Other:         Wounds (last 24 hours)      LDA Wound     Row Name 08/17/21 1106 08/17/21 1055 08/17/21 1040       Wound 08/17/21 0942 Right distal elbow Incision    Wound - Properties Group Placement Date: 08/17/21  -MS Placement Time: 0942 -MS Side: Right  -MS Orientation: distal  -MS Location: elbow  -MS Primary Wound Type: Incision  -MS    Dressing Appearance  dry;intact;no drainage  -NS  dry;intact;no drainage  -NS  dry;intact;no drainage  -NS    Closure  FRANCISCO Xeroform, 4x4's, webril, splint, ace  -NS  FRANCISCO Xeroform, 4x4's, webril, splint, ace  -NS  FRANCISCO Xeroform, 4x4's, webril, splint, ace  -NS    Base  dressing in place, unable to visualize  -NS  dressing in place, unable to visualize  -NS  dressing in place, unable to visualize  -NS    Retired Wound - Properties Group Date first assessed: 08/17/21 -MS Time first assessed: 0942 -MS Side: Right  -MS Location: elbow  -MS Primary Wound Type: Incision  -MS    Row Name 08/17/21 1025 08/17/21 1010 08/17/21 1001       Wound 08/17/21 0942 Right distal elbow Incision    Wound - Properties Group Placement Date: 08/17/21  -MS Placement Time: 0942 -MS Side: Right  -MS Orientation: distal  -MS  Location: elbow  -MS Primary Wound Type: Incision  -MS    Dressing Appearance  dry;intact;no drainage  -NS  dry;intact;no drainage  -NS  dry;intact  -MS    Closure  FRANCISCO Xeroform, 4x4's, webril, splint, ace  -NS  FRANCISCO Xeroform, 4x4's, webril, splint, ace  -NS  Sutures xeroform 4x4s webril splint ace  -MS    Base  dressing in place, unable to visualize  -NS  dressing in place, unable to visualize  -NS  --    Retired Wound - Properties Group Date first assessed: 08/17/21  -MS Time first assessed: 0942 -MS Side: Right  -MS Location: elbow  -MS Primary Wound Type: Incision  -MS      User Key  (r) = Recorded By, (t) = Taken By, (c) = Cosigned By    Initials Name Provider Type    Amalia Boudreaux, RN Registered Nurse    NS Sprigler, Naomi, RN Registered Nurse            Assessment/Plan      Brief Patient Summary:  Frank Barger is a 51 y.o. male who presented to [facility] ED on 8/16/2021 due to syncope and b/l elbow fractures from collapse.  Patient admitted and ortho consulted.  Right olecranon I&D performed on 8/17/2021.  Abx started by ortho.  Plan for 72 hours abx and repeat imaging.  Left olecranon ORIF planned on 8/18/2021      amitriptyline, 75 mg, Oral, Nightly  atorvastatin, 20 mg, Oral, Nightly  buPROPion XL, 150 mg, Oral, Daily  ceFAZolin, 2 g, Intravenous, Q8H  clopidogrel, 75 mg, Oral, Daily  metoprolol tartrate, 25 mg, Oral, BID  nicotine, 1 patch, Transdermal, Q24H  pantoprazole, 40 mg, Oral, QAM AC  polyethylene glycol, 17 g, Oral, Daily  terazosin, 5 mg, Oral, Nightly       lactated ringers, 100 mL/hr  phenylephrine, 0.5-3 mcg/kg/min  sodium chloride, 100 mL/hr, Last Rate: Stopped (08/17/21 0935)  sodium chloride, 75 mL/hr, Last Rate: 75 mL/hr (08/17/21 1200)         Active Hospital Problems:  Active Hospital Problems    Diagnosis    • Dislocation of elbow, right, open, initial encounter    • Fracture of left elbow    • COPD (chronic obstructive pulmonary disease) (CMS/[facility])      Formatting of this note  might be different from the original.  Chest x-ray suggestive of them patient heavy smoker     • Ischemic heart disease due to coronary artery obstruction (CMS/HCC)      Status post CABG and PCI.  BP goal, <140/90.  Continue Lopressor 25 mg twice daily. Start ACE-I.  LDL at goal, <100.  Continue atorvastatin 20 mg nightly.  Continue dual antiplatelet therapy.  Encouraged smoking cessation.     • Syncope and collapse      Plan:     #Dislocation of right elbow, secondary to fall    - reduced in ED, orthopedics consulted    - POD #0 I&D    - continue abx for 72 hours    - plan on weekly x-rays and 3 weeks of splinting if fails to maintain reductions and require referral to sports medicine specialist for reconstruction     #Comminuted fracture of the left elbow secondary to fall    - immobilized in ED,    - orthopedics consulted, morphine 4 mg IV every 4 hours as needed pain    - plan for ORIF tomorrow     #Syncope and collapse  #coronary artery disease recent PCI in January    -s/p CABG and ischemic heart disease,     -continuous cardiac monitoring     -troponin <0.01x2    -cardiology consulted given past medical history, cleared for surgery    - hold aspirin,     -continue  statin, Plavix, metoprolol tartrate    - echo ordered    - no abnormality on EKG     #Leukocytosis     -WBC 13, may be reactive on cefazolin per orthopedics trend CBC no cough congestion or dysuria    -  white count decreased to 6.10 today    - no evidence of infection    - 72 hours of abx per ortho    #COPD with continued tobacco use    -nicotine patch, encourage cessation, stable, no home oxygen     #Anxiety depression    - on amitriptyline, Wellbutrin, Trileptal, Abilify and Trintellix not ordered as no current prescription pharmacy since June     #Migraine headaches    -stable no complaints hold Imitrex for now     #Chronic     -pain hold Lyrica and Flexeril for falls, hold NSAID for possible surgery and hold aspirin     #GERD     -on  PPI     #Essential hypertension     -on prazosin, Hytrin here as formulary substitute, on metoprolol     #Hyperlipidemia     -on statin     #Dietary supplementation     -on ferrous sulfate     #Seasonal allergies     -on loratadine not reordered     #Plaque psoriasis    - no home meds     DVT prophylaxis:  Mechanical DVT prophylaxis orders are present    DVT prophylaxis:  Mechanical DVT prophylaxis orders are present.    CODE STATUS:    Level Of Support Discussed With: Patient  Code Status: CPR  Medical Interventions (Level of Support Prior to Arrest): Full      Disposition:  I expect patient to be discharged in 2-3 days.    This patient has been examined wearing appropriate Personal Protective Equipment and discussed with PAtient. 08/17/21      Electronically signed by Crispin Pringle DO, 08/17/21, 15:18 EDT.  [facility] Hospitalist Team

## 2021-08-17 NOTE — ANESTHESIA POSTPROCEDURE EVALUATION
Patient: Frank Barger    Procedure Summary     Date: 08/17/21 Room / Location: [facility] OR 11 / [facility] MAIN OR    Anesthesia Start: 0919 Anesthesia Stop: 1010    Procedure: INCISION / DEBRIDEMENT ELBOW, closed reduction right elbow dislocation  (Right Elbow) Diagnosis:       Dislocation of elbow, right, open, initial encounter      (Dislocation of elbow, right, open, initial encounter [S53.104A, S51.001A])    Surgeons: Kyler August MD Provider: Jose Rangel MD    Anesthesia Type: general with block ASA Status: 4          Anesthesia Type: general with block    Vitals  Vitals Value Taken Time   /58 08/17/21 1042   Temp 97.5 °F (36.4 °C) 08/17/21 1010   Pulse 73 08/17/21 1044   Resp 10 08/17/21 1040   SpO2 100 % 08/17/21 1044   Vitals shown include unvalidated device data.        Post Anesthesia Care and Evaluation    Patient location during evaluation: PACU  Patient participation: complete - patient participated  Level of consciousness: awake  Pain scale: See nurse's notes for pain score.  Pain management: adequate  Airway patency: patent  Anesthetic complications: No anesthetic complications  PONV Status: none  Cardiovascular status: acceptable  Respiratory status: acceptable  Hydration status: acceptable    Comments: Patient seen and examined postoperatively; vital signs stable; SpO2 greater than or equal to 90%; cardiopulmonary status stable; nausea/vomiting adequately controlled; pain adequately controlled; no apparent anesthesia complications; patient discharged from anesthesia care when discharge criteria were met

## 2021-08-17 NOTE — PLAN OF CARE
Problem: Adult Inpatient Plan of Care  Goal: Plan of Care Review  8/17/2021 1622 by Nehal Reyes, PT  Outcome: Ongoing, Progressing  Flowsheets (Taken 8/17/2021 1622)  Plan of Care Reviewed With: patient  Outcome Summary: Pt is 52 yo male admitted for fall after syncopal episode.  Pt d/o right elbow dislocation, left elbow fx.  Pt now s/p irrigation of closed reduction right elbow dislocation and closed reduction/splinting right elbow on 8/17/2021.  Further orthopedic consult pending for left UE.  CT head, CT C-spine, and CT facial bones (-) acute changes.  PT will assume NWB BUEs until further clarified.  Pt presents with orthostatic response to mobility and pain limiting mobility.  Pt requiring David-modA to ambulate with HHA on left UE for support due to pain.  Pt far from functional mobility baseline with another possible sx for left UE fx.  At this time due to deficits and assist level needed, PT recommending IP rehab to address deficits.  PT will follow 5x/week while at [facility] and continue to assess d/c needs pending progress.

## 2021-08-17 NOTE — CONSULTS
"  Referring Provider: Crispin Pringle DO  Reason for Consultation:  Syncope  Multiple bilateral upper extremity fractures  Status post stent    Patient Care Team:  Brittni Shell MD as PCP - General (Family Medicine)  Jaimie Silva MD as Consulting Physician (Cardiology)    Chief complaint  Fall and possible syncope bilateral upper extremity fractures      Subjective .     History of present illness:  Frank Barger is a 51 y.o. male who presents with history of syncopal episode yesterday with injury to bilateral elbows.  Patient apparently was outside with a friend and got up to get a drink and the next thing he was on the concrete floor with face down.  He had syncopal episode approximately 6 months back.  Patient has been having occasional dizzy spells.  Patient had stent placement in December 2020.  Patient denies having any chest discomfort or shortness of breath palpitations.  Patient is not sure he had dizziness prior to this episode and it was very fast.  Surgery is planned.  Cardiac consultation was requested.  CT scan of the head cervical spine and facial bones were negative for fracture.  No other associated aggravating or alleviating factors.    .   ROS      The patient has been without any chest discomfort ,shortness of breath, palpitations, dizziness.  Denies having any headache ,abdominal pain ,nausea, vomiting , diarrhea constipation, loss of weight or loss of appetite.  Denies having any excessive bruising ,hematuria or blood in the stool.    Review of all systems negative except as indicated      History  Past Medical History:   Diagnosis Date   • Abdominal pain    • Allergies     takes allergy injections   • Anesthesia complication     states heart \"fluttering\" after anesthesia x 1- was kept in hospital 3 days s/p heart stent placement   • Anxiety    • Arthritis    • Asthma    • Chronic back pain    • Constipation 06/2021   • DDD (degenerative disc disease), lumbar    • Depression  "   • Emphysema of lung (CMS/HCC)    • GERD (gastroesophageal reflux disease)    • Headache    • Leg pain    • Lumbar spine pain    • Nocturia    • PTSD (post-traumatic stress disorder)    • Under care of pain management specialist     pain management of [place]       Past Surgical History:   Procedure Laterality Date   • CARDIAC CATHETERIZATION N/A 1/20/2021    Procedure: Left Heart Cath and coronary angiogram;  Surgeon: Jaimie Silva MD;  [facility];  Service: Cardiovascular;  Laterality: N/A;   • CARDIAC CATHETERIZATION N/A 1/20/2021    Procedure: Saphenous Vein Graft;  Surgeon: Jaimie Silva MD;  [facility];  Service: Cardiovascular;  Laterality: N/A;   • CARDIAC CATHETERIZATION N/A 1/20/2021    Procedure: Stent EBENEZER coronary;  Surgeon: Herve Rodriguez MD;  [facility];  Service: Cardiology;  Laterality: N/A;   • CORONARY ANGIOPLASTY WITH STENT PLACEMENT  04/2014    2 in 2014, 1 in 12/2020   • CORONARY ARTERY BYPASS GRAFT  12/2015    x 3       Family History   Problem Relation Age of Onset   • Diabetes Mother    • Heart disease Father    • Anxiety disorder Daughter    • Depression Daughter        Social History     Tobacco Use   • Smoking status: Current Every Day Smoker     Packs/day: 1.50   • Smokeless tobacco: Never Used   • Tobacco comment: do not smoke dos   Vaping Use   • Vaping Use: Never used   Substance Use Topics   • Alcohol use: No   • Drug use: No        Medications Prior to Admission   Medication Sig Dispense Refill Last Dose   • amitriptyline (ELAVIL) 75 MG tablet TAKE 1 TABLET BY MOUTH EVERY NIGHT 90 tablet 0    • aspirin (aspirin) 81 MG EC tablet Take 1 tablet by mouth Daily. 90 tablet 1    • atorvastatin (LIPITOR) 20 MG tablet Take 1 tablet by mouth Every Night. 90 tablet 1    • buPROPion XL (WELLBUTRIN XL) 150 MG 24 hr tablet Take 1 tablet by mouth Daily. 90 tablet 1    • clopidogrel (PLAVIX) 75  MG tablet Take 1 tablet by mouth Daily. 90 tablet 1    • cyclobenzaprine (FLEXERIL) 10 MG tablet Take 10 mg by mouth 3 (Three) Times a Day.      • ferrous sulfate 325 (65 FE) MG tablet Take 325 mg by mouth Daily With Breakfast.      • loratadine (CLARITIN) 10 MG tablet Take 10 mg by mouth Daily.      • metoprolol tartrate (LOPRESSOR) 25 MG tablet Take 1 tablet by mouth 2 (Two) Times a Day. 180 tablet 1    • naproxen (NAPROSYN) 500 MG tablet Take 1 tablet by mouth 2 (Two) Times a Day As Needed for Moderate Pain . Take with food! 60 tablet 3    • nitroglycerin (NITROSTAT) 0.4 MG SL tablet Place 0.4 mg under the tongue Every 5 (Five) Minutes As Needed for Chest Pain. Take no more than 3 doses in 15 minutes.      • NON FORMULARY Allergy injections      • omeprazole (priLOSEC) 20 MG capsule Take 1 capsule by mouth Every Morning Before Breakfast. 90 capsule 1    • prazosin (MINIPRESS) 2 MG capsule Take 2 mg by mouth Every Night.      • pregabalin (LYRICA) 200 MG capsule Take 1 capsule by mouth 3 (Three) Times a Day. 90 capsule 5    • SUMAtriptan (IMITREX) 50 MG tablet TAKE 1 TABLET BY MOUTH AT ONSET OF HEADACHE. MAY REPEAT DOSE 1 TIME IN 2 HOURS IF HEADACHE NOT RELIEVED 10 tablet 0    • ARIPiprazole (ABILIFY) 5 MG tablet TAKE 1 TABLET BY MOUTH EVERY NIGHT 90 tablet 0    • OXcarbazepine (TRILEPTAL) 600 MG tablet Take 600 mg by mouth 2 (Two) Times a Day. Take dos      • Vortioxetine HBr (Trintellix) 20 MG tablet Take 20 mg by mouth Daily. 30 tablet 0          Hydrocodone, Hydrocodone-acetaminophen, and Oxycodone    Scheduled Meds:amitriptyline, 75 mg, Oral, Nightly  atorvastatin, 20 mg, Oral, Nightly  buPROPion XL, 150 mg, Oral, Daily  clopidogrel, 75 mg, Oral, Daily  metoprolol tartrate, 25 mg, Oral, BID  nicotine, 1 patch, Transdermal, Q24H  pantoprazole, 40 mg, Oral, QAM AC  terazosin, 5 mg, Oral, Nightly      Continuous Infusions:sodium chloride, 100 mL/hr, Last Rate: 100 mL/hr (08/17/21 0017)      PRN  "Meds:.HYDROmorphone  •  melatonin  •  ondansetron  •  [COMPLETED] Insert peripheral IV **AND** sodium chloride    Objective     VITAL SIGNS  Vitals:    08/16/21 2013 08/16/21 2018 08/16/21 2326 08/17/21 0508   BP: 166/91 169/93 173/93 107/55   BP Location:   Right leg Right leg   Patient Position:   Lying Lying   Pulse: 84 85 91 76   Resp:  18 15 18   Temp:   98.2 °F (36.8 °C) 98.2 °F (36.8 °C)   TempSrc:   Oral Oral   SpO2: 100% 100% 96% 94%   Weight:   76.6 kg (168 lb 14 oz)    Height:   188 cm (74\")        Flowsheet Rows      First Filed Value   Admission Height  188 cm (74\") Documented at 08/16/2021 1654   Admission Weight  78 kg (172 lb) Documented at 08/16/2021 1654            Intake/Output Summary (Last 24 hours) at 8/17/2021 0653  Last data filed at 8/16/2021 2326  Gross per 24 hour   Intake --   Output 500 ml   Net -500 ml        TELEMETRY: Sinus rhythm  Physical Exam:  The patient is alert, oriented and in no distress.  Vital signs as noted above.  Head and neck revealed no carotid bruits or jugular venous distention.  No thyromegaly or lymph adenopathy is present  Lungs clear.  No wheezing.  Breath sounds are normal bilaterally.  Heart normal first and second heart sounds.No murmur.  No precordial rub is present.  No gallop is present.  Abdomen soft and nontender.  No organomegaly is present.  Extremities with good peripheral pulses without any pedal edema.  Bilateral elbow fractures.  Wrapped in bandage.  Skin warm and dry.  Musculoskeletal system is grossly normal  CNS grossly normal      Results Review:   I reviewed the patient's new clinical results.  Lab Results (last 24 hours)     Procedure Component Value Units Date/Time    MRSA Screen, PCR (Inpatient) - Swab, Nares [[id number]]  (Normal) Collected: 08/17/21 0119    Specimen: Swab from Nares Updated: 08/17/21 0306     MRSA PCR No MRSA Detected    Troponin [[id number]]  (Normal) Collected: 08/17/21 0017    Specimen: Blood Updated: 08/17/21 0054     " Troponin T <0.010 ng/mL     Narrative:      Troponin T Reference Range:  <= 0.03 ng/mL-   Negative for AMI  >0.03 ng/mL-     Abnormal for myocardial necrosis.  Clinicians would have to utilize clinical acumen, EKG, Troponin and serial changes to determine if it is an Acute Myocardial Infarction or myocardial injury due to an underlying chronic condition.       Results may be falsely decreased if patient taking Biotin.      Ethanol [[id number]] Collected: 08/17/21 0017    Specimen: Blood Updated: 08/17/21 0052     Ethanol % <0.010 %     Narrative:      Plasma Ethanol Clinical Symptoms:    ETOH (%)               Clinical Symptom  .01-.05              No apparent influence  .03-.12              Euphoria, Diminished judgment and attention   .09-.25              Impaired comprehension, Muscle incoordination  .18-.30              Confusion, Staggered gait, Slurred speech  .25-.40              Markedly decreased response to stimuli, unable to stand or                        walk, vomitting, sleep or stupor  .35-.50              Comatose, Anesthesia, Subnormal body temperature        COVID PRE-OP / PRE-PROCEDURE SCREENING ORDER (NO ISOLATION) - Swab, Nasopharynx [[id number]]  (Normal) Collected: 08/16/21 2130    Specimen: Swab from Nasopharynx Updated: 08/16/21 2231    Narrative:      The following orders were created for panel order COVID PRE-OP / PRE-PROCEDURE SCREENING ORDER (NO ISOLATION) - Swab, Nasopharynx.  Procedure                               Abnormality         Status                     ---------                               -----------         ------                     COVID-19,CEPHEID/SHIVANI/BD...[[id number]]  Normal              Final result                 Please view results for these tests on the individual orders.    COVID-19,CEPHEID/SHIVANI/BDMAX,COR/POP/PAD/SAM IN-HOUSE(OR EMERGENT/ADD-ON),NP SWAB IN TRANSPORT MEDIA 3-4 HR TAT, RT-PCR - Swab, Nasopharynx [[id number]]  (Normal) Collected: 08/16/21 2130     Specimen: Swab from Nasopharynx Updated: 08/16/21 2231     COVID19 Not Detected    Narrative:      Fact sheet for providers: https://www.fda.gov/media/971786/download     Fact sheet for patients: https://www.fda.gov/media/417938/download  Fact sheet for providers: https://www.fda.gov/media/260291/download     Fact sheet for patients: https://www.fda.gov/media/639795/download    aPTT [[id number]]  (Normal) Collected: 08/16/21 1726    Specimen: Blood Updated: 08/16/21 2011     PTT 24.7 seconds     Protime-INR [[id number]]  (Normal) Collected: 08/16/21 1726    Specimen: Blood Updated: 08/16/21 2011     Protime 11.4 Seconds      INR 1.03    Extra Tubes [[id number]] Collected: 08/16/21 1726    Specimen: Blood, Venous Line Updated: 08/16/21 1830    Narrative:      The following orders were created for panel order Extra Tubes.  Procedure                               Abnormality         Status                     ---------                               -----------         ------                     Gold Top - SST[[id number]]                                   Final result               Green Top (Gel)[[id number]]                                  Final result                 Please view results for these tests on the individual orders.    Green Top (Gel) [[id number]] Collected: 08/16/21 1726    Specimen: Blood Updated: 08/16/21 1830     Extra Tube Hold for add-ons.     Comment: Auto resulted.       Gold Top - SST [[id number]] Collected: 08/16/21 1726    Specimen: Blood Updated: 08/16/21 1830     Extra Tube Hold for add-ons.     Comment: Auto resulted.       Basic Metabolic Panel [[id number]]  (Abnormal) Collected: 08/16/21 1726    Specimen: Blood Updated: 08/16/21 1804     Glucose 111 mg/dL      BUN 17 mg/dL      Creatinine 1.09 mg/dL      Sodium 133 mmol/L      Potassium 3.9 mmol/L      Comment: Slight hemolysis detected by analyzer. Results may be affected.        Chloride 99 mmol/L      CO2 20.0 mmol/L      Calcium 9.2 mg/dL       eGFR Non African Amer 71 mL/min/1.73      BUN/Creatinine Ratio 15.6     Anion Gap 14.0 mmol/L     Narrative:      GFR Normal >60  Chronic Kidney Disease <60  Kidney Failure <15      Troponin [[id number]]  (Normal) Collected: 08/16/21 1726    Specimen: Blood Updated: 08/16/21 1804     Troponin T <0.010 ng/mL     Narrative:      Troponin T Reference Range:  <= 0.03 ng/mL-   Negative for AMI  >0.03 ng/mL-     Abnormal for myocardial necrosis.  Clinicians would have to utilize clinical acumen, EKG, Troponin and serial changes to determine if it is an Acute Myocardial Infarction or myocardial injury due to an underlying chronic condition.       Results may be falsely decreased if patient taking Biotin.      CBC & Differential [[id number]]  (Abnormal) Collected: 08/16/21 1726    Specimen: Blood Updated: 08/16/21 1738    Narrative:      The following orders were created for panel order CBC & Differential.  Procedure                               Abnormality         Status                     ---------                               -----------         ------                     CBC Auto Differential[[id number]]        Abnormal            Final result                 Please view results for these tests on the individual orders.    CBC Auto Differential [[id number]]  (Abnormal) Collected: 08/16/21 1726    Specimen: Blood Updated: 08/16/21 1738     WBC 13.00 10*3/mm3      RBC 4.66 10*6/mm3      Hemoglobin 13.4 g/dL      Hematocrit 40.1 %      MCV 86.0 fL      MCH 28.7 pg      MCHC 33.4 g/dL      RDW 17.4 %      RDW-SD 51.6 fl      MPV 8.5 fL      Platelets 258 10*3/mm3      Neutrophil % 78.0 %      Lymphocyte % 13.1 %      Monocyte % 7.5 %      Eosinophil % 0.5 %      Basophil % 0.9 %      Neutrophils, Absolute 10.10 10*3/mm3      Lymphocytes, Absolute 1.70 10*3/mm3      Monocytes, Absolute 1.00 10*3/mm3      Eosinophils, Absolute 0.10 10*3/mm3      Basophils, Absolute 0.10 10*3/mm3      nRBC 0.1 /100 WBC           Imaging Results  (Last 24 Hours)     Procedure Component Value Units Date/Time    CT Upper Extremity Left Without Contrast [[id number]] Collected: 08/17/21 0135     Updated: 08/17/21 0138    Narrative:      EXAMINATION: CT scan left elbow    INDICATION: Evaluate fracture    PROCEDURE: Axial, coronal and sagittal reconstructions were obtained.  CT dose lowering techniques were used, to include: automated exposure control, adjustment for patient size, and or use of iterative reconstruction.    COMPARISON: Radiographs performed earlier the same date.    FINDINGS:    There is a comminuted intra-articular, displaced, predominantly obliquely oriented olecranon fracture. There is also an impacted and comminuted, foreshortened intra-articular radial head fracture with dominant fragments of the radial head displaced into   the anterior and posterior joint space.. There is diffuse soft tissue swelling. There is diffuse soft tissue swelling and a lipohemarthrosis. There is a small coronoid process fracture.      Impression:      1. Comminuted intra-articular obliquely oriented olecranon fracture.  2. Impacted intra-articular radial head fracture.  3. Small coronoid process fracture    Electronically signed by:  Elliot Sen M.D.    8/16/2021 11:37 PM    XR Elbow 2 View Bilateral [[id number]] Collected: 08/16/21 1859     Updated: 08/16/21 2029    Addenda:        2 images of the right elbow and 2 images of the left elbow were  obtained. I was not aware that they were placed on the same series.     The right elbow demonstrates complete dislocation of the radiocapitellar  and ulnar trochlear joints. No definite fracture identified.     The left elbow demonstrates displaced fractures of the olecranon, the  radius with a nondisplaced fracture of the supracondylar distal humerus.     Electronically Signed By-Bertha Wood MD On:8/16/2021 8:27 PM  This report was finalized on [id number] by  Bertha Wood MD.  Signed: 08/16/21 2027 by Nina  MD Bertha    Narrative:      XR ELBOW 2 VW BILATERAL-     Date of Exam: 8/16/2021 6:25 PM     Indication: fall.     Comparison: None available.     Technique: Two views of the elbow were obtained.     FINDINGS:  There is a comminuted displaced fracture of the olecranon  with diastases of the fracture fragments measuring approximately 1.3 cm,  best present on the lateral view. A joint effusion is present. A large  butterfly fragment containing the coronoid process appears displaced  anteriorly by approximately 1.1 cm. There is a suspected nondisplaced  supracondylar fracture extending through the distal humerus extending  within the intercondylar notch and the trochlea. The radius appears  grossly intact. Diffuse soft tissue swelling is present. There is  complete dislocation of the ulnar trochlear and radiocapitellar joints.       Impression:         1. Extensively comminuted and displaced fracture of the olecranon as  described above.  2. Suspected nondisplaced supracondylar fracture of the distal humerus  extending through the trochlea and extending within the intercondylar  region.  3. Complete dislocation of the radiocapitellar and ulnar trochlear  joints.     Electronically Signed By-Bertha Wood MD On:8/16/2021 7:01 PM  This report was finalized on [id number] by  Bertha Wood MD.    XR Elbow 2 View Right [[id number]] Collected: 08/16/21 2024     Updated: 08/16/21 2028    Narrative:      XR ELBOW 2 VW RIGHT-     Date of Exam: 8/16/2021 8:16 PM     Indication: post reduction.     Comparison: Radiograph 08/16/2021     Technique: Two views of the elbow were obtained.     FINDINGS:  There has been interval reduction of the previously described  dislocation of the right elbow. No fracture identified. Diffuse soft  tissue swelling is present with a joint effusion.       Impression:      Interval reduction of right elbow dislocation. No fracture identified.     Electronically Signed By-Bertha Wood MD On:8/16/2021  8:25 PM  This report was finalized on [id number] by  Bertha Wood MD.    CT Cervical Spine Without Contrast [[id number]] Collected: 08/16/21 1903     Updated: 08/16/21 1906    Narrative:      DATE OF EXAM:  8/16/2021 5:53 PM     PROCEDURE:  CT CERVICAL SPINE WO CONTRAST-     INDICATIONS:   fall     COMPARISON:   No Comparisons Available     TECHNIQUE:  Routine transaxial slices were obtained through the cervical spine  without the administration of intravenous contrast. Reconstructed  coronal and sagittal images were also obtained. Automated exposure  control and iterative construction methods were used.      FINDINGS:  No evidence of fracture. No evidence of compression deformity. No  evidence of significant spondylolisthesis or acute subluxation. The  prevertebral soft tissues appear within normal limits. The surrounding  soft tissues appear grossly unremarkable. The lung apices appear within  normal limits. No evidence of adenopathy. The visualized portions of the  thyroid gland appears grossly unremarkable. Mild degenerative changes  are present throughout the spine. There is no evidence of significant  bony canal stenosis. No evidence of significant or severe bony neural  foraminal narrowing. MRI is more sensitive modality for evaluation of  degenerative change.       Impression:      No acute osseous abnormality.     Electronically Signed By-Bertha Wood MD On:8/16/2021 7:04 PM  This report was finalized on [id number] by  Bertha Wood MD.    XR Chest 1 View [[id number]] Collected: 08/16/21 1902     Updated: 08/16/21 1905    Narrative:      Examination: XR CHEST 1 VW-     Date of Exam: 8/16/2021 6:20 PM     Indication: syncope.     Comparison: Radiograph 01/18/2021     Technique: 1 view of the chest      Findings:  There are no airspace consolidations. No pleural effusions. No  pneumothorax. The heart size is normal. The pulmonary vasculature  appears within normal limits. Chronic interstitial changes are  present  bilaterally. Median sternotomy wires are present. No acute osseous  abnormality identified.       Impression:      No acute cardiopulmonary process.     Electronically Signed By-Bertha Wood MD On:8/16/2021 7:03 PM  This report was finalized on [id number] by  Bertha Wood MD.    XR Forearm 2 View Bilateral [[id number]] Collected: 08/16/21 1901     Updated: 08/16/21 1904    Narrative:      DATE OF EXAM:  8/16/2021 6:54 PM     PROCEDURE:  XR FOREARM 2 VW BILATERAL-, XR WRIST 3+ VW BILATERAL-     INDICATIONS:  fall     COMPARISON:  No Comparisons Available     TECHNIQUE:   A minimum of two routine standard radiographic views were obtained of  the bilateral forearms. 3 views of the left wrist, 3 views of the right  wrist     FINDINGS:  The remaining portions of the forearm appear intact. No additional  fracture identified.  Left wrist: No evidence of fracture. No evidence of dislocation.     Right wrist: No evidence of fracture. No evidence of dislocation. No  focal soft tissue abnormality is identified.       Impression:      No acute osseous abnormality of the remaining forearm or the bilateral  wrists.     Electronically Signed By-Bertha Wood MD On:8/16/2021 7:02 PM  This report was finalized on [id number] by  Bertha Wood MD.    XR Wrist 3+ View Bilateral [[id number]] Collected: 08/16/21 1901     Updated: 08/16/21 1904    Narrative:      DATE OF EXAM:  8/16/2021 6:54 PM     PROCEDURE:  XR FOREARM 2 VW BILATERAL-, XR WRIST 3+ VW BILATERAL-     INDICATIONS:  fall     COMPARISON:  No Comparisons Available     TECHNIQUE:   A minimum of two routine standard radiographic views were obtained of  the bilateral forearms. 3 views of the left wrist, 3 views of the right  wrist     FINDINGS:  The remaining portions of the forearm appear intact. No additional  fracture identified.  Left wrist: No evidence of fracture. No evidence of dislocation.     Right wrist: No evidence of fracture. No evidence of  dislocation. No  focal soft tissue abnormality is identified.       Impression:      No acute osseous abnormality of the remaining forearm or the bilateral  wrists.     Electronically Signed By-Bertha Wood MD On:8/16/2021 7:02 PM  This report was finalized on [id number] by  Bertha Wood MD.    CT Facial Bones Without Contrast [[id number]] Collected: 08/16/21 1825     Updated: 08/16/21 1828    Narrative:         DATE OF EXAM:   8/16/2021 5:53 PM     PROCEDURE:  CT FACIAL BONES WO CONTRAST-     INDICATIONS:   fall     COMPARISON:   No Comparisons Available     TECHNIQUE:   Routine transaxial slices were obtained through paranasal sinuses  without the administration of intravenous contrast. Reconstructed  coronal and sagittal images were also obtained. Automated exposure  controls and iterative reconstruction methods were used.     FINDINGS:   No evidence of fracture. No. No air-fluid levels identified within the  paranasal sinuses. The visualized orbits appear grossly unremarkable. No  sizable focal soft tissue hematoma identified. Mild soft tissue swelling  is seen overlying the forehead region. The patient is edentulous.       Impression:       No acute osseous abnormality.     Electronically Signed By-Bertha Wood MD On:8/16/2021 6:26 PM  This report was finalized on [id number] by  Bertha Wood MD.    CT Head Without Contrast [[id number]] Collected: 08/16/21 1820     Updated: 08/16/21 1826    Narrative:      CT HEAD WO CONTRAST-     Date of Exam: 8/16/2021 5:53 PM     Indication: syncope, fall.     Comparison: None available.     Technique:  Without contrast, contiguous axial CT images of the head  were obtained from skull base to vertex.  Coronal and sagittal  reconstructions were performed.  Automated exposure control and  iterative reconstruction methods were used.     FINDINGS  No evidence of intracranial hemorrhage, mass, or midline shift. The  ventricles appear normal in size for the patient's age. No  extra-axial  collections identified. The gray white matter differentiation is intact.  No air-fluid levels identified within the paranasal sinuses. The  extra-axial structures demonstrate no acute process.       Impression:      No evidence of hemorrhage, mass effect or midline shift. No acute  process identified.     Electronically Signed By-Bertha Wood MD On:8/16/2021 6:21 PM  This report was finalized on [id number] by  Bertha Wood MD.      LAB RESULTS (LAST 7 DAYS)    CBC  Results from last 7 days   Lab Units 08/16/21  1726   WBC 10*3/mm3 13.00*   RBC 10*6/mm3 4.66   HEMOGLOBIN g/dL 13.4   HEMATOCRIT % 40.1   MCV fL 86.0   PLATELETS 10*3/mm3 258       BMP  Results from last 7 days   Lab Units 08/16/21  1726   SODIUM mmol/L 133*   POTASSIUM mmol/L 3.9   CHLORIDE mmol/L 99   CO2 mmol/L 20.0*   BUN mg/dL 17   CREATININE mg/dL 1.09   GLUCOSE mg/dL 111*       CMP   Results from last 7 days   Lab Units 08/16/21  1726   SODIUM mmol/L 133*   POTASSIUM mmol/L 3.9   CHLORIDE mmol/L 99   CO2 mmol/L 20.0*   BUN mg/dL 17   CREATININE mg/dL 1.09   GLUCOSE mg/dL 111*         BNP        TROPONIN  Results from last 7 days   Lab Units 08/17/21  0017   TROPONIN T ng/mL <0.010       CoAg  Results from last 7 days   Lab Units 08/16/21  1726   INR  1.03   APTT seconds 24.7       Creatinine Clearance  Estimated Creatinine Clearance: 86.9 mL/min (by C-G formula based on SCr of 1.09 mg/dL).    ABG        Radiology  XR Elbow 2 View Right    Result Date: 8/16/2021  Interval reduction of right elbow dislocation. No fracture identified.  Electronically Signed By-Bertha Wood MD On:8/16/2021 8:25 PM This report was finalized on [id number] by  Bertha Wood MD.    CT Head Without Contrast    Result Date: 8/16/2021  No evidence of hemorrhage, mass effect or midline shift. No acute process identified.  Electronically Signed By-Bertha Wood MD On:8/16/2021 6:21 PM This report was finalized on [id number] by  Bertha Wood MD.    CT  Cervical Spine Without Contrast    Result Date: 8/16/2021  No acute osseous abnormality.  Electronically Signed By-Bertha Wood MD On:8/16/2021 7:04 PM This report was finalized on [id number] by  Bertha Wood MD.    XR Chest 1 View    Result Date: 8/16/2021  No acute cardiopulmonary process.  Electronically Signed By-Bertha Wood MD On:8/16/2021 7:03 PM This report was finalized on [id number] by  Bertha Wood MD.    CT Facial Bones Without Contrast    Result Date: 8/16/2021   No acute osseous abnormality.  Electronically Signed By-Bertha Wood MD On:8/16/2021 6:26 PM This report was finalized on [id number] by  Bertha Wood MD.    CT Upper Extremity Left Without Contrast    Result Date: 8/17/2021  1. Comminuted intra-articular obliquely oriented olecranon fracture. 2. Impacted intra-articular radial head fracture. 3. Small coronoid process fracture Electronically signed by:  Elliot Sen M.D.  8/16/2021 11:37 PM    XR Elbow 2 View Bilateral    Addendum Date: 8/16/2021    2 images of the right elbow and 2 images of the left elbow were obtained. I was not aware that they were placed on the same series.  The right elbow demonstrates complete dislocation of the radiocapitellar and ulnar trochlear joints. No definite fracture identified.  The left elbow demonstrates displaced fractures of the olecranon, the radius with a nondisplaced fracture of the supracondylar distal humerus.  Electronically Signed By-Bertha Wood MD On:8/16/2021 8:27 PM This report was finalized on [id number] by  Bertha Wood MD.    Result Date: 8/16/2021   1. Extensively comminuted and displaced fracture of the olecranon as described above. 2. Suspected nondisplaced supracondylar fracture of the distal humerus extending through the trochlea and extending within the intercondylar region. 3. Complete dislocation of the radiocapitellar and ulnar trochlear joints.  Electronically Signed By-Bertha Wood MD On:8/16/2021 7:01 PM This  report was finalized on [id number] by  Bertha Wood MD.    XR Forearm 2 View Bilateral    Result Date: 8/16/2021  No acute osseous abnormality of the remaining forearm or the bilateral wrists.  Electronically Signed By-Bertha Wood MD On:8/16/2021 7:02 PM This report was finalized on [id number] by  Bertha Wood MD.    XR Wrist 3+ View Bilateral    Result Date: 8/16/2021  No acute osseous abnormality of the remaining forearm or the bilateral wrists.  Electronically Signed By-Bertha Wood MD On:8/16/2021 7:02 PM This report was finalized on [id number] by  Bertha Wood MD.              EKG              I personally viewed and interpreted the patient's EKG/Telemetry data: Normal sinus rhythm nonspecific ST-T wave changes    ECHOCARDIOGRAM:    Results for orders placed during the hospital encounter of 01/14/21    Adult Transthoracic Echo Complete W/ Cont if Necessary Per Protocol    Interpretation Summary  · Estimated left ventricular EF = 50% Left ventricular systolic function is normal.    Indications  Chest pain    Technically satisfactory study.  Mitral valve is structurally normal.  Tricuspid valve is structurally normal.  Aortic valve is structurally normal.  Pulmonic valve could not be well visualized.  No evidence for mitral tricuspid or aortic regurgitation is seen by Doppler study.  Left atrium is normal in size.  Right atrium is normal in size.  Left ventricle is normal in size with mild paradoxical septal motion and inferior wall hypokinesis with ejection fraction of 50%.  Right ventricle is normal in size.  Atrial septum is intact.  Aorta is normal.  No pericardial effusion or intracardiac thrombus is seen.    Impression  Structurally and functionally normal cardiac valves.  Left ventricular is normal in size and mild paradoxical septal motion is present with inferior wall hypokinesis with ejection fraction of 50%.              Cardiolite (Tc-99m Sestamibi) stress test      OTHER:  "    Assessment/Plan     Active Problems:    Ischemic heart disease due to coronary artery obstruction (CMS/HCC)    Syncope and collapse    COPD (chronic obstructive pulmonary disease) (CMS/HCC)    Dislocation of elbow, right, open, initial encounter    Fracture of left elbow    ]]]]]]]]]]]]]]]]]  Impression  ==========  -Recent syncope  Bilateral elbow fractures  \"X-ray of the left elbow showed a complete dislocation of the radiocapitellar and ulnar trochlear joints. This was reduced in the emergency department. X-ray of elbow as per radiology also shows extensively comminuted and displaced fracture of the old [place] and suspected nondisplaced supracondylar fracture of the distal humerus on the right side.\"    -Status post CABG (2015) (triple-vessel according to patient) at [facility].     -Status post stent placement 4/4/2014 at [facility]  Status post stent to LAD between diagonal branch and landing site of LIMA.  1/20/2021-[facility]     Cardiac catheterization 1/21/2021  Left ventricle size and contractility is normal with ejection fraction of 60%.  Left main coronary artery is normal.  Left anterior descending artery provided a moderate sized diagonal branch and left anterior descending artery has 80% disease proximal to the LIMA landing site.  Circumflex coronary artery is a moderately large vessel that provided a large marginal branch.  Circumflex coronary artery has 60 to 70% disease just distal to the marginal branch.  Ramus intermedius is a relatively small caliber vessel that has ostial 80 to 90% disease.  Right coronary artery is a large and dominant vessel.  Left ventricular branch has 60 to 70% disease at its ostium.  LIMA to left anterior descending artery is very small and atretic with probably 90 to 95% disease just proximal to the insertion site.  SVG to diagonal branch is patent.  SVG to presumably ramus intermedius is totally occluded in the " proximal segment.     2 out of 3 grafts are patent.  However patent LIMA is extremely small in caliber and atraumatic with significant disease near the landing site.  Difficult to tell the source of patient's symptoms but likely from left anterior descending artery.  Patient to have intervention to left antidescending artery distal to the diagonal branch and proximal to the LIMA landing site.  Other source of pain could be from circumflex coronary artery left ventricular branch of RCA and ramus intermedius.  Suggest maximize medical treatment for disease in these vessels.  Modification of risk factors.     Echocardiogram 1/14/2021 revealed mild paradoxical septal motion and inferior wall hypokinesis with ejection fraction of 50%.  Lexiscan Cardiolite test-abnormal with mild posterior and inferior proximal ischemia-1/14/2021     -Dyslipidemia hypertension COPD PTSD     -Psoriasis     -Family history of coronary artery disease     -Smoker-abstinence from smoking     -Allergic to oxycodone hydrocodone  ==========  Plan  =========  Recent syncope and bilateral elbow fractures.  Patient has been having off-and-on dizziness.  Patient would benefit from close cardiac monitoring including loop recorder placement.  EKG showed no acute changes.    Status post CABG  Status post stent placement.  Patient is not having any angina pectoris or congestive heart failure.     Hypertension-well-controlled 126/76  Continue metoprolol tartrate 25 mg a day     Dyslipidemia-continue atorvastatin      Medications were reviewed and updated.    Patient to have surgery today.  Close cardiac monitoring.  Echocardiogram    Further plan will depend on patient's progress.  ]]]]]]]]]]]]]]     Jaimie Silva MD  08/17/21  06:54 EDT

## 2021-08-17 NOTE — ANESTHESIA PREPROCEDURE EVALUATION
Anesthesia Evaluation     Patient summary reviewed and Nursing notes reviewed   NPO Solid Status: > 8 hours  NPO Liquid Status: > 8 hours           Airway   Mallampati: II  TM distance: >3 FB  Neck ROM: full  No difficulty expected  Dental - normal exam   (+) edentulous    Pulmonary - normal exam   (+) a smoker Current Smoked day of surgery, COPD, asthma,  Cardiovascular - normal exam    ECG reviewed  PT is on anticoagulation therapy    (+) hypertension, CAD, CABG, cardiac stents hyperlipidemia,       Neuro/Psych  (+) headaches, syncope,     GI/Hepatic/Renal/Endo    (+)  GERD,      Musculoskeletal     (+) myalgias,   Abdominal  - normal exam    Bowel sounds: normal.   Substance History      OB/GYN          Other   arthritis,      ROS/Med Hx Other: SR     1/21  Impressions:  Successful PCI of the mid LAD with placement of drug-eluting stent  Attempted crossing of the lesion involving the vein graft but unable to cross the lesion most likely chronic total occlusion                   Anesthesia Plan    ASA 4     general with block     intravenous induction     Anesthetic plan, all risks, benefits, and alternatives have been provided, discussed and informed consent has been obtained with: patient.    Plan discussed with CAA.

## 2021-08-17 NOTE — ANESTHESIA PROCEDURE NOTES
Airway  Urgency: elective    Date/Time: 8/17/2021 9:22 AM  Airway not difficult    General Information and Staff    Patient location during procedure: OR  Anesthesiologist: Jose Rangel MD  CRNA: Freedom Castillo    Indications and Patient Condition  Indications for airway management: airway protection    Preoxygenated: yes  MILS maintained throughout  Mask difficulty assessment: 0 - not attempted    Final Airway Details  Final airway type: supraglottic airway      Successful airway: unique  Size 4    Number of attempts at approach: 1  Assessment: lips, teeth, and gum same as pre-op and atraumatic intubation

## 2021-08-17 NOTE — CONSULTS
"    Referring Provider: Dr. Beck  Reason for Consultation: Right open elbow dislocation, left proximal ulna/radial head/coronoid fracture    Patient Care Team:  Brittni Shell MD as PCP - General (Family Medicine)  Jaimie Silva MD as Consulting Physician (Cardiology)      Subjective .     History of present illness:  Frank Barger is a 51 y.o. male who presents with syncopal episode.  He says he has these frequently.  He is a cardiac history.  He is on Plavix and aspirin.  He was seen by the emergency room with open right elbow dislocation which reduced and splinted.  Was started on Kefzol.  He also had a complex left proximal ulnar fracture which was intra-articular with radial head fracture and coronoid fracture..     Review of Systems:    No chest pain or shortness of breath.  Has some dizziness.  No fevers or chills      History  Past Medical History:   Diagnosis Date   • Abdominal pain    • Allergies     takes allergy injections   • Anesthesia complication     states heart \"fluttering\" after anesthesia x 1- was kept in hospital 3 days s/p heart stent placement   • Anxiety    • Arthritis    • Asthma    • Chronic back pain    • Constipation 06/2021   • DDD (degenerative disc disease), lumbar    • Depression    • Emphysema of lung (CMS/HCC)    • GERD (gastroesophageal reflux disease)    • Headache    • Leg pain    • Lumbar spine pain    • Nocturia    • PTSD (post-traumatic stress disorder)    • Under care of pain management specialist     pain management of [place]     Past Surgical History:   Procedure Laterality Date   • CARDIAC CATHETERIZATION N/A 1/20/2021    Procedure: Left Heart Cath and coronary angiogram;  Surgeon: Jaimie Silva MD;  Location: [facility] CATH INVASIVE LOCATION;  Service: Cardiovascular;  Laterality: N/A;   • CARDIAC CATHETERIZATION N/A 1/20/2021    Procedure: Saphenous Vein Graft;  Surgeon: Jaimie Silva MD;  Location: [facility] CATH INVASIVE LOCATION;  Service: " Cardiovascular;  Laterality: N/A;   • CARDIAC CATHETERIZATION N/A 1/20/2021    Procedure: Stent EBENEZER coronary;  Surgeon: Herve Rodrgiuez MD;  Location: [facility] INVASIVE LOCATION;  Service: Cardiology;  Laterality: N/A;   • CORONARY ANGIOPLASTY WITH STENT PLACEMENT  04/2014    2 in 2014, 1 in 12/2020   • CORONARY ARTERY BYPASS GRAFT  12/2015    x 3     Family History   Problem Relation Age of Onset   • Diabetes Mother    • Heart disease Father    • Anxiety disorder Daughter    • Depression Daughter       Social History     Tobacco Use   • Smoking status: Current Every Day Smoker     Packs/day: 1.50   • Smokeless tobacco: Never Used   • Tobacco comment: do not smoke dos   Vaping Use   • Vaping Use: Never used   Substance Use Topics   • Alcohol use: No   • Drug use: No     Medications Prior to Admission   Medication Sig Dispense Refill Last Dose   • amitriptyline (ELAVIL) 75 MG tablet TAKE 1 TABLET BY MOUTH EVERY NIGHT 90 tablet 0    • aspirin (aspirin) 81 MG EC tablet Take 1 tablet by mouth Daily. 90 tablet 1    • atorvastatin (LIPITOR) 20 MG tablet Take 1 tablet by mouth Every Night. 90 tablet 1    • buPROPion XL (WELLBUTRIN XL) 150 MG 24 hr tablet Take 1 tablet by mouth Daily. 90 tablet 1    • clopidogrel (PLAVIX) 75 MG tablet Take 1 tablet by mouth Daily. 90 tablet 1    • cyclobenzaprine (FLEXERIL) 10 MG tablet Take 10 mg by mouth 3 (Three) Times a Day.      • ferrous sulfate 325 (65 FE) MG tablet Take 325 mg by mouth Daily With Breakfast.      • loratadine (CLARITIN) 10 MG tablet Take 10 mg by mouth Daily.      • metoprolol tartrate (LOPRESSOR) 25 MG tablet Take 1 tablet by mouth 2 (Two) Times a Day. 180 tablet 1    • naproxen (NAPROSYN) 500 MG tablet Take 1 tablet by mouth 2 (Two) Times a Day As Needed for Moderate Pain . Take with food! 60 tablet 3    • nitroglycerin (NITROSTAT) 0.4 MG SL tablet Place 0.4 mg under the tongue Every 5 (Five) Minutes As Needed for Chest Pain. Take no more than 3 doses in  "15 minutes.      • NON FORMULARY Allergy injections      • omeprazole (priLOSEC) 20 MG capsule Take 1 capsule by mouth Every Morning Before Breakfast. 90 capsule 1    • prazosin (MINIPRESS) 2 MG capsule Take 2 mg by mouth Every Night.      • pregabalin (LYRICA) 200 MG capsule Take 1 capsule by mouth 3 (Three) Times a Day. 90 capsule 5    • SUMAtriptan (IMITREX) 50 MG tablet TAKE 1 TABLET BY MOUTH AT ONSET OF HEADACHE. MAY REPEAT DOSE 1 TIME IN 2 HOURS IF HEADACHE NOT RELIEVED 10 tablet 0    • ARIPiprazole (ABILIFY) 5 MG tablet TAKE 1 TABLET BY MOUTH EVERY NIGHT 90 tablet 0    • OXcarbazepine (TRILEPTAL) 600 MG tablet Take 600 mg by mouth 2 (Two) Times a Day. Take dos      • Vortioxetine HBr (Trintellix) 20 MG tablet Take 20 mg by mouth Daily. 30 tablet 0       Hydrocodone, Hydrocodone-acetaminophen, and Oxycodone    Scheduled Meds:[MAR Hold] amitriptyline, 75 mg, Oral, Nightly  [MAR Hold] atorvastatin, 20 mg, Oral, Nightly  [MAR Hold] buPROPion XL, 150 mg, Oral, Daily  [MAR Hold] clopidogrel, 75 mg, Oral, Daily  metoprolol tartrate, 25 mg, Oral, BID  [MAR Hold] nicotine, 1 patch, Transdermal, Q24H  [MAR Hold] pantoprazole, 40 mg, Oral, QAM AC  terazosin, 5 mg, Oral, Nightly      Continuous Infusions:sodium chloride, 100 mL/hr, Last Rate: 100 mL/hr (08/17/21 0017)      PRN Meds:.[MAR Hold] HYDROmorphone  •  [MAR Hold] melatonin  •  [MAR Hold] ondansetron  •  [COMPLETED] Insert peripheral IV **AND** [MAR Hold] sodium chloride    Objective     Vital Signs   Vitals:    08/16/21 2326 08/17/21 0508 08/17/21 0736 08/17/21 0841   BP: 173/93 107/55 96/56 107/56   BP Location: Right leg Right leg Right leg Right leg   Patient Position: Lying Lying Lying Lying   Pulse: 91 76 78 83   Resp: 15 18 15 16   Temp: 98.2 °F (36.8 °C) 98.2 °F (36.8 °C) 97.7 °F (36.5 °C) 97.8 °F (36.6 °C)   TempSrc: Oral Oral Oral Temporal   SpO2: 96% 94% 96% 95%   Weight: 76.6 kg (168 lb 14 oz)      Height: 188 cm (74\")            Physical " Exam:   Male, no apparent distress, alert and oriented x3 normal height and weight  Right elbow is in a posterior splint.  Left elbow is in a sugar tong splint.  Both hands have good sensation in all digits.  5/5 radial median and ulnar nerve function.  Good capillary refill  Right elbow showed a dislocation of the radiocapitellar and ulnohumeral joints.  They now appear well reduced on postreduction film from the ER  The left elbow shows a highly comminuted intra-articular olecranon fracture with coronoid tip fracture and moderately comminuted radial head fracture.  Fractures are displaced    Results Review:   I reviewed the patient's new clinical results.  I reviewed the patient's new imaging results    Lab Results (last 24 hours)     Procedure Component Value Units Date/Time    CBC (No Diff) [[id number]]  (Abnormal) Collected: 08/17/21 0751    Specimen: Blood Updated: 08/17/21 0816     WBC 6.10 10*3/mm3      RBC 3.78 10*6/mm3      Hemoglobin 11.2 g/dL      Hematocrit 33.0 %      MCV 87.3 fL      MCH 29.5 pg      MCHC 33.8 g/dL      RDW 17.7 %      RDW-SD 52.9 fl      MPV 8.6 fL      Platelets 193 10*3/mm3     Basic Metabolic Panel [[id number]]  (Abnormal) Collected: 08/17/21 0017    Specimen: Blood Updated: 08/17/21 0800     Glucose 112 mg/dL      BUN 17 mg/dL      Creatinine 1.09 mg/dL      Sodium 136 mmol/L      Potassium 4.2 mmol/L      Chloride 102 mmol/L      CO2 22.0 mmol/L      Calcium 8.7 mg/dL      eGFR Non African Amer 71 mL/min/1.73      BUN/Creatinine Ratio 15.6     Anion Gap 12.0 mmol/L     Narrative:      GFR Normal >60  Chronic Kidney Disease <60  Kidney Failure <15      Iron Profile [[id number]]  (Normal) Collected: 08/17/21 0017    Specimen: Blood Updated: 08/17/21 0800     Iron 151 mcg/dL      Iron Saturation 34 %      Transferrin 302 mg/dL      TIBC 450 mcg/dL     MRSA Screen, PCR (Inpatient) - Swab, Nares [[id number]]  (Normal) Collected: 08/17/21 0119    Specimen: Swab from Nares Updated:  08/17/21 0306     MRSA PCR No MRSA Detected    Troponin [[id number]]  (Normal) Collected: 08/17/21 0017    Specimen: Blood Updated: 08/17/21 0054     Troponin T <0.010 ng/mL     Narrative:      Troponin T Reference Range:  <= 0.03 ng/mL-   Negative for AMI  >0.03 ng/mL-     Abnormal for myocardial necrosis.  Clinicians would have to utilize clinical acumen, EKG, Troponin and serial changes to determine if it is an Acute Myocardial Infarction or myocardial injury due to an underlying chronic condition.       Results may be falsely decreased if patient taking Biotin.      Ethanol [[id number]] Collected: 08/17/21 0017    Specimen: Blood Updated: 08/17/21 0052     Ethanol % <0.010 %     Narrative:      Plasma Ethanol Clinical Symptoms:    ETOH (%)               Clinical Symptom  .01-.05              No apparent influence  .03-.12              Euphoria, Diminished judgment and attention   .09-.25              Impaired comprehension, Muscle incoordination  .18-.30              Confusion, Staggered gait, Slurred speech  .25-.40              Markedly decreased response to stimuli, unable to stand or                        walk, vomitting, sleep or stupor  .35-.50              Comatose, Anesthesia, Subnormal body temperature        COVID PRE-OP / PRE-PROCEDURE SCREENING ORDER (NO ISOLATION) - Swab, Nasopharynx [[id number]]  (Normal) Collected: 08/16/21 2130    Specimen: Swab from Nasopharynx Updated: 08/16/21 2231    Narrative:      The following orders were created for panel order COVID PRE-OP / PRE-PROCEDURE SCREENING ORDER (NO ISOLATION) - Swab, Nasopharynx.  Procedure                               Abnormality         Status                     ---------                               -----------         ------                     COVID-19,CEPHEID/SHIVANI/BD...[[id number]]  Normal              Final result                 Please view results for these tests on the individual orders.    COVID-19,CEPHEID/SHIVANI/BDMAX,COR/POP/PAD/SAM  IN-HOUSE(OR EMERGENT/ADD-ON),NP SWAB IN TRANSPORT MEDIA 3-4 HR TAT, RT-PCR - Swab, Nasopharynx [[id number]]  (Normal) Collected: 08/16/21 2130    Specimen: Swab from Nasopharynx Updated: 08/16/21 2231     COVID19 Not Detected    Narrative:      Fact sheet for providers: https://www.fda.gov/media/099303/download     Fact sheet for patients: https://www.fda.gov/media/616202/download  Fact sheet for providers: https://www.fda.gov/media/089767/download     Fact sheet for patients: https://www.fda.gov/media/686720/download    aPTT [[id number]]  (Normal) Collected: 08/16/21 1726    Specimen: Blood Updated: 08/16/21 2011     PTT 24.7 seconds     Protime-INR [[id number]]  (Normal) Collected: 08/16/21 1726    Specimen: Blood Updated: 08/16/21 2011     Protime 11.4 Seconds      INR 1.03    Extra Tubes [[id number]] Collected: 08/16/21 1726    Specimen: Blood, Venous Line Updated: 08/16/21 1830    Narrative:      The following orders were created for panel order Extra Tubes.  Procedure                               Abnormality         Status                     ---------                               -----------         ------                     Gold Top - SST[[id number]]                                   Final result               Green Top (Gel)[[id number]]                                  Final result                 Please view results for these tests on the individual orders.    Green Top (Gel) [[id number]] Collected: 08/16/21 1726    Specimen: Blood Updated: 08/16/21 1830     Extra Tube Hold for add-ons.     Comment: Auto resulted.       Gold Top - SST [[id number]] Collected: 08/16/21 1726    Specimen: Blood Updated: 08/16/21 1830     Extra Tube Hold for add-ons.     Comment: Auto resulted.       Basic Metabolic Panel [[id number]]  (Abnormal) Collected: 08/16/21 1726    Specimen: Blood Updated: 08/16/21 1804     Glucose 111 mg/dL      BUN 17 mg/dL      Creatinine 1.09 mg/dL      Sodium 133 mmol/L      Potassium 3.9 mmol/L       Comment: Slight hemolysis detected by analyzer. Results may be affected.        Chloride 99 mmol/L      CO2 20.0 mmol/L      Calcium 9.2 mg/dL      eGFR Non African Amer 71 mL/min/1.73      BUN/Creatinine Ratio 15.6     Anion Gap 14.0 mmol/L     Narrative:      GFR Normal >60  Chronic Kidney Disease <60  Kidney Failure <15      Troponin [[id number]]  (Normal) Collected: 08/16/21 1726    Specimen: Blood Updated: 08/16/21 1804     Troponin T <0.010 ng/mL     Narrative:      Troponin T Reference Range:  <= 0.03 ng/mL-   Negative for AMI  >0.03 ng/mL-     Abnormal for myocardial necrosis.  Clinicians would have to utilize clinical acumen, EKG, Troponin and serial changes to determine if it is an Acute Myocardial Infarction or myocardial injury due to an underlying chronic condition.       Results may be falsely decreased if patient taking Biotin.      CBC & Differential [[id number]]  (Abnormal) Collected: 08/16/21 1726    Specimen: Blood Updated: 08/16/21 1738    Narrative:      The following orders were created for panel order CBC & Differential.  Procedure                               Abnormality         Status                     ---------                               -----------         ------                     CBC Auto Differential[[id number]]        Abnormal            Final result                 Please view results for these tests on the individual orders.    CBC Auto Differential [[id number]]  (Abnormal) Collected: 08/16/21 1726    Specimen: Blood Updated: 08/16/21 1738     WBC 13.00 10*3/mm3      RBC 4.66 10*6/mm3      Hemoglobin 13.4 g/dL      Hematocrit 40.1 %      MCV 86.0 fL      MCH 28.7 pg      MCHC 33.4 g/dL      RDW 17.4 %      RDW-SD 51.6 fl      MPV 8.5 fL      Platelets 258 10*3/mm3      Neutrophil % 78.0 %      Lymphocyte % 13.1 %      Monocyte % 7.5 %      Eosinophil % 0.5 %      Basophil % 0.9 %      Neutrophils, Absolute 10.10 10*3/mm3      Lymphocytes, Absolute 1.70 10*3/mm3      Monocytes,  Absolute 1.00 10*3/mm3      Eosinophils, Absolute 0.10 10*3/mm3      Basophils, Absolute 0.10 10*3/mm3      nRBC 0.1 /100 WBC           Imaging Results (Last 24 Hours)     Procedure Component Value Units Date/Time    CT Upper Extremity Left Without Contrast [[id number]] Collected: 08/17/21 0135     Updated: 08/17/21 0138    Narrative:      EXAMINATION: CT scan left elbow    INDICATION: Evaluate fracture    PROCEDURE: Axial, coronal and sagittal reconstructions were obtained.  CT dose lowering techniques were used, to include: automated exposure control, adjustment for patient size, and or use of iterative reconstruction.    COMPARISON: Radiographs performed earlier the same date.    FINDINGS:    There is a comminuted intra-articular, displaced, predominantly obliquely oriented olecranon fracture. There is also an impacted and comminuted, foreshortened intra-articular radial head fracture with dominant fragments of the radial head displaced into   the anterior and posterior joint space.. There is diffuse soft tissue swelling. There is diffuse soft tissue swelling and a lipohemarthrosis. There is a small coronoid process fracture.      Impression:      1. Comminuted intra-articular obliquely oriented olecranon fracture.  2. Impacted intra-articular radial head fracture.  3. Small coronoid process fracture    Electronically signed by:  Elliot Sen M.D.    8/16/2021 11:37 PM    XR Elbow 2 View Bilateral [[id number]] Collected: 08/16/21 1859     Updated: 08/16/21 2029    Addenda:        2 images of the right elbow and 2 images of the left elbow were  obtained. I was not aware that they were placed on the same series.     The right elbow demonstrates complete dislocation of the radiocapitellar  and ulnar trochlear joints. No definite fracture identified.     The left elbow demonstrates displaced fractures of the olecranon, the  radius with a nondisplaced fracture of the supracondylar distal humerus.      Electronically Signed By-Bertha Wood MD On:8/16/2021 8:27 PM  This report was finalized on [id number] by  Bertha Wood MD.  Signed: 08/16/21 2027 by Bertha Wood MD    Narrative:      XR ELBOW 2 VW BILATERAL-     Date of Exam: 8/16/2021 6:25 PM     Indication: fall.     Comparison: None available.     Technique: Two views of the elbow were obtained.     FINDINGS:  There is a comminuted displaced fracture of the olecranon  with diastases of the fracture fragments measuring approximately 1.3 cm,  best present on the lateral view. A joint effusion is present. A large  butterfly fragment containing the coronoid process appears displaced  anteriorly by approximately 1.1 cm. There is a suspected nondisplaced  supracondylar fracture extending through the distal humerus extending  within the intercondylar notch and the trochlea. The radius appears  grossly intact. Diffuse soft tissue swelling is present. There is  complete dislocation of the ulnar trochlear and radiocapitellar joints.       Impression:         1. Extensively comminuted and displaced fracture of the olecranon as  described above.  2. Suspected nondisplaced supracondylar fracture of the distal humerus  extending through the trochlea and extending within the intercondylar  region.  3. Complete dislocation of the radiocapitellar and ulnar trochlear  joints.     Electronically Signed By-Bertha Wood MD On:8/16/2021 7:01 PM  This report was finalized on [id number] by  Bertha Wood MD.    XR Elbow 2 View Right [[id number]] Collected: 08/16/21 2024     Updated: 08/16/21 2028    Narrative:      XR ELBOW 2 VW RIGHT-     Date of Exam: 8/16/2021 8:16 PM     Indication: post reduction.     Comparison: Radiograph 08/16/2021     Technique: Two views of the elbow were obtained.     FINDINGS:  There has been interval reduction of the previously described  dislocation of the right elbow. No fracture identified. Diffuse soft  tissue swelling is present with a joint  effusion.       Impression:      Interval reduction of right elbow dislocation. No fracture identified.     Electronically Signed By-Bertha Wood MD On:8/16/2021 8:25 PM  This report was finalized on [id number] by  Bertha Wood MD.    CT Cervical Spine Without Contrast [[id number]] Collected: 08/16/21 1903     Updated: 08/16/21 1906    Narrative:      DATE OF EXAM:  8/16/2021 5:53 PM     PROCEDURE:  CT CERVICAL SPINE WO CONTRAST-     INDICATIONS:   fall     COMPARISON:   No Comparisons Available     TECHNIQUE:  Routine transaxial slices were obtained through the cervical spine  without the administration of intravenous contrast. Reconstructed  coronal and sagittal images were also obtained. Automated exposure  control and iterative construction methods were used.      FINDINGS:  No evidence of fracture. No evidence of compression deformity. No  evidence of significant spondylolisthesis or acute subluxation. The  prevertebral soft tissues appear within normal limits. The surrounding  soft tissues appear grossly unremarkable. The lung apices appear within  normal limits. No evidence of adenopathy. The visualized portions of the  thyroid gland appears grossly unremarkable. Mild degenerative changes  are present throughout the spine. There is no evidence of significant  bony canal stenosis. No evidence of significant or severe bony neural  foraminal narrowing. MRI is more sensitive modality for evaluation of  degenerative change.       Impression:      No acute osseous abnormality.     Electronically Signed By-Bertha Wood MD On:8/16/2021 7:04 PM  This report was finalized on [id number] by  Bertha Wood MD.    XR Chest 1 View [[id number]] Collected: 08/16/21 1902     Updated: 08/16/21 1905    Narrative:      Examination: XR CHEST 1 VW-     Date of Exam: 8/16/2021 6:20 PM     Indication: syncope.     Comparison: Radiograph 01/18/2021     Technique: 1 view of the chest      Findings:  There are no airspace  consolidations. No pleural effusions. No  pneumothorax. The heart size is normal. The pulmonary vasculature  appears within normal limits. Chronic interstitial changes are present  bilaterally. Median sternotomy wires are present. No acute osseous  abnormality identified.       Impression:      No acute cardiopulmonary process.     Electronically Signed By-Bertha Wood MD On:8/16/2021 7:03 PM  This report was finalized on [id number] by  Bertha Wood MD.    XR Forearm 2 View Bilateral [[id number]] Collected: 08/16/21 1901     Updated: 08/16/21 1904    Narrative:      DATE OF EXAM:  8/16/2021 6:54 PM     PROCEDURE:  XR FOREARM 2 VW BILATERAL-, XR WRIST 3+ VW BILATERAL-     INDICATIONS:  fall     COMPARISON:  No Comparisons Available     TECHNIQUE:   A minimum of two routine standard radiographic views were obtained of  the bilateral forearms. 3 views of the left wrist, 3 views of the right  wrist     FINDINGS:  The remaining portions of the forearm appear intact. No additional  fracture identified.  Left wrist: No evidence of fracture. No evidence of dislocation.     Right wrist: No evidence of fracture. No evidence of dislocation. No  focal soft tissue abnormality is identified.       Impression:      No acute osseous abnormality of the remaining forearm or the bilateral  wrists.     Electronically Signed By-Bertha Wood MD On:8/16/2021 7:02 PM  This report was finalized on [id number] by  Bertha Wood MD.    XR Wrist 3+ View Bilateral [[id number]] Collected: 08/16/21 1901     Updated: 08/16/21 1904    Narrative:      DATE OF EXAM:  8/16/2021 6:54 PM     PROCEDURE:  XR FOREARM 2 VW BILATERAL-, XR WRIST 3+ VW BILATERAL-     INDICATIONS:  fall     COMPARISON:  No Comparisons Available     TECHNIQUE:   A minimum of two routine standard radiographic views were obtained of  the bilateral forearms. 3 views of the left wrist, 3 views of the right  wrist     FINDINGS:  The remaining portions of the forearm appear  intact. No additional  fracture identified.  Left wrist: No evidence of fracture. No evidence of dislocation.     Right wrist: No evidence of fracture. No evidence of dislocation. No  focal soft tissue abnormality is identified.       Impression:      No acute osseous abnormality of the remaining forearm or the bilateral  wrists.     Electronically Signed By-Bertha Wood MD On:8/16/2021 7:02 PM  This report was finalized on [id number] by  Bertha Wood MD.    CT Facial Bones Without Contrast [[id number]] Collected: 08/16/21 1825     Updated: 08/16/21 1828    Narrative:         DATE OF EXAM:   8/16/2021 5:53 PM     PROCEDURE:  CT FACIAL BONES WO CONTRAST-     INDICATIONS:   fall     COMPARISON:   No Comparisons Available     TECHNIQUE:   Routine transaxial slices were obtained through paranasal sinuses  without the administration of intravenous contrast. Reconstructed  coronal and sagittal images were also obtained. Automated exposure  controls and iterative reconstruction methods were used.     FINDINGS:   No evidence of fracture. No. No air-fluid levels identified within the  paranasal sinuses. The visualized orbits appear grossly unremarkable. No  sizable focal soft tissue hematoma identified. Mild soft tissue swelling  is seen overlying the forehead region. The patient is edentulous.       Impression:       No acute osseous abnormality.     Electronically Signed By-Bertha Wood MD On:8/16/2021 6:26 PM  This report was finalized on [id number] by  Bertha Wood MD.    CT Head Without Contrast [[id number]] Collected: 08/16/21 1820     Updated: 08/16/21 1826    Narrative:      CT HEAD WO CONTRAST-     Date of Exam: 8/16/2021 5:53 PM     Indication: syncope, fall.     Comparison: None available.     Technique:  Without contrast, contiguous axial CT images of the head  were obtained from skull base to vertex.  Coronal and sagittal  reconstructions were performed.  Automated exposure control and  iterative  reconstruction methods were used.     FINDINGS  No evidence of intracranial hemorrhage, mass, or midline shift. The  ventricles appear normal in size for the patient's age. No extra-axial  collections identified. The gray white matter differentiation is intact.  No air-fluid levels identified within the paranasal sinuses. The  extra-axial structures demonstrate no acute process.       Impression:      No evidence of hemorrhage, mass effect or midline shift. No acute  process identified.     Electronically Signed By-Bertha Wood MD On:8/16/2021 6:21 PM  This report was finalized on [id number] by  Bertha Wood MD.            Assessment/Plan     Right open elbow dislocation  Plan incision and drainage.  Remain on antibiotics for 72 hours if unstable then may apply external fixator with possible later ligamentous repair by sports medicine.  Patient understands the risks.  These include bleeding, infection, damage to nerves or vessels, malunion, nonunion, possible need for further surgery, pain, and risk of medical or anesthetic complications including risk of death.      Left complex terrible triad of the elbow fracture  Have discussed with Dr. Mcdonough.  He will consult on the patient and discussed ORIF and radial head replacement.      Kyler August MD  08/17/21  09:01 EDT

## 2021-08-17 NOTE — CASE MANAGEMENT/SOCIAL WORK
Discharge Planning Assessment   [place]     Patient Name: Frank Barger  MRN: [id number]  Today's Date: 8/17/2021    Admit Date: 8/16/2021    Discharge Needs Assessment     Row Name 08/17/21 1358       Living Environment    Lives With  spouse    Current Living Arrangements  home/apartment/condo    Primary Care Provided by  self    Provides Primary Care For  no one    Family Caregiver if Needed  spouse    Quality of Family Relationships  helpful    Able to Return to Prior Arrangements  yes       Resource/Environmental Concerns    Resource/Environmental Concerns  none    Transportation Concerns  car, none       Transition Planning    Patient/Family Anticipates Transition to  inpatient rehabilitation facility    Patient/Family Anticipated Services at Transition  none    Transportation Anticipated  family or friend will provide       Discharge Needs Assessment    Readmission Within the Last 30 Days  no previous admission in last 30 days    Equipment Currently Used at Home  cane, straight    Concerns to be Addressed  denies needs/concerns at this time    Anticipated Changes Related to Illness  none    Equipment Needed After Discharge  none        Discharge Plan     Row Name 08/17/21 1358       Plan    Plan  D/C Plan: [facility] referral pending accceptance. Will require precert. PASRR per .    Patient/Family in Agreement with Plan  yes    Plan Comments  CM spoke to patient's wife, Carri, on the phone. Patient lives with spouse who assists with some ADLs and transportation. PCP and pharmacy verified-denies any difficulty affording meds. Spouse requests referral to [facility]-referral sent and liaison notified. Spouse denies any further d.c need at this time. Barrier to D/C: R elbow I&D and L elbow ORIF today, PT/OT pending.        Continued Care and Services - Admitted Since 8/16/2021     Destination     Service Provider Request Status Selected Services Address Phone Fax Patient Preferred    [facility]  [facility] [place]  Pending - Request Sent N/A [address] [place] [address] [phone] --              Expected Discharge Date and Time     Expected Discharge Date Expected Discharge Time    Aug 20, 2021         Demographic Summary     Row Name 08/17/21 1357       General Information    Admission Type  inpatient    Arrived From  emergency department    Referral Source  admission list    Reason for Consult  discharge planning    Preferred Language  English     Used During This Interaction  no        Functional Status     Row Name 08/17/21 1357       Functional Status    Usual Activity Tolerance  good    Current Activity Tolerance  good       Functional Status, IADL    Medications  independent    Meal Preparation  independent    Housekeeping  independent    Laundry  independent    Shopping  independent       Mental Status Summary    Recent Changes in Mental Status/Cognitive Functioning  unable to assess    Mental Status Comments  spoke to patient's wife, Carri, on the phone          Patient Forms     Row Name 08/17/21 1400       Patient Forms    Important Message from Medicare (IMM)  Delivered 8/16/21 by registration            Pamela Davison

## 2021-08-17 NOTE — PLAN OF CARE
Pt admitted [facility] s/p syncopal episode resulting in fall and injury at the pt's home. Pt complaining of pain, treated per MAR. Pt to have surgery in AM. Will continue to monitor.     Problem: Adult Inpatient Plan of Care  Goal: Plan of Care Review  Outcome: Ongoing, Not Progressing  Flowsheets (Taken 8/17/2021 3670)  Progress: no change  Plan of Care Reviewed With: patient

## 2021-08-17 NOTE — OP NOTE
INCISION / DEBRIDEMENT BONE ELBOW  Procedure Report    Patient Name:  Frank Barger  YOB: 1970    Date of Surgery:  8/17/2021         Pre-op Diagnosis: Right grade 1 open elbow dislocation    Postop diagnosis: Same    Indication: 51-year-old white male who had a syncopal episode fell sustaining right grade 1 open elbow dislocation as well as a left terrible triad of the elbow fracture.  Is indicated to wash this out and possibly stabilize with unstable      Procedure/CPT® Codes:      Procedure(s):  Irrigation of, closed reduction right elbow dislocation and closed reduction and splinting  Staff:  Surgeon(s):  Kyler August MD         Anesthesia: General    Estimated Blood Loss: 0    Implants:    Nothing was implanted during the procedure    Specimen:          0        Findings: 1 cm medial laceration along medial epicondyle.  The elbow would dislocate at about 70 degrees of flexion    Complications: None      Description of Procedure: Patient was seen in the holding room.  Identified the right elbow is correct.  There is initialed by me.  He is taken the OR where timeout form had general anesthesia.  Was positioned supine.  Sterile prep and drape of the right upper extremity.  The 1 cm incision was large to about a 2 cm incision with disease shaped incision the finger could be placed all the way into the anterior elbow and medial elbow.  The urology tubing was placed into the wound and was allowed to gravity flow irrigation with 3 L saline washing out the wound.  No excision of tissue was done.  The wound was then closed loosely with 3-0 nylon interrupted suture and sterile dressings applied.  Was checked to make sure.  Reduced on fluoroscopy and then placed in a 90degree posterior splint with a sidebar.  Postreduction C-arm showed fairly good reduction with mild sagging of the ulnohumeral joint.    Plan: Patient be on 72 hours of Kefzol.  Will obtain a x-ray in recovery.  Plan on weekly  x-rays.  Plan on 3 weeks of splinting if patient fails to maintain reduction and require referral to sports medicine specialist for reconstruction.  Discussed this with Dr. Schmitz and he does not want to do this.  Dr. Mcdonough will see the patient for his left elbow fracture and discuss ORIF        Kyler August MD     Date: 8/17/2021  Time: 10:29 EDT

## 2021-08-17 NOTE — THERAPY EVALUATION
Patient Name: Frank Barger  : 1970    MRN: [id number]                              Today's Date: 2021       Admit Date: 2021    Visit Dx:     ICD-10-CM ICD-9-CM   1. Closed fracture of left elbow, initial encounter  S42.402A 812.40   2. Dislocation of elbow, right, open, initial encounter  S53.104A 832.10    S51.001A    3. Syncope, unspecified syncope type  R55 780.2   4. Elbow dislocation, right, initial encounter  S53.104A 832.00   5. Olecranon fracture, left, closed, initial encounter  S52.022A 813.01   6. Closed fracture of proximal end of left radius, unspecified fracture morphology, initial encounter  S52.102A 813.07     Patient Active Problem List   Diagnosis   • Atherosclerosis of coronary artery bypass graft   • Essential hypertension   • Chronic pain disorder   • Coronary artery disease   • Degeneration of intervertebral disc of lumbar region   • Fibromyositis   • Mixed hyperlipidemia   • Neuropathic pain   • Posttraumatic stress disorder   • Psoriasis   • Psoriatic arthritis (CMS/HCC)   • Environmental and seasonal allergies   • Personal history of tobacco use, presenting hazards to health   • Status post coronary artery bypass graft   • Gastroesophageal reflux disease without esophagitis   • Tinnitus of left ear   • Intractable migraine without aura and with status migrainosus   • Cerumen debris on tympanic membrane of both ears   • Bilateral deafness   • Moderate major depression (CMS/HCC)   • Ischemic heart disease due to coronary artery obstruction (CMS/HCC)   • Status post angioplasty with stent   • Dyslipidemia   • Abnormal nuclear stress test   • Chest pain with high risk of acute coronary syndrome   • Iron deficiency anemia   • Arthropathy of lumbar facet joint   • Arthropathy of right knee   • Syncope and collapse   • History of scoliosis   • COPD (chronic obstructive pulmonary disease) (CMS/HCC)   • Dislocation of elbow, right, open, initial encounter   • Fracture of left  "elbow     Past Medical History:   Diagnosis Date   • Abdominal pain    • Allergies     takes allergy injections   • Anesthesia complication     states heart \"fluttering\" after anesthesia x 1- was kept in hospital 3 days s/p heart stent placement   • Anxiety    • Arthritis    • Asthma    • Chronic back pain    • Constipation 06/2021   • DDD (degenerative disc disease), lumbar    • Depression    • Emphysema of lung (CMS/HCC)    • GERD (gastroesophageal reflux disease)    • Headache    • Leg pain    • Lumbar spine pain    • Nocturia    • PTSD (post-traumatic stress disorder)    • Under care of pain management specialist     pain management of [place]     Past Surgical History:   Procedure Laterality Date   • CARDIAC CATHETERIZATION N/A 1/20/2021    Procedure: Left Heart Cath and coronary angiogram;  Surgeon: Jaimie Silva MD;  Location:  [facility];  Service: Cardiovascular;  Laterality: N/A;   • CARDIAC CATHETERIZATION N/A 1/20/2021    Procedure: Saphenous Vein Graft;  Surgeon: Jaimie Silva MD;  Location:  [facility];  Service: Cardiovascular;  Laterality: N/A;   • CARDIAC CATHETERIZATION N/A 1/20/2021    Procedure: Stent EBENEZER coronary;  Surgeon: Herve Rodriguez MD;  Location:  [facility];  Service: Cardiology;  Laterality: N/A;   • CORONARY ANGIOPLASTY WITH STENT PLACEMENT  04/2014    2 in 2014, 1 in 12/2020   • CORONARY ARTERY BYPASS GRAFT  12/2015    x 3     General Information     Row Name 08/17/21 1632          OT Time and Intention    Document Type  evaluation  -SR     Mode of Treatment  occupational therapy  -SR     Row Name 08/17/21 1632          General Information    Patient Profile Reviewed  yes  -SR     Row Name 08/17/21 1632          Occupational Profile    Reason for Services/Referral (Occupational Profile)  Pt is 52 yo male admitted for fall after syncopal episode.  Pt d/o right elbow dislocation, left elbow fx.  Pt now s/p irrigation " of closed reduction right elbow dislocation and closed reduction/splinting right elbow on 8/17/2021.  Further orthopedic consult pending for left UE.  CT head, CT C-spine, and CT facial bones (-) acute changes.  -SR     Successful Occupations (Occupational Profile)  Pt typically independent, though sometimes uses AD due to LE giving out.  Lives with wife who does not work.  -SR     Row Name 08/17/21 1632          Living Environment    Lives With  spouse  -SR     Row Name 08/17/21 1632          Cognition    Orientation Status (Cognition)  oriented x 3  -SR     Row Name 08/17/21 1632          Safety Issues, Functional Mobility    Impairments Affecting Function (Mobility)  balance;postural/trunk control;endurance/activity tolerance;strength;range of motion (ROM);pain  -SR       User Key  (r) = Recorded By, (t) = Taken By, (c) = Cosigned By    Initials Name Provider Type    SR Palmira Proctor, OT Occupational Therapist          Mobility/ADL's     Row Name 08/17/21 1632          Transfers    Comment (Transfers)  Requires assist to stabilize L elbow during transfer  -SR     Sit-Stand [place] (Transfers)  minimum assist (75% patient effort)  -SR     Row Name 08/17/21 1633          Activities of Daily Living    BADL Assessment/Intervention  feeding  -SR     Row Name 08/17/21 1633          Self-Feeding Assessment/Training    Comment (Feeding)  Pt able to bring drink to L arm due to RUE in splint, and able to bring mouth to straw.  Reports he was unable to feed himself due to bilateral splints.  -SR       User Key  (r) = Recorded By, (t) = Taken By, (c) = Cosigned By    Initials Name Provider Type    SR Palmira Proctor OT Occupational Therapist        Obj/Interventions     Row Name 08/17/21 1634          Range of Motion Comprehensive    General Range of Motion  upper extremity range of motion deficits identified  -SR     Comment, General Range of Motion  Bilateral elbows splinted.  Fair movement in R  shoulder, though LUE limtied due to pain.  -SR     Row Name 08/17/21 1634          Strength Comprehensive (MMT)    Comment, General Manual Muscle Testing (MMT) Assessment  Unable to formally test due to pain and splinting  -SR     Row Name 08/17/21 1634          Balance    Static Sitting Balance  WFL  -SR     Static Standing Balance  mild impairment  -SR     Comment, Balance  Attempted to take BP, though second reading while standing (with cuff on calf) provided extreemly high BP, unsure of accuracy.  -SR       User Key  (r) = Recorded By, (t) = Taken By, (c) = Cosigned By    Initials Name Provider Type    SR Palmira Proctor, OT Occupational Therapist        Goals/Plan     Row Name 08/17/21 1640          Transfer Goal 1 (OT)    Activity/Assistive Device (Transfer Goal 1, OT)  transfers, all  -SR     [place] Level/Cues Needed (Transfer Goal 1, OT)  supervision required  -SR     Time Frame (Transfer Goal 1, OT)  2 weeks  -SR     Row Name 08/17/21 1640          Self-Feeding Goal 1 (OT)    Activity/Device (Self-Feeding Goal 1, OT)  self-feeding skills, all  -SR     [place] Level/Cues Needed (Self-Feeding Goal 1, OT)  minimum assist (75% or more patient effort)  -SR     Time Frame (Self-Feeding Goal 1, OT)  2 weeks  -SR     Row Name 08/17/21 1640          Therapy Assessment/Plan (OT)    Planned Therapy Interventions (OT)  activity tolerance training;BADL retraining;functional balance retraining;IADL retraining;occupation/activity based interventions  -SR       User Key  (r) = Recorded By, (t) = Taken By, (c) = Cosigned By    Initials Name Provider Type    SR Palmira Proctor, OT Occupational Therapist        Clinical Impression     Row Name 08/17/21 1636          Plan of Care Review    Outcome Summary  Pt is 52 yo male admitted for fall after syncopal episode.  Pt d/o right elbow dislocation, left elbow fx.  Pt now s/p irrigation of closed reduction right elbow dislocation and closed  reduction/splinting right elbow on 8/17/2021.  Further orthopedic consult pending for left UE.  CT head, CT C-spine, and CT facial bones (-) acute changes. PT reported orthostatic hypotension with standing, though unable to obtain accurate BP reading this date while standing.  Pt requires min assist for trasnfers and requires assist for all ADLs due to BUE in splints.  OT recommends IP rehab at discharge to improve independence before returning home.  -SR     Row Name 08/17/21 1636          Therapy Assessment/Plan (OT)    Rehab Potential (OT)  good, to achieve stated therapy goals  -SR     Criteria for Skilled Therapeutic Interventions Met (OT)  yes  -SR     Therapy Frequency (OT)  5 times/wk  -SR     Predicted Duration of Therapy Intervention (OT)  Until discharge  -SR     Row Name 08/17/21 1636          Therapy Plan Review/Discharge Plan (OT)    Anticipated Discharge Disposition (OT)  inpatient rehabilitation facility  -SR     Row Name 08/17/21 1636          Positioning and Restraints    Pre-Treatment Position  sitting in chair/recliner  -SR     Post Treatment Position  chair  -SR     In Chair  sitting;call light within reach;encouraged to call for assist;exit alarm on  -SR       User Key  (r) = Recorded By, (t) = Taken By, (c) = Cosigned By    Initials Name Provider Type    SR Palmira Proctor, OT Occupational Therapist        Outcome Measures     Row Name 08/17/21 1615          How much help from another person do you currently need...    Turning from your back to your side while in flat bed without using bedrails?  2  -HC     Moving from lying on back to sitting on the side of a flat bed without bedrails?  2  -HC     Moving to and from a bed to a chair (including a wheelchair)?  2  -HC     Standing up from a chair using your arms (e.g., wheelchair, bedside chair)?  2  -HC     Climbing 3-5 steps with a railing?  2  -HC     To walk in hospital room?  2  -HC     AM-PAC 6 Clicks Score (PT)  12  -HC     [place]  Name 08/17/21 1615          Modified Mills Scale    Modified Mills Scale  4 - Moderately severe disability.  Unable to walk without assistance, and unable to attend to own bodily needs without assistance.  -HC     Row Name 08/17/21 1615          Functional Assessment    Outcome Measure Options  AM-PAC 6 Clicks Basic Mobility (PT);Modified Mills  -HC       User Key  (r) = Recorded By, (t) = Taken By, (c) = Cosigned By    Initials Name Provider Type    HC Nehal Reyes, PT Physical Therapist          Occupational Therapy Education                 Title: PT OT SLP Therapies (In Progress)     Topic: Occupational Therapy (In Progress)     Point: ADL training (Not Started)     Description:   Instruct learner(s) on proper safety adaptation and remediation techniques during self care or transfers.   Instruct in proper use of assistive devices.              Learner Progress:  Not documented in this visit.          Point: Home exercise program (Not Started)     Description:   Instruct learner(s) on appropriate technique for monitoring, assisting and/or progressing therapeutic exercises/activities.              Learner Progress:  Not documented in this visit.          Point: Precautions (Not Started)     Description:   Instruct learner(s) on prescribed precautions during self-care and functional transfers.              Learner Progress:  Not documented in this visit.          Point: Body mechanics (Done)     Description:   Instruct learner(s) on proper positioning and spine alignment during self-care, functional mobility activities and/or exercises.              Learning Progress Summary           Patient Acceptance, E,TB, VU by  at 8/17/2021 1641                               User Key     Initials Effective Dates Name Provider Type Discipline     06/16/21 -  Palmira Proctor OT Occupational Therapist OT              OT Recommendation and Plan  Planned Therapy Interventions (OT): activity tolerance training,  BADL retraining, functional balance retraining, IADL retraining, occupation/activity based interventions  Therapy Frequency (OT): 5 times/wk  Plan of Care Review  Outcome Summary: Pt is 52 yo male admitted for fall after syncopal episode.  Pt d/o right elbow dislocation, left elbow fx.  Pt now s/p irrigation of closed reduction right elbow dislocation and closed reduction/splinting right elbow on 8/17/2021.  Further orthopedic consult pending for left UE.  CT head, CT C-spine, and CT facial bones (-) acute changes. PT reported orthostatic hypotension with standing, though unable to obtain accurate BP reading this date while standing.  Pt requires min assist for trasnfers and requires assist for all ADLs due to BUE in splints.  OT recommends IP rehab at discharge to improve independence before returning home.     Time Calculation:   Time Calculation- OT     Row Name 08/17/21 1641 08/17/21 0853          Time Calculation- OT    OT Start Time  1550  -SR  --     OT Stop Time  1610  -SR  --     OT Time Calculation (min)  20 min  -SR  --     Total Timed Code Minutes- OT  0 minute(s)  -SR  --     OT Received On  08/17/21  -SR  --     OT - Next Appointment  08/18/21  -SR  08/18/21  -SR     OT Goal Re-Cert Due Date  08/31/21  -SR  --       User Key  (r) = Recorded By, (t) = Taken By, (c) = Cosigned By    Initials Name Provider Type    SR Palmira Proctor, OT Occupational Therapist        Therapy Charges for Today     Code Description Service Date Service Provider Modifiers Qty    [id number]  OT EVAL MOD COMPLEXITY 3 8/17/2021 Palmira Proctor OT GO 1               Palmira Proctor OT  8/17/2021

## 2021-08-17 NOTE — H&P
[facility]      Patient Name: Frank Barger  : 1970  MRN: [id number]  Primary Care Physician:  Brittni Shell MD  Date of admission: 2021      Subjective      Chief Complaint: Syncope and collapse    History of Present Illness: Frank Barger is a 51 y.o. male who presented to [facility] on 2021 after a syncopal episode this afternoon and injury to his bilateral elbow. He reports he was outside with a friend and got up to get a cola and the next thing he knew he was face down on the concrete. He reports he has had syncopal episodes in the past. Last time was in December prior to his stent placement here. He denies any chest pain, shortness of air or palpitations. He thinks he may have been dizzy prior to the syncopal episode but it happened very fast. Has abrasions to his face and the bridge of his nose. CT head, CT cervical spine and CT facial bones were negative per radiology today. X-ray of the left elbow showed a complete dislocation of the radiocapitellar and ulnar trochlear joints. This was reduced in the emergency department. X-ray of elbow as per radiology also shows extensively comminuted and displaced fracture of the old [place] and suspected nondisplaced supracondylar fracture of the distal humerus on the right side. Orthopedics was consulted from the emergency department. He has a past medical history of coronary artery disease with a CABG in the past and a recent PCI of the mid LAD on 2021. Per review of records there still exists a chronic total occlusion. Given past medical history and syncopal episode, cardiology has been consulted. Opponent was not elevated. Review of records shows EF 50% per echo dated 2021. Sodium is 133. Reports daily tobacco use. Last medical history of chronic pain, anxiety depression and plaque psoriasis he will be admitted for further evaluation and treatment. He reports  "being fully COVID-19 vaccinated.  Review of Systems   Constitutional: Negative.   HENT: Negative.    Eyes: Negative.    Cardiovascular: Positive for syncope.   Respiratory: Negative.    Endocrine: Negative.    Hematologic/Lymphatic: Negative.    Skin: Positive for color change and itching.   Musculoskeletal: Positive for back pain and joint pain.   Gastrointestinal: Negative.    Genitourinary: Negative.    Psychiatric/Behavioral: Negative.    Allergic/Immunologic: Negative.         Personal History     Past Medical History:   Diagnosis Date   • Abdominal pain    • Allergies     takes allergy injections   • Anesthesia complication     states heart \"fluttering\" after anesthesia x 1- was kept in hospital 3 days s/p heart stent placement   • Anxiety    • Arthritis    • Asthma    • Chronic back pain    • Constipation 06/2021   • DDD (degenerative disc disease), lumbar    • Depression    • Emphysema of lung (CMS/HCC)    • GERD (gastroesophageal reflux disease)    • Headache    • Leg pain    • Lumbar spine pain    • Nocturia    • PTSD (post-traumatic stress disorder)    • Under care of pain management specialist     pain management of [place]       Past Surgical History:   Procedure Laterality Date   • CARDIAC CATHETERIZATION N/A 1/20/2021    Procedure: Left Heart Cath and coronary angiogram;  Surgeon: Jaimie Silva MD;  Location: [facility] CATH INVASIVE LOCATION;  Service: Cardiovascular;  Laterality: N/A;   • CARDIAC CATHETERIZATION N/A 1/20/2021    Procedure: Saphenous Vein Graft;  Surgeon: Jaimie Silva MD;  Location: [facility] CATH INVASIVE LOCATION;  Service: Cardiovascular;  Laterality: N/A;   • CARDIAC CATHETERIZATION N/A 1/20/2021    Procedure: Stent EBENEZER coronary;  Surgeon: Herve Rodriguez MD;  Location: [facility] CATH INVASIVE LOCATION;  Service: Cardiology;  Laterality: N/A;   • CORONARY ANGIOPLASTY WITH STENT PLACEMENT  04/2014    2 in 2014, 1 in 12/2020   • CORONARY ARTERY BYPASS GRAFT  12/2015    x 3 "       Family History: family history includes Anxiety disorder in his daughter; Depression in his daughter; Diabetes in his mother; Heart disease in his father. Otherwise pertinent FHx was reviewed and not pertinent to current issue.    Social History:  reports that he has been smoking. He has been smoking about 1.50 packs per day. He has never used smokeless tobacco. He reports that he does not drink alcohol and does not use drugs.    Home Medications:  Prior to Admission Medications     Prescriptions Last Dose Informant Patient Reported? Taking?    amitriptyline (ELAVIL) 75 MG tablet   No No    TAKE 1 TABLET BY MOUTH EVERY NIGHT    ARIPiprazole (ABILIFY) 5 MG tablet   No No    TAKE 1 TABLET BY MOUTH EVERY NIGHT    aspirin (aspirin) 81 MG EC tablet   No No    Take 1 tablet by mouth Daily.    Patient taking differently:  Take 81 mg by mouth Daily. No instructions per [facility].  Will call them    atorvastatin (LIPITOR) 20 MG tablet   No No    Take 1 tablet by mouth Every Night.    buPROPion XL (WELLBUTRIN XL) 150 MG 24 hr tablet   No No    Take 1 tablet by mouth Daily.    clobetasol (TEMOVATE) 0.05 % ointment   No No    Apply  topically to the appropriate area as directed 2 (Two) Times a Day.    clopidogrel (PLAVIX) 75 MG tablet   No No    Take 1 tablet by mouth Daily.    Patient taking differently:  Take 75 mg by mouth Daily. Instructed per [facility] to stop 3 days prior to procedure(pt states ok'd per Dr. Silva)    cyclobenzaprine (FLEXERIL) 10 MG tablet   No No    TAKE 1 TABLET BY MOUTH EVERY NIGHT AT BEDTIME FOR 1 WEEK THEN THREE TIMES DAILY    Patient taking differently:  Take 10 mg by mouth 3 (Three) Times a Day.    ferrous sulfate 325 (65 FE) MG EC tablet   No No    Take 1 tablet by mouth Every Other Day.    loratadine (CLARITIN) 10 MG tablet   Yes No    Take 10 mg by mouth Daily.    metoprolol tartrate (LOPRESSOR) 25 MG tablet   No No    Take 1 tablet by mouth 2 (Two) Times a Day.    naproxen (NAPROSYN) 500 MG tablet    No No    Take 1 tablet by mouth 2 (Two) Times a Day As Needed for Moderate Pain . Take with food!    nitroglycerin (NITROSTAT) 0.4 MG SL tablet   No No    DISSOLVE ONE TABLET UNDER THE TONGUE EVERY 5 MINUTES AS NEEDED FOF CHEST PAIN. MAX 3 DOSES IN 15 MINUTES    NON FORMULARY  Self Yes No    Allergy injections    omeprazole (priLOSEC) 20 MG capsule   No No    Take 1 capsule by mouth Every Morning Before Breakfast.    OXcarbazepine (TRILEPTAL) 600 MG tablet   Yes No    Take 600 mg by mouth 2 (Two) Times a Day. Take dos    prazosin (MINIPRESS) 1 MG capsule   No No    Take 1 capsule by mouth Every Night.    pregabalin (LYRICA) 200 MG capsule   No No    Take 1 capsule by mouth 3 (Three) Times a Day.    SUMAtriptan (IMITREX) 50 MG tablet   No No    TAKE 1 TABLET BY MOUTH AT ONSET OF HEADACHE. MAY REPEAT DOSE 1 TIME IN 2 HOURS IF HEADACHE NOT RELIEVED    Vortioxetine HBr (Trintellix) 20 MG tablet   No No    Take 20 mg by mouth Daily.            Allergies:  Allergies   Allergen Reactions   • Hydrocodone Nausea And Vomiting   • Hydrocodone-Acetaminophen GI Intolerance   • Oxycodone GI Intolerance       Objective      Vitals:   Temp:  [98.2 °F (36.8 °C)] 98.2 °F (36.8 °C)  Heart Rate:  [70-95] 91  Resp:  [14-19] 15  BP: (166-209)/() 173/93    Physical Exam  Constitutional:       Appearance: Normal appearance. He is normal weight.   HENT:      Head: Normocephalic.      Right Ear: External ear normal.      Left Ear: External ear normal.      Nose:      Comments: Abrasion on bridge of nose     Mouth/Throat:      Mouth: Mucous membranes are moist.   Eyes:      Extraocular Movements: Extraocular movements intact.   Cardiovascular:      Rate and Rhythm: Normal rate and regular rhythm.      Pulses: Normal pulses.      Heart sounds: Normal heart sounds.   Pulmonary:      Effort: Pulmonary effort is normal.      Breath sounds: Normal breath sounds.   Abdominal:      Palpations: Abdomen is soft.   Genitourinary:     Comments:  Deferred  Musculoskeletal:      Cervical back: Normal range of motion and neck supple.      Comments: Range of motion bilateral upper extremities limited due to fracture and displacement.   Skin:     General: Skin is warm and dry.      Findings: Lesion present.      Comments: Plaque psoriasis with plaques on forehead, lower back, and abdomen   Neurological:      General: No focal deficit present.      Mental Status: He is alert and oriented to person, place, and time.   Psychiatric:         Mood and Affect: Mood normal.         Behavior: Behavior normal.         Thought Content: Thought content normal.         Judgment: Judgment normal.          Result Review    Result Review:  I have personally reviewed the results from the time of this admission to 8/17/2021 00:46 EDT and agree with these findings:  [x]  Laboratory  [x]  Microbiology  [x]  Radiology  [x]  EKG/Telemetry   []  Cardiology/Vascular   []  Pathology  [x]  Old records  []  Other:  Most notable findings include: X-ray right and left elbow see below, troponin CBC EKG          Assessment/Plan        Active Hospital Problems:  Active Hospital Problems    Diagnosis    • Dislocation of elbow, right, open, initial encounter    • Fracture of left elbow    • Syncope and collapse    • Ischemic heart disease due to coronary artery obstruction (CMS/HCC)      Status post CABG and PCI.  BP goal, <140/90.  Continue Lopressor 25 mg twice daily. Start ACE-I.  LDL at goal, <100.  Continue atorvastatin 20 mg nightly.  Continue dual antiplatelet therapy.  Encouraged smoking cessation.     • COPD (chronic obstructive pulmonary disease) (CMS/HCC)      Formatting of this note might be different from the original.  Chest x-ray suggestive of them patient heavy smoker       Plan:    Dislocation of right elbow, secondary to fall, reduced in ED, orthopedics consulted    Comminuted fracture of the left elbow secondary to fall, immobilized in ED, orthopedics consulted, morphine 4 mg  IV every 4 hours as needed pain    Syncope and collapse with past medical history of coronary artery disease recent PCI in January past medical history of CABG and ischemic heart disease, continuous cardiac monitoring first troponin negative serial troponins cardiology consulted given past medical history hold aspirin, on statin, Plavix, metoprolol tartrate, fall precautions    Leukocytosis WBC 13, may be reactive on cefazolin per orthopedics trend CBC no cough congestion or dysuria    COPD with continued tobacco use, nicotine patch, encourage cessation, stable, no home oxygen    Anxiety depression, on amitriptyline, Wellbutrin, Trileptal, Abilify and Trintellix not ordered as no current prescription pharmacy since June    Migraine headaches, stable no complaints hold Imitrex for now    Chronic pain hold Lyrica and Flexeril for falls, hold NSAID for possible surgery and hold aspirin    GERD on PPI    Essential hypertension on prazosin, Hytrin here as formulary substitute, on metoprolol    Hyperlipidemia on statin    Dietary supplementation on ferrous sulfate    Seasonal allergies on loratadine not reordered    Plaque psoriasis, no home meds    DVT prophylaxis:  Mechanical DVT prophylaxis orders are present.    CODE STATUS:    Code Status: CPR  Medical Interventions (Level of Support Prior to Arrest): Full    Admission Status:  I believe this patient meets inpatient status.    I discussed the patient's findings and my recommendations with patient and significant other at bedside.    This patient has been examined wearing appropriate Personal Protective Equipment     Signature: Electronically signed by CATERINA Gonzalez, 08/17/21, 12:52 AM EDT.

## 2021-08-17 NOTE — DISCHARGE PLACEMENT REQUEST
"Frank Barger (51 y.o. Male)     Date of Birth Social Security Number Address Home Phone MRN    1970  [address] [place] [address] [phone] [id number]    [employer] Mar[employer] Status          Non-Protestant Single       Admission Date Admission Type Admitting Provider Attending Provider Department, Room/Bed    8/16/21 Emergency Crispin Pringle DO [place], [place], DO [facility] SURGICAL INPATIENT, 4131/1    Discharge Date Discharge Disposition Discharge Destination                       Attending Provider: Crispin Pringle DO    Allergies: Hydrocodone, Hydrocodone-acetaminophen, Oxycodone    Isolation: None   Infection: None   Code Status: CPR    Ht: 188 cm (74\")   Wt: 76.6 kg (168 lb 14 oz)    Admission Cmt: None   Principal Problem: None                Active Insurance as of 8/16/2021     Primary Coverage     Payor Plan Insurance Group Employer/Plan Group    ANTHEM MEDICARE REPLACEMENT ANTHEM MEDICARE ADVANTAGE [id number]     Payor Plan Address Payor Plan Phone Number Payor Plan Fax Number Effective Dates    [address] [phone]  1/1/2019 - None Entered    [facility] [phone]       Subscriber Name Subscriber Birth Date Member ID       FRANK BARGER 1970 [id number]           Secondary Coverage     Payor Plan Insurance Group Employer/Plan Group    [place]      Payor Plan Address Payor Plan Phone Number Payor Plan Fax Number Effective Dates    [address]   7/22/2019 - None Entered    [place] [address]       Subscriber Name Subscriber Birth Date Member ID       FRANK BARGER 1970 [id number]                 Emergency Contacts      (Rel.) Home Phone Work Phone Mobile Phone    SHARMILA ELANA (Spouse) -- -- [phone]              "

## 2021-08-17 NOTE — THERAPY EVALUATION
Patient Name: Frank Barger  : 1970    MRN: [id number]                              Today's Date: 2021       Admit Date: 2021    Visit Dx:     ICD-10-CM ICD-9-CM   1. Closed fracture of left elbow, initial encounter  S42.402A 812.40   2. Dislocation of elbow, right, open, initial encounter  S53.104A 832.10    S51.001A    3. Syncope, unspecified syncope type  R55 780.2   4. Elbow dislocation, right, initial encounter  S53.104A 832.00   5. Olecranon fracture, left, closed, initial encounter  S52.022A 813.01   6. Closed fracture of proximal end of left radius, unspecified fracture morphology, initial encounter  S52.102A 813.07     Patient Active Problem List   Diagnosis   • Atherosclerosis of coronary artery bypass graft   • Essential hypertension   • Chronic pain disorder   • Coronary artery disease   • Degeneration of intervertebral disc of lumbar region   • Fibromyositis   • Mixed hyperlipidemia   • Neuropathic pain   • Posttraumatic stress disorder   • Psoriasis   • Psoriatic arthritis (CMS/HCC)   • Environmental and seasonal allergies   • Personal history of tobacco use, presenting hazards to health   • Status post coronary artery bypass graft   • Gastroesophageal reflux disease without esophagitis   • Tinnitus of left ear   • Intractable migraine without aura and with status migrainosus   • Cerumen debris on tympanic membrane of both ears   • Bilateral deafness   • Moderate major depression (CMS/HCC)   • Ischemic heart disease due to coronary artery obstruction (CMS/HCC)   • Status post angioplasty with stent   • Dyslipidemia   • Abnormal nuclear stress test   • Chest pain with high risk of acute coronary syndrome   • Iron deficiency anemia   • Arthropathy of lumbar facet joint   • Arthropathy of right knee   • Syncope and collapse   • History of scoliosis   • COPD (chronic obstructive pulmonary disease) (CMS/HCC)   • Dislocation of elbow, right, open, initial encounter   • Fracture of left  "elbow     Past Medical History:   Diagnosis Date   • Abdominal pain    • Allergies     takes allergy injections   • Anesthesia complication     states heart \"fluttering\" after anesthesia x 1- was kept in hospital 3 days s/p heart stent placement   • Anxiety    • Arthritis    • Asthma    • Chronic back pain    • Constipation 06/2021   • DDD (degenerative disc disease), lumbar    • Depression    • Emphysema of lung (CMS/HCC)    • GERD (gastroesophageal reflux disease)    • Headache    • Leg pain    • Lumbar spine pain    • Nocturia    • PTSD (post-traumatic stress disorder)    • Under care of pain management specialist     pain management of [place]     Past Surgical History:   Procedure Laterality Date   • CARDIAC CATHETERIZATION N/A 1/20/2021    Procedure: Left Heart Cath and coronary angiogram;  Surgeon: Jaimie Silva MD;  Location:  [facility];  Service: Cardiovascular;  Laterality: N/A;   • CARDIAC CATHETERIZATION N/A 1/20/2021    Procedure: Saphenous Vein Graft;  Surgeon: Jaimie Silva MD;  Location:  [facility];  Service: Cardiovascular;  Laterality: N/A;   • CARDIAC CATHETERIZATION N/A 1/20/2021    Procedure: Stent EBENEZER coronary;  Surgeon: Herve Rodriguez MD;  Location:  [facility];  Service: Cardiology;  Laterality: N/A;   • CORONARY ANGIOPLASTY WITH STENT PLACEMENT  04/2014    2 in 2014, 1 in 12/2020   • CORONARY ARTERY BYPASS GRAFT  12/2015    x 3     General Information     Row Name 08/17/21 1606          Physical Therapy Time and Intention    Document Type  evaluation  -HC     Mode of Treatment  physical therapy  -HC     Row Name 08/17/21 1606          General Information    Patient Profile Reviewed  yes  -HC     Prior Level of Function  independent: pt reports wife does not work and can assist as needed  -HC     Existing Precautions/Restrictions  fall Will assume NWB BUEs until further clarified  -HC     Row Name 08/17/21 1606          " Living Environment    Lives With  spouse  -     Row Name 08/17/21 1606          Home Main Entrance    Number of Stairs, Main Entrance  two  -HC     Row Name 08/17/21 1606          Cognition    Orientation Status (Cognition)  oriented x 3  -HC     Row Name 08/17/21 1606          Safety Issues, Functional Mobility    Safety Issues Affecting Function (Mobility)  safety precaution awareness;insight into deficits/self-awareness  -     Impairments Affecting Function (Mobility)  balance;postural/trunk control;endurance/activity tolerance;strength;range of motion (ROM);pain  -HC       User Key  (r) = Recorded By, (t) = Taken By, (c) = Cosigned By    Initials Name Provider Type    HC Nehal Reyes, PT Physical Therapist        Mobility     Row Name 08/17/21 1608          Bed Mobility    Bed Mobility  supine-sit  -HC     Supine-Sit [place] (Bed Mobility)  moderate assist (50% patient effort)  -HC     Row Name 08/17/21 1608          Sit-Stand Transfer    Sit-Stand [place] (Transfers)  moderate assist (50% patient effort)  -     Row Name 08/17/21 1608          Gait/Stairs (Locomotion)    [place] Level (Gait)  moderate assist (50% patient effort)  -     Assistive Device (Gait)  -- HHA on left UE for support  -HC     Distance in Feet (Gait)  20 ft  -HC     Deviations/Abnormal Patterns (Gait)  gait speed decreased  -HC     Comment (Gait/Stairs)  impaired balance, lateral sway, HHA on left UE to support left UE during mobility due to pain, high risk for falls  -     Row Name 08/17/21 1608          Mobility    Extremity Weight-bearing Status  right upper extremity;left upper extremity  -HC     Left Upper Extremity (Weight-bearing Status)  non weight-bearing (NWB) WIll assume NWB until further clarified  -HC     Right Upper Extremity (Weight-bearing Status)  non weight-bearing (NWB) WIll assume NWB until further clarified  -HC       User Key  (r) = Recorded By, (t) = Taken By, (c) = Cosigned By     Initials Name Provider Type     Nehal Reyes, PT Physical Therapist        Obj/Interventions     Row Name 08/17/21 1610          Range of Motion Comprehensive    Comment, General Range of Motion  BLEs WFL  -HC     Row Name 08/17/21 1610          Strength Comprehensive (MMT)    Comment, General Manual Muscle Testing (MMT) Assessment  BLEs grossly 4/5  -HC     Row Name 08/17/21 1610          Balance    Balance Assessment  sitting static balance;sitting dynamic balance;standing static balance;standing dynamic balance  -HC     Static Sitting Balance  WFL  -HC     Dynamic Sitting Balance  mild impairment  -HC     Static Standing Balance  mild impairment  -HC     Dynamic Standing Balance  moderate impairment  -HC       User Key  (r) = Recorded By, (t) = Taken By, (c) = Cosigned By    Initials Name Provider Type     Nehal Reyes, PT Physical Therapist        Goals/Plan     Row Name 08/17/21 1614          Bed Mobility Goal 1 (PT)    Activity/Assistive Device (Bed Mobility Goal 1, PT)  bed mobility activities, all  -HC     [place] Level/Cues Needed (Bed Mobility Goal 1, PT)  contact guard assist  -HC     Time Frame (Bed Mobility Goal 1, PT)  2 weeks  -HC     Row Name 08/17/21 1614          Transfer Goal 1 (PT)    Activity/Assistive Device (Transfer Goal 1, PT)  transfers, all  -HC     [place] Level/Cues Needed (Transfer Goal 1, PT)  contact guard assist  -HC     Time Frame (Transfer Goal 1, PT)  2 weeks  -HC     Row Name 08/17/21 1614          Gait Training Goal 1 (PT)    Activity/Assistive Device (Gait Training Goal 1, PT)  gait (walking locomotion)  -HC     [place] Level (Gait Training Goal 1, PT)  contact guard assist  -HC     Distance (Gait Training Goal 1, PT)  150 ft  -HC     Time Frame (Gait Training Goal 1, PT)  2 weeks  -HC       User Key  (r) = Recorded By, (t) = Taken By, (c) = Cosigned By    Initials Name Provider Type     Nehal Reyes, PT Physical Therapist        Clinical  Impression     Row Name 08/17/21 1610          Pain    Additional Documentation  -- Pt reports 2/10 pain right UE and 10/10 pain left UE.  RN aware of pain  -     Row Name 08/17/21 1610          Plan of Care Review    Outcome Summary  Pt is 50 yo male admitted for fall after syncopal episode.  Pt d/o right elbow dislocation, left elbow fx.  Pt now s/p irrigation of closed reduction right elbow dislocation and closed reduction/splinting right elbow on 8/17/2021.  Further orthopedic consult pending for left UE.  CT head, CT C-spine, and CT facial bones (-) acute changes.  PT will assume NWB BUEs until further clarified.  -     Row Name 08/17/21 1610          Therapy Assessment/Plan (PT)    Patient/Family Therapy Goals Statement (PT)  decrease pain, increase mobility  -HC     Rehab Potential (PT)  good, to achieve stated therapy goals  -     Criteria for Skilled Interventions Met (PT)  yes;skilled treatment is necessary  -HC     Predicted Duration of Therapy Intervention (PT)  until d/c  -HC     Row Name 08/17/21 1610          Vital Signs    Pre Systolic BP Rehab  107  -HC     Pre Treatment Diastolic BP  58  -HC     Intra Systolic BP Rehab  156  -HC     Intra Treatment Diastolic BP  102  -HC     Post Systolic BP Rehab  90  -HC     Post Treatment Diastolic BP  73 after ambulating 113/83  -     Row Name 08/17/21 1610          Positioning and Restraints    Pre-Treatment Position  in bed  -HC     Post Treatment Position  chair  -HC     In Chair  notified nsg;call light within reach;encouraged to call for assist;exit alarm on  -HC       User Key  (r) = Recorded By, (t) = Taken By, (c) = Cosigned By    Initials Name Provider Type    HC Nehal Reyes, PT Physical Therapist        Outcome Measures     Row Name 08/17/21 1615          How much help from another person do you currently need...    Turning from your back to your side while in flat bed without using bedrails?  2  -HC     Moving from lying on back to  sitting on the side of a flat bed without bedrails?  2  -HC     Moving to and from a bed to a chair (including a wheelchair)?  2  -HC     Standing up from a chair using your arms (e.g., wheelchair, bedside chair)?  2  -HC     Climbing 3-5 steps with a railing?  2  -HC     To walk in hospital room?  2  -HC     AM-PAC 6 Clicks Score (PT)  12  -     Row Name 08/17/21 1615          Modified [place] Scale    Modified Anisha Scale  4 - Moderately severe disability.  Unable to walk without assistance, and unable to attend to own bodily needs without assistance.  -     Row Name 08/17/21 1615          Functional Assessment    Outcome Measure Options  AM-PAC 6 Clicks Basic Mobility (PT);Modified [place]  -       User Key  (r) = Recorded By, (t) = Taken By, (c) = Cosigned By    Initials Name Provider Type     Nehal Reyes, PT Physical Therapist                       Physical Therapy Education                 Title: PT OT SLP Therapies (In Progress)     Topic: Physical Therapy (In Progress)     Point: Mobility training (In Progress)     Learning Progress Summary           Patient Acceptance, E, NR by  at 8/17/2021 1615                   Point: Precautions (In Progress)     Learning Progress Summary           Patient Acceptance, E, NR by  at 8/17/2021 1615                               User Key     Initials Effective Dates Name Provider Type Discipline     06/16/21 -  Nehal Reyes, PT Physical Therapist PT              PT Recommendation and Plan  Planned Therapy Interventions (PT): balance training, bed mobility training, gait training, neuromuscular re-education, strengthening, stair training, patient/family education, postural re-education, transfer training, home exercise program  Plan of Care Reviewed With: patient  Outcome Summary: Pt is 52 yo male admitted for fall after syncopal episode.  Pt d/o right elbow dislocation, left elbow fx.  Pt now s/p irrigation of closed reduction right elbow dislocation  and closed reduction/splinting right elbow on 8/17/2021.  Further orthopedic consult pending for left UE.  CT head, CT C-spine, and CT facial bones (-) acute changes.  PT will assume NWB BUEs until further clarified.  Pt presents with orthostatic response to mobility and pain limiting mobility.  Pt requiring David-modA to ambulate with HHA on left UE for support due to pain.  Pt far from functional mobility baseline with another possible sx for left UE fx.  At this time due to deficits and assist level needed, PT recommending IP rehab to address deficits.  PT will follow 5x/week while at [facility] and continue to assess d/c needs pending progress.     Time Calculation:   PT Charges     Row Name 08/17/21 1620             Time Calculation    Start Time  1405  -      Stop Time  1434  -      Time Calculation (min)  29 min  -      PT Received On  08/17/21  -      PT - Next Appointment  08/18/21  -      PT Goal Re-Cert Due Date  08/31/21  -         Time Calculation- PT    Total Timed Code Minutes- PT  15 minute(s)  -        User Key  (r) = Recorded By, (t) = Taken By, (c) = Cosigned By    Initials Name Provider Type     Nehal Reyes, PT Physical Therapist        Therapy Charges for Today     Code Description Service Date Service Provider Modifiers Qty    [id number]  PT EVAL MOD COMPLEXITY 3 8/17/2021 Nehal Reyes, PT GP 1    [id number]  GAIT TRAINING EA 15 MIN 8/17/2021 Nehal Reyes, PT GP 1          PT G-Codes  Outcome Measure Options: AM-PAC 6 Clicks Basic Mobility (PT), Modified Anisha  AM-PAC 6 Clicks Score (PT): 12  Modified [place] Scale: 4 - Moderately severe disability.  Unable to walk without assistance, and unable to attend to own bodily needs without assistance.    Nehal Reyes PT  8/17/2021

## 2021-08-17 NOTE — PLAN OF CARE
Problem: Adult Inpatient Plan of Care  Goal: Plan of Care Review  Outcome: Ongoing, Progressing  Flowsheets  Taken 8/17/2021 1616  Plan of Care Reviewed With: patient  Outcome Summary: Pt is 52 yo male admitted for fall after syncopal episode.  Pt d/o right elbow dislocation, left elbow fx.  Pt now s/p irrigation of closed reduction right elbow dislocation and closed reduction/splinting right elbow on 8/17/2021.  Further orthopedic consult pending for left UE.  CT head, CT C-spine, and CT facial bones (-) acute changes.  PT will assume NWB BUEs until further clarified.  Pt presents with orthostatic response to mobility and pain limiting mobility.  Pt requiring David-modA to ambulate with HHA on left UE for support due to pain.  Pt far from functional mobility baseline, however it is anticipate pt will be able to d/c home with 24/7 assist of wife and HHPT pending progress.  PT will follow 5x/week while at [facility].

## 2021-08-17 NOTE — SIGNIFICANT NOTE
08/17/21 0853   Rehab Time/Intention   Session Not Performed   (Pt currently in surgery.  Will follow up tomorrow.)   Recommendation   OT - Next Appointment 08/18/21

## 2021-08-17 NOTE — PROGRESS NOTES
Ortho note:    Received  request from Dr. 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 I will be by to see the patient later this afternoon, however, he will likely require surgical fixation for this injury. We will work on getting this set up for tomorrow afternoon pending clearance and or availability.    Yonis Mcdonough MD, PhD  Orthopaedic & Hand Surgery  [facility]  [phone] - [facility]  [phone] - [facility]

## 2021-08-17 NOTE — CASE MANAGEMENT/SOCIAL WORK
Continued Stay Note  MEGAN Lama     Patient Name: Frank Barger  MRN: [id number]  Today's Date: 8/17/2021    Admit Date: 8/16/2021    Discharge Plan     Row Name 08/17/21 1521       Plan    Plan  D/C Plan: [facility] pending precert. Precert pending PT/OT evals. PASRR per SW.       Expected Discharge Date and Time     Expected Discharge Date Expected Discharge Time    Aug 20, 2021             Pamela Davison

## 2021-08-18 ENCOUNTER — APPOINTMENT (OUTPATIENT)
Dept: GENERAL RADIOLOGY | Facility: HOSPITAL | Age: 51
End: 2021-08-18

## 2021-08-18 ENCOUNTER — ANESTHESIA EVENT (OUTPATIENT)
Dept: PERIOP | Facility: HOSPITAL | Age: 51
End: 2021-08-18

## 2021-08-18 ENCOUNTER — HOSPITAL ENCOUNTER (INPATIENT)
Facility: HOSPITAL | Age: 51
LOS: 2 days | Discharge: HOME | End: 2021-08-20
Attending: EMERGENCY MEDICINE | Admitting: INTERNAL MEDICINE

## 2021-08-18 ENCOUNTER — ANESTHESIA (OUTPATIENT)
Dept: PERIOP | Facility: HOSPITAL | Age: 51
End: 2021-08-18

## 2021-08-18 DIAGNOSIS — S42.402D CLOSED FRACTURE OF LEFT ELBOW WITH ROUTINE HEALING, SUBSEQUENT ENCOUNTER: Primary | ICD-10-CM

## 2021-08-18 DIAGNOSIS — S53.104A: ICD-10-CM

## 2021-08-18 DIAGNOSIS — S51.001A: ICD-10-CM

## 2021-08-18 LAB
ANION GAP SERPL CALCULATED.3IONS-SCNC: 9 MMOL/L (ref 5–15)
BASOPHILS # BLD AUTO: 0.1 10*3/MM3 (ref 0–0.2)
BASOPHILS NFR BLD AUTO: 0.5 % (ref 0–1.5)
BUN SERPL-MCNC: 13 MG/DL (ref 6–20)
BUN/CREAT SERPL: 14.6 (ref 7–25)
CALCIUM SPEC-SCNC: 8.4 MG/DL (ref 8.6–10.5)
CHLORIDE SERPL-SCNC: 105 MMOL/L (ref 98–107)
CO2 SERPL-SCNC: 24 MMOL/L (ref 22–29)
CREAT SERPL-MCNC: 0.89 MG/DL (ref 0.76–1.27)
DEPRECATED RDW RBC AUTO: 53.4 FL (ref 37–54)
EOSINOPHIL # BLD AUTO: 0 10*3/MM3 (ref 0–0.4)
EOSINOPHIL NFR BLD AUTO: 0.1 % (ref 0.3–6.2)
ERYTHROCYTE [DISTWIDTH] IN BLOOD BY AUTOMATED COUNT: 17.6 % (ref 12.3–15.4)
GFR SERPL CREATININE-BSD FRML MDRD: 90 ML/MIN/1.73
GLUCOSE SERPL-MCNC: 132 MG/DL (ref 65–99)
HCT VFR BLD AUTO: 31.4 % (ref 37.5–51)
HGB BLD-MCNC: 10.4 G/DL (ref 13–17.7)
LYMPHOCYTES # BLD AUTO: 1.5 10*3/MM3 (ref 0.7–3.1)
LYMPHOCYTES NFR BLD AUTO: 12.5 % (ref 19.6–45.3)
MCH RBC QN AUTO: 28.6 PG (ref 26.6–33)
MCHC RBC AUTO-ENTMCNC: 33.1 G/DL (ref 31.5–35.7)
MCV RBC AUTO: 86.4 FL (ref 79–97)
MONOCYTES # BLD AUTO: 1.1 10*3/MM3 (ref 0.1–0.9)
MONOCYTES NFR BLD AUTO: 9.6 % (ref 5–12)
NEUTROPHILS NFR BLD AUTO: 77.3 % (ref 42.7–76)
NEUTROPHILS NFR BLD AUTO: 9 10*3/MM3 (ref 1.7–7)
NRBC BLD AUTO-RTO: 0.1 /100 WBC (ref 0–0.2)
PLATELET # BLD AUTO: 196 10*3/MM3 (ref 140–450)
PMV BLD AUTO: 8.6 FL (ref 6–12)
POTASSIUM SERPL-SCNC: 4.1 MMOL/L (ref 3.5–5.2)
RBC # BLD AUTO: 3.63 10*6/MM3 (ref 4.14–5.8)
SODIUM SERPL-SCNC: 138 MMOL/L (ref 136–145)
WBC # BLD AUTO: 11.6 10*3/MM3 (ref 3.4–10.8)

## 2021-08-18 PROCEDURE — 25010000002 DEXAMETHASONE PER 1 MG: Performed by: ANESTHESIOLOGY

## 2021-08-18 PROCEDURE — 25010000002 HYDRALAZINE PER 20 MG: Performed by: NURSE PRACTITIONER

## 2021-08-18 PROCEDURE — 25010000002 CEFAZOLIN PER 500 MG: Performed by: STUDENT IN AN ORGANIZED HEALTH CARE EDUCATION/TRAINING PROGRAM

## 2021-08-18 PROCEDURE — 73070 X-RAY EXAM OF ELBOW: CPT

## 2021-08-18 PROCEDURE — 25010000002 PROPOFOL 10 MG/ML EMULSION: Performed by: NURSE ANESTHETIST, CERTIFIED REGISTERED

## 2021-08-18 PROCEDURE — C1713 ANCHOR/SCREW BN/BN,TIS/BN: HCPCS | Performed by: STUDENT IN AN ORGANIZED HEALTH CARE EDUCATION/TRAINING PROGRAM

## 2021-08-18 PROCEDURE — 25010000002 MIDAZOLAM PER 1 MG: Performed by: ANESTHESIOLOGY

## 2021-08-18 PROCEDURE — 76942 ECHO GUIDE FOR BIOPSY: CPT | Performed by: STUDENT IN AN ORGANIZED HEALTH CARE EDUCATION/TRAINING PROGRAM

## 2021-08-18 PROCEDURE — 25010000002 ONDANSETRON PER 1 MG: Performed by: ANESTHESIOLOGY

## 2021-08-18 PROCEDURE — 25010000002 NEOSTIGMINE 5 MG/5ML SOLUTION: Performed by: ANESTHESIOLOGY

## 2021-08-18 PROCEDURE — 85025 COMPLETE CBC W/AUTO DIFF WBC: CPT | Performed by: NURSE PRACTITIONER

## 2021-08-18 PROCEDURE — 99284 EMERGENCY DEPT VISIT MOD MDM: CPT

## 2021-08-18 PROCEDURE — 0PRJ0JZ REPLACEMENT OF LEFT RADIUS WITH SYNTHETIC SUBSTITUTE, OPEN APPROACH: ICD-10-PCS | Performed by: STUDENT IN AN ORGANIZED HEALTH CARE EDUCATION/TRAINING PROGRAM

## 2021-08-18 PROCEDURE — 25010000002 MORPHINE PER 10 MG: Performed by: NURSE PRACTITIONER

## 2021-08-18 PROCEDURE — 73080 X-RAY EXAM OF ELBOW: CPT

## 2021-08-18 PROCEDURE — C1776 JOINT DEVICE (IMPLANTABLE): HCPCS | Performed by: STUDENT IN AN ORGANIZED HEALTH CARE EDUCATION/TRAINING PROGRAM

## 2021-08-18 PROCEDURE — 25010000002 CEFAZOLIN PER 500 MG: Performed by: NURSE PRACTITIONER

## 2021-08-18 PROCEDURE — 80048 BASIC METABOLIC PNL TOTAL CA: CPT | Performed by: NURSE PRACTITIONER

## 2021-08-18 PROCEDURE — 99232 SBSQ HOSP IP/OBS MODERATE 35: CPT | Performed by: INTERNAL MEDICINE

## 2021-08-18 PROCEDURE — 0PSL04Z REPOSITION LEFT ULNA WITH INTERNAL FIXATION DEVICE, OPEN APPROACH: ICD-10-PCS | Performed by: STUDENT IN AN ORGANIZED HEALTH CARE EDUCATION/TRAINING PROGRAM

## 2021-08-18 PROCEDURE — 25010000002 MORPHINE PER 10 MG: Performed by: STUDENT IN AN ORGANIZED HEALTH CARE EDUCATION/TRAINING PROGRAM

## 2021-08-18 PROCEDURE — 25010000002 KETOROLAC TROMETHAMINE PER 15 MG: Performed by: ANESTHESIOLOGY

## 2021-08-18 PROCEDURE — 76000 FLUOROSCOPY <1 HR PHYS/QHP: CPT

## 2021-08-18 PROCEDURE — 36415 COLL VENOUS BLD VENIPUNCTURE: CPT | Performed by: NURSE PRACTITIONER

## 2021-08-18 PROCEDURE — 99222 1ST HOSP IP/OBS MODERATE 55: CPT | Performed by: INTERNAL MEDICINE

## 2021-08-18 DEVICE — IMPLANTABLE DEVICE: Type: IMPLANTABLE DEVICE | Site: ELBOW | Status: FUNCTIONAL

## 2021-08-18 DEVICE — IMPLANTABLE DEVICE
Type: IMPLANTABLE DEVICE | Site: ELBOW | Status: FUNCTIONAL
Brand: EVOLVE

## 2021-08-18 DEVICE — KWIRE STD/TP 2X152MM: Type: IMPLANTABLE DEVICE | Site: ELBOW | Status: FUNCTIONAL

## 2021-08-18 DEVICE — SCRW M/THRD LK TI 3.5X16MM: Type: IMPLANTABLE DEVICE | Site: ELBOW | Status: FUNCTIONAL

## 2021-08-18 DEVICE — SCRW DIST/ELBW PA NL TI 3.5X20MM: Type: IMPLANTABLE DEVICE | Site: ELBOW | Status: FUNCTIONAL

## 2021-08-18 DEVICE — SCRW M/THRD LK TI 3.5X18MM: Type: IMPLANTABLE DEVICE | Site: ELBOW | Status: FUNCTIONAL

## 2021-08-18 RX ORDER — TERAZOSIN 5 MG/1
5 CAPSULE ORAL NIGHTLY
Status: DISCONTINUED | OUTPATIENT
Start: 2021-08-18 | End: 2021-08-20 | Stop reason: HOSPADM

## 2021-08-18 RX ORDER — ROCURONIUM BROMIDE 10 MG/ML
INJECTION, SOLUTION INTRAVENOUS AS NEEDED
Status: DISCONTINUED | OUTPATIENT
Start: 2021-08-18 | End: 2021-08-18 | Stop reason: SURG

## 2021-08-18 RX ORDER — CETIRIZINE HYDROCHLORIDE 10 MG/1
10 TABLET ORAL DAILY
Status: DISCONTINUED | OUTPATIENT
Start: 2021-08-18 | End: 2021-08-20 | Stop reason: HOSPADM

## 2021-08-18 RX ORDER — DEXAMETHASONE SODIUM PHOSPHATE 4 MG/ML
INJECTION, SOLUTION INTRA-ARTICULAR; INTRALESIONAL; INTRAMUSCULAR; INTRAVENOUS; SOFT TISSUE AS NEEDED
Status: DISCONTINUED | OUTPATIENT
Start: 2021-08-18 | End: 2021-08-18 | Stop reason: SURG

## 2021-08-18 RX ORDER — MORPHINE SULFATE 4 MG/ML
2 INJECTION, SOLUTION INTRAMUSCULAR; INTRAVENOUS EVERY 4 HOURS PRN
Status: DISCONTINUED | OUTPATIENT
Start: 2021-08-18 | End: 2021-08-19

## 2021-08-18 RX ORDER — DROPERIDOL 2.5 MG/ML
1.25 INJECTION, SOLUTION INTRAMUSCULAR; INTRAVENOUS ONCE AS NEEDED
Status: DISCONTINUED | OUTPATIENT
Start: 2021-08-18 | End: 2021-08-18 | Stop reason: HOSPADM

## 2021-08-18 RX ORDER — MIDAZOLAM HYDROCHLORIDE 1 MG/ML
1 INJECTION INTRAMUSCULAR; INTRAVENOUS
Status: DISCONTINUED | OUTPATIENT
Start: 2021-08-18 | End: 2021-08-18 | Stop reason: HOSPADM

## 2021-08-18 RX ORDER — BUPIVACAINE HYDROCHLORIDE 5 MG/ML
INJECTION, SOLUTION EPIDURAL; INTRACAUDAL
Status: COMPLETED | OUTPATIENT
Start: 2021-08-18 | End: 2021-08-18

## 2021-08-18 RX ORDER — FLUMAZENIL 0.1 MG/ML
0.1 INJECTION INTRAVENOUS AS NEEDED
Status: DISCONTINUED | OUTPATIENT
Start: 2021-08-18 | End: 2021-08-18 | Stop reason: HOSPADM

## 2021-08-18 RX ORDER — DEXAMETHASONE SODIUM PHOSPHATE 4 MG/ML
INJECTION, SOLUTION INTRA-ARTICULAR; INTRALESIONAL; INTRAMUSCULAR; INTRAVENOUS; SOFT TISSUE
Status: COMPLETED | OUTPATIENT
Start: 2021-08-18 | End: 2021-08-18

## 2021-08-18 RX ORDER — ATORVASTATIN CALCIUM 20 MG/1
20 TABLET, FILM COATED ORAL NIGHTLY
Status: DISCONTINUED | OUTPATIENT
Start: 2021-08-18 | End: 2021-08-20 | Stop reason: HOSPADM

## 2021-08-18 RX ORDER — PREGABALIN 100 MG/1
200 CAPSULE ORAL 3 TIMES DAILY
Status: DISCONTINUED | OUTPATIENT
Start: 2021-08-18 | End: 2021-08-20 | Stop reason: HOSPADM

## 2021-08-18 RX ORDER — MIDAZOLAM HYDROCHLORIDE 1 MG/ML
INJECTION INTRAMUSCULAR; INTRAVENOUS AS NEEDED
Status: DISCONTINUED | OUTPATIENT
Start: 2021-08-18 | End: 2021-08-18 | Stop reason: SURG

## 2021-08-18 RX ORDER — ONDANSETRON 2 MG/ML
4 INJECTION INTRAMUSCULAR; INTRAVENOUS EVERY 6 HOURS PRN
Status: DISCONTINUED | OUTPATIENT
Start: 2021-08-18 | End: 2021-08-20 | Stop reason: HOSPADM

## 2021-08-18 RX ORDER — CYCLOBENZAPRINE HCL 10 MG
10 TABLET ORAL 3 TIMES DAILY
Status: DISCONTINUED | OUTPATIENT
Start: 2021-08-18 | End: 2021-08-20 | Stop reason: HOSPADM

## 2021-08-18 RX ORDER — DROPERIDOL 2.5 MG/ML
0.62 INJECTION, SOLUTION INTRAMUSCULAR; INTRAVENOUS ONCE AS NEEDED
Status: DISCONTINUED | OUTPATIENT
Start: 2021-08-18 | End: 2021-08-18 | Stop reason: HOSPADM

## 2021-08-18 RX ORDER — SODIUM CHLORIDE 9 MG/ML
100 INJECTION, SOLUTION INTRAVENOUS CONTINUOUS
Status: DISCONTINUED | OUTPATIENT
Start: 2021-08-18 | End: 2021-08-19

## 2021-08-18 RX ORDER — HYDROMORPHONE HCL 110MG/55ML
1 PATIENT CONTROLLED ANALGESIA SYRINGE INTRAVENOUS
Status: DISCONTINUED | OUTPATIENT
Start: 2021-08-18 | End: 2021-08-18 | Stop reason: HOSPADM

## 2021-08-18 RX ORDER — ONDANSETRON 2 MG/ML
INJECTION INTRAMUSCULAR; INTRAVENOUS AS NEEDED
Status: DISCONTINUED | OUTPATIENT
Start: 2021-08-18 | End: 2021-08-18 | Stop reason: SURG

## 2021-08-18 RX ORDER — ONDANSETRON 2 MG/ML
4 INJECTION INTRAMUSCULAR; INTRAVENOUS ONCE AS NEEDED
Status: DISCONTINUED | OUTPATIENT
Start: 2021-08-18 | End: 2021-08-18 | Stop reason: HOSPADM

## 2021-08-18 RX ORDER — HYDROMORPHONE HCL 110MG/55ML
0.5 PATIENT CONTROLLED ANALGESIA SYRINGE INTRAVENOUS
Status: DISCONTINUED | OUTPATIENT
Start: 2021-08-18 | End: 2021-08-18 | Stop reason: HOSPADM

## 2021-08-18 RX ORDER — LABETALOL HYDROCHLORIDE 5 MG/ML
5 INJECTION, SOLUTION INTRAVENOUS
Status: DISCONTINUED | OUTPATIENT
Start: 2021-08-18 | End: 2021-08-18 | Stop reason: HOSPADM

## 2021-08-18 RX ORDER — FERROUS SULFATE TAB EC 324 MG (65 MG FE EQUIVALENT) 324 (65 FE) MG
324 TABLET DELAYED RESPONSE ORAL
Status: DISCONTINUED | OUTPATIENT
Start: 2021-08-18 | End: 2021-08-20 | Stop reason: HOSPADM

## 2021-08-18 RX ORDER — NICOTINE 21 MG/24HR
1 PATCH, TRANSDERMAL 24 HOURS TRANSDERMAL
Status: DISCONTINUED | OUTPATIENT
Start: 2021-08-18 | End: 2021-08-20 | Stop reason: HOSPADM

## 2021-08-18 RX ORDER — PANTOPRAZOLE SODIUM 40 MG/1
40 TABLET, DELAYED RELEASE ORAL
Refills: 1 | Status: DISCONTINUED | OUTPATIENT
Start: 2021-08-18 | End: 2021-08-20 | Stop reason: HOSPADM

## 2021-08-18 RX ORDER — CLOPIDOGREL BISULFATE 75 MG/1
75 TABLET ORAL DAILY
Status: DISCONTINUED | OUTPATIENT
Start: 2021-08-18 | End: 2021-08-20 | Stop reason: HOSPADM

## 2021-08-18 RX ORDER — PROPOFOL 10 MG/ML
VIAL (ML) INTRAVENOUS AS NEEDED
Status: DISCONTINUED | OUTPATIENT
Start: 2021-08-18 | End: 2021-08-18 | Stop reason: SURG

## 2021-08-18 RX ORDER — EPHEDRINE SULFATE 50 MG/ML
5 INJECTION, SOLUTION INTRAVENOUS ONCE AS NEEDED
Status: DISCONTINUED | OUTPATIENT
Start: 2021-08-18 | End: 2021-08-18 | Stop reason: HOSPADM

## 2021-08-18 RX ORDER — ACETAMINOPHEN 650 MG/1
650 SUPPOSITORY RECTAL ONCE AS NEEDED
Status: DISCONTINUED | OUTPATIENT
Start: 2021-08-18 | End: 2021-08-18 | Stop reason: HOSPADM

## 2021-08-18 RX ORDER — NALOXONE HCL 0.4 MG/ML
0.4 VIAL (ML) INJECTION AS NEEDED
Status: DISCONTINUED | OUTPATIENT
Start: 2021-08-18 | End: 2021-08-18 | Stop reason: HOSPADM

## 2021-08-18 RX ORDER — SODIUM CHLORIDE, SODIUM LACTATE, POTASSIUM CHLORIDE, CALCIUM CHLORIDE 600; 310; 30; 20 MG/100ML; MG/100ML; MG/100ML; MG/100ML
100 INJECTION, SOLUTION INTRAVENOUS CONTINUOUS
Status: DISCONTINUED | OUTPATIENT
Start: 2021-08-18 | End: 2021-08-19

## 2021-08-18 RX ORDER — IBUPROFEN 400 MG/1
600 TABLET ORAL ONCE AS NEEDED
Status: DISCONTINUED | OUTPATIENT
Start: 2021-08-18 | End: 2021-08-18 | Stop reason: HOSPADM

## 2021-08-18 RX ORDER — NEOSTIGMINE METHYLSULFATE 5 MG/5 ML
SYRINGE (ML) INTRAVENOUS AS NEEDED
Status: DISCONTINUED | OUTPATIENT
Start: 2021-08-18 | End: 2021-08-18 | Stop reason: SURG

## 2021-08-18 RX ORDER — LIDOCAINE HYDROCHLORIDE AND EPINEPHRINE 10; 10 MG/ML; UG/ML
INJECTION, SOLUTION INFILTRATION; PERINEURAL AS NEEDED
Status: DISCONTINUED | OUTPATIENT
Start: 2021-08-18 | End: 2021-08-18 | Stop reason: HOSPADM

## 2021-08-18 RX ORDER — KETOROLAC TROMETHAMINE 30 MG/ML
INJECTION, SOLUTION INTRAMUSCULAR; INTRAVENOUS AS NEEDED
Status: DISCONTINUED | OUTPATIENT
Start: 2021-08-18 | End: 2021-08-18 | Stop reason: SURG

## 2021-08-18 RX ORDER — LORAZEPAM 2 MG/ML
1 INJECTION INTRAMUSCULAR
Status: DISCONTINUED | OUTPATIENT
Start: 2021-08-18 | End: 2021-08-18 | Stop reason: HOSPADM

## 2021-08-18 RX ORDER — ONDANSETRON 4 MG/1
4 TABLET, FILM COATED ORAL EVERY 6 HOURS PRN
Status: DISCONTINUED | OUTPATIENT
Start: 2021-08-18 | End: 2021-08-20 | Stop reason: HOSPADM

## 2021-08-18 RX ORDER — BUPROPION HYDROCHLORIDE 150 MG/1
150 TABLET ORAL DAILY
Status: DISCONTINUED | OUTPATIENT
Start: 2021-08-18 | End: 2021-08-20 | Stop reason: HOSPADM

## 2021-08-18 RX ORDER — ACETAMINOPHEN 325 MG/1
650 TABLET ORAL ONCE AS NEEDED
Status: DISCONTINUED | OUTPATIENT
Start: 2021-08-18 | End: 2021-08-18 | Stop reason: HOSPADM

## 2021-08-18 RX ORDER — HYDRALAZINE HYDROCHLORIDE 20 MG/ML
10 INJECTION INTRAMUSCULAR; INTRAVENOUS ONCE
Status: COMPLETED | OUTPATIENT
Start: 2021-08-18 | End: 2021-08-18

## 2021-08-18 RX ORDER — LIDOCAINE HYDROCHLORIDE 20 MG/ML
INJECTION, SOLUTION EPIDURAL; INFILTRATION; INTRACAUDAL; PERINEURAL AS NEEDED
Status: DISCONTINUED | OUTPATIENT
Start: 2021-08-18 | End: 2021-08-18 | Stop reason: SURG

## 2021-08-18 RX ORDER — IPRATROPIUM BROMIDE AND ALBUTEROL SULFATE 2.5; .5 MG/3ML; MG/3ML
3 SOLUTION RESPIRATORY (INHALATION) ONCE AS NEEDED
Status: DISCONTINUED | OUTPATIENT
Start: 2021-08-18 | End: 2021-08-18 | Stop reason: HOSPADM

## 2021-08-18 RX ADMIN — BUPROPION HYDROCHLORIDE 150 MG: 150 TABLET, EXTENDED RELEASE ORAL at 08:54

## 2021-08-18 RX ADMIN — MIDAZOLAM 2 MG: 1 INJECTION INTRAMUSCULAR; INTRAVENOUS at 14:46

## 2021-08-18 RX ADMIN — FERROUS SULFATE TAB EC 324 MG (65 MG FE EQUIVALENT) 324 MG: 324 (65 FE) TABLET DELAYED RESPONSE at 08:54

## 2021-08-18 RX ADMIN — ROCURONIUM BROMIDE 10 MG: 10 INJECTION INTRAVENOUS at 14:14

## 2021-08-18 RX ADMIN — DEXAMETHASONE SODIUM PHOSPHATE 4 MG: 4 INJECTION, SOLUTION INTRAMUSCULAR; INTRAVENOUS at 13:10

## 2021-08-18 RX ADMIN — PROPOFOL 200 MG: 10 INJECTION, EMULSION INTRAVENOUS at 13:37

## 2021-08-18 RX ADMIN — METOPROLOL TARTRATE 25 MG: 25 TABLET, FILM COATED ORAL at 21:44

## 2021-08-18 RX ADMIN — SODIUM CHLORIDE 100 ML/HR: 9 INJECTION, SOLUTION INTRAVENOUS at 04:02

## 2021-08-18 RX ADMIN — MORPHINE SULFATE 2 MG: 4 INJECTION INTRAVENOUS at 09:18

## 2021-08-18 RX ADMIN — Medication 5 MG: at 16:55

## 2021-08-18 RX ADMIN — SODIUM CHLORIDE: 9 INJECTION, SOLUTION INTRAVENOUS at 13:36

## 2021-08-18 RX ADMIN — PREGABALIN 200 MG: 100 CAPSULE ORAL at 08:54

## 2021-08-18 RX ADMIN — CETIRIZINE HYDROCHLORIDE 10 MG: 10 TABLET, FILM COATED ORAL at 08:54

## 2021-08-18 RX ADMIN — HYDRALAZINE HYDROCHLORIDE 10 MG: 20 INJECTION INTRAMUSCULAR; INTRAVENOUS at 05:42

## 2021-08-18 RX ADMIN — CYCLOBENZAPRINE 10 MG: 10 TABLET, FILM COATED ORAL at 08:54

## 2021-08-18 RX ADMIN — CLOPIDOGREL BISULFATE 75 MG: 75 TABLET ORAL at 08:54

## 2021-08-18 RX ADMIN — KETOROLAC TROMETHAMINE 30 MG: 30 INJECTION, SOLUTION INTRAMUSCULAR at 16:32

## 2021-08-18 RX ADMIN — AMITRIPTYLINE HYDROCHLORIDE 75 MG: 50 TABLET, FILM COATED ORAL at 21:44

## 2021-08-18 RX ADMIN — ROCURONIUM BROMIDE 10 MG: 10 INJECTION INTRAVENOUS at 15:24

## 2021-08-18 RX ADMIN — PROPOFOL 150 MCG/KG/MIN: 10 INJECTION, EMULSION INTRAVENOUS at 13:41

## 2021-08-18 RX ADMIN — ONDANSETRON 4 MG: 2 INJECTION INTRAMUSCULAR; INTRAVENOUS at 16:32

## 2021-08-18 RX ADMIN — CEFAZOLIN SODIUM 2 G: 10 INJECTION, POWDER, FOR SOLUTION INTRAVENOUS at 04:15

## 2021-08-18 RX ADMIN — MIDAZOLAM 2 MG: 1 INJECTION INTRAMUSCULAR; INTRAVENOUS at 13:05

## 2021-08-18 RX ADMIN — CEFAZOLIN SODIUM 2 G: 1 INJECTION, POWDER, FOR SOLUTION INTRAMUSCULAR; INTRAVENOUS at 13:46

## 2021-08-18 RX ADMIN — BUPIVACAINE HYDROCHLORIDE 30 ML: 5 INJECTION, SOLUTION EPIDURAL; INTRACAUDAL at 13:10

## 2021-08-18 RX ADMIN — TERAZOSIN HYDROCHLORIDE 5 MG: 5 CAPSULE ORAL at 21:44

## 2021-08-18 RX ADMIN — PROPOFOL 150 MCG/KG/MIN: 10 INJECTION, EMULSION INTRAVENOUS at 16:00

## 2021-08-18 RX ADMIN — LIDOCAINE HYDROCHLORIDE 100 MG: 20 INJECTION, SOLUTION EPIDURAL; INFILTRATION; INTRACAUDAL; PERINEURAL at 13:37

## 2021-08-18 RX ADMIN — CYCLOBENZAPRINE 10 MG: 10 TABLET, FILM COATED ORAL at 21:44

## 2021-08-18 RX ADMIN — MORPHINE SULFATE 2 MG: 4 INJECTION INTRAVENOUS at 05:42

## 2021-08-18 RX ADMIN — ROCURONIUM BROMIDE 50 MG: 10 INJECTION INTRAVENOUS at 13:37

## 2021-08-18 RX ADMIN — CEFAZOLIN SODIUM 2 G: 10 INJECTION, POWDER, FOR SOLUTION INTRAVENOUS at 21:44

## 2021-08-18 RX ADMIN — DEXAMETHASONE SODIUM PHOSPHATE 4 MG: 4 INJECTION, SOLUTION INTRA-ARTICULAR; INTRALESIONAL; INTRAMUSCULAR; INTRAVENOUS; SOFT TISSUE at 16:32

## 2021-08-18 RX ADMIN — GLYCOPYRROLATE 0.6 MCG: 0.2 INJECTION, SOLUTION INTRAMUSCULAR; INTRAVITREAL at 16:55

## 2021-08-18 RX ADMIN — ATORVASTATIN CALCIUM 20 MG: 20 TABLET, FILM COATED ORAL at 21:44

## 2021-08-18 RX ADMIN — PREGABALIN 200 MG: 100 CAPSULE ORAL at 21:43

## 2021-08-18 RX ADMIN — MORPHINE SULFATE 2 MG: 4 INJECTION INTRAVENOUS at 21:44

## 2021-08-18 RX ADMIN — PANTOPRAZOLE SODIUM 40 MG: 40 TABLET, DELAYED RELEASE ORAL at 08:54

## 2021-08-18 RX ADMIN — METOPROLOL TARTRATE 25 MG: 25 TABLET, FILM COATED ORAL at 08:54

## 2021-08-18 RX ADMIN — ROCURONIUM BROMIDE 10 MG: 10 INJECTION INTRAVENOUS at 14:45

## 2021-08-18 NOTE — ANESTHESIA PROCEDURE NOTES
Airway  Urgency: elective    Date/Time: 8/18/2021 1:39 PM  Airway not difficult    General Information and Staff    Patient location during procedure: OR  Anesthesiologist: Dmitry Sterling MD  CRNA: Francie Faye CRNA    Indications and Patient Condition  Indications for airway management: airway protection    Preoxygenated: yes  MILS maintained throughout  Mask difficulty assessment: 2 - vent by mask + OA or adjuvant +/- NMBA    Final Airway Details  Final airway type: endotracheal airway      Successful airway: ETT  Cuffed: yes   Successful intubation technique: direct laryngoscopy  Facilitating devices/methods: intubating stylet  Endotracheal tube insertion site: oral  Blade: Oniel  Blade size: 3  ETT size (mm): 7.5  Cormack-Lehane Classification: grade I - full view of glottis  Placement verified by: chest auscultation and capnometry   Measured from: lips  ETT/EBT  to lips (cm): 23  Number of attempts at approach: 1  Assessment: lips, teeth, and gum same as pre-op and atraumatic intubation

## 2021-08-18 NOTE — ED PROVIDER NOTES
"Subjective   51-year-old male admitted here several days ago status post episode of syncope with bilateral elbow injury. Right elbow has been operated on and repair per Dr. August. There was a plan to operate on the left elbow but patient enforced left AGAINST MEDICAL ADVICE several hours ago. Patient apologizes for this mistake and is agreeable to following medical advice going forward. Patient denies any new symptoms          Review of Systems   Constitutional: Negative.    Respiratory: Negative.    Cardiovascular: Negative.    Musculoskeletal:        As per HPI       Past Medical History:   Diagnosis Date   • Abdominal pain    • Allergies     takes allergy injections   • Anesthesia complication     states heart \"fluttering\" after anesthesia x 1- was kept in hospital 3 days s/p heart stent placement   • Anxiety    • Arthritis    • Asthma    • Chronic back pain    • Constipation 06/2021   • DDD (degenerative disc disease), lumbar    • Depression    • Emphysema of lung (CMS/HCC)    • GERD (gastroesophageal reflux disease)    • Headache    • Leg pain    • Lumbar spine pain    • Nocturia    • PTSD (post-traumatic stress disorder)    • Under care of pain management specialist     pain management of [facility]       Allergies   Allergen Reactions   • Hydrocodone Nausea And Vomiting   • Hydrocodone-Acetaminophen GI Intolerance   • Oxycodone GI Intolerance       Past Surgical History:   Procedure Laterality Date   • CARDIAC CATHETERIZATION N/A 1/20/2021    Procedure: Left Heart Cath and coronary angiogram;  Surgeon: Jaimie Silva MD;  Location: [facility] CATH INVASIVE LOCATION;  Service: Cardiovascular;  Laterality: N/A;   • CARDIAC CATHETERIZATION N/A 1/20/2021    Procedure: Saphenous Vein Graft;  Surgeon: Jaimie Silva MD;  Location: [facility] CATH INVASIVE LOCATION;  Service: Cardiovascular;  Laterality: N/A;   • CARDIAC CATHETERIZATION N/A 1/20/2021    Procedure: Stent EBENEZER coronary;  Surgeon: Herve Rodriguez, " MD;  Location: [facility] CATH INVASIVE LOCATION;  Service: Cardiology;  Laterality: N/A;   • CORONARY ANGIOPLASTY WITH STENT PLACEMENT  04/2014    2 in 2014, 1 in 12/2020   • CORONARY ARTERY BYPASS GRAFT  12/2015    x 3       Family History   Problem Relation Age of Onset   • Diabetes Mother    • Heart disease Father    • Anxiety disorder Daughter    • Depression Daughter        Social History     Socioeconomic History   • Marital status: Single     Spouse name: Not on file   • Number of children: Not on file   • Years of education: Not on file   • Highest education level: Not on file   Tobacco Use   • Smoking status: Current Every Day Smoker     Packs/day: 1.50   • Smokeless tobacco: Never Used   • Tobacco comment: do not smoke dos   Vaping Use   • Vaping Use: Never used   Substance and Sexual Activity   • Alcohol use: No   • Drug use: No   • Sexual activity: Defer     Partners: Female           Objective   Physical Exam  Constitutional:       Appearance: Normal appearance.   HENT:      Head:      Comments: Healing abrasions of face, otherwise normocephalic     Mouth/Throat:      Mouth: Mucous membranes are moist.      Pharynx: Oropharynx is clear.   Eyes:      Conjunctiva/sclera: Conjunctivae normal.      Pupils: Pupils are equal, round, and reactive to light.   Cardiovascular:      Rate and Rhythm: Normal rate and regular rhythm.      Heart sounds: Normal heart sounds.   Pulmonary:      Effort: Pulmonary effort is normal.      Breath sounds: Normal breath sounds.   Musculoskeletal:      Comments: Bilateral upper extremities are in splints   Skin:     General: Skin is warm and dry.      Capillary Refill: Capillary refill takes less than 2 seconds.   Neurological:      General: No focal deficit present.      Mental Status: He is alert and oriented to person, place, and time.   Psychiatric:         Mood and Affect: Mood normal.         Behavior: Behavior normal.         Procedures           ED Course                                            MDM    Final diagnoses:   Closed fracture of left elbow with routine healing, subsequent encounter       ED Disposition  ED Disposition     ED Disposition Condition Comment    Decision to Admit            No follow-up provider specified.       Medication List      No changes were made to your prescriptions during this visit.          Kyler Melgoza MD  08/18/21 0325

## 2021-08-18 NOTE — ANESTHESIA PREPROCEDURE EVALUATION
Anesthesia Evaluation     Patient summary reviewed and Nursing notes reviewed   no history of anesthetic complications:  NPO Solid Status: > 8 hours  NPO Liquid Status: > 8 hours           Airway   Dental      Pulmonary    (+) a smoker Current, COPD, asthma,  Cardiovascular     ECG reviewed  PT is on anticoagulation therapy  Patient on routine beta blocker    (+) hypertension, CAD, CABG, angina, hyperlipidemia,       Neuro/Psych  (+) headaches, syncope, psychiatric history PTSD, Anxiety and Depression,     GI/Hepatic/Renal/Endo    (+)  GERD,      Musculoskeletal     (+) back pain, chronic pain, myalgias,   Abdominal    Substance History      OB/GYN          Other   arthritis,      ROS/Med Hx Other: Elbow fx, leg/knee pain, emphysema, DDD, allergies, abd pain, scoliosis, migraines, arthropathy, psoriasis, psoriatic arthritis, neuropathic pain, tinnitus. fibromyositis    Heart fluttering after anesthesia    · Estimated left ventricular EF = 60% Left ventricular systolic function is normal.     Indications  Syncope     Technically satisfactory study.  Mitral valve is structurally normal.  Tricuspid valve is structurally normal.  Aortic valve is structurally normal.  Pulmonic valve could not be well visualized.  No evidence for mitral tricuspid or aortic regurgitation is seen by Doppler study.  Left atrium is normal in size.  Right atrium is normal in size.  Left ventricle is normal in size and contractility with ejection fraction of 60%.  Right ventricle is normal in size.  Atrial septum is intact.  Aorta is normal.  No pericardial effusion or intracardiac thrombus is seen.     Impression  Structurally and functionally normal cardiac valves.  Left ventricular size and contractility is normal with ejection fraction of 60%.    Stress  Lexiscan Cardiolite test is abnormal with mild posterior and inferior proximal ischemia.     Gated SPECT images revealed normal left ventricular size and contractility with ejection  fraction of 55%.    Cath  Findings:     Interventions  successful PCI of the mid LAD with placement of a drug-eluting stent  Xience drug-eluting stent 2.25 x 28 mm  Target Vessel  mid LAD  Pre % Stenosis 80  Post % Stenosis  0  Pre-procedure MERCED flow  3  Post procedure MERCED flow  3  Closure Device  none  Complications  none     Estimated Blood Loss:  Minimal         Complications:  None; patient tolerated the procedure well.           Disposition: PACU - hemodynamically stable.           Condition: stable     Impressions:  Successful PCI of the mid LAD with placement of drug-eluting stent  Attempted crossing of the lesion involving the vein graft but unable to cross the lesion most likely chronic total occlusion     Recommendations:  Aspirin 81 mg p.o. once a day  Plavix 75 mg p.o. once a day  Continued aggressive risk factor modification  Observe patient in PCU bed overnight  Test results reviewed and discussed with the patient and family    1. HEMODYNAMICS:  Left ventricle end-diastolic pressure is normal.  No gradient was noted across aortic valve.     2. LEFT VENTRICULOGRAPHY:  Left ventricle size and contractility is normal with ejection fraction of 60%.  No mitral regurgitation is present.     3. CORONARY ANGIOGRAPHY:  Left main coronary artery is normal.  Left anterior descending artery provided a moderate sized diagonal branch and left anterior descending artery has 80% disease proximal to the LIMA landing site.  Circumflex coronary artery is a moderately large vessel that provided a large marginal branch.  Circumflex coronary artery has 60 to 70% disease just distal to the marginal branch.  Ramus intermedius is a relatively small caliber vessel that has ostial 80 to 90% disease.  Right coronary artery is a large and dominant vessel.  Left ventricular branch has 60 to 70% disease at its ostium.     LIMA to left anterior descending artery is very small and atretic with probably 90 to 95% disease just  proximal to the insertion site.  SVG to diagonal branch is patent.  SVG to presumably ramus intermedius is totally occluded in the proximal segment.     SUMMARY:  Left ventricle size and contractility is normal with ejection fraction of 60%.     Left main coronary artery is normal.  Left anterior descending artery provided a moderate sized diagonal branch and left anterior descending artery has 80% disease proximal to the LIMA landing site.  Circumflex coronary artery is a moderately large vessel that provided a large marginal branch.  Circumflex coronary artery has 60 to 70% disease just distal to the marginal branch.  Ramus intermedius is a relatively small caliber vessel that has ostial 80 to 90% disease.  Right coronary artery is a large and dominant vessel.  Left ventricular branch has 60 to 70% disease at its ostium.     LIMA to left anterior descending artery is very small and atretic with probably 90 to 95% disease just proximal to the insertion site.  SVG to diagonal branch is patent.  SVG to presumably ramus intermedius is totally occluded in the proximal segment.     RECOMMENDATIONS:  Patient has symptoms of unstable angina pectoris.  2 out of 3 grafts are patent.  However patent LIMA is extremely small in caliber and atraumatic with significant disease near the landing site.  Difficult to tell the source of patient's symptoms but likely from left anterior descending artery.  Patient to have intervention to left antidescending artery distal to the diagonal branch and proximal to the LIMA landing site.  Other source of pain could be from circumflex coronary artery left ventricular branch of RCA and ramus intermedius.  Suggest maximize medical treatment for disease in these vessels.  Modification of risk factors.    [facility]                  Anesthesia Plan    ASA 4     general with block   (Patient identified; pre-operative vital signs, all relevant labs/studies, complete medical/surgical/anesthetic history,  full medication list, full allergy list, and NPO status obtained/reviewed; physical assessment performed; anesthetic options, side effects, potential complications, risks, and benefits discussed; questions answered; written anesthesia consent obtained; patient cleared for procedure; anesthesia machine and equipment checked and functioning)  intravenous induction     Anesthetic plan, all risks, benefits, and alternatives have been provided, discussed and informed consent has been obtained with: patient.    Plan discussed with CRNA and CAA.

## 2021-08-18 NOTE — OP NOTE
Orthopaedic & Hand Surgery  Olecranon Fracture Fixation Operative Report  Dr. Yonis Mcdonough  [phone]      PATIENT NAME: Frank Barger  MRN: [id number]  : 1970 AGE: 51 y.o. GENDER: male  DATE OF OPERATION: 2021  PREOPERATIVE DIAGNOSIS:   • Left elbow proximal ulna fracture   • Left elbow comminuted radial head fracture  POSTOPERATIVE DIAGNOSIS:   • Left elbow proximal ulnar fracture in 4 parts:  o Lesser sigmoid notch fragment  o Proximal olecranon fragment  o Large coronoid fragment  o Distal shaft fragment  • Multipart left radial head fracture with comminution  OPERATION PERFORMED:   • Left elbow fracture dislocation fixation consisting of  o Olecranon fracture open reduction internal fixation (CPT 06358)  o Radial head replacement for fracture (CPT 31362)  SURGEON: Yonis Mcdonough MD, PhD  ANESTHESIA: General with Block  ASSISTANT: None  ESTIMATED BLOOD LOSS: <50ml  SPONGE AND NEEDLE COUNT: Correct  INDICATIONS: This patient is noted to have left elbow complex fracture (essentially terrible triad variant) with radial head fracture with comminution and olecranon fracture with significant articular incongruity as a result of syncopal episode. In addition, he sustained an open right elbow dislocation that was treated previously by Dr. August. Given the complex nature of his left elbow injury, Dr. August requested assistance with management. I met with the patient. We discussed conservative and surgical treatment options and surgery was recommended. The risks of surgery were discussed and included Pain, Bleeding, Infection, Complications of anesthesia, Damage to blood vessels, nerves and other surrounding structures, Stiffness, Scar, Incomplete resolution or recurrence of the condition, Further procedures, Nonunion or malunion, Hardware Failure and Unforeseen risks of surgery including stroke, heart stoppage or death. No guarantees were made. Following a thorough discussion, questions  were solicited and answered to his satisfaction. Verbal and written consent were obtained prior to proceeding with surgery.    COMPONENTS:   • Skeletal dynamics olecranon plate and screws  • [facility] radial head replacement (24 mm x 5.5 mm standard)    SPECIMENS:   • None    PERTINENT FINDINGS:   • Following fixation, there was anatomic and concentric reduction of the articular surface  • The elbow could be ranged from extension to flexion without gapping of the fracture fragments and moved there is a stable unit  • Full pronation and supination were achievable without crepitance, clicking or grinding  • Elbow was stable to varus and valgus stress following fixation  • Fluoroscopic imaging revealed no hardware within the joint    • Imaging of the right side was performed and showed persistent reduction of right elbow dislocation following procedure.    TOURNIQUET TIME:   • 117 minutes minutes    DETAILS OF PROCEDURE:  The patient was met in the preoperative area. The site was marked. The consent and H&P were reviewed. The patient was then transferred to the operative suite and placed supine on the operative table. The patient submitted to anesthesia. The patient was then turned into the lateral decubitus position with a foam wedge placed underneath the elbow to create a stable surface. An axillary roll was placed underneath the down torso and all bony prominences were padded. Surgical alcohol was used to thoroughly clean the entire operative extremity. The arm was then prepped in the normal sterile fashion, which included Chloroprep and multiple layers of sterile drapes. A sterile tourniquet was placed on the left upper arm.    A surgical timeout was taken to verify the patient's identity, the surgical side/site, planned procedure and the administration of preoperative antibiotics. Cautery and 3.5-power loupe magnification were used.     The limb was exsanguinated with an Esmarch bandage and the  tourniquet was inflated.    An incision was designed directly posterior over the elbow with a small curvature medially to avoid the tip of the olecranon. Skin was incised sharply with a 15 blade and dissection was taken down through subcutaneous tissue using Bovie pencil to the level of the triceps fascia. Full-thickness flaps were elevated and the tip of the olecranon and the fracture site were exposed. Dissection was then taken distally along the dorsal ridge of the ulna.    The ulnar nerve was identified on the medial aspect of the triceps fascia and was carefully dissected free and isolated with a vessel loop. It was found to be in continuity but contused across the extent of the injury.    We then turned our attention to provisional treatment of the radial head fracture.  By reflecting back the olecranon fragment, we were able to book open the ulnar fracture and see directly in to where the radial head fragments lay. These were removed as well as several comminuted fragments of neck with the use of a rongeur and Kocher. We were able to assemble the fragments of the radial head on the back table and sized them to a 24 mm prosthesis.    Using a canal finder first, followed by successive reamers, we reamed up to a 6.5 mm canal. We then used the planar to create a flush surface on the radial head.    We selected a 24 mm head mated to a 5.5 mm stem and placed the prosthesis in the canal with good fit. We then reduced the radiocapitellar joint.    Olecranon fracture ends were exposed and debrided of hematoma. This allowed us to establish a key edge to reduce the fracture provisionally. This was accomplished with the use of air reduction clamps kept closely on bone between the coronoid fragment and the shaft fragment first which were held provisionally with a K wire. A longitudinal split was made using Bovie through the triceps tendon at the tip of the olecranon to allow for the plate to sit flush on the bone. We  selected a plate from the skeletal dynamics olecranon set. After provisional reduction, lying up the fracture edge, the plate was pinned through a K wire hole proximally across the fracture site. Attention was then turned to the oblong hole distally which was drilled and a cortical screw was placed centrally. Provisional fixation was visualized under C arm and found to be acceptable. In particular, we are able to verify that the radial head replacement set flush and did not overstuff the radiocapitellar joint.    We therefore turned to implantation of the radial head prosthesis. Unfortunately, this necessitated removing the plate and K wires from the prior ulna reduction to allow for access to the radial head without violating the lateral ulnar collateral ligament. The radial head trial was removed and a 24 mm head mated to a 5.5 mm stem prosthesis without offset was placed. Radiocapitellar joint was then again reduced. We are then able to reperform the reduction of the coronoid and shaft fragments of the ulna, also securing the sigmoid notch fragment within this construct. This was held provisionally with K wires. Plate was then placed over the proximal end of the plate against bone and held with a K wire.     We then drilled for a 3.5 millimeter screw in the oblong hole in the shaft fragment. This was followed by drilling the 2 locking olecranon screw holes and placed locking screws subtracting 2 mm off of the measured depth to secure the proximal fragment. Given the comminution, we did not attempt compression to the fracture site, but noted that the cortical read remained acceptable. We therefore proceeded with filling of the distal portion of the plate with additional locking screws followed finally by the homerun screw.    At this point, the fixated fracture was visualized under fluoroscopy and noted to be reduced at the articular surface with a concentric reduction of the joint. Radial head was found to be  acceptably aligned and not overstuffed. No screws were seen to penetrate into the articular surface and all screws were found to be of an acceptable length. The distal portion of the olecranon fragment was slightly more reduced, however, given that the articular surface was in good continuity, we accepted this not reduction given the complexity of the fracture. The elbow was then ranged and no gapping was noted at the fracture site and the elbow moved as a concentric and single unit. There were no bony blocks to pronation or supination, flexion or extension.    To strengthen our construct, 0 Vicryl was passed through suture holes in the plate and passed in a [place] manner up and then down the triceps tendon. This was tied and the suture ends were buried in tendon material.    Skin edges were infiltrated with anesthetic containing epinephrine to assist in hemostasis, and the wound was thoroughly irrigated. tourniquet was released at 117 minutes. Hemostasis was achieved through a combination of direct pressure and Bovie as needed. The skin was closed in layers. A sterile dressing was applied and a posterior slab splint as well as sidebars was placed and secured with an Ace wrap.    Anesthesia was reversed without complication, the patient was transferred to the [facility] and taken to the recovery room in stable condition. Sponge and needle count were reported to me as correct. There were no complications. The patient tolerated the procedure well.    Post Operative Plan:   • Patient will remain in postoperative dressing until 2-week postoperative visit at which point the dressing and staples will be removed.  • Gentle active nonweightbearing range of motion will be allowed at that point in a hinged elbow brace from 60 degrees of extension to terminal flexion.  • Strengthening to begin at 12 weeks postoperatively  • Pain control through a combination of ice, elevation and ibuprofen/Tylenol with judicious use of narcotics  as needed.  • Follow-up in 2 weeks for wound inspection and staple removal. 2 views of the operative elbow at that time.  • Anticipate gradual return of function with alleviation of stiffness at 6 months to a year.    Yonis Mcdonough MD, PhD  Orthopaedic & Hand Surgery  [facility]  [phone] - [place] Office  [phone] - [facility]

## 2021-08-18 NOTE — PLAN OF CARE
Goal Outcome Evaluation:  Plan of Care Reviewed With: patient        Progress: improving  Outcome Summary: Pt did not have any behaviors this shift. Pt c/o pain x1 prn medication given with positive results. Pt scheduled for an ORIF to left arm after lunch.

## 2021-08-18 NOTE — PROGRESS NOTES
Ortho note:    Patient seen in the preoperative holding area and the plan for surgery reviewed with the patient today. He voiced understanding of the nature of his condition, the indicated surgery, the risks and the alternatives. He voiced a desire to proceed with surgery.     We also discussed the fact that he left AMA last night for a cigarette. He noted that when he was at home, his wife refused to get him a glass of water and so he got up and attempted to grab a glass himself. He felt a pop in his right elbow which then spontaneously popped back in as he flexed his elbow up. He is worried that he may have dislocated his right elbow. With respect to smoking. He notes that Nicorette gum makes him nauseated and that the current nicotine patches only make him want to smoke more.    I strongly advised him to cease smoking as well as decrease the consumption of nicotine as much as possible for his overall health but also to promote bone healing. I noted that there is risk of wound breakdown and infection with both elbows which could result in potential amputation at worst or death. I noted that it was critical to have his cooperation for his best healing potential.    With respect to his right elbow, I have reviewed the images that were obtained after his readmission. It reveals an elbow remains concentrically reduced. I will plan to obtain fluoroscopic images at the completion of our case to assure that it is still within joint. If he finds that it remains unstable as it heals, I will defer to Dr. August, regarding further management. If he desires, I am happy to assist in this injury as well and remain available.    Yonis Mcdonough MD, PhD  Orthopaedic & Hand Surgery  [facility]  [phone] - [place] Office  [phone] - [facility]

## 2021-08-18 NOTE — PROGRESS NOTES
"Referring Provider: Ramon Mcgowan MD    Reason for follow-up:     Syncope  Multiple bilateral upper extremity fractures  Status post stent     Patient Care Team:  Brittni Shell MD as PCP - General (Family Medicine)  Jaimie Silva MD as Consulting Physician (Cardiology)    Subjective .      ROS    Since I have last seen him yesterday, the patient has been without any chest discomfort ,shortness of breath, palpitations, dizziness or syncope.  Denies having any headache ,abdominal pain ,nausea, vomiting , diarrhea constipation, loss of weight or loss of appetite.  Denies having any excessive bruising ,hematuria or blood in the stool.    Review of all systems negative except as indicated    History  Past Medical History:   Diagnosis Date   • Abdominal pain    • Allergies     takes allergy injections   • Anesthesia complication     states heart \"fluttering\" after anesthesia x 1- was kept in hospital 3 days s/p heart stent placement   • Anxiety    • Arthritis    • Asthma    • Chronic back pain    • Constipation 06/2021   • DDD (degenerative disc disease), lumbar    • Depression    • Emphysema of lung (CMS/HCC)    • GERD (gastroesophageal reflux disease)    • Headache    • Leg pain    • Lumbar spine pain    • Nocturia    • PTSD (post-traumatic stress disorder)    • Under care of pain management specialist     pain management of [place]       Past Surgical History:   Procedure Laterality Date   • CARDIAC CATHETERIZATION N/A 1/20/2021    Procedure: Left Heart Cath and coronary angiogram;  Surgeon: Jaimie Silva MD;  Location: [facility] CATH INVASIVE LOCATION;  Service: Cardiovascular;  Laterality: N/A;   • CARDIAC CATHETERIZATION N/A 1/20/2021    Procedure: Saphenous Vein Graft;  Surgeon: Jaimie Silva MD;  Location: [facility] CATH INVASIVE LOCATION;  Service: Cardiovascular;  Laterality: N/A;   • CARDIAC CATHETERIZATION N/A 1/20/2021    Procedure: Stent EBENEZER coronary;  Surgeon: Herve Rodriguez MD;  " Location: [facility] CATH INVASIVE LOCATION;  Service: Cardiology;  Laterality: N/A;   • CORONARY ANGIOPLASTY WITH STENT PLACEMENT  04/2014    2 in 2014, 1 in 12/2020   • CORONARY ARTERY BYPASS GRAFT  12/2015    x 3   • ELBOW DEBRIDEMENT Right 8/17/2021    Procedure: INCISION / DEBRIDEMENT ELBOW, closed reduction right elbow dislocation ;  Surgeon: Kyler August MD;  Location: [facility] MAIN OR;  Service: Orthopedics;  Laterality: Right;       Family History   Problem Relation Age of Onset   • Diabetes Mother    • Heart disease Father    • Anxiety disorder Daughter    • Depression Daughter        Social History     Tobacco Use   • Smoking status: Current Every Day Smoker     Packs/day: 1.50   • Smokeless tobacco: Never Used   • Tobacco comment: do not smoke dos   Vaping Use   • Vaping Use: Never used   Substance Use Topics   • Alcohol use: No   • Drug use: No        Medications Prior to Admission   Medication Sig Dispense Refill Last Dose   • amitriptyline (ELAVIL) 75 MG tablet TAKE 1 TABLET BY MOUTH EVERY NIGHT 90 tablet 0    • aspirin (aspirin) 81 MG EC tablet Take 1 tablet by mouth Daily. 90 tablet 1    • atorvastatin (LIPITOR) 20 MG tablet Take 1 tablet by mouth Every Night. 90 tablet 1    • buPROPion XL (WELLBUTRIN XL) 150 MG 24 hr tablet Take 1 tablet by mouth Daily. 90 tablet 1    • clopidogrel (PLAVIX) 75 MG tablet Take 1 tablet by mouth Daily. 90 tablet 1    • cyclobenzaprine (FLEXERIL) 10 MG tablet Take 10 mg by mouth 3 (Three) Times a Day.      • ferrous sulfate 325 (65 FE) MG tablet Take 325 mg by mouth Daily With Breakfast.      • loratadine (CLARITIN) 10 MG tablet Take 10 mg by mouth Daily.      • metoprolol tartrate (LOPRESSOR) 25 MG tablet Take 1 tablet by mouth 2 (Two) Times a Day. 180 tablet 1    • naproxen (NAPROSYN) 500 MG tablet Take 1 tablet by mouth 2 (Two) Times a Day As Needed for Moderate Pain . Take with food! 60 tablet 3    • nitroglycerin (NITROSTAT) 0.4 MG SL tablet Place 0.4 mg under the  tongue Every 5 (Five) Minutes As Needed for Chest Pain. Take no more than 3 doses in 15 minutes.      • NON FORMULARY Allergy injections      • omeprazole (priLOSEC) 20 MG capsule Take 1 capsule by mouth Every Morning Before Breakfast. 90 capsule 1    • prazosin (MINIPRESS) 2 MG capsule Take 1 mg by mouth Every Night.      • pregabalin (LYRICA) 200 MG capsule Take 1 capsule by mouth 3 (Three) Times a Day. 90 capsule 5    • SUMAtriptan (IMITREX) 50 MG tablet TAKE 1 TABLET BY MOUTH AT ONSET OF HEADACHE. MAY REPEAT DOSE 1 TIME IN 2 HOURS IF HEADACHE NOT RELIEVED 10 tablet 0    • ARIPiprazole (ABILIFY) 5 MG tablet TAKE 1 TABLET BY MOUTH EVERY NIGHT 90 tablet 0    • OXcarbazepine (TRILEPTAL) 600 MG tablet Take 600 mg by mouth 2 (Two) Times a Day. Take dos      • Vortioxetine HBr (Trintellix) 20 MG tablet Take 20 mg by mouth Daily. 30 tablet 0        Allergies  Hydrocodone, Hydrocodone-acetaminophen, and Oxycodone    Scheduled Meds:amitriptyline, 75 mg, Oral, Nightly  atorvastatin, 20 mg, Oral, Nightly  buPROPion XL, 150 mg, Oral, Daily  ceFAZolin, 2 g, Intravenous, Q8H  cetirizine, 10 mg, Oral, Daily  clopidogrel, 75 mg, Oral, Daily  cyclobenzaprine, 10 mg, Oral, TID  ferrous sulfate, 324 mg, Oral, Daily With Breakfast  metoprolol tartrate, 25 mg, Oral, BID  nicotine, 1 patch, Transdermal, Q24H  pantoprazole, 40 mg, Oral, QAM AC  pregabalin, 200 mg, Oral, TID  terazosin, 5 mg, Oral, Nightly      Continuous Infusions:sodium chloride, 100 mL/hr, Last Rate: 100 mL/hr (08/18/21 0402)      PRN Meds:.Morphine  •  ondansetron **OR** ondansetron    Objective     VITAL SIGNS  Vitals:    08/18/21 0432 08/18/21 0437 08/18/21 0446 08/18/21 0853   BP: 168/92 (!) 160/112 134/81 114/60   BP Location: Right leg Right leg Right arm Right leg   Patient Position: Standing Sitting Lying Sitting   Pulse: 89 90 89 91   Resp: 19 19 18   Temp: 97.6 °F (36.4 °C)   98.1 °F (36.7 °C)   TempSrc: Oral   Oral   SpO2: 97%  97% 98%   Weight: 78 kg  "(172 lb)      Height: 188 cm (74\")          Flowsheet Rows      First Filed Value   Admission Height  188 cm (74\") Documented at 08/18/2021 0256   Admission Weight  78 kg (171 lb 15.3 oz) Documented at 08/18/2021 0256            Intake/Output Summary (Last 24 hours) at 8/18/2021 1202  Last data filed at 8/18/2021 0953  Gross per 24 hour   Intake 950 ml   Output 900 ml   Net 50 ml        TELEMETRY: Sinus rhythm    Physical Exam:  The patient is alert, oriented and in no distress.  Vital signs as noted above.  Head and neck revealed no carotid bruits or jugular venous distention.  No thyromegaly or lymphadenopathy is present  Lungs clear.  No wheezing.  Breath sounds are normal bilaterally.  Heart normal first and second heart sounds.  No murmur. No precordial rub is present.  No gallop is present.  Abdomen soft and nontender.  No organomegaly is present.  Extremities with good peripheral pulses both upper extremities covered with bandage.  Skin warm and dry.  Musculoskeletal system is grossly normal  CNS grossly normal      Results Review:   I reviewed the patient's new clinical results.  Lab Results (last 24 hours)     Procedure Component Value Units Date/Time    Basic Metabolic Panel [[id number]]  (Abnormal) Collected: 08/18/21 0504    Specimen: Blood Updated: 08/18/21 0554     Glucose 132 mg/dL      BUN 13 mg/dL      Creatinine 0.89 mg/dL      Sodium 138 mmol/L      Potassium 4.1 mmol/L      Chloride 105 mmol/L      CO2 24.0 mmol/L      Calcium 8.4 mg/dL      eGFR Non African Amer 90 mL/min/1.73      BUN/Creatinine Ratio 14.6     Anion Gap 9.0 mmol/L     Narrative:      GFR Normal >60  Chronic Kidney Disease <60  Kidney Failure <15      CBC & Differential [[id number]]  (Abnormal) Collected: 08/18/21 0504    Specimen: Blood Updated: 08/18/21 0553    Narrative:      The following orders were created for panel order CBC & Differential.  Procedure                               Abnormality         Status                  "    ---------                               -----------         ------                     CBC Auto Differential[[id number]]        Abnormal            Final result                 Please view results for these tests on the individual orders.    CBC Auto Differential [[id number]]  (Abnormal) Collected: 08/18/21 0504    Specimen: Blood Updated: 08/18/21 0553     WBC 11.60 10*3/mm3      RBC 3.63 10*6/mm3      Hemoglobin 10.4 g/dL      Hematocrit 31.4 %      MCV 86.4 fL      MCH 28.6 pg      MCHC 33.1 g/dL      RDW 17.6 %      RDW-SD 53.4 fl      MPV 8.6 fL      Platelets 196 10*3/mm3      Neutrophil % 77.3 %      Lymphocyte % 12.5 %      Monocyte % 9.6 %      Eosinophil % 0.1 %      Basophil % 0.5 %      Neutrophils, Absolute 9.00 10*3/mm3      Lymphocytes, Absolute 1.50 10*3/mm3      Monocytes, Absolute 1.10 10*3/mm3      Eosinophils, Absolute 0.00 10*3/mm3      Basophils, Absolute 0.10 10*3/mm3      nRBC 0.1 /100 WBC           Imaging Results (Last 24 Hours)     Procedure Component Value Units Date/Time    XR Elbow 2 View Right [[id number]] Collected: 08/18/21 1120     Updated: 08/18/21 1125    Narrative:      DATE OF EXAM:  8/18/2021 10:13 AM     PROCEDURE:  XR ELBOW 2 VW RIGHT-     INDICATIONS:  Elbow instability; S42.402D-Unspecified fracture of lower end of left  humerus, subsequent encounter for fracture with routine healing     COMPARISON:  No Comparisons Available     TECHNIQUE:   Two Radiologic views of the right elbow were obtained.     FINDINGS:  There is subcutaneous air again seen at the right elbow. No fracture or  dislocation. No visualized joint effusion. No osseous erosion. Radial  head is normally aligned.        Impression:      1. Negative for fracture or dislocation.  2. Subcutaneous air again noted overlying the soft tissues of the right  elbow, similar to prior study.     Electronically Signed By-Anshul Ram MD On:8/18/2021 11:23 AM  This report was finalized on [id number] by  Anshul Ram MD.       LAB RESULTS (LAST 7 DAYS)    CBC  Results from last 7 days   Lab Units 08/18/21  0504 08/17/21  0751 08/16/21  1726   WBC 10*3/mm3 11.60* 6.10 13.00*   RBC 10*6/mm3 3.63* 3.78* 4.66   HEMOGLOBIN g/dL 10.4* 11.2* 13.4   HEMATOCRIT % 31.4* 33.0* 40.1   MCV fL 86.4 87.3 86.0   PLATELETS 10*3/mm3 196 193 258       BMP  Results from last 7 days   Lab Units 08/18/21  0504 08/17/21  0017 08/16/21  1726   SODIUM mmol/L 138 136 133*   POTASSIUM mmol/L 4.1 4.2 3.9   CHLORIDE mmol/L 105 102 99   CO2 mmol/L 24.0 22.0 20.0*   BUN mg/dL 13 17 17   CREATININE mg/dL 0.89 1.09 1.09   GLUCOSE mg/dL 132* 112* 111*       CMP   Results from last 7 days   Lab Units 08/18/21  0504 08/17/21  0017 08/16/21  1726   SODIUM mmol/L 138 136 133*   POTASSIUM mmol/L 4.1 4.2 3.9   CHLORIDE mmol/L 105 102 99   CO2 mmol/L 24.0 22.0 20.0*   BUN mg/dL 13 17 17   CREATININE mg/dL 0.89 1.09 1.09   GLUCOSE mg/dL 132* 112* 111*         BNP        TROPONIN  Results from last 7 days   Lab Units 08/17/21  0017   TROPONIN T ng/mL <0.010       CoAg  Results from last 7 days   Lab Units 08/16/21  1726   INR  1.03   APTT seconds 24.7       Creatinine Clearance  Estimated Creatinine Clearance: 108.3 mL/min (by C-G formula based on SCr of 0.89 mg/dL).    ABG        Radiology  XR Elbow 2 View Right    Result Date: 8/18/2021  1. Negative for fracture or dislocation. 2. Subcutaneous air again noted overlying the soft tissues of the right elbow, similar to prior study.  Electronically Signed By-Anshul Ram MD On:8/18/2021 11:23 AM This report was finalized on [id number] by  Anshul Ram MD.    XR Elbow 2 View Right    Result Date: 8/17/2021  Right elbow and forearm soft tissue swelling with subcutaneous gas. No retained radiopaque foreign body is seen. No acute osseous abnormality of the right elbow  Electronically Signed By-Penny Jj MD On:8/17/2021 12:00 PM This report was finalized on [id number] by  Penny Jj MD.    XR Elbow 2 View  Right    Result Date: 8/16/2021  Interval reduction of right elbow dislocation. No fracture identified.  Electronically Signed By-Bertha Wood MD On:8/16/2021 8:25 PM This report was finalized on [id number] by  Bertha Wood MD.    CT Head Without Contrast    Result Date: 8/16/2021  No evidence of hemorrhage, mass effect or midline shift. No acute process identified.  Electronically Signed By-Bertha Wood MD On:8/16/2021 6:21 PM This report was finalized on [id number] by  Bertha Wood MD.    CT Cervical Spine Without Contrast    Result Date: 8/16/2021  No acute osseous abnormality.  Electronically Signed By-Bertha Wood MD On:8/16/2021 7:04 PM This report was finalized on [id number] by  Bertha Wood MD.    XR Chest 1 View    Result Date: 8/16/2021  No acute cardiopulmonary process.  Electronically Signed By-Bertha Wood MD On:8/16/2021 7:03 PM This report was finalized on [id number] by  Bertha Wood MD.    CT Facial Bones Without Contrast    Result Date: 8/16/2021   No acute osseous abnormality.  Electronically Signed By-Bertha Wood MD On:8/16/2021 6:26 PM This report was finalized on [id number] by  Bertha Wood MD.    CT Upper Extremity Left Without Contrast    Result Date: 8/17/2021  1. Comminuted intra-articular obliquely oriented olecranon fracture. 2. Impacted intra-articular radial head fracture. 3. Small coronoid process fracture Electronically signed by:  Elliot Sne M.D.  8/16/2021 11:37 PM    XR Elbow 2 View Bilateral    Addendum Date: 8/16/2021    2 images of the right elbow and 2 images of the left elbow were obtained. I was not aware that they were placed on the same series.  The right elbow demonstrates complete dislocation of the radiocapitellar and ulnar trochlear joints. No definite fracture identified.  The left elbow demonstrates displaced fractures of the olecranon, the radius with a nondisplaced fracture of the supracondylar distal humerus.  Electronically Signed  By-Bertha Wood MD On:8/16/2021 8:27 PM This report was finalized on [id number] by  Bertha Wood MD.    Result Date: 8/16/2021   1. Extensively comminuted and displaced fracture of the olecranon as described above. 2. Suspected nondisplaced supracondylar fracture of the distal humerus extending through the trochlea and extending within the intercondylar region. 3. Complete dislocation of the radiocapitellar and ulnar trochlear joints.  Electronically Signed By-Bertha Wood MD On:8/16/2021 7:01 PM This report was finalized on [id number] by  Bertha Wood MD.    XR Forearm 2 View Bilateral    Result Date: 8/16/2021  No acute osseous abnormality of the remaining forearm or the bilateral wrists.  Electronically Signed By-Bertha Wood MD On:8/16/2021 7:02 PM This report was finalized on [id number] by  Bertha Wood MD.    XR Wrist 3+ View Bilateral    Result Date: 8/16/2021  No acute osseous abnormality of the remaining forearm or the bilateral wrists.  Electronically Signed By-Bertha Wood MD On:8/16/2021 7:02 PM This report was finalized on [id number] by  Bertha Wood MD.              EKG      I personally viewed and interpreted the patient's EKG/Telemetry data:    ECHOCARDIOGRAM:    Results for orders placed during the hospital encounter of 08/16/21    Adult Transthoracic Echo Complete W/ Cont if Necessary Per Protocol    Interpretation Summary  · Estimated left ventricular EF = 60% Left ventricular systolic function is normal.    Indications  Syncope    Technically satisfactory study.  Mitral valve is structurally normal.  Tricuspid valve is structurally normal.  Aortic valve is structurally normal.  Pulmonic valve could not be well visualized.  No evidence for mitral tricuspid or aortic regurgitation is seen by Doppler study.  Left atrium is normal in size.  Right atrium is normal in size.  Left ventricle is normal in size and contractility with ejection fraction of 60%.  Right ventricle is normal in  "size.  Atrial septum is intact.  Aorta is normal.  No pericardial effusion or intracardiac thrombus is seen.    Impression  Structurally and functionally normal cardiac valves.  Left ventricular size and contractility is normal with ejection fraction of 60%.          STRESS MYOVIEW:    Cardiolite (Tc-99m Sestamibi) stress test    CARDIAC CATHETERIZATION:            OTHER:         Assessment/Plan     Active Problems:    Fracture of left elbow      Frank Barger is a 51 y.o. male who presents with history of syncopal episode yesterday with injury to bilateral elbows.  Patient apparently was outside with a friend and got up to get a drink and the next thing he was on the concrete floor with face down.  He had syncopal episode approximately 6 months back.  Patient has been having occasional dizzy spells.  Patient had stent placement in December 2020.  Patient denies having any chest discomfort or shortness of breath palpitations.  Patient is not sure he had dizziness prior to this episode and it was very fast.  Surgery is planned.  Cardiac consultation was requested.  CT scan of the head cervical spine and facial bones were negative for fracture.  No other associated aggravating or alleviating factors.    Patient had surgery on his right elbow yesterday.  Patient signed out AMA last night since he was not able to smoke in the hospital.  Patient has returned back this morning to pursue planned surgery on his left elbow    ]]]]]]]]]]]]]]]]]  Impression  ==========  -Recent syncope  Bilateral elbow fractures  \"X-ray of the left elbow showed a complete dislocation of the radiocapitellar and ulnar trochlear joints. This was reduced in the emergency department. X-ray of elbow as per radiology also shows extensively comminuted and displaced fracture of the old [place] and suspected nondisplaced supracondylar fracture of the distal humerus on the right side.\"    Status post closed reduction right elbow dislocation and " closed reduction and splinting August 17, 2021 by Dr. August.     -Status post CABG (2015) (triple-vessel according to patient) at [facility].     -Status post stent placement 4/4/2014 at [facility]  Status post stent to LAD between diagonal branch and landing site of LIMA.  1/20/2021-[facility]    Echocardiogram-normal August 16, 2021     Cardiac catheterization 1/21/2021  Left ventricle size and contractility is normal with ejection fraction of 60%.  Left main coronary artery is normal.  Left anterior descending artery provided a moderate sized diagonal branch and left anterior descending artery has 80% disease proximal to the LIMA landing site.  Circumflex coronary artery is a moderately large vessel that provided a large marginal branch.  Circumflex coronary artery has 60 to 70% disease just distal to the marginal branch.  Ramus intermedius is a relatively small caliber vessel that has ostial 80 to 90% disease.  Right coronary artery is a large and dominant vessel.  Left ventricular branch has 60 to 70% disease at its ostium.  LIMA to left anterior descending artery is very small and atretic with probably 90 to 95% disease just proximal to the insertion site.  SVG to diagonal branch is patent.  SVG to presumably ramus intermedius is totally occluded in the proximal segment.     2 out of 3 grafts are patent.  However patent LIMA is extremely small in caliber and atraumatic with significant disease near the landing site.  Difficult to tell the source of patient's symptoms but likely from left anterior descending artery.  Patient to have intervention to left antidescending artery distal to the diagonal branch and proximal to the LIMA landing site.  Other source of pain could be from circumflex coronary artery left ventricular branch of RCA and ramus intermedius.  Suggest maximize medical treatment for disease in these vessels.  Modification of risk  factors.     Echocardiogram 1/14/2021 revealed mild paradoxical septal motion and inferior wall hypokinesis with ejection fraction of 50%.  Lexiscan Cardiolite test-abnormal with mild posterior and inferior proximal ischemia-1/14/2021     -Dyslipidemia hypertension COPD PTSD     -Psoriasis     -Family history of coronary artery disease     -Smoker-abstinence from smoking     -Allergic to oxycodone hydrocodone  ==========  Plan  =========  Recent syncope and bilateral elbow fractures.  Patient has been having off-and-on dizziness.  Patient would benefit from close cardiac monitoring including loop recorder placement.  EKG showed no acute changes.    closed reduction right elbow dislocation and closed reduction and splinting by Dr. August August 17, 2021     Status post CABG  Status post stent placement.  Patient is not having any angina pectoris or congestive heart failure.  Patient is on Plavix     Hypertension-well-controlled 126/76  Continue metoprolol tartrate 25 mg a day     Dyslipidemia-continue atorvastatin      Medications were reviewed and updated.     Patient to have surgery today on his left elbow.  Close cardiac monitoring.  Echocardiogram-normal August 16, 2021    Patient to have loop recorder placement prior to going home.     Further plan will depend on patient's progress.  ]]]]]]]]]]]]]]          Jaimie Silva MD  08/18/21  12:02 EDT

## 2021-08-18 NOTE — H&P
[facility] Medicine Services      Patient Name: Frank Barger  : 1970  MRN: [id number]  Primary Care Physician:  Brittni Shell MD  Date of admission: 2021      Subjective      Chief Complaint: Bilateral arm pain    History of Present Illness: Frank Barger is a 51 y.o. male who presented to [facility] on 2021 after leaving AMA several hours ago.  He was admitted 2021 for syncopal episode which resulted in a fall and he dislocated his right elbow and had a comminuted displaced fracture of his left elbow.  He underwent surgery yesterday for his right  elbow was scheduled for surgery today for his left elbow.  Earlier today he was adamant about smoking a cigarette and was advised he could not do so on hospital grounds he could not leave to do so.  He was offered a nicotine patch Ativan but declined and left AMA.  He now returns regrets his earlier decision and is agreeable to continued care.  Has a past medical history of CAD status post CABG and PCI and he was cardiology and given preoperative clearance.  Cardiology recommended a Holter monitor continuous cardiac monitoring while inpatient.  Denies any chest pain further syncope or any other complaint other than the bilateral arm pain.  Has medical history also includes anxiety depression chronic pain GERD essential hypertension hyperlipidemia plaque psoriasis and seasonal allergies.  Will be admitted for continued evaluation and treatment and scheduled surgery.  He earlier reported being fully COVID-19 vaccinated and was negative on 2021 tested here.    Review of Systems   Constitutional: Negative.   HENT: Negative.    Eyes: Negative.    Cardiovascular: Negative.    Respiratory: Negative.    Endocrine: Negative.    Hematologic/Lymphatic: Negative.    Skin: Negative.    Musculoskeletal: Positive for joint pain.   Gastrointestinal: Negative.    Genitourinary: Negative.    Neurological:  "Negative.    Psychiatric/Behavioral: Negative.    Allergic/Immunologic: Negative.         Personal History     Past Medical History:   Diagnosis Date   • Abdominal pain    • Allergies     takes allergy injections   • Anesthesia complication     states heart \"fluttering\" after anesthesia x 1- was kept in hospital 3 days s/p heart stent placement   • Anxiety    • Arthritis    • Asthma    • Chronic back pain    • Constipation 06/2021   • DDD (degenerative disc disease), lumbar    • Depression    • Emphysema of lung (CMS/HCC)    • GERD (gastroesophageal reflux disease)    • Headache    • Leg pain    • Lumbar spine pain    • Nocturia    • PTSD (post-traumatic stress disorder)    • Under care of pain management specialist     pain management of [place]       Past Surgical History:   Procedure Laterality Date   • CARDIAC CATHETERIZATION N/A 1/20/2021    Procedure: Left Heart Cath and coronary angiogram;  Surgeon: Jaimie Silva MD;  Location: [facility] CATH INVASIVE LOCATION;  Service: Cardiovascular;  Laterality: N/A;   • CARDIAC CATHETERIZATION N/A 1/20/2021    Procedure: Saphenous Vein Graft;  Surgeon: Jaimie Silva MD;  Location: [facility] CATH INVASIVE LOCATION;  Service: Cardiovascular;  Laterality: N/A;   • CARDIAC CATHETERIZATION N/A 1/20/2021    Procedure: Stent EBENEZER coronary;  Surgeon: Herve Rodriguez MD;  Location: [facility] CATH INVASIVE LOCATION;  Service: Cardiology;  Laterality: N/A;   • CORONARY ANGIOPLASTY WITH STENT PLACEMENT  04/2014    2 in 2014, 1 in 12/2020   • CORONARY ARTERY BYPASS GRAFT  12/2015    x 3       Family History: family history includes Anxiety disorder in his daughter; Depression in his daughter; Diabetes in his mother; Heart disease in his father. Otherwise pertinent FHx was reviewed and not pertinent to current issue.    Social History:  reports that he has been smoking. He has been smoking about 1.50 packs per day. He has never used smokeless tobacco. He reports that he does not " drink alcohol and does not use drugs.    Home Medications:  Prior to Admission Medications     Prescriptions Last Dose Informant Patient Reported? Taking?    amitriptyline (ELAVIL) 75 MG tablet   No No    TAKE 1 TABLET BY MOUTH EVERY NIGHT    ARIPiprazole (ABILIFY) 5 MG tablet   No No    TAKE 1 TABLET BY MOUTH EVERY NIGHT    aspirin (aspirin) 81 MG EC tablet   No No    Take 1 tablet by mouth Daily.    atorvastatin (LIPITOR) 20 MG tablet   No No    Take 1 tablet by mouth Every Night.    buPROPion XL (WELLBUTRIN XL) 150 MG 24 hr tablet   No No    Take 1 tablet by mouth Daily.    clopidogrel (PLAVIX) 75 MG tablet   No No    Take 1 tablet by mouth Daily.    cyclobenzaprine (FLEXERIL) 10 MG tablet   Yes No    Take 10 mg by mouth 3 (Three) Times a Day.    ferrous sulfate 325 (65 FE) MG tablet   Yes No    Take 325 mg by mouth Daily With Breakfast.    loratadine (CLARITIN) 10 MG tablet   Yes No    Take 10 mg by mouth Daily.    metoprolol tartrate (LOPRESSOR) 25 MG tablet   No No    Take 1 tablet by mouth 2 (Two) Times a Day.    naproxen (NAPROSYN) 500 MG tablet   No No    Take 1 tablet by mouth 2 (Two) Times a Day As Needed for Moderate Pain . Take with food!    nitroglycerin (NITROSTAT) 0.4 MG SL tablet   Yes No    Place 0.4 mg under the tongue Every 5 (Five) Minutes As Needed for Chest Pain. Take no more than 3 doses in 15 minutes.    NON FORMULARY  Self Yes No    Allergy injections    omeprazole (priLOSEC) 20 MG capsule   No No    Take 1 capsule by mouth Every Morning Before Breakfast.    OXcarbazepine (TRILEPTAL) 600 MG tablet   Yes No    Take 600 mg by mouth 2 (Two) Times a Day. Take dos    prazosin (MINIPRESS) 2 MG capsule   Yes No    Take 1 mg by mouth Every Night.    pregabalin (LYRICA) 200 MG capsule   No No    Take 1 capsule by mouth 3 (Three) Times a Day.    SUMAtriptan (IMITREX) 50 MG tablet   No No    TAKE 1 TABLET BY MOUTH AT ONSET OF HEADACHE. MAY REPEAT DOSE 1 TIME IN 2 HOURS IF HEADACHE NOT RELIEVED     Vortioxetine HBr (Trintellix) 20 MG tablet   No No    Take 20 mg by mouth Daily.            Allergies:  Allergies   Allergen Reactions   • Hydrocodone Nausea And Vomiting   • Hydrocodone-Acetaminophen GI Intolerance   • Oxycodone GI Intolerance       Objective      Vitals:   Temp:  [98 °F (36.7 °C)] 98 °F (36.7 °C)  Heart Rate:  [141] 141  Resp:  [20] 20  BP: (149)/(101) 149/101    Physical Exam  Vitals reviewed.   Constitutional:       Appearance: Normal appearance. He is normal weight.   HENT:      Head: Normocephalic and atraumatic.      Right Ear: External ear normal.      Left Ear: External ear normal.      Nose: Nose normal.      Mouth/Throat:      Mouth: Mucous membranes are moist.   Eyes:      Extraocular Movements: Extraocular movements intact.   Cardiovascular:      Rate and Rhythm: Normal rate and regular rhythm.      Heart sounds: Normal heart sounds.   Pulmonary:      Effort: Pulmonary effort is normal.      Breath sounds: Normal breath sounds.   Abdominal:      Palpations: Abdomen is soft.   Genitourinary:     Comments: Deferred  Musculoskeletal:      Cervical back: Normal range of motion and neck supple.      Comments: Range of motion bilateral upper extremities limited due to splinting and recent surgery and recent fracture and dislocation   Skin:     General: Skin is warm and dry.   Neurological:      General: No focal deficit present.      Mental Status: He is alert and oriented to person, place, and time.   Psychiatric:         Mood and Affect: Mood normal.         Behavior: Behavior normal.         Thought Content: Thought content normal.         Judgment: Judgment normal.          Result Review    Result Review:  I have personally reviewed the results from the time of this admission to 8/18/2021 03:47 EDT and agree with these findings:  [x]  Laboratory  []  Microbiology  [x]  Radiology  [x]  EKG/Telemetry   []  Cardiology/Vascular   []  Pathology  [x]  Old records  []  Other:  Most notable  findings include: CBC CMP x-ray right left elbow.  Notes from previous admission yesterday    Assessment/Plan        Active Hospital Problems:  Active Hospital Problems    Diagnosis    • Fracture of left elbow      Plan:    Left elbow fracture, placed right elbow, had surgery on right elbow 8/17/2021, scheduled for surgery on left elbow today the orthopedics reconsulted, on IV cefazolin every 8 hours continued, 2 mg IV morphine every 4 hours as needed pain    Syncope, evaluated yesterday by cardiology fall precautions, continuous cardiac monitoring need Holter monitor on discharge clear whether cardiology needs to be reconsulted or signed off.    Past medical history CABG and ischemic heart disease troponin was negative x2 hold aspirin for surgery, continue statin Plavix metoprolol tartrate    COPD with continued tobacco use, nicotine patch ordered encourage cessation stable no home oxygen    Anxiety depression on amitriptyline and Wellbutrin, Trileptal Abilify atrial Entex not ordered as no current prescription from pharmacy since June    Migraine headaches stable no complaints hold Imitrex for now    Chronic pain Flexeril continued, hold NSAIDs for surgery, on Lyrica    GERD on PPI    Essential hypertension on prazosin Hytrin here ordered as formulary substitute, on metoprolol    Hyperlipidemia on statin    Dietary supplementation on ferrous sulfate    Seasonal allergies on loratadine formulary substitute here    Plaque psoriasis no home meds    DVT prophylaxis:  No DVT prophylaxis order currently exists.    CODE STATUS:    Code Status: CPR  Medical Interventions (Level of Support Prior to Arrest): Full    Admission Status:  I believe this patient meets inpatient status.    I discussed the patient's findings and my recommendations with patient.    This patient has been examined wearing appropriate Personal Protective Equipment     Signature: Electronically signed by CATERINA Gonzalez, 08/18/21, 4:55 AM  EDT.    Reviewed and agreed with documentation of the NP above    I also personally evaluated this patient.    51-year-old man with multiple comorbidities including CAD status post CABG and PCI, anxiety/depression, GERD and hypertension and hyperlipidemia.  Patient was initially admitted on 8/16/2021 after he had a syncopal episode and fell dislocating his right elbow and displaced fracture of his left elbow.  On 8/17/2021, patient underwent irrigation, closed reduction right elbow dislocation and splinting.  Plan is for open reduction and internal fixation of left olecranon with radial head replacement on 8/18/2021.  However on the night of 08/17/2021, patient left the hospital AMA as he was not allowed to smoke cigarette while inpatient.    He subsequently returned to the emergency room on 8/18/2021 as he was not able to do anything by himself at home.    He was admitted for further care and orthopedic surgeon reconsulted.    On general examination, patient is in no obvious painful distress  Lungs are clear to auscultation  Heart sounds 1 and 2 heard regular rate and rhythm  Musculoskeletal-bilateral upper extremities splinting    Plan  Pain control  Continue telemetry-recent syncopal episode  Operating surgeon plans ORIF left elbow today  Further recommendation following clinical course

## 2021-08-18 NOTE — ANESTHESIA PROCEDURE NOTES
Peripheral Block    Pre-sedation assessment completed: 8/18/2021 1:00 PM    Patient reassessed immediately prior to procedure    Patient location during procedure: pre-op  Reason for block: procedure for pain, at surgeon's request, post-op pain management and secondary anesthetic  Performed by  Anesthesiologist: Dmitry Sterling MD  Preanesthetic Checklist  Completed: patient identified, IV checked, site marked, risks and benefits discussed, surgical consent, monitors and equipment checked, pre-op evaluation and timeout performed  Prep:  Pt Position: sitting  Sterile barriers:cap, gloves, mask and washed/disinfected hands  Prep: ChloraPrep  Patient monitoring: blood pressure monitoring, continuous pulse oximetry and EKG  Procedure  Sedation:yes  Performed under: local infiltration  Guidance:ultrasound guided and landmark technique  ULTRASOUND INTERPRETATION.  Using ultrasound guidance a 22 G gauge needle was placed in close proximity to the brachial plexus nerve, at which point, under ultrasound guidance anesthetic was injected in the area of the nerve and spread of the anesthesia was seen on ultrasound in close proximity thereto.  There were no abnormalities seen on ultrasound; a digital image was taken; and the patient tolerated the procedure with no complications. Images:still images obtained, printed/placed on chart    Laterality:left  Block Type:supraclavicular  Injection Technique:single-shot  Needle Type:echogenic  Needle Gauge:22 G  Resistance on Injection: less than 15 psi    Medications Used: dexamethasone (DECADRON) injection, 4 mg  bupivacaine PF (MARCAINE) injection 0.5%, 30 mL  Med admintered at 8/18/2021 1:10 PM      Post Assessment  Injection Assessment: negative aspiration for heme, no paresthesia on injection and incremental injection  Patient Tolerance:comfortable throughout block  Complications:no  Additional Notes  Pre-procedure:  Peripheral nerve block performed preoperatively prior to the  start of anesthesia time at the request of the patient and the surgeon for the management of postoperative acute surgical pain as well as for a secondary intraoperative anesthetic (general anesthesia is the primary intraoperative anesthetic); patient identified; pre-procedure vital signs, all relevant labs/studies, complete medical/surgical/anesthetic history, full medication list, full allergy list, and NPO status obtained/reviewed; physical assessment performed; anesthetic options, side effects, potential complications, risks, and benefits discussed; questions answered; patient wishes to proceed with the procedure; written anesthesia procedure consent obtained; patient cleared for procedure; time out performed; IV access in situ    Procedure:  ASA monitor placed; supplemental oxygen provided; patient positioned; hand hygiene performed; sterile technique maintained throughout the procedure; sterile prep applied; insertion site determined by anatomical landmarks, palpation, and ultrasound imaging; live ultrasound guidance throughout the procedure; target nerves/landmarks identified on live ultrasound; skin and subcutaneous tissues numbed by injection of 1% lidocaine; 2 inch 22G StimupAava Mobile Ultra 360 Insulated Echogenic Needle used; realtime needle advancement and placement near the target nerves witnessed on live ultrasound; negative aspiration prior to injection; correct needle placement confirmed on live ultrasound by local anesthetic spread around the target nerves; local anesthetic mixture injected with negative aspiration prior to each injection and after each 1-5 mL injected; needle withdrawn; dressing applied; ultrasound image printed and placed in the patient's permanent medical record    Post-procedure:  Peripheral nerve block placed successfully; good block; no apparent complications; minimal estimated blood loss; vital signs stable throughout; see nurse's notes for vitals; transported to the OR, general  anesthesia induced, and surgery started

## 2021-08-18 NOTE — PROGRESS NOTES
"     LOS: 0 days   Patient Care Team:  Brittni Shell MD as PCP - General (Family Medicine)  Jaimie Silva MD as Consulting Physician (Cardiology)        Subjective     Patient left AGAINST MEDICAL ADVICE last night to smoke.  He returned.  Says that his elbow popped once while in the splint.  He says he left the splint on.      Objective     Vital Signs  Vitals:    08/18/21 0430 08/18/21 0432 08/18/21 0437 08/18/21 0446   BP:  168/92 (!) 160/112 134/81   BP Location: Right leg Right leg Right leg Right arm   Patient Position: Sitting Standing Sitting Lying   Pulse:  89 90 89   Resp:  19  19   Temp:  97.6 °F (36.4 °C)     TempSrc:  Oral     SpO2:  97%  97%   Weight:  78 kg (172 lb)     Height:  188 cm (74\")         Physical Exam:   Alert and orient x3  Right elbow splint intact  Moving fingers well  X-ray yesterday before he left the hospital showed good reduction of the right elbow     Results Review:     I reviewed the patient's new clinical results.  I reviewed the patient's new imaging results    Lab Results (last 24 hours)     Procedure Component Value Units Date/Time    Basic Metabolic Panel [[id number]]  (Abnormal) Collected: 08/18/21 0504    Specimen: Blood Updated: 08/18/21 0554     Glucose 132 mg/dL      BUN 13 mg/dL      Creatinine 0.89 mg/dL      Sodium 138 mmol/L      Potassium 4.1 mmol/L      Chloride 105 mmol/L      CO2 24.0 mmol/L      Calcium 8.4 mg/dL      eGFR Non African Amer 90 mL/min/1.73      BUN/Creatinine Ratio 14.6     Anion Gap 9.0 mmol/L     Narrative:      GFR Normal >60  Chronic Kidney Disease <60  Kidney Failure <15      CBC & Differential [[id number]]  (Abnormal) Collected: 08/18/21 0504    Specimen: Blood Updated: 08/18/21 0553    Narrative:      The following orders were created for panel order CBC & Differential.  Procedure                               Abnormality         Status                     ---------                               -----------         ------         "             CBC Auto Differential[[id number]]        Abnormal            Final result                 Please view results for these tests on the individual orders.    CBC Auto Differential [[id number]]  (Abnormal) Collected: 08/18/21 0504    Specimen: Blood Updated: 08/18/21 0553     WBC 11.60 10*3/mm3      RBC 3.63 10*6/mm3      Hemoglobin 10.4 g/dL      Hematocrit 31.4 %      MCV 86.4 fL      MCH 28.6 pg      MCHC 33.1 g/dL      RDW 17.6 %      RDW-SD 53.4 fl      MPV 8.6 fL      Platelets 196 10*3/mm3      Neutrophil % 77.3 %      Lymphocyte % 12.5 %      Monocyte % 9.6 %      Eosinophil % 0.1 %      Basophil % 0.5 %      Neutrophils, Absolute 9.00 10*3/mm3      Lymphocytes, Absolute 1.50 10*3/mm3      Monocytes, Absolute 1.10 10*3/mm3      Eosinophils, Absolute 0.00 10*3/mm3      Basophils, Absolute 0.10 10*3/mm3      nRBC 0.1 /100 WBC         Imaging Results (Last 24 Hours)     ** No results found for the last 24 hours. **            Medication Review:   Scheduled Meds:amitriptyline, 75 mg, Oral, Nightly  atorvastatin, 20 mg, Oral, Nightly  buPROPion XL, 150 mg, Oral, Daily  ceFAZolin, 2 g, Intravenous, Q8H  cetirizine, 10 mg, Oral, Daily  clopidogrel, 75 mg, Oral, Daily  cyclobenzaprine, 10 mg, Oral, TID  ferrous sulfate, 324 mg, Oral, Daily With Breakfast  metoprolol tartrate, 25 mg, Oral, BID  nicotine, 1 patch, Transdermal, Q24H  pantoprazole, 40 mg, Oral, QAM AC  pregabalin, 200 mg, Oral, TID  terazosin, 5 mg, Oral, Nightly      Continuous Infusions:sodium chloride, 100 mL/hr, Last Rate: 100 mL/hr (08/18/21 0402)      PRN Meds:.Morphine  •  ondansetron **OR** ondansetron      Assessment/Plan     Right unstable elbow fracture    Plan for disposition: Stressed to patient he cannot leave AGAINST MEDICAL ADVICE and that he may get infected going home not on antibiotics.  Explained that he extend the elbow he will likely dislocate and have a poor result.  We will repeat x-ray today to make sure he is not  dislocated again.  Stressed he is not to be lifting or pushing off with the elbow.  Stressed that he should not remove the splint    Kyler August MD  08/18/21  08:31 EDT

## 2021-08-18 NOTE — NURSING NOTE
Pt left AMA. Pt's doctors notified. AMA paperwork signed by patient. I escorted the patient outside where his wife was waiting outside with their personal vehicle.

## 2021-08-18 NOTE — BRIEF OP NOTE
ELBOW OPEN REDUCTION INTERNAL FIXATION  Progress Note    Frank Barger  8/18/2021    Pre-op Diagnosis:   Closed fracture of left elbow, initial encounter [S42.402A]       Post-Op Diagnosis Codes:     * Closed fracture of left elbow, initial encounter [S42.402A]    Procedure/CPT® Codes:    Procedure(s):  Left  ulna open reduction internal fixation (of olecranon and coronoid), left radial head replacement     Surgeon(s):  Yonis Mcdonough MD    Anesthesia: General with Block    Staff:   Circulator: Lisa Portillo RN; Ana Cruz RN  Radiology Technologist: Carri Edmonds  Scrub Person: Dalia Pringle; Nila Gomez         Estimated Blood Loss: Less than 50    Urine Voided: 400 mL    Specimens:                None          Drains:   Urethral Catheter Silicone 16 Fr. (Active)       Findings: Comminuted fracture of the proximal ulna including a lesser sigmoid notch component, the coronoid fragment and a olecranon fragment.    Comminuted radial head    Fixation with skeletal dynamics olecranon plate and screws  Radial head replacement with VentureBeat 2.4 mm x 5.5 mm    Following fixation, there was smooth range of motion from 0 to 130 degrees extension to flexion as well as full pronation and supination without crepitance.    Elbow appeared stable to varus and valgus stress following fixation and closure.    Tourniquet time 117 minutes    Complications: None identified          Yonis Mcdonough MD     Date: 8/18/2021  Time: 17:12 EDT

## 2021-08-18 NOTE — CASE MANAGEMENT/SOCIAL WORK
Discharge Planning Assessment   [place]     Patient Name: Frank Barger  MRN: [id number]  Today's Date: 8/18/2021    Admit Date: 8/18/2021    Discharge Needs Assessment     Row Name 08/18/21 1314       Living Environment    Lives With  spouse    Current Living Arrangements  home/apartment/condo    Primary Care Provided by  self    Provides Primary Care For  no one    Family Caregiver if Needed  spouse    Able to Return to Prior Arrangements  yes       Resource/Environmental Concerns    Resource/Environmental Concerns  none    Transportation Concerns  car, none       Transition Planning    Patient/Family Anticipates Transition to  home with family outpatient PT    Patient/Family Anticipated Services at Transition  none    Transportation Anticipated  family or friend will provide       Discharge Needs Assessment    Concerns to be Addressed  discharge planning    Anticipated Changes Related to Illness  none    Equipment Needed After Discharge  none    Outpatient/Agency/Support Group Needs  outpatient therapy        Discharge Plan     Row Name 08/18/21 1174       Plan    Plan  D/C Plan: Anticipate home with spouse and outpatient therapy at [facility].    Patient/Family in Agreement with Plan  yes    Plan Comments  Met with patient in room to discuss d/c planning.  IP rehab is recommended, but patient declines.  He is open to outpatient therapy, and would like to attend [facility] outpatient at [address] [place] [address].  Order placed in [employer], and sent through [employer].  Verified with facility that fax was received, and they will call patient spouse to set up appointment.  Patient lives at home with spouse, is I with ADLs, drives, and is unemployed.  PCP and thuy verified.  DC Barriers:  surgery today, IVF, IV ABX.        Continued Care and Services - Admitted Since 8/18/2021     Therapy     Service Provider Request Status Selected Services Address Phone Fax Patient Preferred    [facility] PHYSICAL  THERAPY [facility]  Pending - No Request Sent N/A [address] [place] [id number] -- -- --              Expected Discharge Date and Time     Expected Discharge Date Expected Discharge Time    Aug 20, 2021         Demographic Summary     Row Name 08/18/21 1313       General Information    Admission Type  inpatient    Arrived From  emergency department    Referral Source  admission list    Reason for Consult  discharge planning    Preferred Language  English     Used During This Interaction  no        Functional Status     Row Name 08/18/21 1314       Functional Status    Usual Activity Tolerance  good    Current Activity Tolerance  moderate       Functional Status, IADL    Medications  independent    Meal Preparation  independent    Housekeeping  independent    Laundry  independent    Shopping  independent       Mental Status    General Appearance WDL  WDL       Mental Status Summary    Recent Changes in Mental Status/Cognitive Functioning  no changes                Zhane Bruce RN

## 2021-08-18 NOTE — DISCHARGE SUMMARY
[facility] ER Medicine Services  DISCHARGE SUMMARY    Patient Name: Frank Barger  : 1970  MRN: [id number]    Date of Admission: 2021  Date of Discharge:  2021  Primary Care Physician: Brittni Shell MD      Presenting Problem:   Dislocation of elbow, right, open, initial encounter [S53.104A, S51.001A]  Olecranon fracture, left, closed, initial encounter [S52.022A]  Elbow dislocation, right, initial encounter [S53.104A]  Syncope, unspecified syncope type [R55]  Closed fracture of proximal end of left radius, unspecified fracture morphology, initial encounter [S52.102A]    Active and Resolved Hospital Problems:  Active Hospital Problems    Diagnosis POA   • Dislocation of elbow, right, open, initial encounter [S53.104A, S51.001A] Yes   • Fracture of left elbow [S42.402A] Yes   • COPD (chronic obstructive pulmonary disease) (CMS/[facility]) [J44.9] Yes   • Ischemic heart disease due to coronary artery obstruction (CMS/[facility]) [I24.0, I25.9] Yes   • Syncope and collapse [R55] Yes      Resolved Hospital Problems   No resolved problems to display.         Hospital Course     Hospital Course:  Frank Barger is a 51 y.o. male who presented to [facility] ED on 2021 due to syncope and b/l elbow fractures from collapse.  Patient admitted and ortho consulted.  Right olecranon I&D performed on 2021.  Abx started by ortho.  Plan for 72 hours abx and repeat imaging.  Left olecranon ORIF planned on 2021.  Patient left AMA on the evening of 2021.        DISCHARGE Follow Up Recommendations for labs and diagnostics:     Left AMA        Day of Discharge     Vital Signs:  Temp:  [97.4 °F (36.3 °C)-97.7 °F (36.5 °C)] 97.7 °F (36.5 °C)  Heart Rate:  [74-81] 74  Resp:  [14-16] 14  BP: (114-137)/(61-74) 124/73    Physical Exam:  Physical Exam   Patient left AMA      Pertinent  and/or Most Recent Results     LAB RESULTS:      Lab 21  0504 21  0751 21  3708    WBC 11.60* 6.10 13.00*   HEMOGLOBIN 10.4* 11.2* 13.4   HEMATOCRIT 31.4* 33.0* 40.1   PLATELETS 196 193 258   NEUTROS ABS 9.00*  --  10.10*   LYMPHS ABS 1.50  --  1.70   MONOS ABS 1.10*  --  1.00*   EOS ABS 0.00  --  0.10   MCV 86.4 87.3 86.0   PROTIME  --   --  11.4   APTT  --   --  24.7         Lab 08/18/21  0504 08/17/21  0017 08/16/21  1726   SODIUM 138 136 133*   POTASSIUM 4.1 4.2 3.9   CHLORIDE 105 102 99   CO2 24.0 22.0 20.0*   ANION GAP 9.0 12.0 14.0   BUN 13 17 17   CREATININE 0.89 1.09 1.09   GLUCOSE 132* 112* 111*   CALCIUM 8.4* 8.7 9.2             Lab 08/17/21  0017 08/16/21  1726   TROPONIN T <0.010 <0.010   PROTIME  --  11.4   INR  --  1.03             Lab 08/17/21  0017   IRON 151   IRON SATURATION 34   TIBC 450   TRANSFERRIN 302   ABO TYPING O   RH TYPING Positive   ANTIBODY SCREEN Negative         Brief Urine Lab Results     None        Microbiology Results (last 10 days)     Procedure Component Value - Date/Time    MRSA Screen, PCR (Inpatient) - Swab, Nares [[id number]]  (Normal) Collected: 08/17/21 0119    Lab Status: Final result Specimen: Swab from Nares Updated: 08/17/21 0306     MRSA PCR No MRSA Detected    COVID PRE-OP / PRE-PROCEDURE SCREENING ORDER (NO ISOLATION) - Swab, Nasopharynx [[id number]]  (Normal) Collected: 08/16/21 2130    Lab Status: Final result Specimen: Swab from Nasopharynx Updated: 08/16/21 2231    Narrative:      The following orders were created for panel order COVID PRE-OP / PRE-PROCEDURE SCREENING ORDER (NO ISOLATION) - Swab, Nasopharynx.  Procedure                               Abnormality         Status                     ---------                               -----------         ------                     COVID-19,CEPHEID/SHIVANI/BD...[[id number]]  Normal              Final result                 Please view results for these tests on the individual orders.    COVID-19,CEPHEID/SHIVANI/BDMAX,COR/POP/PAD/SAM IN-HOUSE(OR EMERGENT/ADD-ON),NP SWAB IN TRANSPORT MEDIA 3-4 HR TAT,  RT-PCR - Swab, Nasopharynx [[id number]]  (Normal) Collected: 08/16/21 2130    Lab Status: Final result Specimen: Swab from Nasopharynx Updated: 08/16/21 2231     COVID19 Not Detected    Narrative:      Fact sheet for providers: https://www.fda.gov/media/933529/download     Fact sheet for patients: https://www.fda.gov/media/441356/download  Fact sheet for providers: https://www.fda.gov/media/528799/download     Fact sheet for patients: https://www.fda.gov/media/850382/download          XR Elbow 2 View Right    Result Date: 8/18/2021  Impression: 1. Negative for fracture or dislocation. 2. Subcutaneous air again noted overlying the soft tissues of the right elbow, similar to prior study.  Electronically Signed By-Anshul Ram MD On:8/18/2021 11:23 AM This report was finalized on [id number] by  Anshul Ram MD.    XR Elbow 2 View Right    Result Date: 8/17/2021  Impression: Right elbow and forearm soft tissue swelling with subcutaneous gas. No retained radiopaque foreign body is seen. No acute osseous abnormality of the right elbow  Electronically Signed By-Penny Jj MD On:8/17/2021 12:00 PM This report was finalized on [id number] by  Penny Jj MD.    XR Elbow 2 View Right    Result Date: 8/16/2021  Impression: Interval reduction of right elbow dislocation. No fracture identified.  Electronically Signed By-Bertha Wood MD On:8/16/2021 8:25 PM This report was finalized on [id number] by  Bertha Wood MD.    CT Head Without Contrast    Result Date: 8/16/2021  Impression: No evidence of hemorrhage, mass effect or midline shift. No acute process identified.  Electronically Signed By-Bertha Wood MD On:8/16/2021 6:21 PM This report was finalized on [id number] by  Bertha Wood MD.    CT Cervical Spine Without Contrast    Result Date: 8/16/2021  Impression: No acute osseous abnormality.  Electronically Signed By-Bertha Wood MD On:8/16/2021 7:04 PM This report was finalized on [id number] by  Bertha  MD Nina.    XR Chest 1 View    Result Date: 8/16/2021  Impression: No acute cardiopulmonary process.  Electronically Signed By-Bertha Wodo MD On:8/16/2021 7:03 PM This report was finalized on [id number] by  Bertha Wood MD.    CT Facial Bones Without Contrast    Result Date: 8/16/2021  Impression:  No acute osseous abnormality.  Electronically Signed By-Bertha Wood MD On:8/16/2021 6:26 PM This report was finalized on [id number] by  Bertha Wood MD.    CT Upper Extremity Left Without Contrast    Result Date: 8/17/2021  Impression: 1. Comminuted intra-articular obliquely oriented olecranon fracture. 2. Impacted intra-articular radial head fracture. 3. Small coronoid process fracture Electronically signed by:  Elliot Sen M.D.  8/16/2021 11:37 PM    XR Elbow 2 View Bilateral    Addendum Date: 8/16/2021    2 images of the right elbow and 2 images of the left elbow were obtained. I was not aware that they were placed on the same series.  The right elbow demonstrates complete dislocation of the radiocapitellar and ulnar trochlear joints. No definite fracture identified.  The left elbow demonstrates displaced fractures of the olecranon, the radius with a nondisplaced fracture of the supracondylar distal humerus.  Electronically Signed By-Bertha Wood MD On:8/16/2021 8:27 PM This report was finalized on [id number] by  Bertha Wood MD.    Result Date: 8/16/2021  Impression:  1. Extensively comminuted and displaced fracture of the olecranon as described above. 2. Suspected nondisplaced supracondylar fracture of the distal humerus extending through the trochlea and extending within the intercondylar region. 3. Complete dislocation of the radiocapitellar and ulnar trochlear joints.  Electronically Signed By-Bertha Wood MD On:8/16/2021 7:01 PM This report was finalized on [id number] by  Bertha Wood MD.    XR Forearm 2 View Bilateral    Result Date: 8/16/2021  Impression: No acute osseous abnormality  of the remaining forearm or the bilateral wrists.  Electronically Signed By-Bertha Wood MD On:8/16/2021 7:02 PM This report was finalized on [id number] by  Bertha Wood MD.    XR Wrist 3+ View Bilateral    Result Date: 8/16/2021  Impression: No acute osseous abnormality of the remaining forearm or the bilateral wrists.  Electronically Signed By-Bertha Wood MD On:8/16/2021 7:02 PM This report was finalized on [id number] by  Bertha Wood MD.              Results for orders placed during the hospital encounter of 08/16/21    Adult Transthoracic Echo Complete W/ Cont if Necessary Per Protocol    Interpretation Summary  · Estimated left ventricular EF = 60% Left ventricular systolic function is normal.    Indications  Syncope    Technically satisfactory study.  Mitral valve is structurally normal.  Tricuspid valve is structurally normal.  Aortic valve is structurally normal.  Pulmonic valve could not be well visualized.  No evidence for mitral tricuspid or aortic regurgitation is seen by Doppler study.  Left atrium is normal in size.  Right atrium is normal in size.  Left ventricle is normal in size and contractility with ejection fraction of 60%.  Right ventricle is normal in size.  Atrial septum is intact.  Aorta is normal.  No pericardial effusion or intracardiac thrombus is seen.    Impression  Structurally and functionally normal cardiac valves.  Left ventricular size and contractility is normal with ejection fraction of 60%.      Labs Pending at Discharge:      Procedures Performed  Procedure(s):  INCISION / DEBRIDEMENT ELBOW, closed reduction right elbow dislocation          Consults:   Consults     Date and Time Order Name Status Description    8/17/2021  7:42 AM Inpatient Orthopedic Surgery Consult Completed     8/17/2021 12:36 AM Inpatient Cardiology Consult Completed     8/16/2021  8:06 PM Hospitalist (on-call MD unless specified) Completed     8/16/2021  7:10 PM Ortho (on-call MD unless specified)  Completed             Discharge Details        Discharge Medications      ASK your doctor about these medications      Instructions Start Date   amitriptyline 75 MG tablet  Commonly known as: ELAVIL   75 mg, Oral, Nightly      ARIPiprazole 5 MG tablet  Commonly known as: ABILIFY   5 mg, Oral, Nightly      aspirin 81 MG EC tablet   81 mg, Oral, Daily      atorvastatin 20 MG tablet  Commonly known as: LIPITOR   20 mg, Oral, Nightly      buPROPion  MG 24 hr tablet  Commonly known as: WELLBUTRIN XL   150 mg, Oral, Daily      clopidogrel 75 MG tablet  Commonly known as: PLAVIX   75 mg, Oral, Daily      cyclobenzaprine 10 MG tablet  Commonly known as: FLEXERIL  Ask about: Which instructions should I use?   10 mg, Oral, 3 Times Daily      ferrous sulfate 325 (65 FE) MG tablet  Ask about: Which instructions should I use?   325 mg, Oral, Daily With Breakfast      loratadine 10 MG tablet  Commonly known as: CLARITIN   10 mg, Oral, Daily      metoprolol tartrate 25 MG tablet  Commonly known as: LOPRESSOR   25 mg, Oral, 2 Times Daily      naproxen 500 MG tablet  Commonly known as: NAPROSYN   500 mg, Oral, 2 Times Daily PRN, Take with food!      nitroglycerin 0.4 MG SL tablet  Commonly known as: NITROSTAT  Ask about: Which instructions should I use?   0.4 mg, Sublingual, Every 5 Minutes PRN, Take no more than 3 doses in 15 minutes.       NON FORMULARY   Allergy injections       omeprazole 20 MG capsule  Commonly known as: priLOSEC   20 mg, Oral, Every Morning Before Breakfast      OXcarbazepine 600 MG tablet  Commonly known as: TRILEPTAL   600 mg, Oral, 2 Times Daily, Take dos      prazosin 2 MG capsule  Commonly known as: MINIPRESS  Ask about: Which instructions should I use?   1 mg, Oral, Nightly      pregabalin 200 MG capsule  Commonly known as: LYRICA   200 mg, Oral, 3 Times Daily      SUMAtriptan 50 MG tablet  Commonly known as: IMITREX   TAKE 1 TABLET BY MOUTH AT ONSET OF HEADACHE. MAY REPEAT DOSE 1 TIME IN 2 HOURS  IF HEADACHE NOT RELIEVED      Trintellix 20 MG tablet  Generic drug: Vortioxetine HBr   20 mg, Oral, Daily             Allergies   Allergen Reactions   • Hydrocodone Nausea And Vomiting   • Hydrocodone-Acetaminophen GI Intolerance   • Oxycodone GI Intolerance         Discharge Disposition:  Left Against Medical Advice    Diet:  Hospital:No active diet order        Discharge Activity:         CODE STATUS:  Code Status and Medical Interventions:   Ordered at: 08/17/21 1116     Level Of Support Discussed With:    Patient     Code Status:    CPR     Medical Interventions (Level of Support Prior to Arrest):    Full         Future Appointments   Date Time Provider Department Center   8/23/2021 10:15 AM Carlita Mcdermott PA-C [facility]   9/2/2021  3:50 PM Jaimie Silva MD [facility]   11/2/2021  1:15 PM Brittni Shell MD [facility]           Time spent on Discharge including face to face service:  10 minutes      Signature: Electronically signed by Crispin Pringle DO, 08/18/21, 1:49 PM EDT.       negative No oral lesions; no gross abnormalities

## 2021-08-18 NOTE — CASE MANAGEMENT/SOCIAL WORK
Case Management Discharge Note      Final Note: Left AMA         Selected Continued Care - Discharged on 8/17/2021 Admission date: 8/16/2021 - Discharge disposition: Left Against Medical Advice        Final Discharge Disposition Code: 07 - left AMA

## 2021-08-18 NOTE — ANESTHESIA POSTPROCEDURE EVALUATION
Patient: Frank Barger    Procedure Summary     Date: 08/18/21 Room / Location: [facility] OR 11 / [facility] MAIN OR    Anesthesia Start: 1324 Anesthesia Stop: 1719    Procedure: Left  ulna open reduction internal fixation, left radial head replacement versus excision, possible coronoid fixation, possible lateral ulnar collateral ligament reconstruction with local tissue, possible application of internal joint stabilizer (Left Elbow) Diagnosis:       Closed fracture of left elbow, initial encounter      (Closed fracture of left elbow, initial encounter [S42.402A])    Surgeons: Yonis Mcdonough MD Provider: Dmitry Sterling MD    Anesthesia Type: general with block ASA Status: 4          Anesthesia Type: general with block    Vitals  Vitals Value Taken Time   BP 95/44 08/18/21 1716   Temp     Pulse 73 08/18/21 1718   Resp     SpO2 99 % 08/18/21 1718   Vitals shown include unvalidated device data.        Post Anesthesia Care and Evaluation    Patient location during evaluation: PACU  Patient participation: complete - patient participated  Level of consciousness: awake  Pain scale: See nurse's notes for pain score.  Pain management: adequate  Airway patency: patent  Anesthetic complications: No anesthetic complications  PONV Status: none  Cardiovascular status: acceptable  Respiratory status: acceptable  Hydration status: acceptable    Comments: Patient seen and examined postoperatively; vital signs stable; SpO2 greater than or equal to 90%; cardiopulmonary status stable; nausea/vomiting adequately controlled; pain adequately controlled; no apparent anesthesia complications; patient discharged from anesthesia care when discharge criteria were met

## 2021-08-18 NOTE — PROGRESS NOTES
Ortho note:    Review of chart reveals the patient left AMA last evening after he was refused a cigarette. He then returned afterwards noting remorse for his earlier decision and agreeable to continue treatment. At this time, we are planning to proceed with surgical fixation of the left elbow. I have spoken to the nursing team and will replace consent order for his new admission. Please keep him n.p.o. with the plan for surgery at 130 this afternoon. Strongly urged smoking cessation in this patient to improve bony healing of his fracture as well as for his overall health.    Yonis Mcdonough MD, PhD  Orthopaedic & Hand Surgery  [facility]  [phone] - [place] [facility]  [phone] - [facility]

## 2021-08-18 NOTE — DISCHARGE PLACEMENT REQUEST
"Frank Barger (51 y.o. Male)     Date of Birth Social Security Number Address Home Phone MRN    1970  [address] [facility] [place] [address] [phone] [id number]    Confucianist Marital Status          Non-Zoroastrianism Single       Admission Date Admission Type Admitting Provider Attending Provider Department, Room/Bed    8/18/21 Emergency Ramon Mcgowan MD [place], [place], MD [facility] MAIN OR, [facility] MAIN OR/MAIN OR    Discharge Date Discharge Disposition Discharge Destination                       Attending Provider: Ramon Mcgowan MD    Allergies: Hydrocodone, Hydrocodone-acetaminophen, Oxycodone    Isolation: None   Infection: None   Code Status: CPR    Ht: 188 cm (74\")   Wt: 78 kg (172 lb)    Admission Cmt: None   Principal Problem: None                Active Insurance as of 8/18/2021     Primary Coverage     Payor Plan Insurance Group Employer/Plan Group    ANTHEM MEDICARE REPLACEMENT ANTHEM MEDICARE ADVANTAGE [id number]     Payor Plan Address Payor Plan Phone Number Payor Plan Fax Number Effective Dates    [address] [phone]  1/1/2019 - None Entered    [facility] [address]       Subscriber Name Subscriber Birth Date Member ID       FRANK BARGER 1970 [id number]           Secondary Coverage     Payor Plan Insurance Group Employer/Plan Group    [place]      Payor Plan Address Payor Plan Phone Number Payor Plan Fax Number Effective Dates    [address]   7/22/2019 - None Entered    [place] [address]       Subscriber Name Subscriber Birth Date Member ID       FRANK BARGER 1970 [id number]                 Emergency Contacts      (Rel.) Home Phone Work Phone Mobile Phone    SHARMILAELANA MULLIGAN (Spouse) -- -- [phone]              "

## 2021-08-18 NOTE — NURSING NOTE
Patient stating that he is going to leave AMA if he cannot get a cigarette. Nicotine patch in place per MAR. Call placed to Jammie Menchaca. Pt offered ativan and nicotine gum. Pt states that he does not want either option. Risks of leaving explained to patient.

## 2021-08-19 LAB
ANION GAP SERPL CALCULATED.3IONS-SCNC: 9 MMOL/L (ref 5–15)
BASOPHILS # BLD AUTO: 0 10*3/MM3 (ref 0–0.2)
BASOPHILS NFR BLD AUTO: 0.1 % (ref 0–1.5)
BUN SERPL-MCNC: 9 MG/DL (ref 6–20)
BUN/CREAT SERPL: 9.9 (ref 7–25)
CALCIUM SPEC-SCNC: 8.2 MG/DL (ref 8.6–10.5)
CHLORIDE SERPL-SCNC: 110 MMOL/L (ref 98–107)
CO2 SERPL-SCNC: 22 MMOL/L (ref 22–29)
CREAT SERPL-MCNC: 0.91 MG/DL (ref 0.76–1.27)
DEPRECATED RDW RBC AUTO: 56 FL (ref 37–54)
EOSINOPHIL # BLD AUTO: 0 10*3/MM3 (ref 0–0.4)
EOSINOPHIL NFR BLD AUTO: 0 % (ref 0.3–6.2)
ERYTHROCYTE [DISTWIDTH] IN BLOOD BY AUTOMATED COUNT: 18.2 % (ref 12.3–15.4)
GFR SERPL CREATININE-BSD FRML MDRD: 88 ML/MIN/1.73
GLUCOSE SERPL-MCNC: 136 MG/DL (ref 65–99)
HCT VFR BLD AUTO: 28.8 % (ref 37.5–51)
HGB BLD-MCNC: 9.8 G/DL (ref 13–17.7)
LYMPHOCYTES # BLD AUTO: 0.7 10*3/MM3 (ref 0.7–3.1)
LYMPHOCYTES NFR BLD AUTO: 7.5 % (ref 19.6–45.3)
MCH RBC QN AUTO: 30.1 PG (ref 26.6–33)
MCHC RBC AUTO-ENTMCNC: 33.9 G/DL (ref 31.5–35.7)
MCV RBC AUTO: 88.7 FL (ref 79–97)
MONOCYTES # BLD AUTO: 0.9 10*3/MM3 (ref 0.1–0.9)
MONOCYTES NFR BLD AUTO: 9.1 % (ref 5–12)
NEUTROPHILS NFR BLD AUTO: 8.1 10*3/MM3 (ref 1.7–7)
NEUTROPHILS NFR BLD AUTO: 83.3 % (ref 42.7–76)
NRBC BLD AUTO-RTO: 0 /100 WBC (ref 0–0.2)
PLATELET # BLD AUTO: 187 10*3/MM3 (ref 140–450)
PMV BLD AUTO: 8.8 FL (ref 6–12)
POTASSIUM SERPL-SCNC: 3.9 MMOL/L (ref 3.5–5.2)
QT INTERVAL: 354 MS
RBC # BLD AUTO: 3.24 10*6/MM3 (ref 4.14–5.8)
SODIUM SERPL-SCNC: 141 MMOL/L (ref 136–145)
WBC # BLD AUTO: 9.7 10*3/MM3 (ref 3.4–10.8)

## 2021-08-19 PROCEDURE — 80048 BASIC METABOLIC PNL TOTAL CA: CPT | Performed by: STUDENT IN AN ORGANIZED HEALTH CARE EDUCATION/TRAINING PROGRAM

## 2021-08-19 PROCEDURE — 85025 COMPLETE CBC W/AUTO DIFF WBC: CPT | Performed by: STUDENT IN AN ORGANIZED HEALTH CARE EDUCATION/TRAINING PROGRAM

## 2021-08-19 PROCEDURE — 25010000002 MORPHINE PER 10 MG: Performed by: STUDENT IN AN ORGANIZED HEALTH CARE EDUCATION/TRAINING PROGRAM

## 2021-08-19 PROCEDURE — 25010000002 CEFAZOLIN PER 500 MG: Performed by: STUDENT IN AN ORGANIZED HEALTH CARE EDUCATION/TRAINING PROGRAM

## 2021-08-19 PROCEDURE — 99233 SBSQ HOSP IP/OBS HIGH 50: CPT | Performed by: INTERNAL MEDICINE

## 2021-08-19 PROCEDURE — 97165 OT EVAL LOW COMPLEX 30 MIN: CPT

## 2021-08-19 PROCEDURE — 97162 PT EVAL MOD COMPLEX 30 MIN: CPT

## 2021-08-19 RX ORDER — HYDROCODONE BITARTRATE AND ACETAMINOPHEN 7.5; 325 MG/1; MG/1
1 TABLET ORAL EVERY 6 HOURS PRN
Status: DISCONTINUED | OUTPATIENT
Start: 2021-08-19 | End: 2021-08-20 | Stop reason: HOSPADM

## 2021-08-19 RX ADMIN — PREGABALIN 200 MG: 100 CAPSULE ORAL at 20:42

## 2021-08-19 RX ADMIN — CETIRIZINE HYDROCHLORIDE 10 MG: 10 TABLET, FILM COATED ORAL at 08:51

## 2021-08-19 RX ADMIN — CYCLOBENZAPRINE 10 MG: 10 TABLET, FILM COATED ORAL at 16:09

## 2021-08-19 RX ADMIN — TERAZOSIN HYDROCHLORIDE 5 MG: 5 CAPSULE ORAL at 20:42

## 2021-08-19 RX ADMIN — CLOPIDOGREL BISULFATE 75 MG: 75 TABLET ORAL at 08:52

## 2021-08-19 RX ADMIN — FERROUS SULFATE TAB EC 324 MG (65 MG FE EQUIVALENT) 324 MG: 324 (65 FE) TABLET DELAYED RESPONSE at 08:51

## 2021-08-19 RX ADMIN — PREGABALIN 200 MG: 100 CAPSULE ORAL at 16:09

## 2021-08-19 RX ADMIN — CEFAZOLIN SODIUM 2 G: 10 INJECTION, POWDER, FOR SOLUTION INTRAVENOUS at 13:55

## 2021-08-19 RX ADMIN — CYCLOBENZAPRINE 10 MG: 10 TABLET, FILM COATED ORAL at 20:42

## 2021-08-19 RX ADMIN — CEFAZOLIN SODIUM 2 G: 10 INJECTION, POWDER, FOR SOLUTION INTRAVENOUS at 05:01

## 2021-08-19 RX ADMIN — CYCLOBENZAPRINE 10 MG: 10 TABLET, FILM COATED ORAL at 08:51

## 2021-08-19 RX ADMIN — METOPROLOL TARTRATE 25 MG: 25 TABLET, FILM COATED ORAL at 20:42

## 2021-08-19 RX ADMIN — PANTOPRAZOLE SODIUM 40 MG: 40 TABLET, DELAYED RELEASE ORAL at 08:52

## 2021-08-19 RX ADMIN — CEFAZOLIN SODIUM 2 G: 10 INJECTION, POWDER, FOR SOLUTION INTRAVENOUS at 20:43

## 2021-08-19 RX ADMIN — MORPHINE SULFATE 2 MG: 4 INJECTION INTRAVENOUS at 08:51

## 2021-08-19 RX ADMIN — PREGABALIN 200 MG: 100 CAPSULE ORAL at 08:51

## 2021-08-19 RX ADMIN — BUPROPION HYDROCHLORIDE 150 MG: 150 TABLET, EXTENDED RELEASE ORAL at 08:51

## 2021-08-19 RX ADMIN — METOPROLOL TARTRATE 25 MG: 25 TABLET, FILM COATED ORAL at 08:51

## 2021-08-19 RX ADMIN — ATORVASTATIN CALCIUM 20 MG: 20 TABLET, FILM COATED ORAL at 20:42

## 2021-08-19 RX ADMIN — AMITRIPTYLINE HYDROCHLORIDE 75 MG: 50 TABLET, FILM COATED ORAL at 20:42

## 2021-08-19 RX ADMIN — HYDROCODONE BITARTRATE AND ACETAMINOPHEN 1 TABLET: 7.5; 325 TABLET ORAL at 16:09

## 2021-08-19 NOTE — PROGRESS NOTES
"Referring Provider: Ramon Mcgowan MD    Reason for follow-up:     Syncope  Multiple bilateral upper extremity fractures  Status post stent     Patient Care Team:  Brittni Shell MD as PCP - General (Family Medicine)  Jaimie Silva MD as Consulting Physician (Cardiology)    Subjective .      ROS    Since I have last seen him yesterday, the patient has been without any chest discomfort ,shortness of breath, palpitations, dizziness or syncope.  Denies having any headache ,abdominal pain ,nausea, vomiting , diarrhea constipation, loss of weight or loss of appetite.  Denies having any excessive bruising ,hematuria or blood in the stool.    Review of all systems negative except as indicated    History  Past Medical History:   Diagnosis Date   • Abdominal pain    • Allergies     takes allergy injections   • Anesthesia complication     states heart \"fluttering\" after anesthesia x 1- was kept in hospital 3 days s/p heart stent placement   • Anxiety    • Arthritis    • Asthma    • Chronic back pain    • Constipation 06/2021   • DDD (degenerative disc disease), lumbar    • Depression    • Emphysema of lung (CMS/HCC)    • GERD (gastroesophageal reflux disease)    • Headache    • Leg pain    • Lumbar spine pain    • Nocturia    • PTSD (post-traumatic stress disorder)    • Under care of pain management specialist     pain management of [place]       Past Surgical History:   Procedure Laterality Date   • CARDIAC CATHETERIZATION N/A 1/20/2021    Procedure: Left Heart Cath and coronary angiogram;  Surgeon: Jaimie Silva MD;  Location: [facility] CATH INVASIVE LOCATION;  Service: Cardiovascular;  Laterality: N/A;   • CARDIAC CATHETERIZATION N/A 1/20/2021    Procedure: Saphenous Vein Graft;  Surgeon: Jaimie Silva MD;  Location: [facility] CATH INVASIVE LOCATION;  Service: Cardiovascular;  Laterality: N/A;   • CARDIAC CATHETERIZATION N/A 1/20/2021    Procedure: Stent EBENEZER coronary;  Surgeon: Herve Rodriguez MD;  " Location: [facility] CATH INVASIVE LOCATION;  Service: Cardiology;  Laterality: N/A;   • CORONARY ANGIOPLASTY WITH STENT PLACEMENT  04/2014    2 in 2014, 1 in 12/2020   • CORONARY ARTERY BYPASS GRAFT  12/2015    x 3   • ELBOW DEBRIDEMENT Right 8/17/2021    Procedure: INCISION / DEBRIDEMENT ELBOW, closed reduction right elbow dislocation ;  Surgeon: Kyler August MD;  Location: [facility] MAIN OR;  Service: Orthopedics;  Laterality: Right;       Family History   Problem Relation Age of Onset   • Diabetes Mother    • Heart disease Father    • Anxiety disorder Daughter    • Depression Daughter        Social History     Tobacco Use   • Smoking status: Current Every Day Smoker     Packs/day: 1.50   • Smokeless tobacco: Never Used   • Tobacco comment: do not smoke dos   Vaping Use   • Vaping Use: Never used   Substance Use Topics   • Alcohol use: No   • Drug use: No        Medications Prior to Admission   Medication Sig Dispense Refill Last Dose   • amitriptyline (ELAVIL) 75 MG tablet TAKE 1 TABLET BY MOUTH EVERY NIGHT 90 tablet 0    • aspirin (aspirin) 81 MG EC tablet Take 1 tablet by mouth Daily. 90 tablet 1    • atorvastatin (LIPITOR) 20 MG tablet Take 1 tablet by mouth Every Night. 90 tablet 1    • buPROPion XL (WELLBUTRIN XL) 150 MG 24 hr tablet Take 1 tablet by mouth Daily. 90 tablet 1    • clopidogrel (PLAVIX) 75 MG tablet Take 1 tablet by mouth Daily. 90 tablet 1    • cyclobenzaprine (FLEXERIL) 10 MG tablet Take 10 mg by mouth 3 (Three) Times a Day.      • ferrous sulfate 325 (65 FE) MG tablet Take 325 mg by mouth Daily With Breakfast.      • loratadine (CLARITIN) 10 MG tablet Take 10 mg by mouth Daily.      • metoprolol tartrate (LOPRESSOR) 25 MG tablet Take 1 tablet by mouth 2 (Two) Times a Day. 180 tablet 1    • naproxen (NAPROSYN) 500 MG tablet Take 1 tablet by mouth 2 (Two) Times a Day As Needed for Moderate Pain . Take with food! 60 tablet 3    • nitroglycerin (NITROSTAT) 0.4 MG SL tablet Place 0.4 mg under the  tongue Every 5 (Five) Minutes As Needed for Chest Pain. Take no more than 3 doses in 15 minutes.      • NON FORMULARY Allergy injections      • omeprazole (priLOSEC) 20 MG capsule Take 1 capsule by mouth Every Morning Before Breakfast. 90 capsule 1    • prazosin (MINIPRESS) 2 MG capsule Take 1 mg by mouth Every Night.      • pregabalin (LYRICA) 200 MG capsule Take 1 capsule by mouth 3 (Three) Times a Day. 90 capsule 5    • SUMAtriptan (IMITREX) 50 MG tablet TAKE 1 TABLET BY MOUTH AT ONSET OF HEADACHE. MAY REPEAT DOSE 1 TIME IN 2 HOURS IF HEADACHE NOT RELIEVED 10 tablet 0    • ARIPiprazole (ABILIFY) 5 MG tablet TAKE 1 TABLET BY MOUTH EVERY NIGHT 90 tablet 0    • OXcarbazepine (TRILEPTAL) 600 MG tablet Take 600 mg by mouth 2 (Two) Times a Day. Take dos      • Vortioxetine HBr (Trintellix) 20 MG tablet Take 20 mg by mouth Daily. 30 tablet 0        Allergies  Hydrocodone, Hydrocodone-acetaminophen, and Oxycodone    Scheduled Meds:amitriptyline, 75 mg, Oral, Nightly  atorvastatin, 20 mg, Oral, Nightly  buPROPion XL, 150 mg, Oral, Daily  ceFAZolin, 2 g, Intravenous, Q8H  cetirizine, 10 mg, Oral, Daily  clopidogrel, 75 mg, Oral, Daily  cyclobenzaprine, 10 mg, Oral, TID  ferrous sulfate, 324 mg, Oral, Daily With Breakfast  metoprolol tartrate, 25 mg, Oral, BID  nicotine, 1 patch, Transdermal, Q24H  pantoprazole, 40 mg, Oral, QAM AC  pregabalin, 200 mg, Oral, TID  terazosin, 5 mg, Oral, Nightly      Continuous Infusions:lactated ringers, 100 mL/hr  sodium chloride, 100 mL/hr, Last Rate: 100 mL/hr (08/18/21 1324)      PRN Meds:.Morphine  •  ondansetron **OR** ondansetron    Objective     VITAL SIGNS  Vitals:    08/18/21 1757 08/18/21 1809 08/18/21 1941 08/19/21 0441   BP: 101/49 110/52 106/50 114/65   BP Location:   Right leg Right leg   Patient Position:   Lying Lying   Pulse: 68 68 69 72   Resp: 11 10 14 16   Temp:  97.7 °F (36.5 °C) 97.6 °F (36.4 °C) 97.9 °F (36.6 °C)   TempSrc:  Temporal Oral Oral   SpO2: 91% 92% 94% 97%  "  Weight:    81.6 kg (179 lb 14.4 oz)   Height:           Flowsheet Rows      First Filed Value   Admission Height  188 cm (74\") Documented at 08/18/2021 0256   Admission Weight  78 kg (171 lb 15.3 oz) Documented at 08/18/2021 0256            Intake/Output Summary (Last 24 hours) at 8/19/2021 0632  Last data filed at 8/19/2021 0504  Gross per 24 hour   Intake 1180 ml   Output 2700 ml   Net -1520 ml        TELEMETRY: Sinus rhythm    Physical Exam:  The patient is alert, oriented and in no distress.  Vital signs as noted above.  Head and neck revealed no carotid bruits or jugular venous distention.  No thyromegaly or lymphadenopathy is present  Lungs clear.  No wheezing.  Breath sounds are normal bilaterally.  Heart normal first and second heart sounds.  No murmur. No precordial rub is present.  No gallop is present.  Abdomen soft and nontender.  No organomegaly is present.  Extremities with good peripheral pulses both upper extremities covered with bandage.  Status post surgery on both elbows.  Skin warm and dry.  Musculoskeletal system is grossly normal  CNS grossly normal      Results Review:   I reviewed the patient's new clinical results.  Lab Results (last 24 hours)     Procedure Component Value Units Date/Time    CBC & Differential [[id number]]  (Abnormal) Collected: 08/19/21 0556    Specimen: Blood Updated: 08/19/21 0630    Narrative:      The following orders were created for panel order CBC & Differential.  Procedure                               Abnormality         Status                     ---------                               -----------         ------                     CBC Auto Differential[[id number]]        Abnormal            Final result                 Please view results for these tests on the individual orders.    CBC Auto Differential [[id number]]  (Abnormal) Collected: 08/19/21 0556    Specimen: Blood Updated: 08/19/21 0630     WBC 9.70 10*3/mm3      RBC 3.24 10*6/mm3      Hemoglobin 9.8 g/dL  "     Hematocrit 28.8 %      MCV 88.7 fL      MCH 30.1 pg      MCHC 33.9 g/dL      RDW 18.2 %      RDW-SD 56.0 fl      MPV 8.8 fL      Platelets 187 10*3/mm3      Neutrophil % 83.3 %      Lymphocyte % 7.5 %      Monocyte % 9.1 %      Eosinophil % 0.0 %      Basophil % 0.1 %      Neutrophils, Absolute 8.10 10*3/mm3      Lymphocytes, Absolute 0.70 10*3/mm3      Monocytes, Absolute 0.90 10*3/mm3      Eosinophils, Absolute 0.00 10*3/mm3      Basophils, Absolute 0.00 10*3/mm3      nRBC 0.0 /100 WBC     Basic Metabolic Panel [[id number]] Collected: 08/19/21 0556    Specimen: Blood Updated: 08/19/21 0615          Imaging Results (Last 24 Hours)     Procedure Component Value Units Date/Time    XR Elbow 3+ View Left [[id number]] Resulted: 08/18/21 1727     Updated: 08/18/21 1731    FL C Arm During Surgery [[id number]] Resulted: 08/18/21 1727     Updated: 08/18/21 1729    XR Elbow 2 View Right [[id number]] Collected: 08/18/21 1120     Updated: 08/18/21 1125    Narrative:      DATE OF EXAM:  8/18/2021 10:13 AM     PROCEDURE:  XR ELBOW 2 VW RIGHT-     INDICATIONS:  Elbow instability; S42.402D-Unspecified fracture of lower end of left  humerus, subsequent encounter for fracture with routine healing     COMPARISON:  No Comparisons Available     TECHNIQUE:   Two Radiologic views of the right elbow were obtained.     FINDINGS:  There is subcutaneous air again seen at the right elbow. No fracture or  dislocation. No visualized joint effusion. No osseous erosion. Radial  head is normally aligned.        Impression:      1. Negative for fracture or dislocation.  2. Subcutaneous air again noted overlying the soft tissues of the right  elbow, similar to prior study.     Electronically Signed By-Anshul Ram MD On:8/18/2021 11:23 AM  This report was finalized on [id number] by  Anshul Ram MD.      LAB RESULTS (LAST 7 DAYS)    CBC  Results from last 7 days   Lab Units 08/19/21  0556 08/18/21  0504 08/17/21  0751 08/16/21  1726   WBC  10*3/mm3 9.70 11.60* 6.10 13.00*   RBC 10*6/mm3 3.24* 3.63* 3.78* 4.66   HEMOGLOBIN g/dL 9.8* 10.4* 11.2* 13.4   HEMATOCRIT % 28.8* 31.4* 33.0* 40.1   MCV fL 88.7 86.4 87.3 86.0   PLATELETS 10*3/mm3 187 196 193 258       BMP  Results from last 7 days   Lab Units 08/18/21  0504 08/17/21  0017 08/16/21  1726   SODIUM mmol/L 138 136 133*   POTASSIUM mmol/L 4.1 4.2 3.9   CHLORIDE mmol/L 105 102 99   CO2 mmol/L 24.0 22.0 20.0*   BUN mg/dL 13 17 17   CREATININE mg/dL 0.89 1.09 1.09   GLUCOSE mg/dL 132* 112* 111*       CMP   Results from last 7 days   Lab Units 08/18/21  0504 08/17/21  0017 08/16/21  1726   SODIUM mmol/L 138 136 133*   POTASSIUM mmol/L 4.1 4.2 3.9   CHLORIDE mmol/L 105 102 99   CO2 mmol/L 24.0 22.0 20.0*   BUN mg/dL 13 17 17   CREATININE mg/dL 0.89 1.09 1.09   GLUCOSE mg/dL 132* 112* 111*         BNP        TROPONIN  Results from last 7 days   Lab Units 08/17/21  0017   TROPONIN T ng/mL <0.010       CoAg  Results from last 7 days   Lab Units 08/16/21  1726   INR  1.03   APTT seconds 24.7       Creatinine Clearance  Estimated Creatinine Clearance: 113.3 mL/min (by C-G formula based on SCr of 0.89 mg/dL).    ABG        Radiology  XR Elbow 2 View Right    Result Date: 8/18/2021  1. Negative for fracture or dislocation. 2. Subcutaneous air again noted overlying the soft tissues of the right elbow, similar to prior study.  Electronically Signed By-Anshul Ram MD On:8/18/2021 11:23 AM This report was finalized on [id number] by  Anshul Ram MD.    XR Elbow 2 View Right    Result Date: 8/17/2021  Right elbow and forearm soft tissue swelling with subcutaneous gas. No retained radiopaque foreign body is seen. No acute osseous abnormality of the right elbow  Electronically Signed By-Penny Jj MD On:8/17/2021 12:00 PM This report was finalized on [id number] by  Penny Jj MD.              EKG      I personally viewed and interpreted the patient's EKG/Telemetry data: Normal sinus rhythm nonspecific  ST-T wave changes    ECHOCARDIOGRAM:    Results for orders placed during the hospital encounter of 08/16/21    Adult Transthoracic Echo Complete W/ Cont if Necessary Per Protocol    Interpretation Summary  · Estimated left ventricular EF = 60% Left ventricular systolic function is normal.    Indications  Syncope    Technically satisfactory study.  Mitral valve is structurally normal.  Tricuspid valve is structurally normal.  Aortic valve is structurally normal.  Pulmonic valve could not be well visualized.  No evidence for mitral tricuspid or aortic regurgitation is seen by Doppler study.  Left atrium is normal in size.  Right atrium is normal in size.  Left ventricle is normal in size and contractility with ejection fraction of 60%.  Right ventricle is normal in size.  Atrial septum is intact.  Aorta is normal.  No pericardial effusion or intracardiac thrombus is seen.    Impression  Structurally and functionally normal cardiac valves.  Left ventricular size and contractility is normal with ejection fraction of 60%.          STRESS MYOVIEW:    Cardiolite (Tc-99m Sestamibi) stress test    CARDIAC CATHETERIZATION:            OTHER:         Assessment/Plan     Principal Problem:    Fracture of left elbow      Frank Barger is a 51 y.o. male who presents with history of syncopal episode yesterday with injury to bilateral elbows.  Patient apparently was outside with a friend and got up to get a drink and the next thing he was on the concrete floor with face down.  He had syncopal episode approximately 6 months back.  Patient has been having occasional dizzy spells.  Patient had stent placement in December 2020.  Patient denies having any chest discomfort or shortness of breath palpitations.  Patient is not sure he had dizziness prior to this episode and it was very fast.  Surgery is planned.  Cardiac consultation was requested.  CT scan of the head cervical spine and facial bones were negative for fracture.  No  "other associated aggravating or alleviating factors.    Patient had surgery on his right elbow yesterday.  Patient signed out AMA last night since he was not able to smoke in the hospital.  Patient has returned back this morning to pursue planned surgery on his left elbow    ]]]]]]]]]]]]]]]]]  Impression  ==========  -Recent syncope  Bilateral elbow fractures  \"X-ray of the left elbow showed a complete dislocation of the radiocapitellar and ulnar trochlear joints. This was reduced in the emergency department. X-ray of elbow as per radiology also shows extensively comminuted and displaced fracture of the old [place] and suspected nondisplaced supracondylar fracture of the distal humerus on the right side.\"    Status post closed reduction right elbow dislocation and closed reduction and splinting August 17, 2021 by Dr. August.  Status post surgery on left elbow by Dr. hartman-8/18/2021    -Status post CABG (2015) (triple-vessel according to patient) at [facility].     -Status post stent placement 4/4/2014 at [facility]  Status post stent to LAD between diagonal branch and landing site of LIMA.  1/20/2021-[facility]    Echocardiogram-normal August 16, 2021     Cardiac catheterization 1/21/2021  Left ventricle size and contractility is normal with ejection fraction of 60%.  Left main coronary artery is normal.  Left anterior descending artery provided a moderate sized diagonal branch and left anterior descending artery has 80% disease proximal to the LIMA landing site.  Circumflex coronary artery is a moderately large vessel that provided a large marginal branch.  Circumflex coronary artery has 60 to 70% disease just distal to the marginal branch.  Ramus intermedius is a relatively small caliber vessel that has ostial 80 to 90% disease.  Right coronary artery is a large and dominant vessel.  Left ventricular branch has 60 to 70% disease at its ostium.  LIMA to left anterior " descending artery is very small and atretic with probably 90 to 95% disease just proximal to the insertion site.  SVG to diagonal branch is patent.  SVG to presumably ramus intermedius is totally occluded in the proximal segment.     2 out of 3 grafts are patent.  However patent LIMA is extremely small in caliber and atraumatic with significant disease near the landing site.  Difficult to tell the source of patient's symptoms but likely from left anterior descending artery.  Patient to have intervention to left antidescending artery distal to the diagonal branch and proximal to the LIMA landing site.  Other source of pain could be from circumflex coronary artery left ventricular branch of RCA and ramus intermedius.  Suggest maximize medical treatment for disease in these vessels.  Modification of risk factors.     Echocardiogram 1/14/2021 revealed mild paradoxical septal motion and inferior wall hypokinesis with ejection fraction of 50%.  Lexiscan Cardiolite test-abnormal with mild posterior and inferior proximal ischemia-1/14/2021     -Dyslipidemia hypertension COPD PTSD     -Psoriasis     -Family history of coronary artery disease     -Smoker-abstinence from smoking     -Allergic to oxycodone hydrocodone  ==========  Plan  =========  Recent syncope and bilateral elbow fractures.  Patient has been having off-and-on dizziness.  Patient would benefit from close cardiac monitoring including loop recorder placement.  EKG showed no acute changes.    closed reduction right elbow dislocation and closed reduction and splinting by Dr. August August 17, 2021  Status post left elbow surgery 8/18/2021     Status post CABG    Status post stent placement.  1/20/2021  Patient is not having any angina pectoris or congestive heart failure.  Patient is on Plavix     Hypertension-well-controlled 126/76  Continue metoprolol tartrate 25 mg a day     Dyslipidemia-continue atorvastatin      Medications were reviewed and updated.     Have  discussed with patient regarding post discharge cardiac monitoring in view of his recent syncopal episode requiring bilateral elbow surgery.  So for cardiac monitoring did not show any dysrhythmia.  Option of loop recorder versus 30-day monitor was discussed with patient.  Risks and benefits pros and cons of the procedure were discussed with patient including infection bleeding etc.  Patient wants to think about the options and let me know.  Given patient's situation with bilateral elbow surgery best option perhaps would be a loop recorder placement then 30-day event monitor.    Further plan will depend on patient's progress.  ]]]]]]]]]]]]]]          Jaimie Silva MD  08/19/21  06:32 EDT

## 2021-08-19 NOTE — CASE MANAGEMENT/SOCIAL WORK
Continued Stay Note  MEGAN Lama     Patient Name: Frank Barger  MRN: [id number]  Today's Date: 8/19/2021    Admit Date: 8/18/2021    Discharge Plan     Row Name 08/19/21 1655       Plan    Plan  D/C Plan: Anticipate home with spouse and outpatient therapy at [facility].    Plan Comments  SW met with pt at the bedside to discuss consult stating pt has financial concerns. Pt became somewhat erratic and demonstrated irritability upon being asked if he has financial concerns. Pt stated that his only concern is that his wife spends all of their money but states he is able to pay his bills.        Met with patient in room wearing PPE: mask, face shield/goggles.      Maintained distance greater than six feet and spent less than 15 minutes in the room.      [place] Dailey [facility] [place]    Office: [phone]  Cell: [phone]  Fax: [phone]  E-mail: [email or web address]

## 2021-08-19 NOTE — PLAN OF CARE
Goal Outcome Evaluation:  Plan of Care Reviewed With: patient           Outcome Summary: Pt is a 50 YO M with [place] UE injury, (dislocation at R elbow, complex frx s/p fixation at L elbow). Pt Left AMA and was readmitted. Pt reports living at home with spouse, typically independent with all ADLs, ambulation without AD and reports having few recent falls. Pt reports falls come from psoriatic arthritis in back/leg. This date pt demonstrates decent functional mobility, but poor follow through of precautions with UE. Pt attempted to use UE for mobility and had to be cued multiple times for safety. Once standing pt ambulated in room with CGA/MIN A with no significant LOB. Pt unable to perform necessary duties at home and recommendation is IP rehab. Pt adamantly refusing rehab, and will require significant assistance from spouse and OPPT following d/c.

## 2021-08-19 NOTE — PLAN OF CARE
Goal Outcome Evaluation:  Plan of Care Reviewed With: patient        Progress: no change  Outcome Summary: Pt. is 50 y/o male admit w/ dislocation R elbow and L elbow fracture. Pt. underwent RUE sx to correct stage 1 discloaction and left AMA, returning shortly after secondary to possible redislocation. Pt. now POD #1 LUE ORIF and w/ splints B elbows to prevent AROM untill pt. follows up w/ orthopedic MD x 2 weeks. Pt. states he is I/ADL Independent at baseline and lives at home w/ spouse. Pt. ADL participation largely impacted by recent surgery including inability to feed self w/o breaking precautions. Pt. completes ambulatory transfers w/ CGA for safety secondary to impacts to dynamic standing balance. Recommend IP rehab at d/c, pt. refusing this date and states his spouse will care for him at home. Will require OP therapy at 2 week follow to address AROM/PROM deficits.

## 2021-08-19 NOTE — PLAN OF CARE
Problem: Fall Injury Risk  Goal: Absence of Fall and Fall-Related Injury  Intervention: Identify and Manage Contributors to Fall Injury Risk  Recent Flowsheet Documentation  Taken 8/18/2021 2316 by Ashley Arnold RN  Medication Review/Management: medications reviewed  Taken 8/18/2021 2115 by Ashley Arnold, RN  Medication Review/Management: medications reviewed  Taken 8/18/2021 1920 by Ashley Arnold, RN  Medication Review/Management: medications reviewed  Intervention: Promote Injury-Free Environment  Recent Flowsheet Documentation  Taken 8/19/2021 0310 by Ashley Arnold, RN  Safety Promotion/Fall Prevention:   assistive device/personal items within reach   clutter free environment maintained   fall prevention program maintained   lighting adjusted   nonskid shoes/slippers when out of bed   room organization consistent   safety round/check completed  Taken 8/19/2021 0142 by Ashley Arnold, RN  Safety Promotion/Fall Prevention:   assistive device/personal items within reach   clutter free environment maintained   fall prevention program maintained   lighting adjusted   nonskid shoes/slippers when out of bed   room organization consistent   safety round/check completed  Taken 8/18/2021 2316 by Ashley Arnold, RN  Safety Promotion/Fall Prevention:   assistive device/personal items within reach   clutter free environment maintained   fall prevention program maintained   lighting adjusted   nonskid shoes/slippers when out of bed   room organization consistent   safety round/check completed  Taken 8/18/2021 2115 by Ashley Arnold, RN  Safety Promotion/Fall Prevention:   assistive device/personal items within reach   clutter free environment maintained   fall prevention program maintained   lighting adjusted   nonskid shoes/slippers when out of bed   room organization consistent   safety round/check completed  Taken 8/18/2021 1920 by Ashley Arnold, RN  Safety Promotion/Fall Prevention:    assistive device/personal items within reach   clutter free environment maintained   fall prevention program maintained   lighting adjusted   nonskid shoes/slippers when out of bed   room organization consistent   safety round/check completed     Problem: Adult Inpatient Plan of Care  Goal: Absence of Hospital-Acquired Illness or Injury  Intervention: Identify and Manage Fall Risk  Recent Flowsheet Documentation  Taken 8/19/2021 0310 by Ashley Arnold RN  Safety Promotion/Fall Prevention:   assistive device/personal items within reach   clutter free environment maintained   fall prevention program maintained   lighting adjusted   nonskid shoes/slippers when out of bed   room organization consistent   safety round/check completed  Taken 8/19/2021 0142 by Ashley Arnold, RN  Safety Promotion/Fall Prevention:   assistive device/personal items within reach   clutter free environment maintained   fall prevention program maintained   lighting adjusted   nonskid shoes/slippers when out of bed   room organization consistent   safety round/check completed  Taken 8/18/2021 2316 by Ashley Arnold, RN  Safety Promotion/Fall Prevention:   assistive device/personal items within reach   clutter free environment maintained   fall prevention program maintained   lighting adjusted   nonskid shoes/slippers when out of bed   room organization consistent   safety round/check completed  Taken 8/18/2021 2115 by Ashley Arnold, RN  Safety Promotion/Fall Prevention:   assistive device/personal items within reach   clutter free environment maintained   fall prevention program maintained   lighting adjusted   nonskid shoes/slippers when out of bed   room organization consistent   safety round/check completed  Taken 8/18/2021 1920 by Ashley Arnold, RN  Safety Promotion/Fall Prevention:   assistive device/personal items within reach   clutter free environment maintained   fall prevention program maintained   lighting adjusted    nonskid shoes/slippers when out of bed   room organization consistent   safety round/check completed  Intervention: Prevent Skin Injury  Recent Flowsheet Documentation  Taken 8/19/2021 0310 by Ashley Arnold RN  Body Position: position changed independently  Taken 8/19/2021 0142 by Ashley Arnold RN  Body Position: position changed independently  Taken 8/18/2021 2316 by Ashley Arnold RN  Body Position: position changed independently  Taken 8/18/2021 2115 by Ashley Arnold RN  Body Position: position changed independently  Taken 8/18/2021 1920 by Ashley Arnold RN  Body Position: position changed independently  Intervention: Prevent Infection  Recent Flowsheet Documentation  Taken 8/19/2021 0310 by Ashley Arnold RN  Infection Prevention:   hand hygiene promoted   personal protective equipment utilized  Taken 8/19/2021 0142 by Ashley Arnold RN  Infection Prevention:   hand hygiene promoted   personal protective equipment utilized  Taken 8/18/2021 2316 by Ashley Arnold RN  Infection Prevention:   hand hygiene promoted   personal protective equipment utilized  Taken 8/18/2021 2115 by Ashley Arnold RN  Infection Prevention:   hand hygiene promoted   personal protective equipment utilized  Taken 8/18/2021 1920 by Ashley Arnold RN  Infection Prevention:   hand hygiene promoted   personal protective equipment utilized  Goal: Optimal Comfort and Wellbeing  Intervention: Provide Person-Centered Care  Recent Flowsheet Documentation  Taken 8/18/2021 1920 by Ashley Arnold RN  Trust Relationship/Rapport: care explained     Problem: Asthma Comorbidity  Goal: Maintenance of Asthma Control  Intervention: Maintain Asthma Symptom Control  Recent Flowsheet Documentation  Taken 8/18/2021 2316 by Ashley Arnold RN  Medication Review/Management: medications reviewed  Taken 8/18/2021 2115 by Ashley Arnold RN  Medication Review/Management: medications reviewed  Taken 8/18/2021 1920  by Ashley Arnold RN  Medication Review/Management: medications reviewed     Problem: COPD Comorbidity  Goal: Maintenance of COPD Symptom Control  Intervention: Maintain COPD-Symptom Control  Recent Flowsheet Documentation  Taken 8/18/2021 2316 by Ashley Arnold RN  Medication Review/Management: medications reviewed  Taken 8/18/2021 2115 by Ashley Arnold RN  Medication Review/Management: medications reviewed  Taken 8/18/2021 1920 by Ashley Arnold RN  Medication Review/Management: medications reviewed     Problem: Heart Failure Comorbidity  Goal: Maintenance of Heart Failure Symptom Control  Intervention: Maintain Heart Failure-Management Strategies  Recent Flowsheet Documentation  Taken 8/18/2021 2316 by Ashley Arnold RN  Medication Review/Management: medications reviewed  Taken 8/18/2021 2115 by Ashley Arnold RN  Medication Review/Management: medications reviewed  Taken 8/18/2021 1920 by Ashley Arnold RN  Medication Review/Management: medications reviewed     Problem: Hypertension Comorbidity  Goal: Blood Pressure in Desired Range  Intervention: Maintain Hypertension-Management Strategies  Recent Flowsheet Documentation  Taken 8/18/2021 2316 by Ashley Arnold RN  Medication Review/Management: medications reviewed  Taken 8/18/2021 2115 by Ashley Arnold RN  Medication Review/Management: medications reviewed  Taken 8/18/2021 1920 by Ashley Arnold RN  Medication Review/Management: medications reviewed     Problem: Pain Chronic (Persistent) (Comorbidity Management)  Goal: Acceptable Pain Control and Functional Ability  Intervention: Manage Persistent Pain  Recent Flowsheet Documentation  Taken 8/18/2021 2316 by Ashley Arnold RN  Medication Review/Management: medications reviewed  Taken 8/18/2021 2115 by Ashley Arnold RN  Medication Review/Management: medications reviewed  Taken 8/18/2021 1920 by Ashley Arnold RN  Medication Review/Management: medications  reviewed  Intervention: Optimize Psychosocial Wellbeing  Recent Flowsheet Documentation  Taken 8/18/2021 1920 by Ashley Arnold RN  Supportive Measures: active listening utilized  Diversional Activities: television  Family/Support System Care: support provided     Problem: Skin Injury Risk Increased  Goal: Skin Health and Integrity  Intervention: Optimize Skin Protection  Recent Flowsheet Documentation  Taken 8/19/2021 0310 by Ashley Arnold RN  Head of Bed (HOB): HOB elevated  Taken 8/19/2021 0142 by Ashley Arnold RN  Head of Bed (HOB): HOB elevated  Taken 8/18/2021 2316 by Ashley Arnold RN  Head of Bed (HOB): HOB elevated  Taken 8/18/2021 2115 by Ashley Arnold RN  Head of Bed (HOB): HOB elevated  Taken 8/18/2021 1920 by Ashley Arnold RN  Pressure Reduction Techniques: frequent weight shift encouraged  Head of Bed (HOB): HOB elevated  Pressure Reduction Devices: pressure-redistributing mattress utilized   Goal Outcome Evaluation:      Pt given pain meds X1 this shift. Pain controlled, complaints of numbness in left arm after surgery and swelling in fingers. Pt has not ambulated this shift. Urine output adequate, no BM. Pt resting in bed at this time, call light in reach. Will continue to monitor.

## 2021-08-19 NOTE — PLAN OF CARE
Goal Outcome Evaluation:  Plan of Care Reviewed With: patient        Progress: no change  Outcome Summary: Patient with some c/o pain in bilat upper extremeties this shift.  Left upper extremety initially swollen and patient c/o numbness.  Sling adjusted and arm elevated; swelling is reducing and patient able to move fingers now, numbness subsiding.  Will continue to monitor.

## 2021-08-19 NOTE — PROGRESS NOTES
[facility] Daily Progress Note    Patient Name: Frank Barger  : 1970  MRN: [id number]  Primary Care Physician:  Brittni Shell MD  Date of admission: 2021      Subjective      Chief Complaint: Syncopal episode with bilateral elbow fracture    Patient Reports   2021  Patient seen and examined  Stated incisional pain, optimally controlled    Review of Systems   Constitutional: Negative for chills and fever.   HENT: Negative for congestion.    Eyes: Negative for blurred vision.   Cardiovascular: Negative for chest pain.   Respiratory: Negative for shortness of breath.    Endocrine: Negative for cold intolerance and heat intolerance.   Musculoskeletal: Positive for joint pain.   Gastrointestinal: Negative for abdominal pain, nausea and vomiting.   Genitourinary: Negative for dysuria.   Neurological: Negative for focal weakness.   Psychiatric/Behavioral: Negative for altered mental status.          Objective      Vitals:   Temp:  [97.6 °F (36.4 °C)-98.1 °F (36.7 °C)] 97.9 °F (36.6 °C)  Heart Rate:  [68-91] 72  Resp:  [10-18] 16  BP: ()/(43-65) 114/65  Flow (L/min):  [8] 8    Physical Exam  Vitals reviewed.   Constitutional:       General: He is not in acute distress.  HENT:      Head: Normocephalic and atraumatic.      Nose: Nose normal.      Mouth/Throat:      Mouth: Mucous membranes are moist.   Eyes:      Extraocular Movements: Extraocular movements intact.      Conjunctiva/sclera: Conjunctivae normal.      Pupils: Pupils are equal, round, and reactive to light.   Cardiovascular:      Rate and Rhythm: Normal rate and regular rhythm.   Pulmonary:      Effort: No respiratory distress.      Breath sounds: Normal breath sounds.   Abdominal:      General: Bowel sounds are normal.      Palpations: Abdomen is soft.   Musculoskeletal:      Cervical back: Neck supple.      Comments: Surgical dressing to the elbows bilaterally  Left elbow in  sling  Neurovascularly intact distally-bilaterally   Skin:     General: Skin is warm and dry.   Neurological:      Mental Status: He is alert and oriented to person, place, and time.   Psychiatric:         Mood and Affect: Mood normal.             Result Review    Result Review:  I have personally reviewed the results from the time of this admission to 8/19/2021 08:42 EDT and agree with these findings:  [x]  Laboratory  []  Microbiology  [x]  Radiology  []  EKG/Telemetry   []  Cardiology/Vascular   []  Pathology  [x]  Old records  []  Other:  Most notable findings include:        Wounds (last 24 hours)      LDA Wound     Row Name 08/18/21 1920 08/18/21 1810 08/18/21 1727       Wound 08/17/21 0942 Right distal elbow Incision    Wound - Properties Group Placement Date: 08/17/21  -MS Placement Time: 0942 -MS Side: Right  -MS Orientation: distal  -MS Location: elbow  -MS Primary Wound Type: Incision  -MS    Dressing Appearance  dry;intact  -TS  --  --    Closure  FRANCISCO  -TS  --  --    Base  dressing in place, unable to visualize  -TS  --  --    Retired Wound - Properties Group Date first assessed: 08/17/21  -MS Time first assessed: 0942  -MS Side: Right  -MS Location: elbow  -MS Primary Wound Type: Incision  -MS       Wound 08/18/21 1621 Left elbow Incision    Wound - Properties Group Placement Date: 08/18/21  -TD Placement Time: 1621 -TD Side: Left  -TD Location: elbow  -TD Primary Wound Type: Incision  -TD    Dressing Appearance  dry;intact  -TS  dry;intact;no drainage  -AH  dry;intact;no drainage  -CL    Closure  FRANCISCO  -TS  FRANCISCO  -AH  FRANCISCO  -CL    Retired Wound - Properties Group Date first assessed: 08/18/21  -TD Time first assessed: 1621  -TD Side: Left  -TD Location: elbow  -TD Primary Wound Type: Incision  -TD    Row Name 08/18/21 1712 08/18/21 1631 08/18/21 0901       Wound 08/17/21 0942 Right distal elbow Incision    Wound - Properties Group Placement Date: 08/17/21  -MS Placement Time: 0942  -MS Side: Right  -MS  Orientation: distal  -MS Location: elbow  -MS Primary Wound Type: Incision  -MS    Dressing Appearance  --  --  dry;intact;no drainage  -KA    Closure  --  --  FRANCISCO  -KA    Base  --  --  dressing in place, unable to visualize  -KA    Retired Wound - Properties Group Date first assessed: 08/17/21  -MS Time first assessed: 0942  -MS Side: Right  -MS Location: elbow  -MS Primary Wound Type: Incision  -MS       Wound 08/18/21 1621 Left elbow Incision    Wound - Properties Group Placement Date: 08/18/21  -TD Placement Time: 1621  -TD Side: Left  -TD Location: elbow  -TD Primary Wound Type: Incision  -TD    Dressing Appearance  dry;intact;no drainage  -CL  dry;intact  -TD  --    Closure  FRANCISCO  -CL  Approximated xeroform,fluffs, abd,cast padding, plaster splint cast,sling  -TD  --    Retired Wound - Properties Group Date first assessed: 08/18/21  -TD Time first assessed: 1621  -TD Side: Left  -TD Location: elbow  -TD Primary Wound Type: Incision  -TD      User Key  (r) = Recorded By, (t) = Taken By, (c) = Cosigned By    Initials Name Provider Type    Paris Haile, RN Registered Nurse    Oksana Baird RN Registered Nurse    Amalia Boudreaux RN Registered Nurse    Ashley Braswell RN Registered Nurse    Lisa Mathews RN Registered Nurse    Lisa Sanchez LPN Licensed Nurse            Assessment/Plan      Brief Patient Summary:  51-year-old man with multiple comorbidities including CAD status post CABG and PCI, anxiety/depression, GERD and hypertension and hyperlipidemia.  Patient was initially admitted on 8/16/2021 after he had a syncopal episode and fell dislocating his right elbow and displaced fracture of his left elbow.  On 8/17/2021, patient underwent irrigation, closed reduction right elbow dislocation and splinting.  Plan is for open reduction and internal fixation of left olecranon with radial head replacement on 8/18/2021.  However on the night of 08/17/2021, patient left the hospital AMA as  he was not allowed to smoke cigarette while inpatient.    He subsequently returned to the emergency room on 8/18/2021 as he was not able to do anything by himself at home.    He was admitted for further care and orthopedic surgeon reconsulted.      amitriptyline, 75 mg, Oral, Nightly  atorvastatin, 20 mg, Oral, Nightly  buPROPion XL, 150 mg, Oral, Daily  ceFAZolin, 2 g, Intravenous, Q8H  cetirizine, 10 mg, Oral, Daily  clopidogrel, 75 mg, Oral, Daily  cyclobenzaprine, 10 mg, Oral, TID  ferrous sulfate, 324 mg, Oral, Daily With Breakfast  metoprolol tartrate, 25 mg, Oral, BID  nicotine, 1 patch, Transdermal, Q24H  pantoprazole, 40 mg, Oral, QAM AC  pregabalin, 200 mg, Oral, TID  terazosin, 5 mg, Oral, Nightly       lactated ringers, 100 mL/hr  sodium chloride, 100 mL/hr, Last Rate: 100 mL/hr (08/18/21 1324)         Active Hospital Problems:  Active Hospital Problems    Diagnosis    • **Fracture of left elbow      Plan:     left elbow proximal ulna fracture-in four-part  left radial head fracture with comminution  Status post left elbow fracture dislocation fixation-olecranon fracture ORIF and radial head replacement 8/18/2021  Per orthopedic surgeon-remain in postoperative dressing onset 2 weeks post operative visit at which time dressing and staples will be removed  · Gentle active nonweightbearing range of motion will be allowed at that point in a hinged elbow brace from 60 degrees of extension to terminal flexion.  · Strengthening to begin at 12 weeks postoperatively  · Pain control through a combination of ice, elevation and ibuprofen/Tylenol with judicious use of narcotics as needed.  · Follow-up in 2 weeks for wound inspection and staple removal. 2 views of the operative elbow at that time.  · Anticipate gradual return of function with alleviation of stiffness at 6 months to a year.    Right open elbow dislocation  Status post irrigation, closed reduction right elbow dislocation and splinting 8/17/2021  On  antibiotics-Kefzol x72 hours  Weekly x-ray  3 weeks of splinting-if patient fails to maintain reduction-referral to sports medicine specialist for reconstruction will be recommended  Pain control    Syncope-resulting in bilateral elbow fracture  Patient reports having dizziness off and on  No EKG changes on presentation  Has a history of CAD status post CABG and subsequent PCI  Cardiologist following and recommending close cardiac monitoring including loop recorder placement at discharge    Hypertension-blood pressure well controlled  On metoprolol    Dyslipidemia-on statin        Tobacco abuse  Counseled on smoking cessation    Anxiety/depression   on amitriptyline and Wellbutrin,      Migraine headaches stable   hold Imitrex for now     Chronic pain   On Flexeril and Lyrica     GERD on PPI       Plaque psoriasis no home meds         DVT prophylaxis:  No DVT prophylaxis order currently exists.    CODE STATUS:    Code Status: CPR  Medical Interventions (Level of Support Prior to Arrest): Full      Disposition: Pending clinical progress.    This patient has been examined with appropriate PPE. 08/19/21      Electronically signed by Ramon Mcgowan MD, 08/19/21, 08:42 EDT.  [facility]

## 2021-08-19 NOTE — PROGRESS NOTES
Orthopaedic & Hand Surgery  Daily Progress Note  Dr. Yonis Mcdonough  [phone]    DEMOGRAPHICS:   · Frank Barger   · Age:51 y.o.   · MRN:[id number]  · Admitted: 8/18/2021    CC: Left elbow fracture dislocation with pain    PROCEDURE: 1 Day Post-Op  s/p   · Olecranon fracture open reduction internal fixation (CPT 56258)  · Radial head replacement for fracture (CPT 45636)        HOSPITAL PROGRESS  · Patient Issues: Notes pain is improved following pain pill today. Block wore off approximately 12 hours ago. States he is moving his hand well and that the former numbness has resolved following surgery.  · With respect to the right side (type I simple dislocation being managed by Dr. August), he notes a little posterior elbow pain but otherwise improved  · Ambulation/Activity: Evaluated by physical therapy who recommended rehab due to bilateral upper extremity injury. Patient is refusing inpatient rehab and desires to go home.  · He denies fevers, chills, chest pain, shortness of breath, nausea, headache, dizziness, numbness or paresthesias.    VITALS:  Vitals:    08/18/21 1809 08/18/21 1941 08/19/21 0441 08/19/21 0700   BP: 110/52 106/50 114/65 143/79   BP Location:  Right leg Right leg Right leg   Patient Position:  Lying Lying Lying   Pulse: 68 69 72 95   Resp: 10 14 16 16   Temp: 97.7 °F (36.5 °C) 97.6 °F (36.4 °C) 97.9 °F (36.6 °C) 97.9 °F (36.6 °C)   TempSrc: Temporal Oral Oral Oral   SpO2: 92% 94% 97% 95%   Weight:   81.6 kg (179 lb 14.4 oz)    Height:           PHYSICAL EXAM:  · LUNGS: Equal chest rise, no shortness of air  · CARDIOVASCULAR: brisk capillary refill intact  · WOUND: Dressings clean, dry, and intact  · EXTREMITY: Left upper extremity  · Pulses: brisk capillary refill intact  · Sensation: Intact in median, ulnar and radial nerve distributions to light touch  · Motor: Fires muscles controlled by AIN, PIN, median and ulnar nerve  · Range of motion: Able to make a composite fist and fully  extend the fingers. Able to oppose the thumb to the base of the small finger. Elbow range of motion deferred due to splinting    LABS:   WBC   Date Value Ref Range Status   08/19/2021 9.70 3.40 - 10.80 10*3/mm3 Final   08/18/2021 11.60 (H) 3.40 - 10.80 10*3/mm3 Final   08/17/2021 6.10 3.40 - 10.80 10*3/mm3 Final     Hemoglobin   Date Value Ref Range Status   08/19/2021 9.8 (L) 13.0 - 17.7 g/dL Final   08/18/2021 10.4 (L) 13.0 - 17.7 g/dL Final   08/17/2021 11.2 (L) 13.0 - 17.7 g/dL Final     Platelets   Date Value Ref Range Status   08/19/2021 187 140 - 450 10*3/mm3 Final   08/18/2021 196 140 - 450 10*3/mm3 Final   08/17/2021 193 140 - 450 10*3/mm3 Final     Lab Results   Component Value Date    GLUCOSE 136 (H) 08/19/2021    CALCIUM 8.2 (L) 08/19/2021     08/19/2021    K 3.9 08/19/2021    CO2 22.0 08/19/2021     (H) 08/19/2021    BUN 9 08/19/2021    CREATININE 0.91 08/19/2021    EGFRIFNONA 88 08/19/2021    BCR 9.9 08/19/2021    ANIONGAP 9.0 08/19/2021       Imaging:  Postoperative imaging of the left elbow reveals improved alignment but with some displacement of the coronoid fracture distally. Articular surface appears congruent. No evidence of hardware within the joint. Radial head remains concentric and well-seated.    ASSESSMENT: Patient is a 51 y.o. male who is 1 Day Post-Op s/p   · Olecranon fracture open reduction internal fixation (CPT 05364)  · Radial head replacement for fracture (CPT 80302)       Improved without numbness or paresthesias or constitutional symptoms concerning for infection at this time. With respect to healing, I noted that it would take a number of months for the bone to fully heal. For right now, he was instructed to not bear weight through the left elbow in particular. Defer weightbearing restrictions of the right elbow to Dr. August, however, my understanding is he plans to keep him splinted for 3 weeks and then begin gentle range of motion at that time.    The patient asked  about metal detectors. I noted there is a chance that the radial head may set off the alarm as it is made of stainless steel. I noted that the remaining plate is titanium and should not be ferromagnetic.    Regarding placement, I think inpatient rehab would be beneficial as it would allow for professional staff to help with activities of daily living, however, the patient is politely refusing placement. He voiced understanding of restrictions for the left upper extremity.    PLAN:   · Weight Bearing: Right Upper Extremity: Defer to Dr. August. I would recommend against heavy weightbearing.  · Left Upper Extremity: Non Weight Bearing  · PT/OT: Recommending inpatient rehab, patient politely refusing  · DVT PPX: Patient is mobile. Recommend ambulation  · Post-Op Xray: Hardware in good position. Acceptable bony reduction.   · Follow-Up: In office at 2 to 3 weeks postop  · Dispo: Per primary. From an orthopedic standpoint, he may be discharged to home for the left upper extremity. Some reservations regarding home care, however, so long as he understands that he is not to perform heavy lifting activities and should rely on family for ADLs he does so under his own advisement.    Yonis Mcdonough MD, PhD  Orthopaedic & Hand Surgery  [facility]  [phone] - [place] Office  [phone] - [place] Office

## 2021-08-19 NOTE — THERAPY EVALUATION
"Patient Name: Frank Barger  : 1970    MRN: [id number]                              Today's Date: 2021       Admit Date: 2021    Visit Dx:     ICD-10-CM ICD-9-CM   1. Closed fracture of left elbow with routine healing, subsequent encounter  S42.402D V54.11     Patient Active Problem List   Diagnosis   • Atherosclerosis of coronary artery bypass graft   • Essential hypertension   • Chronic pain disorder   • Coronary artery disease   • Degeneration of intervertebral disc of lumbar region   • Fibromyositis   • Mixed hyperlipidemia   • Neuropathic pain   • Posttraumatic stress disorder   • Psoriasis   • Psoriatic arthritis (CMS/HCC)   • Environmental and seasonal allergies   • Personal history of tobacco use, presenting hazards to health   • Status post coronary artery bypass graft   • Gastroesophageal reflux disease without esophagitis   • Tinnitus of left ear   • Intractable migraine without aura and with status migrainosus   • Cerumen debris on tympanic membrane of both ears   • Bilateral deafness   • Moderate major depression (CMS/HCC)   • Ischemic heart disease due to coronary artery obstruction (CMS/HCC)   • Status post angioplasty with stent   • Dyslipidemia   • Abnormal nuclear stress test   • Chest pain with high risk of acute coronary syndrome   • Iron deficiency anemia   • Arthropathy of lumbar facet joint   • Arthropathy of right knee   • Syncope and collapse   • History of scoliosis   • COPD (chronic obstructive pulmonary disease) (CMS/HCC)   • Dislocation of elbow, right, open, initial encounter   • Fracture of left elbow     Past Medical History:   Diagnosis Date   • Abdominal pain    • Allergies     takes allergy injections   • Anesthesia complication     states heart \"fluttering\" after anesthesia x 1- was kept in hospital 3 days s/p heart stent placement   • Anxiety    • Arthritis    • Asthma    • Chronic back pain    • Constipation 2021   • DDD (degenerative disc disease), " lumbar    • Depression    • Emphysema of lung (CMS/HCC)    • GERD (gastroesophageal reflux disease)    • Headache    • Leg pain    • Lumbar spine pain    • Nocturia    • PTSD (post-traumatic stress disorder)    • Under care of pain management specialist     pain management of [place]     Past Surgical History:   Procedure Laterality Date   • CARDIAC CATHETERIZATION N/A 1/20/2021    Procedure: Left Heart Cath and coronary angiogram;  Surgeon: Jaimie Silva MD;  Location: [facility] CATH INVASIVE LOCATION;  Service: Cardiovascular;  Laterality: N/A;   • CARDIAC CATHETERIZATION N/A 1/20/2021    Procedure: Saphenous Vein Graft;  Surgeon: Jaimie Silva MD;  Location: [facility] CATH INVASIVE LOCATION;  Service: Cardiovascular;  Laterality: N/A;   • CARDIAC CATHETERIZATION N/A 1/20/2021    Procedure: Stent EBENEZER coronary;  Surgeon: Herve Rodriguez MD;  Location: [facility] CATH INVASIVE LOCATION;  Service: Cardiology;  Laterality: N/A;   • CORONARY ANGIOPLASTY WITH STENT PLACEMENT  04/2014    2 in 2014, 1 in 12/2020   • CORONARY ARTERY BYPASS GRAFT  12/2015    x 3   • ELBOW DEBRIDEMENT Right 8/17/2021    Procedure: INCISION / DEBRIDEMENT ELBOW, closed reduction right elbow dislocation ;  Surgeon: Kyler August MD;  Location: [facility] MAIN OR;  Service: Orthopedics;  Laterality: Right;   • ELBOW OPEN REDUCTION INTERNAL FIXATION Left 8/18/2021    Procedure: Left  ulna open reduction internal fixation (of olecranon and coronoid), left radial head replacement  ;  Surgeon: Yonis Mcdonough MD;  Location: [facility] MAIN OR;  Service: Orthopedics;  Laterality: Left;     General Information     Row Name 08/19/21 1630          OT Time and Intention    Document Type  evaluation  -MP     Mode of Treatment  occupational therapy  -MP     Row Name 08/19/21 0510          General Information    Patient Profile Reviewed  yes  -MP     Prior Level of Function  independent:;ADL's  -MP     Existing Precautions/Restrictions  fall;non-weight  bearing  -MP     Row Name 08/19/21 1630          Living Environment    Lives With  spouse  -     Row Name 08/19/21 1630          Cognition    Orientation Status (Cognition)  oriented x 3  -MP     Row Name 08/19/21 1630          Safety Issues, Functional Mobility    Safety Issues Affecting Function (Mobility)  insight into deficits/self-awareness;judgment  -MP     Impairments Affecting Function (Mobility)  balance;postural/trunk control;endurance/activity tolerance;strength;range of motion (ROM);pain;sensation/sensory awareness  -MP       User Key  (r) = Recorded By, (t) = Taken By, (c) = Cosigned By    Initials Name Provider Type    Presley Zarate OT Occupational Therapist          Mobility/ADL's     Row Name 08/19/21 1631          Transfers    Sit-Stand [place] (Transfers)  supervision  -     Row Name 08/19/21 1631          Functional Mobility    Functional Mobility- Ind. Level  contact guard assist;1 person  -     Row Name 08/19/21 1631          Mobility    Extremity Weight-bearing Status  right upper extremity;left upper extremity  -MP     Left Upper Extremity (Weight-bearing Status)  non weight-bearing (NWB)  -MP     Right Upper Extremity (Weight-bearing Status)  non weight-bearing (NWB)  -     Row Name 08/19/21 1631          Self-Feeding Assessment/Training    [place] Level (Feeding)  minimum assist (75% patient effort)  -       User Key  (r) = Recorded By, (t) = Taken By, (c) = Cosigned By    Initials Name Provider Type    Presley Zarate OT Occupational Therapist        Obj/Interventions     Row Name 08/19/21 1633          Range of Motion Comprehensive    Comment, General Range of Motion  B elbow splinted w/o AROM/PROM, B shoulder WFL  -MP     Row Name 08/19/21 1633          Strength Comprehensive (MMT)    Comment, General Manual Muscle Testing (MMT) Assessment  BUE not tested following ORIF  -MP     Row Name 08/19/21 1633          Balance    Static Standing Balance   WFL;unsupported;standing  -MP     Dynamic Standing Balance  mild impairment;unsupported;standing  -MP     Balance Interventions  sitting;standing;supported;static;dynamic;sit to stand  -MP       User Key  (r) = Recorded By, (t) = Taken By, (c) = Cosigned By    Initials Name Provider Type    Presley Zarate OT Occupational Therapist        Goals/Plan     Row Name 08/19/21 1641          Bed Mobility Goal 1 (OT)    Activity/Assistive Device (Bed Mobility Goal 1, OT)  bed mobility activities, all  -MP     [place] Level/Cues Needed (Bed Mobility Goal 1, OT)  supervision required  -MP     Time Frame (Bed Mobility Goal 1, OT)  long term goal (LTG);2 weeks  -MP     Row Name 08/19/21 1641          Transfer Goal 1 (OT)    Activity/Assistive Device (Transfer Goal 1, OT)  toilet;sit-to-stand/stand-to-sit  -MP     [place] Level/Cues Needed (Transfer Goal 1, OT)  supervision required  -MP     Time Frame (Transfer Goal 1, OT)  long term goal (LTG);10 days  -MP     Row Name 08/19/21 1641          Toileting Goal 1 (OT)    Activity/Device (Toileting Goal 1, OT)  toileting skills, all  -MP     [place] Level/Cues Needed (Toileting Goal 1, OT)  minimum assist (75% or more patient effort)  -MP     Time Frame (Toileting Goal 1, OT)  long term goal (LTG);2 weeks  -MP       User Key  (r) = Recorded By, (t) = Taken By, (c) = Cosigned By    Initials Name Provider Type    Presley Zarate OT Occupational Therapist        Clinical Impression     Row Name 08/19/21 1636          Pain Scale: FACES Pre/Post-Treatment    Pain: FACES Scale, Pretreatment  2-->hurts little bit  -MP     Posttreatment Pain Rating  2-->hurts little bit  -MP     Pain Location - Side  Right  -MP     Pain Location - Orientation  upper  -MP     Row Name 08/19/21 1636          Plan of Care Review    Plan of Care Reviewed With  patient  -MP     Progress  no change  -MP     Outcome Summary  Pt. is 52 y/o male admit w/ dislocation R elbow and L elbow  fracture. Pt. underwent RUE sx to correct stage 1 discloaction and left AMA, returning shortly after secondary to possible redislocation. Pt. now POD #1 LUE ORIF and w/ splints B elbows to prevent AROM untill pt. follows up w/ orthopedic MD x 2 weeks. Pt. states he is I/ADL Independent at baseline and lives at home w/ spouse. Pt. ADL participation largely impacted by recent surgery including inability to feed self w/o breaking precautions. Pt. completes ambulatory transfers w/ CGA for safety secondary to impacts to dynamic standing balance. Recommend IP rehab at d/c, pt. refusing this date and states his spouse will care for him at home. Will require OP therapy at 2 week follow to address AROM/PROM deficits.  -MP     Row Name 08/19/21 1636          Therapy Assessment/Plan (OT)    Rehab Potential (OT)  good, to achieve stated therapy goals  -MP     Criteria for Skilled Therapeutic Interventions Met (OT)  yes;skilled treatment is necessary  -MP     Therapy Frequency (OT)  3 times/wk  -MP     Row Name 08/19/21 1636          Therapy Plan Review/Discharge Plan (OT)    Anticipated Discharge Disposition (OT)  inpatient rehabilitation facility  -MP     Row Name 08/19/21 1636          Vital Signs    Pre Patient Position  Sitting  -MP     Intra Patient Position  Standing  -MP     Post Patient Position  Sitting  -MP     Row Name 08/19/21 1636          Positioning and Restraints    Pre-Treatment Position  sitting in chair/recliner  -MP     Post Treatment Position  chair  -MP     In Chair  sitting;call light within reach;encouraged to call for assist;exit alarm on  -MP       User Key  (r) = Recorded By, (t) = Taken By, (c) = Cosigned By    Initials Name Provider Type    Presley Zarate, OT Occupational Therapist        Outcome Measures     Row Name 08/19/21 6660          How much help from another person do you currently need...    Turning from your back to your side while in flat bed without using bedrails?  3  -EL      Moving from lying on back to sitting on the side of a flat bed without bedrails?  3  -EL     Moving to and from a bed to a chair (including a wheelchair)?  3  -EL     Standing up from a chair using your arms (e.g., wheelchair, bedside chair)?  3  -EL     Climbing 3-5 steps with a railing?  3  -EL     To walk in hospital room?  3  -EL     AM-PAC 6 Clicks Score (PT)  18  -EL     Row Name 08/19/21 1327          Modified [place]sworth Scale    Pre-Stroke Modified [place]sworth Scale  6 - Unable to determine (UTD) from the medical record documentation  -EL     Modified [place] Scale  4 - Moderately severe disability.  Unable to walk without assistance, and unable to attend to own bodily needs without assistance.  -EL       User Key  (r) = Recorded By, (t) = Taken By, (c) = Cosigned By    Initials Name Provider Type    Sree Soriano PT Physical Therapist          Occupational Therapy Education                 Title: PT OT SLP Therapies (Done)     Topic: Occupational Therapy (Done)     Point: ADL training (Done)     Description:   Instruct learner(s) on proper safety adaptation and remediation techniques during self care or transfers.   Instruct in proper use of assistive devices.              Learning Progress Summary           Patient Acceptance, E,TB, VU,DU by TS at 8/18/2021 0454                   Point: Home exercise program (Done)     Description:   Instruct learner(s) on appropriate technique for monitoring, assisting and/or progressing therapeutic exercises/activities.              Learning Progress Summary           Patient Acceptance, E,TB, VU,DU by TS at 8/18/2021 0454                   Point: Precautions (Done)     Description:   Instruct learner(s) on prescribed precautions during self-care and functional transfers.              Learning Progress Summary           Patient Acceptance, E,TB, VU,DU by TS at 8/18/2021 0454                   Point: Body mechanics (Done)     Description:   Instruct learner(s) on proper  positioning and spine alignment during self-care, functional mobility activities and/or exercises.              Learning Progress Summary           Patient Acceptance, E,TB, VU by MP at 8/19/2021 1642    Acceptance, E,TB, VU,DU by  at 8/18/2021 0454                               User Key     Initials Effective Dates Name Provider Type Discipline    MP 06/16/21 -  Presley Johnson OT Occupational Therapist OT    TS 12/18/20 -  Ashley Arnold, RN Registered Nurse Nurse              OT Recommendation and Plan  Therapy Frequency (OT): 3 times/wk  Plan of Care Review  Plan of Care Reviewed With: patient  Progress: no change  Outcome Summary: Pt. is 50 y/o male admit w/ dislocation R elbow and L elbow fracture. Pt. underwent RUE sx to correct stage 1 discloaction and left AMA, returning shortly after secondary to possible redislocation. Pt. now POD #1 LUE ORIF and w/ splints B elbows to prevent AROM untill pt. follows up w/ orthopedic MD x 2 weeks. Pt. states he is I/ADL Independent at baseline and lives at home w/ spouse. Pt. ADL participation largely impacted by recent surgery including inability to feed self w/o breaking precautions. Pt. completes ambulatory transfers w/ CGA for safety secondary to impacts to dynamic standing balance. Recommend IP rehab at d/c, pt. refusing this date and states his spouse will care for him at home. Will require OP therapy at 2 week follow to address AROM/PROM deficits.     Time Calculation:   Time Calculation- OT     Row Name 08/19/21 1642             Time Calculation- OT    OT Start Time  1025  -MP      OT Stop Time  1042  -      OT Time Calculation (min)  17 min  -      Total Timed Code Minutes- OT  0 minute(s)  -MP      OT Received On  08/19/21  -      OT - Next Appointment  08/23/21  -      OT Goal Re-Cert Due Date  09/02/21  -        User Key  (r) = Recorded By, (t) = Taken By, (c) = Cosigned By    Initials Name Provider Type    Presley Zarate OT  Occupational Therapist        Therapy Charges for Today     Code Description Service Date Service Provider Modifiers Qty    [id number] HC OT EVAL LOW COMPLEXITY 3 8/19/2021 Presley Johnson OT GO 1               Presley Johnson OT  8/19/2021

## 2021-08-19 NOTE — THERAPY EVALUATION
"Patient Name: Frank Barger  : 1970    MRN: [id number]                              Today's Date: 2021       Admit Date: 2021    Visit Dx:     ICD-10-CM ICD-9-CM   1. Closed fracture of left elbow with routine healing, subsequent encounter  S42.402D V54.11     Patient Active Problem List   Diagnosis   • Atherosclerosis of coronary artery bypass graft   • Essential hypertension   • Chronic pain disorder   • Coronary artery disease   • Degeneration of intervertebral disc of lumbar region   • Fibromyositis   • Mixed hyperlipidemia   • Neuropathic pain   • Posttraumatic stress disorder   • Psoriasis   • Psoriatic arthritis (CMS/HCC)   • Environmental and seasonal allergies   • Personal history of tobacco use, presenting hazards to health   • Status post coronary artery bypass graft   • Gastroesophageal reflux disease without esophagitis   • Tinnitus of left ear   • Intractable migraine without aura and with status migrainosus   • Cerumen debris on tympanic membrane of both ears   • Bilateral deafness   • Moderate major depression (CMS/HCC)   • Ischemic heart disease due to coronary artery obstruction (CMS/HCC)   • Status post angioplasty with stent   • Dyslipidemia   • Abnormal nuclear stress test   • Chest pain with high risk of acute coronary syndrome   • Iron deficiency anemia   • Arthropathy of lumbar facet joint   • Arthropathy of right knee   • Syncope and collapse   • History of scoliosis   • COPD (chronic obstructive pulmonary disease) (CMS/HCC)   • Dislocation of elbow, right, open, initial encounter   • Fracture of left elbow     Past Medical History:   Diagnosis Date   • Abdominal pain    • Allergies     takes allergy injections   • Anesthesia complication     states heart \"fluttering\" after anesthesia x 1- was kept in hospital 3 days s/p heart stent placement   • Anxiety    • Arthritis    • Asthma    • Chronic back pain    • Constipation 2021   • DDD (degenerative disc disease), " lumbar    • Depression    • Emphysema of lung (CMS/HCC)    • GERD (gastroesophageal reflux disease)    • Headache    • Leg pain    • Lumbar spine pain    • Nocturia    • PTSD (post-traumatic stress disorder)    • Under care of pain management specialist     pain management of [place]     Past Surgical History:   Procedure Laterality Date   • CARDIAC CATHETERIZATION N/A 1/20/2021    Procedure: Left Heart Cath and coronary angiogram;  Surgeon: Jaimie Silva MD;  Location: [facility] CATH INVASIVE LOCATION;  Service: Cardiovascular;  Laterality: N/A;   • CARDIAC CATHETERIZATION N/A 1/20/2021    Procedure: Saphenous Vein Graft;  Surgeon: Jaimie Silva MD;  Location: [facility] CATH INVASIVE LOCATION;  Service: Cardiovascular;  Laterality: N/A;   • CARDIAC CATHETERIZATION N/A 1/20/2021    Procedure: Stent EBENEZER coronary;  Surgeon: Herve Rodriguez MD;  Location: [facility] CATH INVASIVE LOCATION;  Service: Cardiology;  Laterality: N/A;   • CORONARY ANGIOPLASTY WITH STENT PLACEMENT  04/2014    2 in 2014, 1 in 12/2020   • CORONARY ARTERY BYPASS GRAFT  12/2015    x 3   • ELBOW DEBRIDEMENT Right 8/17/2021    Procedure: INCISION / DEBRIDEMENT ELBOW, closed reduction right elbow dislocation ;  Surgeon: Kyler August MD;  Location: [facility] MAIN OR;  Service: Orthopedics;  Laterality: Right;     General Information     Row Name 08/19/21 1311          Physical Therapy Time and Intention    Document Type  evaluation  -EL     Mode of Treatment  individual therapy;physical therapy  -EL     Row Name 08/19/21 1311          General Information    Patient Profile Reviewed  yes  -EL     Prior Level of Function  independent:;all household mobility;ADL's  -EL     Existing Precautions/Restrictions  fall;non-weight bearing  -EL     Row Name 08/19/21 1311          Living Environment    Lives With  spouse  -EL     Row Name 08/19/21 1311          Home Main Entrance    Number of Stairs, Main Entrance  two  -EL     Row Name 08/19/21 1311           Cognition    Orientation Status (Cognition)  oriented x 3  -EL     Row Name 08/19/21 1311          Safety Issues, Functional Mobility    Impairments Affecting Function (Mobility)  balance;postural/trunk control;endurance/activity tolerance;strength;range of motion (ROM);pain;sensation/sensory awareness  -EL       User Key  (r) = Recorded By, (t) = Taken By, (c) = Cosigned By    Initials Name Provider Type    Sree Soriano PT Physical Therapist        Mobility     Row Name 08/19/21 1311          Bed Mobility    Bed Mobility  supine-sit  -EL     Supine-Sit Lares (Bed Mobility)  moderate assist (50% patient effort)  -EL     Comment (Bed Mobility)  Cueing to avoid using UE to push self to EOB  -EL     Row Name 08/19/21 1311          Sit-Stand Transfer    Sit-Stand Lares (Transfers)  minimum assist (75% patient effort)  -EL     Row Name 08/19/21 1311          Gait/Stairs (Locomotion)    Lares Level (Gait)  contact guard;minimum assist (75% patient effort)  -EL     Assistive Device (Gait)  other (see comments) gait belt  -EL     Distance in Feet (Gait)  50  -EL     Deviations/Abnormal Patterns (Gait)  gait speed decreased;stride length decreased  -EL     Comment (Gait/Stairs)  Lateral sway but no LOB.  -EL     Row Name 08/19/21 1311          Mobility    Extremity Weight-bearing Status  right upper extremity;left upper extremity  -EL     Left Upper Extremity (Weight-bearing Status)  non weight-bearing (NWB)  -EL     Right Upper Extremity (Weight-bearing Status)  non weight-bearing (NWB)  -EL       User Key  (r) = Recorded By, (t) = Taken By, (c) = Cosigned By    Initials Name Provider Type    Sree Soriano PT Physical Therapist        Obj/Interventions     Row Name 08/19/21 1315          Range of Motion Comprehensive    General Range of Motion  upper extremity range of motion deficits identified;bilateral lower extremity ROM WFL  -EL     Row Name 08/19/21 1315          Strength Comprehensive (MMT)     General Manual Muscle Testing (MMT) Assessment  lower extremity strength deficits identified  -EL     Comment, General Manual Muscle Testing (MMT) Assessment  BLE 4-/5 gross  -EL     Row Name 08/19/21 1315          Balance    Balance Assessment  sitting static balance;standing static balance;standing dynamic balance  -EL     Static Sitting Balance  WFL  -EL     Static Standing Balance  WFL  -EL     Dynamic Standing Balance  mild impairment  -EL       User Key  (r) = Recorded By, (t) = Taken By, (c) = Cosigned By    Initials Name Provider Type    Sree Soriano, PT Physical Therapist        Goals/Plan     Row Name 08/19/21 1325          Bed Mobility Goal 1 (PT)    Activity/Assistive Device (Bed Mobility Goal 1, PT)  bed mobility activities, all  -EL     Bantry Level/Cues Needed (Bed Mobility Goal 1, PT)  modified independence  -EL     Time Frame (Bed Mobility Goal 1, PT)  long term goal (LTG);2 weeks  -EL     Strategies/Barriers (Bed Mobility Goal 1, PT)  no UE assistance  -EL     Row Name 08/19/21 1325          Transfer Goal 1 (PT)    Activity/Assistive Device (Transfer Goal 1, PT)  transfers, all  -EL     Bantry Level/Cues Needed (Transfer Goal 1, PT)  supervision required  -EL     Time Frame (Transfer Goal 1, PT)  2 weeks;long term goal (LTG)  -     Row Name 08/19/21 1325          Gait Training Goal 1 (PT)    Activity/Assistive Device (Gait Training Goal 1, PT)  gait (walking locomotion)  -EL     Bantry Level (Gait Training Goal 1, PT)  supervision required  -EL     Distance (Gait Training Goal 1, PT)  150  -EL     Time Frame (Gait Training Goal 1, PT)  long term goal (LTG);2 weeks  -EL       User Key  (r) = Recorded By, (t) = Taken By, (c) = Cosigned By    Initials Name Provider Type    Sree Soriano, PT Physical Therapist        Clinical Impression     Row Name 08/19/21 1320          Pain    Additional Documentation  Pain Scale: FACES Pre/Post-Treatment (Group)  -     Row Name 08/19/21  1320          Pain Scale: FACES Pre/Post-Treatment    Pain: FACES Scale, Pretreatment  2-->hurts little bit  -EL     Posttreatment Pain Rating  2-->hurts little bit  -EL     Pain Location - Side  Right  -EL     Pain Location - Orientation  upper  -EL     Pain Location  extremity  -EL     Pre/Posttreatment Pain Comment  Reports recently having medication so pain is improved  -     Row Name 08/19/21 1320          Plan of Care Review    Plan of Care Reviewed With  patient  -EL     Outcome Summary  Pt is a 50 YO M with Juanpablo UE injury, (dislocation at R elbow, complex frx s/p fixation at L elbow). Pt Left AMA and was readmitted. Pt reports living at home with spouse, typically independent with all ADLs, ambulation without AD and reports having few recent falls. Pt reports falls come from psoriatic arthritis in back/leg. This date pt demonstrates decent functional mobility, but poor follow through of precautions with UE. Pt attempted to use UE for mobility and had to be cued multiple times for safety. Once standing pt ambulated in room with CGA/MIN A with no significant LOB. Pt unable to perform necessary duties at home and recommendation is IP rehab. Pt adamantly refusing rehab, and will require significant assistance from spouse and OPPT following d/c.  -     Row Name 08/19/21 1320          Therapy Assessment/Plan (PT)    Rehab Potential (PT)  good, to achieve stated therapy goals  -     Criteria for Skilled Interventions Met (PT)  yes;skilled treatment is necessary  -     Row Name 08/19/21 1320          Vital Signs    O2 Delivery Pre Treatment  room air  -EL     O2 Delivery Intra Treatment  room air  -EL     O2 Delivery Post Treatment  room air  -EL     Pre Patient Position  Supine  -EL     Intra Patient Position  Standing  -EL     Post Patient Position  Sitting  -     Row Name 08/19/21 1320          Positioning and Restraints    Pre-Treatment Position  in bed  -EL     Post Treatment Position  chair  -EL      In Chair  notified nsg;sitting;call light within reach;encouraged to call for assist;exit alarm on  -EL       User Key  (r) = Recorded By, (t) = Taken By, (c) = Cosigned By    Initials Name Provider Type    Sree Soriano PT Physical Therapist        Outcome Measures     Row Name 08/19/21 1327          How much help from another person do you currently need...    Turning from your back to your side while in flat bed without using bedrails?  3  -EL     Moving from lying on back to sitting on the side of a flat bed without bedrails?  3  -EL     Moving to and from a bed to a chair (including a wheelchair)?  3  -EL     Standing up from a chair using your arms (e.g., wheelchair, bedside chair)?  3  -EL     Climbing 3-5 steps with a railing?  3  -EL     To walk in hospital room?  3  -EL     AM-PAC 6 Clicks Score (PT)  18  -EL     Row Name 08/19/21 1327          Modified Hawkins Scale    Pre-Stroke Modified Anisha Scale  6 - Unable to determine (UTD) from the medical record documentation  -EL     Modified Hawkins Scale  4 - Moderately severe disability.  Unable to walk without assistance, and unable to attend to own bodily needs without assistance.  -EL       User Key  (r) = Recorded By, (t) = Taken By, (c) = Cosigned By    Initials Name Provider Type    Sree Soriano PT Physical Therapist                       Physical Therapy Education                 Title: PT OT SLP Therapies (Done)     Topic: Physical Therapy (Done)     Point: Mobility training (Done)     Learning Progress Summary           Patient Acceptance, E,TB, VU by  at 8/19/2021 1327    Acceptance, E,TB, VU,DU by TS at 8/18/2021 0454                   Point: Precautions (Done)     Learning Progress Summary           Patient Acceptance, E,TB, VU by EL at 8/19/2021 1327    Acceptance, E,TB, VU,DU by TS at 8/18/2021 0454                               User Key     Initials Effective Dates Name Provider Type [facility] 06/23/20 -  Sree Castrejon PT Physical  Therapist PT    TS 12/18/20 -  Ashley Arnold, RN Registered Nurse Nurse              PT Recommendation and Plan  Planned Therapy Interventions (PT): balance training, neuromuscular re-education, bed mobility training, transfer training, gait training, patient/family education, strengthening  Plan of Care Reviewed With: patient  Outcome Summary: Pt is a 52 YO M with Juanpablo UE injury, (dislocation at R elbow, complex frx s/p fixation at L elbow). Pt Left AMA and was readmitted. Pt reports living at home with spouse, typically independent with all ADLs, ambulation without AD and reports having few recent falls. Pt reports falls come from psoriatic arthritis in back/leg. This date pt demonstrates decent functional mobility, but poor follow through of precautions with UE. Pt attempted to use UE for mobility and had to be cued multiple times for safety. Once standing pt ambulated in room with CGA/MIN A with no significant LOB. Pt unable to perform necessary duties at home and recommendation is IP rehab. Pt adamantly refusing rehab, and will require significant assistance from spouse and OPPT following d/c.     Time Calculation:   PT Charges     Row Name 08/19/21 1328             Time Calculation    Start Time  0950  -EL      Stop Time  1017  -EL      Time Calculation (min)  27 min  -EL      PT Received On  08/19/21  -EL      PT - Next Appointment  08/20/21  -EL         Time Calculation- PT    Total Timed Code Minutes- PT  --  -EL        User Key  (r) = Recorded By, (t) = Taken By, (c) = Cosigned By    Initials Name Provider Type    EL Sree Castrejon, PT Physical Therapist        Therapy Charges for Today     Code Description Service Date Service Provider Modifiers Qty    [id number]  PT EVAL MOD COMPLEXITY 4 8/19/2021 Sree Castrejon, PT GP 1          PT G-Codes  AM-PAC 6 Clicks Score (PT): 18  Modified Kanona Scale: 4 - Moderately severe disability.  Unable to walk without assistance, and unable to attend to own bodily needs  without assistance.    Sree Castrejon, PT  8/19/2021

## 2021-08-20 ENCOUNTER — READMISSION MANAGEMENT (OUTPATIENT)
Dept: CALL CENTER | Facility: HOSPITAL | Age: 51
End: 2021-08-20

## 2021-08-20 VITALS
HEART RATE: 80 BPM | SYSTOLIC BLOOD PRESSURE: 144 MMHG | OXYGEN SATURATION: 94 % | HEIGHT: 74 IN | TEMPERATURE: 98.4 F | WEIGHT: 179.9 LBS | DIASTOLIC BLOOD PRESSURE: 72 MMHG | BODY MASS INDEX: 23.09 KG/M2 | RESPIRATION RATE: 16 BRPM

## 2021-08-20 LAB
ANION GAP SERPL CALCULATED.3IONS-SCNC: 10 MMOL/L (ref 5–15)
BASOPHILS # BLD AUTO: 0.1 10*3/MM3 (ref 0–0.2)
BASOPHILS NFR BLD AUTO: 0.8 % (ref 0–1.5)
BUN SERPL-MCNC: 6 MG/DL (ref 6–20)
BUN/CREAT SERPL: 6.1 (ref 7–25)
CALCIUM SPEC-SCNC: 8.1 MG/DL (ref 8.6–10.5)
CHLORIDE SERPL-SCNC: 110 MMOL/L (ref 98–107)
CO2 SERPL-SCNC: 24 MMOL/L (ref 22–29)
CREAT SERPL-MCNC: 0.99 MG/DL (ref 0.76–1.27)
DEPRECATED RDW RBC AUTO: 55.6 FL (ref 37–54)
EOSINOPHIL # BLD AUTO: 0 10*3/MM3 (ref 0–0.4)
EOSINOPHIL NFR BLD AUTO: 0.2 % (ref 0.3–6.2)
ERYTHROCYTE [DISTWIDTH] IN BLOOD BY AUTOMATED COUNT: 18.1 % (ref 12.3–15.4)
GFR SERPL CREATININE-BSD FRML MDRD: 80 ML/MIN/1.73
GLUCOSE SERPL-MCNC: 124 MG/DL (ref 65–99)
HCT VFR BLD AUTO: 27.7 % (ref 37.5–51)
HGB BLD-MCNC: 9.2 G/DL (ref 13–17.7)
LYMPHOCYTES # BLD AUTO: 2 10*3/MM3 (ref 0.7–3.1)
LYMPHOCYTES NFR BLD AUTO: 27.4 % (ref 19.6–45.3)
MAGNESIUM SERPL-MCNC: 1.6 MG/DL (ref 1.6–2.6)
MCH RBC QN AUTO: 28.8 PG (ref 26.6–33)
MCHC RBC AUTO-ENTMCNC: 33.1 G/DL (ref 31.5–35.7)
MCV RBC AUTO: 87 FL (ref 79–97)
MONOCYTES # BLD AUTO: 0.8 10*3/MM3 (ref 0.1–0.9)
MONOCYTES NFR BLD AUTO: 10.7 % (ref 5–12)
NEUTROPHILS NFR BLD AUTO: 4.5 10*3/MM3 (ref 1.7–7)
NEUTROPHILS NFR BLD AUTO: 60.9 % (ref 42.7–76)
NRBC BLD AUTO-RTO: 0.1 /100 WBC (ref 0–0.2)
PLATELET # BLD AUTO: 201 10*3/MM3 (ref 140–450)
PMV BLD AUTO: 8.6 FL (ref 6–12)
POTASSIUM SERPL-SCNC: 3.2 MMOL/L (ref 3.5–5.2)
RBC # BLD AUTO: 3.18 10*6/MM3 (ref 4.14–5.8)
SODIUM SERPL-SCNC: 144 MMOL/L (ref 136–145)
WBC # BLD AUTO: 7.3 10*3/MM3 (ref 3.4–10.8)

## 2021-08-20 PROCEDURE — 83735 ASSAY OF MAGNESIUM: CPT | Performed by: INTERNAL MEDICINE

## 2021-08-20 PROCEDURE — 99233 SBSQ HOSP IP/OBS HIGH 50: CPT | Performed by: INTERNAL MEDICINE

## 2021-08-20 PROCEDURE — 80048 BASIC METABOLIC PNL TOTAL CA: CPT | Performed by: INTERNAL MEDICINE

## 2021-08-20 PROCEDURE — 97116 GAIT TRAINING THERAPY: CPT

## 2021-08-20 PROCEDURE — 97530 THERAPEUTIC ACTIVITIES: CPT

## 2021-08-20 PROCEDURE — 25010000002 CEFAZOLIN PER 500 MG: Performed by: STUDENT IN AN ORGANIZED HEALTH CARE EDUCATION/TRAINING PROGRAM

## 2021-08-20 PROCEDURE — 99239 HOSP IP/OBS DSCHRG MGMT >30: CPT | Performed by: INTERNAL MEDICINE

## 2021-08-20 PROCEDURE — 85025 COMPLETE CBC W/AUTO DIFF WBC: CPT | Performed by: INTERNAL MEDICINE

## 2021-08-20 RX ORDER — LABETALOL HYDROCHLORIDE 5 MG/ML
10 INJECTION, SOLUTION INTRAVENOUS ONCE
Status: COMPLETED | OUTPATIENT
Start: 2021-08-20 | End: 2021-08-20

## 2021-08-20 RX ORDER — HYDROCODONE BITARTRATE AND ACETAMINOPHEN 7.5; 325 MG/1; MG/1
1 TABLET ORAL EVERY 6 HOURS PRN
Qty: 20 TABLET | Refills: 0 | Status: SHIPPED | OUTPATIENT
Start: 2021-08-20 | End: 2022-01-05

## 2021-08-20 RX ORDER — POTASSIUM CHLORIDE 20 MEQ/1
40 TABLET, EXTENDED RELEASE ORAL ONCE
Status: COMPLETED | OUTPATIENT
Start: 2021-08-20 | End: 2021-08-20

## 2021-08-20 RX ORDER — CEPHALEXIN 500 MG/1
500 CAPSULE ORAL 3 TIMES DAILY
Qty: 9 CAPSULE | Refills: 0 | Status: SHIPPED | OUTPATIENT
Start: 2021-08-20 | End: 2021-08-23

## 2021-08-20 RX ADMIN — PREGABALIN 200 MG: 100 CAPSULE ORAL at 09:00

## 2021-08-20 RX ADMIN — CEFAZOLIN SODIUM 2 G: 10 INJECTION, POWDER, FOR SOLUTION INTRAVENOUS at 12:20

## 2021-08-20 RX ADMIN — POTASSIUM CHLORIDE 40 MEQ: 1500 TABLET, EXTENDED RELEASE ORAL at 09:03

## 2021-08-20 RX ADMIN — BUPROPION HYDROCHLORIDE 150 MG: 150 TABLET, EXTENDED RELEASE ORAL at 08:59

## 2021-08-20 RX ADMIN — CETIRIZINE HYDROCHLORIDE 10 MG: 10 TABLET, FILM COATED ORAL at 09:00

## 2021-08-20 RX ADMIN — CLOPIDOGREL BISULFATE 75 MG: 75 TABLET ORAL at 09:00

## 2021-08-20 RX ADMIN — PANTOPRAZOLE SODIUM 40 MG: 40 TABLET, DELAYED RELEASE ORAL at 09:00

## 2021-08-20 RX ADMIN — FERROUS SULFATE TAB EC 324 MG (65 MG FE EQUIVALENT) 324 MG: 324 (65 FE) TABLET DELAYED RESPONSE at 09:00

## 2021-08-20 RX ADMIN — HYDROCODONE BITARTRATE AND ACETAMINOPHEN 1 TABLET: 7.5; 325 TABLET ORAL at 09:29

## 2021-08-20 RX ADMIN — HYDROCODONE BITARTRATE AND ACETAMINOPHEN 1 TABLET: 7.5; 325 TABLET ORAL at 03:06

## 2021-08-20 RX ADMIN — METOPROLOL TARTRATE 25 MG: 25 TABLET, FILM COATED ORAL at 09:00

## 2021-08-20 RX ADMIN — LABETALOL 20 MG/4 ML (5 MG/ML) INTRAVENOUS SYRINGE 10 MG: at 03:46

## 2021-08-20 RX ADMIN — CYCLOBENZAPRINE 10 MG: 10 TABLET, FILM COATED ORAL at 09:00

## 2021-08-20 RX ADMIN — CEFAZOLIN SODIUM 2 G: 10 INJECTION, POWDER, FOR SOLUTION INTRAVENOUS at 03:07

## 2021-08-20 NOTE — PLAN OF CARE
Objective:   Bed mobility - SBA with VC for NWB  Transfers - CGA and Min-A for CTS without using UE assistance. (practiced x5 in bedside chair, requires increased time but able to complete)  Ambulation - 100 feet CGA    Assessment: Frank Barger presents with functional mobility impairments which indicate the need for skilled intervention. Tolerating session today without incident. Pt with improvement in mobility and ability to perform functional mobility without UE assistance. Required occasionally cueing for NWB. Concern remains about support at home, per pt his spouse does not appear to be helpful or plan to be when he d/c. Will continue to follow and progress as tolerated.

## 2021-08-20 NOTE — DISCHARGE INSTRUCTIONS
Post Operative Plan:   For left upper extremity  Patient will remain in postoperative dressing until 2-week postoperative visit at which point the dressing and staples will be removed.  Gentle active nonweightbearing range of motion will be allowed at that point in a hinged elbow brace from 60 degrees of extension to terminal flexion.  Strengthening to begin at 12 weeks postoperatively  Pain control through a combination of ice, elevation and ibuprofen/Tylenol with judicious use of narcotics as needed.  Follow-up in 2 weeks for wound inspection and staple removal. 2 views of the operative elbow at that time.  Anticipate gradual return of function with alleviation of stiffness at 6 months to a year.    For right upper extremity    Continue with right upper extremity splint x3 weeks  No heavy weightbearing  Right upper extremity x-ray in 1 week

## 2021-08-20 NOTE — DISCHARGE SUMMARY
[facility] Medicine Services  DISCHARGE SUMMARY    Patient Name: Frank Barger  : 1970  MRN: [id number]    Date of Admission: 2021  Date of Discharge: 2021.  Primary Care Physician: Brittni Shell MD      Presenting Problem:   Closed fracture of left elbow with routine healing, subsequent encounter [S42402D]    Active and Resolved Hospital Problems:  Active Hospital Problems    Diagnosis POA   • **Fracture of left elbow [S42.402A] Yes      Resolved Hospital Problems   No resolved problems to display.         Hospital Course     Hospital Course:  51-year-old man with multiple comorbidities including CAD status post CABG and PCI, anxiety/depression, GERD and hypertension and hyperlipidemia.  Patient was initially admitted on 2021 after he had a syncopal episode and fell dislocating his right elbow and displaced fracture of his left elbow.  On 2021, patient underwent irrigation, closed reduction right elbow dislocation and splinting.  Plan was for open reduction and internal fixation of left olecranon with radial head replacement on 2021.  However on the night of 2021, patient left the hospital AMA as he was not allowed to smoke cigarette while inpatient.    He subsequently returned to the emergency room on 2021 as he was not able to do anything by himself at home.    He was admitted for further care and orthopedic surgeon reconsulted.      Condition addressed while inpatient are as stated below    left elbow proximal ulna fracture-in four-part  left radial head fracture with comminution  Status post left elbow fracture dislocation fixation-olecranon fracture ORIF and radial head replacement 2021  Per orthopedic surgeon-remain in postoperative dressing until 2 weeks post operative visit at which time dressing and staples will be removed  Gentle active nonweightbearing range of motion will be allowed at that point in a hinged  elbow brace from 60 degrees of extension to terminal flexion.  Strengthening to begin at 12 weeks postoperatively  Pain control through a combination of ice, elevation and ibuprofen/Tylenol with judicious use of narcotics as needed.  Follow-up in 2 weeks for wound inspection and staple removal. 2 views of the operative elbow at that time.  Anticipate gradual return of function with alleviation of stiffness at 6 months to a year.     Right open elbow dislocation  Status post irrigation, closed reduction right elbow dislocation and splinting 8/17/2021  On antibiotics-Kefzol x72 hours  Weekly x-ray  3 weeks of splinting-if patient fails to maintain reduction-referral to sports medicine specialist for reconstruction will be recommended  Pain control     Syncope-resulting in bilateral elbow fracture  Patient reports having dizziness off and on  No EKG changes on presentation  Has a history of CAD status post CABG and subsequent PCI  Cardiologist load while inpatient and recommended close cardiac monitoring including loop recorder placement at discharge  Patient declined loop recorder placement at discharge     Hypertension-blood pressure well controlled  On metoprolol     Dyslipidemia-on statin        Tobacco abuse  Counseled on smoking cessation     Anxiety/depression   on amitriptyline and Wellbutrin,      Migraine headaches stable   On Imitrex as needed    Chronic pain   On Flexeril and Lyrica     GERD on PPI      PT recommended inpatient rehab at discharge but patient declined and wants to be discharged home with family assist      Condition at discharge-stable        DISCHARGE Follow Up Recommendations for labs and diagnostics:     Reasons For Change In Medications and Indications for New Medications:      Day of Discharge     Vital Signs:  Temp:  [97.7 °F (36.5 °C)-98.3 °F (36.8 °C)] 97.7 °F (36.5 °C)  Heart Rate:  [] 80  Resp:  [16-17] 17  BP: (138-184)/(67-91) 170/82    Physical Exam:  Physical Exam  Vitals  reviewed.   HENT:      Head: Normocephalic.      Nose: Nose normal.      Mouth/Throat:      Mouth: Mucous membranes are moist.   Eyes:      Extraocular Movements: Extraocular movements intact.      Conjunctiva/sclera: Conjunctivae normal.      Pupils: Pupils are equal, round, and reactive to light.   Cardiovascular:      Rate and Rhythm: Normal rate and regular rhythm.   Pulmonary:      Effort: No respiratory distress.      Breath sounds: Normal breath sounds.   Abdominal:      General: Bowel sounds are normal.      Palpations: Abdomen is soft.      Tenderness: There is no abdominal tenderness.   Musculoskeletal:      Cervical back: Neck supple.      Comments: Bilateral elbow surgical dressings-clean dry and intact  Left upper extremity in sling   Skin:     General: Skin is warm and dry.   Neurological:      Mental Status: He is alert and oriented to person, place, and time.   Psychiatric:         Mood and Affect: Mood normal.          Pertinent  and/or Most Recent Results     LAB RESULTS:      Lab 08/20/21 0447 08/19/21  0556 08/18/21  0504 08/17/21  0751 08/16/21  1726   WBC 7.30 9.70 11.60* 6.10 13.00*   HEMOGLOBIN 9.2* 9.8* 10.4* 11.2* 13.4   HEMATOCRIT 27.7* 28.8* 31.4* 33.0* 40.1   PLATELETS 201 187 196 193 258   NEUTROS ABS 4.50 8.10* 9.00*  --  10.10*   LYMPHS ABS 2.00 0.70 1.50  --  1.70   MONOS ABS 0.80 0.90 1.10*  --  1.00*   EOS ABS 0.00 0.00 0.00  --  0.10   MCV 87.0 88.7 86.4 87.3 86.0   PROTIME  --   --   --   --  11.4   APTT  --   --   --   --  24.7         Lab 08/20/21  0447 08/19/21  0556 08/18/21  0504 08/17/21  0017 08/16/21  1726   SODIUM 144 141 138 136 133*   POTASSIUM 3.2* 3.9 4.1 4.2 3.9   CHLORIDE 110* 110* 105 102 99   CO2 24.0 22.0 24.0 22.0 20.0*   ANION GAP 10.0 9.0 9.0 12.0 14.0   BUN 6 9 13 17 17   CREATININE 0.99 0.91 0.89 1.09 1.09   GLUCOSE 124* 136* 132* 112* 111*   CALCIUM 8.1* 8.2* 8.4* 8.7 9.2   MAGNESIUM 1.6  --   --   --   --              Lab 08/17/21  0017 08/16/21  9646    TROPONIN T <0.010 <0.010   PROTIME  --  11.4   INR  --  1.03             Lab 08/17/21  0017   IRON 151   IRON SATURATION 34   TIBC 450   TRANSFERRIN 302   ABO TYPING O   RH TYPING Positive   ANTIBODY SCREEN Negative         Brief Urine Lab Results     None        Microbiology Results (last 10 days)     Procedure Component Value - Date/Time    MRSA Screen, PCR (Inpatient) - Swab, Nares [[id number]]  (Normal) Collected: 08/17/21 0119    Lab Status: Final result Specimen: Swab from Nares Updated: 08/17/21 0306     MRSA PCR No MRSA Detected    COVID PRE-OP / PRE-PROCEDURE SCREENING ORDER (NO ISOLATION) - Swab, Nasopharynx [[id number]]  (Normal) Collected: 08/16/21 2130    Lab Status: Final result Specimen: Swab from Nasopharynx Updated: 08/16/21 2231    Narrative:      The following orders were created for panel order COVID PRE-OP / PRE-PROCEDURE SCREENING ORDER (NO ISOLATION) - Swab, Nasopharynx.  Procedure                               Abnormality         Status                     ---------                               -----------         ------                     COVID-19,CEPHEID/SHIVANI/BD...[[id number]]  Normal              Final result                 Please view results for these tests on the individual orders.    COVID-19,CEPHEID/SHIVANI/BDMAX,COR/POP/PAD/SAM IN-HOUSE(OR EMERGENT/ADD-ON),NP SWAB IN TRANSPORT MEDIA 3-4 HR TAT, RT-PCR - Swab, Nasopharynx [[id number]]  (Normal) Collected: 08/16/21 2130    Lab Status: Final result Specimen: Swab from Nasopharynx Updated: 08/16/21 2231     COVID19 Not Detected    Narrative:      Fact sheet for providers: https://www.fda.gov/media/904282/download     Fact sheet for patients: https://www.fda.gov/media/532519/download  Fact sheet for providers: https://www.fda.gov/media/443381/download     Fact sheet for patients: https://www.fda.gov/media/890736/download          XR Elbow 2 View Right    Result Date: 8/18/2021  Impression: 1. Negative for fracture or dislocation. 2.  Subcutaneous air again noted overlying the soft tissues of the right elbow, similar to prior study.  Electronically Signed By-Anshul Ram MD On:8/18/2021 11:23 AM This report was finalized on [id number] by  Anshul Ram MD.    XR Elbow 2 View Right    Result Date: 8/17/2021  Impression: Right elbow and forearm soft tissue swelling with subcutaneous gas. No retained radiopaque foreign body is seen. No acute osseous abnormality of the right elbow  Electronically Signed By-Penny Jj MD On:8/17/2021 12:00 PM This report was finalized on [id number] by  Penny Jj MD.    XR Elbow 2 View Right    Result Date: 8/16/2021  Impression: Interval reduction of right elbow dislocation. No fracture identified.  Electronically Signed By-Bertha Wood MD On:8/16/2021 8:25 PM This report was finalized on [id number] by  Bertha Wood MD.    CT Head Without Contrast    Result Date: 8/16/2021  Impression: No evidence of hemorrhage, mass effect or midline shift. No acute process identified.  Electronically Signed By-Bertha Wood MD On:8/16/2021 6:21 PM This report was finalized on [id number] by  Bertha Wood MD.    CT Cervical Spine Without Contrast    Result Date: 8/16/2021  Impression: No acute osseous abnormality.  Electronically Signed By-Bertha Wood MD On:8/16/2021 7:04 PM This report was finalized on [id number] by  Bertha Wood MD.    XR Chest 1 View    Result Date: 8/16/2021  Impression: No acute cardiopulmonary process.  Electronically Signed By-Bertah Wood MD On:8/16/2021 7:03 PM This report was finalized on [id number] by  Bertha Wood MD.    CT Facial Bones Without Contrast    Result Date: 8/16/2021  Impression:  No acute osseous abnormality.  Electronically Signed By-Bertha Wood MD On:8/16/2021 6:26 PM This report was finalized on [id number] by  Bertha Wood MD.    CT Upper Extremity Left Without Contrast    Result Date: 8/17/2021  Impression: 1. Comminuted intra-articular obliquely  oriented olecranon fracture. 2. Impacted intra-articular radial head fracture. 3. Small coronoid process fracture Electronically signed by:  Elliot Sen M.D.  8/16/2021 11:37 PM    XR Elbow 2 View Bilateral    Addendum Date: 8/16/2021    2 images of the right elbow and 2 images of the left elbow were obtained. I was not aware that they were placed on the same series.  The right elbow demonstrates complete dislocation of the radiocapitellar and ulnar trochlear joints. No definite fracture identified.  The left elbow demonstrates displaced fractures of the olecranon, the radius with a nondisplaced fracture of the supracondylar distal humerus.  Electronically Signed By-Bertha Wood MD On:8/16/2021 8:27 PM This report was finalized on [id number] by  Bertha Wood MD.    Result Date: 8/16/2021  Impression:  1. Extensively comminuted and displaced fracture of the olecranon as described above. 2. Suspected nondisplaced supracondylar fracture of the distal humerus extending through the trochlea and extending within the intercondylar region. 3. Complete dislocation of the radiocapitellar and ulnar trochlear joints.  Electronically Signed By-Bertha Wood MD On:8/16/2021 7:01 PM This report was finalized on [id number] by  Bertha Wood MD.    XR Forearm 2 View Bilateral    Result Date: 8/16/2021  Impression: No acute osseous abnormality of the remaining forearm or the bilateral wrists.  Electronically Signed By-Bertha Wood MD On:8/16/2021 7:02 PM This report was finalized on [id number] by  Bertha Wood MD.    XR Wrist 3+ View Bilateral    Result Date: 8/16/2021  Impression: No acute osseous abnormality of the remaining forearm or the bilateral wrists.  Electronically Signed By-Bertha Wood MD On:8/16/2021 7:02 PM This report was finalized on [id number] by  Bertha Wood MD.              Results for orders placed during the hospital encounter of 08/16/21    Adult Transthoracic Echo Complete W/ Cont if  Necessary Per Protocol    Interpretation Summary  · Estimated left ventricular EF = 60% Left ventricular systolic function is normal.    Indications  Syncope    Technically satisfactory study.  Mitral valve is structurally normal.  Tricuspid valve is structurally normal.  Aortic valve is structurally normal.  Pulmonic valve could not be well visualized.  No evidence for mitral tricuspid or aortic regurgitation is seen by Doppler study.  Left atrium is normal in size.  Right atrium is normal in size.  Left ventricle is normal in size and contractility with ejection fraction of 60%.  Right ventricle is normal in size.  Atrial septum is intact.  Aorta is normal.  No pericardial effusion or intracardiac thrombus is seen.    Impression  Structurally and functionally normal cardiac valves.  Left ventricular size and contractility is normal with ejection fraction of 60%.      Labs Pending at Discharge:      Procedures Performed  Procedure(s):  Left  ulna open reduction internal fixation (of olecranon and coronoid), left radial head replacement           Consults:   Consults     Date and Time Order Name Status Description    8/18/2021  4:39 AM Inpatient Orthopedic Surgery Consult      8/18/2021  3:22 AM Inpatient Hospitalist Consult Completed     8/17/2021  7:42 AM Inpatient Orthopedic Surgery Consult Completed     8/17/2021 12:36 AM Inpatient Cardiology Consult Completed     8/16/2021  8:06 PM Hospitalist (on-call MD unless specified) Completed     8/16/2021  7:10 PM Ortho (on-call MD unless specified) Completed             Discharge Details        Discharge Medications      New Medications      Instructions Start Date   cephalexin 500 MG capsule  Commonly known as: Keflex   500 mg, Oral, 3 Times Daily      HYDROcodone-acetaminophen 7.5-325 MG per tablet  Commonly known as: NORCO   1 tablet, Oral, Every 6 Hours PRN         Continue These Medications      Instructions Start Date   amitriptyline 75 MG tablet  Commonly known  as: ELAVIL   75 mg, Oral, Nightly      ARIPiprazole 5 MG tablet  Commonly known as: ABILIFY   5 mg, Oral, Nightly      aspirin 81 MG EC tablet   81 mg, Oral, Daily      atorvastatin 20 MG tablet  Commonly known as: LIPITOR   20 mg, Oral, Nightly      buPROPion  MG 24 hr tablet  Commonly known as: WELLBUTRIN XL   150 mg, Oral, Daily      clopidogrel 75 MG tablet  Commonly known as: PLAVIX   75 mg, Oral, Daily      cyclobenzaprine 10 MG tablet  Commonly known as: FLEXERIL   10 mg, Oral, 3 Times Daily      ferrous sulfate 325 (65 FE) MG tablet   325 mg, Oral, Daily With Breakfast      loratadine 10 MG tablet  Commonly known as: CLARITIN   10 mg, Oral, Daily      metoprolol tartrate 25 MG tablet  Commonly known as: LOPRESSOR   25 mg, Oral, 2 Times Daily      naproxen 500 MG tablet  Commonly known as: NAPROSYN   500 mg, Oral, 2 Times Daily PRN, Take with food!      nitroglycerin 0.4 MG SL tablet  Commonly known as: NITROSTAT   0.4 mg, Sublingual, Every 5 Minutes PRN, Take no more than 3 doses in 15 minutes.       NON FORMULARY   Allergy injections       omeprazole 20 MG capsule  Commonly known as: priLOSEC   20 mg, Oral, Every Morning Before Breakfast      OXcarbazepine 600 MG tablet  Commonly known as: TRILEPTAL   600 mg, Oral, 2 Times Daily, Take dos      prazosin 2 MG capsule  Commonly known as: MINIPRESS   1 mg, Oral, Nightly      pregabalin 200 MG capsule  Commonly known as: LYRICA   200 mg, Oral, 3 Times Daily      SUMAtriptan 50 MG tablet  Commonly known as: IMITREX   TAKE 1 TABLET BY MOUTH AT ONSET OF HEADACHE. MAY REPEAT DOSE 1 TIME IN 2 HOURS IF HEADACHE NOT RELIEVED      Trintellix 20 MG tablet  Generic drug: Vortioxetine HBr   20 mg, Oral, Daily             Allergies   Allergen Reactions   • Hydrocodone Nausea And Vomiting   • Hydrocodone-Acetaminophen GI Intolerance   • Oxycodone GI Intolerance         Discharge Disposition:   Home or Self Care    Diet:  Hospital:  Diet Order   Procedures   • Diet  Regular         Discharge Activity:   Activity Instructions     Other Activity Instructions      Activity Instructions: As instructed by orthopedic surgeon            CODE STATUS:  Code Status and Medical Interventions:   Ordered at: 08/18/21 0347     Code Status:    CPR     Medical Interventions (Level of Support Prior to Arrest):    Full         Future Appointments   Date Time Provider Department Center   8/23/2021 10:15 AM Carlita Mcdermott PA-C [facility]   9/2/2021  3:50 PM Jaimie Silva MD [facility]   11/2/2021  1:15 PM Brittni Shell MD [facility]       Additional Instructions for the Follow-ups that You Need to Schedule     Ambulatory Referral to Occupational Therapy   As directed      Specialty needed: Evaluate and treat         Ambulatory Referral to Physical Therapy Evaluate and treat   As directed      Specialty needed: Evaluate and treat         Discharge Follow-up with PCP   As directed       Currently Documented PCP:    Brittni Shell MD    PCP Phone Number:    [phone]     Follow Up Details: Follow-up with PCP as needed         Discharge Follow-up with Specified Provider: Follow-up with Dr. August in 2 weeks   As directed      To: Follow-up with Dr. August in 2 weeks         Discharge Follow-up with Specified Provider: Follow-up with the orthopedic surgeon-Dr. Mcdonough in 2 weeks; 2 Weeks   As directed      To: Follow-up with the orthopedic surgeon-Dr. Mcdonough in 2 weeks    Follow Up: 2 Weeks         Discharge Follow-up with Specified Provider: Follow-up with your cardiologist in 2  weeks   As directed      To: Follow-up with your cardiologist in 2  weeks         XR elbow 2 vw right   Aug 27, 2021      Exam reason: Follow-up on right elbow dislocation    Release to patient: Immediate               Time spent on Discharge including face to face service: 37 minutes    This patient has been examined with appropriate PPE . 08/20/21      Signature: Electronically  signed by Ramon Mcgowan MD, 08/20/21, 12:47 PM EDT.

## 2021-08-20 NOTE — PROGRESS NOTES
"Referring Provider: Ramon Mcgowan MD    Reason for follow-up:     Syncope  Multiple bilateral upper extremity fractures  Status post stent     Patient Care Team:  Brittni Shell MD as PCP - General (Family Medicine)  Jaimie Silva MD as Consulting Physician (Cardiology)    Subjective .      ROS    Since I have last seen him yesterday, the patient has been without any chest discomfort ,shortness of breath, palpitations, dizziness or syncope.  Denies having any headache ,abdominal pain ,nausea, vomiting , diarrhea constipation, loss of weight or loss of appetite.  Denies having any excessive bruising ,hematuria or blood in the stool.    Review of all systems negative except as indicated    History  Past Medical History:   Diagnosis Date   • Abdominal pain    • Allergies     takes allergy injections   • Anesthesia complication     states heart \"fluttering\" after anesthesia x 1- was kept in hospital 3 days s/p heart stent placement   • Anxiety    • Arthritis    • Asthma    • Chronic back pain    • Constipation 06/2021   • DDD (degenerative disc disease), lumbar    • Depression    • Emphysema of lung (CMS/HCC)    • GERD (gastroesophageal reflux disease)    • Headache    • Leg pain    • Lumbar spine pain    • Nocturia    • PTSD (post-traumatic stress disorder)    • Under care of pain management specialist     pain management of [place]       Past Surgical History:   Procedure Laterality Date   • CARDIAC CATHETERIZATION N/A 1/20/2021    Procedure: Left Heart Cath and coronary angiogram;  Surgeon: Jaimie Silva MD;  Location: [facility] CATH INVASIVE LOCATION;  Service: Cardiovascular;  Laterality: N/A;   • CARDIAC CATHETERIZATION N/A 1/20/2021    Procedure: Saphenous Vein Graft;  Surgeon: Jaimie Silva MD;  Location: [facility] CATH INVASIVE LOCATION;  Service: Cardiovascular;  Laterality: N/A;   • CARDIAC CATHETERIZATION N/A 1/20/2021    Procedure: Stent EBENEZER coronary;  Surgeon: Herve Rodriguez MD;  " Location: [facility] CATH INVASIVE LOCATION;  Service: Cardiology;  Laterality: N/A;   • CORONARY ANGIOPLASTY WITH STENT PLACEMENT  04/2014    2 in 2014, 1 in 12/2020   • CORONARY ARTERY BYPASS GRAFT  12/2015    x 3   • ELBOW DEBRIDEMENT Right 8/17/2021    Procedure: INCISION / DEBRIDEMENT ELBOW, closed reduction right elbow dislocation ;  Surgeon: Kyler August MD;  Location: [facility] MAIN OR;  Service: Orthopedics;  Laterality: Right;   • ELBOW OPEN REDUCTION INTERNAL FIXATION Left 8/18/2021    Procedure: Left  ulna open reduction internal fixation (of olecranon and coronoid), left radial head replacement  ;  Surgeon: Yonis Mcdonough MD;  Location: [facility] MAIN OR;  Service: Orthopedics;  Laterality: Left;       Family History   Problem Relation Age of Onset   • Diabetes Mother    • Heart disease Father    • Anxiety disorder Daughter    • Depression Daughter        Social History     Tobacco Use   • Smoking status: Current Every Day Smoker     Packs/day: 1.50   • Smokeless tobacco: Never Used   • Tobacco comment: do not smoke dos   Vaping Use   • Vaping Use: Never used   Substance Use Topics   • Alcohol use: No   • Drug use: No        Medications Prior to Admission   Medication Sig Dispense Refill Last Dose   • amitriptyline (ELAVIL) 75 MG tablet TAKE 1 TABLET BY MOUTH EVERY NIGHT 90 tablet 0    • aspirin (aspirin) 81 MG EC tablet Take 1 tablet by mouth Daily. 90 tablet 1    • atorvastatin (LIPITOR) 20 MG tablet Take 1 tablet by mouth Every Night. 90 tablet 1    • buPROPion XL (WELLBUTRIN XL) 150 MG 24 hr tablet Take 1 tablet by mouth Daily. 90 tablet 1    • clopidogrel (PLAVIX) 75 MG tablet Take 1 tablet by mouth Daily. 90 tablet 1    • cyclobenzaprine (FLEXERIL) 10 MG tablet Take 10 mg by mouth 3 (Three) Times a Day.      • ferrous sulfate 325 (65 FE) MG tablet Take 325 mg by mouth Daily With Breakfast.      • loratadine (CLARITIN) 10 MG tablet Take 10 mg by mouth Daily.      • metoprolol tartrate (LOPRESSOR) 25  MG tablet Take 1 tablet by mouth 2 (Two) Times a Day. 180 tablet 1    • naproxen (NAPROSYN) 500 MG tablet Take 1 tablet by mouth 2 (Two) Times a Day As Needed for Moderate Pain . Take with food! 60 tablet 3    • nitroglycerin (NITROSTAT) 0.4 MG SL tablet Place 0.4 mg under the tongue Every 5 (Five) Minutes As Needed for Chest Pain. Take no more than 3 doses in 15 minutes.      • NON FORMULARY Allergy injections      • omeprazole (priLOSEC) 20 MG capsule Take 1 capsule by mouth Every Morning Before Breakfast. 90 capsule 1    • prazosin (MINIPRESS) 2 MG capsule Take 1 mg by mouth Every Night.      • pregabalin (LYRICA) 200 MG capsule Take 1 capsule by mouth 3 (Three) Times a Day. 90 capsule 5    • SUMAtriptan (IMITREX) 50 MG tablet TAKE 1 TABLET BY MOUTH AT ONSET OF HEADACHE. MAY REPEAT DOSE 1 TIME IN 2 HOURS IF HEADACHE NOT RELIEVED 10 tablet 0    • ARIPiprazole (ABILIFY) 5 MG tablet TAKE 1 TABLET BY MOUTH EVERY NIGHT 90 tablet 0    • OXcarbazepine (TRILEPTAL) 600 MG tablet Take 600 mg by mouth 2 (Two) Times a Day. Take dos      • Vortioxetine HBr (Trintellix) 20 MG tablet Take 20 mg by mouth Daily. 30 tablet 0        Allergies  Hydrocodone, Hydrocodone-acetaminophen, and Oxycodone    Scheduled Meds:amitriptyline, 75 mg, Oral, Nightly  atorvastatin, 20 mg, Oral, Nightly  buPROPion XL, 150 mg, Oral, Daily  ceFAZolin, 2 g, Intravenous, Q8H  cetirizine, 10 mg, Oral, Daily  clopidogrel, 75 mg, Oral, Daily  cyclobenzaprine, 10 mg, Oral, TID  ferrous sulfate, 324 mg, Oral, Daily With Breakfast  metoprolol tartrate, 25 mg, Oral, BID  nicotine, 1 patch, Transdermal, Q24H  pantoprazole, 40 mg, Oral, QAM AC  pregabalin, 200 mg, Oral, TID  terazosin, 5 mg, Oral, Nightly      Continuous Infusions:   PRN Meds:.HYDROcodone-acetaminophen  •  ondansetron **OR** ondansetron    Objective     VITAL SIGNS  Vitals:    08/19/21 0700 08/19/21 1934 08/20/21 0302 08/20/21 0502   BP: 143/79 138/74 (!) 184/91 145/67   BP Location: Right leg  "Right leg Right arm Right arm   Patient Position: Lying Lying Lying Lying   Pulse: 95 104 78    Resp: 16 16 17    Temp: 97.9 °F (36.6 °C) 98.2 °F (36.8 °C) 98.3 °F (36.8 °C)    TempSrc: Oral Oral Oral    SpO2: 95% 95% 95%    Weight:       Height:           Flowsheet Rows      First Filed Value   Admission Height  188 cm (74\") Documented at 08/18/2021 0256   Admission Weight  78 kg (171 lb 15.3 oz) Documented at 08/18/2021 0256            Intake/Output Summary (Last 24 hours) at 8/20/2021 0633  Last data filed at 8/20/2021 0503  Gross per 24 hour   Intake 1080 ml   Output 1500 ml   Net -420 ml        TELEMETRY: Sinus rhythm    Physical Exam:  The patient is alert, oriented and in no distress.  Vital signs as noted above.  Head and neck revealed no carotid bruits or jugular venous distention.  No thyromegaly or lymphadenopathy is present  Lungs clear.  No wheezing.  Breath sounds are normal bilaterally.  Heart normal first and second heart sounds.  No murmur. No precordial rub is present.  No gallop is present.  Abdomen soft and nontender.  No organomegaly is present.  Extremities with good peripheral pulses both upper extremities covered with bandage.  Status post surgery on both elbows.  Skin warm and dry.  Musculoskeletal system is grossly normal  CNS grossly normal      Results Review:   I reviewed the patient's new clinical results.  Lab Results (last 24 hours)     Procedure Component Value Units Date/Time    Basic Metabolic Panel [[id number]]  (Abnormal) Collected: 08/20/21 0447    Specimen: Blood Updated: 08/20/21 0543     Glucose 124 mg/dL      BUN 6 mg/dL      Creatinine 0.99 mg/dL      Sodium 144 mmol/L      Potassium 3.2 mmol/L      Chloride 110 mmol/L      CO2 24.0 mmol/L      Calcium 8.1 mg/dL      eGFR Non African Amer 80 mL/min/1.73      BUN/Creatinine Ratio 6.1     Anion Gap 10.0 mmol/L     Narrative:      GFR Normal >60  Chronic Kidney Disease <60  Kidney Failure <15      CBC & Differential [[id number]] "  (Abnormal) Collected: 08/20/21 0447    Specimen: Blood Updated: 08/20/21 0529    Narrative:      The following orders were created for panel order CBC & Differential.  Procedure                               Abnormality         Status                     ---------                               -----------         ------                     CBC Auto Differential[[id number]]        Abnormal            Final result                 Please view results for these tests on the individual orders.    CBC Auto Differential [[id number]]  (Abnormal) Collected: 08/20/21 0447    Specimen: Blood Updated: 08/20/21 0529     WBC 7.30 10*3/mm3      RBC 3.18 10*6/mm3      Hemoglobin 9.2 g/dL      Hematocrit 27.7 %      MCV 87.0 fL      MCH 28.8 pg      MCHC 33.1 g/dL      RDW 18.1 %      RDW-SD 55.6 fl      MPV 8.6 fL      Platelets 201 10*3/mm3      Neutrophil % 60.9 %      Lymphocyte % 27.4 %      Monocyte % 10.7 %      Eosinophil % 0.2 %      Basophil % 0.8 %      Neutrophils, Absolute 4.50 10*3/mm3      Lymphocytes, Absolute 2.00 10*3/mm3      Monocytes, Absolute 0.80 10*3/mm3      Eosinophils, Absolute 0.00 10*3/mm3      Basophils, Absolute 0.10 10*3/mm3      nRBC 0.1 /100 WBC           Imaging Results (Last 24 Hours)     ** No results found for the last 24 hours. **      LAB RESULTS (LAST 7 DAYS)    CBC  Results from last 7 days   Lab Units 08/20/21 0447 08/19/21  0556 08/18/21  0504 08/17/21  0751 08/16/21  1726   WBC 10*3/mm3 7.30 9.70 11.60* 6.10 13.00*   RBC 10*6/mm3 3.18* 3.24* 3.63* 3.78* 4.66   HEMOGLOBIN g/dL 9.2* 9.8* 10.4* 11.2* 13.4   HEMATOCRIT % 27.7* 28.8* 31.4* 33.0* 40.1   MCV fL 87.0 88.7 86.4 87.3 86.0   PLATELETS 10*3/mm3 201 187 196 193 258       BMP  Results from last 7 days   Lab Units 08/20/21 0447 08/19/21  0556 08/18/21  0504 08/17/21  0017 08/16/21  1726   SODIUM mmol/L 144 141 138 136 133*   POTASSIUM mmol/L 3.2* 3.9 4.1 4.2 3.9   CHLORIDE mmol/L 110* 110* 105 102 99   CO2 mmol/L 24.0 22.0 24.0 22.0  20.0*   BUN mg/dL 6 9 13 17 17   CREATININE mg/dL 0.99 0.91 0.89 1.09 1.09   GLUCOSE mg/dL 124* 136* 132* 112* 111*       CMP   Results from last 7 days   Lab Units 08/20/21  0447 08/19/21  0556 08/18/21  0504 08/17/21  0017 08/16/21  1726   SODIUM mmol/L 144 141 138 136 133*   POTASSIUM mmol/L 3.2* 3.9 4.1 4.2 3.9   CHLORIDE mmol/L 110* 110* 105 102 99   CO2 mmol/L 24.0 22.0 24.0 22.0 20.0*   BUN mg/dL 6 9 13 17 17   CREATININE mg/dL 0.99 0.91 0.89 1.09 1.09   GLUCOSE mg/dL 124* 136* 132* 112* 111*         BNP        TROPONIN  Results from last 7 days   Lab Units 08/17/21  0017   TROPONIN T ng/mL <0.010       CoAg  Results from last 7 days   Lab Units 08/16/21  1726   INR  1.03   APTT seconds 24.7       Creatinine Clearance  Estimated Creatinine Clearance: 101.9 mL/min (by C-G formula based on SCr of 0.99 mg/dL).    ABG        Radiology  XR Elbow 2 View Right    Result Date: 8/18/2021  1. Negative for fracture or dislocation. 2. Subcutaneous air again noted overlying the soft tissues of the right elbow, similar to prior study.  Electronically Signed By-Anshul Ram MD On:8/18/2021 11:23 AM This report was finalized on [id number] by  Anshul Ram MD.              EKG      I personally viewed and interpreted the patient's EKG/Telemetry data: Normal sinus rhythm nonspecific ST-T wave changes    ECHOCARDIOGRAM:    Results for orders placed during the hospital encounter of 08/16/21    Adult Transthoracic Echo Complete W/ Cont if Necessary Per Protocol    Interpretation Summary  · Estimated left ventricular EF = 60% Left ventricular systolic function is normal.    Indications  Syncope    Technically satisfactory study.  Mitral valve is structurally normal.  Tricuspid valve is structurally normal.  Aortic valve is structurally normal.  Pulmonic valve could not be well visualized.  No evidence for mitral tricuspid or aortic regurgitation is seen by Doppler study.  Left atrium is normal in size.  Right atrium is normal  "in size.  Left ventricle is normal in size and contractility with ejection fraction of 60%.  Right ventricle is normal in size.  Atrial septum is intact.  Aorta is normal.  No pericardial effusion or intracardiac thrombus is seen.    Impression  Structurally and functionally normal cardiac valves.  Left ventricular size and contractility is normal with ejection fraction of 60%.          STRESS MYOVIEW:    Cardiolite (Tc-99m Sestamibi) stress test    CARDIAC CATHETERIZATION:            OTHER:         Assessment/Plan     Principal Problem:    Fracture of left elbow      Frank Barger is a 51 y.o. male who presents with history of syncopal episode yesterday with injury to bilateral elbows.  Patient apparently was outside with a friend and got up to get a drink and the next thing he was on the concrete floor with face down.  He had syncopal episode approximately 6 months back.  Patient has been having occasional dizzy spells.  Patient had stent placement in December 2020.  Patient denies having any chest discomfort or shortness of breath palpitations.  Patient is not sure he had dizziness prior to this episode and it was very fast.  Surgery is planned.  Cardiac consultation was requested.  CT scan of the head cervical spine and facial bones were negative for fracture.  No other associated aggravating or alleviating factors.    Patient had surgery on his right elbow yesterday.  Patient signed out AMA last night since he was not able to smoke in the hospital.  Patient has returned back this morning to pursue planned surgery on his left elbow    ]]]]]]]]]]]]]]]]]  Impression  ==========  -Recent syncope  Bilateral elbow fractures  \"X-ray of the left elbow showed a complete dislocation of the radiocapitellar and ulnar trochlear joints. This was reduced in the emergency department. X-ray of elbow as per radiology also shows extensively comminuted and displaced fracture of the old [place] and suspected nondisplaced " "supracondylar fracture of the distal humerus on the right side.\"    Status post closed reduction right elbow dislocation and closed reduction and splinting August 17, 2021 by Dr. August.  Status post surgery on left elbow by Dr. hartman-8/18/2021    -Status post CABG (2015) (triple-vessel according to patient) at [facility].     -Status post stent placement 4/4/2014 at [facility]  Status post stent to LAD between diagonal branch and landing site of LIMA.  [facility]/20/2021-[facility]    Echocardiogram-normal August 16, 2021     Cardiac catheterization 1/21/2021  Left ventricle size and contractility is normal with ejection fraction of 60%.  Left main coronary artery is normal.  Left anterior descending artery provided a moderate sized diagonal branch and left anterior descending artery has 80% disease proximal to the LIMA landing site.  Circumflex coronary artery is a moderately large vessel that provided a large marginal branch.  Circumflex coronary artery has 60 to 70% disease just distal to the marginal branch.  Ramus intermedius is a relatively small caliber vessel that has ostial 80 to 90% disease.  Right coronary artery is a large and dominant vessel.  Left ventricular branch has 60 to 70% disease at its ostium.  LIMA to left anterior descending artery is very small and atretic with probably 90 to 95% disease just proximal to the insertion site.  SVG to diagonal branch is patent.  SVG to presumably ramus intermedius is totally occluded in the proximal segment.     2 out of 3 grafts are patent.  However patent LIMA is extremely small in caliber and atraumatic with significant disease near the landing site.  Difficult to tell the source of patient's symptoms but likely from left anterior descending artery.  Patient to have intervention to left antidescending artery distal to the diagonal branch and proximal to the LIMA landing site.  Other source of pain could be from " circumflex coronary artery left ventricular branch of RCA and ramus intermedius.  Suggest maximize medical treatment for disease in these vessels.  Modification of risk factors.     Echocardiogram 1/14/2021 revealed mild paradoxical septal motion and inferior wall hypokinesis with ejection fraction of 50%.  Lexiscan Cardiolite test-abnormal with mild posterior and inferior proximal ischemia-1/14/2021     -Dyslipidemia hypertension COPD PTSD     -Psoriasis     -Family history of coronary artery disease     -Smoker-abstinence from smoking     -Allergic to oxycodone hydrocodone  ==========  Plan  =========  Recent syncope and bilateral elbow fractures.  Patient has been having off-and-on dizziness.  Patient would benefit from close cardiac monitoring including loop recorder placement.  EKG showed no acute changes.    closed reduction right elbow dislocation and closed reduction and splinting by Dr. August August 17, 2021  Status post left elbow surgery 8/18/2021    Hypokalemia  3.2-8/20/2021.  Potassium supplements.     Status post CABG    Status post stent placement.  1/20/2021  Patient is not having any angina pectoris or congestive heart failure.  Patient is on Plavix     Hypertension-well-controlled 126/76  Continue metoprolol tartrate 25 mg a day     Dyslipidemia-continue atorvastatin      Medications were reviewed and updated.     Have discussed with patient regarding post discharge cardiac monitoring in view of his recent syncopal episode requiring bilateral elbow surgery.  So for cardiac monitoring did not show any dysrhythmia.  Option of loop recorder versus 30-day monitor was discussed with patient.  Risks and benefits pros and cons of the procedure were discussed with patient including infection bleeding etc.  Patient wants to think about the options and let me know.  Given patient's situation with bilateral elbow surgery best option perhaps would be a loop recorder placement then 30-day event  monitor.    Patient absolutely refused to have 30-day monitor or loop recorder.  I had a lengthy discussion with him to explain to him the benefit of having monitoring and concerned about possible tachycardia or bradycardia arrhythmias causing syncope that resulted in significant orthopedic injuries requiring multiple surgeries.  At the end of the discussion patient does not want any monitoring at all at this time.    Follow-up in the office in 2 weeks.    Further plan will depend on patient's progress.  ]]]]]]]]]]]]]]          Jaimie Silva MD  08/20/21  06:33 EDT

## 2021-08-20 NOTE — PLAN OF CARE
Goal Outcome Evaluation:  Plan of Care Reviewed With: patient           Outcome Summary: Pt with no complaints of pain this shift. Pt with complaints of some numbness. Arm elevated. Pt resting at this time. Will continue to monitor.

## 2021-08-20 NOTE — DISCHARGE INSTR - APPOINTMENTS
Appointment with Dr. August (Surgeon) on Monday 8/30/21 at 1 pm.  [phone]   Appointment with Dr. Mcdonough (Surgeon)  Sept 8th at 10 am. [phone] [place] Office    Call phone numbers for directions.

## 2021-08-20 NOTE — PLAN OF CARE
Goal Outcome Evaluation:  Plan of Care Reviewed With: patient        Patient without complaint this shift.  Receiving PO medication for pain.  Plan to d/c home today.

## 2021-08-20 NOTE — THERAPY TREATMENT NOTE
Subjective: Pt agreeable to therapeutic plan of care.    Objective:     Bed mobility - SBA with VC for NWB  Transfers - CGA and Min-A for CTS without using UE assistance. (practiced x5 in bedside chair, requires increased time but able to complete)  Ambulation - 100 feet CGA    Pain: 5 VAS just received medication prior to treatment.  Education: Provided education on importance of mobility and skilled verbal / tactile cueing throughout intervention.     Assessment: Frank Barger presents with functional mobility impairments which indicate the need for skilled intervention. Tolerating session today without incident. Pt with improvement in mobility and ability to perform functional mobility without UE assistance. Required occasionally cueing for NWB. Concern remains about support at home, per pt his spouse does not appear to be helpful or plan to be when he d/c. Will continue to follow and progress as tolerated.     Plan/Recommendations:   Pt would benefit from Inpatient Rehabilitation placement but refusing, showed some interest in HHPT and that would be beneficial if continues to adamantly refuse IP rehab at discharge from facility and requires no DME at discharge.   Pt desires Home with family assist at discharge. Pt cooperative; agreeable to therapeutic recommendations and plan of care.     Basic Mobility 6-click:  Rollin = Total, A lot = 2, A little = 3; 4 = None  Supine>Sit:   1 = Total, A lot = 2, A little = 3; 4 = None   Sit>Stand with arms:  1 = Total, A lot = 2, A little = 3; 4 = None  Bed>Chair:   1 = Total, A lot = 2, A little = 3; 4 = None  Ambulate in room:  1 = Total, A lot = 2, A little = 3; 4 = None  3-5 Steps with railin = Total, A lot = 2, A little = 3; 4 = None  Score: 20    Modified Anisha: 4 = Moderately severe disability (Unable to attend to own bodily needs without assistance, and unable to walk unassisted)     Post-Tx Position: Up in Chair, Alarms activated and Call light  and personal items within reach  PPE: gloves, surgical mask, eyewear protection

## 2021-08-21 NOTE — OUTREACH NOTE
Prep Survey      Responses   [facility] facility patient discharged from?  [facility]   Is LACE score < 7 ?  No   Emergency Room discharge w/ pulse ox?  No   Eligibility  TCM   Hospital  [facility]   Date of Admission  08/18/21   Date of Discharge  08/20/21   Discharge Disposition  Home or Self Care   Discharge diagnosis  elbow fracture, ORIF left ulna, left radial head replacement   Does the patient have one of the following disease processes/diagnoses(primary or secondary)?  General Surgery   Does the patient have Home health ordered?  No   Is there a DME ordered?  No   Prep survey completed?  Yes          Porsha Staley RN

## 2021-08-23 ENCOUNTER — OFFICE VISIT (OUTPATIENT)
Dept: PSYCHIATRY | Facility: CLINIC | Age: 51
End: 2021-08-23

## 2021-08-23 ENCOUNTER — TRANSITIONAL CARE MANAGEMENT TELEPHONE ENCOUNTER (OUTPATIENT)
Dept: CALL CENTER | Facility: HOSPITAL | Age: 51
End: 2021-08-23

## 2021-08-23 DIAGNOSIS — F51.04 PSYCHOPHYSIOLOGICAL INSOMNIA: Chronic | ICD-10-CM

## 2021-08-23 DIAGNOSIS — F33.1 MAJOR DEPRESSIVE DISORDER, RECURRENT EPISODE, MODERATE (HCC): Chronic | ICD-10-CM

## 2021-08-23 DIAGNOSIS — F43.10 POSTTRAUMATIC STRESS DISORDER: Primary | Chronic | ICD-10-CM

## 2021-08-23 PROCEDURE — 99214 OFFICE O/P EST MOD 30 MIN: CPT | Performed by: PHYSICIAN ASSISTANT

## 2021-08-23 RX ORDER — ARIPIPRAZOLE 5 MG/1
5 TABLET ORAL NIGHTLY
Qty: 90 TABLET | Refills: 1 | Status: SHIPPED | OUTPATIENT
Start: 2021-08-23 | End: 2022-02-14

## 2021-08-23 RX ORDER — VORTIOXETINE 20 MG/1
1 TABLET, FILM COATED ORAL DAILY
Qty: 30 TABLET | Refills: 5 | Status: SHIPPED | OUTPATIENT
Start: 2021-08-23 | End: 2022-08-23 | Stop reason: SDUPTHER

## 2021-08-23 RX ORDER — TRAZODONE HYDROCHLORIDE 100 MG/1
100 TABLET ORAL NIGHTLY
Qty: 90 TABLET | Refills: 1 | Status: SHIPPED | OUTPATIENT
Start: 2021-08-23 | End: 2022-02-14

## 2021-08-23 NOTE — OUTREACH NOTE
Call Center TCM Note      Responses   [facility] patient discharged from?  [facility]   Does the patient have one of the following disease processes/diagnoses(primary or secondary)?  General Surgery   TCM attempt successful?  No   Unsuccessful attempts  Attempt 1          Willa Savage RN    8/23/2021, 10:27 EDT

## 2021-08-23 NOTE — OUTREACH NOTE
Call Center TCM Note      Responses   [facility] patient discharged from?  [facility]   Does the patient have one of the following disease processes/diagnoses(primary or secondary)?  General Surgery   TCM attempt successful?  No   Unsuccessful attempts  Attempt 2          Willa Savage RN    8/23/2021, 12:45 EDT

## 2021-08-23 NOTE — PROGRESS NOTES
"Subjective   Frank Barger is a 51 y.o.white male who presents today for follow up in the office    Chief Complaint:  Depression, anxiety and PTSD    History of Present Illness:   He blacked out and fell on the concrete, dislocated right elbow and fractured left, had surgery on the left elbow (8/18/21 at [facility])  He is on Medicare now  He continues to see therapist at [facility], but now every 6 months  Adding Abilify helped his mood a lot.  Depression low 2/10  Denies Si/HI  Anxiety 4/10  Sleeping well, no nightmares, holding the prazosin for now    The following portions of the patient's history were reviewed and updated as appropriate: allergies, current medications, past family history, past medical history, past social history, past surgical history and problem list.    PAST PSYCHIATRIC HISTORY  Axis I  Affective/Bipoloar Disorder, Anxiety/Panic Disorder, Posttraumatic Stress  Axis II  None    PAST OUTPATIENT TREATMENT  Diagnosis treated:  Affective Disorder, Anxiety/Panic Disorder, Post-Traumatic Stress ([employer],   Treatment Type:  Individual Therapy, Medication Management  Prior Psychiatric Medications:  Zoloft  Trazodone  Brintellix  Trintellix   Prazosin  Elavil  Abilify  Support Groups:  None  Sequelae Of Mental Disorder:  social isolation, family disruption, emotional distress      Interval History  No Change    Side Effects  None    Past psychiatric history reviewed and compared to 2/3/21 visit and appropriate updates were made.    Past Medical History:  Past Medical History:   Diagnosis Date   • Abdominal pain    • Allergies     takes allergy injections   • Anesthesia complication     states heart \"fluttering\" after anesthesia x 1- was kept in hospital 3 days s/p heart stent placement   • Anxiety    • Arthritis    • Asthma    • Chronic back pain    • Constipation 06/2021   • DDD (degenerative disc disease), lumbar    • Emphysema of lung (CMS/[facility])    • GERD (gastroesophageal reflux disease)    • " Headache    • Leg pain    • Lumbar spine pain    • Nocturia    • Under care of pain management specialist     pain management of [place]       Social History:  Social History     Socioeconomic History   • Marital status: Single     Spouse name: Not on file   • Number of children: Not on file   • Years of education: Not on file   • Highest education level: Not on file   Tobacco Use   • Smoking status: Current Every Day Smoker     Packs/day: 1.50   • Smokeless tobacco: Never Used   • Tobacco comment: do not smoke dos   Vaping Use   • Vaping Use: Never used   Substance and Sexual Activity   • Alcohol use: No   • Drug use: No   • Sexual activity: Defer     Partners: Female       Family History:  Family History   Problem Relation Age of Onset   • Diabetes Mother    • Heart disease Father    • Anxiety disorder Daughter    • Depression Daughter        Past Surgical History:  Past Surgical History:   Procedure Laterality Date   • CARDIAC CATHETERIZATION N/A 1/20/2021    Procedure: Left Heart Cath and coronary angiogram;  Surgeon: Jaimie Silva MD;  Location: [facility] CATH INVASIVE LOCATION;  Service: Cardiovascular;  Laterality: N/A;   • CARDIAC CATHETERIZATION N/A 1/20/2021    Procedure: Saphenous Vein Graft;  Surgeon: Jaimie Silva MD;  Location: [facility] CATH INVASIVE LOCATION;  Service: Cardiovascular;  Laterality: N/A;   • CARDIAC CATHETERIZATION N/A 1/20/2021    Procedure: Stent EBENEZER coronary;  Surgeon: Herve Rodriguez MD;  Location: [facility] CATH INVASIVE LOCATION;  Service: Cardiology;  Laterality: N/A;   • CORONARY ANGIOPLASTY WITH STENT PLACEMENT  04/2014    2 in 2014, 1 in 12/2020   • CORONARY ARTERY BYPASS GRAFT  12/2015    x 3   • ELBOW DEBRIDEMENT Right 8/17/2021    Procedure: INCISION / DEBRIDEMENT ELBOW, closed reduction right elbow dislocation ;  Surgeon: Kyler August MD;  Location: [facility] MAIN OR;  Service: Orthopedics;  Laterality: Right;   • ELBOW OPEN REDUCTION INTERNAL FIXATION Left  8/18/2021    Procedure: Left  ulna open reduction internal fixation (of olecranon and coronoid), left radial head replacement  ;  Surgeon: Yonis Mcdonough MD;  Location: [facility];  Service: Orthopedics;  Laterality: Left;       Problem List:  Patient Active Problem List   Diagnosis   • Atherosclerosis of coronary artery bypass graft   • Essential hypertension   • Chronic pain disorder   • Coronary artery disease   • Degeneration of intervertebral disc of lumbar region   • Fibromyositis   • Mixed hyperlipidemia   • Neuropathic pain   • Posttraumatic stress disorder   • Psoriasis   • Psoriatic arthritis (CMS/HCC)   • Environmental and seasonal allergies   • Personal history of tobacco use, presenting hazards to health   • Status post coronary artery bypass graft   • Gastroesophageal reflux disease without esophagitis   • Tinnitus of left ear   • Intractable migraine without aura and with status migrainosus   • Cerumen debris on tympanic membrane of both ears   • Bilateral deafness   • Ischemic heart disease due to coronary artery obstruction (CMS/HCC)   • Status post angioplasty with stent   • Dyslipidemia   • Abnormal nuclear stress test   • Chest pain with high risk of acute coronary syndrome   • Iron deficiency anemia   • Arthropathy of lumbar facet joint   • Arthropathy of right knee   • Syncope and collapse   • History of scoliosis   • COPD (chronic obstructive pulmonary disease) (CMS/HCC)   • Dislocation of elbow, right, open, initial encounter   • Fracture of left elbow   • Major depressive disorder, recurrent episode, moderate (CMS/HCC)   • Psychophysiological insomnia       Allergy:   Allergies   Allergen Reactions   • Hydrocodone Nausea And Vomiting   • Hydrocodone-Acetaminophen GI Intolerance   • Oxycodone GI Intolerance        Discontinued Medications:  Medications Discontinued During This Encounter   Medication Reason   • amitriptyline (ELAVIL) 75 MG tablet Alternate therapy   • ARIPiprazole  (ABILIFY) 5 MG tablet Reorder   • Vortioxetine HBr (Trintellix) 20 MG tablet Reorder       Current Medications:   Current Outpatient Medications   Medication Sig Dispense Refill   • ARIPiprazole (ABILIFY) 5 MG tablet Take 1 tablet by mouth Every Night. 90 tablet 1   • aspirin (aspirin) 81 MG EC tablet Take 1 tablet by mouth Daily. 90 tablet 1   • atorvastatin (LIPITOR) 20 MG tablet Take 1 tablet by mouth Every Night. 90 tablet 1   • buPROPion XL (WELLBUTRIN XL) 150 MG 24 hr tablet Take 1 tablet by mouth Daily. 90 tablet 1   • cephalexin (Keflex) 500 MG capsule Take 1 capsule by mouth 3 (Three) Times a Day for 3 days. 9 capsule 0   • clopidogrel (PLAVIX) 75 MG tablet Take 1 tablet by mouth Daily. 90 tablet 1   • cyclobenzaprine (FLEXERIL) 10 MG tablet Take 10 mg by mouth 3 (Three) Times a Day.     • ferrous sulfate 325 (65 FE) MG tablet Take 325 mg by mouth Daily With Breakfast.     • HYDROcodone-acetaminophen (NORCO) 7.5-325 MG per tablet Take 1 tablet by mouth Every 6 (Six) Hours As Needed for Moderate Pain . 20 tablet 0   • loratadine (CLARITIN) 10 MG tablet Take 10 mg by mouth Daily.     • metoprolol tartrate (LOPRESSOR) 25 MG tablet Take 1 tablet by mouth 2 (Two) Times a Day. 180 tablet 1   • naproxen (NAPROSYN) 500 MG tablet Take 1 tablet by mouth 2 (Two) Times a Day As Needed for Moderate Pain . Take with food! 60 tablet 3   • nitroglycerin (NITROSTAT) 0.4 MG SL tablet Place 0.4 mg under the tongue Every 5 (Five) Minutes As Needed for Chest Pain. Take no more than 3 doses in 15 minutes.     • NON FORMULARY Allergy injections     • omeprazole (priLOSEC) 20 MG capsule Take 1 capsule by mouth Every Morning Before Breakfast. 90 capsule 1   • OXcarbazepine (TRILEPTAL) 600 MG tablet Take 600 mg by mouth 2 (Two) Times a Day. Take dos     • prazosin (MINIPRESS) 2 MG capsule Take 1 mg by mouth Every Night.     • pregabalin (LYRICA) 200 MG capsule Take 1 capsule by mouth 3 (Three) Times a Day. 90 capsule 5   •  SUMAtriptan (IMITREX) 50 MG tablet TAKE 1 TABLET BY MOUTH AT ONSET OF HEADACHE. MAY REPEAT DOSE 1 TIME IN 2 HOURS IF HEADACHE NOT RELIEVED 10 tablet 0   • traZODone (DESYREL) 100 MG tablet Take 1 tablet by mouth Every Night. 90 tablet 1   • Vortioxetine HBr (Trintellix) 20 MG tablet Take 20 mg by mouth Daily. 30 tablet 5     No current facility-administered medications for this visit.         Review of Symptoms:    Psychiatric/Behavioral: Negative for agitation, behavioral problems, confusion, decreased concentration, dysphoric mood, hallucinations, self-injury, sleep disturbance and suicidal ideas. The patient is not nervous/anxious and is not hyperactive.        Physical Exam:   There were no vitals taken for this visit.    Mental Status Exam:   Hygiene:   Good  Cooperation:  Cooperative  Eye Contact: Good  Psychomotor Behavior:  Appropriate  Affect:  Appropriate  Mood: Normal  Hopelessness: Denies  Speech:  Normal  Thought Process:  Goal directed  Thought Content:  Normal  Suicidal:  None  Homicidal:  None  Hallucinations:  None  Delusion:  None  Memory:  Intact  Orientation:  Person, Place, Time and Situation  Reliability:  good  Insight:  Good  Judgement:  Good  Impulse Control:  Good  Physical/Medical Issues:  Yes arthritis     Mental status exam was reviewed and compared to 2/3/21 visit and appropriate updates were made.    PHQ-9 Depression Screening  Little interest or pleasure in doing things? 1   Feeling down, depressed, or hopeless? 1   Trouble falling or staying asleep, or sleeping too much? 2   Feeling tired or having little energy? 1   Poor appetite or overeating? 0   Feeling bad about yourself - or that you are a failure or have let yourself or your family down? 0   Trouble concentrating on things, such as reading the newspaper or watching television? 0   Moving or speaking so slowly that other people could have noticed? Or the opposite - being so fidgety or restless that you have been moving around a  lot more than usual? 0   Thoughts that you would be better off dead, or of hurting yourself in some way? 0   PHQ-9 Total Score 5   If you checked off any problems, how difficult have these problems made it for you to do your work, take care of things at home, or get along with other people? Somewhat difficult           Currently smoking, has decreased to 1/4 ppd and trying to quit.      I advised Frank of the risks of tobacco use.     Lab Results:   Admission on 08/18/2021, Discharged on 08/20/2021   Component Date Value Ref Range Status   • Glucose 08/18/2021 132* 65 - 99 mg/dL Final   • BUN 08/18/2021 13  6 - 20 mg/dL Final   • Creatinine 08/18/2021 0.89  0.76 - 1.27 mg/dL Final   • Sodium 08/18/2021 138  136 - 145 mmol/L Final   • Potassium 08/18/2021 4.1  3.5 - 5.2 mmol/L Final   • Chloride 08/18/2021 105  98 - 107 mmol/L Final   • CO2 08/18/2021 24.0  22.0 - 29.0 mmol/L Final   • Calcium 08/18/2021 8.4* 8.6 - 10.5 mg/dL Final   • eGFR Non African Amer 08/18/2021 90  >60 mL/min/1.73 Final   • BUN/Creatinine Ratio 08/18/2021 14.6  7.0 - 25.0 Final   • Anion Gap 08/18/2021 9.0  5.0 - 15.0 mmol/L Final   • WBC 08/18/2021 11.60* 3.40 - 10.80 10*3/mm3 Final   • RBC 08/18/2021 3.63* 4.14 - 5.80 10*6/mm3 Final   • Hemoglobin 08/18/2021 10.4* 13.0 - 17.7 g/dL Final   • Hematocrit 08/18/2021 31.4* 37.5 - 51.0 % Final   • MCV 08/18/2021 86.4  79.0 - 97.0 fL Final   • MCH 08/18/2021 28.6  26.6 - 33.0 pg Final   • MCHC 08/18/2021 33.1  31.5 - 35.7 g/dL Final   • RDW 08/18/2021 17.6* 12.3 - 15.4 % Final   • RDW-SD 08/18/2021 53.4  37.0 - 54.0 fl Final   • MPV 08/18/2021 8.6  6.0 - 12.0 fL Final   • Platelets 08/18/2021 196  140 - 450 10*3/mm3 Final   • Neutrophil % 08/18/2021 77.3* 42.7 - 76.0 % Final   • Lymphocyte % 08/18/2021 12.5* 19.6 - 45.3 % Final   • Monocyte % 08/18/2021 9.6  5.0 - 12.0 % Final   • Eosinophil % 08/18/2021 0.1* 0.3 - 6.2 % Final   • Basophil % 08/18/2021 0.5  0.0 - 1.5 % Final   • Neutrophils,  Absolute 08/18/2021 9.00* 1.70 - 7.00 10*3/mm3 Final   • Lymphocytes, Absolute 08/18/2021 1.50  0.70 - 3.10 10*3/mm3 Final   • Monocytes, Absolute 08/18/2021 1.10* 0.10 - 0.90 10*3/mm3 Final   • Eosinophils, Absolute 08/18/2021 0.00  0.00 - 0.40 10*3/mm3 Final   • Basophils, Absolute 08/18/2021 0.10  0.00 - 0.20 10*3/mm3 Final   • nRBC 08/18/2021 0.1  0.0 - 0.2 /100 WBC Final   • Glucose 08/19/2021 136* 65 - 99 mg/dL Final   • BUN 08/19/2021 9  6 - 20 mg/dL Final   • Creatinine 08/19/2021 0.91  0.76 - 1.27 mg/dL Final   • Sodium 08/19/2021 141  136 - 145 mmol/L Final   • Potassium 08/19/2021 3.9  3.5 - 5.2 mmol/L Final   • Chloride 08/19/2021 110* 98 - 107 mmol/L Final   • CO2 08/19/2021 22.0  22.0 - 29.0 mmol/L Final   • Calcium 08/19/2021 8.2* 8.6 - 10.5 mg/dL Final   • eGFR Non  Amer 08/19/2021 88  >60 mL/min/1.73 Final   • BUN/Creatinine Ratio 08/19/2021 9.9  7.0 - 25.0 Final   • Anion Gap 08/19/2021 9.0  5.0 - 15.0 mmol/L Final   • WBC 08/19/2021 9.70  3.40 - 10.80 10*3/mm3 Final   • RBC 08/19/2021 3.24* 4.14 - 5.80 10*6/mm3 Final   • Hemoglobin 08/19/2021 9.8* 13.0 - 17.7 g/dL Final   • Hematocrit 08/19/2021 28.8* 37.5 - 51.0 % Final   • MCV 08/19/2021 88.7  79.0 - 97.0 fL Final   • MCH 08/19/2021 30.1  26.6 - 33.0 pg Final   • MCHC 08/19/2021 33.9  31.5 - 35.7 g/dL Final   • RDW 08/19/2021 18.2* 12.3 - 15.4 % Final   • RDW-SD 08/19/2021 56.0* 37.0 - 54.0 fl Final   • MPV 08/19/2021 8.8  6.0 - 12.0 fL Final   • Platelets 08/19/2021 187  140 - 450 10*3/mm3 Final   • Neutrophil % 08/19/2021 83.3* 42.7 - 76.0 % Final   • Lymphocyte % 08/19/2021 7.5* 19.6 - 45.3 % Final   • Monocyte % 08/19/2021 9.1  5.0 - 12.0 % Final   • Eosinophil % 08/19/2021 0.0* 0.3 - 6.2 % Final   • Basophil % 08/19/2021 0.1  0.0 - 1.5 % Final   • Neutrophils, Absolute 08/19/2021 8.10* 1.70 - 7.00 10*3/mm3 Final   • Lymphocytes, Absolute 08/19/2021 0.70  0.70 - 3.10 10*3/mm3 Final   • Monocytes, Absolute 08/19/2021 0.90  0.10 - 0.90  10*3/mm3 Final   • Eosinophils, Absolute 08/19/2021 0.00  0.00 - 0.40 10*3/mm3 Final   • Basophils, Absolute 08/19/2021 0.00  0.00 - 0.20 10*3/mm3 Final   • nRBC 08/19/2021 0.0  0.0 - 0.2 /100 WBC Final   • Glucose 08/20/2021 124* 65 - 99 mg/dL Final   • BUN 08/20/2021 6  6 - 20 mg/dL Final   • Creatinine 08/20/2021 0.99  0.76 - 1.27 mg/dL Final   • Sodium 08/20/2021 144  136 - 145 mmol/L Final   • Potassium 08/20/2021 3.2* 3.5 - 5.2 mmol/L Final   • Chloride 08/20/2021 110* 98 - 107 mmol/L Final   • CO2 08/20/2021 24.0  22.0 - 29.0 mmol/L Final   • Calcium 08/20/2021 8.1* 8.6 - 10.5 mg/dL Final   • eGFR Non  Amer 08/20/2021 80  >60 mL/min/1.73 Final   • BUN/Creatinine Ratio 08/20/2021 6.1* 7.0 - 25.0 Final   • Anion Gap 08/20/2021 10.0  5.0 - 15.0 mmol/L Final   • WBC 08/20/2021 7.30  3.40 - 10.80 10*3/mm3 Final   • RBC 08/20/2021 3.18* 4.14 - 5.80 10*6/mm3 Final   • Hemoglobin 08/20/2021 9.2* 13.0 - 17.7 g/dL Final   • Hematocrit 08/20/2021 27.7* 37.5 - 51.0 % Final   • MCV 08/20/2021 87.0  79.0 - 97.0 fL Final   • MCH 08/20/2021 28.8  26.6 - 33.0 pg Final   • MCHC 08/20/2021 33.1  31.5 - 35.7 g/dL Final   • RDW 08/20/2021 18.1* 12.3 - 15.4 % Final   • RDW-SD 08/20/2021 55.6* 37.0 - 54.0 fl Final   • MPV 08/20/2021 8.6  6.0 - 12.0 fL Final   • Platelets 08/20/2021 201  140 - 450 10*3/mm3 Final   • Neutrophil % 08/20/2021 60.9  42.7 - 76.0 % Final   • Lymphocyte % 08/20/2021 27.4  19.6 - 45.3 % Final   • Monocyte % 08/20/2021 10.7  5.0 - 12.0 % Final   • Eosinophil % 08/20/2021 0.2* 0.3 - 6.2 % Final   • Basophil % 08/20/2021 0.8  0.0 - 1.5 % Final   • Neutrophils, Absolute 08/20/2021 4.50  1.70 - 7.00 10*3/mm3 Final   • Lymphocytes, Absolute 08/20/2021 2.00  0.70 - 3.10 10*3/mm3 Final   • Monocytes, Absolute 08/20/2021 0.80  0.10 - 0.90 10*3/mm3 Final   • Eosinophils, Absolute 08/20/2021 0.00  0.00 - 0.40 10*3/mm3 Final   • Basophils, Absolute 08/20/2021 0.10  0.00 - 0.20 10*3/mm3 Final   • nRBC 08/20/2021  0.1  0.0 - 0.2 /100 WBC Final   • Magnesium 08/20/2021 1.6  1.6 - 2.6 mg/dL Final   Admission on 08/16/2021, Discharged on 08/17/2021   Component Date Value Ref Range Status   • Glucose 08/16/2021 111* 65 - 99 mg/dL Final   • BUN 08/16/2021 17  6 - 20 mg/dL Final   • Creatinine 08/16/2021 1.09  0.76 - 1.27 mg/dL Final   • Sodium 08/16/2021 133* 136 - 145 mmol/L Final   • Potassium 08/16/2021 3.9  3.5 - 5.2 mmol/L Final    Slight hemolysis detected by analyzer. Results may be affected.   • Chloride 08/16/2021 99  98 - 107 mmol/L Final   • CO2 08/16/2021 20.0* 22.0 - 29.0 mmol/L Final   • Calcium 08/16/2021 9.2  8.6 - 10.5 mg/dL Final   • eGFR Non  Amer 08/16/2021 71  >60 mL/min/1.73 Final   • BUN/Creatinine Ratio 08/16/2021 15.6  7.0 - 25.0 Final   • Anion Gap 08/16/2021 14.0  5.0 - 15.0 mmol/L Final   • Protime 08/16/2021 11.4  9.6 - 11.7 Seconds Final   • INR 08/16/2021 1.03  0.93 - 1.10 Final   • PTT 08/16/2021 24.7  24.0 - 31.0 seconds Final   • Troponin T 08/16/2021 <0.010  0.000 - 0.030 ng/mL Final   • QT Interval 08/16/2021 354  ms Final   • WBC 08/16/2021 13.00* 3.40 - 10.80 10*3/mm3 Final   • RBC 08/16/2021 4.66  4.14 - 5.80 10*6/mm3 Final   • Hemoglobin 08/16/2021 13.4  13.0 - 17.7 g/dL Final   • Hematocrit 08/16/2021 40.1  37.5 - 51.0 % Final   • MCV 08/16/2021 86.0  79.0 - 97.0 fL Final   • MCH 08/16/2021 28.7  26.6 - 33.0 pg Final   • MCHC 08/16/2021 33.4  31.5 - 35.7 g/dL Final   • RDW 08/16/2021 17.4* 12.3 - 15.4 % Final   • RDW-SD 08/16/2021 51.6  37.0 - 54.0 fl Final   • MPV 08/16/2021 8.5  6.0 - 12.0 fL Final   • Platelets 08/16/2021 258  140 - 450 10*3/mm3 Final   • Neutrophil % 08/16/2021 78.0* 42.7 - 76.0 % Final   • Lymphocyte % 08/16/2021 13.1* 19.6 - 45.3 % Final   • Monocyte % 08/16/2021 7.5  5.0 - 12.0 % Final   • Eosinophil % 08/16/2021 0.5  0.3 - 6.2 % Final   • Basophil % 08/16/2021 0.9  0.0 - 1.5 % Final   • Neutrophils, Absolute 08/16/2021 10.10* 1.70 - 7.00 10*3/mm3 Final   •  Lymphocytes, Absolute 08/16/2021 1.70  0.70 - 3.10 10*3/mm3 Final   • Monocytes, Absolute 08/16/2021 1.00* 0.10 - 0.90 10*3/mm3 Final   • Eosinophils, Absolute 08/16/2021 0.10  0.00 - 0.40 10*3/mm3 Final   • Basophils, Absolute 08/16/2021 0.10  0.00 - 0.20 10*3/mm3 Final   • nRBC 08/16/2021 0.1  0.0 - 0.2 /100 WBC Final   • Extra Tube 08/16/2021 Hold for add-ons.   Final    Auto resulted.   • Extra Tube 08/16/2021 Hold for add-ons.   Final    Auto resulted.   • Ethanol % 08/17/2021 <0.010  % Final   • COVID19 08/16/2021 Not Detected  Not Detected - Ref. Range Final   • Troponin T 08/17/2021 <0.010  0.000 - 0.030 ng/mL Final   • ABO Type 08/17/2021 O   Final   • RH type 08/17/2021 Positive   Final   • Antibody Screen 08/17/2021 Negative   Final   • T&S Expiration Date 08/17/2021 8/20/2021 11:59:59 PM   Final   • MRSA PCR 08/17/2021 No MRSA Detected  No MRSA Detected Final   • BSA 08/17/2021 2.0  m^2 Final   • RVIDd 08/17/2021 2.8  cm Final   • IVSd 08/17/2021 0.97  cm Final   • IVSs 08/17/2021 1.2  cm Final   • LVIDd 08/17/2021 4.6  cm Final   • LVIDs 08/17/2021 3.1  cm Final   • LVPWd 08/17/2021 0.96  cm Final   • BH CV ECHO VERÓNICA - LVPWS 08/17/2021 1.3  cm Final   • IVS/LVPW 08/17/2021 1.0   Final   • FS 08/17/2021 31.1  % Final   • EDV(Teich) 08/17/2021 95.2  ml Final   • ESV(Teich) 08/17/2021 39.1  ml Final   • EF(Teich) 08/17/2021 59.0  % Final   • EDV(cubed) 08/17/2021 94.7  ml Final   • ESV(cubed) 08/17/2021 30.9  ml Final   • EF(cubed) 08/17/2021 67.3  % Final   • % IVS thick 08/17/2021 27.7  % Final   • % LVPW thick 08/17/2021 34.9  % Final   • LV mass(C)d 08/17/2021 149.4  grams Final   • LV mass(C)dI 08/17/2021 74.0  grams/m^2 Final   • LV mass(C)s 08/17/2021 127.1  grams Final   • LV mass(C)sI 08/17/2021 63.0  grams/m^2 Final   • SV(Teich) 08/17/2021 56.2  ml Final   • SI(Teich) 08/17/2021 27.8  ml/m^2 Final   • SV(cubed) 08/17/2021 63.7  ml Final   • SI(cubed) 08/17/2021 31.6  ml/m^2 Final   • Ao root  diam 08/17/2021 3.5  cm Final   • Ao root area 08/17/2021 9.7  cm^2 Final   • ACS 08/17/2021 2.1  cm Final   • LVOT diam 08/17/2021 2.3  cm Final   • LVOT area 08/17/2021 4.0  cm^2 Final   • EDV(MOD-sp4) 08/17/2021 97.1  ml Final   • ESV(MOD-sp4) 08/17/2021 39.5  ml Final   • EF(MOD-sp4) 08/17/2021 59.4  % Final   • SV(MOD-sp4) 08/17/2021 57.6  ml Final   • SI(MOD-sp4) 08/17/2021 28.6  ml/m^2 Final   • Ao root area (BSA corrected) 08/17/2021 1.7   Final   • LV Recio Vol (BSA corrected) 08/17/2021 48.1  ml/m^2 Final   • LV Sys Vol (BSA corrected) 08/17/2021 19.6  ml/m^2 Final   • MV E max lee 08/17/2021 61.8  cm/sec Final   • MV A max lee 08/17/2021 58.2  cm/sec Final   • MV E/A 08/17/2021 1.1   Final   • MV V2 max 08/17/2021 83.9  cm/sec Final   • MV max PG 08/17/2021 2.8  mmHg Final   • MV V2 mean 08/17/2021 47.5  cm/sec Final   • MV mean PG 08/17/2021 1.1  mmHg Final   • MV V2 VTI 08/17/2021 22.4  cm Final   • MVA(VTI) 08/17/2021 2.9  cm^2 Final   • MV dec slope 08/17/2021 266.8  cm/sec^2 Final   • MV dec time 08/17/2021 0.23  sec Final   • Ao pk lee 08/17/2021 97.3  cm/sec Final   • Ao max PG 08/17/2021 3.8  mmHg Final   • Ao max PG (full) 08/17/2021 1.2  mmHg Final   • Ao V2 mean 08/17/2021 72.5  cm/sec Final   • Ao mean PG 08/17/2021 2.2  mmHg Final   • Ao mean PG (full) 08/17/2021 0.98  mmHg Final   • Ao V2 VTI 08/17/2021 19.6  cm Final   • HUBER(I,A) 08/17/2021 3.4  cm^2 Final   • HUBER(I,D) 08/17/2021 3.4  cm^2 Final   • HUBER(V,A) 08/17/2021 3.3  cm^2 Final   • HUBER(V,D) 08/17/2021 3.3  cm^2 Final   • LV V1 max PG 08/17/2021 2.6  mmHg Final   • LV V1 mean PG 08/17/2021 1.3  mmHg Final   • LV V1 max 08/17/2021 81.1  cm/sec Final   • LV V1 mean 08/17/2021 52.8  cm/sec Final   • LV V1 VTI 08/17/2021 16.5  cm Final   • SV(Ao) 08/17/2021 189.7  ml Final   • SI(Ao) 08/17/2021 94.0  ml/m^2 Final   • SV(LVOT) 08/17/2021 65.8  ml Final   • SI(LVOT) 08/17/2021 32.6  ml/m^2 Final   • PA V2 max 08/17/2021 112.4  cm/sec Final    • PA max PG 08/17/2021 5.1  mmHg Final   • PA max PG (full) 08/17/2021 3.3  mmHg Final   • PA V2 mean 08/17/2021 85.5  cm/sec Final   • PA mean PG 08/17/2021 3.3  mmHg Final   • PA mean PG (full) 08/17/2021 2.3  mmHg Final   • PA V2 VTI 08/17/2021 25.5  cm Final   • PA acc time 08/17/2021 0.14  sec Final   • RV V1 max PG 08/17/2021 1.8  mmHg Final   • RV V1 mean PG 08/17/2021 0.95  mmHg Final   • RV V1 max 08/17/2021 66.7  cm/sec Final   • RV V1 mean 08/17/2021 46.5  cm/sec Final   • RV V1 VTI 08/17/2021 15.1  cm Final   • PA pr(Accel) 08/17/2021 15.2  mmHg Final   • BH CV ECHO [place] - BZI_BMI 08/17/2021 21.6  kilograms/m^2 Final   • BH CV ECHO [place] - BSA([place]AYC[place]) 08/17/2021 2.0  m^2 Final   • BH CV ECHO [place] - BZI_METRIC_WEIGHT 08/17/2021 76.2  kg Final   • BH CV ECHO [place] - BZI_METRIC_HEIGHT 08/17/2021 188.0  cm Final   • Target HR (85%) 08/17/2021 144  bpm Final   • Max. Pred. HR (100%) 08/17/2021 169  bpm Final   • EF(MOD-bp) 08/17/2021 59.0  % Final   • LA dimension(2D) 08/17/2021 3.0  cm Final   • Echo EF Estimated 08/17/2021 60  % Final   • QT Interval 08/17/2021 410  ms Preliminary   • WBC 08/17/2021 6.10  3.40 - 10.80 10*3/mm3 Final   • RBC 08/17/2021 3.78* 4.14 - 5.80 10*6/mm3 Final   • Hemoglobin 08/17/2021 11.2* 13.0 - 17.7 g/dL Final   • Hematocrit 08/17/2021 33.0* 37.5 - 51.0 % Final   • MCV 08/17/2021 87.3  79.0 - 97.0 fL Final   • MCH 08/17/2021 29.5  26.6 - 33.0 pg Final   • MCHC 08/17/2021 33.8  31.5 - 35.7 g/dL Final   • RDW 08/17/2021 17.7* 12.3 - 15.4 % Final   • RDW-SD 08/17/2021 52.9  37.0 - 54.0 fl Final   • MPV 08/17/2021 8.6  6.0 - 12.0 fL Final   • Platelets 08/17/2021 193  140 - 450 10*3/mm3 Final   • Glucose 08/17/2021 112* 65 - 99 mg/dL Final   • BUN 08/17/2021 17  6 - 20 mg/dL Final   • Creatinine 08/17/2021 1.09  0.76 - 1.27 mg/dL Final   • Sodium 08/17/2021 136  136 - 145 mmol/L Final   • Potassium 08/17/2021 4.2  3.5 - 5.2 mmol/L Final   • Chloride 08/17/2021 102  98 - 107 mmol/L  Final   • CO2 08/17/2021 22.0  22.0 - 29.0 mmol/L Final   • Calcium 08/17/2021 8.7  8.6 - 10.5 mg/dL Final   • eGFR Non  Amer 08/17/2021 71  >60 mL/min/1.73 Final   • BUN/Creatinine Ratio 08/17/2021 15.6  7.0 - 25.0 Final   • Anion Gap 08/17/2021 12.0  5.0 - 15.0 mmol/L Final   • Iron 08/17/2021 151  59 - 158 mcg/dL Final   • Iron Saturation 08/17/2021 34  20 - 50 % Final   • Transferrin 08/17/2021 302  200 - 360 mg/dL Final   • TIBC 08/17/2021 450  298 - 536 mcg/dL Final   Office Visit on 06/15/2021   Component Date Value Ref Range Status   • Ferritin 06/15/2021 12.70* 30.00 - 400.00 ng/mL Final       Assessment/Plan   Problems Addressed this Visit        Mental Health    Posttraumatic stress disorder - Primary (Chronic)    Relevant Medications    traZODone (DESYREL) 100 MG tablet    ARIPiprazole (ABILIFY) 5 MG tablet    Vortioxetine HBr (Trintellix) 20 MG tablet    Major depressive disorder, recurrent episode, moderate (CMS/HCC) (Chronic)    Relevant Medications    traZODone (DESYREL) 100 MG tablet    ARIPiprazole (ABILIFY) 5 MG tablet    Vortioxetine HBr (Trintellix) 20 MG tablet       Sleep    Psychophysiological insomnia (Chronic)      Diagnoses       Codes Comments    Posttraumatic stress disorder    -  Primary ICD-10-CM: F43.10  ICD-9-CM: 309.81     Major depressive disorder, recurrent episode, moderate (CMS/HCC)     ICD-10-CM: F33.1  ICD-9-CM: 296.32     Psychophysiological insomnia     ICD-10-CM: F51.04  ICD-9-CM: 307.42           Visit Diagnoses:    ICD-10-CM ICD-9-CM   1. Posttraumatic stress disorder  F43.10 309.81   2. Major depressive disorder, recurrent episode, moderate (CMS/HCC)  F33.1 296.32   3. Psychophysiological insomnia  F51.04 307.42       TREATMENT PLAN/GOALS: Continue supportive psychotherapy efforts and medications as indicated. Treatment and medication options discussed during today's visit. Patient ackowledged and verbally consented to continue with current treatment plan and was  educated on the importance of compliance with treatment and follow-up appointments.    MEDICATION ISSUES:  INSPECT reviewed as expected  Discussed medication options and treatment plan of prescribed medication as well as the risks, benefits, and side effects including potential falls, possible impaired driving and metabolic adversities among others. Patient is agreeable to call the office with any worsening of symptoms or onset of side effects. Patient is agreeable to call 911 or go to the nearest ER should he/she begin having SI/HI. No medication side effects or related complaints today.     Continue current medication with no changes.  The patient says he is doing well.  Continue Abilify 5mg daily for depression  Continue Trintellix 20mg daily for depression and anxiety  Continue Wellbutrin  mg QAM, rx per Dr. Shell  He wants to go back to Trazodone 100 mg nightly for sleep instead of Elavil    Use the prazosin 2mg at bedtime for nightmares when needed, not using currently.      MEDS ORDERED DURING VISIT:  New Medications Ordered This Visit   Medications   • traZODone (DESYREL) 100 MG tablet     Sig: Take 1 tablet by mouth Every Night.     Dispense:  90 tablet     Refill:  1   • ARIPiprazole (ABILIFY) 5 MG tablet     Sig: Take 1 tablet by mouth Every Night.     Dispense:  90 tablet     Refill:  1   • Vortioxetine HBr (Trintellix) 20 MG tablet     Sig: Take 20 mg by mouth Daily.     Dispense:  30 tablet     Refill:  5       Return in about 6 months (around 2/23/2022).      This document has been electronically signed by Carlita Mcdermott PA-C  August 23, 2021 13:55 EDT

## 2021-08-24 ENCOUNTER — TRANSITIONAL CARE MANAGEMENT TELEPHONE ENCOUNTER (OUTPATIENT)
Dept: CALL CENTER | Facility: HOSPITAL | Age: 51
End: 2021-08-24

## 2021-08-24 NOTE — OUTREACH NOTE
Call Center TCM Note      Responses   [facility] patient discharged from?  [place]   Does the patient have one of the following disease processes/diagnoses(primary or secondary)?  General Surgery   TCM attempt successful?  No [Carri]   Unsuccessful attempts  Attempt 3          Kelly Perez RN    8/24/2021, 10:03 EDT

## 2021-08-27 LAB — QT INTERVAL: 410 MS

## 2021-08-31 ENCOUNTER — READMISSION MANAGEMENT (OUTPATIENT)
Dept: CALL CENTER | Facility: HOSPITAL | Age: 51
End: 2021-08-31

## 2021-08-31 NOTE — OUTREACH NOTE
General Surgery Week 2 Survey      Responses   [facility] patient discharged from?  [place]   Does the patient have one of the following disease processes/diagnoses(primary or secondary)?  General Surgery   Week 2 attempt successful?  No   Unsuccessful attempts  Attempt 1          Esther Rhoades RN

## 2021-09-02 ENCOUNTER — READMISSION MANAGEMENT (OUTPATIENT)
Dept: CALL CENTER | Facility: HOSPITAL | Age: 51
End: 2021-09-02

## 2021-09-02 NOTE — OUTREACH NOTE
General Surgery Week 2 Survey      Responses   [facility] patient discharged from?  [place]   Does the patient have one of the following disease processes/diagnoses(primary or secondary)?  General Surgery   Week 2 attempt successful?  Yes   Call start time  0734   Call end time  0744   Discharge diagnosis  elbow fracture, ORIF left ulna, left radial head replacement   Meds reviewed with patient/caregiver?  Yes   Is the patient having any side effects they believe may be caused by any medication additions or changes?  No   Does the patient have all medications related to this admission filled (includes all antibiotics, pain medications, etc.)  Yes   Is the patient taking all medications as directed (includes completed medication regime)?  Yes   Does the patient have a follow up appointment scheduled with their surgeon?  Yes   Has the patient kept scheduled appointments due by today?  Yes   Comments  Has seen surgeon for one arm will see second surgeon for other arm. He had  2 seperate surgeries/surgeons for each arm   Has home health visited the patient within 72 hours of discharge?  N/A   Psychosocial issues?  No   Did the patient receive a copy of their discharge instructions?  Yes   Nursing interventions  Reviewed instructions with patient   What is the patient's perception of their health status since discharge?  Improving   Is the patient /caregiver able to teach back basic post-op care?  Lifting as instructed by MD in discharge instructions, Drive as instructed by MD in discharge instructions, Practice 'cough and deep breath', Take showers only when approved by MD-sponge bathe until then   Is the patient/caregiver able to teach back signs and symptoms of incisional infection?  Fever, Increased redness, swelling or pain at the incisonal site, Incisional warmth, Pus or odor from incision, Increased drainage or bleeding   Is the patient/caregiver able to teach back steps to recovery at home?  Eat a  well-balance diet   Is the patient/caregiver able to teach back the hierarchy of who to call/visit for symptoms/problems? PCP, Specialist, Home health nurse, Urgent Care, ED, 911  Yes   Additional teach back comments  Will start OP PT, when MD OK's. He is able to move fingers and wrist.   Week 2 call completed?  Yes          Gladys Cabrera RN

## 2021-09-08 ENCOUNTER — READMISSION MANAGEMENT (OUTPATIENT)
Dept: CALL CENTER | Facility: HOSPITAL | Age: 51
End: 2021-09-08

## 2021-09-08 NOTE — OUTREACH NOTE
General Surgery Week 3 Survey      Responses   [facility] patient discharged from?  [place]   Does the patient have one of the following disease processes/diagnoses(primary or secondary)?  General Surgery   Week 3 attempt successful?  Yes   Call start time  1221   Call end time  1225   Discharge diagnosis  elbow fracture, ORIF left ulna, left radial head replacement   Is patient permission given to speak with other caregiver?  Yes   List who call center can speak with  Carri Barger, spouse   Person spoke with today (if not patient) and relationship  spouse   Meds reviewed with patient/caregiver?  Yes   Does the patient have all medications related to this admission filled (includes all antibiotics, pain medications, etc.)  Yes   Is the patient taking all medications as directed (includes completed medication regime)?  Yes   Does the patient have a follow up appointment scheduled with their surgeon?  Yes   Has the patient kept scheduled appointments due by today?  Yes [Wife reports that they are at the Dr office at time of call. Staples removed from left arm. ]   Has home health visited the patient within 72 hours of discharge?  N/A   Psychosocial issues?  No   Comments  Wife reports that patient will be going to outpt PT for his left arm.    Did the patient receive a copy of their discharge instructions?  Yes   Nursing interventions  Reviewed instructions with patient   What is the patient's perception of their health status since discharge?  Improving   Is the patient/caregiver able to teach back signs and symptoms of incisional infection?  Increased redness, swelling or pain at the incisonal site, Increased drainage or bleeding, Incisional warmth, Pus or odor from incision, Fever   Is the patient/caregiver able to teach back steps to recovery at home?  Set small, achievable goals for return to baseline health, Rest and rebuild strength, gradually increase activity   Week 3 call completed?  Yes          Esther MADRIGAL  Jf, RN

## 2021-09-10 DIAGNOSIS — I25.9 ISCHEMIC HEART DISEASE DUE TO CORONARY ARTERY OBSTRUCTION (HCC): ICD-10-CM

## 2021-09-10 DIAGNOSIS — I24.0 ISCHEMIC HEART DISEASE DUE TO CORONARY ARTERY OBSTRUCTION (HCC): ICD-10-CM

## 2021-09-10 DIAGNOSIS — F33.1 MAJOR DEPRESSIVE DISORDER, RECURRENT EPISODE, MODERATE (HCC): Chronic | ICD-10-CM

## 2021-09-10 DIAGNOSIS — E78.2 MIXED HYPERLIPIDEMIA: ICD-10-CM

## 2021-09-10 RX ORDER — ATORVASTATIN CALCIUM 20 MG/1
20 TABLET, FILM COATED ORAL NIGHTLY
Qty: 90 TABLET | Refills: 1 | Status: SHIPPED | OUTPATIENT
Start: 2021-09-10 | End: 2021-12-16

## 2021-09-13 RX ORDER — PRAZOSIN HYDROCHLORIDE 2 MG/1
CAPSULE ORAL
Qty: 90 CAPSULE | Refills: 1 | Status: SHIPPED | OUTPATIENT
Start: 2021-09-13 | End: 2022-08-23 | Stop reason: SINTOL

## 2021-09-13 RX ORDER — VORTIOXETINE 20 MG/1
TABLET, FILM COATED ORAL
Qty: 90 TABLET | OUTPATIENT
Start: 2021-09-13

## 2021-09-16 ENCOUNTER — READMISSION MANAGEMENT (OUTPATIENT)
Dept: CALL CENTER | Facility: HOSPITAL | Age: 51
End: 2021-09-16

## 2021-09-16 NOTE — OUTREACH NOTE
General Surgery Week 4 Survey      Responses   [facility] patient discharged from?  [place]   Does the patient have one of the following disease processes/diagnoses(primary or secondary)?  General Surgery   Week 4 attempt successful?  No          Willa Savage RN

## 2021-09-20 RX ORDER — AMITRIPTYLINE HYDROCHLORIDE 75 MG/1
75 TABLET, FILM COATED ORAL NIGHTLY
Qty: 90 TABLET | Refills: 0 | OUTPATIENT
Start: 2021-09-20

## 2021-09-21 ENCOUNTER — PATIENT MESSAGE (OUTPATIENT)
Dept: FAMILY MEDICINE CLINIC | Facility: CLINIC | Age: 51
End: 2021-09-21

## 2021-09-21 NOTE — TELEPHONE ENCOUNTER
From: Frank Barger  To: Brittni Shell MD  Sent: 9/21/2021 1:42 AM EDT  Subject: Compliment    amitriptyline (ELAVIL) 75 MG tablet  Why am I getting a message saying I don't take when I do!

## 2021-10-12 RX ORDER — SUMATRIPTAN 50 MG/1
TABLET, FILM COATED ORAL
Qty: 10 TABLET | Refills: 0 | OUTPATIENT
Start: 2021-10-12

## 2021-10-17 DIAGNOSIS — G89.4 CHRONIC PAIN DISORDER: ICD-10-CM

## 2021-10-18 RX ORDER — NAPROXEN 500 MG/1
500 TABLET ORAL 2 TIMES DAILY WITH MEALS
Qty: 60 TABLET | Refills: 3 | Status: SHIPPED | OUTPATIENT
Start: 2021-10-18 | End: 2022-01-05 | Stop reason: ALTCHOICE

## 2021-11-03 ENCOUNTER — OFFICE VISIT (OUTPATIENT)
Dept: FAMILY MEDICINE CLINIC | Facility: CLINIC | Age: 51
End: 2021-11-03

## 2021-11-03 VITALS
BODY MASS INDEX: 21.96 KG/M2 | DIASTOLIC BLOOD PRESSURE: 94 MMHG | TEMPERATURE: 98.2 F | HEART RATE: 83 BPM | WEIGHT: 171 LBS | OXYGEN SATURATION: 98 % | SYSTOLIC BLOOD PRESSURE: 143 MMHG

## 2021-11-03 DIAGNOSIS — M25.522 LEFT ELBOW PAIN: Primary | ICD-10-CM

## 2021-11-03 DIAGNOSIS — R11.0 NAUSEA: ICD-10-CM

## 2021-11-03 PROCEDURE — 99214 OFFICE O/P EST MOD 30 MIN: CPT | Performed by: NURSE PRACTITIONER

## 2021-11-03 RX ORDER — ONDANSETRON 4 MG/1
4 TABLET, FILM COATED ORAL EVERY 8 HOURS PRN
Qty: 30 TABLET | Refills: 0 | Status: SHIPPED | OUTPATIENT
Start: 2021-11-03 | End: 2022-01-25 | Stop reason: SDUPTHER

## 2021-11-03 NOTE — PROGRESS NOTES
Subjective     Frank Barger is a 51 y.o. male.     Pt is here today with c/o left elbow pain and c/o nausea  On 8/16/2021pt fell,  dislocating his right elbow and displaced fracture of his left elbow.  On 8/17/2021, patient underwent irrigation, closed reduction right elbow dislocation and splinting (Dr. August did this procedure)  Left elbow fracture dislocation fixation-olecranon fracture ORIF and radial head replacement 8/18/2021. Dr. Rodriguez did this surgery.  Pt was supposed to do physical therapy but has not been able to get in.  He has not made an appointment with the surgeon.  He has minimal pain in the right elbow.  Rates the left elbow on average a 7-8/10.  He has been taking tylenol or naproxen for the pain.   He also has a muscle relaxer.     Pt states for the last 3 days he has had chills and decreased appetite and nausea.  He has not vomited.  He has had increased fatigue.  Denies CP, SOA, dizziness, HA.  Denies abdominal pain  Has not had a BM since Sunday until he drank coffee this morning and was able to go.  No fever.  Had two covid vaccines.  Denies loss of taste or smell  He has been taking motion sickness tablets that dont help       The following portions of the patient's history were reviewed and updated as appropriate: allergies, current medications, past family history, past medical history, past social history, past surgical history and problem list.    Review of Systems   Constitutional: Positive for appetite change and chills. Negative for fatigue and fever.   Respiratory: Negative for chest tightness and shortness of breath.    Cardiovascular: Negative for chest pain and palpitations.   Gastrointestinal: Positive for constipation and nausea. Negative for abdominal pain and vomiting.   Musculoskeletal: Positive for arthralgias (left elbow).   Neurological: Negative for dizziness, numbness and headache.       Objective     /94 (BP Location: Right arm, Patient Position: Sitting,  Cuff Size: Adult)   Pulse 83   Temp 98.2 °F (36.8 °C) (Tympanic)   Wt 77.6 kg (171 lb)   SpO2 98%   BMI 21.96 kg/m²     Current Outpatient Medications on File Prior to Visit   Medication Sig Dispense Refill   • ARIPiprazole (ABILIFY) 5 MG tablet Take 1 tablet by mouth Every Night. 90 tablet 1   • aspirin (aspirin) 81 MG EC tablet Take 1 tablet by mouth Daily. 90 tablet 1   • atorvastatin (LIPITOR) 20 MG tablet Take 1 tablet by mouth Every Night. 90 tablet 1   • buPROPion XL (WELLBUTRIN XL) 150 MG 24 hr tablet Take 1 tablet by mouth Daily. 90 tablet 1   • clopidogrel (PLAVIX) 75 MG tablet Take 1 tablet by mouth Daily. 90 tablet 1   • cyclobenzaprine (FLEXERIL) 10 MG tablet Take 10 mg by mouth 3 (Three) Times a Day.     • ferrous sulfate 325 (65 FE) MG tablet Take 325 mg by mouth Daily With Breakfast.     • HYDROcodone-acetaminophen (NORCO) 7.5-325 MG per tablet Take 1 tablet by mouth Every 6 (Six) Hours As Needed for Moderate Pain . 20 tablet 0   • loratadine (CLARITIN) 10 MG tablet Take 10 mg by mouth Daily.     • metoprolol tartrate (LOPRESSOR) 25 MG tablet Take 1 tablet by mouth 2 (Two) Times a Day. 180 tablet 1   • naproxen (NAPROSYN) 500 MG tablet Take 1 tablet by mouth 2 (Two) Times a Day With Meals. 60 tablet 3   • nitroglycerin (NITROSTAT) 0.4 MG SL tablet Place 0.4 mg under the tongue Every 5 (Five) Minutes As Needed for Chest Pain. Take no more than 3 doses in 15 minutes.     • NON FORMULARY Allergy injections     • omeprazole (priLOSEC) 20 MG capsule Take 1 capsule by mouth Every Morning Before Breakfast. 90 capsule 1   • OXcarbazepine (TRILEPTAL) 600 MG tablet Take 600 mg by mouth 2 (Two) Times a Day. Take dos     • prazosin (MINIPRESS) 2 MG capsule TAKE 1 CAPSULE BY MOUTH EVERY NIGHT 90 capsule 1   • pregabalin (LYRICA) 200 MG capsule Take 1 capsule by mouth 3 (Three) Times a Day. 90 capsule 5   • SUMAtriptan (IMITREX) 50 MG tablet TAKE 1 TABLET BY MOUTH AT ONSET OF HEADACHE. MAY REPEAT DOSE 1 TIME  IN 2 HOURS IF HEADACHE NOT RELIEVED 10 tablet 0   • traZODone (DESYREL) 100 MG tablet Take 1 tablet by mouth Every Night. 90 tablet 1   • Vortioxetine HBr (Trintellix) 20 MG tablet Take 20 mg by mouth Daily. 30 tablet 5     No current facility-administered medications on file prior to visit.        Physical Exam  Vitals reviewed.   Constitutional:       General: He is not in acute distress.     Appearance: Normal appearance. He is well-developed. He is not diaphoretic.   HENT:      Head: Normocephalic and atraumatic.   Eyes:      General:         Right eye: No discharge.         Left eye: No discharge.      Conjunctiva/sclera: Conjunctivae normal.      Pupils: Pupils are equal, round, and reactive to light.   Cardiovascular:      Rate and Rhythm: Normal rate and regular rhythm.   Pulmonary:      Effort: Pulmonary effort is normal. No respiratory distress.      Breath sounds: Normal breath sounds. No wheezing or rales.   Abdominal:      General: Bowel sounds are normal. There is no distension.      Palpations: Abdomen is soft. There is no mass.      Tenderness: There is no abdominal tenderness. There is no guarding.   Musculoskeletal:         General: Tenderness (left elbow. holds elbow at all times) present. Normal range of motion.      Cervical back: Normal range of motion and neck supple.   Skin:     General: Skin is warm and dry.   Neurological:      General: No focal deficit present.      Mental Status: He is alert and oriented to person, place, and time.   Psychiatric:         Mood and Affect: Mood normal.         Behavior: Behavior normal.         Thought Content: Thought content normal.         Judgment: Judgment normal.           Assessment/Plan     Diagnoses and all orders for this visit:    1. Left elbow pain (Primary)  Comments:  had surgery in [place]  has not seen ortho  has not started PT  advised to follow up with ortho asap      2. Nausea  Comments:  likely due to constipation  start miralax  zofran  as needed  eat a bland diet  call if no improvement  Orders:  -     ondansetron (Zofran) 4 MG tablet; Take 1 tablet by mouth Every 8 (Eight) Hours As Needed for Nausea or Vomiting.  Dispense: 30 tablet; Refill: 0

## 2021-11-03 NOTE — PATIENT INSTRUCTIONS
Schedule an appointment with ortho for the elbow pain  zofran as needed for nausea  Take miralax over the counter daily for constipation  Eat a bland diet  Work on diet and exercise  Call if no improvement

## 2021-11-12 RX ORDER — CYCLOBENZAPRINE HCL 10 MG
10 TABLET ORAL 3 TIMES DAILY PRN
Qty: 90 TABLET | Refills: 11 | Status: SHIPPED | OUTPATIENT
Start: 2021-11-12 | End: 2022-11-16 | Stop reason: SDUPTHER

## 2021-11-16 DIAGNOSIS — I24.0 ISCHEMIC HEART DISEASE DUE TO CORONARY ARTERY OBSTRUCTION (HCC): ICD-10-CM

## 2021-11-16 DIAGNOSIS — I25.9 ISCHEMIC HEART DISEASE DUE TO CORONARY ARTERY OBSTRUCTION (HCC): ICD-10-CM

## 2021-11-16 RX ORDER — ASPIRIN 81 MG/1
81 TABLET ORAL DAILY
Qty: 90 TABLET | Refills: 1 | Status: SHIPPED | OUTPATIENT
Start: 2021-11-16

## 2021-11-16 RX ORDER — ASPIRIN 81 MG/1
TABLET, COATED ORAL
Qty: 90 TABLET | Refills: 1 | OUTPATIENT
Start: 2021-11-16

## 2021-12-01 ENCOUNTER — TELEPHONE (OUTPATIENT)
Dept: FAMILY MEDICINE CLINIC | Facility: CLINIC | Age: 51
End: 2021-12-01

## 2021-12-01 NOTE — TELEPHONE ENCOUNTER
This Friday at [facility] he is getting his Covid booster and flu shot.     He is wondering if he is due for a pneumonia injection?     Can leave detailed message on voicemail if he does not answer.

## 2021-12-01 NOTE — TELEPHONE ENCOUNTER
Pt notified. Prefers to get pneumovax 23 here in office. I have transferred him to scheduling to make appt.

## 2021-12-02 ENCOUNTER — CLINICAL SUPPORT (OUTPATIENT)
Dept: FAMILY MEDICINE CLINIC | Facility: CLINIC | Age: 51
End: 2021-12-02

## 2021-12-02 DIAGNOSIS — Z23 IMMUNIZATION DUE: Primary | ICD-10-CM

## 2021-12-02 PROCEDURE — 90732 PPSV23 VACC 2 YRS+ SUBQ/IM: CPT | Performed by: FAMILY MEDICINE

## 2021-12-02 PROCEDURE — G0009 ADMIN PNEUMOCOCCAL VACCINE: HCPCS | Performed by: FAMILY MEDICINE

## 2021-12-08 ENCOUNTER — DOCUMENTATION (OUTPATIENT)
Dept: PHYSICAL THERAPY | Facility: CLINIC | Age: 51
End: 2021-12-08

## 2021-12-08 NOTE — PROGRESS NOTES
Discharge Summary  Discharge Summary from Physical/Occupational Therapy Report    Patient: Frank Barger   : 1970  Today's Date: 2021    Patient seen for 1 visits.  Dates of Service: 3/10/20    Discharge Status of Patient: Patient no showed for 2 appointments, remaining appointments cancelled due to COVID.    Goals: Not Met    Discharge Plan: Patient to return to referring/providing physician    Thank you for this referral to Psychiatric Physical & Occupational Therapy.    SIGNATURE: Pamela Godfrey PT

## 2021-12-16 DIAGNOSIS — I25.9 ISCHEMIC HEART DISEASE DUE TO CORONARY ARTERY OBSTRUCTION (HCC): ICD-10-CM

## 2021-12-16 DIAGNOSIS — Z87.891 PERSONAL HISTORY OF TOBACCO USE, PRESENTING HAZARDS TO HEALTH: ICD-10-CM

## 2021-12-16 DIAGNOSIS — E78.2 MIXED HYPERLIPIDEMIA: ICD-10-CM

## 2021-12-16 DIAGNOSIS — I24.0 ISCHEMIC HEART DISEASE DUE TO CORONARY ARTERY OBSTRUCTION (HCC): ICD-10-CM

## 2021-12-16 RX ORDER — OMEPRAZOLE 20 MG/1
20 CAPSULE, DELAYED RELEASE ORAL
Qty: 90 CAPSULE | Refills: 1 | Status: SHIPPED | OUTPATIENT
Start: 2021-12-16 | End: 2022-01-25 | Stop reason: SDUPTHER

## 2021-12-16 RX ORDER — ATORVASTATIN CALCIUM 20 MG/1
20 TABLET, FILM COATED ORAL NIGHTLY
Qty: 90 TABLET | Refills: 1 | Status: SHIPPED | OUTPATIENT
Start: 2021-12-16 | End: 2022-06-20

## 2021-12-16 RX ORDER — BUPROPION HYDROCHLORIDE 150 MG/1
150 TABLET ORAL DAILY
Qty: 90 TABLET | Refills: 1 | Status: SHIPPED | OUTPATIENT
Start: 2021-12-16 | End: 2022-01-05 | Stop reason: SDUPTHER

## 2021-12-28 ENCOUNTER — TELEPHONE (OUTPATIENT)
Dept: FAMILY MEDICINE CLINIC | Facility: CLINIC | Age: 51
End: 2021-12-28

## 2021-12-28 DIAGNOSIS — M47.816 ARTHROPATHY OF LUMBAR FACET JOINT: Primary | ICD-10-CM

## 2021-12-28 RX ORDER — CELECOXIB 100 MG/1
100 CAPSULE ORAL 2 TIMES DAILY PRN
Qty: 180 CAPSULE | Refills: 0 | Status: SHIPPED | OUTPATIENT
Start: 2021-12-28 | End: 2022-04-08

## 2021-12-28 NOTE — TELEPHONE ENCOUNTER
Caller: Frank Barger     Relationship to Patient: SELF    [id number] [place] - [address] AT [facility] [place] OF [facility] & [address] [facility] [phone] [facility] [phone] FX      Phone Number: [phone]     Reason for Call: PATIENT CALLED REGARDING HIS DICIOFENAC SODIUM TROPICAL GEL 1% SAYING IT HASN'T BEEN HELPING HIM AT ALL. HE WANTED TO SEE IF THERE IS ANYTHING ELSE THAT CAN BE CALLED IN FOR HIM.   HE NOTED HE'S NOT ABLE TO SEE THE ARM SURGEON UNTIL February.

## 2021-12-28 NOTE — TELEPHONE ENCOUNTER
Please call patient and let him know that I recommend no more than 3000 mg Tylenol (acetaminophen) in 24 hours. Extra strength Tylenol (acetaminophen) is 500 mg. You can take 2 tablets up to 3 times per day as needed for pain.     I can start him on Celebrex as well but he cannot take any ibuprofen, Advil, Aleve, or naproxen with it. Would he be interested?

## 2022-01-05 ENCOUNTER — OFFICE VISIT (OUTPATIENT)
Dept: FAMILY MEDICINE CLINIC | Facility: CLINIC | Age: 52
End: 2022-01-05

## 2022-01-05 ENCOUNTER — LAB (OUTPATIENT)
Dept: FAMILY MEDICINE CLINIC | Facility: CLINIC | Age: 52
End: 2022-01-05

## 2022-01-05 VITALS
WEIGHT: 181 LBS | OXYGEN SATURATION: 100 % | BODY MASS INDEX: 23.23 KG/M2 | DIASTOLIC BLOOD PRESSURE: 80 MMHG | TEMPERATURE: 97.5 F | SYSTOLIC BLOOD PRESSURE: 129 MMHG | HEART RATE: 62 BPM | HEIGHT: 74 IN

## 2022-01-05 DIAGNOSIS — Z12.5 ENCOUNTER FOR SCREENING FOR MALIGNANT NEOPLASM OF PROSTATE: ICD-10-CM

## 2022-01-05 DIAGNOSIS — E78.2 MIXED HYPERLIPIDEMIA: ICD-10-CM

## 2022-01-05 DIAGNOSIS — Z00.00 ENCOUNTER FOR MEDICARE ANNUAL WELLNESS EXAM: ICD-10-CM

## 2022-01-05 DIAGNOSIS — F33.1 MAJOR DEPRESSIVE DISORDER, RECURRENT EPISODE, MODERATE: Chronic | ICD-10-CM

## 2022-01-05 DIAGNOSIS — I25.9 ISCHEMIC HEART DISEASE DUE TO CORONARY ARTERY OBSTRUCTION: Primary | ICD-10-CM

## 2022-01-05 DIAGNOSIS — M47.816 ARTHROPATHY OF LUMBAR FACET JOINT: ICD-10-CM

## 2022-01-05 DIAGNOSIS — D50.9 IRON DEFICIENCY ANEMIA, UNSPECIFIED IRON DEFICIENCY ANEMIA TYPE: ICD-10-CM

## 2022-01-05 DIAGNOSIS — I24.0 ISCHEMIC HEART DISEASE DUE TO CORONARY ARTERY OBSTRUCTION: Primary | ICD-10-CM

## 2022-01-05 DIAGNOSIS — Z87.891 PERSONAL HISTORY OF TOBACCO USE, PRESENTING HAZARDS TO HEALTH: ICD-10-CM

## 2022-01-05 DIAGNOSIS — I10 ESSENTIAL HYPERTENSION: ICD-10-CM

## 2022-01-05 LAB
ALBUMIN SERPL-MCNC: 4.6 G/DL (ref 3.5–5.2)
ALBUMIN/GLOB SERPL: 1.9 G/DL
ALP SERPL-CCNC: 102 U/L (ref 39–117)
ALT SERPL W P-5'-P-CCNC: 12 U/L (ref 1–41)
ANION GAP SERPL CALCULATED.3IONS-SCNC: 10.9 MMOL/L (ref 5–15)
AST SERPL-CCNC: 13 U/L (ref 1–40)
BASOPHILS # BLD AUTO: 0.13 10*3/MM3 (ref 0–0.2)
BASOPHILS NFR BLD AUTO: 1.4 % (ref 0–1.5)
BILIRUB SERPL-MCNC: 0.5 MG/DL (ref 0–1.2)
BUN SERPL-MCNC: 16 MG/DL (ref 6–20)
BUN/CREAT SERPL: 15.8 (ref 7–25)
CALCIUM SPEC-SCNC: 9.3 MG/DL (ref 8.6–10.5)
CHLORIDE SERPL-SCNC: 99 MMOL/L (ref 98–107)
CHOLEST SERPL-MCNC: 132 MG/DL (ref 0–200)
CO2 SERPL-SCNC: 24.1 MMOL/L (ref 22–29)
CREAT SERPL-MCNC: 1.01 MG/DL (ref 0.76–1.27)
DEPRECATED RDW RBC AUTO: 46.8 FL (ref 37–54)
EOSINOPHIL # BLD AUTO: 0.18 10*3/MM3 (ref 0–0.4)
EOSINOPHIL NFR BLD AUTO: 2 % (ref 0.3–6.2)
ERYTHROCYTE [DISTWIDTH] IN BLOOD BY AUTOMATED COUNT: 13.9 % (ref 12.3–15.4)
FERRITIN SERPL-MCNC: 48.6 NG/ML (ref 30–400)
GFR SERPL CREATININE-BSD FRML MDRD: 78 ML/MIN/1.73
GLOBULIN UR ELPH-MCNC: 2.4 GM/DL
GLUCOSE SERPL-MCNC: 85 MG/DL (ref 65–99)
HCT VFR BLD AUTO: 44.2 % (ref 37.5–51)
HDLC SERPL-MCNC: 33 MG/DL (ref 40–60)
HGB BLD-MCNC: 14.7 G/DL (ref 13–17.7)
IMM GRANULOCYTES # BLD AUTO: 0.05 10*3/MM3 (ref 0–0.05)
IMM GRANULOCYTES NFR BLD AUTO: 0.5 % (ref 0–0.5)
LDLC SERPL CALC-MCNC: 78 MG/DL (ref 0–100)
LDLC/HDLC SERPL: 2.29 {RATIO}
LYMPHOCYTES # BLD AUTO: 2.63 10*3/MM3 (ref 0.7–3.1)
LYMPHOCYTES NFR BLD AUTO: 28.8 % (ref 19.6–45.3)
MCH RBC QN AUTO: 30.8 PG (ref 26.6–33)
MCHC RBC AUTO-ENTMCNC: 33.3 G/DL (ref 31.5–35.7)
MCV RBC AUTO: 92.7 FL (ref 79–97)
MONOCYTES # BLD AUTO: 0.78 10*3/MM3 (ref 0.1–0.9)
MONOCYTES NFR BLD AUTO: 8.6 % (ref 5–12)
NEUTROPHILS NFR BLD AUTO: 5.35 10*3/MM3 (ref 1.7–7)
NEUTROPHILS NFR BLD AUTO: 58.7 % (ref 42.7–76)
NRBC BLD AUTO-RTO: 0 /100 WBC (ref 0–0.2)
PLATELET # BLD AUTO: 345 10*3/MM3 (ref 140–450)
PMV BLD AUTO: 9.5 FL (ref 6–12)
POTASSIUM SERPL-SCNC: 4.4 MMOL/L (ref 3.5–5.2)
PROT SERPL-MCNC: 7 G/DL (ref 6–8.5)
PSA SERPL-MCNC: 2.61 NG/ML (ref 0–4)
RBC # BLD AUTO: 4.77 10*6/MM3 (ref 4.14–5.8)
SODIUM SERPL-SCNC: 134 MMOL/L (ref 136–145)
TRIGL SERPL-MCNC: 117 MG/DL (ref 0–150)
VLDLC SERPL-MCNC: 21 MG/DL (ref 5–40)
WBC NRBC COR # BLD: 9.12 10*3/MM3 (ref 3.4–10.8)

## 2022-01-05 PROCEDURE — 82728 ASSAY OF FERRITIN: CPT | Performed by: FAMILY MEDICINE

## 2022-01-05 PROCEDURE — G0439 PPPS, SUBSEQ VISIT: HCPCS | Performed by: FAMILY MEDICINE

## 2022-01-05 PROCEDURE — G0103 PSA SCREENING: HCPCS | Performed by: FAMILY MEDICINE

## 2022-01-05 PROCEDURE — 85025 COMPLETE CBC W/AUTO DIFF WBC: CPT | Performed by: FAMILY MEDICINE

## 2022-01-05 PROCEDURE — 80061 LIPID PANEL: CPT | Performed by: FAMILY MEDICINE

## 2022-01-05 PROCEDURE — 1160F RVW MEDS BY RX/DR IN RCRD: CPT | Performed by: FAMILY MEDICINE

## 2022-01-05 PROCEDURE — 36415 COLL VENOUS BLD VENIPUNCTURE: CPT | Performed by: FAMILY MEDICINE

## 2022-01-05 PROCEDURE — 80053 COMPREHEN METABOLIC PANEL: CPT | Performed by: FAMILY MEDICINE

## 2022-01-05 PROCEDURE — 1170F FXNL STATUS ASSESSED: CPT | Performed by: FAMILY MEDICINE

## 2022-01-05 RX ORDER — HYDROCODONE BITARTRATE AND ACETAMINOPHEN 5; 325 MG/1; MG/1
1 TABLET ORAL 2 TIMES DAILY PRN
COMMUNITY
Start: 2021-12-09 | End: 2022-04-05

## 2022-01-05 RX ORDER — BUPROPION HYDROCHLORIDE 300 MG/1
300 TABLET ORAL DAILY
Qty: 90 TABLET | Refills: 1 | Status: SHIPPED | OUTPATIENT
Start: 2022-01-05 | End: 2022-06-20 | Stop reason: SDUPTHER

## 2022-01-05 NOTE — PROGRESS NOTES
The ABCs of the Annual Wellness Visit  Subsequent Medicare Wellness Visit    Chief Complaint   Patient presents with   • Medicare Wellness-subsequent      Subjective    History of Present Illness:  Frank Barger is a 51 y.o. male who presents for a Subsequent Medicare Wellness Visit.    The following portions of the patient's history were reviewed and   updated as appropriate: allergies, current medications, past family history, past medical history, past social history, past surgical history and problem list.    Compared to one year ago, the patient feels his physical   health is the same.    Compared to one year ago, the patient feels his mental   health is the same.    Recent Hospitalizations:  This patient has had a [facility] admission record on file within the last 365 days.    Current Medical Providers:  Patient Care Team:  Brittni Shell MD as PCP - General (Family Medicine)  Jaimie Silva MD as Consulting Physician (Cardiology)    Outpatient Medications Prior to Visit   Medication Sig Dispense Refill   • ARIPiprazole (ABILIFY) 5 MG tablet Take 1 tablet by mouth Every Night. 90 tablet 1   • aspirin (Aspirin Low Dose) 81 MG EC tablet Take 1 tablet by mouth Daily. 90 tablet 1   • atorvastatin (LIPITOR) 20 MG tablet TAKE 1 TABLET BY MOUTH EVERY NIGHT 90 tablet 1   • buPROPion XL (WELLBUTRIN XL) 150 MG 24 hr tablet TAKE 1 TABLET BY MOUTH DAILY 90 tablet 1   • celecoxib (CeleBREX) 100 MG capsule Take 1 capsule by mouth 2 (Two) Times a Day As Needed for Moderate Pain . Take with food! 180 capsule 0   • clopidogrel (PLAVIX) 75 MG tablet Take 1 tablet by mouth Daily. 90 tablet 1   • cyclobenzaprine (FLEXERIL) 10 MG tablet Take 1 tablet by mouth 3 (Three) Times a Day As Needed for Muscle Spasms. 90 tablet 11   • Diclofenac Sodium (VOLTAREN) 1 % gel gel APPLY 1 GM ON THE LEFT ELBOW TWICE DAILY AS NEEDED     • ferrous sulfate 325 (65 FE) MG tablet Take 325 mg by mouth Daily With Breakfast.     •  HYDROcodone-acetaminophen (NORCO) 5-325 MG per tablet Take 1 tablet by mouth 2 (Two) Times a Day As Needed.     • loratadine (CLARITIN) 10 MG tablet Take 10 mg by mouth Daily.     • metoprolol tartrate (LOPRESSOR) 25 MG tablet TAKE 1 TABLET BY MOUTH TWICE DAILY 180 tablet 1   • naproxen (NAPROSYN) 500 MG tablet Take 1 tablet by mouth 2 (Two) Times a Day With Meals. 60 tablet 3   • nitroglycerin (NITROSTAT) 0.4 MG SL tablet Place 0.4 mg under the tongue Every 5 (Five) Minutes As Needed for Chest Pain. Take no more than 3 doses in 15 minutes.     • NON FORMULARY Allergy injections     • omeprazole (priLOSEC) 20 MG capsule TAKE 1 CAPSULE BY MOUTH EVERY MORNING BEFORE BREAKFAST 90 capsule 1   • ondansetron (Zofran) 4 MG tablet Take 1 tablet by mouth Every 8 (Eight) Hours As Needed for Nausea or Vomiting. 30 tablet 0   • OXcarbazepine (TRILEPTAL) 600 MG tablet Take 600 mg by mouth 2 (Two) Times a Day. Take dos     • prazosin (MINIPRESS) 2 MG capsule TAKE 1 CAPSULE BY MOUTH EVERY NIGHT 90 capsule 1   • pregabalin (LYRICA) 200 MG capsule Take 1 capsule by mouth 3 (Three) Times a Day. 90 capsule 5   • SUMAtriptan (IMITREX) 50 MG tablet TAKE 1 TABLET BY MOUTH AT ONSET OF HEADACHE. MAY REPEAT DOSE 1 TIME IN 2 HOURS IF HEADACHE NOT RELIEVED 10 tablet 0   • traZODone (DESYREL) 100 MG tablet Take 1 tablet by mouth Every Night. 90 tablet 1   • Vortioxetine HBr (Trintellix) 20 MG tablet Take 20 mg by mouth Daily. 30 tablet 5   • HYDROcodone-acetaminophen (NORCO) 7.5-325 MG per tablet Take 1 tablet by mouth Every 6 (Six) Hours As Needed for Moderate Pain . 20 tablet 0     No facility-administered medications prior to visit.       Opioid medication/s are on active medication list.  and I have evaluated his active treatment plan and pain score trends (see table).  Vitals:    01/05/22 1253   PainSc:   8   PainLoc: Comment: back and elbow     I have reviewed the chart for potential of high risk medication and harmful drug interactions  in the elderly.            Aspirin is on active medication list. Aspirin use is indicated based on review of current medical condition/s. Pros and cons of this therapy have been discussed today. Benefits of this medication outweigh potential harm.  Patient has been encouraged to continue taking this medication.  .      Patient Active Problem List   Diagnosis   • Atherosclerosis of coronary artery bypass graft   • Essential hypertension   • Chronic pain disorder   • Coronary artery disease   • Degeneration of intervertebral disc of lumbar region   • Fibromyositis   • Mixed hyperlipidemia   • Neuropathic pain   • Posttraumatic stress disorder   • Psoriasis   • Psoriatic arthritis ([facility])   • Environmental and seasonal allergies   • Personal history of tobacco use, presenting hazards to health   • Status post coronary artery bypass graft   • Gastroesophageal reflux disease without esophagitis   • Tinnitus of left ear   • Intractable migraine without aura and with status migrainosus   • Cerumen debris on tympanic membrane of both ears   • Bilateral deafness   • Ischemic heart disease due to coronary artery obstruction ([facility])   • Status post angioplasty with stent   • Dyslipidemia   • Abnormal nuclear stress test   • Chest pain with high risk of acute coronary syndrome   • Iron deficiency anemia   • Arthropathy of lumbar facet joint   • Arthropathy of right knee   • Syncope and collapse   • History of scoliosis   • COPD (chronic obstructive pulmonary disease) ([facility])   • Dislocation of elbow, right, open, initial encounter   • Fracture of left elbow   • Major depressive disorder, recurrent episode, moderate ([facility])   • Psychophysiological insomnia     Advance Care Planning  Advance Directive is not on file.  ACP discussion was held with the patient during this visit. Patient does not have an advance directive, information provided.    Review of Systems   Constitutional: Positive for appetite change (doesn't like the taste of  "water), chills and fatigue.   Musculoskeletal: Positive for arthralgias.   Allergic/Immunologic: Positive for environmental allergies.   Psychiatric/Behavioral: Positive for agitation (PTSD).        Objective    Vitals:    01/05/22 1253   BP: 129/80   BP Location: Right arm   Patient Position: Sitting   Cuff Size: Small Adult   Pulse: 62   Temp: 97.5 °F (36.4 °C)   TempSrc: Infrared   SpO2: 100%   Weight: 82.1 kg (181 lb)   Height: 188 cm (74\")   PainSc:   8   PainLoc: Comment: back and elbow     BMI Readings from Last 1 Encounters:   01/05/22 23.24 kg/m²   BMI is within normal parameters. No follow-up required.    Does the patient have evidence of cognitive impairment? No    Physical Exam  Vitals reviewed.   Constitutional:       General: He is not in acute distress.     Appearance: He is well-developed. He is not diaphoretic.   HENT:      Head: Normocephalic and atraumatic.      Right Ear: There is impacted cerumen.      Left Ear: Tympanic membrane and ear canal normal.      Mouth/Throat:      Mouth: Mucous membranes are moist.      Dentition: Abnormal dentition.      Pharynx: Oropharynx is clear.   Eyes:      Extraocular Movements: Extraocular movements intact.      Pupils: Pupils are equal, round, and reactive to light.   Cardiovascular:      Rate and Rhythm: Normal rate and regular rhythm.      Heart sounds: Normal heart sounds.   Pulmonary:      Effort: Pulmonary effort is normal.      Breath sounds: Normal breath sounds. No wheezing.   Skin:     General: Skin is warm and dry.      Findings: Rash (psoriatic plaques on forehead) present.   Neurological:      Mental Status: He is alert and oriented to person, place, and time.   Psychiatric:         Mood and Affect: Mood normal.         Behavior: Behavior normal.                 HEALTH RISK ASSESSMENT    Smoking Status:  Social History     Tobacco Use   Smoking Status Current Every Day Smoker   • Packs/day: 0.50   • Years: 2.00   • Pack years: 1.00   • Types: " Cigarettes   • Start date: 8/5/1985   Smokeless Tobacco Never Used   Tobacco Comment    do not smoke dos     Alcohol Consumption:  Social History     Substance and Sexual Activity   Alcohol Use No     Fall Risk Screen:    DEXADI Fall Risk Assessment has not been completed.    Depression Screening:  PHQ-2/PHQ-9 Depression Screening 1/5/2022   Little interest or pleasure in doing things 0   Feeling down, depressed, or hopeless 0   Trouble falling or staying asleep, or sleeping too much -   Feeling tired or having little energy -   Poor appetite or overeating -   Feeling bad about yourself - or that you are a failure or have let yourself or your family down -   Trouble concentrating on things, such as reading the newspaper or watching television -   Moving or speaking so slowly that other people could have noticed. Or the opposite - being so fidgety or restless that you have been moving around a lot more than usual -   Thoughts that you would be better off dead, or of hurting yourself in some way -   Total Score 0   If you checked off any problems, how difficult have these problems made it for you to do your work, take care of things at home, or get along with other people? -       Health Habits and Functional and Cognitive Screening:  Functional & Cognitive Status 1/5/2022   Do you have difficulty preparing food and eating? Yes   Do you have difficulty bathing yourself, getting dressed or grooming yourself? Yes   Do you have difficulty using the toilet? No   Do you have difficulty moving around from place to place? Yes   Do you have trouble with steps or getting out of a bed or a chair? Yes   Current Diet Well Balanced Diet   Dental Exam Up to date   Eye Exam Up to date   Exercise (times per week) 0 times per week   Current Exercises Include No Regular Exercise   Do you need help using the phone?  No   Are you deaf or do you have serious difficulty hearing?  Yes   Do you need help with transportation? No   Do you need  help shopping? Yes   Do you need help preparing meals?  No   Do you need help with housework?  Yes   Do you need help with laundry? Yes   Do you need help taking your medications? Yes   Do you need help managing money? No   Do you ever drive or ride in a car without wearing a seat belt? No   Have you felt unusual stress, anger or loneliness in the last month? No   Who do you live with? Spouse   If you need help, do you have trouble finding someone available to you? No   Have you been bothered in the last four weeks by sexual problems? No   Do you have difficulty concentrating, remembering or making decisions? Yes       Age-appropriate Screening Schedule:  Refer to the list below for future screening recommendations based on patient's age, sex and/or medical conditions. Orders for these recommended tests are listed in the plan section. The patient has been provided with a written plan.    Health Maintenance   Topic Date Due   • TDAP/TD VACCINES (1 - Tdap) Never done   • ZOSTER VACCINE (1 of 2) Never done   • INFLUENZA VACCINE  08/01/2021   • LIPID PANEL  01/21/2022              Assessment/Plan   CMS Preventative Services Quick Reference  Risk Factors Identified During Encounter  Cardiovascular Disease  Chronic Pain   Dementia/Memory   Depression/Dysphoria  Fall Risk-High or Moderate  Immunizations Discussed/Encouraged (specific Immunizations; Tdap, Influenza, Shingrix and COVID19  Inactivity/Sedentary  Polypharmacy  The above risks/problems have been discussed with the patient.  Follow up actions/plans if indicated are seen below in the Assessment/Plan Section.  Pertinent information has been shared with the patient in the After Visit Summary.    Problem List Items Addressed This Visit        Cardiac and Vasculature    Essential hypertension    Overview     BP at goal, <140/90.  Encouraged smoking cessation, low-Na+ diet, & 150 min exercise/week.   Continue Lopressor 25 mg twice daily.   Patient to monitor ambulatory  BP.  Monitoring renal function.         Relevant Orders    CBC Auto Differential    Comprehensive Metabolic Panel    Lipid Panel    Mixed hyperlipidemia    Overview     Reviewed lipid panel & LDL goal.  Encouraged a diet rich in vegetables & 150 minutes of exercise weekly.  Continue atorvastatin 10 mg nightly.         Relevant Orders    CBC Auto Differential    Comprehensive Metabolic Panel    Lipid Panel    Ischemic heart disease due to coronary artery obstruction (HCC) - Primary    Overview     Status post CABG and PCI.  BP goal, <140/90.  Continue antihypertensive therapy.  LDL at goal, <100.  Continue atorvastatin 20 mg nightly.  Continue dual antiplatelet therapy.  Encouraged smoking cessation.         Relevant Orders    CBC Auto Differential    Comprehensive Metabolic Panel    Lipid Panel       Hematology and Neoplasia    Iron deficiency anemia    Overview     Continue ferrous sulfate 325 mg every other day.  Monitoring CBC and iron profile regularly.         Relevant Orders    CBC Auto Differential    Ferritin       Mental Health    Major depressive disorder, recurrent episode, moderate (HCC) (Chronic)    Overview     Former solider with PTSD.  Followed by behavioral health.  Symptoms moderately controlled with Trintellix 20 mg, Abilify 5 mg, and prazosin 2 mg nightly.  Monitoring for metabolic dysfunction with mood stabilizer.  Monitoring for orthostatic hypotension with prazosin.  RTO if symptoms worsen.         Relevant Medications    buPROPion XL (WELLBUTRIN XL) 300 MG 24 hr tablet    Other Relevant Orders    CBC Auto Differential    Comprehensive Metabolic Panel       Neuro    Arthropathy of lumbar facet joint    Overview     Advised < 3g acetaminophen/day PRN for joint pain.  Continue Celebrex 100 mg BID PRN. Monitoring renal function.  Followed by pain management for Lyrica & Norco.  Encouraged smoking cessation.         Relevant Orders    CBC Auto Differential    Comprehensive Metabolic Panel        Tobacco    Personal history of tobacco use, presenting hazards to health    Overview     Discussed health risks associated with tobacco use.    Encouraged smoking cessation.  Insurance would not cover Chantix.  Reports non-efficacy with Wellbutrin. Consider increasing dose.  Recommended NRT.         Relevant Medications    buPROPion XL (WELLBUTRIN XL) 300 MG 24 hr tablet      Other Visit Diagnoses     Encounter for screening for malignant neoplasm of prostate        Relevant Orders    PSA Screen    Encounter for Medicare annual wellness exam        Relevant Orders    CBC Auto Differential    Comprehensive Metabolic Panel    Lipid Panel          Follow Up:   Return in about 3 months (around 4/5/2022) for Recheck heart disease and smoking.     An After Visit Summary and PPPS were made available to the patient.               CBC & Differential (08/20/2021 04:47)  Basic Metabolic Panel (08/20/2021 04:47)  Lipid Panel (01/21/2021 02:39)

## 2022-01-05 NOTE — PATIENT INSTRUCTIONS
Mediterranean Diet  A Mediterranean diet refers to food and lifestyle choices that are based on the traditions of countries located on the [place]. This way of eating has been shown to help prevent certain conditions and improve outcomes for people who have chronic diseases, like kidney disease and heart disease.  What are tips for following this plan?  Lifestyle  · Cook and eat meals together with your family, when possible.  · Drink enough fluid to keep your urine clear or pale yellow.  · Be physically active every day. This includes:  ? Aerobic exercise like running or swimming.  ? Leisure activities like gardening, walking, or housework.  · Get 7-8 hours of sleep each night.  · If recommended by your health care provider, drink red wine in moderation. This means 1 glass a day for nonpregnant women and 2 glasses a day for men. A glass of wine equals 5 oz (150 mL).  Reading food labels    · Check the serving size of packaged foods. For foods such as rice and pasta, the serving size refers to the amount of cooked product, not dry.  · Check the total fat in packaged foods. Avoid foods that have saturated fat or trans fats.  · Check the ingredients list for added sugars, such as corn syrup.    Shopping  · At the grocery store, buy most of your food from the areas near the walls of the store. This includes:  ? Fresh fruits and vegetables (produce).  ? Grains, beans, nuts, and seeds. Some of these may be available in unpackaged forms or large amounts (in bulk).  ? Fresh seafood.  ? Poultry and eggs.  ? Low-fat dairy products.  · Buy whole ingredients instead of prepackaged foods.  · Buy fresh fruits and vegetables in-season from local farmers markets.  · Buy frozen fruits and vegetables in resealable bags.  · If you do not have access to quality fresh seafood, buy precooked frozen shrimp or canned fish, such as tuna, salmon, or sardines.  · Buy small amounts of raw or cooked vegetables, salads, or olives from  the deli or salad bar at your store.  · Stock your pantry so you always have certain foods on hand, such as olive oil, canned tuna, canned tomatoes, rice, pasta, and beans.  Cooking  · Cook foods with extra-virgin olive oil instead of using butter or other vegetable oils.  · Have meat as a side dish, and have vegetables or grains as your main dish. This means having meat in small portions or adding small amounts of meat to foods like pasta or stew.  · Use beans or vegetables instead of meat in common dishes like chili or lasagna.  · Simms with different cooking methods. Try roasting or broiling vegetables instead of steaming or sautéeing them.  · Add frozen vegetables to soups, stews, pasta, or rice.  · Add nuts or seeds for added healthy fat at each meal. You can add these to yogurt, salads, or vegetable dishes.  · Marinate fish or vegetables using olive oil, lemon juice, garlic, and fresh herbs.  Meal planning    · Plan to eat 1 vegetarian meal one day each week. Try to work up to 2 vegetarian meals, if possible.  · Eat seafood 2 or more times a week.  · Have healthy snacks readily available, such as:  ? Vegetable sticks with hummus.  ? Greek yogurt.  ? Fruit and nut trail mix.  · Eat balanced meals throughout the week. This includes:  ? Fruit: 2-3 servings a day  ? Vegetables: 4-5 servings a day  ? Low-fat dairy: 2 servings a day  ? Fish, poultry, or lean meat: 1 serving a day  ? Beans and legumes: 2 or more servings a week  ? Nuts and seeds: 1-2 servings a day  ? Whole grains: 6-8 servings a day  ? Extra-virgin olive oil: 3-4 servings a day  · Limit red meat and sweets to only a few servings a month    What are my food choices?  · Mediterranean diet  ? Recommended  § Grains: Whole-grain pasta. Brown rice. Bulgar wheat. Polenta. Couscous. Whole-wheat bread. Oatmeal. Quinoa.  § Vegetables: Artichokes. Beets. Broccoli. Cabbage. Carrots. Eggplant. Green beans. Chard. Kale. Spinach. Onions. Leeks. Peas. Squash.  Tomatoes. Peppers. Radishes.  § Fruits: Apples. Apricots. Avocado. Berries. Bananas. Cherries. Dates. Figs. Grapes. Radu. Melon. Oranges. Peaches. Plums. Pomegranate.  § Meats and other protein foods: Beans. Almonds. Sunflower seeds. Pine nuts. Peanuts. Cod. [place]. Scallops. Shrimp. Tuna. Tilapia. Clams. Oysters. Eggs.  § Dairy: Low-fat milk. Cheese. Greek yogurt.  § Beverages: Water. Red wine. Herbal tea.  § Fats and oils: Extra virgin olive oil. Avocado oil. Grape seed oil.  § Sweets and desserts: Greek yogurt with honey. Baked apples. Poached pears. Trail mix.  § Seasoning and other foods: Basil. Cilantro. Coriander. Cumin. Mint. Parsley. Miller. Rosemary. Tarragon. Garlic. Oregano. Thyme. Pepper. Balsalmic vinegar. Tahini. Hummus. Tomato sauce. Olives. Mushrooms.  ? Limit these  § Grains: Prepackaged pasta or rice dishes. Prepackaged cereal with added sugar.  § Vegetables: Deep fried potatoes (french fries).  § Fruits: Fruit canned in syrup.  § Meats and other protein foods: Beef. Pork. Lamb. Poultry with skin. Hot dogs. Metzger.  § Dairy: Ice cream. Sour cream. Whole milk.  § Beverages: Juice. Sugar-sweetened soft drinks. Beer. Liquor and spirits.  § Fats and oils: Butter. Canola oil. Vegetable oil. Beef fat (tallow). Lard.  § Sweets and desserts: Cookies. Cakes. Pies. Candy.  § Seasoning and other foods: Mayonnaise. Premade sauces and marinades.  The items listed may not be a complete list. Talk with your dietitian about what dietary choices are right for you.  Summary  · The Mediterranean diet includes both food and lifestyle choices.  · Eat a variety of fresh fruits and vegetables, beans, nuts, seeds, and whole grains.  · Limit the amount of red meat and sweets that you eat.  · Talk with your health care provider about whether it is safe for you to drink red wine in moderation. This means 1 glass a day for nonpregnant women and 2 glasses a day for men. A glass of wine equals 5 oz (150 mL).  This information  is not intended to replace advice given to you by your health care provider. Make sure you discuss any questions you have with your health care provider.  Document Revised: 08/17/2017 Document Reviewed: 08/10/2017  [employer] Patient Education © 2020 [employer]      Exercising to Stay Healthy  To become healthy and stay healthy, it is recommended that you do moderate-intensity and vigorous-intensity exercise. You can tell that you are exercising at a moderate intensity if your heart starts beating faster and you start breathing faster but can still hold a conversation. You can tell that you are exercising at a vigorous intensity if you are breathing much harder and faster and cannot hold a conversation while exercising.  Exercising regularly is important. It has many health benefits, such as:  · Improving overall fitness, flexibility, and endurance.  · Increasing bone density.  · Helping with weight control.  · Decreasing body fat.  · Increasing muscle strength.  · Reducing stress and tension.  · Improving overall health.  How often should I exercise?  Choose an activity that you enjoy, and set realistic goals. Your health care provider can help you make an activity plan that works for you.  Exercise regularly as told by your health care provider. This may include:  · Doing strength training two times a week, such as:  ? Lifting weights.  ? Using resistance bands.  ? Push-ups.  ? Sit-ups.  ? Yoga.  · Doing a certain intensity of exercise for a given amount of time. Choose from these options:  ? A total of 150 minutes of moderate-intensity exercise every week.  ? A total of 75 minutes of vigorous-intensity exercise every week.  ? A mix of moderate-intensity and vigorous-intensity exercise every week.  Children, pregnant women, people who have not exercised regularly, people who are overweight, and older adults may need to talk with a health care provider about what activities are safe to do. If you have a medical  condition, be sure to talk with your health care provider before you start a new exercise program.  What are some exercise ideas?  Moderate-intensity exercise ideas include:  · Walking 1 mile (1.6 km) in about 15 minutes.  · Biking.  · Hiking.  · Golfing.  · Dancing.  · Water aerobics.  Vigorous-intensity exercise ideas include:  · Walking 4.5 miles (7.2 km) or more in about 1 hour.  · Jogging or running 5 miles (8 km) in about 1 hour.  · Biking 10 miles (16.1 km) or more in about 1 hour.  · Lap swimming.  · Roller-skating or in-line skating.  · Cross-country skiing.  · Vigorous competitive sports, such as football, basketball, and soccer.  · Jumping rope.  · Aerobic dancing.  What are some everyday activities that can help me to get exercise?  · Yard work, such as:  ? Pushing a .  ? Raking and bagging leaves.  · Washing your car.  · Pushing a stroller.  · Shoveling snow.  · Gardening.  · Washing windows or floors.  How can I be more active in my day-to-day activities?  · Use stairs instead of an elevator.  · Take a walk during your lunch break.  · If you drive, park your car farther away from your work or school.  · If you take public transportation, get off one stop early and walk the rest of the way.  · Stand up or walk around during all of your indoor phone calls.  · Get up, stretch, and walk around every 30 minutes throughout the day.  · Enjoy exercise with a friend. Support to continue exercising will help you keep a regular routine of activity.  What guidelines can I follow while exercising?  · Before you start a new exercise program, talk with your health care provider.  · Do not exercise so much that you hurt yourself, feel dizzy, or get very short of breath.  · Wear comfortable clothes and wear shoes with good support.  · Drink plenty of water while you exercise to prevent dehydration or heat stroke.  · Work out until your breathing and your heartbeat get faster.  Where to find more  information  · [place] Department of Health and Human Services: www.hhs.gov  · Centers for Disease Control and Prevention (CDC): www.cdc.gov  Summary  · Exercising regularly is important. It will improve your overall fitness, flexibility, and endurance.  · Regular exercise also will improve your overall health. It can help you control your weight, reduce stress, and improve your bone density.  · Do not exercise so much that you hurt yourself, feel dizzy, or get very short of breath.  · Before you start a new exercise program, talk with your health care provider.  This information is not intended to replace advice given to you by your health care provider. Make sure you discuss any questions you have with your health care provider.  Document Revised: 11/30/2018 Document Reviewed: 11/08/2018  [employer] Patient Education © 2021 [employer]      Managing the Challenge of Quitting Smoking  Quitting smoking is a physical and mental challenge. You will face cravings, withdrawal symptoms, and temptation. Before quitting, work with your health care provider to make a plan that can help you manage quitting. Preparation can help you quit and keep you from giving in.  How to manage lifestyle changes  Managing stress  Stress can make you want to smoke, and wanting to smoke may cause stress. It is important to find ways to manage your stress. You might try some of the following:  · Practice relaxation techniques.  ? Breathe slowly and deeply, in through your nose and out through your mouth.  ? Listen to music.  ? Soak in a bath or take a shower.  ? Imagine a peaceful place or vacation.  · Get some support.  ? Talk with family or friends about your stress.  ? Join a support group.  ? Talk with a counselor or therapist.  · Get some physical activity.  ? Go for a walk, run, or bike ride.  ? Play a favorite sport.  ? Practice yoga.    Medicines  Talk with your health care provider about medicines that might help you deal with cravings  and make quitting easier for you.  Relationships  Social situations can be difficult when you are quitting smoking. To manage this, you can:  · Avoid parties and other social situations where people might be smoking.  · Avoid alcohol.  · Leave right away if you have the urge to smoke.  · Explain to your family and friends that you are quitting smoking. Ask for support and let them know you might be a bit grumpy.  · Plan activities where smoking is not an option.  General instructions  Be aware that many people gain weight after they quit smoking. However, not everyone does. To keep from gaining weight, have a plan in place before you quit and stick to the plan after you quit. Your plan should include:  · Having healthy snacks. When you have a craving, it may help to:  ? Eat popcorn, carrots, celery, or other cut vegetables.  ? Chew sugar-free gum.  · Changing how you eat.  ? Eat small portion sizes at meals.  ? Eat 4-6 small meals throughout the day instead of 1-2 large meals a day.  ? Be mindful when you eat. Do not watch television or do other things that might distract you as you eat.  · Exercising regularly.  ? Make time to exercise each day. If you do not have time for a long workout, do short bouts of exercise for 5-10 minutes several times a day.  ? Do some form of strengthening exercise, such as weight lifting.  ? Do some exercise that gets your heart beating and causes you to breathe deeply, such as walking fast, running, swimming, or biking. This is very important.  · Drinking plenty of water or other low-calorie or no-calorie drinks. Drink 6-8 glasses of water daily.    How to recognize withdrawal symptoms  Your body and mind may experience discomfort as you try to get used to not having nicotine in your system. These effects are called withdrawal symptoms. They may include:  · Feeling hungrier than normal.  · Having trouble concentrating.  · Feeling irritable or restless.  · Having trouble  sleeping.  · Feeling depressed.  · Craving a cigarette.  To manage withdrawal symptoms:  · Avoid places, people, and activities that trigger your cravings.  · Remember why you want to quit.  · Get plenty of sleep.  · Avoid coffee and other caffeinated drinks. These may worsen some of your symptoms.  These symptoms may surprise you. But be assured that they are normal to have when quitting smoking.  How to manage cravings  Come up with a plan for how to deal with your cravings. The plan should include the following:  · A definition of the specific situation you want to deal with.  · An alternative action you will take.  · A clear idea for how this action will help.  · The name of someone who might help you with this.  Cravings usually last for 5-10 minutes. Consider taking the following actions to help you with your plan to deal with cravings:  · Keep your mouth busy.  ? Chew sugar-free gum.  ? Suck on hard candies or a straw.  ? Brush your teeth.  · Keep your hands and body busy.  ? Change to a different activity right away.  ? Squeeze or play with a ball.  ? Do an activity or a hobby, such as making bead jewelry, practicing needlepoint, or working with wood.  ? Mix up your normal routine.  ? Take a short exercise break. Go for a quick walk or run up and down stairs.  · Focus on doing something kind or helpful for someone else.  · Call a friend or family member to talk during a craving.  · Join a support group.  · Contact a quitline.  Where to find support  To get help or find a support group:  · Call the National [employer] [place]'s Smoking Quitline: 9-800-QUIT NOW (122-5351)  · Visit the website of the Substance Abuse and Mental Health Services Administration: www.samhsa.gov  · Text QUIT to SmokefreeTXT: 140920  Where to find more information  Visit these websites to find more information on quitting smoking:  · National [employer] [place]: www.smokefree.gov  · American Lung Association: www.lung.org  · American  Cancer Society: www.cancer.org  · Centers for Disease Control and Prevention: www.cdc.gov  · American Heart Association: www.heart.org  Contact a health care provider if:  · You want to change your plan for quitting.  · The medicines you are taking are not helping.  · Your eating feels out of control or you cannot sleep.  Get help right away if:  · You feel depressed or become very anxious.  Summary  · Quitting smoking is a physical and mental challenge. You will face cravings, withdrawal symptoms, and temptation to smoke again. Preparation can help you as you go through these challenges.  · Try different techniques to manage stress, handle social situations, and prevent weight gain.  · You can deal with cravings by keeping your mouth busy (such as by chewing gum), keeping your hands and body busy, calling family or friends, or contacting a quitline for people who want to quit smoking.  · You can deal with withdrawal symptoms by avoiding places where people smoke, getting plenty of rest, and avoiding drinks with caffeine.  This information is not intended to replace advice given to you by your health care provider. Make sure you discuss any questions you have with your health care provider.  Document Revised: 10/06/2020 Document Reviewed: 10/06/2020  [employer] Patient Education © 2021 [employer]       ADVANCE CARE PLANNING  Conversations that Matter  PLANNING GUIDE    LOOKING BACK ...  Who we are, what we believe, and what we value are all shaped by experiences we have had. Mandaeism, family traditions, jobs and friends affect us deeply.    Has anything happened in your past that shaped your feelings about medical treatment?    Think about an experience you may have had with a family member or friend who was faced with a decision about medical care near the end of life. What was positive about that experience? What do you wish would have been done differently?    HERE AND NOW ...  Do you have any significant health  problems now? What kinds of things bring you such ely that, should a health problem prevent you from doing them any more, life would have little meaning? What short- or long-term goals do you have? How might medical treatment help you or hinder you in accomplishing those goals?    WHAT ABOUT TOMORROW?  What significant health problems do you fear may affect you in the future? How do you feel about the possibility of having to go to a nursing home? How would decisions be made if you could not make them?    WHO SHOULD MAKE DECISIONS?  An important part of planning is to consider whether or not you could appoint someone to make your healthcare decisions if you could not make them yourself. Many people select a close family member, but you are free to pick anyone you think could best represent you. Whoever you appoint should have all of the following qualifications:    • Can be trusted.  • Is willing to accept this responsibility.  • Is willing to follow the values and instructions you have discussed.  • Is able to make complex, difficult decisions.    It is helpful, but not required, to appoint one or more alternate persons in case your first choice becomes unable or unwilling to represent you. It is best if only one person has authority at a time, but you can instruct your representatives to discuss decisions together if time permits.    WHAT FUTURE DECISIONS NEED TO BE CONSIDERED?  Providing instructions for future healthcare decisions may seem like an impossible task. How can anyone plan for all the possibilities? You cannot … but you do not have to.    You need to plan for situations where you:  1. Become unexpectedly incapable of making your own decisions  2. It is clear you will have little or no recovery, and  3. The injury or loss of function is significant.    Such a situation might arise because of an injury to the brain from an accident, a stroke, or a slowly progressive disease such as Alzheimer's.    To  "plan for this type of situation, many people state, \"If I'm going to be a vegetable, let me go,\" or \"No heroics,\" or \"Don't keep me alive on machines.\" While these remarks are a beginning, they simply are too vague to guide decision-making.    You need to completely describe under what circumstances your goals for medical care should be changed from attempting to prolong life to being allowed to die. In some situations, certain treatments may not make sense because they will not help, but other treatments will be of important benefit.    Consider these three questions:  1. When would it make sense to continue certain treatments in an effort to prolong life and seek recovery?  2. When would it make sense to stop or withhold certain treatments and accept death when it comes?  3. Under any circumstance, what kind of comfort care would you want, including medication, spiritual and environmental options?    Making these choices requires understanding the information, weighing the benefits and burdens from your perspective, and then discussing your choices with those closest to you.    WHAT'S NEXT?  How do you make sure that your choices are respected? First talk, about them with your family, friends, clergy and physician, then put your choices in writing. Information about putting your plans into writing -- in an advance directive -- is available from your healthcare organization or .    Do you have any significant health problems? What health problems do you fear in the future?     Consider what frightens you most about medical treatment. What role does Yazidism, heather or spirituality play in how you live your life? How does cost influence your decisions about medical care?   In terms of future medical care, under what circumstances would you want the goals of medical treatment to switch from attempting to prolong life to focusing on comfort?     Describe these circumstances in as much detail as possible.   Ask " yourself, what will most help me live well at this point in my life?     Share your views with the person or people who would make your medical decisions if you could not make them.     THERE'S NO EASY WAY TO PLAN FOR FUTURE HEALTHCARE CHOICES.  It's a process that involves thinking and talking about complex and sensitive issues.    The questions that follow will help in the advance care planning process. This is a guide for your own benefit; it's not a test, and there are no right or wrong answers. It does not need to be completed all at once. You may use it to share your feelings with healthcare providers, your family and your friends. The answers to these questions will help those you love make choices for you when you cannot make them yourself.    These are things I need to tell my loved ones:  What is your idea of comfort care? Describe how you would want medications to be used to provide comfort. What type of spiritual care would you want?     I need to learn about: ____________________________  I need to ask my healthcare provider: ________________

## 2022-01-07 ENCOUNTER — PATIENT MESSAGE (OUTPATIENT)
Dept: FAMILY MEDICINE CLINIC | Facility: CLINIC | Age: 52
End: 2022-01-07

## 2022-01-07 DIAGNOSIS — D50.9 IRON DEFICIENCY ANEMIA, UNSPECIFIED IRON DEFICIENCY ANEMIA TYPE: Primary | ICD-10-CM

## 2022-01-12 RX ORDER — FERROUS SULFATE 325(65) MG
325 TABLET ORAL EVERY OTHER DAY
Qty: 45 TABLET | Refills: 1 | Status: SHIPPED | OUTPATIENT
Start: 2022-01-12

## 2022-01-12 NOTE — TELEPHONE ENCOUNTER
From: FRED CHO  To: Frank Barger  Sent: 1/7/2022 11:19 AM EST  Subject: iron    I gave Dr. Shell your message regarding whether you are to continue prescription iron or OTC iron supplement. This is her response:    I was under the impression that he was only taking the over-the-counter iron supplement because cost/insurance did not cover the prescription. I would recommend continuing over-the-counter iron supplement.    If you have any questions please call the office or send Dr. Shell a [employer] message. Have a good day.

## 2022-01-25 ENCOUNTER — OFFICE VISIT (OUTPATIENT)
Dept: FAMILY MEDICINE CLINIC | Facility: CLINIC | Age: 52
End: 2022-01-25

## 2022-01-25 ENCOUNTER — LAB (OUTPATIENT)
Dept: FAMILY MEDICINE CLINIC | Facility: CLINIC | Age: 52
End: 2022-01-25

## 2022-01-25 VITALS
SYSTOLIC BLOOD PRESSURE: 110 MMHG | HEIGHT: 74 IN | DIASTOLIC BLOOD PRESSURE: 69 MMHG | OXYGEN SATURATION: 97 % | HEART RATE: 81 BPM | WEIGHT: 178 LBS | BODY MASS INDEX: 22.84 KG/M2 | TEMPERATURE: 97.7 F

## 2022-01-25 DIAGNOSIS — R10.13 EPIGASTRIC ABDOMINAL PAIN: ICD-10-CM

## 2022-01-25 DIAGNOSIS — R11.0 NAUSEA: Primary | ICD-10-CM

## 2022-01-25 LAB
CRP SERPL-MCNC: <0.3 MG/DL (ref 0–0.5)
LIPASE SERPL-CCNC: 26 U/L (ref 13–60)

## 2022-01-25 PROCEDURE — 36415 COLL VENOUS BLD VENIPUNCTURE: CPT | Performed by: FAMILY MEDICINE

## 2022-01-25 PROCEDURE — 86140 C-REACTIVE PROTEIN: CPT | Performed by: FAMILY MEDICINE

## 2022-01-25 PROCEDURE — U0004 COV-19 TEST NON-CDC HGH THRU: HCPCS | Performed by: FAMILY MEDICINE

## 2022-01-25 PROCEDURE — 99213 OFFICE O/P EST LOW 20 MIN: CPT | Performed by: FAMILY MEDICINE

## 2022-01-25 PROCEDURE — 83690 ASSAY OF LIPASE: CPT | Performed by: FAMILY MEDICINE

## 2022-01-25 RX ORDER — OMEPRAZOLE 40 MG/1
40 CAPSULE, DELAYED RELEASE ORAL
Qty: 90 CAPSULE | Refills: 1 | Status: SHIPPED | OUTPATIENT
Start: 2022-01-25 | End: 2022-06-20 | Stop reason: SDUPTHER

## 2022-01-25 RX ORDER — ONDANSETRON 4 MG/1
4 TABLET, FILM COATED ORAL EVERY 8 HOURS PRN
Qty: 30 TABLET | Refills: 0 | Status: SHIPPED | OUTPATIENT
Start: 2022-01-25 | End: 2023-02-15

## 2022-01-25 NOTE — PATIENT INSTRUCTIONS
[place] Diet  A bland diet consists of foods that are often soft and do not have a lot of fat, fiber, or extra seasonings. Foods without fat, fiber, or seasoning are easier for the body to digest. They are also less likely to irritate your mouth, throat, stomach, and other parts of your digestive system. A bland diet is sometimes called a BRAT diet.  What is my plan?  Your health care provider or food and nutrition specialist (dietitian) may recommend specific changes to your diet to prevent symptoms or to treat your symptoms. These changes may include:  · Eating small meals often.  · Cooking food until it is soft enough to chew easily.  · Chewing your food well.  · Drinking fluids slowly.  · Not eating foods that are very spicy, sour, or fatty.  · Not eating citrus fruits, such as oranges and grapefruit.  What do I need to know about this diet?  · Eat a variety of foods from the bland diet food list.  · Do not follow a bland diet longer than needed.  · Ask your health care provider whether you should take vitamins or supplements.  What foods can I eat?  Grains    Hot cereals, such as cream of wheat. Rice. Bread, crackers, or tortillas made from refined white flour.  Vegetables  Canned or cooked vegetables. Mashed or boiled potatoes.  Fruits    Bananas. Applesauce. Other types of cooked or canned fruit with the skin and seeds removed, such as canned peaches or pears.  Meats and other proteins    Scrambled eggs. Creamy peanut butter or other nut butters. Lean, well-cooked meats, such as chicken or fish. Tofu. Soups or broths.  Dairy  Low-fat dairy products, such as milk, cottage cheese, or yogurt.  Beverages    Water. Herbal tea. Apple juice.  Fats and oils  Mild salad dressings. Canola or olive oil.  Sweets and desserts  Pudding. Custard. Fruit gelatin. Ice cream.  The items listed above may not be a complete list of recommended foods and beverages. Contact a dietitian for more options.  What foods are not  recommended?  Grains  Whole grain breads and cereals.  Vegetables  Raw vegetables.  Fruits  Raw fruits, especially citrus, berries, or dried fruits.  Dairy  Whole fat dairy foods.  Beverages  Caffeinated drinks. Alcohol.  Seasonings and condiments  Strongly flavored seasonings or condiments. Hot sauce. Salsa.  Other foods  Spicy foods. Fried foods. Sour foods, such as pickled or fermented foods. Foods with high sugar content. Foods high in fiber.  The items listed above may not be a complete list of foods and beverages to avoid. Contact a dietitian for more information.  Summary  · A bland diet consists of foods that are often soft and do not have a lot of fat, fiber, or extra seasonings.  · Foods without fat, fiber, or seasoning are easier for the body to digest.  · Check with your health care provider to see how long you should follow this diet plan. It is not meant to be followed for long periods.  This information is not intended to replace advice given to you by your health care provider. Make sure you discuss any questions you have with your health care provider.  Document Revised: 01/16/2019 Document Reviewed: 01/16/2019  [employer] Patient Education © 2021 [employer]

## 2022-01-25 NOTE — PROGRESS NOTES
Assessment and Plan     Problem List Items Addressed This Visit     None      Visit Diagnoses     Nausea    -  Primary    Relevant Medications    ondansetron (Zofran) 4 MG tablet    omeprazole (priLOSEC) 40 MG capsule    Other Relevant Orders    COVID-19,APTIMA PANTHER(LOKESH),[facility], NP/OP SWAB IN UTM/VTM/SALINE TRANSPORT MEDIA,24 HR TAT - Swab, Nasopharynx    Lipase    C-reactive protein    Epigastric abdominal pain        Relevant Medications    ondansetron (Zofran) 4 MG tablet    omeprazole (priLOSEC) 40 MG capsule    Other Relevant Orders    COVID-19,APTIMA PANTHER(LOKESH),[facility], NP/OP SWAB IN UTM/VTM/SALINE TRANSPORT MEDIA,24 HR TAT - Swab, Nasopharynx    Lipase    C-reactive protein        Discussed soft bland diet; educational info in AVS.  Will start PPI for possible dyspepsia.  Continue Zofran PRN for nausea.  Will obtain labs. Consider imaging or referral to GI if symptoms worsen.     Return if symptoms worsen or fail to improve.        Patient was given instructions and counseling regarding his condition or for health maintenance advice. Please see specific information pulled into the AVS if appropriate.        Frank is a 51 y.o. being seen today for  Nausea (nausea,chills,achy. covid test 2 weeks ago was negative. symptoms started about 2 weeks ago. very tired.) and Chills   Subjective   History of the Present Illness      smoker with a concurrent medical history of coronary disease presents with complaints of chills and malaise.  Associated symptoms include fatigue, nausea, flushing, and diffuse myalgias.  Denies any fever, emesis, or changes in bowel movements.  Afebrile in office.  Symptoms began approximately 2 weeks ago.  Has been using Zofran to relief of nausea.  Reports improvement of symptoms with belching.  Of note patient tested negative for SARS-CoV-2 approximately 2 weeks ago.  Patient has been vaccinated; records requested.    Drinking 3-16 oz of water with flavor  "enhancers.  PO intake has been limited due to nausea. Eating pizza, coffee, and salad.     Social History  He  reports that he has been smoking cigarettes. He started smoking about 36 years ago. He has a 1.00 pack-year smoking history. He has never used smokeless tobacco. He reports that he does not drink alcohol and does not use drugs.  Objective   Vital Signs          Vitals:    01/25/22 1247   BP: 110/69   BP Location: Right arm   Patient Position: Sitting   Cuff Size: Small Adult   Pulse: 81   Temp: 97.7 °F (36.5 °C)   TempSrc: Infrared   SpO2: 97%   Weight: 80.7 kg (178 lb)   Height: 188 cm (74\")     BP Readings from Last 1 Encounters:   01/25/22 110/69     Wt Readings from Last 3 Encounters:   01/25/22 80.7 kg (178 lb)   01/05/22 82.1 kg (181 lb)   11/03/21 77.6 kg (171 lb)   Body mass index is 22.85 kg/m².     Physical Exam  Vitals reviewed.   Constitutional:       General: He is not in acute distress.     Appearance: He is well-developed. He is not diaphoretic.   HENT:      Head: Normocephalic and atraumatic.   Cardiovascular:      Rate and Rhythm: Normal rate and regular rhythm.      Heart sounds: Normal heart sounds.   Pulmonary:      Effort: Pulmonary effort is normal.      Breath sounds: Normal breath sounds. No wheezing.   Abdominal:      General: Bowel sounds are normal.      Palpations: Abdomen is soft.      Tenderness: There is abdominal tenderness in the epigastric area and left upper quadrant.   Skin:     General: Skin is warm and dry.      Findings: Rash (diffuse psoriatic plaques) present.   Neurological:      Mental Status: He is alert and oriented to person, place, and time.   Psychiatric:         Mood and Affect: Mood normal.         Behavior: Behavior normal.               Lipid Panel (01/05/2022 13:36)  Comprehensive Metabolic Panel (01/05/2022 13:36)  CBC Auto Differential (01/05/2022 13:36)  Ferritin (01/05/2022 13:36)    "

## 2022-01-26 ENCOUNTER — TELEPHONE (OUTPATIENT)
Dept: FAMILY MEDICINE CLINIC | Facility: CLINIC | Age: 52
End: 2022-01-26

## 2022-01-26 LAB — SARS-COV-2 ORF1AB RESP QL NAA+PROBE: DETECTED

## 2022-01-26 NOTE — TELEPHONE ENCOUNTER
When I gave him his results he asked if he needed to retest. I informed him no he does not need to retest, its not necessary.

## 2022-01-26 NOTE — TELEPHONE ENCOUNTER
Caller: Frank Barger    Relationship to patient: Self    Best call back number: [phone]    Patient is needing: PATIENT TESTED POSITIVE FOR COVID YESTERDAY. PATIENT STATES THAT HE NEEDS TO RETEST IN 5 DAYS, REQUESTING 1/31     REQUESTING COVID TEST IN OFFICE 1/31

## 2022-01-26 NOTE — TELEPHONE ENCOUNTER
Please call patient and ask why he needs to be retested.  He will continue to be positive for several days, potentially weeks.  I do not recommend retesting.  I recommend quarantining for 10 days. If symptoms resolve, he can end quarantine early after 5 days.

## 2022-02-14 DIAGNOSIS — F33.1 MAJOR DEPRESSIVE DISORDER, RECURRENT EPISODE, MODERATE: Chronic | ICD-10-CM

## 2022-02-14 DIAGNOSIS — G89.4 CHRONIC PAIN DISORDER: ICD-10-CM

## 2022-02-14 DIAGNOSIS — F43.10 POSTTRAUMATIC STRESS DISORDER: Chronic | ICD-10-CM

## 2022-02-14 RX ORDER — TRAZODONE HYDROCHLORIDE 100 MG/1
100 TABLET ORAL NIGHTLY
Qty: 90 TABLET | Refills: 1 | Status: SHIPPED | OUTPATIENT
Start: 2022-02-14 | End: 2023-02-22

## 2022-02-14 RX ORDER — ARIPIPRAZOLE 5 MG/1
5 TABLET ORAL NIGHTLY
Qty: 90 TABLET | Refills: 1 | Status: SHIPPED | OUTPATIENT
Start: 2022-02-14 | End: 2022-08-23 | Stop reason: SDUPTHER

## 2022-02-15 RX ORDER — NAPROXEN 500 MG/1
TABLET ORAL
Qty: 60 TABLET | Refills: 3 | OUTPATIENT
Start: 2022-02-15

## 2022-02-15 NOTE — TELEPHONE ENCOUNTER
Please call patient and let him know that we received a refill request for naproxen.  I switched his anti-inflammatory therapy from naproxen to Celebrex/celecoxib.  I do not want him taking both medications.

## 2022-03-16 RX ORDER — PRAZOSIN HYDROCHLORIDE 2 MG/1
CAPSULE ORAL
Qty: 90 CAPSULE | Refills: 1 | OUTPATIENT
Start: 2022-03-16

## 2022-04-05 ENCOUNTER — LAB (OUTPATIENT)
Dept: FAMILY MEDICINE CLINIC | Facility: CLINIC | Age: 52
End: 2022-04-05

## 2022-04-05 ENCOUNTER — OFFICE VISIT (OUTPATIENT)
Dept: FAMILY MEDICINE CLINIC | Facility: CLINIC | Age: 52
End: 2022-04-05

## 2022-04-05 ENCOUNTER — TELEPHONE (OUTPATIENT)
Dept: FAMILY MEDICINE CLINIC | Facility: CLINIC | Age: 52
End: 2022-04-05

## 2022-04-05 VITALS
SYSTOLIC BLOOD PRESSURE: 126 MMHG | HEART RATE: 80 BPM | HEIGHT: 74 IN | DIASTOLIC BLOOD PRESSURE: 78 MMHG | OXYGEN SATURATION: 99 % | TEMPERATURE: 97.8 F | WEIGHT: 176 LBS | BODY MASS INDEX: 22.59 KG/M2

## 2022-04-05 DIAGNOSIS — Z87.891 PERSONAL HISTORY OF TOBACCO USE, PRESENTING HAZARDS TO HEALTH: ICD-10-CM

## 2022-04-05 DIAGNOSIS — I24.0 ISCHEMIC HEART DISEASE DUE TO CORONARY ARTERY OBSTRUCTION: Primary | ICD-10-CM

## 2022-04-05 DIAGNOSIS — I25.9 ISCHEMIC HEART DISEASE DUE TO CORONARY ARTERY OBSTRUCTION: Primary | ICD-10-CM

## 2022-04-05 DIAGNOSIS — D50.9 IRON DEFICIENCY ANEMIA, UNSPECIFIED IRON DEFICIENCY ANEMIA TYPE: ICD-10-CM

## 2022-04-05 DIAGNOSIS — L40.9 PSORIASIS: Primary | ICD-10-CM

## 2022-04-05 LAB — FERRITIN SERPL-MCNC: 45.6 NG/ML (ref 30–400)

## 2022-04-05 PROCEDURE — 99213 OFFICE O/P EST LOW 20 MIN: CPT | Performed by: FAMILY MEDICINE

## 2022-04-05 PROCEDURE — 82728 ASSAY OF FERRITIN: CPT | Performed by: FAMILY MEDICINE

## 2022-04-05 PROCEDURE — 36415 COLL VENOUS BLD VENIPUNCTURE: CPT | Performed by: FAMILY MEDICINE

## 2022-04-05 RX ORDER — NAPROXEN 500 MG/1
500 TABLET ORAL 2 TIMES DAILY WITH MEALS
COMMUNITY
Start: 2022-01-15 | End: 2022-07-05

## 2022-04-05 RX ORDER — BUPROPION HYDROCHLORIDE 150 MG/1
150 TABLET ORAL DAILY
COMMUNITY
Start: 2022-03-16 | End: 2022-04-05 | Stop reason: DRUGHIGH

## 2022-04-05 RX ORDER — OMEPRAZOLE 20 MG/1
20 CAPSULE, DELAYED RELEASE ORAL
COMMUNITY
Start: 2022-03-16 | End: 2022-04-05 | Stop reason: DRUGHIGH

## 2022-04-05 NOTE — PROGRESS NOTES
Assessment and Plan     Problem List Items Addressed This Visit        Cardiac and Vasculature    Ischemic heart disease due to coronary artery obstruction (HCC) - Primary    Overview     Status post CABG and PCI.  BP goal, <140/90.  Continue antihypertensive therapy.  LDL at goal, <100.  Continue atorvastatin 20 mg nightly.  Continue dual antiplatelet therapy.  Encouraged smoking cessation.              Hematology and Neoplasia    Iron deficiency anemia    Overview     Continue ferrous sulfate 325 mg every other day.  Monitoring CBC and iron profile regularly.  Consider colonoscopy. Patient declined.           Relevant Orders    Ferritin       Tobacco    Personal history of tobacco use, presenting hazards to health    Overview     Discussed health risks associated with tobacco use.    Encouraged smoking cessation.  Insurance would not cover Chantix.  Continue Wellbutrin 300 mg daily.  Recommended NRT.               Return in about 3 months (around 7/5/2022) for Recheck heart disease.        Patient was given instructions and counseling regarding his condition or for health maintenance advice. Please see specific information pulled into the AVS if appropriate.        Frank is a 51 y.o. being seen today for  Heart Problem (F/u heart disease )   Subjective   History of the Present Illness      smoker with CMHx of CAD, HTN, and PTSD presents for follow up    Normotensive in office. Reports compliance with antihypertensive therapy, antiplatelet therapy, and statin.    Complains of fatigue since COVID infection in late January 2022. Reports takes iron supplement prescription and OTC iron supplement for iron deficiency anemia. Alternating therapy daily or every other day based on energy level. Denies any melena or blood in stool.    Complains of joint pain that is worsening arthritis with colder weather.    Social History  He  reports that he has been smoking cigarettes. He started smoking about 36 years ago.  "He has a 2.00 pack-year smoking history. He has never used smokeless tobacco. He reports that he does not drink alcohol and does not use drugs.  Objective   Vital Signs          Vitals:    04/05/22 1124   BP: 126/78   BP Location: Left arm   Patient Position: Sitting   Cuff Size: Large Adult   Pulse: 80   Temp: 97.8 °F (36.6 °C)   TempSrc: Infrared   SpO2: 99%   Weight: 79.8 kg (176 lb)   Height: 188 cm (74\")     BP Readings from Last 1 Encounters:   04/05/22 126/78     Wt Readings from Last 3 Encounters:   04/05/22 79.8 kg (176 lb)   01/25/22 80.7 kg (178 lb)   01/05/22 82.1 kg (181 lb)   Body mass index is 22.6 kg/m².     Physical Exam  Vitals reviewed.   Constitutional:       General: He is not in acute distress.     Appearance: Normal appearance.   HENT:      Head: Normocephalic and atraumatic.   Cardiovascular:      Rate and Rhythm: Normal rate.      Heart sounds: Normal heart sounds.   Pulmonary:      Effort: Pulmonary effort is normal.      Breath sounds: Normal breath sounds.   Skin:     Findings: Rash (psoriatic plaques along hairline) present.   Neurological:      Mental Status: He is alert and oriented to person, place, and time.   Psychiatric:         Mood and Affect: Mood normal.         Behavior: Behavior normal.               Comprehensive Metabolic Panel (01/05/2022 13:36)  CBC Auto Differential (01/05/2022 13:36)  Lipid Panel (01/05/2022 13:36)  Ferritin (01/05/2022 13:36)    "

## 2022-04-08 DIAGNOSIS — M47.816 ARTHROPATHY OF LUMBAR FACET JOINT: ICD-10-CM

## 2022-04-08 RX ORDER — CELECOXIB 100 MG/1
100 CAPSULE ORAL 2 TIMES DAILY PRN
Qty: 180 CAPSULE | Refills: 0 | Status: SHIPPED | OUTPATIENT
Start: 2022-04-08 | End: 2022-07-12 | Stop reason: SDUPTHER

## 2022-04-15 DIAGNOSIS — I24.0 ISCHEMIC HEART DISEASE DUE TO CORONARY ARTERY OBSTRUCTION: ICD-10-CM

## 2022-04-15 DIAGNOSIS — I25.9 ISCHEMIC HEART DISEASE DUE TO CORONARY ARTERY OBSTRUCTION: ICD-10-CM

## 2022-04-15 DIAGNOSIS — F33.1 MAJOR DEPRESSIVE DISORDER, RECURRENT EPISODE, MODERATE: Chronic | ICD-10-CM

## 2022-04-15 RX ORDER — CLOPIDOGREL BISULFATE 75 MG/1
TABLET ORAL
Qty: 90 TABLET | Refills: 1 | OUTPATIENT
Start: 2022-04-15

## 2022-04-15 NOTE — TELEPHONE ENCOUNTER
Rx Refill Note  Requested Prescriptions     Pending Prescriptions Disp Refills   • clopidogrel (PLAVIX) 75 MG tablet [Pharmacy Med Name: CLOPIDOGREL 75MG TABLETS] 90 tablet 1     Sig: TAKE 1 TABLET BY MOUTH EVERY MORNING      Last office visit with prescribing clinician: 4/12/2021      Next office visit with prescribing clinician: Visit date not found            Palmira Martines MA  04/15/22, 08:11 EDT

## 2022-04-18 RX ORDER — VORTIOXETINE 20 MG/1
TABLET, FILM COATED ORAL
Qty: 30 TABLET | Refills: 5 | OUTPATIENT
Start: 2022-04-18

## 2022-05-15 DIAGNOSIS — F33.1 MAJOR DEPRESSIVE DISORDER, RECURRENT EPISODE, MODERATE: Chronic | ICD-10-CM

## 2022-05-15 RX ORDER — VORTIOXETINE 20 MG/1
TABLET, FILM COATED ORAL
Qty: 30 TABLET | Refills: 5 | OUTPATIENT
Start: 2022-05-15

## 2022-06-14 DIAGNOSIS — Z87.891 PERSONAL HISTORY OF TOBACCO USE, PRESENTING HAZARDS TO HEALTH: ICD-10-CM

## 2022-06-14 DIAGNOSIS — I25.9 ISCHEMIC HEART DISEASE DUE TO CORONARY ARTERY OBSTRUCTION: ICD-10-CM

## 2022-06-14 DIAGNOSIS — I24.0 ISCHEMIC HEART DISEASE DUE TO CORONARY ARTERY OBSTRUCTION: ICD-10-CM

## 2022-06-14 DIAGNOSIS — E78.2 MIXED HYPERLIPIDEMIA: ICD-10-CM

## 2022-06-20 RX ORDER — OMEPRAZOLE 40 MG/1
40 CAPSULE, DELAYED RELEASE ORAL
Qty: 90 CAPSULE | Refills: 1 | Status: SHIPPED | OUTPATIENT
Start: 2022-06-20 | End: 2022-12-21

## 2022-06-20 RX ORDER — BUPROPION HYDROCHLORIDE 300 MG/1
300 TABLET ORAL DAILY
Qty: 90 TABLET | Refills: 1 | Status: SHIPPED | OUTPATIENT
Start: 2022-06-20 | End: 2022-12-01 | Stop reason: SDUPTHER

## 2022-06-20 RX ORDER — ATORVASTATIN CALCIUM 20 MG/1
20 TABLET, FILM COATED ORAL NIGHTLY
Qty: 90 TABLET | Refills: 1 | Status: SHIPPED | OUTPATIENT
Start: 2022-06-20 | End: 2022-12-01 | Stop reason: SDUPTHER

## 2022-07-02 VITALS
BODY MASS INDEX: 22.59 KG/M2 | OXYGEN SATURATION: 96 % | RESPIRATION RATE: 18 BRPM | TEMPERATURE: 97 F | HEART RATE: 96 BPM | HEIGHT: 74 IN | WEIGHT: 176 LBS | SYSTOLIC BLOOD PRESSURE: 148 MMHG | DIASTOLIC BLOOD PRESSURE: 101 MMHG

## 2022-07-02 PROCEDURE — 99283 EMERGENCY DEPT VISIT LOW MDM: CPT

## 2022-07-03 ENCOUNTER — APPOINTMENT (OUTPATIENT)
Dept: GENERAL RADIOLOGY | Facility: HOSPITAL | Age: 52
End: 2022-07-03

## 2022-07-03 ENCOUNTER — HOSPITAL ENCOUNTER (EMERGENCY)
Facility: HOSPITAL | Age: 52
Discharge: HOME | End: 2022-07-03
Attending: EMERGENCY MEDICINE | Admitting: EMERGENCY MEDICINE

## 2022-07-03 DIAGNOSIS — S40.011A CONTUSION OF MULTIPLE SITES OF RIGHT SHOULDER, INITIAL ENCOUNTER: ICD-10-CM

## 2022-07-03 DIAGNOSIS — T14.8XXA ABRASION: Primary | ICD-10-CM

## 2022-07-03 PROCEDURE — 73030 X-RAY EXAM OF SHOULDER: CPT

## 2022-07-03 PROCEDURE — 73010 X-RAY EXAM OF SHOULDER BLADE: CPT

## 2022-07-03 NOTE — ED PROVIDER NOTES
"Subjective   History of Present Illness  History Provided By: Patient    Chief Complaint: Right shoulder pain  Onset: Couple hours prior to arrival  Timing: Constant  Location: Right posterior shoulder  Quality: Aching  Severity: Mild to moderate  Modifying Factors: Worse with movement    Other: 51-year-old male prior medical history of CAD, chronic back pain, fibromyalgia presents after someone stepped on a deck and he was working on and it came down approximately a foot and hit him in the posterior right shoulder and shoulder blade.  Had immediate pain.  No numbness or weakness in arm.  No shortness of breath.  Denies any midline pain.  Did not strike him in head.  No loss of consciousness.  No neck pain.    Review of Systems   Respiratory: Negative for shortness of breath.    Cardiovascular: Negative for chest pain.   Musculoskeletal: Positive for arthralgias. Negative for neck pain.   Skin:        Positive for abrasions   Neurological: Negative for headaches.       Past Medical History:   Diagnosis Date   • Abdominal pain    • Allergic    • Allergies     takes allergy injections   • Anesthesia complication     states heart \"fluttering\" after anesthesia x 1- was kept in hospital 3 days s/p heart stent placement   • Anxiety    • Arthritis    • Asthma    • Chronic back pain    • Constipation 06/2021   • Coronary artery disease 2014   • DDD (degenerative disc disease), lumbar    • Depression    • Emphysema of lung ([facility])    • Fibromyalgia, primary 2016   • GERD (gastroesophageal reflux disease)    • Headache    • Heart murmur 2014   • Hypertension 2014   • Leg pain    • Lumbar spine pain    • Myocardial infarction ([facility]) 2014   • Nocturia    • Scoliosis 1995   • Under care of pain management specialist     pain management of [place]       Allergies   Allergen Reactions   • Hydrocodone Nausea And Vomiting   • Hydrocodone-Acetaminophen GI Intolerance   • Oxycodone GI Intolerance       Past Surgical History:   Procedure " Laterality Date   • CARDIAC CATHETERIZATION N/A 1/20/2021    Procedure: Left Heart Cath and coronary angiogram;  Surgeon: Jaimie Silva MD;  Location: [facility] CATH INVASIVE LOCATION;  Service: Cardiovascular;  Laterality: N/A;   • CARDIAC CATHETERIZATION N/A 1/20/2021    Procedure: Saphenous Vein Graft;  Surgeon: Jaimie Silva MD;  Location: [facility] CATH INVASIVE LOCATION;  Service: Cardiovascular;  Laterality: N/A;   • CARDIAC CATHETERIZATION N/A 1/20/2021    Procedure: Stent EBENEZER coronary;  Surgeon: Herve Rodriguez MD;  Location: [facility] CATH INVASIVE LOCATION;  Service: Cardiology;  Laterality: N/A;   • CARDIAC SURGERY  12/15   • CORONARY ANGIOPLASTY WITH STENT PLACEMENT  04/2014    2 in 2014, 1 in 12/2020   • CORONARY ARTERY BYPASS GRAFT  12/2015    x 3   • ELBOW DEBRIDEMENT Right 8/17/2021    Procedure: INCISION / DEBRIDEMENT ELBOW, closed reduction right elbow dislocation ;  Surgeon: Kyler August MD;  Location: [facility] MAIN OR;  Service: Orthopedics;  Laterality: Right;   • ELBOW OPEN REDUCTION INTERNAL FIXATION Left 8/18/2021    Procedure: Left  ulna open reduction internal fixation (of olecranon and coronoid), left radial head replacement  ;  Surgeon: Yonis Mcdonough MD;  Location: [facility] MAIN OR;  Service: Orthopedics;  Laterality: Left;       Family History   Problem Relation Age of Onset   • Diabetes Mother    • Heart disease Father    • Arthritis Father    • Cancer Father         Prostate   • COPD Father    • Anxiety disorder Daughter    • Depression Daughter        Social History     Socioeconomic History   • Marital status:    Tobacco Use   • Smoking status: Current Every Day Smoker     Packs/day: 1.00     Years: 2.00     Pack years: 2.00     Types: Cigarettes     Start date: 8/5/1985   • Smokeless tobacco: Never Used   • Tobacco comment: do not smoke dos   Vaping Use   • Vaping Use: Never used   • Passive vaping exposure: Yes   Substance and Sexual Activity   • Alcohol use: No  "  • Drug use: No   • Sexual activity: Yes     Partners: Female           Objective   Physical Exam  Constitutional:  No acute distress.  Head:  Atraumatic.  Normocephalic.   Eyes:  No scleral icterus. Normal conjunctiva  ENT:  Moist mucosa.  No nasal discharge present.  No midline neck tenderness to palpation.  Cardiovascular:  Well perfused.  2+ and equal distal pulses.  Regular rhythm and normal rate.  Normal capillary refill.    Pulmonary/Chest:  No respiratory distress.  Airway patent.  No tachypnea.  No accessory muscle usage.  Clear to auscultation bilaterally  Abdominal:  Non-distended. Non-tender.   Back: No midline bony point tenderness to palpation.  Extremities:  No peripheral edema.  No Deformity, abrasion over posterior right shoulder, no ecchymosis,  Skin:  Warm, dry  Neurological:  Alert, awake, and appropriate.  Normal speech.      Procedures           ED Course      BP (!) 148/101   Pulse 96   Temp 97 °F (36.1 °C)   Resp 18   Ht 188 cm (74\")   Wt 79.8 kg (176 lb)   SpO2 96%   BMI 22.60 kg/m²   Labs Reviewed - No data to display  Medications - No data to display  XR Scapula Right    Result Date: 7/3/2022  1.  Normal right shoulder. 2.  Normal right scapula Electronically signed by:  Elliot Sen M.D.  7/2/2022 11:22 PM    XR Shoulder 2+ View Right    Result Date: 7/3/2022  1.  Normal right shoulder. 2.  Normal right scapula Electronically signed by:  Elliot Sen M.D.  7/2/2022 11:22 PM                                         MDM  Preliminary interpretation by the Emergency Provider:  XR of the shoulder, 2+ views:   Interpretation: No acute fracture or dislocation.  An official final read will be made by the attending radiologist.      Preliminary interpretation by the Emergency Provider:  XR of the scapula, 2 views:   Interpretation: No acute fracture.  An official final read will be made by the attending radiologist.      Imaging negative.  Reassuring exam.  Will DC home.  " Return ER precautions discussed.    Final diagnoses:   Abrasion   Contusion of multiple sites of right shoulder, initial encounter       ED Disposition  ED Disposition     ED Disposition   Discharge    Condition   Stable    Comment   --             Brittni Shell MD  [address]  [address]  [place] [facility]  [phone]    In 3 days           Medication List      No changes were made to your prescriptions during this visit.          Solis Chavez MD  07/03/22 0784

## 2022-07-05 RX ORDER — NAPROXEN 500 MG/1
500 TABLET ORAL 2 TIMES DAILY PRN
Qty: 180 TABLET | Refills: 0 | Status: SHIPPED | OUTPATIENT
Start: 2022-07-05 | End: 2022-07-12

## 2022-07-09 DIAGNOSIS — M47.816 ARTHROPATHY OF LUMBAR FACET JOINT: ICD-10-CM

## 2022-07-11 RX ORDER — CELECOXIB 100 MG/1
100 CAPSULE ORAL 2 TIMES DAILY PRN
Qty: 180 CAPSULE | Refills: 1 | OUTPATIENT
Start: 2022-07-11

## 2022-07-11 NOTE — TELEPHONE ENCOUNTER
Please call patient and let him know that he should not be taking Celebrex and naproxen at the same time.  Naproxen prescription was recently filled.

## 2022-07-12 ENCOUNTER — TELEPHONE (OUTPATIENT)
Dept: FAMILY MEDICINE CLINIC | Facility: CLINIC | Age: 52
End: 2022-07-12

## 2022-07-12 ENCOUNTER — OFFICE VISIT (OUTPATIENT)
Dept: FAMILY MEDICINE CLINIC | Facility: CLINIC | Age: 52
End: 2022-07-12

## 2022-07-12 ENCOUNTER — LAB (OUTPATIENT)
Dept: FAMILY MEDICINE CLINIC | Facility: CLINIC | Age: 52
End: 2022-07-12

## 2022-07-12 VITALS
BODY MASS INDEX: 21.56 KG/M2 | OXYGEN SATURATION: 98 % | WEIGHT: 168 LBS | HEART RATE: 69 BPM | DIASTOLIC BLOOD PRESSURE: 89 MMHG | HEIGHT: 74 IN | SYSTOLIC BLOOD PRESSURE: 132 MMHG | TEMPERATURE: 98.2 F

## 2022-07-12 DIAGNOSIS — L40.50 PSORIATIC ARTHRITIS: ICD-10-CM

## 2022-07-12 DIAGNOSIS — I10 ESSENTIAL HYPERTENSION: ICD-10-CM

## 2022-07-12 DIAGNOSIS — D50.9 IRON DEFICIENCY ANEMIA, UNSPECIFIED IRON DEFICIENCY ANEMIA TYPE: ICD-10-CM

## 2022-07-12 DIAGNOSIS — M47.816 ARTHROPATHY OF LUMBAR FACET JOINT: ICD-10-CM

## 2022-07-12 DIAGNOSIS — E78.2 MIXED HYPERLIPIDEMIA: ICD-10-CM

## 2022-07-12 DIAGNOSIS — I25.10 CORONARY ARTERY DISEASE INVOLVING NATIVE HEART WITHOUT ANGINA PECTORIS, UNSPECIFIED VESSEL OR LESION TYPE: ICD-10-CM

## 2022-07-12 DIAGNOSIS — I25.10 CORONARY ARTERY DISEASE INVOLVING NATIVE HEART WITHOUT ANGINA PECTORIS, UNSPECIFIED VESSEL OR LESION TYPE: Primary | ICD-10-CM

## 2022-07-12 DIAGNOSIS — Z87.891 PERSONAL HISTORY OF TOBACCO USE, PRESENTING HAZARDS TO HEALTH: ICD-10-CM

## 2022-07-12 LAB
ALBUMIN SERPL-MCNC: 4.2 G/DL (ref 3.5–5.2)
ALBUMIN/GLOB SERPL: 1.6 G/DL
ALP SERPL-CCNC: 101 U/L (ref 39–117)
ALT SERPL W P-5'-P-CCNC: 6 U/L (ref 1–41)
ANION GAP SERPL CALCULATED.3IONS-SCNC: 9.1 MMOL/L (ref 5–15)
AST SERPL-CCNC: 11 U/L (ref 1–40)
BASOPHILS # BLD AUTO: 0.12 10*3/MM3 (ref 0–0.2)
BASOPHILS NFR BLD AUTO: 1.9 % (ref 0–1.5)
BILIRUB SERPL-MCNC: 0.2 MG/DL (ref 0–1.2)
BUN SERPL-MCNC: 13 MG/DL (ref 6–20)
BUN/CREAT SERPL: 11 (ref 7–25)
CALCIUM SPEC-SCNC: 9.3 MG/DL (ref 8.6–10.5)
CHLORIDE SERPL-SCNC: 103 MMOL/L (ref 98–107)
CHOLEST SERPL-MCNC: 119 MG/DL (ref 0–200)
CO2 SERPL-SCNC: 24.9 MMOL/L (ref 22–29)
CREAT SERPL-MCNC: 1.18 MG/DL (ref 0.76–1.27)
DEPRECATED RDW RBC AUTO: 42.1 FL (ref 37–54)
EGFRCR SERPLBLD CKD-EPI 2021: 74.7 ML/MIN/1.73
EOSINOPHIL # BLD AUTO: 0.49 10*3/MM3 (ref 0–0.4)
EOSINOPHIL NFR BLD AUTO: 7.7 % (ref 0.3–6.2)
ERYTHROCYTE [DISTWIDTH] IN BLOOD BY AUTOMATED COUNT: 12.8 % (ref 12.3–15.4)
GLOBULIN UR ELPH-MCNC: 2.6 GM/DL
GLUCOSE SERPL-MCNC: 93 MG/DL (ref 65–99)
HCT VFR BLD AUTO: 39.9 % (ref 37.5–51)
HDLC SERPL-MCNC: 34 MG/DL (ref 40–60)
HGB BLD-MCNC: 13.7 G/DL (ref 13–17.7)
IMM GRANULOCYTES # BLD AUTO: 0.01 10*3/MM3 (ref 0–0.05)
IMM GRANULOCYTES NFR BLD AUTO: 0.2 % (ref 0–0.5)
LDLC SERPL CALC-MCNC: 61 MG/DL (ref 0–100)
LDLC/HDLC SERPL: 1.71 {RATIO}
LYMPHOCYTES # BLD AUTO: 2.28 10*3/MM3 (ref 0.7–3.1)
LYMPHOCYTES NFR BLD AUTO: 35.7 % (ref 19.6–45.3)
MCH RBC QN AUTO: 31.4 PG (ref 26.6–33)
MCHC RBC AUTO-ENTMCNC: 34.3 G/DL (ref 31.5–35.7)
MCV RBC AUTO: 91.5 FL (ref 79–97)
MONOCYTES # BLD AUTO: 0.7 10*3/MM3 (ref 0.1–0.9)
MONOCYTES NFR BLD AUTO: 11 % (ref 5–12)
NEUTROPHILS NFR BLD AUTO: 2.79 10*3/MM3 (ref 1.7–7)
NEUTROPHILS NFR BLD AUTO: 43.5 % (ref 42.7–76)
NRBC BLD AUTO-RTO: 0 /100 WBC (ref 0–0.2)
PLATELET # BLD AUTO: 220 10*3/MM3 (ref 140–450)
PMV BLD AUTO: 10.7 FL (ref 6–12)
POTASSIUM SERPL-SCNC: 4 MMOL/L (ref 3.5–5.2)
PROT SERPL-MCNC: 6.8 G/DL (ref 6–8.5)
RBC # BLD AUTO: 4.36 10*6/MM3 (ref 4.14–5.8)
SODIUM SERPL-SCNC: 137 MMOL/L (ref 136–145)
TRIGL SERPL-MCNC: 135 MG/DL (ref 0–150)
VLDLC SERPL-MCNC: 24 MG/DL (ref 5–40)
WBC NRBC COR # BLD: 6.39 10*3/MM3 (ref 3.4–10.8)

## 2022-07-12 PROCEDURE — 80053 COMPREHEN METABOLIC PANEL: CPT | Performed by: FAMILY MEDICINE

## 2022-07-12 PROCEDURE — 36415 COLL VENOUS BLD VENIPUNCTURE: CPT | Performed by: FAMILY MEDICINE

## 2022-07-12 PROCEDURE — 99214 OFFICE O/P EST MOD 30 MIN: CPT | Performed by: FAMILY MEDICINE

## 2022-07-12 PROCEDURE — 85025 COMPLETE CBC W/AUTO DIFF WBC: CPT | Performed by: FAMILY MEDICINE

## 2022-07-12 PROCEDURE — 80061 LIPID PANEL: CPT | Performed by: FAMILY MEDICINE

## 2022-07-12 RX ORDER — PREGABALIN 300 MG/1
300 CAPSULE ORAL 2 TIMES DAILY
COMMUNITY
Start: 2022-07-03

## 2022-07-12 RX ORDER — CELECOXIB 100 MG/1
100 CAPSULE ORAL 2 TIMES DAILY PRN
Qty: 180 CAPSULE | Refills: 0 | Status: SHIPPED | OUTPATIENT
Start: 2022-07-12 | End: 2022-09-26

## 2022-07-12 NOTE — PROGRESS NOTES
"Subjective   Frank Barger is a 51 y.o. male.     History of Present Illness     The patient comes in for follow-up on his heart disease and anemia. He usually sees Dr. Brittni Shell.    The patient reports he has not been trying to lose weight. He mentions his weight fluctuates. He denies any chest pain. He denies any blood in his bowel movements. He denies feeling like he is going to pass out. He reports he is still smoking and refuses to stop. He mentions he would like to use Celebrex instead of naproxen and needs a refill. He wants a referral to a rheumatologist for his psoriatic arthritis.      The following portions of the patient's history were reviewed and updated as appropriate: allergies, current medications, past family history, past medical history, past social history, past surgical history, and problem list.  Past Medical History:   Diagnosis Date   • Abdominal pain    • Allergic    • Allergies     takes allergy injections   • Anesthesia complication     states heart \"fluttering\" after anesthesia x 1- was kept in hospital 3 days s/p heart stent placement   • Anxiety    • Arthritis    • Asthma    • Chronic back pain    • Constipation 06/2021   • Coronary artery disease 2014   • DDD (degenerative disc disease), lumbar    • Depression    • Emphysema of lung ([facility])    • Fibromyalgia, primary 2016   • GERD (gastroesophageal reflux disease)    • Headache    • Heart murmur 2014   • Hypertension 2014   • Leg pain    • Lumbar spine pain    • Myocardial infarction (HCC) 2014   • Nocturia    • Scoliosis 1995   • Under care of pain management specialist     pain management of [place]     Past Surgical History:   Procedure Laterality Date   • CARDIAC CATHETERIZATION N/A 1/20/2021    Procedure: Left Heart Cath and coronary angiogram;  Surgeon: Jaimie Silva MD;  Location: [facility] CATH INVASIVE LOCATION;  Service: Cardiovascular;  Laterality: N/A;   • CARDIAC CATHETERIZATION N/A 1/20/2021    Procedure: " Saphenous Vein Graft;  Surgeon: Jaimie Silva MD;  Location: [facility] CATH INVASIVE LOCATION;  Service: Cardiovascular;  Laterality: N/A;   • CARDIAC CATHETERIZATION N/A 1/20/2021    Procedure: Stent EBENEZER coronary;  Surgeon: Herve Rodriguez MD;  Location: [facility] CATH INVASIVE LOCATION;  Service: Cardiology;  Laterality: N/A;   • CARDIAC SURGERY  12/15   • CORONARY ANGIOPLASTY WITH STENT PLACEMENT  04/2014    2 in 2014, 1 in 12/2020   • CORONARY ARTERY BYPASS GRAFT  12/2015    x 3   • ELBOW DEBRIDEMENT Right 8/17/2021    Procedure: INCISION / DEBRIDEMENT ELBOW, closed reduction right elbow dislocation ;  Surgeon: Kyler August MD;  Location: [facility] MAIN OR;  Service: Orthopedics;  Laterality: Right;   • ELBOW OPEN REDUCTION INTERNAL FIXATION Left 8/18/2021    Procedure: Left  ulna open reduction internal fixation (of olecranon and coronoid), left radial head replacement  ;  Surgeon: Yonis Mcdonough MD;  Location: [facility] MAIN OR;  Service: Orthopedics;  Laterality: Left;     Family History   Problem Relation Age of Onset   • Diabetes Mother    • Heart disease Father    • Arthritis Father    • Cancer Father         Prostate   • COPD Father    • Anxiety disorder Daughter    • Depression Daughter      Social History     Socioeconomic History   • Marital status:    Tobacco Use   • Smoking status: Current Every Day Smoker     Packs/day: 0.50     Years: 47.00     Pack years: 23.50     Types: Cigarettes     Start date: 8/5/1985   • Smokeless tobacco: Never Used   Vaping Use   • Vaping Use: Never used   • Passive vaping exposure: Yes   Substance and Sexual Activity   • Alcohol use: No   • Drug use: No   • Sexual activity: Yes     Partners: Female         Current Outpatient Medications:   •  ARIPiprazole (ABILIFY) 5 MG tablet, TAKE 1 TABLET BY MOUTH EVERY NIGHT, Disp: 90 tablet, Rfl: 1  •  aspirin (Aspirin Low Dose) 81 MG EC tablet, Take 1 tablet by mouth Daily., Disp: 90 tablet, Rfl: 1  •  atorvastatin  (LIPITOR) 20 MG tablet, Take 1 tablet by mouth Every Night., Disp: 90 tablet, Rfl: 1  •  buPROPion XL (WELLBUTRIN XL) 300 MG 24 hr tablet, Take 1 tablet by mouth Daily., Disp: 90 tablet, Rfl: 1  •  celecoxib (CeleBREX) 100 MG capsule, Take 1 capsule by mouth 2 (Two) Times a Day As Needed for Moderate Pain . Take with food!, Disp: 180 capsule, Rfl: 0  •  clopidogrel (PLAVIX) 75 MG tablet, Take 1 tablet by mouth Daily., Disp: 90 tablet, Rfl: 1  •  cyclobenzaprine (FLEXERIL) 10 MG tablet, Take 1 tablet by mouth 3 (Three) Times a Day As Needed for Muscle Spasms., Disp: 90 tablet, Rfl: 11  •  ferrous sulfate 325 (65 FE) MG tablet, Take 1 tablet by mouth Every Other Day., Disp: 45 tablet, Rfl: 1  •  loratadine (CLARITIN) 10 MG tablet, Take 10 mg by mouth Daily., Disp: , Rfl:   •  metoprolol tartrate (LOPRESSOR) 25 MG tablet, TAKE 1 TABLET BY MOUTH TWICE DAILY, Disp: 180 tablet, Rfl: 1  •  nitroglycerin (NITROSTAT) 0.4 MG SL tablet, Place 0.4 mg under the tongue Every 5 (Five) Minutes As Needed for Chest Pain. Take no more than 3 doses in 15 minutes., Disp: , Rfl:   •  NON FORMULARY, Allergy injections, Disp: , Rfl:   •  omeprazole (priLOSEC) 40 MG capsule, Take 1 capsule by mouth Every Morning Before Breakfast. Take on an empty stomach!, Disp: 90 capsule, Rfl: 1  •  ondansetron (Zofran) 4 MG tablet, Take 1 tablet by mouth Every 8 (Eight) Hours As Needed for Nausea or Vomiting., Disp: 30 tablet, Rfl: 0  •  OXcarbazepine (TRILEPTAL) 600 MG tablet, Take 600 mg by mouth 2 (Two) Times a Day. Take dos, Disp: , Rfl:   •  prazosin (MINIPRESS) 2 MG capsule, TAKE 1 CAPSULE BY MOUTH EVERY NIGHT, Disp: 90 capsule, Rfl: 1  •  pregabalin (LYRICA) 300 MG capsule, Take 300 mg by mouth 2 (Two) Times a Day., Disp: , Rfl:   •  SUMAtriptan (IMITREX) 50 MG tablet, TAKE 1 TABLET BY MOUTH AT ONSET OF HEADACHE. MAY REPEAT DOSE 1 TIME IN 2 HOURS IF HEADACHE NOT RELIEVED, Disp: 10 tablet, Rfl: 0  •  traZODone (DESYREL) 100 MG tablet, TAKE 1 TABLET  "BY MOUTH EVERY NIGHT, Disp: 90 tablet, Rfl: 1  •  Vortioxetine HBr (Trintellix) 20 MG tablet, Take 20 mg by mouth Daily., Disp: 30 tablet, Rfl: 5    Review of Systems  /89 (BP Location: Left arm, Patient Position: Sitting, Cuff Size: Adult)   Pulse 69   Temp 98.2 °F (36.8 °C) (Temporal)   Ht 188 cm (74\")   Wt 76.2 kg (168 lb)   SpO2 98%   BMI 21.57 kg/m²      A review of systems was performed, and the pertinent positives are noted in the HPI.      Objective   Physical Exam  Vitals and nursing note reviewed.   Constitutional:       General: He is not in acute distress.     Appearance: He is well-developed.   HENT:      Head: Normocephalic and atraumatic.   Neck:      Thyroid: No thyromegaly.   Cardiovascular:      Rate and Rhythm: Normal rate and regular rhythm.      Heart sounds: Normal heart sounds. No murmur heard.    No friction rub. No gallop.   Pulmonary:      Effort: Pulmonary effort is normal. No respiratory distress.      Breath sounds: Normal breath sounds. No wheezing or rales.   Musculoskeletal:      Cervical back: Neck supple.      Comments: Very stiff movements when ambulating.   Lymphadenopathy:      Cervical: No cervical adenopathy.   Skin:     General: Skin is warm and dry.   Neurological:      Mental Status: He is alert.           Assessment & Plan   Problems Addressed this Visit        Cardiac and Vasculature    Coronary artery disease - Primary    Relevant Orders    Comprehensive Metabolic Panel    Lipid Panel    Mixed hyperlipidemia    Relevant Orders    Comprehensive Metabolic Panel    Lipid Panel    Essential hypertension    Relevant Orders    Comprehensive Metabolic Panel    Lipid Panel       Hematology and Neoplasia    Iron deficiency anemia    Relevant Orders    CBC & Differential       Musculoskeletal and Injuries    Psoriatic arthritis (HCC)    Relevant Orders    Ambulatory Referral to Rheumatology       Neuro    Arthropathy of lumbar facet joint    Relevant Medications    " celecoxib (CeleBREX) 100 MG capsule       Tobacco    Personal history of tobacco use, presenting hazards to health      Diagnoses       Codes Comments    Coronary artery disease involving native heart without angina pectoris, unspecified vessel or lesion type    -  Primary ICD-10-CM: I25.10  ICD-9-CM: 414.01     Essential hypertension     ICD-10-CM: I10  ICD-9-CM: 401.9     Mixed hyperlipidemia     ICD-10-CM: E78.2  ICD-9-CM: 272.2     Iron deficiency anemia, unspecified iron deficiency anemia type     ICD-10-CM: D50.9  ICD-9-CM: 280.9     Arthropathy of lumbar facet joint     ICD-10-CM: M47.816  ICD-9-CM: 721.3     Psoriatic arthritis (HCC)     ICD-10-CM: L40.50  ICD-9-CM: 696.0     Personal history of tobacco use, presenting hazards to health     ICD-10-CM: Z87.891  ICD-9-CM: V15.82         1. Coronary artery disease:  - He is due a CMP and a lipid and continue his aspirin and Plavix.    2. Hyperlipidemia:  - He is due for a CMP and a lipid and we will continue his atorvastatin 20 mg.    3. Hypertension:  - CMP and a lipid and he will continue metoprolol 25 mg twice a day.    4. Iron deficiency anemia:  - He refuses the colonoscopy but is due a CBC and will continue his iron.    5. Psoriatic arthritis:  - I have referred him to rheumatology for further evaluation and treatment.    He was counseled on the need for smoking cessation. He was counseled on the need for colonoscopy but refused. He sees psychiatry and needs to make his follow-up appointment. He will be due follow-up here in 6 months.            Transcribed from ambient dictation for Claire Roth MD by JANELLE LABOY.  07/12/22   10:13 EDT    Patient verbalized consent to the visit recording.

## 2022-07-13 ENCOUNTER — TELEPHONE (OUTPATIENT)
Dept: FAMILY MEDICINE CLINIC | Facility: CLINIC | Age: 52
End: 2022-07-13

## 2022-07-18 ENCOUNTER — TELEPHONE (OUTPATIENT)
Dept: FAMILY MEDICINE CLINIC | Facility: CLINIC | Age: 52
End: 2022-07-18

## 2022-07-18 NOTE — TELEPHONE ENCOUNTER
PATIENT CALLED STATING THAT HIS INSURANCE IS REQUIRING THE OFFICE TO CONTACT THEM AND UPDATE WHO THE PROVIDER'S NAME IS THAT HE WILL BE SEEING - FRANCA BELTRE.    PLEASE ADVISE  [phone]

## 2022-08-01 NOTE — TELEPHONE ENCOUNTER
Spoke to pt Friday before I left. I let him know that his referral was sent ot [facility]. He wanted some where closer and I let him know that is the only place that takes his 2nd insurance. nd he said he needed a place to take both insurance. I let him know about [facility] and he said it was to far too. Needs a place to come to him or in [place]. Then I gave number to [facility] cause he forgot where he was scheduled.

## 2022-08-10 DIAGNOSIS — F33.1 MAJOR DEPRESSIVE DISORDER, RECURRENT EPISODE, MODERATE: Chronic | ICD-10-CM

## 2022-08-10 DIAGNOSIS — F43.10 POSTTRAUMATIC STRESS DISORDER: Chronic | ICD-10-CM

## 2022-08-10 RX ORDER — TRAZODONE HYDROCHLORIDE 100 MG/1
100 TABLET ORAL NIGHTLY
Qty: 90 TABLET | Refills: 1 | OUTPATIENT
Start: 2022-08-10

## 2022-08-10 RX ORDER — ARIPIPRAZOLE 5 MG/1
5 TABLET ORAL NIGHTLY
Qty: 90 TABLET | Refills: 1 | OUTPATIENT
Start: 2022-08-10

## 2022-08-23 ENCOUNTER — OFFICE VISIT (OUTPATIENT)
Dept: FAMILY MEDICINE CLINIC | Facility: CLINIC | Age: 52
End: 2022-08-23

## 2022-08-23 ENCOUNTER — LAB (OUTPATIENT)
Dept: FAMILY MEDICINE CLINIC | Facility: CLINIC | Age: 52
End: 2022-08-23

## 2022-08-23 VITALS
TEMPERATURE: 98 F | OXYGEN SATURATION: 99 % | DIASTOLIC BLOOD PRESSURE: 88 MMHG | WEIGHT: 164.4 LBS | HEIGHT: 74 IN | HEART RATE: 79 BPM | BODY MASS INDEX: 21.1 KG/M2 | SYSTOLIC BLOOD PRESSURE: 126 MMHG

## 2022-08-23 DIAGNOSIS — Z87.891 PERSONAL HISTORY OF TOBACCO USE, PRESENTING HAZARDS TO HEALTH: ICD-10-CM

## 2022-08-23 DIAGNOSIS — F33.1 MAJOR DEPRESSIVE DISORDER, RECURRENT EPISODE, MODERATE: Chronic | ICD-10-CM

## 2022-08-23 DIAGNOSIS — Z12.2 ENCOUNTER FOR SCREENING FOR MALIGNANT NEOPLASM OF LUNG: ICD-10-CM

## 2022-08-23 DIAGNOSIS — D50.9 IRON DEFICIENCY ANEMIA, UNSPECIFIED IRON DEFICIENCY ANEMIA TYPE: ICD-10-CM

## 2022-08-23 DIAGNOSIS — M77.8 ELBOW ENTHESOPATHY: ICD-10-CM

## 2022-08-23 DIAGNOSIS — R53.83 OTHER FATIGUE: Primary | ICD-10-CM

## 2022-08-23 LAB
DEPRECATED RDW RBC AUTO: 45.4 FL (ref 37–54)
ERYTHROCYTE [DISTWIDTH] IN BLOOD BY AUTOMATED COUNT: 13.1 % (ref 12.3–15.4)
FERRITIN SERPL-MCNC: 54.1 NG/ML (ref 30–400)
HCT VFR BLD AUTO: 43.5 % (ref 37.5–51)
HGB BLD-MCNC: 14.2 G/DL (ref 13–17.7)
MCH RBC QN AUTO: 30.9 PG (ref 26.6–33)
MCHC RBC AUTO-ENTMCNC: 32.6 G/DL (ref 31.5–35.7)
MCV RBC AUTO: 94.8 FL (ref 79–97)
PLATELET # BLD AUTO: 238 10*3/MM3 (ref 140–450)
PMV BLD AUTO: 10.6 FL (ref 6–12)
RBC # BLD AUTO: 4.59 10*6/MM3 (ref 4.14–5.8)
TSH SERPL DL<=0.05 MIU/L-ACNC: 0.96 UIU/ML (ref 0.27–4.2)
WBC NRBC COR # BLD: 6.25 10*3/MM3 (ref 3.4–10.8)

## 2022-08-23 PROCEDURE — 99214 OFFICE O/P EST MOD 30 MIN: CPT | Performed by: FAMILY MEDICINE

## 2022-08-23 PROCEDURE — 84443 ASSAY THYROID STIM HORMONE: CPT | Performed by: FAMILY MEDICINE

## 2022-08-23 PROCEDURE — 36415 COLL VENOUS BLD VENIPUNCTURE: CPT | Performed by: FAMILY MEDICINE

## 2022-08-23 PROCEDURE — 82728 ASSAY OF FERRITIN: CPT | Performed by: FAMILY MEDICINE

## 2022-08-23 PROCEDURE — 85027 COMPLETE CBC AUTOMATED: CPT | Performed by: FAMILY MEDICINE

## 2022-08-23 RX ORDER — VORTIOXETINE 20 MG/1
1 TABLET, FILM COATED ORAL DAILY
Qty: 90 TABLET | Refills: 1 | Status: SHIPPED | OUTPATIENT
Start: 2022-08-23 | End: 2022-12-22

## 2022-08-23 RX ORDER — ARIPIPRAZOLE 5 MG/1
5 TABLET ORAL NIGHTLY
Qty: 90 TABLET | Refills: 1 | Status: SHIPPED | OUTPATIENT
Start: 2022-08-23 | End: 2022-12-22

## 2022-08-23 NOTE — PROGRESS NOTES
Assessment and Plan     Problem List Items Addressed This Visit        Hematology and Neoplasia    Iron deficiency anemia    Overview     Continue ferrous sulfate 325 mg every other day.  Monitoring CBC and iron profile regularly.  Consider colonoscopy. Patient declined.         Relevant Orders    Ferritin       Mental Health    Major depressive disorder, recurrent episode, moderate (HCC) (Chronic)    Overview     Former solider with PTSD.  Discharged from former psychiatrist. Will refer to psychiatry.  Symptoms moderately controlled with Trintellix 20 mg and Abilify 5 mg.  Monitoring for metabolic dysfunction with mood stabilizer.  RTO if symptoms worsen.         Relevant Medications    Vortioxetine HBr (Trintellix) 20 MG tablet    ARIPiprazole (ABILIFY) 5 MG tablet    Other Relevant Orders    Ambulatory Referral to Psychiatry (Completed)       Tobacco    Personal history of tobacco use, presenting hazards to health    Overview     Discussed health risks associated with tobacco use.    Encouraged smoking cessation.  Insurance would not cover Chantix.  Continue Wellbutrin 300 mg daily.  Recommended NRT.         Relevant Orders     CT Chest Low Dose Cancer Screening WO      Other Visit Diagnoses     Other fatigue    -  Primary    Encouraged regular diet rich in vegetables, smoking cessation, & 150 minutes of exercise/week.  Will obtain labs.   Will screen for neoplastic process.    Relevant Orders    Ferritin    TSH    CBC No Differential    Elbow enthesopathy        Relevant Orders    Ambulatory Referral to Orthopedic Surgery    Encounter for screening for malignant neoplasm of lung        Relevant Orders     CT Chest Low Dose Cancer Screening WO        Return in about 6 months (around 2/23/2023) for Medicare Wellness.        Patient was given instructions and counseling regarding his condition or for health maintenance advice. Please see specific information pulled into the AVS if appropriate.        Frank dobson  "52 y.o. being seen today for  Fatigue   Subjective   History of the Present Illness      male smoker with a concurrent medical history of coronary artery disease, iron deficiency anemia, and, depression presents with complaints of fatigue for the last few days.  Reports that he doesn't want to get up and do anything.  Smokes approximately 1 pack/day.  Denies any chest pain or dyspnea.  Of note patient has lost 12 pounds since his last visit in April.    Of note, patient has been out of his Trintellix for approximately a month. Reports that he's missed too many appointment and has been dismissed by his psychiatrist.     Patient underwent left elbow ORIF and experiences epicondyle pain with flexion and extension. Reports that he is unable to extend his elbow completely.     Social History  He  reports that he has been smoking cigarettes. He started smoking about 37 years ago. He has a 23.50 pack-year smoking history. He has never used smokeless tobacco. He reports that he does not drink alcohol and does not use drugs.  Objective   Vital Signs          Vitals:    08/23/22 1405   BP: 126/88   BP Location: Left arm   Patient Position: Sitting   Cuff Size: Adult   Pulse: 79   Temp: 98 °F (36.7 °C)   TempSrc: Oral   SpO2: 99%   Weight: 74.6 kg (164 lb 6.4 oz)   Height: 188 cm (74\")     BP Readings from Last 1 Encounters:   08/23/22 126/88     Wt Readings from Last 3 Encounters:   08/23/22 74.6 kg (164 lb 6.4 oz)   07/12/22 76.2 kg (168 lb)   07/02/22 79.8 kg (176 lb)   Body mass index is 21.11 kg/m².     Physical Exam  Vitals reviewed.   Constitutional:       General: He is not in acute distress.     Appearance: Normal appearance.   HENT:      Head: Normocephalic and atraumatic.   Cardiovascular:      Rate and Rhythm: Normal rate.      Heart sounds: Normal heart sounds.   Pulmonary:      Effort: Pulmonary effort is normal.      Breath sounds: Normal breath sounds.   Musculoskeletal:      Right elbow: Normal range " of motion.      Left elbow: Decreased range of motion.   Neurological:      Mental Status: He is alert and oriented to person, place, and time.   Psychiatric:         Mood and Affect: Mood normal.         Behavior: Behavior normal.               CBC & Differential (07/12/2022 09:03)  Lipid Panel (07/12/2022 09:03)  Comprehensive Metabolic Panel (07/12/2022 09:03)  Ferritin (04/05/2022 12:16)

## 2022-09-09 ENCOUNTER — HOSPITAL ENCOUNTER (OUTPATIENT)
Dept: CT IMAGING | Facility: HOSPITAL | Age: 52
Discharge: HOME | End: 2022-09-09
Admitting: FAMILY MEDICINE

## 2022-09-09 DIAGNOSIS — Z12.2 ENCOUNTER FOR SCREENING FOR MALIGNANT NEOPLASM OF LUNG: ICD-10-CM

## 2022-09-09 DIAGNOSIS — Z87.891 PERSONAL HISTORY OF TOBACCO USE, PRESENTING HAZARDS TO HEALTH: ICD-10-CM

## 2022-09-09 PROCEDURE — 71271 CT THORAX LUNG CANCER SCR C-: CPT

## 2022-09-16 DIAGNOSIS — I10 ESSENTIAL HYPERTENSION: ICD-10-CM

## 2022-09-16 DIAGNOSIS — I25.9 ISCHEMIC HEART DISEASE DUE TO CORONARY ARTERY OBSTRUCTION: ICD-10-CM

## 2022-09-16 DIAGNOSIS — I24.0 ISCHEMIC HEART DISEASE DUE TO CORONARY ARTERY OBSTRUCTION: ICD-10-CM

## 2022-09-16 RX ORDER — LISINOPRIL 10 MG/1
10 TABLET ORAL DAILY
Qty: 30 TABLET | Refills: 5 | OUTPATIENT
Start: 2022-09-16

## 2022-09-19 ENCOUNTER — OFFICE VISIT (OUTPATIENT)
Dept: ORTHOPEDIC SURGERY | Facility: CLINIC | Age: 52
End: 2022-09-19

## 2022-09-19 ENCOUNTER — OFFICE VISIT (OUTPATIENT)
Dept: FAMILY MEDICINE CLINIC | Facility: CLINIC | Age: 52
End: 2022-09-19

## 2022-09-19 VITALS — WEIGHT: 168 LBS | HEART RATE: 79 BPM | HEIGHT: 74 IN | BODY MASS INDEX: 21.56 KG/M2

## 2022-09-19 VITALS
TEMPERATURE: 98 F | OXYGEN SATURATION: 100 % | WEIGHT: 169 LBS | SYSTOLIC BLOOD PRESSURE: 137 MMHG | DIASTOLIC BLOOD PRESSURE: 86 MMHG | BODY MASS INDEX: 21.7 KG/M2 | HEART RATE: 75 BPM

## 2022-09-19 DIAGNOSIS — R42 VERTIGO: Primary | ICD-10-CM

## 2022-09-19 DIAGNOSIS — M25.522 LEFT ELBOW PAIN: Primary | ICD-10-CM

## 2022-09-19 PROCEDURE — 99213 OFFICE O/P EST LOW 20 MIN: CPT | Performed by: NURSE PRACTITIONER

## 2022-09-19 PROCEDURE — 99203 OFFICE O/P NEW LOW 30 MIN: CPT | Performed by: ORTHOPAEDIC SURGERY

## 2022-09-19 RX ORDER — MECLIZINE HYDROCHLORIDE 25 MG/1
25 TABLET ORAL 3 TIMES DAILY PRN
Qty: 30 TABLET | Refills: 0 | Status: SHIPPED | OUTPATIENT
Start: 2022-09-19 | End: 2022-10-10 | Stop reason: SDUPTHER

## 2022-09-19 NOTE — PROGRESS NOTES
Subjective     Frank Barger is a 52 y.o. male.     History of Present Illness  Patient is here today with complaints of dizziness.  He believes it could be vertigo.  He states it feels like the floor is constantly moving.  It comes and goes.  This episode started a few days ago.  Occasionally gets lightheaded.  Denies blurred or double vision.  He gets nauseous.   Denies any new CP or SOA  Does get some HA.         The following portions of the patient's history were reviewed and updated as appropriate: allergies, current medications, past family history, past medical history, past social history, past surgical history and problem list.    Review of Systems   Constitutional: Negative for chills, fatigue and fever.   Respiratory: Negative for chest tightness and shortness of breath.    Cardiovascular: Negative for chest pain and palpitations.   Gastrointestinal: Positive for nausea.   Neurological: Positive for dizziness and headache.       Objective     /86 (BP Location: Left arm, Patient Position: Sitting, Cuff Size: Adult)   Pulse 75   Temp 98 °F (36.7 °C) (Tympanic)   Wt 76.7 kg (169 lb)   SpO2 100%   BMI 21.70 kg/m²     Current Outpatient Medications on File Prior to Visit   Medication Sig Dispense Refill   • ARIPiprazole (ABILIFY) 5 MG tablet Take 1 tablet by mouth Every Night. 90 tablet 1   • aspirin (Aspirin Low Dose) 81 MG EC tablet Take 1 tablet by mouth Daily. 90 tablet 1   • atorvastatin (LIPITOR) 20 MG tablet Take 1 tablet by mouth Every Night. 90 tablet 1   • buPROPion XL (WELLBUTRIN XL) 300 MG 24 hr tablet Take 1 tablet by mouth Daily. 90 tablet 1   • celecoxib (CeleBREX) 100 MG capsule Take 1 capsule by mouth 2 (Two) Times a Day As Needed for Moderate Pain . Take with food! 180 capsule 0   • clopidogrel (PLAVIX) 75 MG tablet Take 1 tablet by mouth Daily. 90 tablet 1   • cyclobenzaprine (FLEXERIL) 10 MG tablet Take 1 tablet by mouth 3 (Three) Times a Day As Needed for Muscle Spasms. 90  tablet 11   • ferrous sulfate 325 (65 FE) MG tablet Take 1 tablet by mouth Every Other Day. 45 tablet 1   • loratadine (CLARITIN) 10 MG tablet Take 10 mg by mouth Daily.     • metoprolol tartrate (LOPRESSOR) 25 MG tablet TAKE 1 TABLET BY MOUTH TWICE DAILY 180 tablet 1   • nitroglycerin (NITROSTAT) 0.4 MG SL tablet Place 0.4 mg under the tongue Every 5 (Five) Minutes As Needed for Chest Pain. Take no more than 3 doses in 15 minutes.     • NON FORMULARY Allergy injections     • omeprazole (priLOSEC) 40 MG capsule Take 1 capsule by mouth Every Morning Before Breakfast. Take on an empty stomach! 90 capsule 1   • ondansetron (Zofran) 4 MG tablet Take 1 tablet by mouth Every 8 (Eight) Hours As Needed for Nausea or Vomiting. 30 tablet 0   • pregabalin (LYRICA) 300 MG capsule Take 300 mg by mouth 2 (Two) Times a Day.     • SUMAtriptan (IMITREX) 50 MG tablet TAKE 1 TABLET BY MOUTH AT ONSET OF HEADACHE. MAY REPEAT DOSE 1 TIME IN 2 HOURS IF HEADACHE NOT RELIEVED 10 tablet 0   • traZODone (DESYREL) 100 MG tablet TAKE 1 TABLET BY MOUTH EVERY NIGHT 90 tablet 1   • Vortioxetine HBr (Trintellix) 20 MG tablet Take 20 mg by mouth Daily. 90 tablet 1     No current facility-administered medications on file prior to visit.        Physical Exam  Vitals reviewed.   Constitutional:       General: He is not in acute distress.     Appearance: Normal appearance. He is well-developed. He is not diaphoretic.   HENT:      Head: Normocephalic and atraumatic.   Eyes:      General:         Right eye: No discharge.         Left eye: No discharge.      Extraocular Movements: Extraocular movements intact.      Conjunctiva/sclera: Conjunctivae normal.      Pupils: Pupils are equal, round, and reactive to light.   Cardiovascular:      Rate and Rhythm: Normal rate and regular rhythm.   Pulmonary:      Effort: Pulmonary effort is normal. No respiratory distress.      Breath sounds: Normal breath sounds. No wheezing or rales.   Abdominal:      General:  Bowel sounds are normal.      Palpations: Abdomen is soft.   Musculoskeletal:         General: Normal range of motion.      Cervical back: Normal range of motion.   Skin:     General: Skin is warm and dry.   Neurological:      General: No focal deficit present.      Mental Status: He is alert and oriented to person, place, and time.      Comments: Positive Erica-Hallpike maneuver, left worse than the right   Psychiatric:         Mood and Affect: Mood normal.         Behavior: Behavior normal.         Thought Content: Thought content normal.         Judgment: Judgment normal.           Assessment & Plan     Diagnoses and all orders for this visit:    1. Vertigo (Primary)  Comments:  Positive Erica-Hallpike maneuver  Referral to physical therapy for vestibular  Meclizine as needed  Call if no improvement  Orders:  -     Ambulatory Referral to Physical Therapy Vestibular  -     meclizine (ANTIVERT) 25 MG tablet; Take 1 tablet by mouth 3 (Three) Times a Day As Needed for Dizziness.  Dispense: 30 tablet; Refill: 0

## 2022-09-19 NOTE — PROGRESS NOTES
"     Patient ID: Frank Barger is a 52 y.o. male.    Chief Complaint:    Chief Complaint   Patient presents with   • Left Elbow - Initial Evaluation, Pain     Pain 8-9       HPI:  This is a 52-year-old gentleman here with about a week of left elbow pain.  He has a history of ulna open reduction internal fixation as well as a radial head replacement done by Dr. Mcdonough.  This was done in August of last year he was lifting some milk and felt a pop in his elbow recently  Past Medical History:   Diagnosis Date   • Abdominal pain    • Allergic    • Allergies     takes allergy injections   • Anesthesia complication     states heart \"fluttering\" after anesthesia x 1- was kept in hospital 3 days s/p heart stent placement   • Anxiety    • Arthritis    • Asthma    • Chronic back pain    • Constipation 06/2021   • Coronary artery disease 2014   • DDD (degenerative disc disease), lumbar    • Depression    • Emphysema of lung (HCC)    • Fibromyalgia, primary 2016   • GERD (gastroesophageal reflux disease)    • Headache    • Heart murmur 2014   • Hypertension 2014   • Leg pain    • Lumbar spine pain    • Myocardial infarction (HCC) 2014   • Nocturia    • Scoliosis 1995   • Under care of pain management specialist     pain management of [facility]       Past Surgical History:   Procedure Laterality Date   • CARDIAC CATHETERIZATION N/A 1/20/2021    Procedure: Left Heart Cath and coronary angiogram;  Surgeon: Jaimie Silva MD;  Location: [facility] CATH INVASIVE LOCATION;  Service: Cardiovascular;  Laterality: N/A;   • CARDIAC CATHETERIZATION N/A 1/20/2021    Procedure: Saphenous Vein Graft;  Surgeon: Jaimie iSlva MD;  Location: [facility] CATH INVASIVE LOCATION;  Service: Cardiovascular;  Laterality: N/A;   • CARDIAC CATHETERIZATION N/A 1/20/2021    Procedure: Stent EBENEZER coronary;  Surgeon: Herve Rodriguez MD;  Location: [facility] CATH INVASIVE LOCATION;  Service: Cardiology;  Laterality: N/A;   • CARDIAC SURGERY  12/15   • " "CORONARY ANGIOPLASTY WITH STENT PLACEMENT  04/2014    2 in 2014, 1 in 12/2020   • CORONARY ARTERY BYPASS GRAFT  12/2015    x 3   • ELBOW DEBRIDEMENT Right 8/17/2021    Procedure: INCISION / DEBRIDEMENT ELBOW, closed reduction right elbow dislocation ;  Surgeon: Kyler August MD;  Location: [facility];  Service: Orthopedics;  Laterality: Right;   • ELBOW OPEN REDUCTION INTERNAL FIXATION Left 8/18/2021    Procedure: Left  ulna open reduction internal fixation (of olecranon and coronoid), left radial head replacement  ;  Surgeon: Yonis Mcdonough MD;  Location: [facility];  Service: Orthopedics;  Laterality: Left;       Family History   Problem Relation Age of Onset   • Diabetes Mother    • Heart disease Father    • Arthritis Father    • Cancer Father         Prostate   • COPD Father    • Anxiety disorder Daughter    • Depression Daughter           Social History     Occupational History   • Not on file   Tobacco Use   • Smoking status: Current Every Day Smoker     Packs/day: 0.50     Years: 47.00     Pack years: 23.50     Types: Cigarettes     Start date: 8/5/1985   • Smokeless tobacco: Never Used   Vaping Use   • Vaping Use: Never used   • Passive vaping exposure: Yes   Substance and Sexual Activity   • Alcohol use: No   • Drug use: No   • Sexual activity: Yes     Partners: Female      Review of Systems   Cardiovascular: Negative for chest pain.   Musculoskeletal: Positive for arthralgias.       Objective:    Pulse 79   Ht 188 cm (74\")   Wt 76.2 kg (168 lb)   BMI 21.57 kg/m²     Physical Examination:  Left elbow has no redness he has healed incision about the posterior aspect of the elbow Elbow range of motion is 10 to 90 degrees with pain on pronation supination of about 50 degrees each.  Sensory and motor exam are intact all distributions. Radial pulse is palpable and capillary refill is less than two seconds to all digits.    Imaging:    left Elbow X-Ray  Indication: Elbow pain history of radial " head replacement and olecranon fracture  Views: AP and Lateral views  Findings: Overall appropriate alignment of his olecranon plate and radial head there appears to be lucency at what was likely the olecranon fracture with possible loosening of the proximal screw  no bony lesion  Soft tissues normal  not examined joint spaces  Hardware appropriately positioned yes      no prior studies available for comparison  Assessment:  Left elbow pain    Plan:  I have referred him back to his surgeon who performed his recent operation.  Ice heat over-the-counter medication until that time      Procedures         Disclaimer: Part of this note may be an electronic transcription/translation of spoken language to printed text using the Dragon Dictation System

## 2022-09-20 ENCOUNTER — PATIENT ROUNDING (BHMG ONLY) (OUTPATIENT)
Dept: ORTHOPEDIC SURGERY | Facility: CLINIC | Age: 52
End: 2022-09-20

## 2022-09-20 NOTE — PROGRESS NOTES
A my chart message has been sent to the patient for PATIENT ROUNDING with [facility]

## 2022-09-26 DIAGNOSIS — F43.10 PTSD (POST-TRAUMATIC STRESS DISORDER): ICD-10-CM

## 2022-09-26 DIAGNOSIS — T44.4X5A: ICD-10-CM

## 2022-09-26 DIAGNOSIS — M47.816 ARTHROPATHY OF LUMBAR FACET JOINT: ICD-10-CM

## 2022-09-26 RX ORDER — CELECOXIB 100 MG/1
CAPSULE ORAL
Qty: 180 CAPSULE | Refills: 0 | Status: SHIPPED | OUTPATIENT
Start: 2022-09-26 | End: 2022-12-21

## 2022-09-26 RX ORDER — PRAZOSIN HYDROCHLORIDE 1 MG/1
1 CAPSULE ORAL NIGHTLY
Qty: 90 CAPSULE | Refills: 1 | OUTPATIENT
Start: 2022-09-26

## 2022-09-28 ENCOUNTER — TELEPHONE (OUTPATIENT)
Dept: ORTHOPEDIC SURGERY | Facility: CLINIC | Age: 52
End: 2022-09-28

## 2022-09-28 NOTE — TELEPHONE ENCOUNTER
Provider: DR EDWARDS    Caller: JOJO DOLL    Relationship to Patient: SELF    Phone Number: [phone]    Reason for Call: PT SAID HE CALLED DR GARCIA'S OFFICE AND THEY WILL NOT ACCEPT HIM BACK AS A PATIENT, EVEN THOUGH THAT WAS THE ORIGINAL DOCTOR WHO DID THE PROCEDURE ON HIS LEFT ELBOW. HE WANTS TO KNOW WHAT HE CAN DO NOW SINCE HE WAS TOLD TO CALL OUR OFFICE BACK IF HE HAD ANY ISSUES OR TROUBLE.

## 2022-09-28 NOTE — TELEPHONE ENCOUNTER
I cannot take care of that.  He can file a complaint with the medical staff office here since Dr. Mcdonough is not taking care of his postop patient.  There is nothing I can do for another surgeon's postoperative issue

## 2022-10-10 DIAGNOSIS — R42 VERTIGO: ICD-10-CM

## 2022-10-10 RX ORDER — MECLIZINE HYDROCHLORIDE 25 MG/1
25 TABLET ORAL 3 TIMES DAILY PRN
Qty: 30 TABLET | Refills: 0 | Status: SHIPPED | OUTPATIENT
Start: 2022-10-10 | End: 2022-12-19 | Stop reason: SDUPTHER

## 2022-10-10 NOTE — TELEPHONE ENCOUNTER
Caller: Frank Barger    Relationship: Self    Best call back number: [phone]    Requested Prescriptions:   Requested Prescriptions     Pending Prescriptions Disp Refills   • meclizine (ANTIVERT) 25 MG tablet 30 tablet 0     Sig: Take 1 tablet by mouth 3 (Three) Times a Day As Needed for Dizziness.        Pharmacy where request should be sent: [id number], [place] - [address] AT [address] [facility] & On license of [facility] [phone] [facility] [phone] FX     Additional details provided by patient: PATIENT STATES HE HAS 1 TABLET REMAINING.     Does the patient have less than a 3 day supply:  [x] Yes  [] No    Kevin Carnes Rep   10/10/22 13:09 EDT

## 2022-10-13 ENCOUNTER — LAB (OUTPATIENT)
Dept: LAB | Facility: HOSPITAL | Age: 52
End: 2022-10-13

## 2022-10-13 ENCOUNTER — TRANSCRIBE ORDERS (OUTPATIENT)
Dept: ADMINISTRATIVE | Facility: HOSPITAL | Age: 52
End: 2022-10-13

## 2022-10-13 DIAGNOSIS — L40.0 PSORIASIS VULGARIS: Primary | ICD-10-CM

## 2022-10-13 DIAGNOSIS — L40.0 PSORIASIS VULGARIS: ICD-10-CM

## 2022-10-13 LAB
ALBUMIN SERPL-MCNC: 4.4 G/DL (ref 3.5–5.2)
ALBUMIN/GLOB SERPL: 1.8 G/DL
ALP SERPL-CCNC: 96 U/L (ref 39–117)
ALT SERPL W P-5'-P-CCNC: 14 U/L (ref 1–41)
ANION GAP SERPL CALCULATED.3IONS-SCNC: 8.2 MMOL/L (ref 5–15)
AST SERPL-CCNC: 16 U/L (ref 1–40)
BASOPHILS # BLD AUTO: 0.07 10*3/MM3 (ref 0–0.2)
BASOPHILS NFR BLD AUTO: 1.3 % (ref 0–1.5)
BILIRUB CONJ SERPL-MCNC: <0.2 MG/DL (ref 0–0.3)
BILIRUB SERPL-MCNC: <0.2 MG/DL (ref 0–1.2)
BUN SERPL-MCNC: 10 MG/DL (ref 6–20)
BUN/CREAT SERPL: 10.4 (ref 7–25)
CALCIUM SPEC-SCNC: 9.5 MG/DL (ref 8.6–10.5)
CHLORIDE SERPL-SCNC: 102 MMOL/L (ref 98–107)
CO2 SERPL-SCNC: 25.8 MMOL/L (ref 22–29)
CREAT SERPL-MCNC: 0.96 MG/DL (ref 0.76–1.27)
DEPRECATED RDW RBC AUTO: 42.4 FL (ref 37–54)
EGFRCR SERPLBLD CKD-EPI 2021: 95.1 ML/MIN/1.73
EOSINOPHIL # BLD AUTO: 0.17 10*3/MM3 (ref 0–0.4)
EOSINOPHIL NFR BLD AUTO: 3.2 % (ref 0.3–6.2)
ERYTHROCYTE [DISTWIDTH] IN BLOOD BY AUTOMATED COUNT: 12.9 % (ref 12.3–15.4)
GLOBULIN UR ELPH-MCNC: 2.5 GM/DL
GLUCOSE SERPL-MCNC: 66 MG/DL (ref 65–99)
HCT VFR BLD AUTO: 41.3 % (ref 37.5–51)
HGB BLD-MCNC: 13.7 G/DL (ref 13–17.7)
IMM GRANULOCYTES # BLD AUTO: 0.02 10*3/MM3 (ref 0–0.05)
IMM GRANULOCYTES NFR BLD AUTO: 0.4 % (ref 0–0.5)
LYMPHOCYTES # BLD AUTO: 1.64 10*3/MM3 (ref 0.7–3.1)
LYMPHOCYTES NFR BLD AUTO: 30.8 % (ref 19.6–45.3)
MCH RBC QN AUTO: 30.4 PG (ref 26.6–33)
MCHC RBC AUTO-ENTMCNC: 33.2 G/DL (ref 31.5–35.7)
MCV RBC AUTO: 91.8 FL (ref 79–97)
MONOCYTES # BLD AUTO: 0.63 10*3/MM3 (ref 0.1–0.9)
MONOCYTES NFR BLD AUTO: 11.8 % (ref 5–12)
NEUTROPHILS NFR BLD AUTO: 2.8 10*3/MM3 (ref 1.7–7)
NEUTROPHILS NFR BLD AUTO: 52.5 % (ref 42.7–76)
NRBC BLD AUTO-RTO: 0 /100 WBC (ref 0–0.2)
PLATELET # BLD AUTO: 268 10*3/MM3 (ref 140–450)
PMV BLD AUTO: 10.8 FL (ref 6–12)
POTASSIUM SERPL-SCNC: 4.4 MMOL/L (ref 3.5–5.2)
PROT SERPL-MCNC: 6.9 G/DL (ref 6–8.5)
RBC # BLD AUTO: 4.5 10*6/MM3 (ref 4.14–5.8)
SODIUM SERPL-SCNC: 136 MMOL/L (ref 136–145)
WBC NRBC COR # BLD: 5.33 10*3/MM3 (ref 3.4–10.8)

## 2022-10-13 PROCEDURE — 80053 COMPREHEN METABOLIC PANEL: CPT

## 2022-10-13 PROCEDURE — 36415 COLL VENOUS BLD VENIPUNCTURE: CPT

## 2022-10-13 PROCEDURE — 86480 TB TEST CELL IMMUN MEASURE: CPT

## 2022-10-13 PROCEDURE — 82248 BILIRUBIN DIRECT: CPT

## 2022-10-13 PROCEDURE — 85025 COMPLETE CBC W/AUTO DIFF WBC: CPT

## 2022-10-15 LAB
GAMMA INTERFERON BACKGROUND BLD IA-ACNC: 0.03 IU/ML
M TB IFN-G BLD-IMP: NEGATIVE
M TB IFN-G CD4+ BCKGRND COR BLD-ACNC: 0.02 IU/ML
M TB IFN-G CD4+CD8+ BCKGRND COR BLD-ACNC: 0.02 IU/ML
MITOGEN IGNF BLD-ACNC: >10 IU/ML
QUANTIFERON INCUBATION: NORMAL
SERVICE CMNT-IMP: NORMAL

## 2022-10-28 ENCOUNTER — TELEPHONE (OUTPATIENT)
Dept: FAMILY MEDICINE CLINIC | Facility: CLINIC | Age: 52
End: 2022-10-28

## 2022-10-28 NOTE — TELEPHONE ENCOUNTER
Caller: Frank Barger    Relationship: Self    Best call back number: [phone]    What was the call regarding: PATIENT STATED HIS DERMATOLOGY HAS BEEN GIVING HIM HUMIRA PEN SHOTS.    HE ALSO WANTED TO LET THE OFFICE KNOW THAT HE HAS RECEIVED HIS FLU SHOT AND FOURTH COVID BOOSTER.    PATIENT WOULD ALSO LIKE A CALL TO DISCUSS A LIFT CHAIR.    HE STATED THAT HIM AND TOMAS YANG HAD DISCUSSED GETTING A LIFT CHAIR FOR HIS ARTHRITIS.    HE WOULD LIKE TO KNOW HOW HE CAN GET A LIFT CHAIR THROUGH HIS INSURANCE.    PATIENT ALSO STATED HE WOULD LIKE TO DISCUSS POSSIBLY GET A SHINGLES SHOT AT HIS UPCOMING APPOINTMENT ON 11/29/22.    PLEASE CALL.    Do you require a callback: YES

## 2022-10-28 NOTE — TELEPHONE ENCOUNTER
Yeah he will need one before that if he wants to discuss a lift chair, he will need a face to face. Patient has Medicare insurance.

## 2022-11-01 ENCOUNTER — OFFICE VISIT (OUTPATIENT)
Dept: FAMILY MEDICINE CLINIC | Facility: CLINIC | Age: 52
End: 2022-11-01

## 2022-11-01 VITALS
TEMPERATURE: 97.7 F | OXYGEN SATURATION: 98 % | HEIGHT: 74 IN | WEIGHT: 178 LBS | SYSTOLIC BLOOD PRESSURE: 118 MMHG | BODY MASS INDEX: 22.84 KG/M2 | DIASTOLIC BLOOD PRESSURE: 87 MMHG | HEART RATE: 96 BPM

## 2022-11-01 DIAGNOSIS — M77.8 ELBOW ENTHESOPATHY: ICD-10-CM

## 2022-11-01 DIAGNOSIS — M51.36 DEGENERATION OF INTERVERTEBRAL DISC OF LUMBAR REGION: ICD-10-CM

## 2022-11-01 DIAGNOSIS — Z87.39 HISTORY OF SCOLIOSIS: ICD-10-CM

## 2022-11-01 DIAGNOSIS — M47.816 ARTHROPATHY OF LUMBAR FACET JOINT: ICD-10-CM

## 2022-11-01 DIAGNOSIS — M17.11 ARTHROPATHY OF RIGHT KNEE: ICD-10-CM

## 2022-11-01 DIAGNOSIS — L40.50 PSORIATIC ARTHRITIS: Primary | ICD-10-CM

## 2022-11-01 PROCEDURE — 99213 OFFICE O/P EST LOW 20 MIN: CPT | Performed by: FAMILY MEDICINE

## 2022-11-01 RX ORDER — ADALIMUMAB 40MG/0.8ML
40 KIT SUBCUTANEOUS
COMMUNITY
Start: 2022-10-24

## 2022-11-01 NOTE — PROGRESS NOTES
"Answers for HPI/ROS submitted by the patient on 10/31/2022  What is the primary reason for your visit?: Other  Please describe your symptoms.: Need lift chair cuse of my arthritis  Have you had these symptoms before?: Yes  How long have you been having these symptoms?: Greater than 2 weeks  Please list any medications you are currently taking for this condition.: Adalgisa  Please describe any probable cause for these symptoms. : Gets hard to get out of chair get real stiff    Subjective   Frank Barger is a 52 y.o. male.     History of Present Illness  He comes in wanting to talk about the possibility of getting a lift chair secondary to his arthritis  He says he has psoriatic arthritis.  He is seeing dermatology and they are preparing to start him on Humira for his psoriasis.  He has a history of a previous left elbow fracture for which she still has continued pain and decreased mobility.  This makes it very difficult for him to be able to use that arm to help push him up from a seated position in a chair or couch.  He has arthritic changes in his lumbar spine and a history of scoliosis  He has arthritis in his knees  He says it once he is upright he is able to walk but says he moves slowly.       The following portions of the patient's history were reviewed and updated as appropriate: allergies, current medications, past family history, past medical history, past social history, past surgical history, and problem list.  Past Medical History:   Diagnosis Date   • Abdominal pain    • Allergic    • Allergies     takes allergy injections   • Anesthesia complication     states heart \"fluttering\" after anesthesia x 1- was kept in hospital 3 days s/p heart stent placement   • Anxiety    • Arthritis    • Asthma    • Chronic back pain    • Constipation 06/2021   • Coronary artery disease 2014   • DDD (degenerative disc disease), lumbar    • Depression    • Emphysema of lung (HCC)    • Fibromyalgia, primary 2016   • GERD " (gastroesophageal reflux disease)    • Headache    • Heart murmur 2014   • Hypertension 2014   • Leg pain    • Lumbar spine pain    • Myocardial infarction (HCC) 2014   • Nocturia    • Scoliosis 1995   • Under care of pain management specialist     pain [facility] of [place]     Past Surgical History:   Procedure Laterality Date   • CARDIAC CATHETERIZATION N/A 1/20/2021    Procedure: Left Heart Cath and coronary angiogram;  Surgeon: Jaimie Silva MD;  Location: [facility] CATH INVASIVE LOCATION;  Service: Cardiovascular;  Laterality: N/A;   • CARDIAC CATHETERIZATION N/A 1/20/2021    Procedure: Saphenous Vein Graft;  Surgeon: Jaimie Silva MD;  Location: [facility] CATH INVASIVE LOCATION;  Service: Cardiovascular;  Laterality: N/A;   • CARDIAC CATHETERIZATION N/A 1/20/2021    Procedure: Stent EBENEZER coronary;  Surgeon: Herve Rodriguez MD;  Location: [facility] CATH INVASIVE LOCATION;  Service: Cardiology;  Laterality: N/A;   • CARDIAC SURGERY  12/15   • CORONARY ANGIOPLASTY WITH STENT PLACEMENT  04/2014    2 in 2014, 1 in 12/2020   • CORONARY ARTERY BYPASS GRAFT  12/2015    x 3   • ELBOW DEBRIDEMENT Right 8/17/2021    Procedure: INCISION / DEBRIDEMENT ELBOW, closed reduction right elbow dislocation ;  Surgeon: Kyler August MD;  Location: [facility] MAIN OR;  Service: Orthopedics;  Laterality: Right;   • ELBOW OPEN REDUCTION INTERNAL FIXATION Left 8/18/2021    Procedure: Left  ulna open reduction internal fixation (of olecranon and coronoid), left radial head replacement  ;  Surgeon: Yonis Mcdonough MD;  Location: [facility] MAIN OR;  Service: Orthopedics;  Laterality: Left;     Family History   Problem Relation Age of Onset   • Diabetes Mother    • Heart disease Father    • Arthritis Father    • Cancer Father         Prostate   • COPD Father    • Anxiety disorder Daughter    • Depression Daughter      Social History     Socioeconomic History   • Marital status:    Tobacco Use   • Smoking status: Every Day      Packs/day: 1.50     Years: 41.00     Pack years: 61.50     Types: Cigarettes, Cigars     Start date: 8/5/1985   • Smokeless tobacco: Never   Vaping Use   • Vaping Use: Never used   • Passive vaping exposure: Yes   Substance and Sexual Activity   • Alcohol use: No   • Drug use: No   • Sexual activity: Yes     Partners: Female         Current Outpatient Medications:   •  ARIPiprazole (ABILIFY) 5 MG tablet, Take 1 tablet by mouth Every Night., Disp: 90 tablet, Rfl: 1  •  aspirin (Aspirin Low Dose) 81 MG EC tablet, Take 1 tablet by mouth Daily., Disp: 90 tablet, Rfl: 1  •  atorvastatin (LIPITOR) 20 MG tablet, Take 1 tablet by mouth Every Night., Disp: 90 tablet, Rfl: 1  •  buPROPion XL (WELLBUTRIN XL) 300 MG 24 hr tablet, Take 1 tablet by mouth Daily., Disp: 90 tablet, Rfl: 1  •  celecoxib (CeleBREX) 100 MG capsule, TAKE ONE CAPSULE BY MOUTH TWICE DAILY AS NEEDED FOR MODERATE PAIN. TAKE WITH FOOD., Disp: 180 capsule, Rfl: 0  •  clopidogrel (PLAVIX) 75 MG tablet, Take 1 tablet by mouth Daily., Disp: 90 tablet, Rfl: 1  •  cyclobenzaprine (FLEXERIL) 10 MG tablet, Take 1 tablet by mouth 3 (Three) Times a Day As Needed for Muscle Spasms., Disp: 90 tablet, Rfl: 11  •  ferrous sulfate 325 (65 FE) MG tablet, Take 1 tablet by mouth Every Other Day., Disp: 45 tablet, Rfl: 1  •  loratadine (CLARITIN) 10 MG tablet, Take 10 mg by mouth Daily., Disp: , Rfl:   •  meclizine (ANTIVERT) 25 MG tablet, Take 1 tablet by mouth 3 (Three) Times a Day As Needed for Dizziness., Disp: 30 tablet, Rfl: 0  •  metoprolol tartrate (LOPRESSOR) 25 MG tablet, TAKE 1 TABLET BY MOUTH TWICE DAILY, Disp: 180 tablet, Rfl: 1  •  nitroglycerin (NITROSTAT) 0.4 MG SL tablet, Place 0.4 mg under the tongue Every 5 (Five) Minutes As Needed for Chest Pain. Take no more than 3 doses in 15 minutes., Disp: , Rfl:   •  NON FORMULARY, Allergy injections, Disp: , Rfl:   •  omeprazole (priLOSEC) 40 MG capsule, Take 1 capsule by mouth Every Morning Before Breakfast. Take on  "an empty stomach!, Disp: 90 capsule, Rfl: 1  •  ondansetron (Zofran) 4 MG tablet, Take 1 tablet by mouth Every 8 (Eight) Hours As Needed for Nausea or Vomiting., Disp: 30 tablet, Rfl: 0  •  pregabalin (LYRICA) 300 MG capsule, Take 300 mg by mouth 2 (Two) Times a Day., Disp: , Rfl:   •  SUMAtriptan (IMITREX) 50 MG tablet, TAKE 1 TABLET BY MOUTH AT ONSET OF HEADACHE. MAY REPEAT DOSE 1 TIME IN 2 HOURS IF HEADACHE NOT RELIEVED, Disp: 10 tablet, Rfl: 0  •  traZODone (DESYREL) 100 MG tablet, TAKE 1 TABLET BY MOUTH EVERY NIGHT, Disp: 90 tablet, Rfl: 1  •  Vortioxetine HBr (Trintellix) 20 MG tablet, Take 20 mg by mouth Daily., Disp: 90 tablet, Rfl: 1  •  Humira Pen-Ps/UV/Adol HS Start 40 MG/0.8ML Pen-injector Kit, Inject 40 mg under the skin into the appropriate area as directed., Disp: , Rfl:     Review of Systems   Respiratory: Negative.    Cardiovascular: Negative.    Musculoskeletal: Positive for arthralgias and back pain.   Neurological: Positive for weakness.     /87 (BP Location: Left arm, Patient Position: Sitting, Cuff Size: Adult)   Pulse 96   Temp 97.7 °F (36.5 °C) (Temporal)   Ht 188 cm (74\")   Wt 80.7 kg (178 lb)   SpO2 98%   BMI 22.85 kg/m²       Objective   Physical Exam  Constitutional:       Appearance: He is normal weight.      Comments: When seated in the chair, he is leaning forward   Cardiovascular:      Rate and Rhythm: Normal rate and regular rhythm.      Heart sounds: Normal heart sounds.   Pulmonary:      Effort: Pulmonary effort is normal.      Breath sounds: Normal breath sounds.   Musculoskeletal:      Cervical back: Neck supple.      Comments: Tender over the lower lumbar spine   Neurological:      Mental Status: He is alert.      Comments: Lower extremity strength 4 out of 5  Gait is slow but effective  While seated he has to lean forward and rock in order to get enough momentum and strength to be able to stand       left Elbow X-Ray  Indication: Elbow pain history of radial head " replacement and olecranon fracture  Views: AP and Lateral views  Findings: Overall appropriate alignment of his olecranon plate and radial head there appears to be lucency at what was likely the olecranon fracture with possible loosening of the proximal screw  no bony lesion  Soft tissues normal  not examined joint spaces  Hardware appropriately positioned yes        no prior studies available for comparison.     X-RAY was ordered and reviewed by Anton Schmitz MD       Assessment & Plan   Problems Addressed this Visit        Musculoskeletal and Injuries    Psoriatic arthritis (HCC) - Primary    Arthropathy of right knee    History of scoliosis       Neuro    Degeneration of intervertebral disc of lumbar region    Arthropathy of lumbar facet joint   Other Visit Diagnoses     Elbow enthesopathy          Diagnoses       Codes Comments    Psoriatic arthritis (HCC)    -  Primary ICD-10-CM: L40.50  ICD-9-CM: 696.0     History of scoliosis     ICD-10-CM: Z87.39  ICD-9-CM: V13.59     Degeneration of intervertebral disc of lumbar region     ICD-10-CM: M51.36  ICD-9-CM: 722.52     Arthropathy of lumbar facet joint     ICD-10-CM: M47.816  ICD-9-CM: 721.3     Elbow enthesopathy     ICD-10-CM: M77.8  ICD-9-CM: 726.30     Arthropathy of right knee     ICD-10-CM: M17.11  ICD-9-CM: 716.86         I strongly feel that with all these issues, he would benefit from a lift chair.  I explained to him the usual coverage for this.  I will forward the order to an appropriate company.

## 2022-11-14 RX ORDER — CYCLOBENZAPRINE HCL 10 MG
TABLET ORAL
Qty: 90 TABLET | Refills: 11 | OUTPATIENT
Start: 2022-11-14

## 2022-11-16 RX ORDER — CYCLOBENZAPRINE HCL 10 MG
10 TABLET ORAL 3 TIMES DAILY PRN
Qty: 90 TABLET | Refills: 11 | Status: SHIPPED | OUTPATIENT
Start: 2022-11-16

## 2022-11-28 RX ORDER — NITROGLYCERIN 0.4 MG/1
TABLET SUBLINGUAL
Qty: 25 TABLET | Refills: 0 | Status: SHIPPED | OUTPATIENT
Start: 2022-11-28 | End: 2022-11-29 | Stop reason: SDUPTHER

## 2022-11-28 NOTE — TELEPHONE ENCOUNTER
Rx Refill Note  Requested Prescriptions     Pending Prescriptions Disp Refills   • nitroglycerin (NITROSTAT) 0.4 MG SL tablet [Pharmacy Med Name: NITROGLYCERIN 0.4MG SUB TAB 25S] 25 tablet      Sig: DISSOLVE ONE TABLET UNDER THE TONGUE EVERY 5 MINUTES AS NEEDED FOF CHEST PAIN. MAX 3 DOSES IN 15 MINUTES      Last office visit with prescribing clinician: 4/12/2021      Next office visit with prescribing clinician: Visit date not found            Nancy Cook MA  11/28/22, 08:06 EST

## 2022-11-29 RX ORDER — NITROGLYCERIN 0.4 MG/1
0.4 TABLET SUBLINGUAL
Qty: 25 TABLET | Refills: 0 | Status: SHIPPED | OUTPATIENT
Start: 2022-11-29

## 2022-11-29 NOTE — TELEPHONE ENCOUNTER
Rx Refill Note  Requested Prescriptions     Pending Prescriptions Disp Refills   • nitroglycerin (NITROSTAT) 0.4 MG SL tablet 25 tablet 0     Sig: Place 1 tablet under the tongue Every 5 (Five) Minutes As Needed for Chest Pain. Take no more than 3 doses in 15 minutes.      Last office visit with prescribing clinician: 4/12/2021   Last telemedicine visit with prescribing clinician: Visit date not found   Next office visit with prescribing clinician: Visit date not found                         Would you like a call back once the refill request has been completed: [] Yes [] No    If the office needs to give you a call back, can they leave a voicemail: [] Yes [] No    Nancy Cook MA  11/29/22, 08:59 EST

## 2022-12-01 DIAGNOSIS — E78.2 MIXED HYPERLIPIDEMIA: ICD-10-CM

## 2022-12-01 DIAGNOSIS — I24.0 ISCHEMIC HEART DISEASE DUE TO CORONARY ARTERY OBSTRUCTION: ICD-10-CM

## 2022-12-01 DIAGNOSIS — I25.9 ISCHEMIC HEART DISEASE DUE TO CORONARY ARTERY OBSTRUCTION: ICD-10-CM

## 2022-12-01 DIAGNOSIS — Z87.891 PERSONAL HISTORY OF TOBACCO USE, PRESENTING HAZARDS TO HEALTH: ICD-10-CM

## 2022-12-01 RX ORDER — BUPROPION HYDROCHLORIDE 300 MG/1
300 TABLET ORAL DAILY
Qty: 90 TABLET | Refills: 1 | Status: SHIPPED | OUTPATIENT
Start: 2022-12-01

## 2022-12-01 RX ORDER — ATORVASTATIN CALCIUM 20 MG/1
20 TABLET, FILM COATED ORAL NIGHTLY
Qty: 90 TABLET | Refills: 1 | Status: SHIPPED | OUTPATIENT
Start: 2022-12-01

## 2022-12-19 DIAGNOSIS — R42 VERTIGO: ICD-10-CM

## 2022-12-19 RX ORDER — MECLIZINE HYDROCHLORIDE 25 MG/1
25 TABLET ORAL 3 TIMES DAILY PRN
Qty: 30 TABLET | Refills: 0 | Status: SHIPPED | OUTPATIENT
Start: 2022-12-19 | End: 2023-03-09 | Stop reason: SDUPTHER

## 2022-12-19 NOTE — TELEPHONE ENCOUNTER
Pt had appointment with Lilliana today.  He was rescheduled for 1/18/23, but out of Meclizine.  Also needs new pain mgmt , would like to switch to [facility] pain mgmt  by Yareli.

## 2022-12-21 DIAGNOSIS — F33.1 MAJOR DEPRESSIVE DISORDER, RECURRENT EPISODE, MODERATE: Chronic | ICD-10-CM

## 2022-12-21 DIAGNOSIS — M47.816 ARTHROPATHY OF LUMBAR FACET JOINT: ICD-10-CM

## 2022-12-21 RX ORDER — OMEPRAZOLE 40 MG/1
40 CAPSULE, DELAYED RELEASE ORAL
Qty: 90 CAPSULE | Refills: 1 | OUTPATIENT
Start: 2022-12-21

## 2022-12-21 RX ORDER — PANTOPRAZOLE SODIUM 40 MG/1
40 TABLET, DELAYED RELEASE ORAL DAILY
Qty: 90 TABLET | Refills: 1 | Status: SHIPPED | OUTPATIENT
Start: 2022-12-21

## 2022-12-21 RX ORDER — CELECOXIB 100 MG/1
CAPSULE ORAL
Qty: 180 CAPSULE | Refills: 0 | Status: SHIPPED | OUTPATIENT
Start: 2022-12-21 | End: 2023-03-16

## 2022-12-21 NOTE — TELEPHONE ENCOUNTER
pls let pt know she shouldn't be on omeprazole due to the potential of it decreasing effectiveness of plavix. I am going to switch him to protonix which is similar.

## 2022-12-22 RX ORDER — ARIPIPRAZOLE 5 MG/1
5 TABLET ORAL NIGHTLY
Qty: 90 TABLET | Refills: 1 | Status: SHIPPED | OUTPATIENT
Start: 2022-12-22 | End: 2023-02-22 | Stop reason: SDUPTHER

## 2022-12-22 RX ORDER — OMEPRAZOLE 40 MG/1
CAPSULE, DELAYED RELEASE ORAL
Qty: 90 CAPSULE | Refills: 1 | OUTPATIENT
Start: 2022-12-22

## 2022-12-22 RX ORDER — VORTIOXETINE 20 MG/1
TABLET, FILM COATED ORAL
Qty: 90 TABLET | Refills: 1 | Status: SHIPPED | OUTPATIENT
Start: 2022-12-22

## 2022-12-22 RX ORDER — VORTIOXETINE 20 MG/1
TABLET, FILM COATED ORAL
Qty: 90 TABLET | Refills: 1 | OUTPATIENT
Start: 2022-12-22

## 2022-12-22 NOTE — TELEPHONE ENCOUNTER
Caller: Frank Barger    Relationship: Self    Best call back number: [phone]     Requested Prescriptions: [phone]        Pharmacy where request should be sent: [id number] - [place], [place] - [address] AT [facility] [facility] [address] [facility] [address] - [phone]  - [phone] FX     Additional details provided by patient: PATIENT IS CALLING TO STATE HE DOES NOT SEE THE NEW PSYCHIATRIST UNTIL 01/18/2023.  HE STATES HE HAS ONLY 1 DOSE OF THE TRINTELLIX REMAINING.  HE IS WANTING TO KNOW IF [facility] WOULD PRESCRIBE THE TRINTELLIX TO LAST UNTIL THE PSYCHIATRY APPOINTMENT.    Does the patient have less than a 3 day supply:  [x] Yes  [] No    Would you like a call back once the refill request has been completed: [x] Yes [] No    If the office needs to give you a call back, can they leave a voicemail: [x] Yes [] No    Carmen Chen, RegSched Rep   12/22/22 10:55 EST     PLEASE ADVISE.

## 2023-01-18 ENCOUNTER — OFFICE VISIT (OUTPATIENT)
Dept: FAMILY MEDICINE CLINIC | Facility: CLINIC | Age: 53
End: 2023-01-18
Payer: MEDICARE

## 2023-01-18 VITALS
TEMPERATURE: 98.2 F | HEART RATE: 84 BPM | SYSTOLIC BLOOD PRESSURE: 135 MMHG | DIASTOLIC BLOOD PRESSURE: 92 MMHG | BODY MASS INDEX: 23.37 KG/M2 | OXYGEN SATURATION: 100 % | WEIGHT: 182 LBS

## 2023-01-18 DIAGNOSIS — E78.2 MIXED HYPERLIPIDEMIA: ICD-10-CM

## 2023-01-18 DIAGNOSIS — I25.9 ISCHEMIC HEART DISEASE DUE TO CORONARY ARTERY OBSTRUCTION: ICD-10-CM

## 2023-01-18 DIAGNOSIS — L40.50 PSORIATIC ARTHRITIS: ICD-10-CM

## 2023-01-18 DIAGNOSIS — I10 ESSENTIAL HYPERTENSION: ICD-10-CM

## 2023-01-18 DIAGNOSIS — F33.1 MAJOR DEPRESSIVE DISORDER, RECURRENT EPISODE, MODERATE: Primary | ICD-10-CM

## 2023-01-18 DIAGNOSIS — I24.0 ISCHEMIC HEART DISEASE DUE TO CORONARY ARTERY OBSTRUCTION: ICD-10-CM

## 2023-01-18 DIAGNOSIS — Z12.5 PROSTATE CANCER SCREENING: ICD-10-CM

## 2023-01-18 PROCEDURE — 99214 OFFICE O/P EST MOD 30 MIN: CPT | Performed by: NURSE PRACTITIONER

## 2023-01-18 NOTE — PATIENT INSTRUCTIONS
Work on diet and exercise  Smoking cessation   Check labs  Continue meds  Follow up with specialist

## 2023-01-18 NOTE — PROGRESS NOTES
Subjective     Frank Barger is a 52 y.o. male.     History of Present Illness  Patient is here today to follow-up on chronic medical conditions.  Pt believes he had a stomach virus.  Yesterday had a fever  Doing much better and eating well now.    Hypertension with CAD-patient is currently on metoprolol 25 mg twice daily.  He has had heart stents placed.  He is currently on Plavix 75 mg daily as well.  He is seen by cardiology- dr. Silva. Denies CP, SOA, HA. Has occasional dizziness    Hyperlipidemia-patient is currently on Lipitor 20 mg daily. He doesn't always eat a well balanced diet. Smokes 1-1.5ppd.     Depression-patient is currently on Trintellix 20 mg daily, Abilify 5 mg daily, and Wellbutrin  mg daily. Doing well on meds. Denies SI or HI. Supposed to see psychiatrist.     Psoriatic arthritis-patient sees rheumatology.  He is currently on Humira and Celebrex.     Chronic pain-  takes Lyrica 300 mg twice daily. Sees pain management     Insomnia-patient is currently on trazodone 100 mg nightly. Doing well    GERD-patient is currently on Protonix 40 mg daily.    Labs- due  Colonoscopy-  PSA- Lab Results       Component                Value               Date                       PSA                      2.610               01/05/2022                 PSA                      1.50                04/13/2017                Vaccines:  Flu-  Tdap-  Shingles-  PNA-  Covid-19    Dental exam-  Eye exam-         The following portions of the patient's history were reviewed and updated as appropriate: allergies, current medications, past family history, past medical history, past social history, past surgical history and problem list.    Review of Systems   Constitutional: Negative for chills, fatigue and fever.   Respiratory: Negative for chest tightness and shortness of breath.    Cardiovascular: Negative for chest pain and palpitations.   Gastrointestinal: Negative for abdominal pain, constipation,  diarrhea, nausea and vomiting.   Neurological: Negative for dizziness and headache.   Psychiatric/Behavioral: Negative for depressed mood and stress. The patient is not nervous/anxious.        Objective     /92 (BP Location: Left arm, Patient Position: Sitting, Cuff Size: Adult)   Pulse 84   Temp 98.2 °F (36.8 °C) (Tympanic)   Wt 82.6 kg (182 lb)   SpO2 100%   BMI 23.37 kg/m²     Current Outpatient Medications on File Prior to Visit   Medication Sig Dispense Refill   • cyclobenzaprine (FLEXERIL) 10 MG tablet Take 1 tablet by mouth 3 (Three) Times a Day As Needed for Muscle Spasms. 90 tablet 11   • meclizine (ANTIVERT) 25 MG tablet Take 1 tablet by mouth 3 (Three) Times a Day As Needed for Dizziness. 30 tablet 0   • nitroglycerin (NITROSTAT) 0.4 MG SL tablet Place 1 tablet under the tongue Every 5 (Five) Minutes As Needed for Chest Pain. Take no more than 3 doses in 15 minutes. 25 tablet 0   • Trintellix 20 MG tablet TAKE 1 TABLET BY MOUTH DAILY 90 tablet 1   • ARIPiprazole (ABILIFY) 5 MG tablet TAKE 1 TABLET BY MOUTH EVERY NIGHT 90 tablet 1   • aspirin (Aspirin Low Dose) 81 MG EC tablet Take 1 tablet by mouth Daily. 90 tablet 1   • atorvastatin (LIPITOR) 20 MG tablet Take 1 tablet by mouth Every Night. 90 tablet 1   • buPROPion XL (WELLBUTRIN XL) 300 MG 24 hr tablet Take 1 tablet by mouth Daily. 90 tablet 1   • celecoxib (CeleBREX) 100 MG capsule TAKE 1 CAPSULE BY MOUTH TWICE DAILY WITH FOOD AS NEEDED FOR MODERATE PAIN 180 capsule 0   • clopidogrel (PLAVIX) 75 MG tablet Take 1 tablet by mouth Daily. 90 tablet 1   • ferrous sulfate 325 (65 FE) MG tablet Take 1 tablet by mouth Every Other Day. 45 tablet 1   • Humira Pen-Ps/UV/Adol HS Start 40 MG/0.8ML Pen-injector Kit Inject 40 mg under the skin into the appropriate area as directed.     • loratadine (CLARITIN) 10 MG tablet Take 10 mg by mouth Daily.     • metoprolol tartrate (LOPRESSOR) 25 MG tablet Take 1 tablet by mouth 2 (Two) Times a Day. 180 tablet 1    • NON FORMULARY Allergy injections     • ondansetron (Zofran) 4 MG tablet Take 1 tablet by mouth Every 8 (Eight) Hours As Needed for Nausea or Vomiting. 30 tablet 0   • pantoprazole (Protonix) 40 MG EC tablet Take 1 tablet by mouth Daily. 90 tablet 1   • pregabalin (LYRICA) 300 MG capsule Take 300 mg by mouth 2 (Two) Times a Day.     • SUMAtriptan (IMITREX) 50 MG tablet TAKE 1 TABLET BY MOUTH AT ONSET OF HEADACHE. MAY REPEAT DOSE 1 TIME IN 2 HOURS IF HEADACHE NOT RELIEVED 10 tablet 0   • traZODone (DESYREL) 100 MG tablet TAKE 1 TABLET BY MOUTH EVERY NIGHT 90 tablet 1     No current facility-administered medications on file prior to visit.        Physical Exam  Vitals reviewed.   Constitutional:       General: He is not in acute distress.     Appearance: Normal appearance. He is well-developed. He is not diaphoretic.      Comments: dissheveled   HENT:      Head: Normocephalic and atraumatic.   Eyes:      General:         Right eye: No discharge.         Left eye: No discharge.      Extraocular Movements: Extraocular movements intact.      Conjunctiva/sclera: Conjunctivae normal.   Cardiovascular:      Rate and Rhythm: Normal rate and regular rhythm.      Heart sounds: No murmur heard.  Pulmonary:      Effort: Pulmonary effort is normal. No respiratory distress.      Breath sounds: Normal breath sounds. No wheezing or rales.   Abdominal:      General: Bowel sounds are normal.      Palpations: Abdomen is soft.   Musculoskeletal:         General: Normal range of motion.      Cervical back: Normal range of motion.   Skin:     General: Skin is warm and dry.   Neurological:      General: No focal deficit present.      Mental Status: He is alert and oriented to person, place, and time.   Psychiatric:         Mood and Affect: Mood normal.         Behavior: Behavior normal.         Thought Content: Thought content normal.         Judgment: Judgment normal.           Assessment & Plan     Diagnoses and all orders for this  visit:    1. Major depressive disorder, recurrent episode, moderate (HCC) (Primary)  Comments:  stable  cont current meds  denies SI or HI    2. Mixed hyperlipidemia  Comments:  cont med  work on diet and exercise  check labs  smoking cessation  Orders:  -     Comprehensive Metabolic Panel; Future  -     Lipid Panel; Future    3. Psoriatic arthritis (HCC)  Comments:  sees rheum  cont meds    4. Ischemic heart disease due to coronary artery obstruction (HCC)  Comments:  cont meds  cont plavix  sees cardiology    5. Essential hypertension  Comments:  stable  cont meds  work on diet and exercise  Orders:  -     Comprehensive Metabolic Panel; Future  -     Lipid Panel; Future    6. Prostate cancer screening  -     PSA SCREENING; Future      Stomach issues have resolved- likely was viral

## 2023-02-02 ENCOUNTER — LAB (OUTPATIENT)
Dept: FAMILY MEDICINE CLINIC | Facility: CLINIC | Age: 53
End: 2023-02-02
Payer: MEDICARE

## 2023-02-02 DIAGNOSIS — I10 ESSENTIAL HYPERTENSION: ICD-10-CM

## 2023-02-02 DIAGNOSIS — E78.2 MIXED HYPERLIPIDEMIA: ICD-10-CM

## 2023-02-02 DIAGNOSIS — Z12.5 PROSTATE CANCER SCREENING: ICD-10-CM

## 2023-02-02 LAB
ALBUMIN SERPL-MCNC: 4.3 G/DL (ref 3.5–5.2)
ALBUMIN/GLOB SERPL: 1.5 G/DL
ALP SERPL-CCNC: 107 U/L (ref 39–117)
ALT SERPL W P-5'-P-CCNC: 11 U/L (ref 1–41)
ANION GAP SERPL CALCULATED.3IONS-SCNC: 8.2 MMOL/L (ref 5–15)
AST SERPL-CCNC: 17 U/L (ref 1–40)
BILIRUB SERPL-MCNC: <0.2 MG/DL (ref 0–1.2)
BUN SERPL-MCNC: 8 MG/DL (ref 6–20)
BUN/CREAT SERPL: 7.4 (ref 7–25)
CALCIUM SPEC-SCNC: 9.8 MG/DL (ref 8.6–10.5)
CHLORIDE SERPL-SCNC: 104 MMOL/L (ref 98–107)
CHOLEST SERPL-MCNC: 136 MG/DL (ref 0–200)
CO2 SERPL-SCNC: 26.8 MMOL/L (ref 22–29)
CREAT SERPL-MCNC: 1.08 MG/DL (ref 0.76–1.27)
EGFRCR SERPLBLD CKD-EPI 2021: 82.6 ML/MIN/1.73
GLOBULIN UR ELPH-MCNC: 2.9 GM/DL
GLUCOSE SERPL-MCNC: 95 MG/DL (ref 65–99)
HDLC SERPL-MCNC: 37 MG/DL (ref 40–60)
LDLC SERPL CALC-MCNC: 72 MG/DL (ref 0–100)
LDLC/HDLC SERPL: 1.82 {RATIO}
POTASSIUM SERPL-SCNC: 4.3 MMOL/L (ref 3.5–5.2)
PROT SERPL-MCNC: 7.2 G/DL (ref 6–8.5)
PSA SERPL-MCNC: 2.17 NG/ML (ref 0–4)
SODIUM SERPL-SCNC: 139 MMOL/L (ref 136–145)
TRIGL SERPL-MCNC: 158 MG/DL (ref 0–150)
VLDLC SERPL-MCNC: 27 MG/DL (ref 5–40)

## 2023-02-02 PROCEDURE — 36415 COLL VENOUS BLD VENIPUNCTURE: CPT

## 2023-02-02 PROCEDURE — 80053 COMPREHEN METABOLIC PANEL: CPT | Performed by: NURSE PRACTITIONER

## 2023-02-02 PROCEDURE — 80061 LIPID PANEL: CPT | Performed by: NURSE PRACTITIONER

## 2023-02-02 PROCEDURE — G0103 PSA SCREENING: HCPCS | Performed by: NURSE PRACTITIONER

## 2023-02-15 ENCOUNTER — OFFICE VISIT (OUTPATIENT)
Dept: FAMILY MEDICINE CLINIC | Facility: CLINIC | Age: 53
End: 2023-02-15
Payer: MEDICARE

## 2023-02-15 VITALS
OXYGEN SATURATION: 98 % | TEMPERATURE: 97.5 F | HEIGHT: 74 IN | DIASTOLIC BLOOD PRESSURE: 85 MMHG | WEIGHT: 173.4 LBS | HEART RATE: 90 BPM | BODY MASS INDEX: 22.25 KG/M2 | SYSTOLIC BLOOD PRESSURE: 123 MMHG

## 2023-02-15 DIAGNOSIS — A08.4 VIRAL GASTROENTERITIS: Primary | ICD-10-CM

## 2023-02-15 PROBLEM — S52.122D: Status: ACTIVE | Noted: 2023-02-15

## 2023-02-15 PROBLEM — S52.032D: Status: ACTIVE | Noted: 2023-02-15

## 2023-02-15 PROBLEM — S53.024A: Status: ACTIVE | Noted: 2023-02-15

## 2023-02-15 PROCEDURE — 99213 OFFICE O/P EST LOW 20 MIN: CPT | Performed by: NURSE PRACTITIONER

## 2023-02-15 RX ORDER — ONDANSETRON 4 MG/1
4 TABLET, FILM COATED ORAL EVERY 8 HOURS PRN
Qty: 42 TABLET | Refills: 0 | Status: SHIPPED | OUTPATIENT
Start: 2023-02-15 | End: 2023-03-01

## 2023-02-15 RX ORDER — DICYCLOMINE HYDROCHLORIDE 10 MG/1
10 CAPSULE ORAL
Qty: 56 CAPSULE | Refills: 0 | Status: SHIPPED | OUTPATIENT
Start: 2023-02-15 | End: 2023-03-09

## 2023-02-15 NOTE — ASSESSMENT & PLAN NOTE
May take zofran every 8 hours as needed for nausea. Take Bentyl for stomach cramping. Eat bland diet and slowly reintroduce foods as tolerated. Increase fluid intake to prevent dehydration. Return to clinic if symptoms persist or become worse.

## 2023-02-15 NOTE — PROGRESS NOTES
"Chief Complaint  Nausea and Abdominal Cramping    Subjective        Frank Barger presents to [facility]  History of Present Illness  Frank is a 52-year-old male presenting today with complaints of diarrhea and stomach cramping.    Started Friday.  [place] like he was going to throw up  Was having diarrhea. Sunday stool became more hard  Continued nausea.  Took OTC motion sickness meds and it helped a little  Monday he ate soup and it helped a little. Made stomach quite cramping  Yesterday stomach still cramping. Ate BigMac. Kept it down  This morning had a good BM, but still c/o cramping and nausea.   Patient had contact with someone with a stomach virus  Belching/passing gas has helped        Objective   Vital Signs:  /85 (BP Location: Left arm, Patient Position: Sitting, Cuff Size: Adult)   Pulse 90   Temp 97.5 °F (36.4 °C) (Temporal)   Ht 188 cm (74\")   Wt 78.7 kg (173 lb 6.4 oz)   SpO2 98%   BMI 22.26 kg/m²   Estimated body mass index is 22.26 kg/m² as calculated from the following:    Height as of this encounter: 188 cm (74\").    Weight as of this encounter: 78.7 kg (173 lb 6.4 oz).       BMI is within normal parameters. No other follow-up for BMI required.      Review of Systems   Constitutional: Positive for chills. Negative for fatigue and fever.   Gastrointestinal: Positive for abdominal pain, diarrhea and nausea. Negative for vomiting.   Musculoskeletal: Negative for myalgias.   Neurological: Negative for dizziness, weakness, light-headedness and headaches.        Physical Exam  Constitutional:       Appearance: Normal appearance.   Cardiovascular:      Rate and Rhythm: Normal rate and regular rhythm.      Pulses: Normal pulses.      Heart sounds: Normal heart sounds.   Pulmonary:      Effort: Pulmonary effort is normal.      Breath sounds: Normal breath sounds.   Abdominal:      General: Abdomen is flat. Bowel sounds are normal.      Palpations: Abdomen " is soft.   Musculoskeletal:         General: Normal range of motion.      Cervical back: Normal range of motion and neck supple.   Skin:     General: Skin is warm and dry.      Capillary Refill: Capillary refill takes less than 2 seconds.   Neurological:      Mental Status: He is alert and oriented to person, place, and time.   Psychiatric:         Mood and Affect: Mood normal.         Behavior: Behavior normal.         Thought Content: Thought content normal.         Judgment: Judgment normal.        Result Review :                   Assessment and Plan   Diagnoses and all orders for this visit:    1. Viral gastroenteritis (Primary)  Assessment & Plan:  May take zofran every 8 hours as needed for nausea. Take Bentyl for stomach cramping. Eat bland diet and slowly reintroduce foods as tolerated. Increase fluid intake to prevent dehydration. Return to clinic if symptoms persist or become worse.       Other orders  -     ondansetron (Zofran) 4 MG tablet; Take 1 tablet by mouth Every 8 (Eight) Hours As Needed for Nausea or Vomiting for up to 14 days.  Dispense: 42 tablet; Refill: 0  -     dicyclomine (BENTYL) 10 MG capsule; Take 1 capsule by mouth 4 (Four) Times a Day Before Meals & at Bedtime.  Dispense: 56 capsule; Refill: 0           Follow Up   Return if symptoms worsen or fail to improve.  Patient was given instructions and counseling regarding his condition or for health maintenance advice. Please see specific information pulled into the AVS if appropriate.

## 2023-02-16 ENCOUNTER — TELEPHONE (OUTPATIENT)
Dept: FAMILY MEDICINE CLINIC | Facility: CLINIC | Age: 53
End: 2023-02-16
Payer: MEDICARE

## 2023-02-16 ENCOUNTER — TELEPHONE (OUTPATIENT)
Dept: FAMILY MEDICINE CLINIC | Facility: CLINIC | Age: 53
End: 2023-02-16

## 2023-02-16 DIAGNOSIS — G89.4 CHRONIC PAIN DISORDER: Primary | ICD-10-CM

## 2023-02-16 NOTE — TELEPHONE ENCOUNTER
Caller: Frank Barger    Relationship: Self    Best call back number: [phone]    What medication are you requesting: OMEPRAZOLE    What are your current symptoms: STOMACH ISSUES ARE COMING BACK AGAIN AND HE FEELS LIKE THIS MEDICATION WORKS THE BEST    Have you had these symptoms before:    [x] Yes  [] No    Have you been treated for these symptoms before:   [x] Yes  [] No    If a prescription is needed, what is your preferred pharmacy and phone number: [id number] - [place], [place] - [address] AT [facility] [facility] [address] [facility] & [facility] [phone] [facility] [phone] FX     Additional notes: PLEASE ADVISE IF HE CAN TAKE THIS WITH WHAT HE GOT FROM [facility] YESTERDAY OR JUST THE OMEPRAZOLE.

## 2023-02-16 NOTE — TELEPHONE ENCOUNTER
He cant take omeprazole and has to take the protonix due to being on plavix. And he can take it with the bentyl and zofran

## 2023-02-16 NOTE — TELEPHONE ENCOUNTER
Caller: Frank Barger    Relationship: Self    Best call back number: [phone]    What specialty or service is being requested: PAIN MANAGEMENT    What is the provider, practice or medical service name: [facility]    What is the office location: [place]    Any additional details: PATIENT WOULD LIKE TO CHANGE OVER TO [facility] FOR PAIN MANAGEMENT, HE IS NOT HAPPY WHERE HE IS GOING RIGHT NOW.

## 2023-02-22 ENCOUNTER — OFFICE VISIT (OUTPATIENT)
Dept: PSYCHIATRY | Facility: CLINIC | Age: 53
End: 2023-02-22
Payer: MEDICARE

## 2023-02-22 DIAGNOSIS — F43.10 POST TRAUMATIC STRESS DISORDER (PTSD): Primary | Chronic | ICD-10-CM

## 2023-02-22 DIAGNOSIS — F31.30 BIPOLAR I DISORDER, MOST RECENT EPISODE DEPRESSED: Chronic | ICD-10-CM

## 2023-02-22 DIAGNOSIS — F51.05 INSOMNIA DUE TO MENTAL CONDITION: Chronic | ICD-10-CM

## 2023-02-22 DIAGNOSIS — F41.1 GENERALIZED ANXIETY DISORDER: Chronic | ICD-10-CM

## 2023-02-22 PROCEDURE — 99214 OFFICE O/P EST MOD 30 MIN: CPT

## 2023-02-22 RX ORDER — QUETIAPINE FUMARATE 25 MG/1
25 TABLET, FILM COATED ORAL NIGHTLY
Qty: 30 TABLET | Refills: 3 | Status: SHIPPED | OUTPATIENT
Start: 2023-02-22

## 2023-02-22 RX ORDER — ARIPIPRAZOLE 10 MG/1
10 TABLET ORAL
Qty: 90 TABLET | Refills: 1 | Status: SHIPPED | OUTPATIENT
Start: 2023-02-22

## 2023-02-22 NOTE — PROGRESS NOTES
Subjective   Frank Barger is a 52 y.o. male who presents today for follow-up for psychiatric medication management. Patient is new to this provider, but previously DEBORAH Noble.     Chief Complaint:  Follow up for depression and PTSD. Patient also here to establish care with this provider.     History of Present Illness:     Patient has not been seen in the clinic since 2021. Medications last visit:   Continue Abilify 5mg   Continue Trintellix 20mg daily for depression and anxiety  Continue Wellbutrin XL 300mg QAM  Trazodone 100 mg nightly for sleep instead of Elavil    Use the prazosin 2mg at bedtime for nightmares when needed, not using currently.      At today's visit, he states he has not been seen in the clinic due to sickness and forgetting his appointments.   Primary care has been filling these medications for him, but they are wanting him to get back into psychiatry.   Feels like the trazodone, sometimes it doesn't help him sleep, and it gives him dry mouth.   Nightmares have started back up again. He would like to go back on the Prazosin.   Fell and broke both arms since he was here last--in June of last year. Had a syncopal episode. He has been having a lot of syncopal episodes. Advised him to let Dr. Garcia know he is having these episodes so she can advise who he needs to see for these.   Feels like some days the medicines work and some days it doesn't. Feels like he is still smart some days.   We discussed increasing his abilify to help with this.   No SI/HI/AVH.     Patient presents with symptoms and behaviors that are consistent with the following DSM-5 diagnoses:  1.Bipolar disorder  2. Generalized anxiety disorder  2. PTSD  3. Insomnia    The following portions of the patient's history were reviewed and updated as appropriate: allergies, current medications, past family history, past medical history, past social history, past surgical history and problem list.    PAST OUTPATIENT  "TREATMENT  Diagnosis treated:  Affective Disorder, Anxiety/Panic Disorder  Treatment Type:  Psychotherapy, Medication Management  Prior Psychiatric Medications:  Zoloft  Trazodone  Brintellix  Trintellix   Prazosin  Elavil  Abilify  Support Groups:  None  Sequelae Of Mental Disorder:  emotional distress    Interval History  No Change    Side Effects  None      Past Medical History:  Past Medical History:   Diagnosis Date   • Abdominal pain    • Allergic    • Allergies     takes allergy injections   • Anesthesia complication     states heart \"fluttering\" after anesthesia x 1- was kept in hospital 3 days s/p heart stent placement   • Anxiety    • Arthritis    • Asthma    • Chronic back pain    • Constipation 06/2021   • Coronary artery disease 2014   • DDD (degenerative disc disease), lumbar    • Depression    • Emphysema of lung ([facility])    • Fibromyalgia, primary 2016   • GERD (gastroesophageal reflux disease)    • Headache    • Heart murmur 2014   • Hypertension 2014   • Leg pain    • Lumbar spine pain    • Myocardial infarction (HCC) 2014   • Nocturia    • Scoliosis 1995   • Under care of pain management specialist     pain management of [place]       Social History:  Social History     Socioeconomic History   • Marital status:    Tobacco Use   • Smoking status: Every Day     Packs/day: 1.50     Years: 41.00     Pack years: 61.50     Types: Cigarettes, Cigars     Start date: 8/5/1985   • Smokeless tobacco: Never   Vaping Use   • Vaping Use: Never used   • Passive vaping exposure: Yes   Substance and Sexual Activity   • Alcohol use: No   • Drug use: No   • Sexual activity: Yes     Partners: Female       Family History:  Family History   Problem Relation Age of Onset   • Diabetes Mother    • Heart disease Father    • Arthritis Father    • Cancer Father         Prostate   • COPD Father    • Anxiety disorder Daughter    • Depression Daughter        Past Surgical History:  Past Surgical History:   Procedure " Laterality Date   • CARDIAC CATHETERIZATION N/A 1/20/2021    Procedure: Left Heart Cath and coronary angiogram;  Surgeon: Jaimie Silva MD;  Location: [facility] CATH INVASIVE LOCATION;  Service: Cardiovascular;  Laterality: N/A;   • CARDIAC CATHETERIZATION N/A 1/20/2021    Procedure: Saphenous Vein Graft;  Surgeon: Jaimie Silva MD;  Location: [facility] CATH INVASIVE LOCATION;  Service: Cardiovascular;  Laterality: N/A;   • CARDIAC CATHETERIZATION N/A 1/20/2021    Procedure: Stent EBENEZER coronary;  Surgeon: Herve Rodriguez MD;  Location: [facility] CATH INVASIVE LOCATION;  Service: Cardiology;  Laterality: N/A;   • CARDIAC SURGERY  12/15   • CORONARY ANGIOPLASTY WITH STENT PLACEMENT  04/2014    2 in 2014, 1 in 12/2020   • CORONARY ARTERY BYPASS GRAFT  12/2015    x 3   • ELBOW DEBRIDEMENT Right 8/17/2021    Procedure: INCISION / DEBRIDEMENT ELBOW, closed reduction right elbow dislocation ;  Surgeon: Kyler August MD;  Location: [facility] MAIN OR;  Service: Orthopedics;  Laterality: Right;   • ELBOW OPEN REDUCTION INTERNAL FIXATION Left 8/18/2021    Procedure: Left  ulna open reduction internal fixation (of olecranon and coronoid), left radial head replacement  ;  Surgeon: Yonis Mcdonough MD;  Location: [facility] MAIN OR;  Service: Orthopedics;  Laterality: Left;       Problem List:  Patient Active Problem List   Diagnosis   • Atherosclerosis of coronary artery bypass graft   • Essential hypertension   • Chronic pain disorder   • Coronary artery disease   • Degeneration of intervertebral disc of lumbar region   • Fibromyositis   • Mixed hyperlipidemia   • Neuropathic pain   • Posttraumatic stress disorder   • Psoriasis   • Psoriatic arthritis (HCC)   • Environmental and seasonal allergies   • Personal history of tobacco use, presenting hazards to health   • Status post coronary artery bypass graft   • Gastroesophageal reflux disease without esophagitis   • Tinnitus of left ear   • Intractable migraine without aura  and with status migrainosus   • Cerumen debris on tympanic membrane of both ears   • Bilateral deafness   • Ischemic heart disease due to coronary artery obstruction ([facility])   • Status post angioplasty with stent   • Dyslipidemia   • Abnormal nuclear stress test   • Chest pain with high risk of acute coronary syndrome   • Iron deficiency anemia   • Arthropathy of lumbar facet joint   • Arthropathy of right knee   • Syncope and collapse   • History of scoliosis   • COPD (chronic obstructive pulmonary disease) ([facility])   • Dislocation of elbow, right, open, initial encounter   • Fracture of left elbow   • Major depressive disorder, recurrent episode, moderate ([facility])   • Psychophysiological insomnia   • Posterior dislocation of right radial head   • Displaced fracture of olecranon process with intraarticular extension of left ulna, subsequent encounter for closed fracture with routine healing   • Displaced fracture of head of left radius, subsequent encounter for closed fracture with routine healing   • Viral gastroenteritis   • Post traumatic stress disorder (PTSD)   • Insomnia due to mental condition   • Bipolar I disorder, most recent episode depressed ([facility])       Allergy:   Allergies   Allergen Reactions   • Hydrocodone Nausea And Vomiting   • Hydrocodone-Acetaminophen GI Intolerance   • Oxycodone GI Intolerance        Discontinued Medications:  Medications Discontinued During This Encounter   Medication Reason   • traZODone (DESYREL) 100 MG tablet *Therapy completed   • ARIPiprazole (ABILIFY) 5 MG tablet Reorder       Current Medications:   Current Outpatient Medications   Medication Sig Dispense Refill   • ARIPiprazole (ABILIFY) 10 MG tablet Take 1 tablet by mouth Daily With Breakfast. 90 tablet 1   • aspirin (Aspirin Low Dose) 81 MG EC tablet Take 1 tablet by mouth Daily. 90 tablet 1   • atorvastatin (LIPITOR) 20 MG tablet Take 1 tablet by mouth Every Night. 90 tablet 1   • buPROPion XL (WELLBUTRIN XL) 300 MG 24 hr  tablet Take 1 tablet by mouth Daily. 90 tablet 1   • celecoxib (CeleBREX) 100 MG capsule TAKE 1 CAPSULE BY MOUTH TWICE DAILY WITH FOOD AS NEEDED FOR MODERATE PAIN 180 capsule 0   • clopidogrel (PLAVIX) 75 MG tablet Take 1 tablet by mouth Daily. 90 tablet 1   • cyclobenzaprine (FLEXERIL) 10 MG tablet Take 1 tablet by mouth 3 (Three) Times a Day As Needed for Muscle Spasms. 90 tablet 11   • dicyclomine (BENTYL) 10 MG capsule Take 1 capsule by mouth 4 (Four) Times a Day Before Meals & at Bedtime. 56 capsule 0   • ferrous sulfate 325 (65 FE) MG tablet Take 1 tablet by mouth Every Other Day. 45 tablet 1   • Humira Pen-Ps/UV/Adol HS Start 40 MG/0.8ML Pen-injector Kit Inject 40 mg under the skin into the appropriate area as directed.     • loratadine (CLARITIN) 10 MG tablet Take 10 mg by mouth Daily.     • meclizine (ANTIVERT) 25 MG tablet Take 1 tablet by mouth 3 (Three) Times a Day As Needed for Dizziness. 30 tablet 0   • metoprolol tartrate (LOPRESSOR) 25 MG tablet Take 1 tablet by mouth 2 (Two) Times a Day. 180 tablet 1   • nitroglycerin (NITROSTAT) 0.4 MG SL tablet Place 1 tablet under the tongue Every 5 (Five) Minutes As Needed for Chest Pain. Take no more than 3 doses in 15 minutes. 25 tablet 0   • NON FORMULARY Allergy injections     • ondansetron (Zofran) 4 MG tablet Take 1 tablet by mouth Every 8 (Eight) Hours As Needed for Nausea or Vomiting for up to 14 days. 42 tablet 0   • pantoprazole (Protonix) 40 MG EC tablet Take 1 tablet by mouth Daily. 90 tablet 1   • pregabalin (LYRICA) 300 MG capsule Take 300 mg by mouth 2 (Two) Times a Day.     • QUEtiapine (SEROquel) 25 MG tablet Take 1 tablet by mouth Every Night. 30 tablet 3   • SUMAtriptan (IMITREX) 50 MG tablet TAKE 1 TABLET BY MOUTH AT ONSET OF HEADACHE. MAY REPEAT DOSE 1 TIME IN 2 HOURS IF HEADACHE NOT RELIEVED 10 tablet 0   • Trintellix 20 MG tablet TAKE 1 TABLET BY MOUTH DAILY 90 tablet 1     No current facility-administered medications for this visit.          Psychological ROS: positive for - anxiety, depression and sleep disturbances  negative for - behavioral disorder, concentration difficulties, decreased libido, disorientation, hallucinations, hostility, irritability, memory difficulties, mood swings, obsessive thoughts, physical abuse, sexual abuse or suicidal ideation      Physical Exam:   There were no vitals taken for this visit.    Mental Status Exam:   Hygiene:   good  Cooperation:  Cooperative  Eye Contact:  Good  Psychomotor Behavior:  Appropriate  Affect:  Appropriate  Mood: normal  Hopelessness: Denies  Speech:  Normal  Thought Process:  Linear  Thought Content:  Normal  Suicidal:  None  Homicidal:  None  Hallucinations:  None  Delusion:  None  Memory:  Intact  Orientation:  Person, Place, Time and Situation  Reliability:  good  Insight:  Good  Judgement:  Good  Impulse Control:  Good  Physical/Medical Issues:  No        PHQ-9 Depression Screening    Little interest or pleasure in doing things? 2-->more than half the days   Feeling down, depressed, or hopeless? 2-->more than half the days   Trouble falling or staying asleep, or sleeping too much? 3-->nearly every day   Feeling tired or having little energy? 3-->nearly every day   Poor appetite or overeating? 0-->not at all   Feeling bad about yourself - or that you are a failure or have let yourself or your family down? 3-->nearly every day   Trouble concentrating on things, such as reading the newspaper or watching television? 0-->not at all   Moving or speaking so slowly that other people could have noticed? Or the opposite - being so fidgety or restless that you have been moving around a lot more than usual? 0-->not at all   Thoughts that you would be better off dead, or of hurting yourself in some way? 0-->not at all   PHQ-9 Total Score 13   If you checked off any problems, how difficult have these problems made it for you to do your work, take care of things at home, or get along with other  people? somewhat difficult        Current every day smoker less than 3 minutes spent counseling Not agreeable to stopping    I advised Frank of the risks of tobacco use.     Result Review:    Labs:  Lab on 02/02/2023   Component Date Value Ref Range Status   • Glucose 02/02/2023 95  65 - 99 mg/dL Final   • BUN 02/02/2023 8  6 - 20 mg/dL Final   • Creatinine 02/02/2023 1.08  0.76 - 1.27 mg/dL Final   • Sodium 02/02/2023 139  136 - 145 mmol/L Final   • Potassium 02/02/2023 4.3  3.5 - 5.2 mmol/L Final   • Chloride 02/02/2023 104  98 - 107 mmol/L Final   • CO2 02/02/2023 26.8  22.0 - 29.0 mmol/L Final   • Calcium 02/02/2023 9.8  8.6 - 10.5 mg/dL Final   • Total Protein 02/02/2023 7.2  6.0 - 8.5 g/dL Final   • Albumin 02/02/2023 4.3  3.5 - 5.2 g/dL Final   • ALT (SGPT) 02/02/2023 11  1 - 41 U/L Final   • AST (SGOT) 02/02/2023 17  1 - 40 U/L Final   • Alkaline Phosphatase 02/02/2023 107  39 - 117 U/L Final   • Total Bilirubin 02/02/2023 <0.2  0.0 - 1.2 mg/dL Final   • Globulin 02/02/2023 2.9  gm/dL Final   • A/G Ratio 02/02/2023 1.5  g/dL Final   • BUN/Creatinine Ratio 02/02/2023 7.4  7.0 - 25.0 Final   • Anion Gap 02/02/2023 8.2  5.0 - 15.0 mmol/L Final   • eGFR 02/02/2023 82.6  >60.0 mL/min/1.73 Final   • Total Cholesterol 02/02/2023 136  0 - 200 mg/dL Final   • Triglycerides 02/02/2023 158 (H)  0 - 150 mg/dL Final   • HDL Cholesterol 02/02/2023 37 (L)  40 - 60 mg/dL Final   • LDL Cholesterol  02/02/2023 72  0 - 100 mg/dL Final   • VLDL Cholesterol 02/02/2023 27  5 - 40 mg/dL Final   • LDL/HDL Ratio 02/02/2023 1.82   Final   • PSA 02/02/2023 2.170  0.000 - 4.000 ng/mL Final       Assessment & Plan   Diagnoses and all orders for this visit:    1. Bipolar I disorder, most recent episode depressed    2. Post traumatic stress disorder (PTSD)    3. Insomnia due to mental condition    Increase Abilify to 10mg. Take in the morning.   Continue Trintellix 20mg daily   Continue Wellbutrin XL 300mg daily.   Stop Trazodone.    Start quetiapine 25mg nightly.         Visit Diagnoses:    ICD-10-CM ICD-9-CM   1. Post traumatic stress disorder (PTSD)  F43.10 309.81   2. Insomnia due to mental condition  F51.05 300.9     327.02   3. Bipolar I disorder, most recent episode depressed (HCC)  F31.30 296.50   4. Generalized anxiety disorder  F41.1 300.02       TREATMENT PLAN/GOALS: Continue supportive psychotherapy efforts and medications as indicated. Treatment and medication options discussed during today's visit. Patient ackowledged and verbally consented to continue with current treatment plan and was educated on the importance of compliance with treatment and follow-up appointments.    MEDICATION ISSUES:  INSPECT reviewed as expected    Discussed medication options and treatment plan of prescribed medication as well as the risks, benefits, and side effects including potential falls, possible impaired driving and metabolic adversities among others. Patient is agreeable to call the office with any worsening of symptoms or onset of side effects. Patient is agreeable to call 911 or go to the nearest ER should he/she begin having SI/HI. No medication side effects or related complaints today.     MEDS ORDERED DURING VISIT:  New Medications Ordered This Visit   Medications   • ARIPiprazole (ABILIFY) 10 MG tablet     Sig: Take 1 tablet by mouth Daily With Breakfast.     Dispense:  90 tablet     Refill:  1   • QUEtiapine (SEROquel) 25 MG tablet     Sig: Take 1 tablet by mouth Every Night.     Dispense:  30 tablet     Refill:  3       Return in about 2 months (around 4/22/2023).         This document has been electronically signed by CATERINA Cooney  February 22, 2023 08:47 EST    Part of this note may be an electronic transcription/translation of spoken language to printed text using the Dragon Dictation System.

## 2023-02-22 NOTE — PATIENT INSTRUCTIONS
Increase Abilify to 10mg. Take in the morning.   Continue Trintellix 20mg daily   Continue Wellbutrin XL 300mg daily.   Stop Trazodone.   Start quetiapine 25mg nightly.   Follow up in 2 months.

## 2023-03-02 ENCOUNTER — TELEPHONE (OUTPATIENT)
Dept: FAMILY MEDICINE CLINIC | Facility: CLINIC | Age: 53
End: 2023-03-02
Payer: MEDICARE

## 2023-03-02 DIAGNOSIS — M47.816 ARTHROPATHY OF LUMBAR FACET JOINT: Primary | ICD-10-CM

## 2023-03-02 DIAGNOSIS — M54.50 CHRONIC LOW BACK PAIN, UNSPECIFIED BACK PAIN LATERALITY, UNSPECIFIED WHETHER SCIATICA PRESENT: ICD-10-CM

## 2023-03-02 DIAGNOSIS — G89.29 CHRONIC LOW BACK PAIN, UNSPECIFIED BACK PAIN LATERALITY, UNSPECIFIED WHETHER SCIATICA PRESENT: ICD-10-CM

## 2023-03-02 NOTE — TELEPHONE ENCOUNTER
"Pts pain management referral was routed back to me asking the following .......\"REFERRING PROVIDER, PLEASE INCLUDE AFFECTED BODY PARTS AND RESEND; THANK YOU\"     "

## 2023-03-09 DIAGNOSIS — R42 VERTIGO: ICD-10-CM

## 2023-03-09 RX ORDER — MECLIZINE HYDROCHLORIDE 25 MG/1
25 TABLET ORAL 3 TIMES DAILY PRN
Qty: 30 TABLET | Refills: 0 | Status: SHIPPED | OUTPATIENT
Start: 2023-03-09

## 2023-03-09 RX ORDER — DICYCLOMINE HYDROCHLORIDE 10 MG/1
CAPSULE ORAL
Qty: 56 CAPSULE | Refills: 0 | Status: SHIPPED | OUTPATIENT
Start: 2023-03-09

## 2023-03-09 NOTE — TELEPHONE ENCOUNTER
"    Caller: rFank Barger \"Monroe\"    Relationship: Self    Best call back number: [phone]    Requested Prescriptions:   Requested Prescriptions     Pending Prescriptions Disp Refills   • meclizine (ANTIVERT) 25 MG tablet 30 tablet 0     Sig: Take 1 tablet by mouth 3 (Three) Times a Day As Needed for Dizziness.        Pharmacy where request should be sent: [id number] [place] - [address] AT [address] [facility] [phone] [facility] [phone] FX     Additional details provided by patient: HAS ABOUT 2 DAYS LEFT.    Does the patient have less than a 3 day supply:  [x] Yes  [] No    Would you like a call back once the refill request has been completed: [] Yes [x] No    If the office needs to give you a call back, can they leave a voicemail: [] Yes [x] No    Beverly Rodriguez, PCT   03/09/23 08:09 EST       "

## 2023-03-14 ENCOUNTER — TELEPHONE (OUTPATIENT)
Dept: FAMILY MEDICINE CLINIC | Facility: CLINIC | Age: 53
End: 2023-03-14
Payer: MEDICARE

## 2023-03-14 NOTE — TELEPHONE ENCOUNTER
Patient pain management referral was routed back with this Message:     THIS PATIENT DISCHARGED HIMSELF FROM OUR PRACTICE HE WAS NOT HAPPY WITH OUR SERVICES NOW HE IS LEAVING THE OTHER PAIN MANAGEMENT FOR THE SAME REASON. WE WILL NOT ACCEPT HIM BACK AS A PATIENT.    Please advise    Report called receiving RN requesting 10 min before Pt sent over

## 2023-03-15 ENCOUNTER — LAB (OUTPATIENT)
Dept: LAB | Facility: HOSPITAL | Age: 53
End: 2023-03-15
Payer: MEDICARE

## 2023-03-15 ENCOUNTER — TRANSCRIBE ORDERS (OUTPATIENT)
Dept: ADMINISTRATIVE | Facility: HOSPITAL | Age: 53
End: 2023-03-15
Payer: MEDICARE

## 2023-03-15 DIAGNOSIS — S52.122D CLOSED DISPLACED FRACTURE OF HEAD OF LEFT RADIUS WITH ROUTINE HEALING: ICD-10-CM

## 2023-03-15 DIAGNOSIS — M25.522 LEFT ELBOW PAIN: Primary | ICD-10-CM

## 2023-03-15 DIAGNOSIS — S52.032D CLOSED DISPLACED FRACTURE OF OLECRANON PROCESS WITH INTRAARTICULAR EXTENSION OF LEFT ULNA WITH ROUTINE HEALING: ICD-10-CM

## 2023-03-15 DIAGNOSIS — M25.522 LEFT ELBOW PAIN: ICD-10-CM

## 2023-03-15 LAB
BASOPHILS # BLD AUTO: 0.06 10*3/MM3 (ref 0–0.2)
BASOPHILS NFR BLD AUTO: 1 % (ref 0–1.5)
CRP SERPL-MCNC: 0.42 MG/DL (ref 0–0.5)
DEPRECATED RDW RBC AUTO: 48.3 FL (ref 37–54)
EOSINOPHIL # BLD AUTO: 0.18 10*3/MM3 (ref 0–0.4)
EOSINOPHIL NFR BLD AUTO: 2.9 % (ref 0.3–6.2)
ERYTHROCYTE [DISTWIDTH] IN BLOOD BY AUTOMATED COUNT: 14.9 % (ref 12.3–15.4)
ERYTHROCYTE [SEDIMENTATION RATE] IN BLOOD: 23 MM/HR (ref 0–20)
HCT VFR BLD AUTO: 41.9 % (ref 37.5–51)
HGB BLD-MCNC: 13.7 G/DL (ref 13–17.7)
IMM GRANULOCYTES # BLD AUTO: 0.01 10*3/MM3 (ref 0–0.05)
IMM GRANULOCYTES NFR BLD AUTO: 0.2 % (ref 0–0.5)
LYMPHOCYTES # BLD AUTO: 1.62 10*3/MM3 (ref 0.7–3.1)
LYMPHOCYTES NFR BLD AUTO: 26.3 % (ref 19.6–45.3)
MCH RBC QN AUTO: 29.2 PG (ref 26.6–33)
MCHC RBC AUTO-ENTMCNC: 32.7 G/DL (ref 31.5–35.7)
MCV RBC AUTO: 89.3 FL (ref 79–97)
MONOCYTES # BLD AUTO: 0.56 10*3/MM3 (ref 0.1–0.9)
MONOCYTES NFR BLD AUTO: 9.1 % (ref 5–12)
NEUTROPHILS NFR BLD AUTO: 3.72 10*3/MM3 (ref 1.7–7)
NEUTROPHILS NFR BLD AUTO: 60.5 % (ref 42.7–76)
NRBC BLD AUTO-RTO: 0 /100 WBC (ref 0–0.2)
PLATELET # BLD AUTO: 254 10*3/MM3 (ref 140–450)
PMV BLD AUTO: 10.6 FL (ref 6–12)
RBC # BLD AUTO: 4.69 10*6/MM3 (ref 4.14–5.8)
WBC NRBC COR # BLD: 6.15 10*3/MM3 (ref 3.4–10.8)

## 2023-03-15 PROCEDURE — 36415 COLL VENOUS BLD VENIPUNCTURE: CPT

## 2023-03-15 PROCEDURE — 85652 RBC SED RATE AUTOMATED: CPT

## 2023-03-15 PROCEDURE — 86140 C-REACTIVE PROTEIN: CPT

## 2023-03-15 PROCEDURE — 85025 COMPLETE CBC W/AUTO DIFF WBC: CPT

## 2023-03-15 RX ORDER — DICYCLOMINE HYDROCHLORIDE 10 MG/1
10 CAPSULE ORAL
Qty: 56 CAPSULE | Refills: 0 | Status: CANCELLED | OUTPATIENT
Start: 2023-03-15

## 2023-03-15 NOTE — TELEPHONE ENCOUNTER
"Caller: Frank Barger \"Monroe\"    Relationship: Self    Best call back number: [phone]    Requested Prescriptions:   Requested Prescriptions     Pending Prescriptions Disp Refills   • dicyclomine (BENTYL) 10 MG capsule 56 capsule 0     Sig: Take 1 capsule by mouth 4 (Four) Times a Day Before Meals & at Bedtime.        Pharmacy where request should be sent: [facility] #[id number] [facility] [place] - [address] AT [address] & [facility] [phone] [facility] [phone] FX     Additional details provided by patient: PATIENT STATES THE PHARMACY HAS THIS MEDICATION ON BACKORDER. PATIENT IS COMPLETELY OUT AND DIDN'T KNOW IF SHE COULD CALL IN FOR A DIFFERENT DOSE OR SIMILAR MEDICATION.     Does the patient have less than a 3 day supply:  [x] Yes  [] No    Would you like a call back once the refill request has been completed: [x] Yes [] No    If the office needs to give you a call back, can they leave a voicemail: [x] Yes [] No    Kevin Marshall Rep   03/15/23 15:19 EDT         "

## 2023-03-16 DIAGNOSIS — M47.816 ARTHROPATHY OF LUMBAR FACET JOINT: ICD-10-CM

## 2023-03-16 RX ORDER — CELECOXIB 100 MG/1
CAPSULE ORAL
Qty: 180 CAPSULE | Refills: 0 | Status: SHIPPED | OUTPATIENT
Start: 2023-03-16

## 2023-03-17 ENCOUNTER — HOSPITAL ENCOUNTER (OUTPATIENT)
Dept: CT IMAGING | Facility: HOSPITAL | Age: 53
Discharge: HOME | End: 2023-03-17
Admitting: STUDENT IN AN ORGANIZED HEALTH CARE EDUCATION/TRAINING PROGRAM
Payer: MEDICARE

## 2023-03-17 DIAGNOSIS — S52.032D CLOSED DISPLACED FRACTURE OF OLECRANON PROCESS WITH INTRAARTICULAR EXTENSION OF LEFT ULNA WITH ROUTINE HEALING: ICD-10-CM

## 2023-03-17 DIAGNOSIS — S52.122D CLOSED DISPLACED FRACTURE OF HEAD OF LEFT RADIUS WITH ROUTINE HEALING: ICD-10-CM

## 2023-03-17 DIAGNOSIS — M25.522 LEFT ELBOW PAIN: ICD-10-CM

## 2023-03-17 PROCEDURE — 73200 CT UPPER EXTREMITY W/O DYE: CPT

## 2023-04-12 RX ORDER — ONDANSETRON 4 MG/1
TABLET, FILM COATED ORAL
Qty: 42 TABLET | Refills: 0 | Status: SHIPPED | OUTPATIENT
Start: 2023-04-12

## 2023-04-13 RX ORDER — DICYCLOMINE HYDROCHLORIDE 10 MG/1
CAPSULE ORAL
Qty: 56 CAPSULE | Refills: 0 | Status: SHIPPED | OUTPATIENT
Start: 2023-04-13

## 2023-05-01 RX ORDER — ONDANSETRON 4 MG/1
4 TABLET, FILM COATED ORAL EVERY 8 HOURS PRN
Qty: 42 TABLET | Refills: 0 | Status: SHIPPED | OUTPATIENT
Start: 2023-05-01

## 2023-05-01 NOTE — TELEPHONE ENCOUNTER
"Caller: Frank Barger \"Monroe\"    Relationship: Self    Best call back number:[phone]    Requested Prescriptions:   Requested Prescriptions     Pending Prescriptions Disp Refills   • ondansetron (ZOFRAN) 4 MG tablet 42 tablet 0        Pharmacy where request should be sent: [facility][id number] - [place], [place] - [address] AT [address] OF [address] [facility] [address] [facility] [phone] [facility] [phone] FX     Last office visit with prescribing clinician: 1/18/2023   Last telemedicine visit with prescribing clinician: 7/19/2023   Next office visit with prescribing clinician: 7/19/2023     Additional details provided by patient:PATIENT STATED WITH THE 4MG HE DOESN'T NOTICE MUCH OF A DIFFERENCE, PATIENT IS ASKING TO SEE IF THE PRESCRIPTION CAN BE RAISED.     Does the patient have less than a 3 day supply:  [] Yes  [] No    Would you like a call back once the refill request has been completed: [] Yes [] No    If the office needs to give you a call back, can they leave a voicemail: [] Yes [] No    Kevin Romero Rep   05/01/23 10:23 EDT     "

## 2023-05-05 RX ORDER — NAPROXEN 500 MG/1
TABLET ORAL
COMMUNITY

## 2023-05-05 RX ORDER — TRAZODONE HYDROCHLORIDE 100 MG/1
TABLET ORAL
COMMUNITY

## 2023-05-05 RX ORDER — GABAPENTIN 300 MG/1
CAPSULE ORAL EVERY 12 HOURS SCHEDULED
COMMUNITY
End: 2023-05-09

## 2023-05-07 NOTE — PROGRESS NOTES
Subjective   Frank Barger is a 52 y.o. male who presents today for follow-up for psychiatric medication management.     Chief Complaint:  Follow up for depression and PTSD. Patient also here to establish care with this provider.     History of Present Illness:     Medication adjustments last visit:  Increase Abilify to 10mg. Take in the morning.   Continue Trintellix 20mg daily   Continue Wellbutrin XL 300mg daily.   Stop Trazodone.   Start quetiapine 25mg nightly.     At today's visit, patient states he has feel very restless lately, like he needs to move all the time. We discussed how this could be a side effect of the Abilify. Will decrease to 5mg daily, and may stop at future visit.   He says he can't tell Trintellix is really helping his depression. Feels like sometimes maybe it does, but other times, he still feels very down. We discussed changing trintellix to Lexapro. We discussed that at future visit, if he doesn't notice improvement from Lexapro, will switch to or add Vraylar.   Patient states he has been taking seroquel 25mg nightly, and it worked at first but stopped working. Will increase to 50mg nightly.   Patient states he feels sometimes like he has ADHD, and his wife feels like he has ADHD.  I discussed with him that we could get him referred to Dr. Malcolm for psychological testing. He was agreeable to this.   He is also wanting to start counseling. I let him know we could put him on the waiting list here in the office for Lesli Gamboa LCSW, but the wait may be long. He is going to reach out to his insurance about possibly starting at [facility] for counseling.   No suicidal thoughts. No HI/AVH.     Patient presents with symptoms and behaviors that are consistent with the following DSM-5 diagnoses:  1. Major depressive disorder  2. Generalized anxiety disorder  2. PTSD  3. Insomnia    The following portions of the patient's history were reviewed and updated as appropriate: allergies,  "current medications, past family history, past medical history, past social history, past surgical history and problem list.    PAST OUTPATIENT TREATMENT  Diagnosis treated:  Affective Disorder, Anxiety/Panic Disorder  Treatment Type:  Psychotherapy, Medication Management  Prior Psychiatric Medications:  Zoloft  Trazodone  Brintellix  Trintellix   Prazosin  Elavil  Abilify  Support Groups:  None  Sequelae Of Mental Disorder:  emotional distress    Interval History  No Change    Side Effects  None      Past Medical History:  Past Medical History:   Diagnosis Date   • Abdominal pain    • Allergic    • Allergies     takes allergy injections   • Anesthesia complication     states heart \"fluttering\" after anesthesia x 1- was kept in hospital 3 days s/p heart stent placement   • Anxiety    • Arthritis    • Asthma    • Chronic back pain    • Constipation 06/2021   • Coronary artery disease 2014   • DDD (degenerative disc disease), lumbar    • Depression    • Emphysema of lung    • Fibromyalgia, primary 2016   • GERD (gastroesophageal reflux disease)    • Headache    • Heart murmur 2014   • Hypertension 2014   • Leg pain    • Lumbar spine pain    • Myocardial infarction 2014   • Nocturia    • Scoliosis 1995   • Under care of pain management specialist     pain management of [place]       Social History:  Social History     Socioeconomic History   • Marital status:    Tobacco Use   • Smoking status: Every Day     Packs/day: 1.50     Years: 41.00     Pack years: 61.50     Types: Cigarettes, Cigars     Start date: 8/5/1985   • Smokeless tobacco: Never   Vaping Use   • Vaping Use: Never used   • Passive vaping exposure: Yes   Substance and Sexual Activity   • Alcohol use: No   • Drug use: No   • Sexual activity: Yes     Partners: Female       Family History:  Family History   Problem Relation Age of Onset   • Diabetes Mother    • Heart disease Father    • Arthritis Father    • Cancer Father         Prostate   • COPD " Father    • Anxiety disorder Daughter    • Depression Daughter        Past Surgical History:  Past Surgical History:   Procedure Laterality Date   • CARDIAC CATHETERIZATION N/A 1/20/2021    Procedure: Left Heart Cath and coronary angiogram;  Surgeon: Jaimie Silva MD;  Location: [facility] CATH INVASIVE LOCATION;  Service: Cardiovascular;  Laterality: N/A;   • CARDIAC CATHETERIZATION N/A 1/20/2021    Procedure: Saphenous Vein Graft;  Surgeon: Jaimie Silva MD;  Location: [facility] CATH INVASIVE LOCATION;  Service: Cardiovascular;  Laterality: N/A;   • CARDIAC CATHETERIZATION N/A 1/20/2021    Procedure: Stent EBENEZER coronary;  Surgeon: Herve Rodriguez MD;  Location: [facility] CATH INVASIVE LOCATION;  Service: Cardiology;  Laterality: N/A;   • CARDIAC SURGERY  12/15   • CORONARY ANGIOPLASTY WITH STENT PLACEMENT  04/2014    2 in 2014, 1 in 12/2020   • CORONARY ARTERY BYPASS GRAFT  12/2015    x 3   • ELBOW DEBRIDEMENT Right 8/17/2021    Procedure: INCISION / DEBRIDEMENT ELBOW, closed reduction right elbow dislocation ;  Surgeon: Kyler August MD;  Location: [facility] MAIN OR;  Service: Orthopedics;  Laterality: Right;   • ELBOW OPEN REDUCTION INTERNAL FIXATION Left 8/18/2021    Procedure: Left  ulna open reduction internal fixation (of olecranon and coronoid), left radial head replacement  ;  Surgeon: Yonis Mcdonough MD;  Location: [facility] MAIN OR;  Service: Orthopedics;  Laterality: Left;       Problem List:  Patient Active Problem List   Diagnosis   • Atherosclerosis of coronary artery bypass graft   • Essential hypertension   • Chronic pain disorder   • Coronary artery disease   • Degeneration of intervertebral disc of lumbar region   • Fibromyositis   • Mixed hyperlipidemia   • Neuropathic pain   • Posttraumatic stress disorder   • Psoriasis   • Psoriatic arthritis   • Environmental and seasonal allergies   • Personal history of tobacco use, presenting hazards to health   • Status post coronary artery bypass  graft   • Gastroesophageal reflux disease without esophagitis   • Tinnitus of left ear   • Intractable migraine without aura and with status migrainosus   • Cerumen debris on tympanic membrane of both ears   • Bilateral deafness   • Ischemic heart disease due to coronary artery obstruction (HCC)   • Status post angioplasty with stent   • Dyslipidemia   • Abnormal nuclear stress test   • Chest pain with high risk of acute coronary syndrome   • Iron deficiency anemia   • Arthropathy of lumbar facet joint   • Arthropathy of right knee   • Syncope and collapse   • History of scoliosis   • COPD (chronic obstructive pulmonary disease)   • Dislocation of elbow, right, open, initial encounter   • Fracture of left elbow   • Major depressive disorder, recurrent episode, moderate (HCC)   • Psychophysiological insomnia   • Posterior dislocation of right radial head   • Displaced fracture of olecranon process with intraarticular extension of left ulna, subsequent encounter for closed fracture with routine healing   • Displaced fracture of head of left radius, subsequent encounter for closed fracture with routine healing   • Viral gastroenteritis   • Post traumatic stress disorder (PTSD)   • Insomnia due to mental condition   • Bipolar I disorder, most recent episode depressed       Allergy:   Allergies   Allergen Reactions   • Hydrocodone Nausea And Vomiting   • Hydrocodone-Acetaminophen GI Intolerance   • Oxycodone GI Intolerance        Discontinued Medications:  Medications Discontinued During This Encounter   Medication Reason   • ARIPiprazole (ABILIFY) 10 MG tablet Reorder   • QUEtiapine (SEROquel) 25 MG tablet Reorder   • aspirin (Aspirin Low Dose) 81 MG EC tablet    • gabapentin (NEURONTIN) 300 MG capsule    • Humira Pen-Ps/UV/Adol HS Start 40 MG/0.8ML Pen-injector Kit    • nitroglycerin (NITROSTAT) 0.4 MG SL tablet        Current Medications:   Current Outpatient Medications   Medication Sig Dispense Refill   •  ARIPiprazole (ABILIFY) 5 MG tablet Take 1 tablet by mouth Daily With Breakfast. 30 tablet 2   • atorvastatin (LIPITOR) 20 MG tablet Take 1 tablet by mouth Every Night. 90 tablet 1   • buPROPion XL (WELLBUTRIN XL) 300 MG 24 hr tablet Take 1 tablet by mouth Daily. 90 tablet 1   • celecoxib (CeleBREX) 100 MG capsule TAKE 1 CAPSULE BY MOUTH TWICE DAILY WITH FOOD AS NEEDED FOR MODERATE PAIN 180 capsule 0   • clopidogrel (PLAVIX) 75 MG tablet Take 1 tablet by mouth Daily. 90 tablet 1   • cyclobenzaprine (FLEXERIL) 10 MG tablet Take 1 tablet by mouth 3 (Three) Times a Day As Needed for Muscle Spasms. 90 tablet 11   • Diclofenac Sodium (VOLTAREN) 1 % gel gel      • dicyclomine (BENTYL) 10 MG capsule TAKE 1 CAPSULE BY MOUTH FOUR TIMES DAILY BEFORE MEALS AND AT BEDTIME 56 capsule 0   • ferrous sulfate 325 (65 FE) MG tablet Take 1 tablet by mouth Every Other Day. 45 tablet 1   • loratadine (CLARITIN) 10 MG tablet Take 1 tablet by mouth Daily.     • meclizine (ANTIVERT) 25 MG tablet Take 1 tablet by mouth 3 (Three) Times a Day As Needed for Dizziness. 30 tablet 0   • metoprolol tartrate (LOPRESSOR) 25 MG tablet Take 1 tablet by mouth 2 (Two) Times a Day. 180 tablet 1   • naproxen (NAPROSYN) 500 MG tablet naproxen 500 mg tablet   TAKE 1 TABLET BY MOUTH TWICE DAILY AS NEEDED FOR MODERATE PAIN. TAKE WITH FOOD     • NON FORMULARY Allergy injections     • ondansetron (ZOFRAN) 4 MG tablet Take 1 tablet by mouth Every 8 (Eight) Hours As Needed for Nausea or Vomiting. 42 tablet 0   • pantoprazole (Protonix) 40 MG EC tablet Take 1 tablet by mouth Daily. 90 tablet 1   • pregabalin (LYRICA) 300 MG capsule Take 1 capsule by mouth 2 (Two) Times a Day.     • QUEtiapine (SEROquel) 50 MG tablet Take 1 tablet by mouth Every Night. 30 tablet 3   • SUMAtriptan (IMITREX) 50 MG tablet TAKE 1 TABLET BY MOUTH AT ONSET OF HEADACHE. MAY REPEAT DOSE 1 TIME IN 2 HOURS IF HEADACHE NOT RELIEVED 10 tablet 0   • traZODone (DESYREL) 100 MG tablet trazodone  100 mg tablet   TAKE 1 TABLET BY MOUTH EVERY NIGHT     • Trintellix 20 MG tablet TAKE 1 TABLET BY MOUTH DAILY 90 tablet 1   • escitalopram (Lexapro) 10 MG tablet Take 1 tablet by mouth Daily. 30 tablet 2   • Vortioxetine HBr (Trintellix) 10 MG tablet tablet Take 2 tablets by mouth Daily With Breakfast for 10 days, THEN 1 tablet Daily With Breakfast for 10 days. Then STOP medication. 30 tablet 0     No current facility-administered medications for this visit.         Psychological ROS: positive for - anxiety, depression and sleep disturbances  negative for - behavioral disorder, concentration difficulties, decreased libido, disorientation, hallucinations, hostility, irritability, memory difficulties, mood swings, obsessive thoughts, physical abuse, sexual abuse or suicidal ideation      Physical Exam:   There were no vitals taken for this visit.    Mental Status Exam:   Hygiene:   good  Cooperation:  Cooperative  Eye Contact:  Good  Psychomotor Behavior:  Appropriate  Affect:  Appropriate  Mood: normal  Hopelessness: Denies  Speech:  Normal  Thought Process:  Linear  Thought Content:  Normal  Suicidal:  None  Homicidal:  None  Hallucinations:  None  Delusion:  None  Memory:  Intact  Orientation:  Person, Place, Time and Situation  Reliability:  good  Insight:  Good  Judgement:  Good  Impulse Control:  Good  Physical/Medical Issues:  No      Mental status exam was reviewed and compared to visit on 2/22/23 and appropriate updates were made.     PHQ-9 Depression Screening    Little interest or pleasure in doing things? 2-->more than half the days   Feeling down, depressed, or hopeless? 0-->not at all   Trouble falling or staying asleep, or sleeping too much? 2-->more than half the days   Feeling tired or having little energy? 2-->more than half the days   Poor appetite or overeating? 0-->not at all   Feeling bad about yourself - or that you are a failure or have let yourself or your family down? 1-->several days    Trouble concentrating on things, such as reading the newspaper or watching television? 0-->not at all   Moving or speaking so slowly that other people could have noticed? Or the opposite - being so fidgety or restless that you have been moving around a lot more than usual? 2-->more than half the days   Thoughts that you would be better off dead, or of hurting yourself in some way? 0-->not at all   PHQ-9 Total Score 9   If you checked off any problems, how difficult have these problems made it for you to do your work, take care of things at home, or get along with other people? somewhat difficult        Current every day smoker less than 3 minutes spent counseling Not agreeable to stopping    I advised rFank of the risks of tobacco use.     Result Review:    Labs:  Lab on 03/15/2023   Component Date Value Ref Range Status   • C-Reactive Protein 03/15/2023 0.42  0.00 - 0.50 mg/dL Final   • Sed Rate 03/15/2023 23 (H)  0 - 20 mm/hr Final   • WBC 03/15/2023 6.15  3.40 - 10.80 10*3/mm3 Final   • RBC 03/15/2023 4.69  4.14 - 5.80 10*6/mm3 Final   • Hemoglobin 03/15/2023 13.7  13.0 - 17.7 g/dL Final   • Hematocrit 03/15/2023 41.9  37.5 - 51.0 % Final   • MCV 03/15/2023 89.3  79.0 - 97.0 fL Final   • MCH 03/15/2023 29.2  26.6 - 33.0 pg Final   • MCHC 03/15/2023 32.7  31.5 - 35.7 g/dL Final   • RDW 03/15/2023 14.9  12.3 - 15.4 % Final   • RDW-SD 03/15/2023 48.3  37.0 - 54.0 fl Final   • MPV 03/15/2023 10.6  6.0 - 12.0 fL Final   • Platelets 03/15/2023 254  140 - 450 10*3/mm3 Final   • Neutrophil % 03/15/2023 60.5  42.7 - 76.0 % Final   • Lymphocyte % 03/15/2023 26.3  19.6 - 45.3 % Final   • Monocyte % 03/15/2023 9.1  5.0 - 12.0 % Final   • Eosinophil % 03/15/2023 2.9  0.3 - 6.2 % Final   • Basophil % 03/15/2023 1.0  0.0 - 1.5 % Final   • Immature Grans % 03/15/2023 0.2  0.0 - 0.5 % Final   • Neutrophils, Absolute 03/15/2023 3.72  1.70 - 7.00 10*3/mm3 Final   • Lymphocytes, Absolute 03/15/2023 1.62  0.70 - 3.10 10*3/mm3  Final   • Monocytes, Absolute 03/15/2023 0.56  0.10 - 0.90 10*3/mm3 Final   • Eosinophils, Absolute 03/15/2023 0.18  0.00 - 0.40 10*3/mm3 Final   • Basophils, Absolute 03/15/2023 0.06  0.00 - 0.20 10*3/mm3 Final   • Immature Grans, Absolute 03/15/2023 0.01  0.00 - 0.05 10*3/mm3 Final   • nRBC 03/15/2023 0.0  0.0 - 0.2 /100 WBC Final       Assessment & Plan   Diagnoses and all orders for this visit:    1. Bipolar I disorder, most recent episode depressed    2. Post traumatic stress disorder (PTSD)    3. Insomnia due to mental condition    Decrease abilify to 5mg. May stop at next visit.     Continue Wellbutrin XL 300mg daily.   Continue quetiapine , increase to 50mg nightly.     Switch Trintellix to Lexapro:  Week 1: Take Trintellix 10mg, 2 tablets daily for 10 days. Start lexapro 10mg.   Week 2: Take Trintellix 10mg, 1 tablet daily for 10 days. Continue lexapro 10mg.   Week 3: Stop Trintellix. Continue lexapro 10mg.       Visit Diagnoses:    ICD-10-CM ICD-9-CM   1. Major depressive disorder, recurrent episode, moderate  F33.1 296.32   2. Insomnia due to mental condition  F51.05 300.9     327.02   3. Post traumatic stress disorder (PTSD)  F43.10 309.81   4. Generalized anxiety disorder  F41.1 300.02       TREATMENT PLAN/GOALS: Continue supportive psychotherapy efforts and medications as indicated. Treatment and medication options discussed during today's visit. Patient ackowledged and verbally consented to continue with current treatment plan and was educated on the importance of compliance with treatment and follow-up appointments.    MEDICATION ISSUES:  INSPECT reviewed as expected    Discussed medication options and treatment plan of prescribed medication as well as the risks, benefits, and side effects including potential falls, possible impaired driving and metabolic adversities among others. Patient is agreeable to call the office with any worsening of symptoms or onset of side effects. Patient is agreeable to  call 911 or go to the nearest ER should he/she begin having SI/HI. No medication side effects or related complaints today.     MEDS ORDERED DURING VISIT:  New Medications Ordered This Visit   Medications   • QUEtiapine (SEROquel) 50 MG tablet     Sig: Take 1 tablet by mouth Every Night.     Dispense:  30 tablet     Refill:  3   • Vortioxetine HBr (Trintellix) 10 MG tablet tablet     Sig: Take 2 tablets by mouth Daily With Breakfast for 10 days, THEN 1 tablet Daily With Breakfast for 10 days. Then STOP medication.     Dispense:  30 tablet     Refill:  0   • ARIPiprazole (ABILIFY) 5 MG tablet     Sig: Take 1 tablet by mouth Daily With Breakfast.     Dispense:  30 tablet     Refill:  2   • escitalopram (Lexapro) 10 MG tablet     Sig: Take 1 tablet by mouth Daily.     Dispense:  30 tablet     Refill:  2       Return in about 3 months (around 8/9/2023).         This document has been electronically signed by CATERINA Cooney  May 9, 2023 18:02 EDT    Part of this note may be an electronic transcription/translation of spoken language to printed text using the Dragon Dictation System.

## 2023-05-08 RX ORDER — DICYCLOMINE HYDROCHLORIDE 10 MG/1
CAPSULE ORAL
Qty: 56 CAPSULE | Refills: 0 | Status: SHIPPED | OUTPATIENT
Start: 2023-05-08

## 2023-05-09 ENCOUNTER — OFFICE VISIT (OUTPATIENT)
Dept: PSYCHIATRY | Facility: CLINIC | Age: 53
End: 2023-05-09
Payer: MEDICARE

## 2023-05-09 DIAGNOSIS — F51.05 INSOMNIA DUE TO MENTAL CONDITION: Chronic | ICD-10-CM

## 2023-05-09 DIAGNOSIS — F33.1 MAJOR DEPRESSIVE DISORDER, RECURRENT EPISODE, MODERATE: Primary | Chronic | ICD-10-CM

## 2023-05-09 DIAGNOSIS — F41.1 GENERALIZED ANXIETY DISORDER: Chronic | ICD-10-CM

## 2023-05-09 DIAGNOSIS — F43.10 POST TRAUMATIC STRESS DISORDER (PTSD): Chronic | ICD-10-CM

## 2023-05-09 RX ORDER — ESCITALOPRAM OXALATE 10 MG/1
10 TABLET ORAL DAILY
Qty: 30 TABLET | Refills: 2 | Status: SHIPPED | OUTPATIENT
Start: 2023-05-09 | End: 2024-05-08

## 2023-05-09 RX ORDER — QUETIAPINE FUMARATE 50 MG/1
50 TABLET, FILM COATED ORAL NIGHTLY
Qty: 30 TABLET | Refills: 3 | Status: SHIPPED | OUTPATIENT
Start: 2023-05-09

## 2023-05-09 RX ORDER — ARIPIPRAZOLE 5 MG/1
5 TABLET ORAL
Qty: 30 TABLET | Refills: 2 | Status: SHIPPED | OUTPATIENT
Start: 2023-05-09

## 2023-05-09 NOTE — PATIENT INSTRUCTIONS
Week 1: Take Trintellix 10mg, 2 tablets daily for 10 days. Start lexapro 10mg.   Week 2: Take Trintellix 10mg, 1 tablet daily for 10 days. Continue lexapro 10mg.   Week 3: Stop Trintellix. Continue lexapro 10mg.     Decrease Abilify to 5mg daily.     Increase quetiapine to 50mg nightly.

## 2023-06-01 RX ORDER — ONDANSETRON 4 MG/1
TABLET, FILM COATED ORAL
Qty: 42 TABLET | Refills: 0 | Status: SHIPPED | OUTPATIENT
Start: 2023-06-01

## 2023-06-14 DIAGNOSIS — M47.816 ARTHROPATHY OF LUMBAR FACET JOINT: ICD-10-CM

## 2023-06-14 DIAGNOSIS — I25.9 ISCHEMIC HEART DISEASE DUE TO CORONARY ARTERY OBSTRUCTION: ICD-10-CM

## 2023-06-14 DIAGNOSIS — I24.0 ISCHEMIC HEART DISEASE DUE TO CORONARY ARTERY OBSTRUCTION: ICD-10-CM

## 2023-06-14 DIAGNOSIS — F51.05 INSOMNIA DUE TO MENTAL CONDITION: Chronic | ICD-10-CM

## 2023-06-14 DIAGNOSIS — E78.2 MIXED HYPERLIPIDEMIA: ICD-10-CM

## 2023-06-14 DIAGNOSIS — Z87.891 PERSONAL HISTORY OF TOBACCO USE, PRESENTING HAZARDS TO HEALTH: ICD-10-CM

## 2023-06-14 RX ORDER — QUETIAPINE FUMARATE 25 MG/1
25 TABLET, FILM COATED ORAL NIGHTLY
Qty: 30 TABLET | Refills: 3 | OUTPATIENT
Start: 2023-06-14

## 2023-06-14 RX ORDER — CELECOXIB 100 MG/1
CAPSULE ORAL
Qty: 180 CAPSULE | Refills: 0 | Status: SHIPPED | OUTPATIENT
Start: 2023-06-14

## 2023-06-14 RX ORDER — ATORVASTATIN CALCIUM 20 MG/1
20 TABLET, FILM COATED ORAL NIGHTLY
Qty: 90 TABLET | Refills: 1 | Status: SHIPPED | OUTPATIENT
Start: 2023-06-14

## 2023-06-14 RX ORDER — PANTOPRAZOLE SODIUM 40 MG/1
40 TABLET, DELAYED RELEASE ORAL DAILY
Qty: 90 TABLET | Refills: 1 | Status: SHIPPED | OUTPATIENT
Start: 2023-06-14

## 2023-06-14 RX ORDER — BUPROPION HYDROCHLORIDE 300 MG/1
300 TABLET ORAL DAILY
Qty: 90 TABLET | Refills: 1 | Status: SHIPPED | OUTPATIENT
Start: 2023-06-14

## 2023-07-24 RX ORDER — DICYCLOMINE HYDROCHLORIDE 10 MG/1
CAPSULE ORAL
Qty: 56 CAPSULE | Refills: 0 | Status: SHIPPED | OUTPATIENT
Start: 2023-07-24

## 2023-08-07 NOTE — PROGRESS NOTES
"Subjective   Frank Barger is a 53 y.o. male who presents today for follow-up for psychiatric medication management.     Chief Complaint:  Follow up for depression and PTSD. Patient also here to establish care with this provider.     History of Present Illness:     Medication adjustments last visit:  Continue Wellbutrin XL 300mg daily.   Continue quetiapine , increase to 50mg nightly.   Switch Trintellix to Lexapro.    At today's visit, he feels like depression has been about the same.    He is sleeping at night, but sleeps too long into the next day. We discussed decreasing the seroquel to 25mg at night.   He feels like he is exhausted all the time. Will decrease the quetiapine from 50mg to 25mg./   We will increase the lexapro to 20mg daily.   No suicidal thoughts. NoHI/AVH.    Patient presents with symptoms and behaviors that are consistent with the following DSM-5 diagnoses:  1. Major depressive disorder  2. Generalized anxiety disorder  2. PTSD  3. Insomnia    The following portions of the patient's history were reviewed and updated as appropriate: allergies, current medications, past family history, past medical history, past social history, past surgical history and problem list.    PAST OUTPATIENT TREATMENT  Diagnosis treated:  Affective Disorder, Anxiety/Panic Disorder  Treatment Type:  Psychotherapy, Medication Management  Prior Psychiatric Medications:  Zoloft  Trazodone  Brintellix  Trintellix   Prazosin  Elavil  Abilify  Support Groups:  None  Sequelae Of Mental Disorder:  emotional distress    Interval History  No Change    Side Effects  None      Past Medical History:  Past Medical History:   Diagnosis Date    Abdominal pain     Allergic     Allergies     takes allergy injections    Anesthesia complication     states heart \"fluttering\" after anesthesia x 1- was kept in hospital 3 days s/p heart stent placement    Anxiety     Arthritis     Asthma     Chronic back pain     Constipation 06/2021    " Coronary artery disease 2014    DDD (degenerative disc disease), lumbar     Depression     Emphysema of lung     Fibromyalgia, primary 2016    GERD (gastroesophageal reflux disease)     Headache     Heart murmur 2014    Hypertension 2014    Leg pain     Lumbar spine pain     Myocardial infarction 2014    Nocturia     Scoliosis 1995    Under care of pain management specialist     pain management of [place]       Social History:  Social History     Socioeconomic History    Marital status:    Tobacco Use    Smoking status: Every Day     Packs/day: 1.50     Years: 41.00     Pack years: 61.50     Types: Cigarettes, Cigars     Start date: 8/5/1985    Smokeless tobacco: Never   Vaping Use    Vaping Use: Never used    Passive vaping exposure: Yes   Substance and Sexual Activity    Alcohol use: No    Drug use: No    Sexual activity: Yes     Partners: Female       Family History:  Family History   Problem Relation Age of Onset    Diabetes Mother     Heart disease Father     Arthritis Father     Cancer Father         Prostate    COPD Father     Anxiety disorder Daughter     Depression Daughter        Past Surgical History:  Past Surgical History:   Procedure Laterality Date    CARDIAC CATHETERIZATION N/A 1/20/2021    Procedure: Left Heart Cath and coronary angiogram;  Surgeon: Jaimie Silva MD;  Location: [facility] CATH INVASIVE LOCATION;  Service: Cardiovascular;  Laterality: N/A;    CARDIAC CATHETERIZATION N/A 1/20/2021    Procedure: Saphenous Vein Graft;  Surgeon: Jaimie Silva MD;  Location: [facility] CATH INVASIVE LOCATION;  Service: Cardiovascular;  Laterality: N/A;    CARDIAC CATHETERIZATION N/A 1/20/2021    Procedure: Stent EBENEZER coronary;  Surgeon: Herve Rodriguez MD;  Location: [facility] CATH INVASIVE LOCATION;  Service: Cardiology;  Laterality: N/A;    CARDIAC SURGERY  12/15    CORONARY ANGIOPLASTY WITH STENT PLACEMENT  04/2014    2 in 2014, 1 in 12/2020    CORONARY ARTERY BYPASS GRAFT  12/2015    x 3     ELBOW DEBRIDEMENT Right 8/17/2021    Procedure: INCISION / DEBRIDEMENT ELBOW, closed reduction right elbow dislocation ;  Surgeon: Kyler August MD;  Location: [facility] MAIN OR;  Service: Orthopedics;  Laterality: Right;    ELBOW OPEN REDUCTION INTERNAL FIXATION Left 8/18/2021    Procedure: Left  ulna open reduction internal fixation (of olecranon and coronoid), left radial head replacement  ;  Surgeon: Yonis Mcdonough MD;  Location: [facility] MAIN OR;  Service: Orthopedics;  Laterality: Left;       Problem List:  Patient Active Problem List   Diagnosis    Atherosclerosis of coronary artery bypass graft    Essential hypertension    Chronic pain disorder    Coronary artery disease    Degeneration of intervertebral disc of lumbar region    Fibromyositis    Mixed hyperlipidemia    Neuropathic pain    Posttraumatic stress disorder    Psoriasis    Psoriatic arthritis    Environmental and seasonal allergies    Personal history of tobacco use, presenting hazards to health    Status post coronary artery bypass graft    Gastroesophageal reflux disease without esophagitis    Tinnitus of left ear    Intractable migraine without aura and with status migrainosus    Cerumen debris on tympanic membrane of both ears    Bilateral deafness    Ischemic heart disease due to coronary artery obstruction    Status post angioplasty with stent    Dyslipidemia    Abnormal nuclear stress test    Chest pain with high risk of acute coronary syndrome    Iron deficiency anemia    Arthropathy of lumbar facet joint    Arthropathy of right knee    Syncope and collapse    History of scoliosis    COPD (chronic obstructive pulmonary disease)    Dislocation of elbow, right, open, initial encounter    Fracture of left elbow    Major depressive disorder, recurrent episode, moderate    Psychophysiological insomnia    Posterior dislocation of right radial head    Displaced fracture of olecranon process with intraarticular extension of left ulna, subsequent  encounter for closed fracture with routine healing    Displaced fracture of head of left radius, subsequent encounter for closed fracture with routine healing    Viral gastroenteritis    Post traumatic stress disorder (PTSD)    Insomnia due to mental condition    Bipolar I disorder, most recent episode depressed       Allergy:   Allergies   Allergen Reactions    Hydrocodone Nausea And Vomiting    Hydrocodone-Acetaminophen GI Intolerance    Oxycodone GI Intolerance        Discontinued Medications:  Medications Discontinued During This Encounter   Medication Reason    ARIPiprazole (ABILIFY) 5 MG tablet *Therapy completed    ARIPiprazole (ABILIFY) 10 MG tablet     QUEtiapine (SEROquel) 50 MG tablet     traZODone (DESYREL) 100 MG tablet *Therapy completed    Trintellix 20 MG tablet     Vortioxetine HBr (Trintellix) 10 MG tablet tablet     buPROPion XL (WELLBUTRIN XL) 300 MG 24 hr tablet Reorder         Current Medications:   Current Outpatient Medications   Medication Sig Dispense Refill    atorvastatin (LIPITOR) 20 MG tablet TAKE 1 TABLET BY MOUTH EVERY NIGHT 90 tablet 1    buPROPion XL (WELLBUTRIN XL) 300 MG 24 hr tablet Take 1 tablet by mouth Every Morning. 90 tablet 1    celecoxib (CeleBREX) 100 MG capsule TAKE 1 CAPSULE BY MOUTH TWICE DAILY WITH FOOD AS NEEDED FOR MODERATE PAIN 180 capsule 0    clopidogrel (PLAVIX) 75 MG tablet Take 1 tablet by mouth Daily. 90 tablet 1    cyclobenzaprine (FLEXERIL) 10 MG tablet Take 1 tablet by mouth 3 (Three) Times a Day As Needed for Muscle Spasms. 90 tablet 11    Diclofenac Sodium (VOLTAREN) 1 % gel gel       dicyclomine (BENTYL) 10 MG capsule TAKE 1 CAPSULE BY MOUTH FOUR TIMES DAILY BEFORE MEALS AND AT BEDTIME 56 capsule 0    ferrous sulfate 325 (65 FE) MG tablet Take 1 tablet by mouth Every Other Day. 45 tablet 1    loratadine (CLARITIN) 10 MG tablet Take 1 tablet by mouth Daily.      meclizine (ANTIVERT) 25 MG tablet Take 1 tablet by mouth 3 (Three) Times a Day As Needed for  Dizziness. 30 tablet 0    metoprolol tartrate (LOPRESSOR) 25 MG tablet TAKE 1 TABLET BY MOUTH TWICE DAILY 180 tablet 1    NON FORMULARY Allergy injections      ondansetron (ZOFRAN) 4 MG tablet TAKE 1 TABLET BY MOUTH EVERY 8 HOURS AS NEEDED FOR NAUSEA OR VOMITING 42 tablet 0    pantoprazole (PROTONIX) 40 MG EC tablet TAKE 1 TABLET BY MOUTH DAILY 90 tablet 1    pregabalin (LYRICA) 300 MG capsule Take 1 capsule by mouth 2 (Two) Times a Day.      SUMAtriptan (IMITREX) 50 MG tablet TAKE 1 TABLET BY MOUTH AT ONSET OF HEADACHE. MAY REPEAT DOSE 1 TIME IN 2 HOURS IF HEADACHE NOT RELIEVED 10 tablet 0    escitalopram (Lexapro) 20 MG tablet Take 1 tablet by mouth Every Night. 30 tablet 2    QUEtiapine (SEROquel) 25 MG tablet Take 1 tablet by mouth Every Night. 30 tablet 2     No current facility-administered medications for this visit.         Psychological ROS: positive for - anxiety, depression and sleep disturbances  negative for - behavioral disorder, concentration difficulties, decreased libido, disorientation, hallucinations, hostility, irritability, memory difficulties, mood swings, obsessive thoughts, physical abuse, sexual abuse or suicidal ideation      Physical Exam:   Blood pressure 125/81, pulse 100.    Mental Status Exam:   Hygiene:   good  Cooperation:  Cooperative  Eye Contact:  Good  Psychomotor Behavior:  Appropriate  Affect:  Appropriate  Mood: normal  Hopelessness: Denies  Speech:  Normal  Thought Process:  Linear  Thought Content:  Normal  Suicidal:  None  Homicidal:  None  Hallucinations:  None  Delusion:  None  Memory:  Intact  Orientation:  Person, Place, Time and Situation  Reliability:  good  Insight:  Good  Judgement:  Good  Impulse Control:  Good  Physical/Medical Issues:  No      Mental status exam was reviewed and compared to visit on 5/9/23 and appropriate updates were made.     PHQ-9 Depression Screening    Little interest or pleasure in doing things? 1-->several days   Feeling down, depressed,  or hopeless? 2-->more than half the days   Trouble falling or staying asleep, or sleeping too much? 2-->more than half the days   Feeling tired or having little energy? 3-->nearly every day   Poor appetite or overeating? 0-->not at all   Feeling bad about yourself - or that you are a failure or have let yourself or your family down? 1-->several days   Trouble concentrating on things, such as reading the newspaper or watching television? 3-->nearly every day   Moving or speaking so slowly that other people could have noticed? Or the opposite - being so fidgety or restless that you have been moving around a lot more than usual? 0-->not at all   Thoughts that you would be better off dead, or of hurting yourself in some way? 0-->not at all   PHQ-9 Total Score 12   If you checked off any problems, how difficult have these problems made it for you to do your work, take care of things at home, or get along with other people? somewhat difficult        Current every day smoker less than 3 minutes spent counseling Not agreeable to stopping    I advised Frank of the risks of tobacco use.     Result Review:    Labs:  No visits with results within 3 Month(s) from this visit.   Latest known visit with results is:   Lab on 03/15/2023   Component Date Value Ref Range Status    C-Reactive Protein 03/15/2023 0.42  0.00 - 0.50 mg/dL Final    Sed Rate 03/15/2023 23 (H)  0 - 20 mm/hr Final    WBC 03/15/2023 6.15  3.40 - 10.80 10*3/mm3 Final    RBC 03/15/2023 4.69  4.14 - 5.80 10*6/mm3 Final    Hemoglobin 03/15/2023 13.7  13.0 - 17.7 g/dL Final    Hematocrit 03/15/2023 41.9  37.5 - 51.0 % Final    MCV 03/15/2023 89.3  79.0 - 97.0 fL Final    MCH 03/15/2023 29.2  26.6 - 33.0 pg Final    MCHC 03/15/2023 32.7  31.5 - 35.7 g/dL Final    RDW 03/15/2023 14.9  12.3 - 15.4 % Final    RDW-SD 03/15/2023 48.3  37.0 - 54.0 fl Final    MPV 03/15/2023 10.6  6.0 - 12.0 fL Final    Platelets 03/15/2023 254  140 - 450 10*3/mm3 Final    Neutrophil %  03/15/2023 60.5  42.7 - 76.0 % Final    Lymphocyte % 03/15/2023 26.3  19.6 - 45.3 % Final    Monocyte % 03/15/2023 9.1  5.0 - 12.0 % Final    Eosinophil % 03/15/2023 2.9  0.3 - 6.2 % Final    Basophil % 03/15/2023 1.0  0.0 - 1.5 % Final    Immature Grans % 03/15/2023 0.2  0.0 - 0.5 % Final    Neutrophils, Absolute 03/15/2023 3.72  1.70 - 7.00 10*3/mm3 Final    Lymphocytes, Absolute 03/15/2023 1.62  0.70 - 3.10 10*3/mm3 Final    Monocytes, Absolute 03/15/2023 0.56  0.10 - 0.90 10*3/mm3 Final    Eosinophils, Absolute 03/15/2023 0.18  0.00 - 0.40 10*3/mm3 Final    Basophils, Absolute 03/15/2023 0.06  0.00 - 0.20 10*3/mm3 Final    Immature Grans, Absolute 03/15/2023 0.01  0.00 - 0.05 10*3/mm3 Final    nRBC 03/15/2023 0.0  0.0 - 0.2 /100 WBC Final       Assessment & Plan     Diagnoses and all orders for this visit:    1. Major depressive disorder, recurrent episode, moderate (Primary)  -     escitalopram (Lexapro) 20 MG tablet; Take 1 tablet by mouth Every Night.  Dispense: 30 tablet; Refill: 2  -     buPROPion XL (WELLBUTRIN XL) 300 MG 24 hr tablet; Take 1 tablet by mouth Every Morning.  Dispense: 90 tablet; Refill: 1    2. Insomnia due to mental condition  -     QUEtiapine (SEROquel) 25 MG tablet; Take 1 tablet by mouth Every Night.  Dispense: 30 tablet; Refill: 2    3. Generalized anxiety disorder      Continue Wellbutrin XL 300mg daily.   Continue quetiapine, decrease to 25mg nightly.   Continue Lexapro but increase to 20mg.       Visit Diagnoses:    ICD-10-CM ICD-9-CM   1. Major depressive disorder, recurrent episode, moderate  F33.1 296.32   2. Insomnia due to mental condition  F51.05 300.9     327.02   3. Generalized anxiety disorder  F41.1 300.02         TREATMENT PLAN/GOALS: Continue supportive psychotherapy efforts and medications as indicated. Treatment and medication options discussed during today's visit. Patient ackowledged and verbally consented to continue with current treatment plan and was educated on  the importance of compliance with treatment and follow-up appointments.    MEDICATION ISSUES:  INSPECT reviewed as expected    Discussed medication options and treatment plan of prescribed medication as well as the risks, benefits, and side effects including potential falls, possible impaired driving and metabolic adversities among others. Patient is agreeable to call the office with any worsening of symptoms or onset of side effects. Patient is agreeable to call 911 or go to the nearest ER should he/she begin having SI/HI. No medication side effects or related complaints today.     MEDS ORDERED DURING VISIT:  New Medications Ordered This Visit   Medications    escitalopram (Lexapro) 20 MG tablet     Sig: Take 1 tablet by mouth Every Night.     Dispense:  30 tablet     Refill:  2    buPROPion XL (WELLBUTRIN XL) 300 MG 24 hr tablet     Sig: Take 1 tablet by mouth Every Morning.     Dispense:  90 tablet     Refill:  1    QUEtiapine (SEROquel) 25 MG tablet     Sig: Take 1 tablet by mouth Every Night.     Dispense:  30 tablet     Refill:  2       Return in about 3 months (around 11/9/2023).         This document has been electronically signed by CATERINA Cooney  August 9, 2023 15:16 EDT    Part of this note may be an electronic transcription/translation of spoken language to printed text using the Dragon Dictation System.

## 2023-08-09 ENCOUNTER — OFFICE VISIT (OUTPATIENT)
Dept: PSYCHIATRY | Facility: CLINIC | Age: 53
End: 2023-08-09
Payer: MEDICARE

## 2023-08-09 VITALS — SYSTOLIC BLOOD PRESSURE: 125 MMHG | DIASTOLIC BLOOD PRESSURE: 81 MMHG | HEART RATE: 100 BPM

## 2023-08-09 DIAGNOSIS — F33.1 MAJOR DEPRESSIVE DISORDER, RECURRENT EPISODE, MODERATE: Primary | Chronic | ICD-10-CM

## 2023-08-09 DIAGNOSIS — F41.1 GENERALIZED ANXIETY DISORDER: Chronic | ICD-10-CM

## 2023-08-09 DIAGNOSIS — F51.05 INSOMNIA DUE TO MENTAL CONDITION: Chronic | ICD-10-CM

## 2023-08-09 RX ORDER — ARIPIPRAZOLE 10 MG/1
10 TABLET ORAL
COMMUNITY
Start: 2023-05-15 | End: 2023-08-09

## 2023-08-09 RX ORDER — QUETIAPINE FUMARATE 25 MG/1
25 TABLET, FILM COATED ORAL NIGHTLY
Qty: 30 TABLET | Refills: 2 | Status: SHIPPED | OUTPATIENT
Start: 2023-08-09

## 2023-08-09 RX ORDER — ESCITALOPRAM OXALATE 20 MG/1
20 TABLET ORAL NIGHTLY
Qty: 30 TABLET | Refills: 2 | Status: SHIPPED | OUTPATIENT
Start: 2023-08-09 | End: 2024-08-08

## 2023-08-09 RX ORDER — BUPROPION HYDROCHLORIDE 300 MG/1
300 TABLET ORAL EVERY MORNING
Qty: 90 TABLET | Refills: 1 | Status: SHIPPED | OUTPATIENT
Start: 2023-08-09

## 2023-08-12 DIAGNOSIS — F31.30 BIPOLAR I DISORDER, MOST RECENT EPISODE DEPRESSED: Chronic | ICD-10-CM

## 2023-08-22 RX ORDER — ARIPIPRAZOLE 10 MG/1
10 TABLET ORAL
Qty: 90 TABLET | Refills: 1 | OUTPATIENT
Start: 2023-08-22

## 2023-08-29 DIAGNOSIS — M47.816 ARTHROPATHY OF LUMBAR FACET JOINT: ICD-10-CM

## 2023-08-29 DIAGNOSIS — F33.1 MAJOR DEPRESSIVE DISORDER, RECURRENT EPISODE, MODERATE: Chronic | ICD-10-CM

## 2023-08-29 RX ORDER — CELECOXIB 100 MG/1
CAPSULE ORAL
Qty: 180 CAPSULE | Refills: 0 | Status: SHIPPED | OUTPATIENT
Start: 2023-08-29

## 2023-08-29 RX ORDER — VORTIOXETINE 20 MG/1
TABLET, FILM COATED ORAL
Qty: 90 TABLET | Refills: 1 | OUTPATIENT
Start: 2023-08-29

## 2023-09-14 RX ORDER — DICYCLOMINE HYDROCHLORIDE 10 MG/1
CAPSULE ORAL
Qty: 56 CAPSULE | Refills: 0 | Status: SHIPPED | OUTPATIENT
Start: 2023-09-14

## 2023-09-14 RX ORDER — ONDANSETRON 4 MG/1
TABLET, FILM COATED ORAL
Qty: 42 TABLET | Refills: 0 | Status: SHIPPED | OUTPATIENT
Start: 2023-09-14

## 2023-09-25 ENCOUNTER — TELEPHONE (OUTPATIENT)
Dept: FAMILY MEDICINE CLINIC | Facility: CLINIC | Age: 53
End: 2023-09-25

## 2023-09-25 NOTE — TELEPHONE ENCOUNTER
Pt is due for his annual CT chest lung cancer screening due to his smoking. Let me know if he is agreeable.

## 2023-10-23 RX ORDER — ONDANSETRON 4 MG/1
TABLET, FILM COATED ORAL
Qty: 42 TABLET | Refills: 0 | Status: SHIPPED | OUTPATIENT
Start: 2023-10-23

## 2023-11-17 ENCOUNTER — TELEPHONE (OUTPATIENT)
Dept: FAMILY MEDICINE CLINIC | Facility: CLINIC | Age: 53
End: 2023-11-17

## 2023-11-17 DIAGNOSIS — F17.210 CIGARETTE SMOKER: Primary | ICD-10-CM

## 2023-11-17 RX ORDER — DICYCLOMINE HYDROCHLORIDE 10 MG/1
10 CAPSULE ORAL
Qty: 90 CAPSULE | Refills: 0 | Status: SHIPPED | OUTPATIENT
Start: 2023-11-17 | End: 2023-11-20

## 2023-11-17 NOTE — TELEPHONE ENCOUNTER
"Caller: Frank Barger \"Monroe\"    Relationship: Self    Best call back number: [phone]    What orders are you requesting (i.e. lab or imaging): LUNG CANCER SCREENING    Additional notes: MONROE WOULD LIKE A CALL WHEN ORDER IN SYSTEM    "

## 2023-11-17 NOTE — TELEPHONE ENCOUNTER
"Caller: Frank Barger \"Monroe\"    Relationship: Self    Best call back number: [phone]     Requested Prescriptions:   Requested Prescriptions     Pending Prescriptions Disp Refills    dicyclomine (BENTYL) 10 MG capsule 56 capsule 0     Sig: Take 1 capsule by mouth 4 (Four) Times a Day Before Meals & at Bedtime.        Pharmacy where request should be sent: [id number] [place]  [address] AT [facility] [facility] [address] [phone] [facility] [phone]      Last office visit with prescribing clinician: 1/18/2023   Last telemedicine visit with prescribing clinician: Visit date not found   Next office visit with prescribing clinician: 11/29/2023     Does the patient have less than a 3 day supply:  [x] Yes  [] No      Kevin Reyes Rep   11/17/23 13:50 EST     "

## 2023-11-20 RX ORDER — DICYCLOMINE HYDROCHLORIDE 10 MG/1
10 CAPSULE ORAL
Qty: 368 CAPSULE | Refills: 0 | Status: SHIPPED | OUTPATIENT
Start: 2023-11-20

## 2023-11-24 DIAGNOSIS — I24.0 ISCHEMIC HEART DISEASE DUE TO CORONARY ARTERY OBSTRUCTION: ICD-10-CM

## 2023-11-24 DIAGNOSIS — M47.816 ARTHROPATHY OF LUMBAR FACET JOINT: ICD-10-CM

## 2023-11-24 DIAGNOSIS — E78.2 MIXED HYPERLIPIDEMIA: ICD-10-CM

## 2023-11-24 DIAGNOSIS — I25.9 ISCHEMIC HEART DISEASE DUE TO CORONARY ARTERY OBSTRUCTION: ICD-10-CM

## 2023-11-27 RX ORDER — CELECOXIB 100 MG/1
CAPSULE ORAL
Qty: 180 CAPSULE | Refills: 0 | Status: SHIPPED | OUTPATIENT
Start: 2023-11-27

## 2023-11-27 RX ORDER — ATORVASTATIN CALCIUM 20 MG/1
20 TABLET, FILM COATED ORAL NIGHTLY
Qty: 90 TABLET | Refills: 1 | Status: SHIPPED | OUTPATIENT
Start: 2023-11-27

## 2023-11-27 RX ORDER — PANTOPRAZOLE SODIUM 40 MG/1
40 TABLET, DELAYED RELEASE ORAL DAILY
Qty: 90 TABLET | Refills: 1 | Status: SHIPPED | OUTPATIENT
Start: 2023-11-27

## 2023-11-29 ENCOUNTER — LAB (OUTPATIENT)
Dept: FAMILY MEDICINE CLINIC | Facility: CLINIC | Age: 53
End: 2023-11-29
Payer: MEDICARE

## 2023-11-29 ENCOUNTER — TELEPHONE (OUTPATIENT)
Dept: CARDIOLOGY | Facility: CLINIC | Age: 53
End: 2023-11-29
Payer: MEDICARE

## 2023-11-29 ENCOUNTER — OFFICE VISIT (OUTPATIENT)
Dept: FAMILY MEDICINE CLINIC | Facility: CLINIC | Age: 53
End: 2023-11-29
Payer: MEDICARE

## 2023-11-29 VITALS
TEMPERATURE: 98 F | OXYGEN SATURATION: 100 % | HEART RATE: 96 BPM | BODY MASS INDEX: 23.88 KG/M2 | DIASTOLIC BLOOD PRESSURE: 82 MMHG | SYSTOLIC BLOOD PRESSURE: 130 MMHG | WEIGHT: 186 LBS

## 2023-11-29 DIAGNOSIS — I24.0 ISCHEMIC HEART DISEASE DUE TO CORONARY ARTERY OBSTRUCTION: ICD-10-CM

## 2023-11-29 DIAGNOSIS — F17.210 CIGARETTE SMOKER: ICD-10-CM

## 2023-11-29 DIAGNOSIS — E78.2 MIXED HYPERLIPIDEMIA: Primary | ICD-10-CM

## 2023-11-29 DIAGNOSIS — Z12.11 COLON CANCER SCREENING: ICD-10-CM

## 2023-11-29 DIAGNOSIS — G89.4 CHRONIC PAIN DISORDER: ICD-10-CM

## 2023-11-29 DIAGNOSIS — K58.9 IRRITABLE BOWEL SYNDROME, UNSPECIFIED TYPE: ICD-10-CM

## 2023-11-29 DIAGNOSIS — I25.9 ISCHEMIC HEART DISEASE DUE TO CORONARY ARTERY OBSTRUCTION: ICD-10-CM

## 2023-11-29 DIAGNOSIS — E78.2 MIXED HYPERLIPIDEMIA: ICD-10-CM

## 2023-11-29 DIAGNOSIS — F33.1 MAJOR DEPRESSIVE DISORDER, RECURRENT EPISODE, MODERATE: ICD-10-CM

## 2023-11-29 LAB
ALBUMIN SERPL-MCNC: 4.5 G/DL (ref 3.5–5.2)
ALBUMIN/GLOB SERPL: 1.5 G/DL
ALP SERPL-CCNC: 71 U/L (ref 39–117)
ALT SERPL W P-5'-P-CCNC: 11 U/L (ref 1–41)
ANION GAP SERPL CALCULATED.3IONS-SCNC: 9.5 MMOL/L (ref 5–15)
AST SERPL-CCNC: 15 U/L (ref 1–40)
BILIRUB SERPL-MCNC: 0.4 MG/DL (ref 0–1.2)
BUN SERPL-MCNC: 15 MG/DL (ref 6–20)
BUN/CREAT SERPL: 14.4 (ref 7–25)
CALCIUM SPEC-SCNC: 9.6 MG/DL (ref 8.6–10.5)
CHLORIDE SERPL-SCNC: 104 MMOL/L (ref 98–107)
CHOLEST SERPL-MCNC: 117 MG/DL (ref 0–200)
CO2 SERPL-SCNC: 24.5 MMOL/L (ref 22–29)
CREAT SERPL-MCNC: 1.04 MG/DL (ref 0.76–1.27)
EGFRCR SERPLBLD CKD-EPI 2021: 85.9 ML/MIN/1.73
GLOBULIN UR ELPH-MCNC: 3 GM/DL
GLUCOSE SERPL-MCNC: 102 MG/DL (ref 65–99)
HDLC SERPL-MCNC: 32 MG/DL (ref 40–60)
LDLC SERPL CALC-MCNC: 67 MG/DL (ref 0–100)
LDLC/HDLC SERPL: 2.07 {RATIO}
POTASSIUM SERPL-SCNC: 4.1 MMOL/L (ref 3.5–5.2)
PROT SERPL-MCNC: 7.5 G/DL (ref 6–8.5)
SODIUM SERPL-SCNC: 138 MMOL/L (ref 136–145)
TRIGL SERPL-MCNC: 94 MG/DL (ref 0–150)
VLDLC SERPL-MCNC: 18 MG/DL (ref 5–40)

## 2023-11-29 PROCEDURE — 36415 COLL VENOUS BLD VENIPUNCTURE: CPT

## 2023-11-29 PROCEDURE — 80053 COMPREHEN METABOLIC PANEL: CPT | Performed by: NURSE PRACTITIONER

## 2023-11-29 PROCEDURE — 80061 LIPID PANEL: CPT | Performed by: NURSE PRACTITIONER

## 2023-11-29 RX ORDER — DICYCLOMINE HYDROCHLORIDE 10 MG/1
10 CAPSULE ORAL 3 TIMES DAILY PRN
Qty: 270 CAPSULE | Refills: 2 | Status: SHIPPED | OUTPATIENT
Start: 2023-11-29

## 2023-11-29 NOTE — PATIENT INSTRUCTIONS
Check labs  Referral to PM  Follow up with specialist- Dr. Silva and rheumatology  Get CT scan- call hospital to schedule

## 2023-11-29 NOTE — PROGRESS NOTES
Subjective     Frank Barger is a 53 y.o. male.     History of Present Illness  Patient is here today to follow-up on chronic medical conditions.  Pt is due for his annual lung cancer screening- order is placed  He smokes 1 ppd.  He has been smoking since he was 12 yo.      Hypertension with CAD-patient is currently on metoprolol 25 mg twice daily.  He has had heart stents placed. He is seen by cardiology- dr. Silva. Denies CP, SOA, HA. He states his plavix was stopped last year, but he isnt sure by who. He only has ASA as needed     Hyperlipidemia-patient is currently on Lipitor 20 mg daily.      Depression-patient is currently on lexapro 20 mg daily and Wellbutrin  mg daily. Doing well on meds. Denies SI or HI. See psych.      Psoriatic arthritis-patient sees rheumatology.  He is currently on Humira pen daily (not sure of dose) and Celebrex.      Chronic pain-  takes Lyrica 300 mg twice daily. Sees pain management. They want him to go to [place] and he cannot afford to go there. He wants to see someone local.      Insomnia-patient is currently on seroquel 25mg nightly. Doing well     GERD-patient is currently on Protonix 40 mg daily.    IBS- pt is on dicyclomine 10mg QID. He has been taking it 4 times daily and not just as needed. It helps with his symptoms.      Labs- due  Colonoscopy- due  PSA- Lab Results       Component                Value               Date                       PSA                      2.610               01/05/2022                 PSA                      1.50                04/13/2017                  Vaccines:  Flu- due- will get at [facility]  Tdap-  Shingles-  PNA-  Covid-19     Dental exam-  Eye exam-         The following portions of the patient's history were reviewed and updated as appropriate: allergies, current medications, past family history, past medical history, past social history, past surgical history, and problem list.    Review of Systems   Constitutional:   Positive for fatigue. Negative for chills and fever.   Respiratory:  Negative for chest tightness and shortness of breath.    Cardiovascular:  Negative for chest pain and palpitations.   Gastrointestinal:  Positive for nausea. Negative for abdominal pain and vomiting.   Musculoskeletal:  Positive for arthralgias and back pain.   Neurological:  Negative for dizziness and headache.   Psychiatric/Behavioral:  Negative for self-injury, suicidal ideas, depressed mood and stress. The patient is not nervous/anxious.        Objective     /82 (BP Location: Left arm, Patient Position: Sitting, Cuff Size: Adult)   Pulse 96   Temp 98 °F (36.7 °C) (Tympanic)   Wt 84.4 kg (186 lb)   SpO2 100%   BMI 23.88 kg/m²     Current Outpatient Medications on File Prior to Visit   Medication Sig Dispense Refill    Adalimumab (HUMIRA PEN SC) Inject  under the skin into the appropriate area as directed.      atorvastatin (LIPITOR) 20 MG tablet TAKE 1 TABLET BY MOUTH EVERY NIGHT 90 tablet 1    buPROPion XL (WELLBUTRIN XL) 300 MG 24 hr tablet Take 1 tablet by mouth Every Morning. 90 tablet 1    celecoxib (CeleBREX) 100 MG capsule TAKE 1 CAPSULE BY MOUTH TWICE DAILY WITH FOOD AS NEEDED FOR MODERATE PAIN 180 capsule 0    cyclobenzaprine (FLEXERIL) 10 MG tablet Take 1 tablet by mouth 3 (Three) Times a Day As Needed for Muscle Spasms. 90 tablet 11    Diclofenac Sodium (VOLTAREN) 1 % gel gel       escitalopram (Lexapro) 20 MG tablet Take 1 tablet by mouth Every Night. 30 tablet 2    ferrous sulfate 325 (65 FE) MG tablet Take 1 tablet by mouth Every Other Day. 45 tablet 1    loratadine (CLARITIN) 10 MG tablet Take 1 tablet by mouth Daily.      meclizine (ANTIVERT) 25 MG tablet Take 1 tablet by mouth 3 (Three) Times a Day As Needed for Dizziness. 30 tablet 0    metoprolol tartrate (LOPRESSOR) 25 MG tablet TAKE 1 TABLET BY MOUTH TWICE DAILY 180 tablet 1    NON FORMULARY Allergy injections      ondansetron (ZOFRAN) 4 MG tablet TAKE 1 TABLET BY  MOUTH EVERY 8 HOURS AS NEEDED FOR NAUSEA OR VOMITING 42 tablet 0    pantoprazole (PROTONIX) 40 MG EC tablet TAKE 1 TABLET BY MOUTH DAILY 90 tablet 1    pregabalin (LYRICA) 300 MG capsule Take 1 capsule by mouth 2 (Two) Times a Day.      QUEtiapine (SEROquel) 25 MG tablet Take 1 tablet by mouth Every Night. 30 tablet 2    SUMAtriptan (IMITREX) 50 MG tablet TAKE 1 TABLET BY MOUTH AT ONSET OF HEADACHE. MAY REPEAT DOSE 1 TIME IN 2 HOURS IF HEADACHE NOT RELIEVED 10 tablet 0    [DISCONTINUED] clopidogrel (PLAVIX) 75 MG tablet Take 1 tablet by mouth Daily. 90 tablet 1    [DISCONTINUED] dicyclomine (BENTYL) 10 MG capsule TAKE 1 CAPSULE BY MOUTH FOUR TIMES DAILY BEFORE MEALS AND AT BEDTIME 368 capsule 0     No current facility-administered medications on file prior to visit.        BMI is within normal parameters. No other follow-up for BMI required.       Physical Exam  Constitutional:       General: He is not in acute distress.     Appearance: Normal appearance. He is not ill-appearing.   HENT:      Head: Normocephalic and atraumatic.   Cardiovascular:      Rate and Rhythm: Normal rate and regular rhythm.      Heart sounds: No murmur heard.  Pulmonary:      Effort: Pulmonary effort is normal. No respiratory distress.      Breath sounds: Normal breath sounds.   Skin:     General: Skin is warm and dry.   Neurological:      General: No focal deficit present.      Mental Status: He is alert and oriented to person, place, and time.   Psychiatric:         Mood and Affect: Mood normal.         Behavior: Behavior normal.         Thought Content: Thought content normal.         Judgment: Judgment normal.           Assessment & Plan     Diagnoses and all orders for this visit:    1. Mixed hyperlipidemia (Primary)  Comments:  stable  cont statin  work on diet and exercise  check lipid  Orders:  -     Comprehensive Metabolic Panel; Future  -     Lipid Panel; Future    2. Cigarette smoker  Comments:  due for CT lung cancer  screening  discussed smoking cessation    3. Major depressive disorder, recurrent episode, moderate  Comments:  stable  sees psych  denies SI or HI    4. Chronic pain disorder  Comments:  his PM is moving to [place]  would like to see someone local  referral placed  Orders:  -     Ambulatory Referral to Pain Management    5. Ischemic heart disease due to coronary artery obstruction  Comments:  stable  sees cardio  states he was told to stop plavix last yr  check labs  cont statin  Orders:  -     Comprehensive Metabolic Panel; Future  -     Lipid Panel; Future    6. Irritable bowel syndrome, unspecified type  Comments:  stable  cont bentyl prn  Orders:  -     dicyclomine (BENTYL) 10 MG capsule; Take 1 capsule by mouth 3 (Three) Times a Day As Needed for Abdominal Cramping.  Dispense: 270 capsule; Refill: 2    7. Colon cancer screening  -     Ambulatory Referral For Screening Colonoscopy

## 2023-11-29 NOTE — TELEPHONE ENCOUNTER
"  Caller: Frank Barger \"Monroe\"    Relationship to patient: Self    Best call back number: [phone]    Type of visit: FOLLOW UP     Requested date: ASAP; PREFERS AFTERNOONS     Additional notes:PT CALLED IN TO SCHEDULE A FOLLOW UP APPT, LAST OFFICE VISIT WAS 4.12.21, PLEASE ADVISE TIMEFRAME WHEN TO GET PT IN AND CALL BACK TO SCHEDULE. HE STATES HE'S JUST BEEN UNDER THE WEATHER AND NOT FEELING THE BEST OR HE WOULD'VE CALLED BACK A LOT SOONER TO SCHEDULE THIS.             "

## 2023-12-04 ENCOUNTER — TELEPHONE (OUTPATIENT)
Dept: FAMILY MEDICINE CLINIC | Facility: CLINIC | Age: 53
End: 2023-12-04
Payer: MEDICARE

## 2023-12-04 NOTE — TELEPHONE ENCOUNTER
Please call pt regarding shingles vaccine, he is just wanting to know your opinion on whether or not to get it.  He did bring in his COVID, flu, and pneumonia shot record.

## 2023-12-04 NOTE — TELEPHONE ENCOUNTER
Let him know I do recommend the vaccine. Some people feel ill for a day or two after so tell him to plan accordingly.

## 2023-12-08 ENCOUNTER — TELEPHONE (OUTPATIENT)
Dept: FAMILY MEDICINE CLINIC | Facility: CLINIC | Age: 53
End: 2023-12-08
Payer: MEDICARE

## 2023-12-08 NOTE — TELEPHONE ENCOUNTER
Caller: ELANA DOLL    Relationship: Emergency Contact    Best call back number: [phone] 1ST ATTEMPT [phone]    What is the medical concern/diagnosis: COPD, ARTHRITIS, TRIPLE HEART BIPASS    What specialty or service is being requested: PAIN MANAGEMENT      Any additional details:     PATIENT'S CURRENT PAIN MANAGEMENT DOCTOR HAS LEFT THE CURRENT PRACTICE AND RETURNED TO [place].  PATIENT IS WANTING TO GET A REFERRAL FOR A PAIN MANAGEMENT DOCTOR IN [place].  THEY BELIEVE THE NAME OF THE OFFICE IS [facility].    PLEASE ADVISE.

## 2023-12-19 DIAGNOSIS — F51.05 INSOMNIA DUE TO MENTAL CONDITION: Chronic | ICD-10-CM

## 2023-12-20 RX ORDER — QUETIAPINE FUMARATE 25 MG/1
25 TABLET, FILM COATED ORAL NIGHTLY
Qty: 30 TABLET | Refills: 1 | Status: SHIPPED | OUTPATIENT
Start: 2023-12-20

## 2023-12-27 NOTE — TELEPHONE ENCOUNTER
Called pt to see if he has apolonia with [facility] since they have been trying to get a hold of him. I was going to give number but he couldn't write it down so I asked about my chart. He said that was fine to send to my chart

## 2023-12-28 ENCOUNTER — OFFICE VISIT (OUTPATIENT)
Dept: CARDIOLOGY | Facility: CLINIC | Age: 53
End: 2023-12-28
Payer: MEDICARE

## 2023-12-28 VITALS
BODY MASS INDEX: 24.9 KG/M2 | HEART RATE: 61 BPM | OXYGEN SATURATION: 100 % | DIASTOLIC BLOOD PRESSURE: 71 MMHG | HEIGHT: 74 IN | SYSTOLIC BLOOD PRESSURE: 112 MMHG | WEIGHT: 194 LBS

## 2023-12-28 DIAGNOSIS — Z95.1 HX OF CABG: Primary | ICD-10-CM

## 2023-12-28 DIAGNOSIS — E78.5 DYSLIPIDEMIA: ICD-10-CM

## 2023-12-28 DIAGNOSIS — I10 ESSENTIAL HYPERTENSION: ICD-10-CM

## 2023-12-28 DIAGNOSIS — Z95.820 STATUS POST ANGIOPLASTY WITH STENT: ICD-10-CM

## 2023-12-28 RX ORDER — NITROGLYCERIN 0.4 MG/1
TABLET SUBLINGUAL
COMMUNITY

## 2023-12-28 NOTE — PROGRESS NOTES
"Encounter Date:12/28/2023  Last seen 8/28/2021      Patient ID: Frank Barger is a 53 y.o. male.    Chief Complaint:  Status post CABG  Status post stent  History of syncope  Dyslipidemia  Hypertension  COPD      History of Present Illness  Last seen 8/28/2021.    Since I have last seen, the patient has been without any chest discomfort ,shortness of breath, palpitations, dizziness or syncope.  Denies having any headache ,abdominal pain ,nausea, vomiting , diarrhea constipation, loss of weight or loss of appetite.  Denies having any excessive bruising ,hematuria or blood in the stool.    Review of all systems negative except as indicated.    Reviewed ROS.    Assessment and Plan       History from 2021  Frank Barger is a 51 y.o. male who presents with history of syncopal episode yesterday with injury to bilateral elbows.  Patient apparently was outside with a friend and got up to get a drink and the next thing he was on the concrete floor with face down.  He had syncopal episode approximately 6 months back.  Patient has been having occasional dizzy spells.  Patient had stent placement in December 2020.  Patient denies having any chest discomfort or shortness of breath palpitations.  Patient is not sure he had dizziness prior to this episode and it was very fast.  Surgery is planned.  Cardiac consultation was requested.  CT scan of the head cervical spine and facial bones were negative for fracture.  No other associated aggravating or alleviating factors.     Patient had surgery on his right elbow yesterday.  Patient signed out AMA last night since he was not able to smoke in the hospital.  Patient has returned back this morning to pursue planned surgery on his left elbow\"     ]]]]]]]]]]]]]]]]]  Impression  ==========  -History of syncope 2021 no further episodes  Bilateral elbow fractures  \"X-ray of the left elbow showed a complete dislocation of the radiocapitellar and ulnar trochlear joints. This was " "reduced in the emergency department. X-ray of elbow as per radiology also shows extensively comminuted and displaced fracture of the old Winder and suspected nondisplaced supracondylar fracture of the distal humerus on the right side.\"     Status post closed reduction right elbow dislocation and closed reduction and splinting August 17, 2021 by Dr. August.  Status post surgery on left elbow by Dr. hartman-8/18/2021     -Status post CABG (2015) (triple-vessel according to patient) at [facility].     -Status post stent placement 4/4/2014 at [facility]  Status post stent to LAD between diagonal branch and landing site of LIMA.  [facility]/20/20[facility]     Echocardiogram-normal August 16, 2021     Cardiac catheterization 1/21/2021  Left ventricle size and contractility is normal with ejection fraction of 60%.  Left main coronary artery is normal.  Left anterior descending artery provided a moderate sized diagonal branch and left anterior descending artery has 80% disease proximal to the LIMA landing site.  Circumflex coronary artery is a moderately large vessel that provided a large marginal branch.  Circumflex coronary artery has 60 to 70% disease just distal to the marginal branch.  Ramus intermedius is a relatively small caliber vessel that has ostial 80 to 90% disease.  Right coronary artery is a large and dominant vessel.  Left ventricular branch has 60 to 70% disease at its ostium.  LIMA to left anterior descending artery is very small and atretic with probably 90 to 95% disease just proximal to the insertion site.  SVG to diagonal branch is patent.  SVG to presumably ramus intermedius is totally occluded in the proximal segment.     2 out of 3 grafts are patent.  However patent LIMA is extremely small in caliber and atraumatic with significant disease near the landing site.  Difficult to tell the source of patient's symptoms but likely from left anterior descending " artery.  Patient to have intervention to left antidescending artery distal to the diagonal branch and proximal to the LIMA landing site.  Other source of pain could be from circumflex coronary artery left ventricular branch of RCA and ramus intermedius.  Suggest maximize medical treatment for disease in these vessels.  Modification of risk factors.     Echocardiogram 1/14/2021 revealed mild paradoxical septal motion and inferior wall hypokinesis with ejection fraction of 50%.  Lexiscan Cardiolite test-abnormal with mild posterior and inferior proximal ischemia-1/14/2021     -Dyslipidemia hypertension COPD PTSD     -Psoriasis     -Family history of coronary artery disease     -Smoker-abstinence from smoking     -Allergic to oxycodone hydrocodone  ==========  Plan  =========  History of syncope and bilateral elbow fractures.  2021.  No further episodes.     Status post CABG  Patient is not having any angina pectoris or congestive heart failure.     Status post stent placement.  1/20/2021  Patient is not having any angina pectoris or congestive heart failure.  Patient is off Plavix    EKG 12/28/2023 showed sinus bradycardia nonspecific ST-T wave changes 57/min normal axis normal intervals no ectopy no significant change from previous EKG.     Hypertension-well-controlled  112/70  Continue metoprolol tartrate 25 mg a day     Dyslipidemia-continue atorvastatin      Medications were reviewed and updated.     Follow-up in the office in 6 months.    Further plan will depend on patient's progress.    Reviewed and updated-12/28/2023  ]]]]]]]]]]]]]]               Diagnosis Plan   1. Hx of CABG        2. Essential hypertension        3. Dyslipidemia        4. Status post angioplasty with stent        LAB RESULTS (LAST 7 DAYS)    CBC        BMP        CMP         BNP        TROPONIN        CoAg        Creatinine Clearance  Estimated Creatinine Clearance: 102.2 mL/min (by C-G formula based on SCr of 1.04 mg/dL).    ABG         Radiology  No radiology results for the last day                The following portions of the patient's history were reviewed and updated as appropriate: allergies, current medications, past family history, past medical history, past social history, past surgical history, and problem list.    Review of Systems   Constitutional: Negative for malaise/fatigue.   Cardiovascular:  Negative for chest pain, dyspnea on exertion, leg swelling and palpitations.   Respiratory:  Negative for cough and shortness of breath.    Gastrointestinal:  Negative for abdominal pain, nausea and vomiting.   Neurological:  Positive for dizziness and light-headedness. Negative for focal weakness, headaches and numbness.   All other systems reviewed and are negative.        Current Outpatient Medications:     Adalimumab (HUMIRA PEN SC), Inject  under the skin into the appropriate area as directed., Disp: , Rfl:     atorvastatin (LIPITOR) 20 MG tablet, TAKE 1 TABLET BY MOUTH EVERY NIGHT, Disp: 90 tablet, Rfl: 1    buPROPion XL (WELLBUTRIN XL) 300 MG 24 hr tablet, Take 1 tablet by mouth Every Morning., Disp: 90 tablet, Rfl: 1    celecoxib (CeleBREX) 100 MG capsule, TAKE 1 CAPSULE BY MOUTH TWICE DAILY WITH FOOD AS NEEDED FOR MODERATE PAIN, Disp: 180 capsule, Rfl: 0    cyclobenzaprine (FLEXERIL) 10 MG tablet, Take 1 tablet by mouth 3 (Three) Times a Day As Needed for Muscle Spasms., Disp: 90 tablet, Rfl: 11    Diclofenac Sodium (VOLTAREN) 1 % gel gel, , Disp: , Rfl:     dicyclomine (BENTYL) 10 MG capsule, Take 1 capsule by mouth 3 (Three) Times a Day As Needed for Abdominal Cramping., Disp: 270 capsule, Rfl: 2    escitalopram (Lexapro) 20 MG tablet, Take 1 tablet by mouth Every Night., Disp: 30 tablet, Rfl: 2    ferrous sulfate 325 (65 FE) MG tablet, Take 1 tablet by mouth Every Other Day., Disp: 45 tablet, Rfl: 1    loratadine (CLARITIN) 10 MG tablet, Take 1 tablet by mouth Daily., Disp: , Rfl:     meclizine (ANTIVERT) 25 MG tablet, Take 1  tablet by mouth 3 (Three) Times a Day As Needed for Dizziness., Disp: 30 tablet, Rfl: 0    metoprolol tartrate (LOPRESSOR) 25 MG tablet, TAKE 1 TABLET BY MOUTH TWICE DAILY, Disp: 180 tablet, Rfl: 1    nitroglycerin (NITROSTAT) 0.4 MG SL tablet, DISSOLVE 1 TABLET UNDER THE TONGUE EVERY 5 MINUTES AS NEEDED FOR CHEST PAIN. NOT TO EXCEED 3 DOSES IN 15 MINUTES, Disp: , Rfl:     NON FORMULARY, Allergy injections, Disp: , Rfl:     ondansetron (ZOFRAN) 4 MG tablet, TAKE 1 TABLET BY MOUTH EVERY 8 HOURS AS NEEDED FOR NAUSEA OR VOMITING, Disp: 42 tablet, Rfl: 0    pantoprazole (PROTONIX) 40 MG EC tablet, TAKE 1 TABLET BY MOUTH DAILY, Disp: 90 tablet, Rfl: 1    pregabalin (LYRICA) 300 MG capsule, Take 1 capsule by mouth 2 (Two) Times a Day., Disp: , Rfl:     QUEtiapine (SEROquel) 25 MG tablet, TAKE 1 TABLET BY MOUTH EVERY NIGHT, Disp: 30 tablet, Rfl: 1    SUMAtriptan (IMITREX) 50 MG tablet, TAKE 1 TABLET BY MOUTH AT ONSET OF HEADACHE. MAY REPEAT DOSE 1 TIME IN 2 HOURS IF HEADACHE NOT RELIEVED, Disp: 10 tablet, Rfl: 0    Allergies   Allergen Reactions    Hydrocodone Nausea And Vomiting    Hydrocodone-Acetaminophen GI Intolerance    Oxycodone GI Intolerance       Family History   Problem Relation Age of Onset    Diabetes Mother     Heart disease Father     Arthritis Father     Cancer Father         Prostate    COPD Father     Anxiety disorder Daughter     Depression Daughter        Past Surgical History:   Procedure Laterality Date    CARDIAC CATHETERIZATION N/A 01/20/2021    Procedure: Left Heart Cath and coronary angiogram;  Surgeon: Jaimie Silva MD;  Location: [facility] CATH INVASIVE LOCATION;  Service: Cardiovascular;  Laterality: N/A;    CARDIAC CATHETERIZATION N/A 01/20/2021    Procedure: Saphenous Vein Graft;  Surgeon: Jaimie Silva MD;  Location: [facility] CATH INVASIVE LOCATION;  Service: Cardiovascular;  Laterality: N/A;    CARDIAC CATHETERIZATION N/A 01/20/2021    Procedure: Stent EBENEZER coronary;  Surgeon: Michael  "Herve SHARPE MD;  Location: [facility] CATH INVASIVE LOCATION;  Service: Cardiology;  Laterality: N/A;    CARDIAC SURGERY  12/2015    CORONARY ANGIOPLASTY WITH STENT PLACEMENT  04/2014    2 in 2014, 1 in 12/2020    CORONARY ARTERY BYPASS GRAFT  12/2015    x 3    ELBOW DEBRIDEMENT Right 08/17/2021    Procedure: INCISION / DEBRIDEMENT ELBOW, closed reduction right elbow dislocation ;  Surgeon: Kyler August MD;  Location: [facility] MAIN OR;  Service: Orthopedics;  Laterality: Right;    ELBOW OPEN REDUCTION INTERNAL FIXATION Left 08/18/2021    Procedure: Left  ulna open reduction internal fixation (of olecranon and coronoid), left radial head replacement  ;  Surgeon: Yonis Mcdonough MD;  Location: [facility] MAIN OR;  Service: Orthopedics;  Laterality: Left;       Past Medical History:   Diagnosis Date    Abdominal pain     Allergic     Allergies     takes allergy injections    Anesthesia complication     states heart \"fluttering\" after anesthesia x 1- was kept in hospital 3 days s/p heart stent placement    Anxiety     Arthritis     Asthma     Chronic back pain     Constipation 06/2021    Coronary artery disease 2014    DDD (degenerative disc disease), lumbar     Depression     Emphysema of lung     Fibromyalgia, primary 2016    GERD (gastroesophageal reflux disease)     Headache     Heart murmur 2014    Hypertension 2014    Leg pain     Lumbar spine pain     Myocardial infarction 2014    Nocturia     Scoliosis 1995    Under care of pain management specialist     pain management of [place]       Family History   Problem Relation Age of Onset    Diabetes Mother     Heart disease Father     Arthritis Father     Cancer Father         Prostate    COPD Father     Anxiety disorder Daughter     Depression Daughter        Social History     Socioeconomic History    Marital status:    Tobacco Use    Smoking status: Every Day     Packs/day: 1.50     Years: 41.00     Additional pack years: 0.00     Total pack years: 61.50     " "Types: Cigarettes, Cigars     Start date: 8/5/1985     Passive exposure: Current    Smokeless tobacco: Never   Vaping Use    Vaping Use: Never used    Passive vaping exposure: Yes   Substance and Sexual Activity    Alcohol use: No    Drug use: No    Sexual activity: Yes     Partners: Female         Procedures      Objective:       Physical Exam    /71 (BP Location: Left arm, Patient Position: Lying, Cuff Size: Adult)   Pulse 61   Ht 188 cm (74\")   Wt 88 kg (194 lb)   SpO2 100%   BMI 24.91 kg/m²   The patient is alert, oriented and in no distress.    Vital signs as noted above.    Head and neck revealed no carotid bruits or jugular venous distension.  No thyromegaly or lymphadenopathy is present.    Lungs clear.  No wheezing.  Breath sounds are normal bilaterally.    Heart normal first and second heart sounds.  No murmur..  No pericardial rub is present.  No gallop is present.    Abdomen soft and nontender.  No organomegaly is present.    Extremities revealed good peripheral pulses without any pedal edema.    Skin warm and dry.    Musculoskeletal system is grossly normal.    CNS grossly normal.    Reviewed and updated-12/28/2023        "

## 2024-01-09 ENCOUNTER — HOSPITAL ENCOUNTER (OUTPATIENT)
Dept: CT IMAGING | Facility: HOSPITAL | Age: 54
Discharge: HOME | End: 2024-01-09
Admitting: NURSE PRACTITIONER
Payer: MEDICARE

## 2024-01-09 DIAGNOSIS — F17.210 CIGARETTE SMOKER: ICD-10-CM

## 2024-01-09 PROCEDURE — 71271 CT THORAX LUNG CANCER SCR C-: CPT

## 2024-01-16 NOTE — PROGRESS NOTES
"Subjective   Frank Barger is a 53 y.o. male who presents today for follow-up for psychiatric medication management.     Chief Complaint:  Follow up for depression and PTSD. Patient also here to establish care with this provider.     History of Present Illness:     Medication adjustments last visit:  Continue Wellbutrin XL 300mg daily.   Continue quetiapine, decrease to 25mg nightly.   Continue Lexapro but increase to 20mg.     He reports a little bit of an issue a while back. He had an issue with thinking his wife was cheating on him, and he started talking to other women on the internet. He feels like he was just paranoid. He was wondering if this was a side effect of the medication.   He has stopped this now and he and his wife have talked about it and they are doing ok.   He reports quetiapine is doing ok for sleep.   Depression is good on current medications he states.   Denies any suicidal ideation.     Patient presents with symptoms and behaviors that are consistent with the following DSM-5 diagnoses:  1. Major depressive disorder  2. Generalized anxiety disorder  2. PTSD  3. Insomnia    The following portions of the patient's history were reviewed and updated as appropriate: allergies, current medications, past family history, past medical history, past social history, past surgical history and problem list.    PAST OUTPATIENT TREATMENT  Diagnosis treated:  Affective Disorder, Anxiety/Panic Disorder  Treatment Type:  Psychotherapy, Medication Management  Prior Psychiatric Medications:  Zoloft  Trazodone  Brintellix  Trintellix   Prazosin  Elavil  Abilify  Support Groups:  None  Sequelae Of Mental Disorder:  emotional distress    Interval History  No Change    Side Effects  None      Past Medical History:  Past Medical History:   Diagnosis Date    Abdominal pain     Allergic     Allergies     takes allergy injections    Anesthesia complication     states heart \"fluttering\" after anesthesia x 1- was kept " in hospital 3 days s/p heart stent placement    Anxiety     Arthritis     Asthma     Chronic back pain     Constipation 06/2021    Coronary artery disease 2014    DDD (degenerative disc disease), lumbar     Depression     Emphysema of lung     Fibromyalgia, primary 2016    GERD (gastroesophageal reflux disease)     Headache     Heart murmur 2014    Hypertension 2014    Leg pain     Lumbar spine pain     Myocardial infarction 2014    Nocturia     Scoliosis 1995    Under care of pain management specialist     pain management of [place]       Social History:  Social History     Socioeconomic History    Marital status:    Tobacco Use    Smoking status: Every Day     Packs/day: 1.50     Years: 41.00     Additional pack years: 0.00     Total pack years: 61.50     Types: Cigarettes, Cigars     Start date: 8/5/1985     Passive exposure: Current    Smokeless tobacco: Never   Vaping Use    Vaping Use: Never used    Passive vaping exposure: Yes   Substance and Sexual Activity    Alcohol use: No    Drug use: No    Sexual activity: Yes     Partners: Female       Family History:  Family History   Problem Relation Age of Onset    Diabetes Mother     Heart disease Father     Arthritis Father     Cancer Father         Prostate    COPD Father     Anxiety disorder Daughter     Depression Daughter        Past Surgical History:  Past Surgical History:   Procedure Laterality Date    CARDIAC CATHETERIZATION N/A 01/20/2021    Procedure: Left Heart Cath and coronary angiogram;  Surgeon: Jaimie Silva MD;  Location: [facility] CATH INVASIVE LOCATION;  Service: Cardiovascular;  Laterality: N/A;    CARDIAC CATHETERIZATION N/A 01/20/2021    Procedure: Saphenous Vein Graft;  Surgeon: Jaimie Silva MD;  Location: [facility] CATH INVASIVE LOCATION;  Service: Cardiovascular;  Laterality: N/A;    CARDIAC CATHETERIZATION N/A 01/20/2021    Procedure: Stent EBENEZER coronary;  Surgeon: Herve Rodriguez MD;  Location: [facility] CATH INVASIVE  LOCATION;  Service: Cardiology;  Laterality: N/A;    CARDIAC SURGERY  12/2015    CORONARY ANGIOPLASTY WITH STENT PLACEMENT  04/2014    2 in 2014, 1 in 12/2020    CORONARY ARTERY BYPASS GRAFT  12/2015    x 3    ELBOW DEBRIDEMENT Right 08/17/2021    Procedure: INCISION / DEBRIDEMENT ELBOW, closed reduction right elbow dislocation ;  Surgeon: Kyler August MD;  Location: [facility] OR;  Service: Orthopedics;  Laterality: Right;    ELBOW OPEN REDUCTION INTERNAL FIXATION Left 08/18/2021    Procedure: Left  ulna open reduction internal fixation (of olecranon and coronoid), left radial head replacement  ;  Surgeon: Yonis Mcdonough MD;  Location: [facility] OR;  Service: Orthopedics;  Laterality: Left;       Problem List:  Patient Active Problem List   Diagnosis    Atherosclerosis of coronary artery bypass graft    Essential hypertension    Chronic pain disorder    Coronary artery disease    Degeneration of intervertebral disc of lumbar region    Fibromyositis    Mixed hyperlipidemia    Neuropathic pain    Posttraumatic stress disorder    Psoriasis    Psoriatic arthritis    Environmental and seasonal allergies    Personal history of tobacco use, presenting hazards to health    Status post coronary artery bypass graft    Gastroesophageal reflux disease without esophagitis    Tinnitus of left ear    Intractable migraine without aura and with status migrainosus    Cerumen debris on tympanic membrane of both ears    Bilateral deafness    Ischemic heart disease due to coronary artery obstruction    Status post angioplasty with stent    Dyslipidemia    Abnormal nuclear stress test    Chest pain with high risk of acute coronary syndrome    Iron deficiency anemia    Arthropathy of lumbar facet joint    Arthropathy of right knee    Syncope and collapse    History of scoliosis    COPD (chronic obstructive pulmonary disease)    Dislocation of elbow, right, open, initial encounter    Fracture of left elbow    Major depressive  disorder, recurrent episode, moderate    Psychophysiological insomnia    Posterior dislocation of right radial head    Displaced fracture of olecranon process with intraarticular extension of left ulna, subsequent encounter for closed fracture with routine healing    Displaced fracture of head of left radius, subsequent encounter for closed fracture with routine healing    Viral gastroenteritis    Post traumatic stress disorder (PTSD)    Insomnia due to mental condition    Bipolar I disorder, most recent episode depressed       Allergy:   Allergies   Allergen Reactions    Hydrocodone Nausea And Vomiting    Hydrocodone-Acetaminophen GI Intolerance    Oxycodone GI Intolerance        Discontinued Medications:  Medications Discontinued During This Encounter   Medication Reason    Adalimumab (HUMIRA PEN SC)     Diclofenac Sodium (VOLTAREN) 1 % gel gel     ferrous sulfate 325 (65 FE) MG tablet     SUMAtriptan (IMITREX) 50 MG tablet     escitalopram (Lexapro) 20 MG tablet Reorder    buPROPion XL (WELLBUTRIN XL) 300 MG 24 hr tablet Reorder           Current Medications:   Current Outpatient Medications   Medication Sig Dispense Refill    atorvastatin (LIPITOR) 20 MG tablet TAKE 1 TABLET BY MOUTH EVERY NIGHT 90 tablet 1    buPROPion XL (WELLBUTRIN XL) 300 MG 24 hr tablet Take 1 tablet by mouth Every Morning. 90 tablet 1    celecoxib (CeleBREX) 100 MG capsule TAKE 1 CAPSULE BY MOUTH TWICE DAILY WITH FOOD AS NEEDED FOR MODERATE PAIN 180 capsule 0    cyclobenzaprine (FLEXERIL) 10 MG tablet Take 1 tablet by mouth 3 (Three) Times a Day As Needed for Muscle Spasms. 90 tablet 11    dicyclomine (BENTYL) 10 MG capsule Take 1 capsule by mouth 3 (Three) Times a Day As Needed for Abdominal Cramping. 270 capsule 2    escitalopram (Lexapro) 20 MG tablet Take 1 tablet by mouth Every Night. 90 tablet 1    loratadine (CLARITIN) 10 MG tablet Take 1 tablet by mouth Daily.      meclizine (ANTIVERT) 25 MG tablet Take 1 tablet by mouth 3 (Three)  Times a Day As Needed for Dizziness. 30 tablet 0    metoprolol tartrate (LOPRESSOR) 25 MG tablet TAKE 1 TABLET BY MOUTH TWICE DAILY 180 tablet 1    nitroglycerin (NITROSTAT) 0.4 MG SL tablet DISSOLVE 1 TABLET UNDER THE TONGUE EVERY 5 MINUTES AS NEEDED FOR CHEST PAIN. NOT TO EXCEED 3 DOSES IN 15 MINUTES      NON FORMULARY Allergy injections      ondansetron (ZOFRAN) 4 MG tablet TAKE 1 TABLET BY MOUTH EVERY 8 HOURS AS NEEDED FOR NAUSEA OR VOMITING 42 tablet 0    pantoprazole (PROTONIX) 40 MG EC tablet TAKE 1 TABLET BY MOUTH DAILY 90 tablet 1    pregabalin (LYRICA) 300 MG capsule Take 1 capsule by mouth 2 (Two) Times a Day.      QUEtiapine (SEROquel) 25 MG tablet TAKE 1 TABLET BY MOUTH EVERY NIGHT 30 tablet 1     No current facility-administered medications for this visit.         Psychological ROS: positive for - anxiety, depression and sleep disturbances  negative for - behavioral disorder, concentration difficulties, decreased libido, disorientation, hallucinations, hostility, irritability, memory difficulties, mood swings, obsessive thoughts, physical abuse, sexual abuse or suicidal ideation      Physical Exam:   There were no vitals taken for this visit.    Mental Status Exam:   Hygiene:   good  Cooperation:  Cooperative  Eye Contact:  Good  Psychomotor Behavior:  Appropriate  Affect:  Appropriate  Mood: normal  Hopelessness: Denies  Speech:  Normal  Thought Process:  Linear  Thought Content:  Normal  Suicidal:  None  Homicidal:  None  Hallucinations:  None  Delusion:  None  Memory:  Intact  Orientation:  Person, Place, Time and Situation  Reliability:  good  Insight:  Good  Judgement:  Good  Impulse Control:  Good  Physical/Medical Issues:  No      Mental status exam was reviewed and compared to visit on 8/9/23 and appropriate updates were made.     PHQ-9 Depression Screening    Little interest or pleasure in doing things? 0-->not at all   Feeling down, depressed, or hopeless? 0-->not at all   Trouble falling or  staying asleep, or sleeping too much? 1-->several days   Feeling tired or having little energy? 1-->several days   Poor appetite or overeating? 0-->not at all   Feeling bad about yourself - or that you are a failure or have let yourself or your family down? 0-->not at all   Trouble concentrating on things, such as reading the newspaper or watching television? 0-->not at all   Moving or speaking so slowly that other people could have noticed? Or the opposite - being so fidgety or restless that you have been moving around a lot more than usual? 0-->not at all   Thoughts that you would be better off dead, or of hurting yourself in some way? 0-->not at all   PHQ-9 Total Score 2   If you checked off any problems, how difficult have these problems made it for you to do your work, take care of things at home, or get along with other people? somewhat difficult        Current every day smoker less than 3 minutes spent counseling Not agreeable to stopping    I advised Frank of the risks of tobacco use.     Result Review:    Labs:  Lab on 11/29/2023   Component Date Value Ref Range Status    Glucose 11/29/2023 102 (H)  65 - 99 mg/dL Final    BUN 11/29/2023 15  6 - 20 mg/dL Final    Creatinine 11/29/2023 1.04  0.76 - 1.27 mg/dL Final    Sodium 11/29/2023 138  136 - 145 mmol/L Final    Potassium 11/29/2023 4.1  3.5 - 5.2 mmol/L Final    Chloride 11/29/2023 104  98 - 107 mmol/L Final    CO2 11/29/2023 24.5  22.0 - 29.0 mmol/L Final    Calcium 11/29/2023 9.6  8.6 - 10.5 mg/dL Final    Total Protein 11/29/2023 7.5  6.0 - 8.5 g/dL Final    Albumin 11/29/2023 4.5  3.5 - 5.2 g/dL Final    ALT (SGPT) 11/29/2023 11  1 - 41 U/L Final    AST (SGOT) 11/29/2023 15  1 - 40 U/L Final    Alkaline Phosphatase 11/29/2023 71  39 - 117 U/L Final    Total Bilirubin 11/29/2023 0.4  0.0 - 1.2 mg/dL Final    Globulin 11/29/2023 3.0  gm/dL Final    A/G Ratio 11/29/2023 1.5  g/dL Final    BUN/Creatinine Ratio 11/29/2023 14.4  7.0 - 25.0 Final    Anion  Gap 11/29/2023 9.5  5.0 - 15.0 mmol/L Final    eGFR 11/29/2023 85.9  >60.0 mL/min/1.73 Final    Total Cholesterol 11/29/2023 117  0 - 200 mg/dL Final    Triglycerides 11/29/2023 94  0 - 150 mg/dL Final    HDL Cholesterol 11/29/2023 32 (L)  40 - 60 mg/dL Final    LDL Cholesterol  11/29/2023 67  0 - 100 mg/dL Final    VLDL Cholesterol 11/29/2023 18  5 - 40 mg/dL Final    LDL/HDL Ratio 11/29/2023 2.07   Final       Assessment & Plan     Diagnoses and all orders for this visit:    1. Generalized anxiety disorder (Primary)  -     buPROPion XL (WELLBUTRIN XL) 300 MG 24 hr tablet; Take 1 tablet by mouth Every Morning.  Dispense: 90 tablet; Refill: 1  -     escitalopram (Lexapro) 20 MG tablet; Take 1 tablet by mouth Every Night.  Dispense: 90 tablet; Refill: 1    2. Major depressive disorder, recurrent episode, moderate  -     buPROPion XL (WELLBUTRIN XL) 300 MG 24 hr tablet; Take 1 tablet by mouth Every Morning.  Dispense: 90 tablet; Refill: 1  -     escitalopram (Lexapro) 20 MG tablet; Take 1 tablet by mouth Every Night.  Dispense: 90 tablet; Refill: 1    3. Post traumatic stress disorder (PTSD)    4. Insomnia due to mental condition      Continue Wellbutrin XL 300mg daily.   Continue quetiapine 25mg nightly.   Continue Lexapro 20mg daily.       Visit Diagnoses:    ICD-10-CM ICD-9-CM   1. Generalized anxiety disorder  F41.1 300.02   2. Major depressive disorder, recurrent episode, moderate  F33.1 296.32   3. Post traumatic stress disorder (PTSD)  F43.10 309.81   4. Insomnia due to mental condition  F51.05 300.9     327.02           TREATMENT PLAN/GOALS: Continue supportive psychotherapy efforts and medications as indicated. Treatment and medication options discussed during today's visit. Patient ackowledged and verbally consented to continue with current treatment plan and was educated on the importance of compliance with treatment and follow-up appointments.    MEDICATION ISSUES:  INSPECT reviewed as  expected    Discussed medication options and treatment plan of prescribed medication as well as the risks, benefits, and side effects including potential falls, possible impaired driving and metabolic adversities among others. Patient is agreeable to call the office with any worsening of symptoms or onset of side effects. Patient is agreeable to call 911 or go to the nearest ER should he/she begin having SI/HI. No medication side effects or related complaints today.     MEDS ORDERED DURING VISIT:  New Medications Ordered This Visit   Medications    buPROPion XL (WELLBUTRIN XL) 300 MG 24 hr tablet     Sig: Take 1 tablet by mouth Every Morning.     Dispense:  90 tablet     Refill:  1    escitalopram (Lexapro) 20 MG tablet     Sig: Take 1 tablet by mouth Every Night.     Dispense:  90 tablet     Refill:  1       Return in about 3 months (around 4/18/2024).         This document has been electronically signed by CATERINA Cooney  January 18, 2024 15:19 EST    Part of this note may be an electronic transcription/translation of spoken language to printed text using the Dragon Dictation System.

## 2024-01-18 ENCOUNTER — TELEPHONE (OUTPATIENT)
Dept: CARDIOLOGY | Facility: CLINIC | Age: 54
End: 2024-01-18
Payer: MEDICARE

## 2024-01-18 ENCOUNTER — OFFICE VISIT (OUTPATIENT)
Dept: PSYCHIATRY | Facility: CLINIC | Age: 54
End: 2024-01-18
Payer: MEDICARE

## 2024-01-18 DIAGNOSIS — F41.1 GENERALIZED ANXIETY DISORDER: Primary | Chronic | ICD-10-CM

## 2024-01-18 DIAGNOSIS — F43.10 POST TRAUMATIC STRESS DISORDER (PTSD): Chronic | ICD-10-CM

## 2024-01-18 DIAGNOSIS — F51.05 INSOMNIA DUE TO MENTAL CONDITION: Chronic | ICD-10-CM

## 2024-01-18 DIAGNOSIS — F33.1 MAJOR DEPRESSIVE DISORDER, RECURRENT EPISODE, MODERATE: Chronic | ICD-10-CM

## 2024-01-18 RX ORDER — ESCITALOPRAM OXALATE 20 MG/1
20 TABLET ORAL NIGHTLY
Qty: 90 TABLET | Refills: 1 | Status: SHIPPED | OUTPATIENT
Start: 2024-01-18 | End: 2025-01-17

## 2024-01-18 RX ORDER — BUPROPION HYDROCHLORIDE 300 MG/1
300 TABLET ORAL EVERY MORNING
Qty: 90 TABLET | Refills: 1 | Status: SHIPPED | OUTPATIENT
Start: 2024-01-18

## 2024-01-18 NOTE — TELEPHONE ENCOUNTER
FACILITY: gastroenterology  DR: Tyson Hendrix  PHONE: [phone]  FAX: [phone]  PROCEDURE: Colonoscopy  SCHEDULED: TBD  MEDS TO HOLD: no blood thinners

## 2024-02-20 DIAGNOSIS — F51.05 INSOMNIA DUE TO MENTAL CONDITION: Chronic | ICD-10-CM

## 2024-02-20 RX ORDER — QUETIAPINE FUMARATE 25 MG/1
25 TABLET, FILM COATED ORAL NIGHTLY
Qty: 30 TABLET | Refills: 1 | Status: SHIPPED | OUTPATIENT
Start: 2024-02-20

## 2024-02-22 DIAGNOSIS — M47.816 ARTHROPATHY OF LUMBAR FACET JOINT: ICD-10-CM

## 2024-02-22 RX ORDER — CELECOXIB 100 MG/1
CAPSULE ORAL
Qty: 180 CAPSULE | Refills: 0 | Status: SHIPPED | OUTPATIENT
Start: 2024-02-22

## 2024-03-25 ENCOUNTER — TELEPHONE (OUTPATIENT)
Dept: FAMILY MEDICINE CLINIC | Facility: CLINIC | Age: 54
End: 2024-03-25

## 2024-03-25 NOTE — TELEPHONE ENCOUNTER
"Caller: Frank Barger \"[place]\"    Relationship: Self    Best call back number:  [phone]    What specialty or service is being requested: PAIN MANAGEMENT     What is the provider, practice or medical service name:  [facility]      What is the office location: [address] [place] [address] [place] [address] [place] [address]    What is the office phone number:  [phone]    "

## 2024-03-26 DIAGNOSIS — M51.36 DEGENERATION OF INTERVERTEBRAL DISC OF LUMBAR REGION: Primary | ICD-10-CM

## 2024-03-26 NOTE — TELEPHONE ENCOUNTER
Spoke to patient and he said [facility] told him he needed a new referral. Can you please put new referral in for  patient so I can fax for patient     Thanks

## 2024-04-16 NOTE — PROGRESS NOTES
"Subjective   Frank Barger is a 53 y.o. male who presents today for follow-up for psychiatric medication management.     Chief Complaint:  Follow up for depression and PTSD. Patient also here to establish care with this provider.     History of Present Illness:     Medication adjustments last visit:  Continue Wellbutrin XL 300mg daily.   Continue quetiapine 25mg nightly.   Continue Lexapro 20mg daily.     He states he feels like he is doing ok. He is taking quetiapine for sleep and he states the last 3-4 days  he has been waking up a lot.   We discussed increasing quetiapine  to 50mg nightly. He was agreeable to this.   He sees pain management and takes Lyrica.  Feels like medications are doing well otherwise.   Denies any suicidal thoughts.     Patient presents with symptoms and behaviors that are consistent with the following DSM-5 diagnoses:  1. Major depressive disorder  2. Generalized anxiety disorder  2. PTSD  3. Insomnia    The following portions of the patient's history were reviewed and updated as appropriate: allergies, current medications, past family history, past medical history, past social history, past surgical history and problem list.    PAST OUTPATIENT TREATMENT  Diagnosis treated:  Affective Disorder, Anxiety/Panic Disorder  Treatment Type:  Psychotherapy, Medication Management  Prior Psychiatric Medications:  Zoloft  Trazodone  Brintellix  Trintellix   Prazosin  Elavil  Abilify  Support Groups:  None  Sequelae Of Mental Disorder:  emotional distress    Interval History  No Change    Side Effects  None      Past Medical History:  Past Medical History:   Diagnosis Date    Abdominal pain     Allergic     Allergies     takes allergy injections    Anesthesia complication     states heart \"fluttering\" after anesthesia x 1- was kept in hospital 3 days s/p heart stent placement    Anxiety     Arthritis     Asthma     Chronic back pain     Constipation 06/2021    Coronary artery disease 2014    DDD " (degenerative disc disease), lumbar     Depression     Emphysema of lung     Fibromyalgia, primary 2016    GERD (gastroesophageal reflux disease)     Headache     Heart murmur 2014    Hypertension 2014    Leg pain     Lumbar spine pain     Myocardial infarction 2014    Nocturia     Scoliosis 1995    Under care of pain management specialist     pain management of [place]       Social History:  Social History     Socioeconomic History    Marital status:    Tobacco Use    Smoking status: Every Day     Current packs/day: 1.50     Average packs/day: 1.5 packs/day for 41.0 years (61.5 ttl pk-yrs)     Types: Cigarettes, Cigars     Start date: 8/5/1985     Passive exposure: Current    Smokeless tobacco: Never   Vaping Use    Vaping status: Never Used    Passive vaping exposure: Yes   Substance and Sexual Activity    Alcohol use: No    Drug use: No    Sexual activity: Yes     Partners: Female       Family History:  Family History   Problem Relation Age of Onset    Diabetes Mother     Heart disease Father     Arthritis Father     Cancer Father         Prostate    COPD Father     Anxiety disorder Daughter     Depression Daughter        Past Surgical History:  Past Surgical History:   Procedure Laterality Date    CARDIAC CATHETERIZATION N/A 01/20/2021    Procedure: Left Heart Cath and coronary angiogram;  Surgeon: Jaimie Silva MD;  Location: [facility] CATH INVASIVE LOCATION;  Service: Cardiovascular;  Laterality: N/A;    CARDIAC CATHETERIZATION N/A 01/20/2021    Procedure: Saphenous Vein Graft;  Surgeon: Jaimie Silva MD;  Location: [facility] CATH INVASIVE LOCATION;  Service: Cardiovascular;  Laterality: N/A;    CARDIAC CATHETERIZATION N/A 01/20/2021    Procedure: Stent EBENEZER coronary;  Surgeon: Herve Rodriguez MD;  Location: [facility] CATH INVASIVE LOCATION;  Service: Cardiology;  Laterality: N/A;    CARDIAC SURGERY  12/2015    CORONARY ANGIOPLASTY WITH STENT PLACEMENT  04/2014    2 in 2014, 1 in 12/2020     CORONARY ARTERY BYPASS GRAFT  12/2015    x 3    ELBOW DEBRIDEMENT Right 08/17/2021    Procedure: INCISION / DEBRIDEMENT ELBOW, closed reduction right elbow dislocation ;  Surgeon: Kyler August MD;  Location: [facility];  Service: Orthopedics;  Laterality: Right;    ELBOW OPEN REDUCTION INTERNAL FIXATION Left 08/18/2021    Procedure: Left  ulna open reduction internal fixation (of olecranon and coronoid), left radial head replacement  ;  Surgeon: Yonis Mcdonough MD;  Location: [facility] MAIN OR;  Service: Orthopedics;  Laterality: Left;       Problem List:  Patient Active Problem List   Diagnosis    Atherosclerosis of coronary artery bypass graft    Essential hypertension    Chronic pain disorder    Coronary artery disease    Degeneration of intervertebral disc of lumbar region    Fibromyositis    Mixed hyperlipidemia    Neuropathic pain    Posttraumatic stress disorder    Psoriasis    Psoriatic arthritis    Environmental and seasonal allergies    Personal history of tobacco use, presenting hazards to health    Status post coronary artery bypass graft    Gastroesophageal reflux disease without esophagitis    Tinnitus of left ear    Intractable migraine without aura and with status migrainosus    Cerumen debris on tympanic membrane of both ears    Bilateral deafness    Ischemic heart disease due to coronary artery obstruction    Status post angioplasty with stent    Dyslipidemia    Abnormal nuclear stress test    Chest pain with high risk of acute coronary syndrome    Iron deficiency anemia    Arthropathy of lumbar facet joint    Arthropathy of right knee    Syncope and collapse    History of scoliosis    COPD (chronic obstructive pulmonary disease)    Dislocation of elbow, right, open, initial encounter    Fracture of left elbow    Major depressive disorder, recurrent episode, moderate    Psychophysiological insomnia    Posterior dislocation of right radial head    Displaced fracture of olecranon process with  intraarticular extension of left ulna, subsequent encounter for closed fracture with routine healing    Displaced fracture of head of left radius, subsequent encounter for closed fracture with routine healing    Viral gastroenteritis    Post traumatic stress disorder (PTSD)    Insomnia due to mental condition    Bipolar I disorder, most recent episode depressed       Allergy:   Allergies   Allergen Reactions    Hydrocodone Nausea And Vomiting    Hydrocodone-Acetaminophen GI Intolerance    Oxycodone GI Intolerance        Discontinued Medications:  Medications Discontinued During This Encounter   Medication Reason    dicyclomine (BENTYL) 10 MG capsule     loratadine (CLARITIN) 10 MG tablet     ondansetron (ZOFRAN) 4 MG tablet     pantoprazole (PROTONIX) 40 MG EC tablet     QUEtiapine (SEROquel) 25 MG tablet Reorder             Current Medications:   Current Outpatient Medications   Medication Sig Dispense Refill    aspirin 81 MG EC tablet 0      atorvastatin (LIPITOR) 20 MG tablet TAKE 1 TABLET BY MOUTH EVERY NIGHT 90 tablet 1    buPROPion XL (WELLBUTRIN XL) 300 MG 24 hr tablet Take 1 tablet by mouth Every Morning. 90 tablet 1    celecoxib (CeleBREX) 100 MG capsule TAKE 1 CAPSULE BY MOUTH TWICE DAILY WITH FOOD AS NEEDED FOR MODERATE PAIN 180 capsule 0    cyclobenzaprine (FLEXERIL) 10 MG tablet Take 1 tablet by mouth 3 (Three) Times a Day As Needed for Muscle Spasms. 90 tablet 11    escitalopram (Lexapro) 20 MG tablet Take 1 tablet by mouth Every Night. 90 tablet 1    meclizine (ANTIVERT) 25 MG tablet Take 1 tablet by mouth 3 (Three) Times a Day As Needed for Dizziness. 30 tablet 0    metoprolol tartrate (LOPRESSOR) 25 MG tablet TAKE 1 TABLET BY MOUTH TWICE DAILY 180 tablet 1    montelukast (SINGULAIR) 10 MG tablet 0      nitroglycerin (NITROSTAT) 0.4 MG SL tablet DISSOLVE 1 TABLET UNDER THE TONGUE EVERY 5 MINUTES AS NEEDED FOR CHEST PAIN. NOT TO EXCEED 3 DOSES IN 15 MINUTES      NON FORMULARY Allergy injections       omeprazole (priLOSEC) 40 MG capsule Take 1 capsule by mouth Daily.      pregabalin (LYRICA) 300 MG capsule Take 1 capsule by mouth 2 (Two) Times a Day.      QUEtiapine (SEROquel) 50 MG tablet Take 1 tablet by mouth Every Night. 90 tablet 1     No current facility-administered medications for this visit.         Psychological ROS: positive for - anxiety, depression and sleep disturbances  negative for - behavioral disorder, concentration difficulties, decreased libido, disorientation, hallucinations, hostility, irritability, memory difficulties, mood swings, obsessive thoughts, physical abuse, sexual abuse or suicidal ideation      Physical Exam:   Blood pressure 132/78, pulse 99, SpO2 99%.    Mental Status Exam:   Hygiene:   good  Cooperation:  Cooperative  Eye Contact:  Good  Psychomotor Behavior:  Appropriate  Affect:  Appropriate  Mood: normal  Hopelessness: Denies  Speech:  Normal  Thought Process:  Linear  Thought Content:  Normal  Suicidal:  None  Homicidal:  None  Hallucinations:  None  Delusion:  None  Memory:  Intact  Orientation:  Person, Place, Time and Situation  Reliability:  good  Insight:  Good  Judgement:  Good  Impulse Control:  Good  Physical/Medical Issues:  No      Mental status exam was reviewed and compared to visit on 1/18/24 and appropriate updates were made.     PHQ-9 Depression Screening    Little interest or pleasure in doing things? 1-->several days   Feeling down, depressed, or hopeless? 1-->several days   Trouble falling or staying asleep, or sleeping too much? 2-->more than half the days   Feeling tired or having little energy? 2-->more than half the days   Poor appetite or overeating? 0-->not at all   Feeling bad about yourself - or that you are a failure or have let yourself or your family down? 0-->not at all   Trouble concentrating on things, such as reading the newspaper or watching television? 0-->not at all   Moving or speaking so slowly that other people could have noticed? Or  the opposite - being so fidgety or restless that you have been moving around a lot more than usual? 0-->not at all   Thoughts that you would be better off dead, or of hurting yourself in some way? 0-->not at all   PHQ-9 Total Score 6   If you checked off any problems, how difficult have these problems made it for you to do your work, take care of things at home, or get along with other people?          Current every day smoker less than 3 minutes spent counseling Not agreeable to stopping    I advised Frank of the risks of tobacco use.     Result Review:    Labs:  No visits with results within 3 Month(s) from this visit.   Latest known visit with results is:   Lab on 11/29/2023   Component Date Value Ref Range Status    Glucose 11/29/2023 102 (H)  65 - 99 mg/dL Final    BUN 11/29/2023 15  6 - 20 mg/dL Final    Creatinine 11/29/2023 1.04  0.76 - 1.27 mg/dL Final    Sodium 11/29/2023 138  136 - 145 mmol/L Final    Potassium 11/29/2023 4.1  3.5 - 5.2 mmol/L Final    Chloride 11/29/2023 104  98 - 107 mmol/L Final    CO2 11/29/2023 24.5  22.0 - 29.0 mmol/L Final    Calcium 11/29/2023 9.6  8.6 - 10.5 mg/dL Final    Total Protein 11/29/2023 7.5  6.0 - 8.5 g/dL Final    Albumin 11/29/2023 4.5  3.5 - 5.2 g/dL Final    ALT (SGPT) 11/29/2023 11  1 - 41 U/L Final    AST (SGOT) 11/29/2023 15  1 - 40 U/L Final    Alkaline Phosphatase 11/29/2023 71  39 - 117 U/L Final    Total Bilirubin 11/29/2023 0.4  0.0 - 1.2 mg/dL Final    Globulin 11/29/2023 3.0  gm/dL Final    A/G Ratio 11/29/2023 1.5  g/dL Final    BUN/Creatinine Ratio 11/29/2023 14.4  7.0 - 25.0 Final    Anion Gap 11/29/2023 9.5  5.0 - 15.0 mmol/L Final    eGFR 11/29/2023 85.9  >60.0 mL/min/1.73 Final    Total Cholesterol 11/29/2023 117  0 - 200 mg/dL Final    Triglycerides 11/29/2023 94  0 - 150 mg/dL Final    HDL Cholesterol 11/29/2023 32 (L)  40 - 60 mg/dL Final    LDL Cholesterol  11/29/2023 67  0 - 100 mg/dL Final    VLDL Cholesterol 11/29/2023 18  5 - 40 mg/dL  Final    LDL/HDL Ratio 11/29/2023 2.07   Final       Assessment & Plan     Diagnoses and all orders for this visit:    1. Major depressive disorder, recurrent episode, moderate (Primary)    2. Insomnia due to mental condition  -     QUEtiapine (SEROquel) 50 MG tablet; Take 1 tablet by mouth Every Night.  Dispense: 90 tablet; Refill: 1    3. Generalized anxiety disorder    4. Post traumatic stress disorder (PTSD)        Continue Wellbutrin XL 300mg daily.   Continue quetiapine, increase to 50mg nightly.   Continue Lexapro 20mg daily.       Visit Diagnoses:    ICD-10-CM ICD-9-CM   1. Major depressive disorder, recurrent episode, moderate  F33.1 296.32   2. Insomnia due to mental condition  F51.05 300.9     327.02   3. Generalized anxiety disorder  F41.1 300.02   4. Post traumatic stress disorder (PTSD)  F43.10 309.81     TREATMENT PLAN/GOALS: Continue supportive psychotherapy efforts and medications as indicated. Treatment and medication options discussed during today's visit. Patient ackowledged and verbally consented to continue with current treatment plan and was educated on the importance of compliance with treatment and follow-up appointments.    MEDICATION ISSUES:  INSPECT reviewed as expected    Discussed medication options and treatment plan of prescribed medication as well as the risks, benefits, and side effects including potential falls, possible impaired driving and metabolic adversities among others. Patient is agreeable to call the office with any worsening of symptoms or onset of side effects. Patient is agreeable to call 911 or go to the nearest ER should he/she begin having SI/HI. No medication side effects or related complaints today.     MEDS ORDERED DURING VISIT:  New Medications Ordered This Visit   Medications    QUEtiapine (SEROquel) 50 MG tablet     Sig: Take 1 tablet by mouth Every Night.     Dispense:  90 tablet     Refill:  1       Return in about 4 months (around 8/18/2024).         This  document has been electronically signed by CATERINA Cooney  April 18, 2024 19:21 EDT    Part of this note may be an electronic transcription/translation of spoken language to printed text using the Dragon Dictation System.

## 2024-04-18 ENCOUNTER — OFFICE VISIT (OUTPATIENT)
Dept: PSYCHIATRY | Facility: CLINIC | Age: 54
End: 2024-04-18
Payer: MEDICARE

## 2024-04-18 VITALS — SYSTOLIC BLOOD PRESSURE: 132 MMHG | HEART RATE: 99 BPM | OXYGEN SATURATION: 99 % | DIASTOLIC BLOOD PRESSURE: 78 MMHG

## 2024-04-18 DIAGNOSIS — F41.1 GENERALIZED ANXIETY DISORDER: Chronic | ICD-10-CM

## 2024-04-18 DIAGNOSIS — F33.1 MAJOR DEPRESSIVE DISORDER, RECURRENT EPISODE, MODERATE: Primary | Chronic | ICD-10-CM

## 2024-04-18 DIAGNOSIS — F51.05 INSOMNIA DUE TO MENTAL CONDITION: Chronic | ICD-10-CM

## 2024-04-18 DIAGNOSIS — F43.10 POST TRAUMATIC STRESS DISORDER (PTSD): Chronic | ICD-10-CM

## 2024-04-18 RX ORDER — ASPIRIN 81 MG/1
TABLET ORAL
COMMUNITY

## 2024-04-18 RX ORDER — OMEPRAZOLE 40 MG/1
40 CAPSULE, DELAYED RELEASE ORAL DAILY
COMMUNITY

## 2024-04-18 RX ORDER — MONTELUKAST SODIUM 10 MG/1
TABLET ORAL
COMMUNITY

## 2024-04-18 RX ORDER — QUETIAPINE FUMARATE 50 MG/1
50 TABLET, FILM COATED ORAL NIGHTLY
Qty: 90 TABLET | Refills: 1 | Status: SHIPPED | OUTPATIENT
Start: 2024-04-18

## 2024-04-22 DIAGNOSIS — F51.05 INSOMNIA DUE TO MENTAL CONDITION: Chronic | ICD-10-CM

## 2024-04-22 RX ORDER — QUETIAPINE FUMARATE 25 MG/1
25 TABLET, FILM COATED ORAL NIGHTLY
Qty: 30 TABLET | Refills: 1 | OUTPATIENT
Start: 2024-04-22

## 2024-05-22 DIAGNOSIS — I25.9 ISCHEMIC HEART DISEASE DUE TO CORONARY ARTERY OBSTRUCTION: ICD-10-CM

## 2024-05-22 DIAGNOSIS — M47.816 ARTHROPATHY OF LUMBAR FACET JOINT: ICD-10-CM

## 2024-05-22 DIAGNOSIS — I24.0 ISCHEMIC HEART DISEASE DUE TO CORONARY ARTERY OBSTRUCTION: ICD-10-CM

## 2024-05-22 DIAGNOSIS — E78.2 MIXED HYPERLIPIDEMIA: ICD-10-CM

## 2024-05-22 RX ORDER — CELECOXIB 100 MG/1
CAPSULE ORAL
Qty: 180 CAPSULE | Refills: 0 | Status: SHIPPED | OUTPATIENT
Start: 2024-05-22

## 2024-05-22 RX ORDER — ATORVASTATIN CALCIUM 20 MG/1
20 TABLET, FILM COATED ORAL NIGHTLY
Qty: 90 TABLET | Refills: 1 | Status: SHIPPED | OUTPATIENT
Start: 2024-05-22

## 2024-05-22 RX ORDER — PANTOPRAZOLE SODIUM 40 MG/1
40 TABLET, DELAYED RELEASE ORAL DAILY
Qty: 90 TABLET | Refills: 1 | OUTPATIENT
Start: 2024-05-22

## 2024-05-24 ENCOUNTER — TELEPHONE (OUTPATIENT)
Dept: FAMILY MEDICINE CLINIC | Facility: CLINIC | Age: 54
End: 2024-05-24
Payer: MEDICARE

## 2024-05-24 RX ORDER — ONDANSETRON 4 MG/1
TABLET, FILM COATED ORAL
Qty: 42 TABLET | Refills: 0 | Status: SHIPPED | OUTPATIENT
Start: 2024-05-24

## 2024-05-29 ENCOUNTER — LAB (OUTPATIENT)
Dept: FAMILY MEDICINE CLINIC | Facility: CLINIC | Age: 54
End: 2024-05-29
Payer: MEDICARE

## 2024-05-29 ENCOUNTER — OFFICE VISIT (OUTPATIENT)
Dept: FAMILY MEDICINE CLINIC | Facility: CLINIC | Age: 54
End: 2024-05-29
Payer: MEDICARE

## 2024-05-29 VITALS
HEIGHT: 72 IN | HEART RATE: 100 BPM | TEMPERATURE: 98.2 F | SYSTOLIC BLOOD PRESSURE: 111 MMHG | WEIGHT: 170 LBS | BODY MASS INDEX: 23.03 KG/M2 | DIASTOLIC BLOOD PRESSURE: 78 MMHG | OXYGEN SATURATION: 100 %

## 2024-05-29 DIAGNOSIS — E78.2 MIXED HYPERLIPIDEMIA: ICD-10-CM

## 2024-05-29 DIAGNOSIS — Z00.00 MEDICARE ANNUAL WELLNESS VISIT, SUBSEQUENT: ICD-10-CM

## 2024-05-29 DIAGNOSIS — I24.0 ISCHEMIC HEART DISEASE DUE TO CORONARY ARTERY OBSTRUCTION: ICD-10-CM

## 2024-05-29 DIAGNOSIS — Z00.00 MEDICARE ANNUAL WELLNESS VISIT, SUBSEQUENT: Primary | ICD-10-CM

## 2024-05-29 DIAGNOSIS — I25.9 ISCHEMIC HEART DISEASE DUE TO CORONARY ARTERY OBSTRUCTION: ICD-10-CM

## 2024-05-29 DIAGNOSIS — G89.4 CHRONIC PAIN DISORDER: ICD-10-CM

## 2024-05-29 DIAGNOSIS — F33.1 MAJOR DEPRESSIVE DISORDER, RECURRENT EPISODE, MODERATE: ICD-10-CM

## 2024-05-29 DIAGNOSIS — Z12.11 COLON CANCER SCREENING: ICD-10-CM

## 2024-05-29 LAB
ALBUMIN SERPL-MCNC: 4.7 G/DL (ref 3.5–5.2)
ALBUMIN/GLOB SERPL: 1.3 G/DL
ALP SERPL-CCNC: 95 U/L (ref 39–117)
ALT SERPL W P-5'-P-CCNC: 11 U/L (ref 1–41)
ANION GAP SERPL CALCULATED.3IONS-SCNC: 15 MMOL/L (ref 5–15)
ANISOCYTOSIS BLD QL: ABNORMAL
AST SERPL-CCNC: 10 U/L (ref 1–40)
BASOPHILS # BLD MANUAL: 0.08 10*3/MM3 (ref 0–0.2)
BASOPHILS NFR BLD MANUAL: 1 % (ref 0–1.5)
BILIRUB SERPL-MCNC: 0.3 MG/DL (ref 0–1.2)
BUN SERPL-MCNC: 13 MG/DL (ref 6–20)
BUN/CREAT SERPL: 11.1 (ref 7–25)
CALCIUM SPEC-SCNC: 9.7 MG/DL (ref 8.6–10.5)
CHLORIDE SERPL-SCNC: 100 MMOL/L (ref 98–107)
CHOLEST SERPL-MCNC: 105 MG/DL (ref 0–200)
CO2 SERPL-SCNC: 22 MMOL/L (ref 22–29)
CREAT SERPL-MCNC: 1.17 MG/DL (ref 0.76–1.27)
DEPRECATED RDW RBC AUTO: 47.7 FL (ref 37–54)
EGFRCR SERPLBLD CKD-EPI 2021: 74.5 ML/MIN/1.73
EOSINOPHIL # BLD MANUAL: 0.08 10*3/MM3 (ref 0–0.4)
EOSINOPHIL NFR BLD MANUAL: 1 % (ref 0.3–6.2)
ERYTHROCYTE [DISTWIDTH] IN BLOOD BY AUTOMATED COUNT: 20.7 % (ref 12.3–15.4)
GLOBULIN UR ELPH-MCNC: 3.5 GM/DL
GLUCOSE SERPL-MCNC: 110 MG/DL (ref 65–99)
HCT VFR BLD AUTO: 33.1 % (ref 37.5–51)
HDLC SERPL-MCNC: 39 MG/DL (ref 40–60)
HGB BLD-MCNC: 8.7 G/DL (ref 13–17.7)
HYPOCHROMIA BLD QL: ABNORMAL
LDLC SERPL CALC-MCNC: 48 MG/DL (ref 0–100)
LDLC/HDLC SERPL: 1.22 {RATIO}
LYMPHOCYTES # BLD MANUAL: 1.45 10*3/MM3 (ref 0.7–3.1)
LYMPHOCYTES NFR BLD MANUAL: 6.1 % (ref 5–12)
MCH RBC QN AUTO: 17.7 PG (ref 26.6–33)
MCHC RBC AUTO-ENTMCNC: 26.3 G/DL (ref 31.5–35.7)
MCV RBC AUTO: 67.3 FL (ref 79–97)
MICROCYTES BLD QL: ABNORMAL
MONOCYTES # BLD: 0.46 10*3/MM3 (ref 0.1–0.9)
NEUTROPHILS # BLD AUTO: 5.5 10*3/MM3 (ref 1.7–7)
NEUTROPHILS NFR BLD MANUAL: 72.7 % (ref 42.7–76)
NRBC BLD AUTO-RTO: 0 /100 WBC (ref 0–0.2)
PLAT MORPH BLD: NORMAL
PLATELET # BLD AUTO: 468 10*3/MM3 (ref 140–450)
PMV BLD AUTO: 9.5 FL (ref 6–12)
POIKILOCYTOSIS BLD QL SMEAR: ABNORMAL
POLYCHROMASIA BLD QL SMEAR: ABNORMAL
POTASSIUM SERPL-SCNC: 4.5 MMOL/L (ref 3.5–5.2)
PROT SERPL-MCNC: 8.2 G/DL (ref 6–8.5)
RBC # BLD AUTO: 4.92 10*6/MM3 (ref 4.14–5.8)
SODIUM SERPL-SCNC: 137 MMOL/L (ref 136–145)
TRIGL SERPL-MCNC: 93 MG/DL (ref 0–150)
VARIANT LYMPHS NFR BLD MANUAL: 19.2 % (ref 19.6–45.3)
VLDLC SERPL-MCNC: 18 MG/DL (ref 5–40)
WBC MORPH BLD: NORMAL
WBC NRBC COR # BLD AUTO: 7.56 10*3/MM3 (ref 3.4–10.8)

## 2024-05-29 PROCEDURE — 85007 BL SMEAR W/DIFF WBC COUNT: CPT | Performed by: NURSE PRACTITIONER

## 2024-05-29 PROCEDURE — 85025 COMPLETE CBC W/AUTO DIFF WBC: CPT | Performed by: NURSE PRACTITIONER

## 2024-05-29 PROCEDURE — 1125F AMNT PAIN NOTED PAIN PRSNT: CPT | Performed by: NURSE PRACTITIONER

## 2024-05-29 PROCEDURE — 1170F FXNL STATUS ASSESSED: CPT | Performed by: NURSE PRACTITIONER

## 2024-05-29 PROCEDURE — G0439 PPPS, SUBSEQ VISIT: HCPCS | Performed by: NURSE PRACTITIONER

## 2024-05-29 PROCEDURE — 80061 LIPID PANEL: CPT | Performed by: NURSE PRACTITIONER

## 2024-05-29 PROCEDURE — 80053 COMPREHEN METABOLIC PANEL: CPT | Performed by: NURSE PRACTITIONER

## 2024-05-29 PROCEDURE — 3078F DIAST BP <80 MM HG: CPT | Performed by: NURSE PRACTITIONER

## 2024-05-29 PROCEDURE — 3074F SYST BP LT 130 MM HG: CPT | Performed by: NURSE PRACTITIONER

## 2024-05-29 PROCEDURE — 1159F MED LIST DOCD IN RCRD: CPT | Performed by: NURSE PRACTITIONER

## 2024-05-29 PROCEDURE — 36415 COLL VENOUS BLD VENIPUNCTURE: CPT

## 2024-05-29 PROCEDURE — 1160F RVW MEDS BY RX/DR IN RCRD: CPT | Performed by: NURSE PRACTITIONER

## 2024-05-29 NOTE — PROGRESS NOTES
The ABCs of the Annual Wellness Visit  Subsequent Medicare Wellness Visit    Chief Complaint   Patient presents with    Medicare Wellness-subsequent      Subjective    History of Present Illness:  Frank Barger is a 53 y.o. male who presents for a Subsequent Medicare Wellness Visit.  He smokes 1 ppd.  He has been smoking since he was 10 yo.   He does not work at this time  He tries to walk  He is smoking 1/2 ppd  He is eating a lot of ice  Gets lightheaded in the heat     Hypertension with CAD-patient is currently on metoprolol 25 mg twice daily.  He has had heart stents placed. He is seen by cardiology- Dr. Silva. Denies CP,  HA. Has occasional SOA and lightheadedness when he is in the heat. He states his plavix was stopped last year. He take 81mg ASA daily.      Hyperlipidemia-patient is currently on Lipitor 20 mg daily.      Depression-patient is currently on lexapro 20 mg daily and Wellbutrin  mg daily. Doing well on meds. Denies SI or HI. See psych.      Psoriatic arthritis-patient sees rheumatology but hasn't in quite some time.  He is off the Humira. He is on celebrex.     Chronic pain-  takes Lyrica 300 mg twice daily. Sees pain management. [facility]      Insomnia-patient is currently on seroquel 25mg nightly. Doing well     GERD-patient is currently on Protonix 40 mg daily.     IBS- pt is on dicyclomine 10mg QID. He has been taking it 4 times daily and not just as needed. It helps with his symptoms.     Allergies- pt is currently on singulair daily.      Labs- due  Colonoscopy- due  PSA- Lab Results       Component                Value               Date                       PSA                      2.610               01/05/2022                 PSA                      1.50                04/13/2017                  Vaccines:  Flu- due- will get at [facility]  Tdap-  Shingles-  PNA-  Covid-19    The following portions of the patient's history were  reviewed and   updated as appropriate: allergies, current medications, past family history, past medical history, past social history, past surgical history, and problem list.    Compared to one year ago, the patient feels his physical   health is the same.    Compared to one year ago, the patient feels his mental   health is the same.    Recent Hospitalizations:  He was not admitted to the hospital during the last year.       Current Medical Providers:  Patient Care Team:  Lilliana Garcia APRN as PCP - General (Nurse Practitioner)  Jaimie Silva MD as Consulting Physician (Cardiology)    Outpatient Medications Prior to Visit   Medication Sig Dispense Refill    aspirin 81 MG EC tablet 0      atorvastatin (LIPITOR) 20 MG tablet TAKE 1 TABLET BY MOUTH EVERY NIGHT 90 tablet 1    buPROPion XL (WELLBUTRIN XL) 300 MG 24 hr tablet Take 1 tablet by mouth Every Morning. 90 tablet 1    celecoxib (CeleBREX) 100 MG capsule TAKE 1 CAPSULE BY MOUTH TWICE DAILY WITH FOOD AS NEEDED FOR MODERATE PAIN 180 capsule 0    cyclobenzaprine (FLEXERIL) 10 MG tablet Take 1 tablet by mouth 3 (Three) Times a Day As Needed for Muscle Spasms. 90 tablet 11    escitalopram (Lexapro) 20 MG tablet Take 1 tablet by mouth Every Night. 90 tablet 1    meclizine (ANTIVERT) 25 MG tablet Take 1 tablet by mouth 3 (Three) Times a Day As Needed for Dizziness. 30 tablet 0    metoprolol tartrate (LOPRESSOR) 25 MG tablet TAKE 1 TABLET BY MOUTH TWICE DAILY 180 tablet 1    montelukast (SINGULAIR) 10 MG tablet 0      nitroglycerin (NITROSTAT) 0.4 MG SL tablet DISSOLVE 1 TABLET UNDER THE TONGUE EVERY 5 MINUTES AS NEEDED FOR CHEST PAIN. NOT TO EXCEED 3 DOSES IN 15 MINUTES      NON FORMULARY Allergy injections      omeprazole (priLOSEC) 40 MG capsule Take 1 capsule by mouth Daily.      ondansetron (ZOFRAN) 4 MG tablet TAKE 1 TABLET BY MOUTH EVERY 8 HOURS AS NEEDED FOR NAUSEA OR VOMITING 42 tablet 0    pregabalin (LYRICA) 300 MG capsule Take 1 capsule by mouth 2  (Two) Times a Day.      QUEtiapine (SEROquel) 50 MG tablet Take 1 tablet by mouth Every Night. 90 tablet 1     No facility-administered medications prior to visit.       No opioid medication identified on active medication list. I have reviewed chart for other potential  high risk medication/s and harmful drug interactions in the elderly.        Aspirin is on active medication list. Aspirin use is indicated based on review of current medical condition/s. Pros and cons of this therapy have been discussed today. Benefits of this medication outweigh potential harm.  Patient has been encouraged to continue taking this medication.  .      Patient Active Problem List   Diagnosis    Atherosclerosis of coronary artery bypass graft    Essential hypertension    Chronic pain disorder    Coronary artery disease    Degeneration of intervertebral disc of lumbar region    Fibromyositis    Mixed hyperlipidemia    Neuropathic pain    Posttraumatic stress disorder    Psoriasis    Psoriatic arthritis    Environmental and seasonal allergies    Personal history of tobacco use, presenting hazards to health    Status post coronary artery bypass graft    Gastroesophageal reflux disease without esophagitis    Tinnitus of left ear    Intractable migraine without aura and with status migrainosus    Cerumen debris on tympanic membrane of both ears    Bilateral deafness    Ischemic heart disease due to coronary artery obstruction    Status post angioplasty with stent    Dyslipidemia    Abnormal nuclear stress test    Chest pain with high risk of acute coronary syndrome    Iron deficiency anemia    Arthropathy of lumbar facet joint    Arthropathy of right knee    Syncope and collapse    History of scoliosis    COPD (chronic obstructive pulmonary disease)    Dislocation of elbow, right, open, initial encounter    Fracture of left elbow    Major depressive disorder, recurrent episode, moderate    Psychophysiological insomnia    Posterior  "dislocation of right radial head    Displaced fracture of olecranon process with intraarticular extension of left ulna, subsequent encounter for closed fracture with routine healing    Displaced fracture of head of left radius, subsequent encounter for closed fracture with routine healing    Viral gastroenteritis    Post traumatic stress disorder (PTSD)    Insomnia due to mental condition    Bipolar I disorder, most recent episode depressed     Advance Care Planning  Advance Directive is not on file.  ACP discussion was held with the patient during this visit. Patient has an advance directive (not in EMR), copy requested.    Review of Systems   Constitutional:  Negative for chills, fatigue and fever.   Respiratory:  Positive for shortness of breath. Negative for chest tightness.    Cardiovascular:  Negative for chest pain and palpitations.   Gastrointestinal:  Negative for abdominal pain, nausea and vomiting.   Genitourinary:  Negative for frequency and urgency.   Musculoskeletal:  Positive for arthralgias.   Neurological:  Positive for light-headedness. Negative for dizziness.   Psychiatric/Behavioral:  Negative for self-injury and suicidal ideas. The patient is not nervous/anxious.         Objective    Vitals:    05/29/24 0923   BP: 111/78   BP Location: Left arm   Patient Position: Sitting   Cuff Size: Adult   Pulse: 100   Temp: 98.2 °F (36.8 °C)   TempSrc: Tympanic   SpO2: 100%   Weight: 77.1 kg (170 lb)   Height: 181.6 cm (71.5\")     Estimated body mass index is 23.38 kg/m² as calculated from the following:    Height as of this encounter: 181.6 cm (71.5\").    Weight as of this encounter: 77.1 kg (170 lb).    BMI is within normal parameters. No other follow-up for BMI required.      Does the patient have evidence of cognitive impairment? No    Physical Exam  Constitutional:       General: He is not in acute distress.     Appearance: Normal appearance. He is not ill-appearing.   HENT:      Head: Normocephalic and " atraumatic.   Eyes:      Extraocular Movements: Extraocular movements intact.   Cardiovascular:      Rate and Rhythm: Normal rate and regular rhythm.      Heart sounds: No murmur heard.  Pulmonary:      Effort: Pulmonary effort is normal. No respiratory distress.   Skin:     General: Skin is warm and dry.   Neurological:      General: No focal deficit present.      Mental Status: He is alert and oriented to person, place, and time.   Psychiatric:         Mood and Affect: Mood normal.         Behavior: Behavior normal.         Thought Content: Thought content normal.         Judgment: Judgment normal.                 HEALTH RISK ASSESSMENT    Smoking Status:  Social History     Tobacco Use   Smoking Status Every Day    Current packs/day: 1.50    Average packs/day: 1.5 packs/day for 41.0 years (61.5 ttl pk-yrs)    Types: Cigarettes, Cigars    Start date: 1985    Passive exposure: Current   Smokeless Tobacco Never     Alcohol Consumption:  Social History     Substance and Sexual Activity   Alcohol Use No     Fall Risk Screen:    STEADI Fall Risk Assessment was completed, and patient is at LOW risk for falls.Assessment completed on:2024    Depression Screenin/29/2024     9:28 AM   PHQ-2/PHQ-9 Depression Screening   Little Interest or Pleasure in Doing Things 0-->not at all   Feeling Down, Depressed or Hopeless 0-->not at all   PHQ-9: Brief Depression Severity Measure Score 0       Health Habits and Functional and Cognitive Screenin/29/2024     9:28 AM   Functional & Cognitive Status   Do you have difficulty preparing food and eating? No   Do you have difficulty bathing yourself, getting dressed or grooming yourself? No   Do you have difficulty using the toilet? No   Do you have difficulty moving around from place to place? Yes   Do you have trouble with steps or getting out of a bed or a chair? Yes   Current Diet Well Balanced Diet   Dental Exam Not up to date   Eye Exam Up to date   Current  Exercises Include Walking   Do you need help using the phone?  No   Are you deaf or do you have serious difficulty hearing?  Yes   Do you need help to go to places out of walking distance? Yes   Do you need help shopping? Yes   Do you need help preparing meals?  No   Do you need help with housework?  Yes   Do you need help with laundry? Yes   Do you need help taking your medications? No   Do you need help managing money? No   Do you ever drive or ride in a car without wearing a seat belt? No   Have you felt unusual stress, anger or loneliness in the last month? No   Who do you live with? Spouse   If you need help, do you have trouble finding someone available to you? No   Do you have difficulty concentrating, remembering or making decisions? Yes       Age-appropriate Screening Schedule:  Refer to the list below for future screening recommendations based on patient's age, sex and/or medical conditions. Orders for these recommended tests are listed in the plan section. The patient has been provided with a written plan.    Health Maintenance   Topic Date Due    COLORECTAL CANCER SCREENING  Never done    TDAP/TD VACCINES (1 - Tdap) Never done    ZOSTER VACCINE (1 of 2) Never done    INFLUENZA VACCINE  08/01/2024    LIPID PANEL  11/29/2024    LUNG CANCER SCREENING  01/09/2025    ANNUAL WELLNESS VISIT  05/29/2025    HEPATITIS C SCREENING  Completed    COVID-19 Vaccine  Completed    Pneumococcal Vaccine 0-64  Completed              Assessment & Plan   CMS Preventative Services Quick Reference  Risk Factors Identified During Encounter  Chronic Pain:  sees PM  Tobacco Use/Dependance Risk (use dotphrase .tobaccocessation for documentation)  The above risks/problems have been discussed with the patient.  Follow up actions/plans if indicated are seen below in the Assessment/Plan Section.  Pertinent information has been shared with the patient in the After Visit Summary.    Diagnoses and all orders for this visit:    1. Medicare  annual wellness visit, subsequent (Primary)  Comments:  doing well overall  smoking cessation discussed  check labs  get colonoscopy  Orders:  -     Lipid Panel; Future  -     Comprehensive Metabolic Panel; Future  -     CBC & Differential    2. Colon cancer screening  -     Ambulatory Referral For Screening Colonoscopy    3. Mixed hyperlipidemia  Comments:  stable  cont statin   work on diet and exercise  Orders:  -     Lipid Panel; Future  -     CBC & Differential    4. Ischemic heart disease due to coronary artery obstruction  Comments:  sees cardio  has some lightheadedness  check labs  cont beta blocker and ASA  smoking cessation  Orders:  -     Lipid Panel; Future  -     CBC & Differential    5. Major depressive disorder, recurrent episode, moderate  Comments:  stable  cont current meds  denies SI or HI    6. Chronic pain disorder  Comments:  sees PM   cont lyrica        Follow Up:   Return in about 6 months (around 11/29/2024).     An After Visit Summary and PPPS were made available to the patient.

## 2024-05-30 DIAGNOSIS — D64.9 LOW HEMOGLOBIN: Primary | ICD-10-CM

## 2024-05-30 RX ORDER — FERROUS SULFATE 325(65) MG
325 TABLET ORAL
Qty: 30 TABLET | Refills: 1 | Status: SHIPPED | OUTPATIENT
Start: 2024-05-30

## 2024-05-31 NOTE — PROGRESS NOTES
Left message to return call to doctor's office at [facility]. Either to get test results or message from provider.

## 2024-06-03 NOTE — PROGRESS NOTES
Left message to return call to doctor's office at [facility]. Either to get test results or message from provider.

## 2024-06-11 NOTE — PROGRESS NOTES
HEMATOLOGY ONCOLOGY OUTPATIENT CONSULTATION       Patient name: Frank Barger  : 1970  MRN: [id number]  Primary Care Physician: Lilliana Garcia APRN  Referring Physician: Lilliana Garcia APRN  Reason For Consult:       History of Present Illness:  Patient is a 53 y.o. male who has been referred to us for further evaluation and management of anemia.  Outside labs are reviewed and are summarized as follows:    2024:  CBC: 7.56/8.7/33.1/468 [MCV 67.3, MCH 17.7, RDW 20.7]  CMP: Unremarkable    On review of previous labs, it appears that the microcytic anemia is relatively new in onset as CBC from 1 year prior and previously was unremarkable.    Patient reported that he has previous history of iron deficiency anemia, about 3 years ago for which she was placed on oral iron supplementation which was later discontinued following improvement.  He has been started back on oral iron supplementation about 2 weeks ago following his recent blood work at PCPs office.      He has history of multivessel coronary artery disease requiring him to have a triple vessel CABG in ; he is currently on low-dose daily aspirin.   Also has reported history of Psoriatec arthritis, patient is not on any disease modifying therapy but does take pregabalin for neuropathy symptoms.    He reported that he has been referred for screening colonoscopy due to GI but does not have an appointment at this time.  Psoriatic arthritis: Pregabalin    He is a lifelong smoker and has smoked 1 pack/day since age 11  Currently smoking half pack a day for the last few years.      Subjective:  Patient presents for initial consultation today.  He reported having multiple symptoms at this time including fatigue, dizziness, lightheadedness.  Reported that he has low energy on most days, reported having dizzy spells on changing position which is a new symptom.  He denied any episodes of palpitations or loss of  "consciousness.  Denied any visible GI bleeding.    Past Medical History:   Diagnosis Date    Abdominal pain     Allergic     Allergies     takes allergy injections    Anesthesia complication     states heart \"fluttering\" after anesthesia x 1- was kept in hospital 3 days s/p heart stent placement    Anxiety     Arthritis     Asthma     Chronic back pain     Constipation 06/2021    Coronary artery disease 2014    DDD (degenerative disc disease), lumbar     Depression     Emphysema of lung     Fibromyalgia, primary 2016    GERD (gastroesophageal reflux disease)     Headache     Heart murmur 2014    Hypertension 2014    Leg pain     Lumbar spine pain     Myocardial infarction 2014    Nocturia     Scoliosis 1995    Under care of pain management specialist     pain management of [place]       Past Surgical History:   Procedure Laterality Date    CARDIAC CATHETERIZATION N/A 01/20/2021    Procedure: Left Heart Cath and coronary angiogram;  Surgeon: Jaimie Silva MD;  Location: [facility] CATH INVASIVE LOCATION;  Service: Cardiovascular;  Laterality: N/A;    CARDIAC CATHETERIZATION N/A 01/20/2021    Procedure: Saphenous Vein Graft;  Surgeon: Jaimie Silva MD;  Location: [facility] CATH INVASIVE LOCATION;  Service: Cardiovascular;  Laterality: N/A;    CARDIAC CATHETERIZATION N/A 01/20/2021    Procedure: Stent EBENEZER coronary;  Surgeon: Herve Rodriguez MD;  Location: [facility] CATH INVASIVE LOCATION;  Service: Cardiology;  Laterality: N/A;    CARDIAC SURGERY  12/2015    CORONARY ANGIOPLASTY WITH STENT PLACEMENT  04/2014    2 in 2014, 1 in 12/2020    CORONARY ARTERY BYPASS GRAFT  12/2015    x 3    ELBOW DEBRIDEMENT Right 08/17/2021    Procedure: INCISION / DEBRIDEMENT ELBOW, closed reduction right elbow dislocation ;  Surgeon: Kyler August MD;  Location: [facility] MAIN OR;  Service: Orthopedics;  Laterality: Right;    ELBOW OPEN REDUCTION INTERNAL FIXATION Left 08/18/2021    Procedure: Left  ulna open reduction internal " fixation (of olecranon and coronoid), left radial head replacement  ;  Surgeon: Yonis Mcdonough MD;  Location: [facility] MAIN OR;  Service: Orthopedics;  Laterality: Left;         Current Outpatient Medications:     aspirin 81 MG EC tablet, 0, Disp: , Rfl:     atorvastatin (LIPITOR) 20 MG tablet, TAKE 1 TABLET BY MOUTH EVERY NIGHT, Disp: 90 tablet, Rfl: 1    buPROPion XL (WELLBUTRIN XL) 300 MG 24 hr tablet, Take 1 tablet by mouth Every Morning., Disp: 90 tablet, Rfl: 1    celecoxib (CeleBREX) 100 MG capsule, TAKE 1 CAPSULE BY MOUTH TWICE DAILY WITH FOOD AS NEEDED FOR MODERATE PAIN, Disp: 180 capsule, Rfl: 0    cyclobenzaprine (FLEXERIL) 10 MG tablet, Take 1 tablet by mouth 3 (Three) Times a Day As Needed for Muscle Spasms., Disp: 90 tablet, Rfl: 11    escitalopram (Lexapro) 20 MG tablet, Take 1 tablet by mouth Every Night., Disp: 90 tablet, Rfl: 1    ferrous sulfate 325 (65 FE) MG tablet, Take 1 tablet by mouth Daily With Breakfast., Disp: 30 tablet, Rfl: 1    meclizine (ANTIVERT) 25 MG tablet, Take 1 tablet by mouth 3 (Three) Times a Day As Needed for Dizziness., Disp: 30 tablet, Rfl: 0    metoprolol tartrate (LOPRESSOR) 25 MG tablet, TAKE 1 TABLET BY MOUTH TWICE DAILY, Disp: 180 tablet, Rfl: 1    montelukast (SINGULAIR) 10 MG tablet, 0, Disp: , Rfl:     nitroglycerin (NITROSTAT) 0.4 MG SL tablet, DISSOLVE 1 TABLET UNDER THE TONGUE EVERY 5 MINUTES AS NEEDED FOR CHEST PAIN. NOT TO EXCEED 3 DOSES IN 15 MINUTES, Disp: , Rfl:     NON FORMULARY, Allergy injections, Disp: , Rfl:     omeprazole (priLOSEC) 40 MG capsule, Take 1 capsule by mouth Daily., Disp: , Rfl:     ondansetron (ZOFRAN) 4 MG tablet, TAKE 1 TABLET BY MOUTH EVERY 8 HOURS AS NEEDED FOR NAUSEA OR VOMITING, Disp: 42 tablet, Rfl: 0    pregabalin (LYRICA) 300 MG capsule, Take 1 capsule by mouth 2 (Two) Times a Day., Disp: , Rfl:     QUEtiapine (SEROquel) 50 MG tablet, Take 1 tablet by mouth Every Night., Disp: 90 tablet, Rfl: 1    Allergies   Allergen  "Reactions    Hydrocodone Nausea And Vomiting    Hydrocodone-Acetaminophen GI Intolerance    Oxycodone GI Intolerance       Family History   Problem Relation Age of Onset    Diabetes Mother     Heart disease Father     Arthritis Father     Cancer Father         Prostate    COPD Father     Anxiety disorder Daughter     Depression Daughter        Cancer-related family history includes Cancer in his father.      Social History     Tobacco Use    Smoking status: Every Day     Current packs/day: 1.50     Average packs/day: 1.5 packs/day for 41.0 years (61.6 ttl pk-yrs)     Types: Cigarettes, Cigars     Start date: 8/5/1985     Passive exposure: Current    Smokeless tobacco: Never   Vaping Use    Vaping status: Never Used    Passive vaping exposure: Yes   Substance Use Topics    Alcohol use: No    Drug use: No     Social History     Social History Narrative    Not on file       ROS:   Review of Systems   Constitutional:  Positive for fatigue.   Respiratory:  Positive for shortness of breath.    Musculoskeletal:  Positive for gait problem.   Neurological:  Positive for dizziness and weakness.         Objective:    Vital Signs:  Vitals:    06/12/24 1253   BP: 103/69   Pulse: 79   SpO2: 100%   Weight: 78.4 kg (172 lb 13.6 oz)   Height: 188 cm (74\")   PainSc: 0-No pain     Body mass index is 22.19 kg/m².    ECOG  (1) Restricted in physically strenuous activity, ambulatory and able to do work of light nature    Physical Exam:   Physical Exam  Constitutional:       Appearance: Normal appearance. He is normal weight. He is ill-appearing.   HENT:      Head: Normocephalic and atraumatic.      Right Ear: External ear normal.      Left Ear: External ear normal.      Nose: Nose normal.      Mouth/Throat:      Mouth: Mucous membranes are moist.      Pharynx: Oropharynx is clear.   Eyes:      Extraocular Movements: Extraocular movements intact.      Conjunctiva/sclera: Conjunctivae normal.      Pupils: Pupils are equal, round, and " "reactive to light.   Cardiovascular:      Rate and Rhythm: Normal rate.      Pulses: Normal pulses.   Pulmonary:      Effort: Pulmonary effort is normal.   Abdominal:      General: Abdomen is flat.      Palpations: Abdomen is soft.   Musculoskeletal:         General: Normal range of motion.      Cervical back: Normal range of motion and neck supple.   Skin:     General: Skin is warm.      Coloration: Skin is pale.   Neurological:      Mental Status: He is alert.   Psychiatric:         Mood and Affect: Mood normal.         Behavior: Behavior normal.         Thought Content: Thought content normal.         Judgment: Judgment normal.         Lab Results - Last 18 Months   Lab Units 06/12/24  1341 05/29/24  0957 03/15/23  1250   WBC 10*3/mm3 8.51 7.56 6.15   HEMOGLOBIN g/dL 8.2* 8.7* 13.7   HEMATOCRIT % 28.6* 33.1* 41.9   PLATELETS 10*3/mm3 307 468* 254   MCV fL 62.9* 67.3* 89.3     Lab Results - Last 18 Months   Lab Units 06/12/24  1341 05/29/24  0957 11/29/23  1141   SODIUM mmol/L 136 137 138   POTASSIUM mmol/L 4.1 4.5 4.1   CHLORIDE mmol/L 102 100 104   CO2 mmol/L 23.7 22.0 24.5   BUN mg/dL 10 13 15   CREATININE mg/dL 1.12 1.17 1.04   CALCIUM mg/dL 9.5 9.7 9.6   BILIRUBIN mg/dL 0.5 0.3 0.4   ALK PHOS U/L 92 95 71   ALT (SGPT) U/L 11 11 11   AST (SGOT) U/L 16 10 15   GLUCOSE mg/dL 106* 110* 102*       Lab Results   Component Value Date    GLUCOSE 110 (H) 05/29/2024    BUN 13 05/29/2024    CREATININE 1.17 05/29/2024    EGFRIFNONA 78 01/05/2022    BCR 11.1 05/29/2024    K 4.5 05/29/2024    CO2 22.0 05/29/2024    CALCIUM 9.7 05/29/2024    ALBUMIN 4.7 05/29/2024    LABIL2 1.0 03/15/2019    AST 10 05/29/2024    ALT 11 05/29/2024       No results for input(s): \"APTT\", \"INR\", \"PTT\" in the last 29523 hours.    Lab Results   Component Value Date    IRON 151 08/17/2021    TIBC 450 08/17/2021    FERRITIN 54.10 08/23/2022       Lab Results   Component Value Date    FOLATE 3.99 (L) 06/12/2024       No results found for: " "\"OCCULTBLD\"    Lab Results   Component Value Date    RETICCTPCT 1.53 06/12/2024     Lab Results   Component Value Date    PROWWZLS03 370 06/12/2024     No results found for: \"SPEP\", \"UPEP\"  Uric Acid   Date Value Ref Range Status   08/10/2017 6.1 4.8 - 8.7 mg/dL Final     Lab Results   Component Value Date    SEDRATE 23 (H) 03/15/2023     Lab Results   Component Value Date    HAPTOGLOBIN 202 (H) 06/12/2024     Lab Results   Component Value Date    PTT 24.7 08/16/2021    INR 1.03 08/16/2021     No results found for: \"\"  No results found for: \"CEA\"  No components found for: \"CA-19-9\"  Lab Results   Component Value Date    PSA 2.170 02/02/2023     Lab Results   Component Value Date    SEDRATE 23 (H) 03/15/2023          Assessment & Plan :    Iron deficiency anemia:  -Outside labs and medical history reviewed as above.  -Repeat labs today showed Hb/HCT 8.2/28.6 with MCV 62.9.  Also noted to have slight left shift on CBC.  -Noted to have low ferritin 8.5 ng/mL indicative of significant iron deficiency.  Iron panel is normal at this time which is likely due to ongoing oral iron supplementation and is not truly reflective of his iron deficiency status.  -Will monitor response to oral iron supplementation, if patient continues to have symptoms we will consider IV iron supplementation.  -Also noted to have folic acid deficiency, B12 levels low normal..  Hemolysis labs are normal.  ESR mildly elevated.  -LAW and immunofixation studies are pending.  -Referral placed for GI evaluation for a screening colonoscopy in view of new onset iron deficiency anemia and concerns for GI bleed.  This was discussed with patient in detail.    Folic acid deficiency:  -Will start patient on oral supplementation for the same.    Psoriatic arthritis:  -Patient not on active treatment at this time.  Advised to follow-up with PCP and rheumatology for further management.      Thank you very much for providing the opportunity to participate in " this patient’s care. Please do not hesitate to call if there are any other questions.

## 2024-06-12 ENCOUNTER — LAB (OUTPATIENT)
Dept: LAB | Facility: HOSPITAL | Age: 54
End: 2024-06-12
Payer: MEDICARE

## 2024-06-12 ENCOUNTER — CONSULT (OUTPATIENT)
Dept: ONCOLOGY | Facility: CLINIC | Age: 54
End: 2024-06-12
Payer: MEDICARE

## 2024-06-12 VITALS
HEIGHT: 74 IN | SYSTOLIC BLOOD PRESSURE: 103 MMHG | OXYGEN SATURATION: 100 % | BODY MASS INDEX: 22.18 KG/M2 | WEIGHT: 172.85 LBS | DIASTOLIC BLOOD PRESSURE: 69 MMHG | HEART RATE: 79 BPM

## 2024-06-12 DIAGNOSIS — D50.0 IRON DEFICIENCY ANEMIA DUE TO CHRONIC BLOOD LOSS: ICD-10-CM

## 2024-06-12 DIAGNOSIS — L40.50 PSORIATIC ARTHRITIS: ICD-10-CM

## 2024-06-12 DIAGNOSIS — R42 VERTIGO: ICD-10-CM

## 2024-06-12 DIAGNOSIS — D64.9 ANEMIA, UNSPECIFIED TYPE: Primary | ICD-10-CM

## 2024-06-12 DIAGNOSIS — E53.8 FOLATE DEFICIENCY: ICD-10-CM

## 2024-06-12 DIAGNOSIS — D64.9 ANEMIA, UNSPECIFIED TYPE: ICD-10-CM

## 2024-06-12 LAB
ALBUMIN SERPL-MCNC: 4.3 G/DL (ref 3.5–5.2)
ALBUMIN/GLOB SERPL: 1.3 G/DL
ALP SERPL-CCNC: 92 U/L (ref 39–117)
ALT SERPL W P-5'-P-CCNC: 11 U/L (ref 1–41)
ANION GAP SERPL CALCULATED.3IONS-SCNC: 10.3 MMOL/L (ref 5–15)
AST SERPL-CCNC: 16 U/L (ref 1–40)
BASOPHILS # BLD AUTO: 0.04 10*3/MM3 (ref 0–0.2)
BASOPHILS NFR BLD AUTO: 0.5 % (ref 0–1.5)
BILIRUB SERPL-MCNC: 0.5 MG/DL (ref 0–1.2)
BUN SERPL-MCNC: 10 MG/DL (ref 6–20)
BUN/CREAT SERPL: 8.9 (ref 7–25)
CALCIUM SPEC-SCNC: 9.5 MG/DL (ref 8.6–10.5)
CHLORIDE SERPL-SCNC: 102 MMOL/L (ref 98–107)
CO2 SERPL-SCNC: 23.7 MMOL/L (ref 22–29)
CREAT SERPL-MCNC: 1.12 MG/DL (ref 0.76–1.27)
CRP SERPL-MCNC: 2.47 MG/DL (ref 0–0.5)
DEPRECATED RDW RBC AUTO: 46.6 FL (ref 37–54)
EGFRCR SERPLBLD CKD-EPI 2021: 78.6 ML/MIN/1.73
EOSINOPHIL # BLD AUTO: 0.02 10*3/MM3 (ref 0–0.4)
EOSINOPHIL NFR BLD AUTO: 0.2 % (ref 0.3–6.2)
ERYTHROCYTE [DISTWIDTH] IN BLOOD BY AUTOMATED COUNT: 21.5 % (ref 12.3–15.4)
ERYTHROCYTE [SEDIMENTATION RATE] IN BLOOD: 25 MM/HR (ref 0–20)
FERRITIN SERPL-MCNC: 8.58 NG/ML (ref 30–400)
FOLATE SERPL-MCNC: 3.99 NG/ML (ref 4.78–24.2)
GLOBULIN UR ELPH-MCNC: 3.4 GM/DL
GLUCOSE SERPL-MCNC: 106 MG/DL (ref 65–99)
HAPTOGLOB SERPL-MCNC: 202 MG/DL (ref 30–200)
HCT VFR BLD AUTO: 28.6 % (ref 37.5–51)
HGB BLD-MCNC: 8.2 G/DL (ref 13–17.7)
IRON 24H UR-MRATE: 131 MCG/DL (ref 59–158)
IRON SATN MFR SERPL: 26 % (ref 20–50)
LDH SERPL-CCNC: 131 U/L (ref 135–225)
LYMPHOCYTES # BLD AUTO: 0.8 10*3/MM3 (ref 0.7–3.1)
LYMPHOCYTES NFR BLD AUTO: 9.4 % (ref 19.6–45.3)
MCH RBC QN AUTO: 18 PG (ref 26.6–33)
MCHC RBC AUTO-ENTMCNC: 28.7 G/DL (ref 31.5–35.7)
MCV RBC AUTO: 62.9 FL (ref 79–97)
MONOCYTES # BLD AUTO: 0.95 10*3/MM3 (ref 0.1–0.9)
MONOCYTES NFR BLD AUTO: 11.2 % (ref 5–12)
NEUTROPHILS NFR BLD AUTO: 6.7 10*3/MM3 (ref 1.7–7)
NEUTROPHILS NFR BLD AUTO: 78.7 % (ref 42.7–76)
PLATELET # BLD AUTO: 307 10*3/MM3 (ref 140–450)
PMV BLD AUTO: 9.4 FL (ref 6–12)
POTASSIUM SERPL-SCNC: 4.1 MMOL/L (ref 3.5–5.2)
PROT SERPL-MCNC: 7.7 G/DL (ref 6–8.5)
RBC # BLD AUTO: 4.55 10*6/MM3 (ref 4.14–5.8)
RETICS # AUTO: 0.07 10*6/MM3 (ref 0.02–0.13)
RETICS/RBC NFR AUTO: 1.53 % (ref 0.7–1.9)
SODIUM SERPL-SCNC: 136 MMOL/L (ref 136–145)
TIBC SERPL-MCNC: 496 MCG/DL (ref 298–536)
TRANSFERRIN SERPL-MCNC: 333 MG/DL (ref 200–360)
VIT B12 BLD-MCNC: 370 PG/ML (ref 211–946)
WBC NRBC COR # BLD AUTO: 8.51 10*3/MM3 (ref 3.4–10.8)

## 2024-06-12 PROCEDURE — 82607 VITAMIN B-12: CPT | Performed by: STUDENT IN AN ORGANIZED HEALTH CARE EDUCATION/TRAINING PROGRAM

## 2024-06-12 PROCEDURE — 36415 COLL VENOUS BLD VENIPUNCTURE: CPT

## 2024-06-12 PROCEDURE — 85652 RBC SED RATE AUTOMATED: CPT | Performed by: STUDENT IN AN ORGANIZED HEALTH CARE EDUCATION/TRAINING PROGRAM

## 2024-06-12 PROCEDURE — 86140 C-REACTIVE PROTEIN: CPT | Performed by: STUDENT IN AN ORGANIZED HEALTH CARE EDUCATION/TRAINING PROGRAM

## 2024-06-12 PROCEDURE — 84466 ASSAY OF TRANSFERRIN: CPT | Performed by: STUDENT IN AN ORGANIZED HEALTH CARE EDUCATION/TRAINING PROGRAM

## 2024-06-12 PROCEDURE — 82746 ASSAY OF FOLIC ACID SERUM: CPT | Performed by: STUDENT IN AN ORGANIZED HEALTH CARE EDUCATION/TRAINING PROGRAM

## 2024-06-12 PROCEDURE — 83615 LACTATE (LD) (LDH) ENZYME: CPT | Performed by: STUDENT IN AN ORGANIZED HEALTH CARE EDUCATION/TRAINING PROGRAM

## 2024-06-12 PROCEDURE — 82728 ASSAY OF FERRITIN: CPT | Performed by: STUDENT IN AN ORGANIZED HEALTH CARE EDUCATION/TRAINING PROGRAM

## 2024-06-12 PROCEDURE — 83010 ASSAY OF HAPTOGLOBIN QUANT: CPT | Performed by: STUDENT IN AN ORGANIZED HEALTH CARE EDUCATION/TRAINING PROGRAM

## 2024-06-12 PROCEDURE — 83540 ASSAY OF IRON: CPT | Performed by: STUDENT IN AN ORGANIZED HEALTH CARE EDUCATION/TRAINING PROGRAM

## 2024-06-12 PROCEDURE — 85025 COMPLETE CBC W/AUTO DIFF WBC: CPT

## 2024-06-12 PROCEDURE — 85045 AUTOMATED RETICULOCYTE COUNT: CPT | Performed by: STUDENT IN AN ORGANIZED HEALTH CARE EDUCATION/TRAINING PROGRAM

## 2024-06-12 PROCEDURE — 80053 COMPREHEN METABOLIC PANEL: CPT | Performed by: STUDENT IN AN ORGANIZED HEALTH CARE EDUCATION/TRAINING PROGRAM

## 2024-06-12 RX ORDER — PREGABALIN 300 MG/1
300 CAPSULE ORAL 2 TIMES DAILY
OUTPATIENT
Start: 2024-06-12

## 2024-06-12 RX ORDER — MECLIZINE HYDROCHLORIDE 25 MG/1
25 TABLET ORAL 3 TIMES DAILY PRN
Qty: 30 TABLET | Refills: 0 | Status: SHIPPED | OUTPATIENT
Start: 2024-06-12

## 2024-06-12 NOTE — TELEPHONE ENCOUNTER
"Caller: Frank Barger \"Monroe\"    Relationship: Self    Best call back number:     Requested Prescriptions:   Requested Prescriptions     Pending Prescriptions Disp Refills    meclizine (ANTIVERT) 25 MG tablet 30 tablet 0     Sig: Take 1 tablet by mouth 3 (Three) Times a Day As Needed for Dizziness.    pregabalin (LYRICA) 300 MG capsule       Sig: Take 1 capsule by mouth 2 (Two) Times a Day.        [id number] [place] - [address] AT [facility] [phone] [facility] [phone]      Last office visit with prescribing clinician: 5/29/2024   Last telemedicine visit with prescribing clinician: Visit date not found   Next office visit with prescribing clinician: 11/27/2024     Additional details provided by patient: PATIENT DOES NOT SEE PAIN MANAGEMENT UNTIL JUNE 28TH.    Does the patient have less than a 3 day supply:  [x] Yes  [] No    Would you like a call back once the refill request has been completed: [] Yes [] No    If the office needs to give you a call back, can they leave a voicemail: [] Yes [] No    Kevin Medina Rep   06/12/24 15:56 EDT                 "

## 2024-06-13 ENCOUNTER — PATIENT ROUNDING (BHMG ONLY) (OUTPATIENT)
Dept: ONCOLOGY | Facility: CLINIC | Age: 54
End: 2024-06-13
Payer: MEDICARE

## 2024-06-13 LAB — ANA SER QL IF: NEGATIVE

## 2024-06-13 NOTE — PROGRESS NOTES
June 13, 2024    Hello, may I speak with Frank Barger?    My name is Radha Salvador      I am  with [facility]  [address] [place] [address] [phone].    Before we get started may I verify your date of birth? 1970    I am calling to officially welcome you to our practice and ask about your recent visit. Is this a good time to talk? no    Tell me about your visit with us. What things went well?  A My Chart message was sent to the patient.         We're always looking for ways to make our patients' experiences even better. Do you have recommendations on ways we may improve?  no    Overall were you satisfied with your first visit to our practice? yes       I appreciate you taking the time to speak with me today. Is there anything else I can do for you? no      Thank you, and have a great day.

## 2024-06-14 ENCOUNTER — TELEPHONE (OUTPATIENT)
Dept: ONCOLOGY | Facility: CLINIC | Age: 54
End: 2024-06-14
Payer: MEDICARE

## 2024-06-14 ENCOUNTER — TELEPHONE (OUTPATIENT)
Dept: FAMILY MEDICINE CLINIC | Facility: CLINIC | Age: 54
End: 2024-06-14
Payer: MEDICARE

## 2024-06-14 LAB
ALBUMIN SERPL ELPH-MCNC: 3.7 G/DL (ref 2.9–4.4)
ALBUMIN/GLOB SERPL: 1.1 {RATIO} (ref 0.7–1.7)
ALPHA1 GLOB SERPL ELPH-MCNC: 0.3 G/DL (ref 0–0.4)
ALPHA2 GLOB SERPL ELPH-MCNC: 0.8 G/DL (ref 0.4–1)
B-GLOBULIN SERPL ELPH-MCNC: 1.1 G/DL (ref 0.7–1.3)
GAMMA GLOB SERPL ELPH-MCNC: 1.5 G/DL (ref 0.4–1.8)
GLOBULIN SER-MCNC: 3.7 G/DL (ref 2.2–3.9)
IGA SERPL-MCNC: 225 MG/DL (ref 90–386)
IGG SERPL-MCNC: 1485 MG/DL (ref 603–1613)
IGM SERPL-MCNC: 56 MG/DL (ref 20–172)
INTERPRETATION SERPL IEP-IMP: ABNORMAL
KAPPA LC FREE SER-MCNC: 46.6 MG/L (ref 3.3–19.4)
KAPPA LC FREE/LAMBDA FREE SER: 1.74 {RATIO} (ref 0.26–1.65)
LABORATORY COMMENT REPORT: ABNORMAL
LAMBDA LC FREE SERPL-MCNC: 26.8 MG/L (ref 5.7–26.3)
M PROTEIN SERPL ELPH-MCNC: ABNORMAL G/DL
PROT SERPL-MCNC: 7.4 G/DL (ref 6–8.5)

## 2024-06-14 RX ORDER — FOLIC ACID 1 MG/1
1 TABLET ORAL DAILY
Qty: 90 TABLET | Refills: 1 | Status: SHIPPED | OUTPATIENT
Start: 2024-06-14

## 2024-06-14 RX ORDER — LANOLIN ALCOHOL/MO/W.PET/CERES
1000 CREAM (GRAM) TOPICAL DAILY
Qty: 90 TABLET | Refills: 1 | Status: SHIPPED | OUTPATIENT
Start: 2024-06-14

## 2024-06-14 NOTE — TELEPHONE ENCOUNTER
"  Caller: Frank Barger \"Monroe\"    Relationship: Self    Best call back number:   Frank Barger \"Monroe\" (Self) [phone] (Mobile)         What was the call regarding:  PATIENT IS WANTING YOU TO KNOW THAT THE LATEST TEST SHOWED THAT HE HAS LOW FOLIC ACID AND B12     Is it okay if the provider responds through MyChart: CALL IF NEEDED      "

## 2024-06-14 NOTE — TELEPHONE ENCOUNTER
Per Dr. Mendoza,left a message for the patient and let him know that folate deficiency in addition to Iron deficiency. His B12 levels are also on low side. I have prescribed him Oral B12 and folate supplementation.

## 2024-06-14 NOTE — TELEPHONE ENCOUNTER
PATIENT RETURNING CALL, TRIED TO WARM TRANSFER BUT NO ANSWER AT THE OFFICE    [phone] ELANA IS THE SPOUSE, CAN GIVE RESULTS TO WIFE

## 2024-06-17 RX ORDER — DICYCLOMINE HYDROCHLORIDE 10 MG/1
10 CAPSULE ORAL
Qty: 90 CAPSULE | Refills: 0 | OUTPATIENT
Start: 2024-06-17

## 2024-06-17 RX ORDER — CYCLOBENZAPRINE HCL 10 MG
10 TABLET ORAL 3 TIMES DAILY PRN
Qty: 90 TABLET | Refills: 11 | Status: SHIPPED | OUTPATIENT
Start: 2024-06-17

## 2024-06-17 NOTE — TELEPHONE ENCOUNTER
These were done with his hematologist. He will need to follow up with them as I am not sure if they are planning on starting treatment

## 2024-06-24 ENCOUNTER — TELEPHONE (OUTPATIENT)
Dept: FAMILY MEDICINE CLINIC | Facility: CLINIC | Age: 54
End: 2024-06-24

## 2024-06-24 RX ORDER — DICYCLOMINE HYDROCHLORIDE 10 MG/1
10 CAPSULE ORAL
Qty: 120 CAPSULE | Refills: 5 | Status: SHIPPED | OUTPATIENT
Start: 2024-06-24 | End: 2024-07-22 | Stop reason: SDUPTHER

## 2024-06-24 NOTE — TELEPHONE ENCOUNTER
"    Caller: Frank Barger \"Monroe\"    Relationship: Self    Best call back number: [phone]     Which medication are you concerned about: DICYCLOMINE 10MG CAPSULES.        What are your concerns: PATIENT SAID PHARMACY HAS BEEN TRYING TO GET THIS REFILLED BUT IT HASN'T BEEN SUCCESSFUL.  I SEE IT WAS REFUSED.  PATIENT SAID THIS HELPS WITH HIS GAS AND STOMACH ISSUES AND IS NOT SURE WHY IT WOULD BE REFUSED.  PLEASE CALL TO DISCUSS WITH HIM.  HE SAID HE REALLY NEEDS IT.    "

## 2024-06-29 NOTE — PROGRESS NOTES
"Encounter Date:07/02/2024  Last seen-12/28/2023      Patient ID: Frank Barger is a 53 y.o. male.      Chief Complaint:  Status post CABG  Status post stent  History of syncope  Dyslipidemia  Hypertension  COPD        History of Present Illness  Patient had lightheadedness and hot weather back in April.  None since.    Since I have last seen, the patient has been without any chest discomfort ,shortness of breath, palpitations, dizziness or syncope.  Denies having any headache ,abdominal pain ,nausea, vomiting , diarrhea constipation, loss of weight or loss of appetite.  Denies having any excessive bruising ,hematuria or blood in the stool.    Review of all systems negative except as indicated.    Reviewed ROS.  Assessment and Plan         History from 2021  Frank Barger is a 51 y.o. male who presents with history of syncopal episode yesterday with injury to bilateral elbows.  Patient apparently was outside with a friend and got up to get a drink and the next thing he was on the concrete floor with face down.  He had syncopal episode approximately 6 months back.  Patient has been having occasional dizzy spells.  Patient had stent placement in December 2020.  Patient denies having any chest discomfort or shortness of breath palpitations.  Patient is not sure he had dizziness prior to this episode and it was very fast.  Surgery is planned.  Cardiac consultation was requested.  CT scan of the head cervical spine and facial bones were negative for fracture.  No other associated aggravating or alleviating factors.     Patient had surgery on his right elbow yesterday.  Patient signed out AMA last night since he was not able to smoke in the hospital.  Patient has returned back this morning to pursue planned surgery on his left elbow\"     ]]]]]]]]]]]]]]]]]  Impression  ==========  -History of syncope 2021 no further episodes  Bilateral elbow fractures  \"X-ray of the left elbow showed a complete dislocation of the " "radiocapitellar and ulnar trochlear joints. This was reduced in the emergency department. X-ray of elbow as per radiology also shows extensively comminuted and displaced fracture of the old [place] and suspected nondisplaced supracondylar fracture of the distal humerus on the right side.\"     Status post closed reduction right elbow dislocation and closed reduction and splinting August 17, 2021 by Dr. August.  Status post surgery on left elbow by Dr. hartman-8/18/2021     -Status post CABG (2015) (triple-vessel according to patient) at [facility].     -Status post stent placement 4/4/2014 at [facility]  Status post stent to LAD between diagonal branch and landing site of LIMA.  1/20/2021-[facility]     Echocardiogram-normal August 16, 2021     Cardiac catheterization 1/21/2021  Left ventricle size and contractility is normal with ejection fraction of 60%.  Left main coronary artery is normal.  Left anterior descending artery provided a moderate sized diagonal branch and left anterior descending artery has 80% disease proximal to the LIMA landing site.  Circumflex coronary artery is a moderately large vessel that provided a large marginal branch.  Circumflex coronary artery has 60 to 70% disease just distal to the marginal branch.  Ramus intermedius is a relatively small caliber vessel that has ostial 80 to 90% disease.  Right coronary artery is a large and dominant vessel.  Left ventricular branch has 60 to 70% disease at its ostium.  LIMA to left anterior descending artery is very small and atretic with probably 90 to 95% disease just proximal to the insertion site.  SVG to diagonal branch is patent.  SVG to presumably ramus intermedius is totally occluded in the proximal segment.     2 out of 3 grafts are patent.  However patent LIMA is extremely small in caliber and atraumatic with significant disease near the landing site.  Difficult to tell the source of patient's " symptoms but likely from left anterior descending artery.  Patient to have intervention to left antidescending artery distal to the diagonal branch and proximal to the LIMA landing site.  Other source of pain could be from circumflex coronary artery left ventricular branch of RCA and ramus intermedius.  Suggest maximize medical treatment for disease in these vessels.  Modification of risk factors.     Echocardiogram 1/14/2021 revealed mild paradoxical septal motion and inferior wall hypokinesis with ejection fraction of 50%.  Lexiscan Cardiolite test-abnormal with mild posterior and inferior proximal ischemia-1/14/2021     -Dyslipidemia hypertension COPD PTSD     -Psoriasis     -Family history of coronary artery disease     -Smoker-abstinence from smoking     -Allergic to oxycodone hydrocodone  ==========  Plan  =========  History of syncope and bilateral elbow fractures.  2021.  No further episodes.  Occasional lightheadedness but     Status post CABG  Patient is not having any angina pectoris or congestive heart failure.     Status post stent placement.  1/20/2021  Patient is not having any angina pectoris or congestive heart failure.  Patient is off Plavix     EKG 12/28/2023 showed sinus bradycardia nonspecific ST-T wave changes 57/min normal axis normal intervals no ectopy no significant change from previous EKG.  EKG 7/2/2024-sinus rhythm without ischemic changes     Hypertension-well-controlled  114/70  Continue metoprolol tartrate 25 mg a day     Dyslipidemia-continue atorvastatin      Medications were reviewed and updated.     Follow-up in the office in 6 months.     Further plan will depend on patient's progress.     Reviewed and updated-7/2/2024.  ]]]]]]]]]]]]]]             Diagnosis Plan   1. Hx of CABG        2. Status post angioplasty with stent        3. Essential hypertension        4. Dyslipidemia        LAB RESULTS (LAST 7 DAYS)    CBC        BMP        CMP         BNP        TROPONIN        CoAg         Creatinine Clearance  Estimated Creatinine Clearance: 84.6 mL/min (by C-G formula based on SCr of 1.12 mg/dL).    ABG        Radiology  No radiology results for the last day                The following portions of the patient's history were reviewed and updated as appropriate: allergies, current medications, past family history, past medical history, past social history, past surgical history, and problem list.    Review of Systems   Constitutional: Negative for malaise/fatigue.   Cardiovascular:  Positive for palpitations. Negative for chest pain, dyspnea on exertion and leg swelling.   Respiratory:  Negative for cough and shortness of breath.    Gastrointestinal:  Negative for abdominal pain, nausea and vomiting.   Neurological:  Positive for dizziness and light-headedness. Negative for focal weakness, headaches and numbness.   All other systems reviewed and are negative.      Current Outpatient Medications:     aspirin 81 MG EC tablet, 0, Disp: , Rfl:     atorvastatin (LIPITOR) 20 MG tablet, TAKE 1 TABLET BY MOUTH EVERY NIGHT, Disp: 90 tablet, Rfl: 1    buPROPion XL (WELLBUTRIN XL) 300 MG 24 hr tablet, Take 1 tablet by mouth Every Morning., Disp: 90 tablet, Rfl: 1    celecoxib (CeleBREX) 100 MG capsule, TAKE 1 CAPSULE BY MOUTH TWICE DAILY WITH FOOD AS NEEDED FOR MODERATE PAIN, Disp: 180 capsule, Rfl: 0    cyclobenzaprine (FLEXERIL) 10 MG tablet, TAKE 1 TABLET BY MOUTH THREE TIMES DAILY AS NEEDED FOR MUSCLE SPASMS, Disp: 90 tablet, Rfl: 11    dicyclomine (BENTYL) 10 MG capsule, Take 1 capsule by mouth 4 (Four) Times a Day Before Meals & at Bedtime., Disp: 120 capsule, Rfl: 5    escitalopram (Lexapro) 20 MG tablet, Take 1 tablet by mouth Every Night., Disp: 90 tablet, Rfl: 1    ferrous sulfate 325 (65 FE) MG tablet, Take 1 tablet by mouth Daily With Breakfast., Disp: 30 tablet, Rfl: 1    folic acid (FOLVITE) 1 MG tablet, Take 1 tablet by mouth Daily., Disp: 90 tablet, Rfl: 1    meclizine (ANTIVERT) 25 MG  tablet, Take 1 tablet by mouth 3 (Three) Times a Day As Needed for Dizziness., Disp: 30 tablet, Rfl: 0    metoprolol tartrate (LOPRESSOR) 25 MG tablet, TAKE 1 TABLET BY MOUTH TWICE DAILY, Disp: 180 tablet, Rfl: 1    montelukast (SINGULAIR) 10 MG tablet, 0, Disp: , Rfl:     nitroglycerin (NITROSTAT) 0.4 MG SL tablet, DISSOLVE 1 TABLET UNDER THE TONGUE EVERY 5 MINUTES AS NEEDED FOR CHEST PAIN. NOT TO EXCEED 3 DOSES IN 15 MINUTES (Patient not taking: Reported on 6/12/2024), Disp: , Rfl:     omeprazole (priLOSEC) 40 MG capsule, Take 1 capsule by mouth Daily., Disp: , Rfl:     ondansetron (ZOFRAN) 4 MG tablet, TAKE 1 TABLET BY MOUTH EVERY 8 HOURS AS NEEDED FOR NAUSEA OR VOMITING (Patient not taking: Reported on 6/12/2024), Disp: 42 tablet, Rfl: 0    pregabalin (LYRICA) 300 MG capsule, Take 1 capsule by mouth 2 (Two) Times a Day. (Patient not taking: Reported on 6/12/2024), Disp: , Rfl:     QUEtiapine (SEROquel) 50 MG tablet, Take 1 tablet by mouth Every Night., Disp: 90 tablet, Rfl: 1    vitamin B-12 (CYANOCOBALAMIN) 1000 MCG tablet, Take 1 tablet by mouth Daily., Disp: 90 tablet, Rfl: 1    Allergies   Allergen Reactions    Hydrocodone Nausea And Vomiting    Hydrocodone-Acetaminophen GI Intolerance    Oxycodone GI Intolerance       Family History   Problem Relation Age of Onset    Diabetes Mother     Heart disease Father     Arthritis Father     Cancer Father         Prostate    COPD Father     Anxiety disorder Daughter     Depression Daughter        Past Surgical History:   Procedure Laterality Date    CARDIAC CATHETERIZATION N/A 01/20/2021    Procedure: Left Heart Cath and coronary angiogram;  Surgeon: Jaimie Silva MD;  Location: [facility] CATH INVASIVE LOCATION;  Service: Cardiovascular;  Laterality: N/A;    CARDIAC CATHETERIZATION N/A 01/20/2021    Procedure: Saphenous Vein Graft;  Surgeon: Jaimie Silva MD;  Location: [facility] CATH INVASIVE LOCATION;  Service: Cardiovascular;  Laterality: N/A;    CARDIAC  "CATHETERIZATION N/A 01/20/2021    Procedure: Stent EBENEZER coronary;  Surgeon: Herve Rodriguez MD;  Location: [facility] CATH INVASIVE LOCATION;  Service: Cardiology;  Laterality: N/A;    CARDIAC SURGERY  12/2015    CORONARY ANGIOPLASTY WITH STENT PLACEMENT  04/2014    2 in 2014, 1 in 12/2020    CORONARY ARTERY BYPASS GRAFT  12/2015    x 3    ELBOW DEBRIDEMENT Right 08/17/2021    Procedure: INCISION / DEBRIDEMENT ELBOW, closed reduction right elbow dislocation ;  Surgeon: Kyler August MD;  Location: [facility] MAIN OR;  Service: Orthopedics;  Laterality: Right;    ELBOW OPEN REDUCTION INTERNAL FIXATION Left 08/18/2021    Procedure: Left  ulna open reduction internal fixation (of olecranon and coronoid), left radial head replacement  ;  Surgeon: Yonis Mcdonough MD;  Location: [facility] MAIN OR;  Service: Orthopedics;  Laterality: Left;       Past Medical History:   Diagnosis Date    Abdominal pain     Allergic     Allergies     takes allergy injections    Anesthesia complication     states heart \"fluttering\" after anesthesia x 1- was kept in hospital 3 days s/p heart stent placement    Anxiety     Arthritis     Asthma     Chronic back pain     Constipation 06/2021    Coronary artery disease 2014    DDD (degenerative disc disease), lumbar     Depression     Emphysema of lung     Fibromyalgia, primary 2016    GERD (gastroesophageal reflux disease)     Headache     Heart murmur 2014    Hypertension 2014    Leg pain     Lumbar spine pain     Myocardial infarction 2014    Nocturia     Scoliosis 1995    Under care of pain management specialist     pain management of [place]       Family History   Problem Relation Age of Onset    Diabetes Mother     Heart disease Father     Arthritis Father     Cancer Father         Prostate    COPD Father     Anxiety disorder Daughter     Depression Daughter        Social History     Socioeconomic History    Marital status:    Tobacco Use    Smoking status: Every Day     Current " packs/day: 1.50     Average packs/day: 1.5 packs/day for 41.1 years (61.6 ttl pk-yrs)     Types: Cigarettes, Cigars     Start date: 8/5/1985     Passive exposure: Current    Smokeless tobacco: Never   Vaping Use    Vaping status: Never Used    Passive vaping exposure: Yes   Substance and Sexual Activity    Alcohol use: No    Drug use: No    Sexual activity: Yes     Partners: Female           ECG 12 Lead    Date/Time: 7/2/2024 1:44 PM  Performed by: Jaimie Silva MD    Authorized by: Jaimie Silva MD  Comparison: compared with previous ECG   Similar to previous ECG  Comparison to previous ECG: Normal sinus rhythm nonspecific ST-T wave changes normal axis normal intervals no ectopy inferior infarction age undetermined no significant change from previous EKG.        Objective:       Physical Exam    There were no vitals taken for this visit.  The patient is alert, oriented and in no distress.    Vital signs as noted above.    Head and neck revealed no carotid bruits or jugular venous distension.  No thyromegaly or lymphadenopathy is present.    Lungs clear.  No wheezing.  Breath sounds are normal bilaterally.    Heart normal first and second heart sounds.  No murmur..  No pericardial rub is present.  No gallop is present.    Abdomen soft and nontender.  No organomegaly is present.    Extremities revealed good peripheral pulses without any pedal edema.    Skin warm and dry.    Musculoskeletal system is grossly normal.    CNS grossly normal.    Reviewed and updated.

## 2024-07-02 ENCOUNTER — OFFICE VISIT (OUTPATIENT)
Dept: CARDIOLOGY | Facility: CLINIC | Age: 54
End: 2024-07-02
Payer: MEDICARE

## 2024-07-02 VITALS
HEART RATE: 76 BPM | OXYGEN SATURATION: 96 % | HEIGHT: 71 IN | SYSTOLIC BLOOD PRESSURE: 114 MMHG | BODY MASS INDEX: 25.34 KG/M2 | WEIGHT: 181 LBS | DIASTOLIC BLOOD PRESSURE: 70 MMHG

## 2024-07-02 DIAGNOSIS — E78.5 DYSLIPIDEMIA: ICD-10-CM

## 2024-07-02 DIAGNOSIS — Z95.1 HX OF CABG: Primary | ICD-10-CM

## 2024-07-02 DIAGNOSIS — I10 ESSENTIAL HYPERTENSION: ICD-10-CM

## 2024-07-02 DIAGNOSIS — Z95.820 STATUS POST ANGIOPLASTY WITH STENT: ICD-10-CM

## 2024-07-02 RX ORDER — NITROGLYCERIN 0.4 MG/1
0.4 TABLET SUBLINGUAL
Qty: 25 TABLET | Refills: 4 | Status: SHIPPED | OUTPATIENT
Start: 2024-07-02

## 2024-07-03 ENCOUNTER — TRANSCRIBE ORDERS (OUTPATIENT)
Dept: ADMINISTRATIVE | Facility: HOSPITAL | Age: 54
End: 2024-07-03
Payer: MEDICARE

## 2024-07-03 ENCOUNTER — HOSPITAL ENCOUNTER (OUTPATIENT)
Dept: GENERAL RADIOLOGY | Facility: HOSPITAL | Age: 54
Discharge: HOME | End: 2024-07-03
Admitting: NURSE PRACTITIONER
Payer: MEDICARE

## 2024-07-03 DIAGNOSIS — M47.26 OTHER SPONDYLOSIS WITH RADICULOPATHY, LUMBAR REGION: Primary | ICD-10-CM

## 2024-07-03 DIAGNOSIS — M47.26 OTHER SPONDYLOSIS WITH RADICULOPATHY, LUMBAR REGION: ICD-10-CM

## 2024-07-03 PROCEDURE — 72100 X-RAY EXAM L-S SPINE 2/3 VWS: CPT

## 2024-07-22 ENCOUNTER — TELEPHONE (OUTPATIENT)
Dept: CARDIOLOGY | Facility: CLINIC | Age: 54
End: 2024-07-22
Payer: MEDICARE

## 2024-07-22 ENCOUNTER — TELEPHONE (OUTPATIENT)
Dept: FAMILY MEDICINE CLINIC | Facility: CLINIC | Age: 54
End: 2024-07-22
Payer: MEDICARE

## 2024-07-22 DIAGNOSIS — F33.1 MAJOR DEPRESSIVE DISORDER, RECURRENT EPISODE, MODERATE: Chronic | ICD-10-CM

## 2024-07-22 DIAGNOSIS — F41.1 GENERALIZED ANXIETY DISORDER: Chronic | ICD-10-CM

## 2024-07-22 DIAGNOSIS — I25.9 ISCHEMIC HEART DISEASE DUE TO CORONARY ARTERY OBSTRUCTION: ICD-10-CM

## 2024-07-22 DIAGNOSIS — M47.816 ARTHROPATHY OF LUMBAR FACET JOINT: ICD-10-CM

## 2024-07-22 DIAGNOSIS — D64.9 LOW HEMOGLOBIN: ICD-10-CM

## 2024-07-22 DIAGNOSIS — R42 VERTIGO: ICD-10-CM

## 2024-07-22 DIAGNOSIS — R07.9 CHEST PAIN WITH HIGH RISK OF ACUTE CORONARY SYNDROME: Primary | ICD-10-CM

## 2024-07-22 DIAGNOSIS — I24.0 ISCHEMIC HEART DISEASE DUE TO CORONARY ARTERY OBSTRUCTION: ICD-10-CM

## 2024-07-22 DIAGNOSIS — F51.05 INSOMNIA DUE TO MENTAL CONDITION: Chronic | ICD-10-CM

## 2024-07-22 DIAGNOSIS — E78.2 MIXED HYPERLIPIDEMIA: ICD-10-CM

## 2024-07-22 RX ORDER — NITROGLYCERIN 0.4 MG/1
0.4 TABLET SUBLINGUAL
Qty: 25 TABLET | Refills: 4 | Status: SHIPPED | OUTPATIENT
Start: 2024-07-22

## 2024-07-22 NOTE — TELEPHONE ENCOUNTER
Incoming Refill Request      Medication requested (name and dose): [place]     Pharmacy where request should be sent: [address]    Additional details provided by patient:     Best call back number: [phone]    Does the patient have less than a 3 day supply:  [] Yes  [x] No    Kevin Broussard Rep  07/22/24, 16:01 EDT

## 2024-07-22 NOTE — TELEPHONE ENCOUNTER
"  Caller: Frank Barger \"Monroe\"    Relationship: Self    Best call back number:     What is the best time to reach you:     Who are you requesting to speak with (clinical staff, provider,  specific staff member): DR BELTRE    Do you know the name of the person who called:     What was the call regarding: PATIENT IS CALLING IN TO ASK FOR A CALL BACK AS HE NEEDS TO SPEAK TO HIS PROVIDER OR STAFF ABOUT HIS REFERRAL FOR HIS COLONOSCOPY    Is it okay if the provider responds through MyChart:     " lm to schedule from recall list

## 2024-07-22 NOTE — TELEPHONE ENCOUNTER
"Caller: Frank Barger \"Monroe\"    Relationship: Self    Best call back number: [phone]    Requested Prescriptions:   Requested Prescriptions     Pending Prescriptions Disp Refills    QUEtiapine (SEROquel) 50 MG tablet 90 tablet 1     Sig: Take 1 tablet by mouth Every Night.    omeprazole (priLOSEC) 40 MG capsule       Sig: Take 1 capsule by mouth Daily.    atorvastatin (LIPITOR) 20 MG tablet 90 tablet 1     Sig: Take 1 tablet by mouth Every Night.    buPROPion XL (WELLBUTRIN XL) 300 MG 24 hr tablet 90 tablet 1     Sig: Take 1 tablet by mouth Every Morning.    celecoxib (CeleBREX) 100 MG capsule 180 capsule 0    dicyclomine (BENTYL) 10 MG capsule 120 capsule 5     Sig: Take 1 capsule by mouth 4 (Four) Times a Day Before Meals & at Bedtime.    escitalopram (Lexapro) 20 MG tablet 90 tablet 1     Sig: Take 1 tablet by mouth Every Night.    ferrous sulfate 325 (65 FE) MG tablet 30 tablet 1     Sig: Take 1 tablet by mouth Daily With Breakfast.    folic acid (FOLVITE) 1 MG tablet 90 tablet 1     Sig: Take 1 tablet by mouth Daily.    meclizine (ANTIVERT) 25 MG tablet 30 tablet 0     Sig: Take 1 tablet by mouth 3 (Three) Times a Day As Needed for Dizziness.    metoprolol tartrate (LOPRESSOR) 25 MG tablet 180 tablet 1     Sig: Take 1 tablet by mouth 2 (Two) Times a Day.    montelukast (SINGULAIR) 10 MG tablet          Pharmacy where request should be sent: [facility]/PHARMACY #[id number] - [address] [phone] [facility] [phone] FX     Last office visit with prescribing clinician: 5/29/2024   Last telemedicine visit with prescribing clinician: Visit date not found   Next office visit with prescribing clinician: 11/27/2024     Additional details provided by patient: CHANGED PHARMACY TO [facility]    Does the patient have less than a 3 day supply:  [] Yes  [x] No    Would you like a call back once the refill request has been completed: [] Yes [x] No    If the office needs to give you a call back, can they leave a " voicemail: [] Yes [x] No    Kevin Schmid Rep   07/22/24 15:35 EDT

## 2024-07-22 NOTE — TELEPHONE ENCOUNTER
[facility] AND THEY ARE GIVING HIM A HARD TIME WITH TRANSFERRING THE RX'S FOR HIM, SO THEY TOLD HIM TO JUST CALL US FOR A NEW RX. SENT RX TO [facility].

## 2024-07-23 RX ORDER — FOLIC ACID 1 MG/1
1 TABLET ORAL DAILY
Qty: 90 TABLET | Refills: 1 | Status: SHIPPED | OUTPATIENT
Start: 2024-07-23

## 2024-07-23 RX ORDER — ATORVASTATIN CALCIUM 20 MG/1
20 TABLET, FILM COATED ORAL NIGHTLY
Qty: 90 TABLET | Refills: 1 | Status: SHIPPED | OUTPATIENT
Start: 2024-07-23

## 2024-07-23 RX ORDER — MECLIZINE HYDROCHLORIDE 25 MG/1
25 TABLET ORAL 3 TIMES DAILY PRN
Qty: 30 TABLET | Refills: 0 | Status: SHIPPED | OUTPATIENT
Start: 2024-07-23

## 2024-07-23 RX ORDER — ESCITALOPRAM OXALATE 20 MG/1
20 TABLET ORAL NIGHTLY
Qty: 90 TABLET | Refills: 1 | Status: SHIPPED | OUTPATIENT
Start: 2024-07-23 | End: 2025-07-23

## 2024-07-23 RX ORDER — DICYCLOMINE HYDROCHLORIDE 10 MG/1
10 CAPSULE ORAL
Qty: 120 CAPSULE | Refills: 5 | Status: SHIPPED | OUTPATIENT
Start: 2024-07-23

## 2024-07-23 RX ORDER — CELECOXIB 100 MG/1
100 CAPSULE ORAL 2 TIMES DAILY
Qty: 180 CAPSULE | Refills: 1 | Status: SHIPPED | OUTPATIENT
Start: 2024-07-23

## 2024-07-23 RX ORDER — FERROUS SULFATE 325(65) MG
325 TABLET ORAL
Qty: 30 TABLET | Refills: 1 | Status: SHIPPED | OUTPATIENT
Start: 2024-07-23

## 2024-07-23 RX ORDER — OMEPRAZOLE 40 MG/1
40 CAPSULE, DELAYED RELEASE ORAL DAILY
Qty: 90 CAPSULE | Refills: 3 | Status: SHIPPED | OUTPATIENT
Start: 2024-07-23

## 2024-07-23 RX ORDER — QUETIAPINE FUMARATE 50 MG/1
50 TABLET, FILM COATED ORAL NIGHTLY
Qty: 90 TABLET | Refills: 1 | Status: SHIPPED | OUTPATIENT
Start: 2024-07-23

## 2024-07-23 RX ORDER — BUPROPION HYDROCHLORIDE 300 MG/1
300 TABLET ORAL EVERY MORNING
Qty: 90 TABLET | Refills: 1 | Status: SHIPPED | OUTPATIENT
Start: 2024-07-23

## 2024-07-23 RX ORDER — MONTELUKAST SODIUM 10 MG/1
10 TABLET ORAL NIGHTLY
Qty: 90 TABLET | Refills: 1 | Status: SHIPPED | OUTPATIENT
Start: 2024-07-23

## 2024-07-25 ENCOUNTER — TELEPHONE (OUTPATIENT)
Dept: FAMILY MEDICINE CLINIC | Facility: CLINIC | Age: 54
End: 2024-07-25

## 2024-07-25 NOTE — TELEPHONE ENCOUNTER
HERNÁN WITH [employer] CALLING TO LET FRANCA BELTRE KNOW THAT THERE IS A NEW HEALTH RISK ASSESSMENT AND CARE PLAN IN THE PATIENT PORTAL

## 2024-07-26 ENCOUNTER — TELEPHONE (OUTPATIENT)
Dept: FAMILY MEDICINE CLINIC | Facility: CLINIC | Age: 54
End: 2024-07-26
Payer: MEDICARE

## 2024-07-26 NOTE — TELEPHONE ENCOUNTER
"    Caller: Frank Barger \"Mon[place]\"    Relationship to patient: Self    Best call back number: [phone]    Patient is needing:     PATIENT RECEIVED AN ORDER TO GET A COLONOSCOPY TO WITH     Tyson Hendrix MD     HE HAS BEEN TRYING TO GET AN APPT WITH THEM FOR A WHILE NOW AND CAN'T EVER GET THEM ON THE PHONE.     WOULD LIKE TO KNOW IF THERE IS ANYWHERE ELSE HE CAN GET THIS DONE AT.     PLEASE CALLBACK TO ADVISE.         "

## 2024-07-26 NOTE — TELEPHONE ENCOUNTER
Let him know they book out. He could possibly go to [facility] if he wants but I believe all of GI is backed out. See if he has gotten an appt anywhere

## 2024-08-08 NOTE — PROGRESS NOTES
HEMATOLOGY ONCOLOGY OUTPATIENT CONSULTATION       Patient name: Frank Barger  : 1970  MRN: [id number]  Primary Care Physician: Lilliana Garcia APRN  Referring Physician: No ref. provider found  Reason For Consult:       History of Present Illness:  Patient is a 54 y.o. male who has been referred to us for further evaluation and management of anemia.  Outside labs are reviewed and are summarized as follows:    2024:  CBC: 7.56/8.7/33.1/468 [MCV 67.3, MCH 17.7, RDW 20.7]  CMP: Unremarkable    On review of previous labs, it appears that the microcytic anemia is relatively new in onset as CBC from 1 year prior and previously was unremarkable.    Patient reported that he has previous history of iron deficiency anemia, about 3 years ago for which she was placed on oral iron supplementation which was later discontinued following improvement.  He has been started back on oral iron supplementation about 2 weeks ago following his recent blood work at PCPs office.      He has history of multivessel coronary artery disease requiring him to have a triple vessel CABG in ; he is currently on low-dose daily aspirin.   Also has reported history of Psoriatec arthritis, patient is not on any disease modifying therapy but does take pregabalin for neuropathy symptoms.    He reported that he has been referred for screening colonoscopy due to GI but does not have an appointment at this time.  Psoriatic arthritis: Pregabalin    He is a lifelong smoker and has smoked 1 pack/day since age 11  Currently smoking half pack a day for the last few years.    2024:Patient presents for initial consultation today.  He reported having multiple symptoms at this time including fatigue, dizziness, lightheadedness.  Reported that he has low energy on most days, reported having dizzy spells on changing position which is a new symptom.  He denied any episodes of palpitations or loss of consciousness.   "Denied any visible GI bleeding.    Subjective:  Patient seen today for follow-up.  Reported having intermittent liquidy stools, has dark colored stools  Compliant with oral iron.  Fatigeu and dizziness has improved significantly.       Past Medical History:   Diagnosis Date    Abdominal pain     Allergic     Allergies     takes allergy injections    Anesthesia complication     states heart \"fluttering\" after anesthesia x 1- was kept in hospital 3 days s/p heart stent placement    Anxiety     Arthritis     Asthma     Chronic back pain     Constipation 06/2021    Coronary artery disease 2014    DDD (degenerative disc disease), lumbar     Depression     Emphysema of lung     Fibromyalgia, primary 2016    GERD (gastroesophageal reflux disease)     Headache     Heart murmur 2014    Hypertension 2014    Leg pain     Lumbar spine pain     Myocardial infarction 2014    Nocturia     Scoliosis 1995    Under care of pain management specialist     pain management of [place]       Past Surgical History:   Procedure Laterality Date    CARDIAC CATHETERIZATION N/A 01/20/2021    Procedure: Left Heart Cath and coronary angiogram;  Surgeon: Jaimie Silva MD;  Location: [facility] CATH INVASIVE LOCATION;  Service: Cardiovascular;  Laterality: N/A;    CARDIAC CATHETERIZATION N/A 01/20/2021    Procedure: Saphenous Vein Graft;  Surgeon: Jaimie Silva MD;  Location: [facility] CATH INVASIVE LOCATION;  Service: Cardiovascular;  Laterality: N/A;    CARDIAC CATHETERIZATION N/A 01/20/2021    Procedure: Stent EBENEZER coronary;  Surgeon: Herve Rodriguez MD;  Location: [facility] CATH INVASIVE LOCATION;  Service: Cardiology;  Laterality: N/A;    CARDIAC SURGERY  12/2015    CORONARY ANGIOPLASTY WITH STENT PLACEMENT  04/2014    2 in 2014, 1 in 12/2020    CORONARY ARTERY BYPASS GRAFT  12/2015    x 3    ELBOW DEBRIDEMENT Right 08/17/2021    Procedure: INCISION / DEBRIDEMENT ELBOW, closed reduction right elbow dislocation ;  Surgeon: Kyler August, " MD;  Location: [facility] MAIN OR;  Service: Orthopedics;  Laterality: Right;    ELBOW OPEN REDUCTION INTERNAL FIXATION Left 08/18/2021    Procedure: Left  ulna open reduction internal fixation (of olecranon and coronoid), left radial head replacement  ;  Surgeon: Yonis Mcdonough MD;  Location: [facility] MAIN OR;  Service: Orthopedics;  Laterality: Left;         Current Outpatient Medications:     aspirin 81 MG EC tablet, 0, Disp: , Rfl:     atorvastatin (LIPITOR) 20 MG tablet, Take 1 tablet by mouth Every Night., Disp: 90 tablet, Rfl: 1    buPROPion XL (WELLBUTRIN XL) 300 MG 24 hr tablet, Take 1 tablet by mouth Every Morning., Disp: 90 tablet, Rfl: 1    celecoxib (CeleBREX) 100 MG capsule, Take 1 capsule by mouth 2 (Two) Times a Day., Disp: 180 capsule, Rfl: 1    cyclobenzaprine (FLEXERIL) 10 MG tablet, TAKE 1 TABLET BY MOUTH THREE TIMES DAILY AS NEEDED FOR MUSCLE SPASMS, Disp: 90 tablet, Rfl: 11    dicyclomine (BENTYL) 10 MG capsule, Take 1 capsule by mouth 4 (Four) Times a Day Before Meals & at Bedtime., Disp: 120 capsule, Rfl: 5    escitalopram (Lexapro) 20 MG tablet, Take 1 tablet by mouth Every Night., Disp: 90 tablet, Rfl: 1    ferrous sulfate 325 (65 FE) MG tablet, Take 1 tablet by mouth Daily With Breakfast., Disp: 30 tablet, Rfl: 1    folic acid (FOLVITE) 1 MG tablet, Take 1 tablet by mouth Daily., Disp: 90 tablet, Rfl: 1    meclizine (ANTIVERT) 25 MG tablet, Take 1 tablet by mouth 3 (Three) Times a Day As Needed for Dizziness., Disp: 30 tablet, Rfl: 0    metoprolol tartrate (LOPRESSOR) 25 MG tablet, Take 1 tablet by mouth 2 (Two) Times a Day., Disp: 180 tablet, Rfl: 1    montelukast (SINGULAIR) 10 MG tablet, Take 1 tablet by mouth Every Night., Disp: 90 tablet, Rfl: 1    nitroglycerin (NITROSTAT) 0.4 MG SL tablet, Take 1 tablet by mouth Every 5 (Five) Minutes As Needed for Chest Pain. Take no more than 3 doses in 15 minutes., Disp: 25 tablet, Rfl: 4    omeprazole (priLOSEC) 40 MG capsule, Take 1 capsule by  "mouth Daily., Disp: 90 capsule, Rfl: 3    ondansetron (ZOFRAN) 4 MG tablet, TAKE 1 TABLET BY MOUTH EVERY 8 HOURS AS NEEDED FOR NAUSEA OR VOMITING, Disp: 42 tablet, Rfl: 0    pregabalin (LYRICA) 300 MG capsule, Take 1 capsule by mouth 2 (Two) Times a Day., Disp: , Rfl:     QUEtiapine (SEROquel) 50 MG tablet, Take 1 tablet by mouth Every Night., Disp: 90 tablet, Rfl: 1    vitamin B-12 (CYANOCOBALAMIN) 1000 MCG tablet, Take 1 tablet by mouth Daily., Disp: 90 tablet, Rfl: 1    Allergies   Allergen Reactions    Hydrocodone Nausea And Vomiting    Hydrocodone-Acetaminophen GI Intolerance    Oxycodone GI Intolerance       Family History   Problem Relation Age of Onset    Diabetes Mother     Heart disease Father     Arthritis Father     Cancer Father         Prostate    COPD Father     Anxiety disorder Daughter     Depression Daughter        Cancer-related family history includes Cancer in his father.      Social History     Tobacco Use    Smoking status: Every Day     Current packs/day: 1.50     Average packs/day: 1.5 packs/day for 41.2 years (61.8 ttl pk-yrs)     Types: Cigarettes, Cigars     Start date: 8/5/1985     Passive exposure: Current    Smokeless tobacco: Never   Vaping Use    Vaping status: Never Used    Passive vaping exposure: Yes   Substance Use Topics    Alcohol use: No    Drug use: No     Social History     Social History Narrative    Not on file       ROS:   Review of Systems   Constitutional:  Positive for fatigue.   Respiratory:  Positive for shortness of breath.    Musculoskeletal:  Positive for gait problem.   Neurological:  Positive for dizziness and weakness.         Objective:    Vital Signs:  Vitals:    08/12/24 1539   BP: 134/86   Pulse: 57   SpO2: 96%   Weight: 80.6 kg (177 lb 9.6 oz)   Height: 180.3 cm (71\")   PainSc: 0-No pain       Body mass index is 24.77 kg/m².    ECOG  (1) Restricted in physically strenuous activity, ambulatory and able to do work of light nature    Physical Exam:   Physical " Exam  Constitutional:       Appearance: Normal appearance. He is normal weight. He is ill-appearing.   HENT:      Head: Normocephalic and atraumatic.      Right Ear: External ear normal.      Left Ear: External ear normal.      Nose: Nose normal.      Mouth/Throat:      Mouth: Mucous membranes are moist.      Pharynx: Oropharynx is clear.   Eyes:      Extraocular Movements: Extraocular movements intact.      Conjunctiva/sclera: Conjunctivae normal.      Pupils: Pupils are equal, round, and reactive to light.   Cardiovascular:      Rate and Rhythm: Normal rate.      Pulses: Normal pulses.   Pulmonary:      Effort: Pulmonary effort is normal.   Abdominal:      General: Abdomen is flat.      Palpations: Abdomen is soft.   Musculoskeletal:         General: Normal range of motion.      Cervical back: Normal range of motion and neck supple.   Skin:     General: Skin is warm.      Coloration: Skin is pale.   Neurological:      Mental Status: He is alert.   Psychiatric:         Mood and Affect: Mood normal.         Behavior: Behavior normal.         Thought Content: Thought content normal.         Judgment: Judgment normal.         Lab Results - Last 18 Months   Lab Units 08/12/24  1529 06/12/24  1341 05/29/24  0957   WBC 10*3/mm3 6.96 8.51 7.56   HEMOGLOBIN g/dL 12.5* 8.2* 8.7*   HEMATOCRIT % 40.7 28.6* 33.1*   PLATELETS 10*3/mm3 284 307 468*   MCV fL 78.1* 62.9* 67.3*     Lab Results - Last 18 Months   Lab Units 08/12/24  1529 06/12/24  1341 05/29/24  0957   SODIUM mmol/L 132* 136 137   POTASSIUM mmol/L 4.3 4.1 4.5   CHLORIDE mmol/L 98 102 100   CO2 mmol/L 24.9 23.7 22.0   BUN mg/dL 14 10 13   CREATININE mg/dL 1.14 1.12 1.17   CALCIUM mg/dL 9.5 9.5 9.7   BILIRUBIN mg/dL 0.3 0.5 0.3   ALK PHOS U/L 85 92 95   ALT (SGPT) U/L 15 11 11   AST (SGOT) U/L 18 16 10   GLUCOSE mg/dL 111* 106* 110*       Lab Results   Component Value Date    GLUCOSE 106 (H) 06/12/2024    BUN 10 06/12/2024    CREATININE 1.12 06/12/2024    EGFRIFNONA  "78 01/05/2022    BCR 8.9 06/12/2024    K 4.1 06/12/2024    CO2 23.7 06/12/2024    CALCIUM 9.5 06/12/2024    PROTENTOTREF 7.4 06/12/2024    ALBUMIN 4.3 06/12/2024    ALBUMIN 3.7 06/12/2024    LABIL2 1.1 06/12/2024    AST 16 06/12/2024    ALT 11 06/12/2024       No results for input(s): \"APTT\", \"INR\", \"PTT\" in the last 34114 hours.    Lab Results   Component Value Date    IRON 110 08/12/2024    TIBC 516 08/12/2024    FERRITIN 26.00 (L) 08/12/2024       Lab Results   Component Value Date    FOLATE 3.99 (L) 06/12/2024       No results found for: \"OCCULTBLD\"    Lab Results   Component Value Date    RETICCTPCT 1.53 06/12/2024     Lab Results   Component Value Date    FPLVFQYN06 370 06/12/2024     No results found for: \"SPEP\", \"UPEP\"  LDH   Date Value Ref Range Status   06/12/2024 131 (L) 135 - 225 U/L Final     Uric Acid   Date Value Ref Range Status   08/10/2017 6.1 4.8 - 8.7 mg/dL Final     Lab Results   Component Value Date    LAW Negative 06/12/2024    SEDRATE 25 (H) 06/12/2024     Lab Results   Component Value Date    HAPTOGLOBIN 202 (H) 06/12/2024     Lab Results   Component Value Date    PTT 24.7 08/16/2021    INR 1.03 08/16/2021     No results found for: \"\"  No results found for: \"CEA\"  No components found for: \"CA-19-9\"  Lab Results   Component Value Date    PSA 2.170 02/02/2023     Lab Results   Component Value Date    SEDRATE 25 (H) 06/12/2024          Assessment & Plan :    Iron deficiency anemia:  -Outside labs and medical history reviewed as above.  -Repeat labs today showed Hb/HCT 8.2/28.6 with MCV 62.9.  Also noted to have slight left shift on CBC.  -Noted to have low ferritin 8.5 ng/mL indicative of significant iron deficiency.  Iron panel is normal at this time which is likely due to ongoing oral iron supplementation and is not truly reflective of his iron deficiency status.  -Will monitor response to oral iron supplementation, if patient continues to have symptoms we will consider IV iron " supplementation.  -Also noted to have folic acid deficiency, B12 levels low normal..  Hemolysis labs are normal.  ESR mildly elevated.  -LAW and immunofixation studies are negative.  Has not been able to see GI,appear to have missed appt but pt claimed that he did not get a call. Being arranged for another referral by PCP.      Folic acid deficiency:  -started  patient on oral supplementation for the same.\  -Advised to continue    Psoriatic arthritis:  -Patient not on active treatment at this time.  Advised to follow-up with PCP and rheumatology for further management.      4 month follow-up with repeat labs, sooner as needed.    Thank you very much for providing the opportunity to participate in this patient’s care. Please do not hesitate to call if there are any other questions.

## 2024-08-12 ENCOUNTER — LAB (OUTPATIENT)
Dept: LAB | Facility: HOSPITAL | Age: 54
End: 2024-08-12
Payer: MEDICARE

## 2024-08-12 ENCOUNTER — TELEPHONE (OUTPATIENT)
Dept: FAMILY MEDICINE CLINIC | Facility: CLINIC | Age: 54
End: 2024-08-12

## 2024-08-12 ENCOUNTER — OFFICE VISIT (OUTPATIENT)
Dept: ONCOLOGY | Facility: CLINIC | Age: 54
End: 2024-08-12
Payer: MEDICARE

## 2024-08-12 VITALS
HEIGHT: 71 IN | DIASTOLIC BLOOD PRESSURE: 86 MMHG | OXYGEN SATURATION: 96 % | BODY MASS INDEX: 24.86 KG/M2 | SYSTOLIC BLOOD PRESSURE: 134 MMHG | HEART RATE: 57 BPM | WEIGHT: 177.6 LBS

## 2024-08-12 DIAGNOSIS — D50.0 IRON DEFICIENCY ANEMIA DUE TO CHRONIC BLOOD LOSS: Primary | ICD-10-CM

## 2024-08-12 DIAGNOSIS — L40.50 PSORIATIC ARTHRITIS: ICD-10-CM

## 2024-08-12 DIAGNOSIS — E53.8 FOLATE DEFICIENCY: ICD-10-CM

## 2024-08-12 DIAGNOSIS — D64.9 ANEMIA, UNSPECIFIED TYPE: Primary | ICD-10-CM

## 2024-08-12 DIAGNOSIS — Z12.11 COLON CANCER SCREENING: Primary | ICD-10-CM

## 2024-08-12 LAB
ALBUMIN SERPL-MCNC: 4.6 G/DL (ref 3.5–5.2)
ALBUMIN/GLOB SERPL: 1.4 G/DL
ALP SERPL-CCNC: 85 U/L (ref 39–117)
ALT SERPL W P-5'-P-CCNC: 15 U/L (ref 1–41)
ANION GAP SERPL CALCULATED.3IONS-SCNC: 9.1 MMOL/L (ref 5–15)
AST SERPL-CCNC: 18 U/L (ref 1–40)
BASOPHILS # BLD AUTO: 0.07 10*3/MM3 (ref 0–0.2)
BASOPHILS NFR BLD AUTO: 1 % (ref 0–1.5)
BILIRUB SERPL-MCNC: 0.3 MG/DL (ref 0–1.2)
BUN SERPL-MCNC: 14 MG/DL (ref 6–20)
BUN/CREAT SERPL: 12.3 (ref 7–25)
CALCIUM SPEC-SCNC: 9.5 MG/DL (ref 8.6–10.5)
CHLORIDE SERPL-SCNC: 98 MMOL/L (ref 98–107)
CO2 SERPL-SCNC: 24.9 MMOL/L (ref 22–29)
CREAT SERPL-MCNC: 1.14 MG/DL (ref 0.76–1.27)
DEPRECATED RDW RBC AUTO: 69.2 FL (ref 37–54)
EGFRCR SERPLBLD CKD-EPI 2021: 76.4 ML/MIN/1.73
EOSINOPHIL # BLD AUTO: 0.16 10*3/MM3 (ref 0–0.4)
EOSINOPHIL NFR BLD AUTO: 2.3 % (ref 0.3–6.2)
ERYTHROCYTE [DISTWIDTH] IN BLOOD BY AUTOMATED COUNT: 25.3 % (ref 12.3–15.4)
FERRITIN SERPL-MCNC: 26 NG/ML (ref 30–400)
GLOBULIN UR ELPH-MCNC: 3.2 GM/DL
GLUCOSE SERPL-MCNC: 111 MG/DL (ref 65–99)
HCT VFR BLD AUTO: 40.7 % (ref 37.5–51)
HGB BLD-MCNC: 12.5 G/DL (ref 13–17.7)
HOLD SPECIMEN: NORMAL
IRON 24H UR-MRATE: 110 MCG/DL (ref 59–158)
IRON SATN MFR SERPL: 21 % (ref 20–50)
LYMPHOCYTES # BLD AUTO: 2.12 10*3/MM3 (ref 0.7–3.1)
LYMPHOCYTES NFR BLD AUTO: 30.5 % (ref 19.6–45.3)
MCH RBC QN AUTO: 24 PG (ref 26.6–33)
MCHC RBC AUTO-ENTMCNC: 30.7 G/DL (ref 31.5–35.7)
MCV RBC AUTO: 78.1 FL (ref 79–97)
MONOCYTES # BLD AUTO: 0.62 10*3/MM3 (ref 0.1–0.9)
MONOCYTES NFR BLD AUTO: 8.9 % (ref 5–12)
NEUTROPHILS NFR BLD AUTO: 3.99 10*3/MM3 (ref 1.7–7)
NEUTROPHILS NFR BLD AUTO: 57.3 % (ref 42.7–76)
PLATELET # BLD AUTO: 284 10*3/MM3 (ref 140–450)
PMV BLD AUTO: 9.5 FL (ref 6–12)
POTASSIUM SERPL-SCNC: 4.3 MMOL/L (ref 3.5–5.2)
PROT SERPL-MCNC: 7.8 G/DL (ref 6–8.5)
RBC # BLD AUTO: 5.21 10*6/MM3 (ref 4.14–5.8)
RETICS # AUTO: 0.06 10*6/MM3 (ref 0.02–0.13)
RETICS/RBC NFR AUTO: 1.1 % (ref 0.7–1.9)
SODIUM SERPL-SCNC: 132 MMOL/L (ref 136–145)
TIBC SERPL-MCNC: 516 MCG/DL (ref 298–536)
TRANSFERRIN SERPL-MCNC: 346 MG/DL (ref 200–360)
WBC NRBC COR # BLD AUTO: 6.96 10*3/MM3 (ref 3.4–10.8)

## 2024-08-12 PROCEDURE — 36415 COLL VENOUS BLD VENIPUNCTURE: CPT

## 2024-08-12 PROCEDURE — 85045 AUTOMATED RETICULOCYTE COUNT: CPT | Performed by: STUDENT IN AN ORGANIZED HEALTH CARE EDUCATION/TRAINING PROGRAM

## 2024-08-12 PROCEDURE — 99214 OFFICE O/P EST MOD 30 MIN: CPT | Performed by: STUDENT IN AN ORGANIZED HEALTH CARE EDUCATION/TRAINING PROGRAM

## 2024-08-12 PROCEDURE — 83540 ASSAY OF IRON: CPT | Performed by: STUDENT IN AN ORGANIZED HEALTH CARE EDUCATION/TRAINING PROGRAM

## 2024-08-12 PROCEDURE — 1126F AMNT PAIN NOTED NONE PRSNT: CPT | Performed by: STUDENT IN AN ORGANIZED HEALTH CARE EDUCATION/TRAINING PROGRAM

## 2024-08-12 PROCEDURE — 3079F DIAST BP 80-89 MM HG: CPT | Performed by: STUDENT IN AN ORGANIZED HEALTH CARE EDUCATION/TRAINING PROGRAM

## 2024-08-12 PROCEDURE — 82728 ASSAY OF FERRITIN: CPT | Performed by: STUDENT IN AN ORGANIZED HEALTH CARE EDUCATION/TRAINING PROGRAM

## 2024-08-12 PROCEDURE — 80053 COMPREHEN METABOLIC PANEL: CPT | Performed by: STUDENT IN AN ORGANIZED HEALTH CARE EDUCATION/TRAINING PROGRAM

## 2024-08-12 PROCEDURE — 3075F SYST BP GE 130 - 139MM HG: CPT | Performed by: STUDENT IN AN ORGANIZED HEALTH CARE EDUCATION/TRAINING PROGRAM

## 2024-08-12 PROCEDURE — 84466 ASSAY OF TRANSFERRIN: CPT | Performed by: STUDENT IN AN ORGANIZED HEALTH CARE EDUCATION/TRAINING PROGRAM

## 2024-08-12 PROCEDURE — 85025 COMPLETE CBC W/AUTO DIFF WBC: CPT

## 2024-08-12 RX ORDER — PANTOPRAZOLE SODIUM 40 MG/1
TABLET, DELAYED RELEASE ORAL
COMMUNITY
End: 2024-08-13 | Stop reason: SDUPTHER

## 2024-08-12 NOTE — TELEPHONE ENCOUNTER
"  Caller: Frank Barger \"Monroe\"    Relationship: Self    Best call back number: [phone] OK TO LEAVE MESSAGE      What was the call regarding: PATIENT RECEIVED A CERTIFIED LETTER FROM [facility] AND THEY DISCHARGED HIM FOR MISSED APPT AND HE HAD BEEN WAITING FOR MONTHS FOR THEM TO CALL AND SCHEDULE. THEY NEVER CALLED. PATIENT WOULD LIKE TO BE REFERRED ELSEWHERE. PLEASE CALL AND ADVISE.     "

## 2024-08-13 DIAGNOSIS — I24.0 ISCHEMIC HEART DISEASE DUE TO CORONARY ARTERY OBSTRUCTION: ICD-10-CM

## 2024-08-13 DIAGNOSIS — E78.2 MIXED HYPERLIPIDEMIA: ICD-10-CM

## 2024-08-13 DIAGNOSIS — F51.05 INSOMNIA DUE TO MENTAL CONDITION: Chronic | ICD-10-CM

## 2024-08-13 DIAGNOSIS — I25.9 ISCHEMIC HEART DISEASE DUE TO CORONARY ARTERY OBSTRUCTION: ICD-10-CM

## 2024-08-13 DIAGNOSIS — F41.1 GENERALIZED ANXIETY DISORDER: Chronic | ICD-10-CM

## 2024-08-13 DIAGNOSIS — R42 VERTIGO: ICD-10-CM

## 2024-08-13 DIAGNOSIS — F33.1 MAJOR DEPRESSIVE DISORDER, RECURRENT EPISODE, MODERATE: Chronic | ICD-10-CM

## 2024-08-13 DIAGNOSIS — M47.816 ARTHROPATHY OF LUMBAR FACET JOINT: ICD-10-CM

## 2024-08-13 DIAGNOSIS — R07.9 CHEST PAIN WITH HIGH RISK OF ACUTE CORONARY SYNDROME: ICD-10-CM

## 2024-08-13 DIAGNOSIS — D64.9 LOW HEMOGLOBIN: ICD-10-CM

## 2024-08-13 NOTE — TELEPHONE ENCOUNTER
"    Caller: Frank Barger \"Mon[place]\"    Relationship: Self    Best call back number: [phone]    Requested Prescriptions:   Requested Prescriptions     Pending Prescriptions Disp Refills    ondansetron (ZOFRAN) 4 MG tablet 42 tablet 0     Sig: Take 1 tablet by mouth Every 8 (Eight) Hours As Needed for Nausea or Vomiting.    atorvastatin (LIPITOR) 20 MG tablet 90 tablet 1     Sig: Take 1 tablet by mouth Every Night.    celecoxib (CeleBREX) 100 MG capsule 180 capsule 1     Sig: Take 1 capsule by mouth 2 (Two) Times a Day.    meclizine (ANTIVERT) 25 MG tablet 30 tablet 0     Sig: Take 1 tablet by mouth 3 (Three) Times a Day As Needed for Dizziness.    metoprolol tartrate (LOPRESSOR) 25 MG tablet 180 tablet 1     Sig: Take 1 tablet by mouth 2 (Two) Times a Day.    montelukast (SINGULAIR) 10 MG tablet 90 tablet 1     Sig: Take 1 tablet by mouth Every Night.    omeprazole (priLOSEC) 40 MG capsule 90 capsule 3     Sig: Take 1 capsule by mouth Daily.    QUEtiapine (SEROquel) 50 MG tablet 90 tablet 1     Sig: Take 1 tablet by mouth Every Night.    buPROPion XL (WELLBUTRIN XL) 300 MG 24 hr tablet 90 tablet 1     Sig: Take 1 tablet by mouth Every Morning.    dicyclomine (BENTYL) 10 MG capsule 120 capsule 5     Sig: Take 1 capsule by mouth 4 (Four) Times a Day Before Meals & at Bedtime.    escitalopram (Lexapro) 20 MG tablet 90 tablet 1     Sig: Take 1 tablet by mouth Every Night.    pantoprazole (PROTONIX) 40 MG EC tablet          Pharmacy where request should be sent: [facility]/PHARMACY #[id number] - [facility] [place] [address] [phone] [facility] [phone]      Last office visit with prescribing clinician: 5/29/2024   Last telemedicine visit with prescribing clinician: Visit date not found   Next office visit with prescribing clinician: 11/27/2024     Additional details provided by patient: WILL NEED REFILLS FOR ALL MEDICATIONS SENT TO NEW PHARMACY     Does the patient have less than a 3 day supply:  [] Yes  [x] " No    Would you like a call back once the refill request has been completed: [] Yes [x] No    If the office needs to give you a call back, can they leave a voicemail: [] Yes [x] No    Delgado Duran   08/13/24 14:27 EDT

## 2024-08-14 RX ORDER — CELECOXIB 100 MG/1
100 CAPSULE ORAL 2 TIMES DAILY
Qty: 180 CAPSULE | Refills: 1 | Status: SHIPPED | OUTPATIENT
Start: 2024-08-14

## 2024-08-14 RX ORDER — MECLIZINE HYDROCHLORIDE 25 MG/1
25 TABLET ORAL 3 TIMES DAILY PRN
Qty: 30 TABLET | Refills: 0 | Status: SHIPPED | OUTPATIENT
Start: 2024-08-14

## 2024-08-14 RX ORDER — MONTELUKAST SODIUM 10 MG/1
10 TABLET ORAL NIGHTLY
Qty: 90 TABLET | Refills: 1 | Status: SHIPPED | OUTPATIENT
Start: 2024-08-14

## 2024-08-14 RX ORDER — BUPROPION HYDROCHLORIDE 300 MG/1
300 TABLET ORAL EVERY MORNING
Qty: 90 TABLET | Refills: 1 | Status: SHIPPED | OUTPATIENT
Start: 2024-08-14 | End: 2024-08-15 | Stop reason: SDUPTHER

## 2024-08-14 RX ORDER — QUETIAPINE FUMARATE 50 MG/1
50 TABLET, FILM COATED ORAL NIGHTLY
Qty: 90 TABLET | Refills: 1 | Status: SHIPPED | OUTPATIENT
Start: 2024-08-14 | End: 2024-08-15 | Stop reason: SDUPTHER

## 2024-08-14 RX ORDER — ONDANSETRON 4 MG/1
4 TABLET, FILM COATED ORAL EVERY 8 HOURS PRN
Qty: 42 TABLET | Refills: 0 | Status: SHIPPED | OUTPATIENT
Start: 2024-08-14

## 2024-08-14 RX ORDER — OMEPRAZOLE 40 MG/1
40 CAPSULE, DELAYED RELEASE ORAL DAILY
Qty: 90 CAPSULE | Refills: 3 | Status: SHIPPED | OUTPATIENT
Start: 2024-08-14

## 2024-08-14 RX ORDER — ESCITALOPRAM OXALATE 20 MG/1
20 TABLET ORAL NIGHTLY
Qty: 90 TABLET | Refills: 1 | Status: SHIPPED | OUTPATIENT
Start: 2024-08-14 | End: 2024-08-15 | Stop reason: SDUPTHER

## 2024-08-14 RX ORDER — ATORVASTATIN CALCIUM 20 MG/1
20 TABLET, FILM COATED ORAL NIGHTLY
Qty: 90 TABLET | Refills: 1 | Status: SHIPPED | OUTPATIENT
Start: 2024-08-14

## 2024-08-14 RX ORDER — PANTOPRAZOLE SODIUM 40 MG/1
40 TABLET, DELAYED RELEASE ORAL DAILY
Qty: 90 TABLET | Refills: 1 | Status: SHIPPED | OUTPATIENT
Start: 2024-08-14

## 2024-08-14 RX ORDER — DICYCLOMINE HYDROCHLORIDE 10 MG/1
10 CAPSULE ORAL
Qty: 120 CAPSULE | Refills: 5 | Status: SHIPPED | OUTPATIENT
Start: 2024-08-14

## 2024-08-14 NOTE — PROGRESS NOTES
"Subjective   Frank Barger is a 54 y.o. male who presents today for follow-up for psychiatric medication management.     Chief Complaint:  Follow up for depression and PTSD. Patient also here to establish care with this provider.     History of Present Illness:     Medication adjustments last visit:  Continue Wellbutrin XL 300mg daily.   Continue quetiapine, increase to 50mg nightly.   Continue Lexapro 20mg daily.     Patient here for follow up today.   States he needs to switch medications to [facility]. Says sleep is doing well on 50mg quetiapine.   Denies any suicidal thoughts.   He sees [facility] and is on Lyrica for this. He feels like it is helpful.   Depression is manageable. He says, \"I have my days\" but he feels like he marta ok.   Overall doing well. Will plan for 6 month follow up.     Patient presents with symptoms and behaviors that are consistent with the following DSM-5 diagnoses:  1. Major depressive disorder  2. Generalized anxiety disorder  2. PTSD  3. Insomnia    The following portions of the patient's history were reviewed and updated as appropriate: allergies, current medications, past family history, past medical history, past social history, past surgical history and problem list.    PAST OUTPATIENT TREATMENT  Diagnosis treated:  Affective Disorder, Anxiety/Panic Disorder  Treatment Type:  Psychotherapy, Medication Management  Prior Psychiatric Medications:  Zoloft  Trazodone  Brintellix  Trintellix   Prazosin  Elavil  Abilify  Support Groups:  None  Sequelae Of Mental Disorder:  emotional distress    Interval History  No Change    Side Effects  None      Past Medical History:  Past Medical History:   Diagnosis Date    Abdominal pain     Allergic     Allergies     takes allergy injections    Anesthesia complication     states heart \"fluttering\" after anesthesia x 1- was kept in hospital 3 days s/p heart stent placement    Anxiety     Arthritis     Asthma     Chronic back pain     " Constipation 06/2021    Coronary artery disease 2014    DDD (degenerative disc disease), lumbar     Depression     Emphysema of lung     Fibromyalgia, primary 2016    GERD (gastroesophageal reflux disease)     Headache     Heart murmur 2014    Hypertension 2014    Leg pain     Lumbar spine pain     Myocardial infarction 2014    Nocturia     Scoliosis 1995    Under care of pain management specialist     pain management of [place]       Social History:  Social History     Socioeconomic History    Marital status:    Tobacco Use    Smoking status: Every Day     Current packs/day: 1.50     Average packs/day: 1.5 packs/day for 41.2 years (61.8 ttl pk-yrs)     Types: Cigarettes, Cigars     Start date: 8/5/1985     Passive exposure: Current    Smokeless tobacco: Never   Vaping Use    Vaping status: Never Used    Passive vaping exposure: Yes   Substance and Sexual Activity    Alcohol use: No    Drug use: No    Sexual activity: Yes     Partners: Female       Family History:  Family History   Problem Relation Age of Onset    Diabetes Mother     Heart disease Father     Arthritis Father     Cancer Father         Prostate    COPD Father     Anxiety disorder Daughter     Depression Daughter        Past Surgical History:  Past Surgical History:   Procedure Laterality Date    CARDIAC CATHETERIZATION N/A 01/20/2021    Procedure: Left Heart Cath and coronary angiogram;  Surgeon: Jaimie Silva MD;  Location: [facility] CATH INVASIVE LOCATION;  Service: Cardiovascular;  Laterality: N/A;    CARDIAC CATHETERIZATION N/A 01/20/2021    Procedure: Saphenous Vein Graft;  Surgeon: Jaimie Silva MD;  Location: [facility] CATH INVASIVE LOCATION;  Service: Cardiovascular;  Laterality: N/A;    CARDIAC CATHETERIZATION N/A 01/20/2021    Procedure: Stent EBENEZER coronary;  Surgeon: Herve Rodriguez MD;  Location: [facility] CATH INVASIVE LOCATION;  Service: Cardiology;  Laterality: N/A;    CARDIAC SURGERY  12/2015    CORONARY ANGIOPLASTY WITH  STENT PLACEMENT  04/2014    2 in 2014, 1 in 12/2020    CORONARY ARTERY BYPASS GRAFT  12/2015    x 3    ELBOW DEBRIDEMENT Right 08/17/2021    Procedure: INCISION / DEBRIDEMENT ELBOW, closed reduction right elbow dislocation ;  Surgeon: Kyler August MD;  Location: [facility] MAIN OR;  Service: Orthopedics;  Laterality: Right;    ELBOW OPEN REDUCTION INTERNAL FIXATION Left 08/18/2021    Procedure: Left  ulna open reduction internal fixation (of olecranon and coronoid), left radial head replacement  ;  Surgeon: Yonis Mcdonough MD;  Location: [facility] MAIN OR;  Service: Orthopedics;  Laterality: Left;       Problem List:  Patient Active Problem List   Diagnosis    Atherosclerosis of coronary artery bypass graft    Essential hypertension    Chronic pain disorder    Coronary artery disease    Degeneration of intervertebral disc of lumbar region    Fibromyositis    Mixed hyperlipidemia    Neuropathic pain    Posttraumatic stress disorder    Psoriasis    Psoriatic arthritis    Environmental and seasonal allergies    Personal history of tobacco use, presenting hazards to health    Status post coronary artery bypass graft    Gastroesophageal reflux disease without esophagitis    Tinnitus of left ear    Intractable migraine without aura and with status migrainosus    Cerumen debris on tympanic membrane of both ears    Bilateral deafness    Ischemic heart disease due to coronary artery obstruction    Status post angioplasty with stent    Dyslipidemia    Abnormal nuclear stress test    Chest pain with high risk of acute coronary syndrome    Iron deficiency anemia    Arthropathy of lumbar facet joint    Arthropathy of right knee    Syncope and collapse    History of scoliosis    COPD (chronic obstructive pulmonary disease)    Dislocation of elbow, right, open, initial encounter    Fracture of left elbow    Major depressive disorder, recurrent episode, moderate    Psychophysiological insomnia    Posterior dislocation of right  radial head    Displaced fracture of olecranon process with intraarticular extension of left ulna, subsequent encounter for closed fracture with routine healing    Displaced fracture of head of left radius, subsequent encounter for closed fracture with routine healing    Viral gastroenteritis    Post traumatic stress disorder (PTSD)    Insomnia due to mental condition    Bipolar I disorder, most recent episode depressed       Allergy:   Allergies   Allergen Reactions    Hydrocodone Nausea And Vomiting    Hydrocodone-Acetaminophen GI Intolerance    Oxycodone GI Intolerance        Discontinued Medications:  Medications Discontinued During This Encounter   Medication Reason    QUEtiapine (SEROquel) 50 MG tablet Reorder    buPROPion XL (WELLBUTRIN XL) 300 MG 24 hr tablet Reorder    escitalopram (Lexapro) 20 MG tablet Reorder               Current Medications:   Current Outpatient Medications   Medication Sig Dispense Refill    aspirin 81 MG EC tablet 0      atorvastatin (LIPITOR) 20 MG tablet Take 1 tablet by mouth Every Night. 90 tablet 1    buPROPion XL (WELLBUTRIN XL) 300 MG 24 hr tablet Take 1 tablet by mouth Every Morning. 90 tablet 3    celecoxib (CeleBREX) 100 MG capsule Take 1 capsule by mouth 2 (Two) Times a Day. 180 capsule 1    cyclobenzaprine (FLEXERIL) 10 MG tablet TAKE 1 TABLET BY MOUTH THREE TIMES DAILY AS NEEDED FOR MUSCLE SPASMS 90 tablet 11    dicyclomine (BENTYL) 10 MG capsule Take 1 capsule by mouth 4 (Four) Times a Day Before Meals & at Bedtime. 120 capsule 5    escitalopram (Lexapro) 20 MG tablet Take 1 tablet by mouth Daily. 90 tablet 3    ferrous sulfate 325 (65 FE) MG tablet Take 1 tablet by mouth Daily With Breakfast. 30 tablet 1    folic acid (FOLVITE) 1 MG tablet Take 1 tablet by mouth Daily. 90 tablet 1    meclizine (ANTIVERT) 25 MG tablet Take 1 tablet by mouth 3 (Three) Times a Day As Needed for Dizziness. 30 tablet 0    metoprolol tartrate (LOPRESSOR) 25 MG tablet Take 1 tablet by mouth 2  (Two) Times a Day. 180 tablet 1    montelukast (SINGULAIR) 10 MG tablet Take 1 tablet by mouth Every Night. 90 tablet 1    nitroglycerin (NITROSTAT) 0.4 MG SL tablet Take 1 tablet by mouth Every 5 (Five) Minutes As Needed for Chest Pain. Take no more than 3 doses in 15 minutes. 25 tablet 4    omeprazole (priLOSEC) 40 MG capsule Take 1 capsule by mouth Daily. 90 capsule 3    ondansetron (ZOFRAN) 4 MG tablet Take 1 tablet by mouth Every 8 (Eight) Hours As Needed for Nausea or Vomiting. 42 tablet 0    pantoprazole (PROTONIX) 40 MG EC tablet Take 1 tablet by mouth Daily. 90 tablet 1    pregabalin (LYRICA) 300 MG capsule Take 1 capsule by mouth 2 (Two) Times a Day.      QUEtiapine (SEROquel) 50 MG tablet Take 1 tablet by mouth Every Night. 90 tablet 3    vitamin B-12 (CYANOCOBALAMIN) 1000 MCG tablet Take 1 tablet by mouth Daily. 90 tablet 1     No current facility-administered medications for this visit.         Psychological ROS: positive for - anxiety, depression and sleep disturbances  negative for - behavioral disorder, concentration difficulties, decreased libido, disorientation, hallucinations, hostility, irritability, memory difficulties, mood swings, obsessive thoughts, physical abuse, sexual abuse or suicidal ideation      Physical Exam:   There were no vitals taken for this visit.    MENTAL STATUS EXAM   General Appearance:  Cleanly groomed and dressed  Eye Contact:  Good eye contact  Attitude:  Cooperative  Motor Activity:  Normal gait, posture  Muscle Strength:  Normal  Speech:  Normal rate, tone, volume  Language:  Spontaneous  Mood and affect:  Normal, pleasant  Hopelessness:  Denies  Loneliness: Denies  Thought Process:  Logical and goal-directed  Associations/ Thought Content:  No delusions  Hallucinations:  None  Suicidal Ideations:  Not present  Homicidal Ideation:  Not present  Sensorium:  Alert  Orientation:  Person, place, time and situation  Immediate Recall, Recent, and Remote Memory:   Intact  Attention Span/ Concentration:  Good  Fund of Knowledge:  Appropriate for age and educational level  Intellectual Functioning:  Average range  Insight:  Good  Judgement:  Good  Reliability:  Good  Impulse Control:  Good       PHQ-9 Depression Screening    Little interest or pleasure in doing things? 0-->not at all   Feeling down, depressed, or hopeless? 2-->more than half the days   Trouble falling or staying asleep, or sleeping too much? 0-->not at all   Feeling tired or having little energy? 1-->several days   Poor appetite or overeating? 0-->not at all   Feeling bad about yourself - or that you are a failure or have let yourself or your family down? 0-->not at all   Trouble concentrating on things, such as reading the newspaper or watching television? 0-->not at all   Moving or speaking so slowly that other people could have noticed? Or the opposite - being so fidgety or restless that you have been moving around a lot more than usual? 0-->not at all   Thoughts that you would be better off dead, or of hurting yourself in some way? 0-->not at all   PHQ-9 Total Score 3   If you checked off any problems, how difficult have these problems made it for you to do your work, take care of things at home, or get along with other people? not difficult at all        Current every day smoker less than 3 minutes spent counseling Not agreeable to stopping    I advised Frank of the risks of tobacco use.     Result Review:    Labs:  Lab on 08/12/2024   Component Date Value Ref Range Status    Glucose 08/12/2024 111 (H)  65 - 99 mg/dL Final    BUN 08/12/2024 14  6 - 20 mg/dL Final    Creatinine 08/12/2024 1.14  0.76 - 1.27 mg/dL Final    Sodium 08/12/2024 132 (L)  136 - 145 mmol/L Final    Potassium 08/12/2024 4.3  3.5 - 5.2 mmol/L Final    Chloride 08/12/2024 98  98 - 107 mmol/L Final    CO2 08/12/2024 24.9  22.0 - 29.0 mmol/L Final    Calcium 08/12/2024 9.5  8.6 - 10.5 mg/dL Final    Total Protein 08/12/2024 7.8  6.0 -  8.5 g/dL Final    Albumin 08/12/2024 4.6  3.5 - 5.2 g/dL Final    ALT (SGPT) 08/12/2024 15  1 - 41 U/L Final    AST (SGOT) 08/12/2024 18  1 - 40 U/L Final    Alkaline Phosphatase 08/12/2024 85  39 - 117 U/L Final    Total Bilirubin 08/12/2024 0.3  0.0 - 1.2 mg/dL Final    Globulin 08/12/2024 3.2  gm/dL Final    A/G Ratio 08/12/2024 1.4  g/dL Final    BUN/Creatinine Ratio 08/12/2024 12.3  7.0 - 25.0 Final    Anion Gap 08/12/2024 9.1  5.0 - 15.0 mmol/L Final    eGFR 08/12/2024 76.4  >60.0 mL/min/1.73 Final    Ferritin 08/12/2024 26.00 (L)  30.00 - 400.00 ng/mL Final    Iron 08/12/2024 110  59 - 158 mcg/dL Final    Iron Saturation (TSAT) 08/12/2024 21  20 - 50 % Final    Transferrin 08/12/2024 346  200 - 360 mg/dL Final    TIBC 08/12/2024 516  298 - 536 mcg/dL Final    Reticulocyte % 08/12/2024 1.10  0.70 - 1.90 % Final    Reticulocyte Absolute 08/12/2024 0.0580  0.0200 - 0.1300 10*6/mm3 Final    WBC 08/12/2024 6.96  3.40 - 10.80 10*3/mm3 Final    RBC 08/12/2024 5.21  4.14 - 5.80 10*6/mm3 Final    Hemoglobin 08/12/2024 12.5 (L)  13.0 - 17.7 g/dL Final    Hematocrit 08/12/2024 40.7  37.5 - 51.0 % Final    MCV 08/12/2024 78.1 (L)  79.0 - 97.0 fL Final    MCH 08/12/2024 24.0 (L)  26.6 - 33.0 pg Final    MCHC 08/12/2024 30.7 (L)  31.5 - 35.7 g/dL Final    RDW 08/12/2024 25.3 (H)  12.3 - 15.4 % Final    RDW-SD 08/12/2024 69.2 (H)  37.0 - 54.0 fl Final    MPV 08/12/2024 9.5  6.0 - 12.0 fL Final    Platelets 08/12/2024 284  140 - 450 10*3/mm3 Final    Neutrophil % 08/12/2024 57.3  42.7 - 76.0 % Final    Lymphocyte % 08/12/2024 30.5  19.6 - 45.3 % Final    Monocyte % 08/12/2024 8.9  5.0 - 12.0 % Final    Eosinophil % 08/12/2024 2.3  0.3 - 6.2 % Final    Basophil % 08/12/2024 1.0  0.0 - 1.5 % Final    Neutrophils, Absolute 08/12/2024 3.99  1.70 - 7.00 10*3/mm3 Final    Lymphocytes, Absolute 08/12/2024 2.12  0.70 - 3.10 10*3/mm3 Final    Monocytes, Absolute 08/12/2024 0.62  0.10 - 0.90 10*3/mm3 Final    Eosinophils, Absolute  08/12/2024 0.16  0.00 - 0.40 10*3/mm3 Final    Basophils, Absolute 08/12/2024 0.07  0.00 - 0.20 10*3/mm3 Final    Extra Tube 08/12/2024 Hold for add-ons.   Final    Auto resulted.   Lab on 06/12/2024   Component Date Value Ref Range Status    Glucose 06/12/2024 106 (H)  65 - 99 mg/dL Final    BUN 06/12/2024 10  6 - 20 mg/dL Final    Creatinine 06/12/2024 1.12  0.76 - 1.27 mg/dL Final    Sodium 06/12/2024 136  136 - 145 mmol/L Final    Potassium 06/12/2024 4.1  3.5 - 5.2 mmol/L Final    Chloride 06/12/2024 102  98 - 107 mmol/L Final    CO2 06/12/2024 23.7  22.0 - 29.0 mmol/L Final    Calcium 06/12/2024 9.5  8.6 - 10.5 mg/dL Final    Total Protein 06/12/2024 7.7  6.0 - 8.5 g/dL Final    Albumin 06/12/2024 4.3  3.5 - 5.2 g/dL Final    ALT (SGPT) 06/12/2024 11  1 - 41 U/L Final    AST (SGOT) 06/12/2024 16  1 - 40 U/L Final    Alkaline Phosphatase 06/12/2024 92  39 - 117 U/L Final    Total Bilirubin 06/12/2024 0.5  0.0 - 1.2 mg/dL Final    Globulin 06/12/2024 3.4  gm/dL Final    A/G Ratio 06/12/2024 1.3  g/dL Final    BUN/Creatinine Ratio 06/12/2024 8.9  7.0 - 25.0 Final    Anion Gap 06/12/2024 10.3  5.0 - 15.0 mmol/L Final    eGFR 06/12/2024 78.6  >60.0 mL/min/1.73 Final    Ferritin 06/12/2024 8.58 (L)  30.00 - 400.00 ng/mL Final    Iron 06/12/2024 131  59 - 158 mcg/dL Final    Iron Saturation (TSAT) 06/12/2024 26  20 - 50 % Final    Transferrin 06/12/2024 333  200 - 360 mg/dL Final    TIBC 06/12/2024 496  298 - 536 mcg/dL Final    Reticulocyte % 06/12/2024 1.53  0.70 - 1.90 % Final    Reticulocyte Absolute 06/12/2024 0.0705  0.0200 - 0.1300 10*6/mm3 Final    Sed Rate 06/12/2024 25 (H)  0 - 20 mm/hr Final    Vitamin B-12 06/12/2024 370  211 - 946 pg/mL Final    Folate 06/12/2024 3.99 (L)  4.78 - 24.20 ng/mL Final    LAW 06/12/2024 Negative   Final                                         Negative   <1:80                                       Borderline  1:80                                       Positive   >1:80  ICAP  nomenclature: AC-0  For more information about Hep-2 cell patterns use  ANApatterns.org, the official website for the International  Consensus on Antinuclear Antibody (LAW) Patterns (ICAP).    IgG 06/12/2024 1485  603 - 1613 mg/dL Final    IgA 06/12/2024 225  90 - 386 mg/dL Final    IgM 06/12/2024 56  20 - 172 mg/dL Final    Total Protein 06/12/2024 7.4  6.0 - 8.5 g/dL Final    Albumin 06/12/2024 3.7  2.9 - 4.4 g/dL Final    Alpha-1-Globulin 06/12/2024 0.3  0.0 - 0.4 g/dL Final    Alpha-2-Globulin 06/12/2024 0.8  0.4 - 1.0 g/dL Final    Beta Globulin 06/12/2024 1.1  0.7 - 1.3 g/dL Final    Gamma Globulin 06/12/2024 1.5  0.4 - 1.8 g/dL Final    M-Drew 06/12/2024 Not Observed  Not Observed g/dL Final    Globulin 06/12/2024 3.7  2.2 - 3.9 g/dL Final    A/G Ratio 06/12/2024 1.1  0.7 - 1.7 Final    Immunofixation Reflex, Serum 06/12/2024 Comment   Final    No monoclonality detected.    Please note 06/12/2024 Comment   Final    Protein electrophoresis scan will follow via computer, mail, or   delivery.    Free Light Chain, Kappa 06/12/2024 46.6 (H)  3.3 - 19.4 mg/L Final    Free Lambda Light Chains 06/12/2024 26.8 (H)  5.7 - 26.3 mg/L Final    Kappa/Lambda Ratio 06/12/2024 1.74 (H)  0.26 - 1.65 Final    C-Reactive Protein 06/12/2024 2.47 (H)  0.00 - 0.50 mg/dL Final    LDH 06/12/2024 131 (L)  135 - 225 U/L Final    Haptoglobin 06/12/2024 202 (H)  30 - 200 mg/dL Final    WBC 06/12/2024 8.51  3.40 - 10.80 10*3/mm3 Final    RBC 06/12/2024 4.55  4.14 - 5.80 10*6/mm3 Final    Hemoglobin 06/12/2024 8.2 (L)  13.0 - 17.7 g/dL Final    Hematocrit 06/12/2024 28.6 (L)  37.5 - 51.0 % Final    MCV 06/12/2024 62.9 (L)  79.0 - 97.0 fL Final    MCH 06/12/2024 18.0 (L)  26.6 - 33.0 pg Final    MCHC 06/12/2024 28.7 (L)  31.5 - 35.7 g/dL Final    RDW 06/12/2024 21.5 (H)  12.3 - 15.4 % Final    RDW-SD 06/12/2024 46.6  37.0 - 54.0 fl Final    MPV 06/12/2024 9.4  6.0 - 12.0 fL Final    Platelets 06/12/2024 307  140 - 450 10*3/mm3 Final     Neutrophil % 06/12/2024 78.7 (H)  42.7 - 76.0 % Final    Lymphocyte % 06/12/2024 9.4 (L)  19.6 - 45.3 % Final    Monocyte % 06/12/2024 11.2  5.0 - 12.0 % Final    Eosinophil % 06/12/2024 0.2 (L)  0.3 - 6.2 % Final    Basophil % 06/12/2024 0.5  0.0 - 1.5 % Final    Neutrophils, Absolute 06/12/2024 6.70  1.70 - 7.00 10*3/mm3 Final    Lymphocytes, Absolute 06/12/2024 0.80  0.70 - 3.10 10*3/mm3 Final    Monocytes, Absolute 06/12/2024 0.95 (H)  0.10 - 0.90 10*3/mm3 Final    Eosinophils, Absolute 06/12/2024 0.02  0.00 - 0.40 10*3/mm3 Final    Basophils, Absolute 06/12/2024 0.04  0.00 - 0.20 10*3/mm3 Final   Lab on 05/29/2024   Component Date Value Ref Range Status    Total Cholesterol 05/29/2024 105  0 - 200 mg/dL Final    Triglycerides 05/29/2024 93  0 - 150 mg/dL Final    HDL Cholesterol 05/29/2024 39 (L)  40 - 60 mg/dL Final    LDL Cholesterol  05/29/2024 48  0 - 100 mg/dL Final    VLDL Cholesterol 05/29/2024 18  5 - 40 mg/dL Final    LDL/HDL Ratio 05/29/2024 1.22   Final    Glucose 05/29/2024 110 (H)  65 - 99 mg/dL Final    BUN 05/29/2024 13  6 - 20 mg/dL Final    Creatinine 05/29/2024 1.17  0.76 - 1.27 mg/dL Final    Sodium 05/29/2024 137  136 - 145 mmol/L Final    Potassium 05/29/2024 4.5  3.5 - 5.2 mmol/L Final    Chloride 05/29/2024 100  98 - 107 mmol/L Final    CO2 05/29/2024 22.0  22.0 - 29.0 mmol/L Final    Calcium 05/29/2024 9.7  8.6 - 10.5 mg/dL Final    Total Protein 05/29/2024 8.2  6.0 - 8.5 g/dL Final    Albumin 05/29/2024 4.7  3.5 - 5.2 g/dL Final    ALT (SGPT) 05/29/2024 11  1 - 41 U/L Final    AST (SGOT) 05/29/2024 10  1 - 40 U/L Final    Alkaline Phosphatase 05/29/2024 95  39 - 117 U/L Final    Total Bilirubin 05/29/2024 0.3  0.0 - 1.2 mg/dL Final    Globulin 05/29/2024 3.5  gm/dL Final    A/G Ratio 05/29/2024 1.3  g/dL Final    BUN/Creatinine Ratio 05/29/2024 11.1  7.0 - 25.0 Final    Anion Gap 05/29/2024 15.0  5.0 - 15.0 mmol/L Final    eGFR 05/29/2024 74.5  >60.0 mL/min/1.73 Final   Office Visit  on 05/29/2024   Component Date Value Ref Range Status    WBC 05/29/2024 7.56  3.40 - 10.80 10*3/mm3 Final    RBC 05/29/2024 4.92  4.14 - 5.80 10*6/mm3 Final    Hemoglobin 05/29/2024 8.7 (L)  13.0 - 17.7 g/dL Final    Hematocrit 05/29/2024 33.1 (L)  37.5 - 51.0 % Final    MCV 05/29/2024 67.3 (L)  79.0 - 97.0 fL Final    MCH 05/29/2024 17.7 (L)  26.6 - 33.0 pg Final    MCHC 05/29/2024 26.3 (L)  31.5 - 35.7 g/dL Final    RDW 05/29/2024 20.7 (H)  12.3 - 15.4 % Final    RDW-SD 05/29/2024 47.7  37.0 - 54.0 fl Final    MPV 05/29/2024 9.5  6.0 - 12.0 fL Final    Platelets 05/29/2024 468 (H)  140 - 450 10*3/mm3 Final    nRBC 05/29/2024 0.0  0.0 - 0.2 /100 WBC Final    Neutrophil % 05/29/2024 72.7  42.7 - 76.0 % Final    Lymphocyte % 05/29/2024 19.2 (L)  19.6 - 45.3 % Final    Monocyte % 05/29/2024 6.1  5.0 - 12.0 % Final    Eosinophil % 05/29/2024 1.0  0.3 - 6.2 % Final    Basophil % 05/29/2024 1.0  0.0 - 1.5 % Final    Neutrophils Absolute 05/29/2024 5.50  1.70 - 7.00 10*3/mm3 Final    Lymphocytes Absolute 05/29/2024 1.45  0.70 - 3.10 10*3/mm3 Final    Monocytes Absolute 05/29/2024 0.46  0.10 - 0.90 10*3/mm3 Final    Eosinophils Absolute 05/29/2024 0.08  0.00 - 0.40 10*3/mm3 Final    Basophils Absolute 05/29/2024 0.08  0.00 - 0.20 10*3/mm3 Final    Anisocytosis 05/29/2024 Mod/2+  None Seen Final    Hypochromia 05/29/2024 Slight/1+  None Seen Final    Microcytes 05/29/2024 Slight/1+  None Seen Final    Poikilocytes 05/29/2024 Mod/2+  None Seen Final    Polychromasia 05/29/2024 Slight/1+  None Seen Final    WBC Morphology 05/29/2024 Normal  Normal Final    Platelet Morphology 05/29/2024 Normal  Normal Final       Assessment & Plan     Diagnoses and all orders for this visit:    1. Major depressive disorder, recurrent episode, moderate (Primary)  -     buPROPion XL (WELLBUTRIN XL) 300 MG 24 hr tablet; Take 1 tablet by mouth Every Morning.  Dispense: 90 tablet; Refill: 3  -     escitalopram (Lexapro) 20 MG tablet; Take 1 tablet  by mouth Daily.  Dispense: 90 tablet; Refill: 3    2. Generalized anxiety disorder  -     buPROPion XL (WELLBUTRIN XL) 300 MG 24 hr tablet; Take 1 tablet by mouth Every Morning.  Dispense: 90 tablet; Refill: 3  -     escitalopram (Lexapro) 20 MG tablet; Take 1 tablet by mouth Daily.  Dispense: 90 tablet; Refill: 3    3. Insomnia due to mental condition  -     QUEtiapine (SEROquel) 50 MG tablet; Take 1 tablet by mouth Every Night.  Dispense: 90 tablet; Refill: 3      Continue Wellbutrin XL 300mg daily.   Continue quetiapine, increase to 50mg nightly.   Continue Lexapro 20mg daily.       Visit Diagnoses:    ICD-10-CM ICD-9-CM   1. Major depressive disorder, recurrent episode, moderate  F33.1 296.32   2. Generalized anxiety disorder  F41.1 300.02   3. Insomnia due to mental condition  F51.05 300.9     327.02       TREATMENT PLAN/GOALS: Continue supportive psychotherapy efforts and medications as indicated. Treatment and medication options discussed during today's visit. Patient ackowledged and verbally consented to continue with current treatment plan and was educated on the importance of compliance with treatment and follow-up appointments.    MEDICATION ISSUES:  INSPECT reviewed as expected    Discussed medication options and treatment plan of prescribed medication as well as the risks, benefits, and side effects including potential falls, possible impaired driving and metabolic adversities among others. Patient is agreeable to call the office with any worsening of symptoms or onset of side effects. Patient is agreeable to call 911 or go to the nearest ER should he/she begin having SI/HI. No medication side effects or related complaints today.     MEDS ORDERED DURING VISIT:  New Medications Ordered This Visit   Medications    buPROPion XL (WELLBUTRIN XL) 300 MG 24 hr tablet     Sig: Take 1 tablet by mouth Every Morning.     Dispense:  90 tablet     Refill:  3    QUEtiapine (SEROquel) 50 MG tablet     Sig: Take 1  tablet by mouth Every Night.     Dispense:  90 tablet     Refill:  3    escitalopram (Lexapro) 20 MG tablet     Sig: Take 1 tablet by mouth Daily.     Dispense:  90 tablet     Refill:  3       Return in about 6 months (around 2/15/2025).         This document has been electronically signed by CATERINA Cooney  August 15, 2024 15:31 EDT    Part of this note may be an electronic transcription/translation of spoken language to printed text using the Dragon Dictation System.

## 2024-08-15 ENCOUNTER — OFFICE VISIT (OUTPATIENT)
Dept: PSYCHIATRY | Facility: CLINIC | Age: 54
End: 2024-08-15
Payer: MEDICARE

## 2024-08-15 DIAGNOSIS — F41.1 GENERALIZED ANXIETY DISORDER: Chronic | ICD-10-CM

## 2024-08-15 DIAGNOSIS — F51.05 INSOMNIA DUE TO MENTAL CONDITION: Chronic | ICD-10-CM

## 2024-08-15 DIAGNOSIS — F33.1 MAJOR DEPRESSIVE DISORDER, RECURRENT EPISODE, MODERATE: Primary | Chronic | ICD-10-CM

## 2024-08-15 RX ORDER — ESCITALOPRAM OXALATE 20 MG/1
20 TABLET ORAL DAILY
Qty: 90 TABLET | Refills: 3 | Status: SHIPPED | OUTPATIENT
Start: 2024-08-15 | End: 2025-08-15

## 2024-08-15 RX ORDER — BUPROPION HYDROCHLORIDE 300 MG/1
300 TABLET ORAL EVERY MORNING
Qty: 90 TABLET | Refills: 3 | Status: SHIPPED | OUTPATIENT
Start: 2024-08-15

## 2024-08-15 RX ORDER — QUETIAPINE FUMARATE 50 MG/1
50 TABLET, FILM COATED ORAL NIGHTLY
Qty: 90 TABLET | Refills: 3 | Status: SHIPPED | OUTPATIENT
Start: 2024-08-15

## 2024-09-19 DIAGNOSIS — R42 VERTIGO: ICD-10-CM

## 2024-09-19 RX ORDER — MECLIZINE HYDROCHLORIDE 25 MG/1
25 TABLET ORAL 3 TIMES DAILY PRN
Qty: 30 TABLET | Refills: 0 | Status: SHIPPED | OUTPATIENT
Start: 2024-09-19

## 2024-10-01 NOTE — PROGRESS NOTES
Subjective   History of Present Illness: Frank Barger is a 54 y.o. male is being seen for consultation today at the request of CATERINA Bautista.  Patient comes to clinic for chronic low back pain.  Patient states that his back pain started back in 2012 due to chronic wear and tear.  Patient states over the years that his pain has progressively gotten worse.  He was given Lyrica 300 mg and says it sometimes helps.  In the past the patient has seen a surgeon but they told him that his back was so bad that they would not touch him.  The patient was originally advised to get epidural injections, however past family members have told him that the shots do not help.  In addition the surgeon the patient saw in the past told him that if he were to get epidural steroid injections that it would cause further degenerative changes and he would be in a wheelchair.  Patient tried physical therapy approximately 2 years ago, however, he states that it caused him more pain.  Patient states that he has low back pain and then has pain and numbness and tingling that radiates into both legs.  Patient states that the left leg is worse than the right leg and he has pain that goes all the way down into his feet.  Patient rates his pain a 9 out of 10 today.  Patient admits to low back pain, pain, numbness/tingling and weakness in his lower extremities.  He denies any bowel or bladder incontinence or saddle anesthesia at this time.    Review of Systems   Constitutional:  Positive for activity change.   HENT: Negative.     Eyes: Negative.    Respiratory: Negative.     Cardiovascular: Negative.    Gastrointestinal:  Positive for abdominal pain (comes around to sides).   Endocrine: Negative.    Genitourinary:  Positive for urgency (in bladder).   Musculoskeletal:  Positive for arthralgias, back pain and myalgias.   Skin: Negative.    Allergic/Immunologic: Negative.    Neurological:         Feels like he's been kicked in his back and  "then feels like his muscles are tight and pullling and the pain does go into legs  Balance    Hematological: Negative.    Psychiatric/Behavioral:  Positive for sleep disturbance.        Objective     ./87   Pulse 109   Resp 18   Ht 180.3 cm (71\")   Wt 80.3 kg (177 lb)   SpO2 98%   BMI 24.69 kg/m²    Body mass index is 24.69 kg/m².    Vitals:    10/04/24 1325   PainSc:   9   PainLoc: Back          Physical Exam  Vitals reviewed.   Eyes:      Extraocular Movements: Extraocular movements intact.      Conjunctiva/sclera: Conjunctivae normal.      Pupils: Pupils are equal, round, and reactive to light.   Musculoskeletal:         General: Normal range of motion.      Cervical back: Normal range of motion.   Skin:     General: Skin is warm and dry.   Neurological:      General: No focal deficit present.   Psychiatric:         Mood and Affect: Mood normal.         Speech: Speech normal.       Neurological Exam  Mental Status  Awake, alert and oriented to person, place and time. Oriented to person, place and time. Speech is normal.    Cranial Nerves  CN III, IV, VI: Extraocular movements intact bilaterally. Pupils equal round and reactive to light bilaterally.    Motor  Normal muscle bulk throughout.                                               Right                     Left   Iliopsoas                               5                          5   Quadriceps                           5                          5   Hamstring                             5                          5   Gastrocnemius                     5                           5   Anterior tibialis                      5                          5   Posterior tibialis                    5                          5    Sensory  Sensation is intact to light touch, pinprick, vibration and proprioception in all four extremities.    Gait    Patient walks with no difficulty.  However, sometimes his pain is really bad he does walk with a " cane.        Assessment & Plan   Independent Review of Radiographic Studies:      I personally reviewed the images from the following studies.  MRI LUMBAR SPINE WITHOUT CONTRAST    Date of Exam: 8/5/2024 15:57 EDT    Indication: Lumbar pain.    Comparison: None available.    Technique: Routine multiplanar/multisequence images of the lumbar spine were obtained without contrast administration.      Findings:  T12-L1: Minimal diffuse disc bulge resulting in slight T12 retrolisthesis. No significant central canal or foraminal stenosis    L1-L2: Minimal diffuse disc bulge resulting in slight L1 retrolisthesis. Bilateral facet arthropathy. Mild central canal and bilateral foraminal stenosis    L2-L3: No disc protrusion or extrusion. Bilateral facet arthropathy. No significant central canal or foraminal stenosis    L3-L4: No disc protrusion or extrusion. Bilateral facet arthropathy. No significant central canal or foraminal stenosis    L4-L5: Mild diffuse disc bulge. Mild bilateral facet arthropathy. No significant central canal stenosis. Mild bilateral foraminal stenosis    L5-S1: Diffuse disc bulge with small right paracentral extrusion which extends superiorly dorsal to the L5 inferior endplate and contacts the right S1 nerve root as it exits the thecal sac. Mild bilateral foraminal stenosis    Conus terminates at L1. No abnormality of the distal cord and cauda equina. Normal generalized marrow signal. No paraspinal mass or fluid collection    Impression:  Lumbar degenerative disease including a small right paracentral disc extrusion at L5-S1 that contacts the S1 nerve root    Medical Decision Making:      Frank Barrientoswilliam a 54 y.o. male who presents to clinic for chronic low back pain.  Patient had MRI of his lumbar spine that shows a diffuse disc at L4-L5, but with no significant stenosis and mild bilateral foraminal stenosis.  The main issue is at L5-S1 with a diffuse disc bulge that extends into the right S1  nerve root.  To help relieve his symptoms the patient is taking Lyrica, but states that sometimes it does not help at all.  Patient has tried physical therapy in the past but says that it caused more pain than anything.  At this time patient is hesitant about getting an epidural injection as he was told information in the past that made him second-guess.  The patient would like to discuss further with his family numbers before deciding if he would like to move forward with an epidural injection.  Patient has good strength on physical examination and has no clonus present.    Patient is willing to retry physical therapy and he will get back with me once he decides that he would like to move forward with the epidural injection.  Once both of those modalities are completed the patient is to follow-up in clinic.  If patient's symptoms do not improve or worsen the next steps would be to discuss surgery.  Patient knows to call the office with any worsening pain, loss of sensation, weakness, bowel or bladder incontinence or saddle anesthesia.  Patient is agreeable to this plan and knows to call with any questions or concerns.          Diagnoses and all orders for this visit:    1. Lumbar radiculopathy (Primary)  -     Ambulatory Referral to Physical Therapy for Evaluation & Treatment    2. Chronic bilateral low back pain without sciatica  -     Ambulatory Referral to Physical Therapy for Evaluation & Treatment      Return Once physical therapy and injection are completed, for Recheck.    This patient was examined wearing appropriate personal protective equipment.        Body mass index is 24.69 kg/m².      Patient's blood pressure was reviewed.  Recommendations for  a low-salt diet and exercise to maintain/improve BP in addition to taking any presribed medications.             Michell Nails PA-C   10/04/24  14:16 EDT

## 2024-10-04 ENCOUNTER — OFFICE VISIT (OUTPATIENT)
Dept: NEUROSURGERY | Facility: CLINIC | Age: 54
End: 2024-10-04
Payer: MEDICARE

## 2024-10-04 VITALS
HEIGHT: 71 IN | OXYGEN SATURATION: 98 % | WEIGHT: 177 LBS | BODY MASS INDEX: 24.78 KG/M2 | HEART RATE: 109 BPM | RESPIRATION RATE: 18 BRPM | DIASTOLIC BLOOD PRESSURE: 87 MMHG | SYSTOLIC BLOOD PRESSURE: 146 MMHG

## 2024-10-04 DIAGNOSIS — G89.29 CHRONIC BILATERAL LOW BACK PAIN WITHOUT SCIATICA: ICD-10-CM

## 2024-10-04 DIAGNOSIS — M54.16 LUMBAR RADICULOPATHY: Primary | ICD-10-CM

## 2024-10-04 DIAGNOSIS — M54.50 CHRONIC BILATERAL LOW BACK PAIN WITHOUT SCIATICA: ICD-10-CM

## 2024-10-04 RX ORDER — ONDANSETRON 4 MG/1
4 TABLET, FILM COATED ORAL EVERY 8 HOURS PRN
Qty: 42 TABLET | Refills: 0 | Status: SHIPPED | OUTPATIENT
Start: 2024-10-04

## 2024-11-05 DIAGNOSIS — R42 VERTIGO: ICD-10-CM

## 2024-11-05 RX ORDER — MECLIZINE HYDROCHLORIDE 25 MG/1
25 TABLET ORAL 3 TIMES DAILY PRN
Qty: 30 TABLET | Refills: 0 | Status: SHIPPED | OUTPATIENT
Start: 2024-11-05

## 2024-11-05 RX ORDER — ONDANSETRON 4 MG/1
4 TABLET, FILM COATED ORAL EVERY 8 HOURS PRN
Qty: 42 TABLET | Refills: 0 | Status: SHIPPED | OUTPATIENT
Start: 2024-11-05

## 2024-11-05 NOTE — TELEPHONE ENCOUNTER
"Caller: Denita Frank MARQUIS \"Mon[place]\"    Relationship: Self    Best call back number: [phone]    Requested Prescriptions:   Requested Prescriptions     Pending Prescriptions Disp Refills    ondansetron (ZOFRAN) 4 MG tablet 42 tablet 0     Sig: Take 1 tablet by mouth Every 8 (Eight) Hours As Needed for Nausea or Vomiting.    meclizine (ANTIVERT) 25 MG tablet 30 tablet 0     Sig: Take 1 tablet by mouth 3 (Three) Times a Day As Needed for Dizziness.      Pharmacy where request should be sent: [facility] #[id number] [address] [phone] [facility] [phone]      Last office visit with prescribing clinician: 5/29/2024   Last telemedicine visit with prescribing clinician: Visit date not found   Next office visit with prescribing clinician: 11/27/2024     Does the patient have less than a 3 day supply:  [x] Yes  [] No    Kevin Boyle Rep   11/05/24 13:14 EST     "

## 2024-11-05 NOTE — TELEPHONE ENCOUNTER
"Caller: Frank Barger \"Monroe\"    Relationship: Self    Best call back number: [phone]     What is the medical concern/diagnosis: PAIN     What specialty or service is being requested: PAIN MANAGEMENT    What is the office location: [place]    Any additional details: PATIENT RECEIVED A CALL FROM [facility] AND WAS TOLD THAT HE WOULD NO LONGER BE ABLE TO BE APART OF THE PRACTICE. NEEDING SOMETHING CLOSE TO THE HOUSE DUE TO TRANSPORTATION. IF UNABLE TO FIND A REFERRAL CLOSE, WOULD LIKE TO SEE IF [facility] WOULD  HIS PAIN MEDICATION PREGABALIN (LYRICA)          "

## 2024-11-18 ENCOUNTER — TELEPHONE (OUTPATIENT)
Dept: FAMILY MEDICINE CLINIC | Facility: CLINIC | Age: 54
End: 2024-11-18

## 2024-11-18 RX ORDER — MELOXICAM 15 MG/1
15 TABLET ORAL DAILY
Qty: 90 TABLET | Refills: 1 | Status: SHIPPED | OUTPATIENT
Start: 2024-11-18

## 2024-11-18 NOTE — TELEPHONE ENCOUNTER
"    Caller: Frank Barger \"Monroe\"    Relationship: Self    Best call back number: [phone]    What medication are you requesting: SUBSTITUTE FOR CELEBREX, PHARMACY TEXT AND STATED THEY NEEDED  AN ALTERNATIVE CALLED IN     What are your current symptoms: FOR MODERATE PAIN    How long have you been experiencing symptoms:     Have you had these symptoms before:    [x] Yes  [] No    Have you been treated for these symptoms before:   [x] Yes  [] No    If a prescription is needed, what is your preferred pharmacy and phone number: [id number] - [place], [place] - [address] [phone] [facility] [phone]      Additional notes:        "

## 2024-11-21 ENCOUNTER — TELEPHONE (OUTPATIENT)
Dept: NEUROSURGERY | Facility: CLINIC | Age: 54
End: 2024-11-21
Payer: MEDICARE

## 2024-11-21 ENCOUNTER — OFFICE VISIT (OUTPATIENT)
Dept: FAMILY MEDICINE CLINIC | Facility: CLINIC | Age: 54
End: 2024-11-21
Payer: MEDICARE

## 2024-11-21 ENCOUNTER — TELEPHONE (OUTPATIENT)
Dept: FAMILY MEDICINE CLINIC | Facility: CLINIC | Age: 54
End: 2024-11-21

## 2024-11-21 ENCOUNTER — PREP FOR SURGERY (OUTPATIENT)
Dept: OTHER | Facility: HOSPITAL | Age: 54
End: 2024-11-21
Payer: MEDICARE

## 2024-11-21 VITALS
DIASTOLIC BLOOD PRESSURE: 83 MMHG | OXYGEN SATURATION: 100 % | BODY MASS INDEX: 25.24 KG/M2 | WEIGHT: 181 LBS | TEMPERATURE: 98.4 F | HEART RATE: 101 BPM | SYSTOLIC BLOOD PRESSURE: 120 MMHG

## 2024-11-21 DIAGNOSIS — G89.29 CHRONIC LOW BACK PAIN, UNSPECIFIED BACK PAIN LATERALITY, UNSPECIFIED WHETHER SCIATICA PRESENT: Primary | ICD-10-CM

## 2024-11-21 DIAGNOSIS — M54.50 CHRONIC LOW BACK PAIN, UNSPECIFIED BACK PAIN LATERALITY, UNSPECIFIED WHETHER SCIATICA PRESENT: Primary | ICD-10-CM

## 2024-11-21 DIAGNOSIS — E78.2 MIXED HYPERLIPIDEMIA: ICD-10-CM

## 2024-11-21 DIAGNOSIS — Z12.11 ENCOUNTER FOR SCREENING COLONOSCOPY: Primary | ICD-10-CM

## 2024-11-21 DIAGNOSIS — M54.50 CHRONIC BILATERAL LOW BACK PAIN, UNSPECIFIED WHETHER SCIATICA PRESENT: Primary | ICD-10-CM

## 2024-11-21 DIAGNOSIS — Z12.11 COLON CANCER SCREENING: ICD-10-CM

## 2024-11-21 DIAGNOSIS — G89.29 CHRONIC BILATERAL LOW BACK PAIN, UNSPECIFIED WHETHER SCIATICA PRESENT: Primary | ICD-10-CM

## 2024-11-21 DIAGNOSIS — F41.1 GENERALIZED ANXIETY DISORDER: ICD-10-CM

## 2024-11-21 DIAGNOSIS — L40.50 PSORIATIC ARTHRITIS: ICD-10-CM

## 2024-11-21 DIAGNOSIS — F51.05 INSOMNIA DUE TO MENTAL CONDITION: ICD-10-CM

## 2024-11-21 RX ORDER — MONTELUKAST SODIUM 10 MG/1
10 TABLET ORAL NIGHTLY
Qty: 90 TABLET | Refills: 1 | Status: SHIPPED | OUTPATIENT
Start: 2024-11-21

## 2024-11-21 NOTE — TELEPHONE ENCOUNTER
"  Caller: Frank Barger \"Monroe\"    Relationship: Self    Best call back number: [phone]    What is the best time to reach you: ANYTIME     Who are you requesting to speak with (clinical staff, provider,  specific staff member): CLINICAL     What was the call regarding: PATIENT STATES THAT HE SPOKE WITH HIS WIFE AND THEY DECIDED THAT HE SHOULD DO PHYSICAL THERAPY FOR HIS BACK, BUT HE DOES NOT WANT TO PROCEED WITH INJECTIONS OR SURGERY.     PATIENT WOULD LIKE TO KNOW IF HIS PCP WOULD RECOMMEND SOMEONE FOR PAIN MANAGEMENT FOR MEDICATIONS.     PATIENT WOULD LIKE A CALL BACK TO DISCUSS     PLEASE ADVISE     "

## 2024-11-21 NOTE — PROGRESS NOTES
Subjective     Frank Barger is a 54 y.o. male.     History of Present Illness  He smokes 1 ppd.  He has been smoking since he was 10 yo.   He does not work at this time  He tries to walk  He is smoking 1/2 ppd     Hypertension with CAD-patient is currently on metoprolol 25 mg twice daily.  He has had heart stents placed. He is seen by cardiology- Dr. Silva. Denies CP,  HA. Has occasional SOA. He states his plavix was stopped last year. He take 81mg ASA daily.     Iron deficiency- pt has been on oral iron. Sees hematology.      Hyperlipidemia-patient is currently on Lipitor 20 mg daily and ASA 81mg daily.      Depression-patient is currently on lexapro 20 mg daily and Wellbutrin  mg daily. Doing well on meds. Denies SI or HI. See psych.      Psoriatic arthritis- pt is not currently on any meds. Was previously on humira. Saw rheum in the past     Chronic pain-  chronic back pain. Seeing Malou Pacheco with spine. Will be starting PT. takes Lyrica 300 mg twice daily. Sees pain management- [facility]. He states they are dismissing him. He states he didn't take his med one morning and failed his drug screen bc it wasn't in his system.      Insomnia-patient is currently on seroquel 50mg nightly. Doing well     GERD-patient is currently on Protonix 40 mg daily.     IBS- pt is on dicyclomine 10mg QID. He has been taking it 4 times daily and not just as needed. It helps with his symptoms.      Allergies- pt is currently on singulair daily.      Labs- due  Colonoscopy- due  PSA- Lab Results       Component                Value               Date                       PSA                      2.610               01/05/2022                 PSA                      1.50                04/13/2017                  Vaccines:  Flu- due- will get at [employer]  Tdap-  Shingles-  PNA-  Covid-19         Symptoms include fatigue and myalgias.  Pertinent negative symptoms include no abdominal pain, no chest pain, no chills,  no congestion, no cough, no diaphoresis, no fever, no neck pain, no numbness, no sore throat, no swollen glands, no dysuria, no vomiting and no weakness.   Hypertension  Associated symptoms include shortness of breath. Pertinent negatives include no chest pain or neck pain.   Hyperlipidemia  Associated symptoms include myalgias and shortness of breath. Pertinent negatives include no chest pain.   COPD  He complains of shortness of breath. There is no cough. Associated symptoms include myalgias. Pertinent negatives include no chest pain, fever or sore throat.   DepressionSymptoms include shortness of breath. Patient is not experiencing: chest pain and dizziness.  His past medical history is significant for depression.        The following portions of the patient's history were reviewed and updated as appropriate: allergies, current medications, past family history, past medical history, past social history, past surgical history, and problem list.    Review of Systems   Constitutional:  Positive for fatigue. Negative for chills, diaphoresis and fever.   HENT:  Negative for congestion, sore throat and swollen glands.    Respiratory:  Positive for shortness of breath. Negative for cough and chest tightness.    Cardiovascular:  Negative for chest pain.   Gastrointestinal:  Negative for abdominal pain and vomiting.   Genitourinary:  Negative for dysuria.   Musculoskeletal:  Positive for back pain and myalgias. Negative for neck pain.   Neurological:  Negative for dizziness, weakness, numbness and headache.       Objective     /83 (BP Location: Left arm, Patient Position: Sitting, Cuff Size: Large Adult)   Pulse 101   Temp 98.4 °F (36.9 °C) (Tympanic)   Wt 82.1 kg (181 lb)   SpO2 100%   BMI 25.24 kg/m²     Current Outpatient Medications on File Prior to Visit   Medication Sig Dispense Refill    aspirin 81 MG EC tablet 0      atorvastatin (LIPITOR) 20 MG tablet Take 1 tablet by mouth Every Night. 90 tablet 1     buPROPion XL (WELLBUTRIN XL) 300 MG 24 hr tablet Take 1 tablet by mouth Every Morning. 90 tablet 3    cyclobenzaprine (FLEXERIL) 10 MG tablet TAKE 1 TABLET BY MOUTH THREE TIMES DAILY AS NEEDED FOR MUSCLE SPASMS 90 tablet 11    dicyclomine (BENTYL) 10 MG capsule Take 1 capsule by mouth 4 (Four) Times a Day Before Meals & at Bedtime. 120 capsule 5    escitalopram (Lexapro) 20 MG tablet Take 1 tablet by mouth Daily. 90 tablet 3    ferrous sulfate 325 (65 FE) MG tablet Take 1 tablet by mouth Daily With Breakfast. 30 tablet 1    folic acid (FOLVITE) 1 MG tablet Take 1 tablet by mouth Daily. 90 tablet 1    meclizine (ANTIVERT) 25 MG tablet Take 1 tablet by mouth 3 (Three) Times a Day As Needed for Dizziness. 30 tablet 0    meloxicam (Mobic) 15 MG tablet Take 1 tablet by mouth Daily. 90 tablet 1    metoprolol tartrate (LOPRESSOR) 25 MG tablet Take 1 tablet by mouth 2 (Two) Times a Day. 180 tablet 1    montelukast (SINGULAIR) 10 MG tablet Take 1 tablet by mouth Every Night. 90 tablet 1    nitroglycerin (NITROSTAT) 0.4 MG SL tablet Take 1 tablet by mouth Every 5 (Five) Minutes As Needed for Chest Pain. Take no more than 3 doses in 15 minutes. 25 tablet 4    omeprazole (priLOSEC) 40 MG capsule Take 1 capsule by mouth Daily. 90 capsule 3    ondansetron (ZOFRAN) 4 MG tablet Take 1 tablet by mouth Every 8 (Eight) Hours As Needed for Nausea or Vomiting. 42 tablet 0    pantoprazole (PROTONIX) 40 MG EC tablet Take 1 tablet by mouth Daily. 90 tablet 1    pregabalin (LYRICA) 300 MG capsule Take 1 capsule by mouth 2 (Two) Times a Day.      QUEtiapine (SEROquel) 50 MG tablet Take 1 tablet by mouth Every Night. 90 tablet 3    vitamin B-12 (CYANOCOBALAMIN) 1000 MCG tablet Take 1 tablet by mouth Daily. 90 tablet 1     No current facility-administered medications on file prior to visit.                 Physical Exam  Constitutional:       General: He is not in acute distress.     Appearance: Normal appearance. He is not ill-appearing.       Comments: Disheveled    HENT:      Head: Normocephalic and atraumatic.   Eyes:      Extraocular Movements: Extraocular movements intact.   Cardiovascular:      Rate and Rhythm: Normal rate and regular rhythm.      Heart sounds: No murmur heard.  Pulmonary:      Effort: Pulmonary effort is normal. No respiratory distress.   Musculoskeletal:         General: Normal range of motion.   Skin:     General: Skin is warm and dry.   Neurological:      General: No focal deficit present.      Mental Status: He is alert and oriented to person, place, and time.   Psychiatric:         Mood and Affect: Mood normal.         Behavior: Behavior normal.         Thought Content: Thought content normal.         Judgment: Judgment normal.           Assessment & Plan     Diagnoses and all orders for this visit:    1. Chronic low back pain, unspecified back pain laterality, unspecified whether sciatica present (Primary)  Comments:  stable  got dismissed from PM- failed drug screen  may need to wean on lyrica  referral to new PM  Orders:  -     Ambulatory Referral to Pain Management    2. Colon cancer screening  -     Ambulatory Referral For Screening Colonoscopy    3. Psoriatic arthritis  Comments:  not on treatment at this time  referral to rheum  Orders:  -     Ambulatory Referral to Rheumatology    4. Mixed hyperlipidemia    5. Insomnia due to mental condition  Comments:  stable  cont seroquel  sees psych    6. Generalized anxiety disorder  Comments:  stable  cont lexapro and wellbutrin  denies SI or HI

## 2024-11-21 NOTE — TELEPHONE ENCOUNTER
I am not sure which other pain managements will accept him but I have placed a referral to see. If he is denies I will not be able to write for the lyrica with his failed drug screen. He will have to stop the medication. I can put in for PT.

## 2024-11-21 NOTE — TELEPHONE ENCOUNTER
Caller: JOSE    Relationship: SELF    Best call back number: [phone]     What is the best time to reach you: ANY    Who are you requesting to speak with (clinical staff, provider,  specific staff member): CLINICAL STAFF        What was the call regarding: PATIENT CALLED TO ADVISE THAT HE HAS AN APPT W/ PT ON 12/09/24 HOWEVER, HE LONGER FEELS COMFORTABLE GETTING ANY INJECTIONS OR SX - HE WANTS TO KNOW IF THERE IS ANYTHING ELSE HE CAN DO TO MINIMIZE HIS PAIN    Is it okay if the provider responds through MyChart: YES

## 2024-12-03 NOTE — TELEPHONE ENCOUNTER
PATIENT WAS REFERRED TO [facility] IN NOVEMBER OF THIS YEAR. REFERRAL RE FAXED TO THEM TODAY. PATIENT WAS DISMISSED FROM [facility] AND [facility] WILL NOT ACCEPT PER PCP REFERRAL COMMUNICATION. SEE CONFIRMATION BELOW.

## 2024-12-05 ENCOUNTER — PATIENT MESSAGE (OUTPATIENT)
Dept: NEUROSURGERY | Facility: CLINIC | Age: 54
End: 2024-12-05
Payer: MEDICARE

## 2024-12-06 ENCOUNTER — TELEPHONE (OUTPATIENT)
Dept: NEUROSURGERY | Facility: CLINIC | Age: 54
End: 2024-12-06
Payer: MEDICARE

## 2024-12-06 ENCOUNTER — TELEPHONE (OUTPATIENT)
Dept: FAMILY MEDICINE CLINIC | Facility: CLINIC | Age: 54
End: 2024-12-06

## 2024-12-06 DIAGNOSIS — M54.50 CHRONIC BILATERAL LOW BACK PAIN, UNSPECIFIED WHETHER SCIATICA PRESENT: Primary | ICD-10-CM

## 2024-12-06 DIAGNOSIS — M54.16 LUMBAR RADICULOPATHY: ICD-10-CM

## 2024-12-06 DIAGNOSIS — G89.29 CHRONIC BILATERAL LOW BACK PAIN, UNSPECIFIED WHETHER SCIATICA PRESENT: Primary | ICD-10-CM

## 2024-12-06 NOTE — TELEPHONE ENCOUNTER
"Caller: Frank Bargre \"[place]\"    Relationship to patient: Self    Best call back number: [phone] -  OR CALL  WIFE ELANA AND PATIENT GIVES PERMISSION TO LEAVE THE MESSAGE WITH ELANA ABOUT THE REFERRAL.       Patient is needing: PATIENT STATES HE THOUGHT [facility] WAS PUTTING IN A REFERRAL FOR HIM TO SEE SOMEONE FOR HIS ARTHRITIS.   HE HAS NOT BEEN CONTACTED BY ANYONE OTHER THAN SPAM PHONE CALLS.   PATIENT STATES HE WANTS TO KNOW IF [facility] WILL PRESCRIBE HIM MEDICATION FOR HIS ARTHRITIS AS HE HURTS WORSE AFTER TAKING TYLENOL FOR ARTHRITIS.   PLEASE CALL THE PATIENT TO UPDATE HIM ON THE STATUS OF THE REFERRAL AND PRESCRIBING HIM MEDICATION FOR THE ARTHRITIS PAIN.       "

## 2024-12-06 NOTE — TELEPHONE ENCOUNTER
Please let patient know that he went ahead and sent him to [facility] as our pain management does not offer medications.  If he does not want shots or surgery please let him know that moving forward there is not much more I can offer him.  Thank you

## 2024-12-06 NOTE — TELEPHONE ENCOUNTER
Name: PATIENT    Relationship: SELF    Best Callback Number: [phone]    HUB PROVIDED THE RELAY MESSAGE FROM THE OFFICE   PATIENT HAS FURTHER QUESTIONS AND WOULD LIKE A CALL BACK AT THE FOLLOWING PHONE NUMBER      ADDITIONAL INFORMATION: PATIENT CALLED BACK AND STATES THAT HE WAS LET GO FROM [facility] ON [address]-PATIENT STATES THAT HIS DAUGHTER GOES TO A PAIN MANAGEMENT IN [place] BUT HE DID NOT KNOW THE NAME-PT STATES THAT IT IS [facility] [employer] [facility]-PATIENT STATES IT IS [facility]-SENDING TO OFFICE TO ADVISE THIS IS WHERE HE WOULD LIKE A REFERRAL TO-THANK YOU

## 2024-12-06 NOTE — TELEPHONE ENCOUNTER
SPOKE TO PATIENT'S WIFE AND TOLD HER THAT THE PROVIDER HAS REFERRED THE PATIENT  TO [facility] IN [place] AND SHE STATED THAT SHE WILL LET HIM KNOW AND SHE WILL ALSO HAVE HIM CALL OUR OFFICE TO CLARIFY. I DID TELL HER THAT IT WAS FINE IF HE WANTS TO CALL THE OFFICE.

## 2024-12-09 ENCOUNTER — TREATMENT (OUTPATIENT)
Dept: PHYSICAL THERAPY | Facility: CLINIC | Age: 54
End: 2024-12-09
Payer: MEDICARE

## 2024-12-09 DIAGNOSIS — M54.16 RADICULOPATHY, LUMBAR REGION: Primary | ICD-10-CM

## 2024-12-09 DIAGNOSIS — M54.50 CHRONIC BILATERAL LOW BACK PAIN WITHOUT SCIATICA: ICD-10-CM

## 2024-12-09 DIAGNOSIS — G89.29 CHRONIC BILATERAL LOW BACK PAIN WITHOUT SCIATICA: ICD-10-CM

## 2024-12-09 PROCEDURE — 97110 THERAPEUTIC EXERCISES: CPT | Performed by: PHYSICAL THERAPIST

## 2024-12-09 PROCEDURE — 97162 PT EVAL MOD COMPLEX 30 MIN: CPT | Performed by: PHYSICAL THERAPIST

## 2024-12-09 NOTE — TELEPHONE ENCOUNTER
He was referred to PM. Can you check with Misty or Genoveva on this and let pt know. When there is a failed drug screen I will not write controlled meds. They did not detect the lyrica in his system so this would be considered failed

## 2024-12-09 NOTE — PROGRESS NOTES
Physical Therapy Initial Evaluation and Plan of Care        [address] [facility], [address] [place] [address]     Patient: Frank Barger   : 1970  Diagnosis/ICD-10 Code:  Radiculopathy, lumbar region [M54.16]  Referring practitioner: Michell Nails PA-C  Date of Initial Visit: 2024  Today's Date: 2024  Patient seen for 1 sessions           Subjective Questionnaire: Oswestry: 46% limited      Subjective Evaluation    History of Present Illness  Onset date: since .  Mechanism of injury: Pt presents to OP PT with lower back pain and B leg pain.  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  He states his B hamstrings and down to his ankles feel like twisting and tight pain.  He does not have pain in his buttocks, but in his lower back and has to change positions to try and find comfort.        Patient Occupation: n/a - disabled Pain  Current pain rating: 10  At best pain ratin  At worst pain rating: 10  Location: lower back and down B legs  Quality: radiating, tight, cramping, knife-like and needle-like  Relieving factors: medications and change in position  Aggravating factors: sleeping, movement, lifting, standing, stairs, ambulation, prolonged positioning, squatting and repetitive movement  Progression: worsening    Social Support  Lives with: spouse    Diagnostic Tests  X-ray: abnormal (7/3/24:  Lumbar spine)    Treatments  Previous treatment: medication  Current treatment: physical therapy  Patient Goals  Patient goals for therapy: decreased edema, decreased pain, increased motion, increased strength, independence with ADLs/IADLs and return to sport/leisure activities    Lumbar spine X-ray Findings:  There is disc space narrowing at L5-S1 and also within the visualized thoracic spine consistent with degenerative disc disease. There is mild endplate spurring mainly in the lower thoracic spine consistent with  spondylosis. Facet arthritic changes are   suggested in the lower lumbar spine. There is no acute fracture. There is retrolisthesis of L1 on L2 on the neutral lateral view. It measures approximately 4 mm. On the extension view, there is no significant interval change. On the flexion view, the   subluxation diminishes to 2 mm. This would suggest instability. There are atherosclerotic vascular calcifications in the abdominal aorta extending into the common iliac arteries.     IMPRESSION:  Impression:     1. Degenerative changes.  2. Retrolisthesis of L1 on L2 which changes with flexion suggesting instability.  3. No acute fracture.    Precautions: anxiety, psoriatic arthritis, back injury, asthma, depression, emphysema, headaches, SOA, triple bypass in 2015, 3 stents, irregular heartbeat    Objective        Special Questions  Patient is experiencing disturbed sleep and cardiac problem.       Postural Observations  Seated posture: poor  Standing posture: poor  Correction of posture: has no consistent effect    Additional Postural Observation Details  Significantly slumped forward posture with trunk flexion and B rounded shoulders and forward head position    Neurological Testing     Sensation     Lumbar   Left   Intact: light touch    Right   Intact: light touch    Active Range of Motion   Cervical/Thoracic Spine     Thoracic   Flexion: Active thoracic flexion: limited due to pain.       See Exercise, Manual, and Modality Logs for complete treatment.     Assessment & Plan       Assessment  Impairments: abnormal gait, abnormal or restricted ROM, activity intolerance, impaired physical strength, lacks appropriate home exercise program, pain with function and weight-bearing intolerance   Functional limitations: lifting, sleeping, walking, uncomfortable because of pain, moving in bed, sitting, standing, stooping and unable to perform repetitive tasks   Assessment details: The patient is a 54 y.o. male who presents to  physical therapy today for lower back pain and lumbar radiculopathy. Upon initial evaluation, the patient demonstrates the following impairments: . Postural and gait impairments, radiating pain in B LE, tight hamstrings and hips.  Due to these impairments, the patient is unable to lift, sleep, walk or stand or sit for prolonged periods of time, perform ADLs without pain. The patient would benefit from skilled PT services to address functional limitations and impairments and to improve patient quality of life.        Goals  Plan Goals: LTG 1: 12 weeks:  The patient will report a pain rating of 3 or better in order to improve tolerance to activities of daily living and improve sleep quality.  STG 1a: 4 weeks:  The patient will report a pain rating of 6 or better.    LTG 2: 12 weeks:  The patient will demonstrate 5 /5 strength for B hip flexion, abduction, and extension in order to improve hip stability.  STG 2a: 4 weeks:  The patient will demonstrate 4+ /5 strength for B hip flexion, abduction,and extension.    LTG 3: 12 weeks:  The patient will ambulate without assistive device, independently, for   community distances with minimal limp to the B lower extremity in order to improve mobility and allow patient to perform activities such as grocery shopping with greater ease.  STG 3a: I with HEP          Plan  Therapy options: will be seen for skilled therapy services  Planned modality interventions: cryotherapy, dry needling, ultrasound, traction and thermotherapy (hydrocollator packs)  Planned therapy interventions: manual therapy, neuromuscular re-education, postural training, soft tissue mobilization, spinal/joint mobilization, strengthening, stretching, therapeutic activities, joint mobilization, home exercise program, gait training, functional ROM exercises, flexibility, abdominal trunk stabilization, balance/weight-bearing training and body mechanics training  Frequency: 2x week  Duration in weeks: 12  Treatment  plan discussed with: patient  Plan details: Eval was cut short due to bed bugs crawling on patient's back during exam.  HEP provided and discussed with pt that he cannot return until he gets the situation taken care of        Visit Diagnoses:    ICD-10-CM ICD-9-CM   1. Radiculopathy, lumbar region  M54.16 724.4   2. Chronic bilateral low back pain without sciatica  M54.50 724.2    G89.29 338.29       History # of Personal Factors and/or Comorbidities: HIGH (3+)  Examination of Body System(s): # of elements: MODERATE (3)  Clinical Presentation: EVOLVING  Clinical Decision Making: MODERATE      Timed:         Manual Therapy:         mins  91112;     Therapeutic Exercise:    8     mins  82033;     Neuromuscular Miranda:        mins  47731;    Therapeutic Activity:          mins  44840;     Gait Training:           mins  08527;     Ultrasound:          mins  52262;    Ionto                                   mins   82092  Self Care                       5     mins   39521  Canalith Repos         mins 57193      Un-Timed:  Electrical Stimulation:         mins  80512 (MC );  Dry Needling          mins self-pay  Traction          mins 26046  Low Eval          Mins  93924  Mod Eval     30     Mins  89614  High Eval                            Mins  71045  Re-Eval                               mins  96747    Timed Treatment:   13   mins   Total Treatment:     43   mins    PT SIGNATURE: Jud Lazcano PT         Initial Certification  Certification Period: 12/9/2024 thru 3/9/2025  I certify that the therapy services are furnished while this patient is under my care.  The services outlined above are required by this patient, and will be reviewed every 90 days.     PHYSICIAN: Michell Nails PA-C      DATE:     Please sign and return via fax to [phone].. Thank you, [facility].

## 2024-12-10 ENCOUNTER — TELEPHONE (OUTPATIENT)
Dept: ONCOLOGY | Facility: CLINIC | Age: 54
End: 2024-12-10

## 2024-12-10 NOTE — TELEPHONE ENCOUNTER
"  Caller: Frank Barger \"Monroe\"    Relationship to patient: Self    Best call back number: [phone]     Chief complaint: PT IS HAVING A BUG ISSUE AT HOME AND NEEDS TO TAKE CARE OF IT FIRST.     Type of visit: LAB AND FOLLOW UP    Requested date: PT WILL CALL BACK TO R/S      If rescheduling, when is the original appointment: 12/12     Additional notes: PT STATES THAT HIS MEDICATION HAS BEEN WORKING WELL FOR HIM.          "

## 2024-12-13 ENCOUNTER — TELEPHONE (OUTPATIENT)
Dept: FAMILY MEDICINE CLINIC | Facility: CLINIC | Age: 54
End: 2024-12-13

## 2024-12-13 RX ORDER — PREGABALIN 300 MG/1
300 CAPSULE ORAL 2 TIMES DAILY
Qty: 60 CAPSULE | Refills: 0 | Status: SHIPPED | OUTPATIENT
Start: 2024-12-13

## 2024-12-13 NOTE — TELEPHONE ENCOUNTER
I sent in his lyrica. I do not treat rheumatic conditions. He will have to see them. Genoveva can you see what is missing

## 2024-12-13 NOTE — TELEPHONE ENCOUNTER
I don't know anything about requesting a lift chair, and as for his arthritis hi PCP needs to take control of that. That is not for us to handle.

## 2024-12-13 NOTE — TELEPHONE ENCOUNTER
"  Caller: Frank Barger MARQUIS \"Monroe\"    Relationship: Self    Best call back number: [phone]    Requested Prescriptions:   Requested Prescriptions     Pending Prescriptions Disp Refills    pregabalin (LYRICA) 300 MG capsule       Sig: Take 1 capsule by mouth 2 (Two) Times a Day.        Pharmacy where request should be sent: [facility]/PHARMACY #[id number] - [place], [place] - [address] [place] [address] [phone] [facility] [phone]      Last office visit with prescribing clinician: 11/21/2024   Last telemedicine visit with prescribing clinician: Visit date not found   Next office visit with prescribing clinician: 5/22/2025     Additional details provided by patient: HAS BEEN ACCEPTED TO A PAIN MANAGEMENT, [facility]RU [facility]. WILL NOT HAVE AN APPOINTMENT TILL 3/25/24. WOULD LIKE TO KNOW IF THIS CAN BE FILLED THRU [facility] OR SOMEONE AT THE PRACTICE    Does the patient have less than a 3 day supply:  [x] Yes  [] No    Would you like a call back once the refill request has been completed: [] Yes [x] No    If the office needs to give you a call back, can they leave a voicemail: [] Yes [x] No    Delgado Duran   12/13/24 10:05 EST         "

## 2024-12-13 NOTE — TELEPHONE ENCOUNTER
Called patient and explained that we do not order lift chairs and he would need to call his PCP in regards to this. I did also explain that we cannot order any type of pain medication for him as he did not have Surgery. I did tell him that the referral for Pain Management was sent to [facility] and that he should call them to see if he can schedule an Appointment. I did tell him that Per the Provider he will need to follow up with his PCP for treatment for his Arthritis. He verbally understood.

## 2024-12-13 NOTE — TELEPHONE ENCOUNTER
"Caller: Frank Barger \"Monroe\"    Relationship: Self    Best call back number: [phone]     What is the medical concern/diagnosis: ARTHRITIS    What specialty or service is being requested: RHEUMATOLOGY    Any additional details: PAT[facility] SOME REQUIREMENTS THAT YOU WILL HAVE TO CALL TO FIND OUT.  AND THEY ARE ALSO BOOKED OUT FOR NEW PATIENTS UNTIL AUGUST.  PATIENT IS WANTING TO KNOW IF [facility] CAN TREAT HIM FOR THIS UNTIL [facility] CAN GET HIM IN.  SOMETHING OTHER THEN TYLENOL BECAUSE HE STATES THAT IT DOESN'T WORK.    OR CAN A REFERRAL BE SENT TO ANOTHER RHEUMATOLOGIST THAT CAN GET HIM IN SOONER.    PLEASE CALL TO ADVISE    "

## 2025-01-14 RX ORDER — PREGABALIN 300 MG/1
300 CAPSULE ORAL 2 TIMES DAILY
Qty: 60 CAPSULE | Refills: 0 | Status: SHIPPED | OUTPATIENT
Start: 2025-01-14

## 2025-01-14 NOTE — TELEPHONE ENCOUNTER
His pain mgmt can't get him in until 3/25/25 , and he will need his Lyrica filled before that. It will be due on the 17th.  He is requesting that [facility] fill it to get him thru.    Also, checking on referral to Rheumatology.  He says he still hasn't heard anything and he really needs to get in.

## 2025-01-16 ENCOUNTER — TELEPHONE (OUTPATIENT)
Dept: FAMILY MEDICINE CLINIC | Facility: CLINIC | Age: 55
End: 2025-01-16

## 2025-01-16 NOTE — TELEPHONE ENCOUNTER
"    Caller: Frank Barger \"Monroe\"    Relationship to patient: Self    Best call back number: [phone]    Patient is needing: patient stated the soonest [facility] rheumatologist can get him in is Sep. 15, 2025.    Patient stated he's on a wait list to see if they can get him in sooner.    Patient stated if there is some where that can get him in sooner please let him know.    Patient stated to leave a detailed voicemail with all the information.    Patient stated his colonoscopy is schedule 03.03.25    Please call to advise        "

## 2025-01-20 ENCOUNTER — TELEPHONE (OUTPATIENT)
Dept: FAMILY MEDICINE CLINIC | Facility: CLINIC | Age: 55
End: 2025-01-20
Payer: MEDICARE

## 2025-01-20 NOTE — TELEPHONE ENCOUNTER
" UNABLE TO TRANSFER    Caller: Frank Barger \"Monroe\"     Relationship: [unfilled]     Best call back number:    [phone]       What is your medical concern?   EXCESSIVE SHAKING  DIZZINESS  LIGHTHEADED  NAUSEAS    DOES NOT WANT TO GO TO THE HOSPITAL    How long has this issue been going on? TODAY    Is your provider already aware of this issue? NO    Have you been treated for this issue? NO       "

## 2025-02-05 RX ORDER — PANTOPRAZOLE SODIUM 40 MG/1
40 TABLET, DELAYED RELEASE ORAL DAILY
Qty: 90 TABLET | Refills: 1 | OUTPATIENT
Start: 2025-02-05

## 2025-02-11 DIAGNOSIS — I24.0 ISCHEMIC HEART DISEASE DUE TO CORONARY ARTERY OBSTRUCTION: ICD-10-CM

## 2025-02-11 DIAGNOSIS — I25.9 ISCHEMIC HEART DISEASE DUE TO CORONARY ARTERY OBSTRUCTION: ICD-10-CM

## 2025-02-12 RX ORDER — PREGABALIN 300 MG/1
300 CAPSULE ORAL 2 TIMES DAILY
Qty: 60 CAPSULE | Refills: 0 | Status: SHIPPED | OUTPATIENT
Start: 2025-02-12

## 2025-02-12 RX ORDER — METOPROLOL TARTRATE 25 MG/1
25 TABLET, FILM COATED ORAL 2 TIMES DAILY
Qty: 180 TABLET | Refills: 1 | Status: SHIPPED | OUTPATIENT
Start: 2025-02-12

## 2025-02-12 RX ORDER — ONDANSETRON 4 MG/1
4 TABLET, FILM COATED ORAL EVERY 8 HOURS PRN
Qty: 42 TABLET | Refills: 0 | Status: SHIPPED | OUTPATIENT
Start: 2025-02-12

## 2025-02-12 NOTE — TELEPHONE ENCOUNTER
"  Caller: Frank Barger \"Monroe\"    Relationship: Self    Best call back number: [phone]     Requested Prescriptions:   Requested Prescriptions     Pending Prescriptions Disp Refills    ondansetron (ZOFRAN) 4 MG tablet 42 tablet 0     Sig: Take 1 tablet by mouth Every 8 (Eight) Hours As Needed for Nausea or Vomiting.    pregabalin (LYRICA) 300 MG capsule 60 capsule 0     Sig: Take 1 capsule by mouth 2 (Two) Times a Day.        Pharmacy where request should be sent: [facility]/PHARMACY #[id number] - [address] [phone] [facility] [phone]      Last office visit with prescribing clinician: 11/21/2024   Last telemedicine visit with prescribing clinician: Visit date not found   Next office visit with prescribing clinician: 5/22/2025     Additional details provided by patient:     Does the patient have less than a 3 day supply:  [x] Yes  [] No    Would you like a call back once the refill request has been completed: [] Yes [] No    If the office needs to give you a call back, can they leave a voicemail: [] Yes [] No    April Kevin Myers Rep   02/12/25 13:12 EST         "

## 2025-02-18 NOTE — PROGRESS NOTES
"[facility]   [address], [address]  [place] [address]  [phone]  Ramila Rodriguez, MSN, APRN, PMHNP-BC    Subjective   Frank Barger is a 54 y.o. male who presents today for follow-up for psychiatric medication management.     Chief Complaint:  Follow up for depression and PTSD. Patient also here to establish care with this provider.     History of Present Illness:     Medication adjustments last visit:  Continue Wellbutrin XL 300mg daily.   Continue quetiapine, increase to 50mg nightly.   Continue Lexapro 20mg daily.     Patient or patient representative verbalized consent for the use of Ambient Listening during the visit with  CATERINA Cooney for chart documentation. 2/19/2025  13:07 EST  History of Present Illness  The patient is a 54-year-old male here for a psychiatric medication management follow-up for major depressive disorder, generalized anxiety disorder, PTSD, and insomnia.    He reports overall well-being but has been experiencing episodes of sudden awakenings at night, characterized by a startled response and disorientation. He recalls a previous medication regimen that was effective in managing his PTSD symptoms and nightmares, which he believes included prazosin. He does not remember having any side effects with prazosin.     Since last visit, patient states he has discontinued Lexapro due to adverse effects, describing a sensation akin to being in a daze. His current medications include Wellbutrin and quetiapine.     Patient experiences dizziness, and light headedness at baseline, as well as what he describes as \"heart skipping a beat\". He reports having a blood pressure cuff at home, but does not check his blood pressure. Patient follows with Dr. Greco with cardiology, but it sounds like he was lost to follow up. Advised patient to call Dr. Greco's office to schedule.       MEDICATIONS  Current: Wellbutrin, quetiapine, omeprazole, " "pregabalin, metoprolol, Lyrica  Discontinued: Lexapro, pantoprazole      Patient here for follow up today.   States he needs to switch medications to [facility]. Says sleep is doing well on 50mg quetiapine.   Denies any suicidal thoughts.   He sees [facility] and is on Lyrica for this. He feels like it is helpful.   Depression is manageable. He says, \"I have my days\" but he feels like he marta ok.   Overall doing well. Will plan for 6 month follow up.     Patient presents with symptoms and behaviors that are consistent with the following DSM-5 diagnoses:  1. Major depressive disorder  2. Generalized anxiety disorder  2. PTSD  3. Insomnia    The following portions of the patient's history were reviewed and updated as appropriate: allergies, current medications, past family history, past medical history, past social history, past surgical history and problem list.    PAST OUTPATIENT TREATMENT  Diagnosis treated:  Affective Disorder, Anxiety/Panic Disorder  Treatment Type:  Psychotherapy, Medication Management  Prior Psychiatric Medications:  Zoloft  Trazodone  Brintellix  Trintellix   Prazosin  Elavil  Abilify  Support Groups:  None  Sequelae Of Mental Disorder:  emotional distress    Interval History  No Change    Side Effects  None      Past Medical History:  Past Medical History:   Diagnosis Date    Abdominal pain     Allergic     Allergies     takes allergy injections    Anesthesia complication     states heart \"fluttering\" after anesthesia x 1- was kept in hospital 3 days s/p heart stent placement    Anxiety     Arthritis     Asthma     Chronic back pain     Constipation 06/2021    Coronary artery disease 2014    DDD (degenerative disc disease), lumbar     Depression     Emphysema of lung     Fibromyalgia, primary 2016    GERD (gastroesophageal reflux disease)     Headache     Heart murmur 2014    Hypertension 2014    Leg pain     Lumbar spine pain     Myocardial infarction 2014    Nocturia     Scoliosis 1995    " Under care of pain management specialist     pain management of [place]       Social History:  Social History     Socioeconomic History    Marital status:    Tobacco Use    Smoking status: Every Day     Current packs/day: 1.50     Average packs/day: 1.5 packs/day for 41.7 years (62.6 ttl pk-yrs)     Types: Cigarettes, Cigars     Start date: 8/5/1985     Passive exposure: Current    Smokeless tobacco: Never   Vaping Use    Vaping status: Never Used    Passive vaping exposure: Yes   Substance and Sexual Activity    Alcohol use: No    Drug use: No    Sexual activity: Yes     Partners: Female       Family History:  Family History   Problem Relation Age of Onset    Diabetes Mother     Heart disease Father     Arthritis Father     Cancer Father         Prostate    COPD Father     Anxiety disorder Daughter     Depression Daughter        Past Surgical History:  Past Surgical History:   Procedure Laterality Date    CARDIAC CATHETERIZATION N/A 01/20/2021    Procedure: Left Heart Cath and coronary angiogram;  Surgeon: Jaimie Silva MD;  Location: [facility] CATH INVASIVE LOCATION;  Service: Cardiovascular;  Laterality: N/A;    CARDIAC CATHETERIZATION N/A 01/20/2021    Procedure: Saphenous Vein Graft;  Surgeon: Jaimie Silva MD;  Location: [facility] CATH INVASIVE LOCATION;  Service: Cardiovascular;  Laterality: N/A;    CARDIAC CATHETERIZATION N/A 01/20/2021    Procedure: Stent EBENEZER coronary;  Surgeon: Herve Rodriguez MD;  Location: [facility] CATH INVASIVE LOCATION;  Service: Cardiology;  Laterality: N/A;    CARDIAC SURGERY  12/2015    CORONARY ANGIOPLASTY WITH STENT PLACEMENT  04/2014    2 in 2014, 1 in 12/2020    CORONARY ARTERY BYPASS GRAFT  12/2015    x 3    ELBOW DEBRIDEMENT Right 08/17/2021    Procedure: INCISION / DEBRIDEMENT ELBOW, closed reduction right elbow dislocation ;  Surgeon: Kyler August MD;  Location: [facility] MAIN OR;  Service: Orthopedics;  Laterality: Right;    ELBOW OPEN REDUCTION INTERNAL  FIXATION Left 08/18/2021    Procedure: Left  ulna open reduction internal fixation (of olecranon and coronoid), left radial head replacement  ;  Surgeon: Yonis Mcdonough MD;  Location: [facility];  Service: Orthopedics;  Laterality: Left;       Problem List:  Patient Active Problem List   Diagnosis    Atherosclerosis of coronary artery bypass graft    Essential hypertension    Chronic pain disorder    Coronary artery disease    Degeneration of intervertebral disc of lumbar region    Fibromyositis    Mixed hyperlipidemia    Neuropathic pain    Posttraumatic stress disorder    Psoriasis    Psoriatic arthritis    Environmental and seasonal allergies    Personal history of tobacco use, presenting hazards to health    Status post coronary artery bypass graft    Gastroesophageal reflux disease without esophagitis    Tinnitus of left ear    Intractable migraine without aura and with status migrainosus    Cerumen debris on tympanic membrane of both ears    Bilateral deafness    Ischemic heart disease due to coronary artery obstruction    Status post angioplasty with stent    Dyslipidemia    Abnormal nuclear stress test    Chest pain with high risk of acute coronary syndrome    Iron deficiency anemia    Arthropathy of lumbar facet joint    Arthropathy of right knee    Syncope and collapse    History of scoliosis    COPD (chronic obstructive pulmonary disease)    Dislocation of elbow, right, open, initial encounter    Fracture of left elbow    Major depressive disorder, recurrent episode, moderate    Psychophysiological insomnia    Posterior dislocation of right radial head    Displaced fracture of olecranon process with intraarticular extension of left ulna, subsequent encounter for closed fracture with routine healing    Displaced fracture of head of left radius, subsequent encounter for closed fracture with routine healing    Viral gastroenteritis    Post traumatic stress disorder (PTSD)    Insomnia due to mental  condition    Bipolar I disorder, most recent episode depressed    Encounter for screening colonoscopy       Allergy:   Allergies   Allergen Reactions    Hydrocodone Nausea And Vomiting    Hydrocodone-Acetaminophen GI Intolerance    Oxycodone GI Intolerance        Discontinued Medications:  Medications Discontinued During This Encounter   Medication Reason    escitalopram (Lexapro) 20 MG tablet     pantoprazole (PROTONIX) 40 MG EC tablet     polyethylene glycol (GoLYTELY) 236 g solution        Current Medications:   Current Outpatient Medications   Medication Sig Dispense Refill    aspirin 81 MG EC tablet 0      atorvastatin (LIPITOR) 20 MG tablet Take 1 tablet by mouth Every Night. 90 tablet 1    buPROPion XL (WELLBUTRIN XL) 300 MG 24 hr tablet Take 1 tablet by mouth Every Morning. 90 tablet 3    cyclobenzaprine (FLEXERIL) 10 MG tablet TAKE 1 TABLET BY MOUTH THREE TIMES DAILY AS NEEDED FOR MUSCLE SPASMS 90 tablet 11    dicyclomine (BENTYL) 10 MG capsule Take 1 capsule by mouth 4 (Four) Times a Day Before Meals & at Bedtime. 120 capsule 5    ferrous sulfate 325 (65 FE) MG tablet Take 1 tablet by mouth Daily With Breakfast. 30 tablet 1    folic acid (FOLVITE) 1 MG tablet Take 1 tablet by mouth Daily. 90 tablet 1    meclizine (ANTIVERT) 25 MG tablet Take 1 tablet by mouth 3 (Three) Times a Day As Needed for Dizziness. 30 tablet 0    meloxicam (Mobic) 15 MG tablet Take 1 tablet by mouth Daily. 90 tablet 1    metoprolol tartrate (LOPRESSOR) 25 MG tablet TAKE 1 TABLET BY MOUTH TWICE A  tablet 1    montelukast (SINGULAIR) 10 MG tablet Take 1 tablet by mouth Every Night. 90 tablet 1    nitroglycerin (NITROSTAT) 0.4 MG SL tablet Take 1 tablet by mouth Every 5 (Five) Minutes As Needed for Chest Pain. Take no more than 3 doses in 15 minutes. 25 tablet 4    omeprazole (priLOSEC) 40 MG capsule Take 1 capsule by mouth Daily. 90 capsule 3    ondansetron (ZOFRAN) 4 MG tablet Take 1 tablet by mouth Every 8 (Eight) Hours As  Needed for Nausea or Vomiting. 42 tablet 0    pregabalin (LYRICA) 300 MG capsule Take 1 capsule by mouth 2 (Two) Times a Day. 60 capsule 0    QUEtiapine (SEROquel) 50 MG tablet Take 1 tablet by mouth Every Night. 90 tablet 3    vitamin B-12 (CYANOCOBALAMIN) 1000 MCG tablet Take 1 tablet by mouth Daily. 90 tablet 1     No current facility-administered medications for this visit.     Physical Exam:   There were no vitals taken for this visit.     PHQ-9 Depression Screening  Little interest or pleasure in doing things? Over half   Feeling down, depressed, or hopeless? Almost all   PHQ-2 Total Score 5   Trouble falling or staying asleep, or sleeping too much? Almost all   Feeling tired or having little energy? Not at all   Poor appetite or overeating? Not at all   Feeling bad about yourself - or that you are a failure or have let yourself or your family down? Not at all   Trouble concentrating on things, such as reading the newspaper or watching television? Not at all   Moving or speaking so slowly that other people could have noticed? Or the opposite - being so fidgety or restless that you have been moving around a lot more than usual? Not at all   Thoughts that you would be better off dead, or of hurting yourself in some way? Not at all   PHQ-9 Total Score 8   If you checked off any problems, how difficult have these problems made it for you to do your work, take care of things at home, or get along with other people? Somewhat difficult      GAD7 Documentation:  Feeling nervous, anxious or on edge 2   Not being able to stop or control worrying 3   Worrying too much about different things 3   Trouble relaxing 2   Being so restless that it is hard to sit still 2   Becoming easily annoyed or irritable 0   Feeling Afraid as if something awful might happen 3   FRANDY Total Score 15   How difficult have these problems made it for you? Somewhat difficult           Current every day smoker less than 3 minutes spent counseling  Not agreeable to stopping    I advised Frank of the risks of tobacco use.     Result Review:    Labs:  No visits with results within 3 Month(s) from this visit.   Latest known visit with results is:   Lab on 08/12/2024   Component Date Value Ref Range Status    Glucose 08/12/2024 111 (H)  65 - 99 mg/dL Final    BUN 08/12/2024 14  6 - 20 mg/dL Final    Creatinine 08/12/2024 1.14  0.76 - 1.27 mg/dL Final    Sodium 08/12/2024 132 (L)  136 - 145 mmol/L Final    Potassium 08/12/2024 4.3  3.5 - 5.2 mmol/L Final    Chloride 08/12/2024 98  98 - 107 mmol/L Final    CO2 08/12/2024 24.9  22.0 - 29.0 mmol/L Final    Calcium 08/12/2024 9.5  8.6 - 10.5 mg/dL Final    Total Protein 08/12/2024 7.8  6.0 - 8.5 g/dL Final    Albumin 08/12/2024 4.6  3.5 - 5.2 g/dL Final    ALT (SGPT) 08/12/2024 15  1 - 41 U/L Final    AST (SGOT) 08/12/2024 18  1 - 40 U/L Final    Alkaline Phosphatase 08/12/2024 85  39 - 117 U/L Final    Total Bilirubin 08/12/2024 0.3  0.0 - 1.2 mg/dL Final    Globulin 08/12/2024 3.2  gm/dL Final    A/G Ratio 08/12/2024 1.4  g/dL Final    BUN/Creatinine Ratio 08/12/2024 12.3  7.0 - 25.0 Final    Anion Gap 08/12/2024 9.1  5.0 - 15.0 mmol/L Final    eGFR 08/12/2024 76.4  >60.0 mL/min/1.73 Final    Ferritin 08/12/2024 26.00 (L)  30.00 - 400.00 ng/mL Final    Iron 08/12/2024 110  59 - 158 mcg/dL Final    Iron Saturation (TSAT) 08/12/2024 21  20 - 50 % Final    Transferrin 08/12/2024 346  200 - 360 mg/dL Final    TIBC 08/12/2024 516  298 - 536 mcg/dL Final    Reticulocyte % 08/12/2024 1.10  0.70 - 1.90 % Final    Reticulocyte Absolute 08/12/2024 0.0580  0.0200 - 0.1300 10*6/mm3 Final    WBC 08/12/2024 6.96  3.40 - 10.80 10*3/mm3 Final    RBC 08/12/2024 5.21  4.14 - 5.80 10*6/mm3 Final    Hemoglobin 08/12/2024 12.5 (L)  13.0 - 17.7 g/dL Final    Hematocrit 08/12/2024 40.7  37.5 - 51.0 % Final    MCV 08/12/2024 78.1 (L)  79.0 - 97.0 fL Final    MCH 08/12/2024 24.0 (L)  26.6 - 33.0 pg Final    MCHC 08/12/2024 30.7 (L)  31.5 -  35.7 g/dL Final    RDW 08/12/2024 25.3 (H)  12.3 - 15.4 % Final    RDW-SD 08/12/2024 69.2 (H)  37.0 - 54.0 fl Final    MPV 08/12/2024 9.5  6.0 - 12.0 fL Final    Platelets 08/12/2024 284  140 - 450 10*3/mm3 Final    Neutrophil % 08/12/2024 57.3  42.7 - 76.0 % Final    Lymphocyte % 08/12/2024 30.5  19.6 - 45.3 % Final    Monocyte % 08/12/2024 8.9  5.0 - 12.0 % Final    Eosinophil % 08/12/2024 2.3  0.3 - 6.2 % Final    Basophil % 08/12/2024 1.0  0.0 - 1.5 % Final    Neutrophils, Absolute 08/12/2024 3.99  1.70 - 7.00 10*3/mm3 Final    Lymphocytes, Absolute 08/12/2024 2.12  0.70 - 3.10 10*3/mm3 Final    Monocytes, Absolute 08/12/2024 0.62  0.10 - 0.90 10*3/mm3 Final    Eosinophils, Absolute 08/12/2024 0.16  0.00 - 0.40 10*3/mm3 Final    Basophils, Absolute 08/12/2024 0.07  0.00 - 0.20 10*3/mm3 Final    Extra Tube 08/12/2024 Hold for add-ons.   Final    Auto resulted.       Assessment & Plan     Diagnoses and all orders for this visit:    1. Major depressive disorder, recurrent episode, moderate (Primary)    2. Generalized anxiety disorder    3. Insomnia due to mental condition    4. Post traumatic stress disorder (PTSD)      Assessment & Plan  1. Major Depressive Disorder.  He is currently on Wellbutrin and quetiapine. He has discontinued Lexapro due to adverse effects.    2. Generalized Anxiety Disorder.  He is currently on Wellbutrin and quetiapine.    3. Post-Traumatic Stress Disorder (PTSD).  He has been experiencing nightmares and episodes of waking up disoriented. Patient previously on prazosin, and requested to go back on it. However, elected not to reintroduce at this time due to concerns about potential hypotensive effects, especially given his current symptoms of dizziness and lightheadedness.Advised patient to call cardiology office to schedule a follow-up.     4. Insomnia.  He reports difficulty staying asleep and waking up disoriented.      Continue Wellbutrin XL 300mg daily.   Continue quetiapine 50mg  nightly.         Visit Diagnoses:    ICD-10-CM ICD-9-CM   1. Major depressive disorder, recurrent episode, moderate  F33.1 296.32   2. Generalized anxiety disorder  F41.1 300.02   3. Insomnia due to mental condition  F51.05 300.9     327.02   4. Post traumatic stress disorder (PTSD)  F43.10 309.81         TREATMENT PLAN/GOALS: Continue supportive psychotherapy efforts and medications as indicated. Treatment and medication options discussed during today's visit. Patient ackowledged and verbally consented to continue with current treatment plan and was educated on the importance of compliance with treatment and follow-up appointments.    MEDICATION ISSUES:  INSPECT reviewed as expected    Discussed medication options and treatment plan of prescribed medication as well as the risks, benefits, and side effects including potential falls, possible impaired driving and metabolic adversities among others. Patient is agreeable to call the office with any worsening of symptoms or onset of side effects. Patient is agreeable to call 911 or go to the nearest ER should he/she begin having SI/HI. No medication side effects or related complaints today.     MEDS ORDERED DURING VISIT:  No orders of the defined types were placed in this encounter.      Return in about 6 months (around 8/19/2025).         This document has been electronically signed by CATERINA Cooney  February 19, 2025 13:34 EST    Part of this note may be an electronic transcription/translation of spoken language to printed text using the Dragon Dictation System.    Some of the data in this electronic note has been brought forward from a previous encounter, any necessary changes have been made, it has been reviewed by this APRN, and it is accurate.

## 2025-02-19 ENCOUNTER — OFFICE VISIT (OUTPATIENT)
Dept: PSYCHIATRY | Facility: CLINIC | Age: 55
End: 2025-02-19
Payer: MEDICARE

## 2025-02-19 DIAGNOSIS — F33.1 MAJOR DEPRESSIVE DISORDER, RECURRENT EPISODE, MODERATE: Primary | Chronic | ICD-10-CM

## 2025-02-19 DIAGNOSIS — F51.05 INSOMNIA DUE TO MENTAL CONDITION: Chronic | ICD-10-CM

## 2025-02-19 DIAGNOSIS — F41.1 GENERALIZED ANXIETY DISORDER: Chronic | ICD-10-CM

## 2025-02-19 DIAGNOSIS — F43.10 POST TRAUMATIC STRESS DISORDER (PTSD): Chronic | ICD-10-CM

## 2025-02-20 RX ORDER — POLYETHYLENE GLYCOL 3350, SODIUM SULFATE ANHYDROUS, SODIUM BICARBONATE, SODIUM CHLORIDE, POTASSIUM CHLORIDE 236; 22.74; 6.74; 5.86; 2.97 G/4L; G/4L; G/4L; G/4L; G/4L
4 POWDER, FOR SOLUTION ORAL ONCE
Qty: 4000 ML | Refills: 0 | Status: SHIPPED | OUTPATIENT
Start: 2025-02-20 | End: 2025-02-20

## 2025-02-20 NOTE — TELEPHONE ENCOUNTER
Pt req prescription to be sent to [facility] for colonoscopy [facility] 3/3

## 2025-03-03 ENCOUNTER — ANESTHESIA EVENT (OUTPATIENT)
Dept: GASTROENTEROLOGY | Facility: HOSPITAL | Age: 55
End: 2025-03-03
Payer: MEDICARE

## 2025-03-03 ENCOUNTER — HOSPITAL ENCOUNTER (OUTPATIENT)
Facility: HOSPITAL | Age: 55
Setting detail: HOSPITAL OUTPATIENT SURGERY
Discharge: HOME | End: 2025-03-03
Attending: STUDENT IN AN ORGANIZED HEALTH CARE EDUCATION/TRAINING PROGRAM | Admitting: STUDENT IN AN ORGANIZED HEALTH CARE EDUCATION/TRAINING PROGRAM
Payer: MEDICARE

## 2025-03-03 ENCOUNTER — ANESTHESIA (OUTPATIENT)
Dept: GASTROENTEROLOGY | Facility: HOSPITAL | Age: 55
End: 2025-03-03
Payer: MEDICARE

## 2025-03-03 VITALS
HEART RATE: 66 BPM | HEIGHT: 74 IN | SYSTOLIC BLOOD PRESSURE: 117 MMHG | TEMPERATURE: 97.9 F | WEIGHT: 181 LBS | DIASTOLIC BLOOD PRESSURE: 66 MMHG | BODY MASS INDEX: 23.23 KG/M2 | RESPIRATION RATE: 16 BRPM | OXYGEN SATURATION: 97 %

## 2025-03-03 PROCEDURE — 25010000002 LIDOCAINE 2% SOLUTION: Performed by: NURSE ANESTHETIST, CERTIFIED REGISTERED

## 2025-03-03 PROCEDURE — 45330 DIAGNOSTIC SIGMOIDOSCOPY: CPT | Performed by: STUDENT IN AN ORGANIZED HEALTH CARE EDUCATION/TRAINING PROGRAM

## 2025-03-03 PROCEDURE — 25810000003 SODIUM CHLORIDE 0.9 % SOLUTION: Performed by: NURSE ANESTHETIST, CERTIFIED REGISTERED

## 2025-03-03 PROCEDURE — 25810000003 SODIUM CHLORIDE 0.9 % SOLUTION: Performed by: ANESTHESIOLOGY

## 2025-03-03 PROCEDURE — 25010000002 PROPOFOL 1000 MG/100ML EMULSION: Performed by: NURSE ANESTHETIST, CERTIFIED REGISTERED

## 2025-03-03 RX ORDER — IPRATROPIUM BROMIDE AND ALBUTEROL SULFATE 2.5; .5 MG/3ML; MG/3ML
3 SOLUTION RESPIRATORY (INHALATION) ONCE AS NEEDED
Status: DISCONTINUED | OUTPATIENT
Start: 2025-03-03 | End: 2025-03-03 | Stop reason: HOSPADM

## 2025-03-03 RX ORDER — SODIUM CHLORIDE 9 MG/ML
INJECTION, SOLUTION INTRAVENOUS CONTINUOUS PRN
Status: DISCONTINUED | OUTPATIENT
Start: 2025-03-03 | End: 2025-03-03 | Stop reason: SURG

## 2025-03-03 RX ORDER — HYDRALAZINE HYDROCHLORIDE 20 MG/ML
5 INJECTION INTRAMUSCULAR; INTRAVENOUS
Status: DISCONTINUED | OUTPATIENT
Start: 2025-03-03 | End: 2025-03-03 | Stop reason: HOSPADM

## 2025-03-03 RX ORDER — PROPOFOL 10 MG/ML
INJECTION, EMULSION INTRAVENOUS CONTINUOUS PRN
Status: DISCONTINUED | OUTPATIENT
Start: 2025-03-03 | End: 2025-03-03 | Stop reason: SURG

## 2025-03-03 RX ORDER — SODIUM CHLORIDE 9 MG/ML
9 INJECTION, SOLUTION INTRAVENOUS CONTINUOUS
Status: DISCONTINUED | OUTPATIENT
Start: 2025-03-03 | End: 2025-03-03 | Stop reason: HOSPADM

## 2025-03-03 RX ORDER — LIDOCAINE HYDROCHLORIDE 20 MG/ML
INJECTION, SOLUTION INFILTRATION; PERINEURAL AS NEEDED
Status: DISCONTINUED | OUTPATIENT
Start: 2025-03-03 | End: 2025-03-03 | Stop reason: SURG

## 2025-03-03 RX ORDER — ONDANSETRON 2 MG/ML
4 INJECTION INTRAMUSCULAR; INTRAVENOUS ONCE AS NEEDED
Status: DISCONTINUED | OUTPATIENT
Start: 2025-03-03 | End: 2025-03-03 | Stop reason: HOSPADM

## 2025-03-03 RX ORDER — EPHEDRINE SULFATE 5 MG/ML
5 INJECTION INTRAVENOUS ONCE AS NEEDED
Status: DISCONTINUED | OUTPATIENT
Start: 2025-03-03 | End: 2025-03-03 | Stop reason: HOSPADM

## 2025-03-03 RX ORDER — DIPHENHYDRAMINE HYDROCHLORIDE 50 MG/ML
12.5 INJECTION INTRAMUSCULAR; INTRAVENOUS
Status: DISCONTINUED | OUTPATIENT
Start: 2025-03-03 | End: 2025-03-03 | Stop reason: HOSPADM

## 2025-03-03 RX ORDER — MEPERIDINE HYDROCHLORIDE 25 MG/ML
12.5 INJECTION INTRAMUSCULAR; INTRAVENOUS; SUBCUTANEOUS
Status: DISCONTINUED | OUTPATIENT
Start: 2025-03-03 | End: 2025-03-03 | Stop reason: HOSPADM

## 2025-03-03 RX ORDER — LABETALOL HYDROCHLORIDE 5 MG/ML
5 INJECTION, SOLUTION INTRAVENOUS
Status: DISCONTINUED | OUTPATIENT
Start: 2025-03-03 | End: 2025-03-03 | Stop reason: HOSPADM

## 2025-03-03 RX ADMIN — PROPOFOL INJECTABLE EMULSION 170 MCG/KG/MIN: 10 INJECTION, EMULSION INTRAVENOUS at 09:41

## 2025-03-03 RX ADMIN — SODIUM CHLORIDE 9 ML/HR: 9 INJECTION, SOLUTION INTRAVENOUS at 09:22

## 2025-03-03 RX ADMIN — SODIUM CHLORIDE: 9 INJECTION, SOLUTION INTRAVENOUS at 09:36

## 2025-03-03 RX ADMIN — LIDOCAINE HYDROCHLORIDE 50 MG: 20 INJECTION, SOLUTION INFILTRATION; PERINEURAL at 09:41

## 2025-03-03 RX ADMIN — PROPOFOL INJECTABLE EMULSION 50 MG: 10 INJECTION, EMULSION INTRAVENOUS at 09:42

## 2025-03-03 NOTE — H&P
"Colorectal Surgery Preop Note    ID:  Frank Barger;     Chief Complaint  Screening colonoscopy     History of Present Illness  Frank Barger is a 54 y.o. male who is here for Screening colonoscopy      Past Medical History  Past Medical History:   Diagnosis Date    Abdominal pain     Allergic     Allergies     takes allergy injections    Anesthesia complication     states heart \"fluttering\" after anesthesia x 1- was kept in hospital 3 days s/p heart stent placement    Anxiety     Arthritis     Asthma     Chronic back pain     Constipation 06/2021    Coronary artery disease 2014    DDD (degenerative disc disease), lumbar     Depression     Emphysema of lung     Fibromyalgia, primary 2016    GERD (gastroesophageal reflux disease)     Headache     Heart murmur 2014    Hypertension 2014    Leg pain     Lumbar spine pain     Myocardial infarction 2014    Nocturia     Scoliosis 1995    Under care of pain management specialist     pain management of [place]     For further details please see prior notes and Health History Questionnaire scanned in Epic.  Past Surgical History  Past Surgical History:   Procedure Laterality Date    CARDIAC CATHETERIZATION N/A 01/20/2021    Procedure: Left Heart Cath and coronary angiogram;  Surgeon: Jaimie Silva MD;  Location: [facility] CATH INVASIVE LOCATION;  Service: Cardiovascular;  Laterality: N/A;    CARDIAC CATHETERIZATION N/A 01/20/2021    Procedure: Saphenous Vein Graft;  Surgeon: Jaimie Silva MD;  Location: [facility] CATH INVASIVE LOCATION;  Service: Cardiovascular;  Laterality: N/A;    CARDIAC CATHETERIZATION N/A 01/20/2021    Procedure: Stent EBENEZER coronary;  Surgeon: Herve Rodriguez MD;  Location: [facility] CATH INVASIVE LOCATION;  Service: Cardiology;  Laterality: N/A;    CARDIAC SURGERY  12/2015    CORONARY ANGIOPLASTY WITH STENT PLACEMENT  04/2014    2 in 2014, 1 in 12/2020    CORONARY ARTERY BYPASS GRAFT  12/2015    x 3    ELBOW DEBRIDEMENT Right 08/17/2021 "    Procedure: INCISION / DEBRIDEMENT ELBOW, closed reduction right elbow dislocation ;  Surgeon: Kyler August MD;  Location: [facility] MAIN OR;  Service: Orthopedics;  Laterality: Right;    ELBOW OPEN REDUCTION INTERNAL FIXATION Left 08/18/2021    Procedure: Left  ulna open reduction internal fixation (of olecranon and coronoid), left radial head replacement  ;  Surgeon: Yonis Mcdonough MD;  Location: [facility] MAIN OR;  Service: Orthopedics;  Laterality: Left;     For further details please see prior notes and Health History Questionnaire scanned in Epic.  Family History  Family History   Problem Relation Age of Onset    Diabetes Mother     Heart disease Father     Arthritis Father     Cancer Father         Prostate    COPD Father     Anxiety disorder Daughter     Depression Daughter      For further details please see prior notes and Health History Questionnaire scanned in Epic.  Social History  Social History     Tobacco Use    Smoking status: Every Day     Current packs/day: 1.50     Average packs/day: 1.5 packs/day for 41.8 years (62.7 ttl pk-yrs)     Types: Cigarettes, Cigars     Start date: 8/5/1985     Passive exposure: Current    Smokeless tobacco: Never   Vaping Use    Vaping status: Never Used    Passive vaping exposure: Yes   Substance Use Topics    Alcohol use: No    Drug use: No     For further details please see prior notes and Health History Questionnaire scanned in Epic.  Medication List    Current Facility-Administered Medications:     atropine sulfate injection 0.5 mg, 0.5 mg, Intravenous, Once PRN, Huitron, Sushila, CRNA    diphenhydrAMINE (BENADRYL) injection 12.5 mg, 12.5 mg, Intravenous, Q15 Min PRN, Huitron, Sushila, CRNA    ePHEDrine Sulfate (Pressors) 5 MG/ML injection 5 mg, 5 mg, Intravenous, Once PRN, Huitron, Sushila, CRNA    hydrALAZINE (APRESOLINE) injection 5 mg, 5 mg, Intravenous, Q10 Min PRN, Huitron, Sushila, CRNA    ipratropium-albuterol (DUO-NEB) nebulizer solution 3 mL, 3 mL, Nebulization,  Once PRN, Huitron, Sushila, CRNA    labetalol (NORMODYNE,TRANDATE) injection 5 mg, 5 mg, Intravenous, Q5 Min PRN, Huitron, Sushila, CRNA    lidocaine (cardiac) (XYLOCAINE) injection 100 mg, 100 mg, Intravenous, Q5 Min PRN, Huitron, Sushila, CRNA    meperidine (DEMEROL) injection 12.5 mg, 12.5 mg, Intravenous, Q5 Min PRN, Huitron, Sushila, CRNA    ondansetron (ZOFRAN) injection 4 mg, 4 mg, Intravenous, Once PRN, Huitron, Sushila, CRNA    sodium chloride 0.9 % infusion, 9 mL/hr, Intravenous, Continuous, Jean Carlos Nava MD, Last Rate: 9 mL/hr at 03/03/25 0922, 9 mL/hr at 03/03/25 0922    Facility-Administered Medications Ordered in Other Encounters:     sodium chloride 0.9 % infusion, , Intravenous, Continuous PRN, Huitron, Sushila, CRNA, New Bag at 03/03/25 0936  For further details please see prior notes and Health History Questionnaire scanned in Epic.  Allergies  Allergies   Allergen Reactions    Hydrocodone Nausea And Vomiting    Hydrocodone-Acetaminophen GI Intolerance    Oxycodone GI Intolerance     Review of Systems  Pertinent positives noted in HPI.    Physical Exam  General:  No acute distress  Head: Normocephalic, atraumatic  CV: Regular rate, no edema, extremities warm and well perfused  Pulm: Symmetric chest rise, non-labored breathing  Neuro: Alert and oriented     Abdomen: Please see clinic note  Anorectal: Please see clinic note    Assessment  -Screening colonoscopy       Plan / Recommendations    1. Proceed to [facility] for screening colonoscopy       Musa Odell MD  Colon and Rectal Surgery   [employer] [place]

## 2025-03-03 NOTE — ANESTHESIA PREPROCEDURE EVALUATION
Anesthesia Evaluation     NPO Solid Status: > 8 hours  NPO Liquid Status: > 8 hours           Airway   Mallampati: II  TM distance: >3 FB  Neck ROM: full  No difficulty expected  Dental - normal exam     Pulmonary - normal exam   (+) COPD mild, asthma,  Cardiovascular - normal exam    (+) hypertension well controlled, valvular problems/murmurs, past MI , CAD, CABG >6 Months, dysrhythmias Atrial Fib, hyperlipidemia      Neuro/Psych  (+) headaches, syncope, psychiatric history  GI/Hepatic/Renal/Endo    (+) GERD well controlled    Musculoskeletal     (+) myalgias  Abdominal  - normal exam    Bowel sounds: normal.   Substance History      OB/GYN          Other   arthritis,                 Anesthesia Plan    ASA 3     MAC   total IV anesthesia  intravenous induction     Anesthetic plan, risks, benefits, and alternatives have been provided, discussed and informed consent has been obtained with: patient.  Pre-procedure education provided  Plan discussed with CRNA.    CODE STATUS:

## 2025-03-03 NOTE — DISCHARGE INSTRUCTIONS
A responsible adult should stay with you and you should rest quietly for the rest of the day.    Do not drink alcohol, drive, operate any heavy machinery or power tools or make any legal/important decisions for the next 24 hours.     Progress your diet as tolerated.  If you begin to experience severe pain, increased shortness of breath, racing heartbeat or a fever above 101 F, seek immediate medical attention.     Follow up with MD as instructed. Call office for results in 3 to 5 days if needed. [phone]    Recommendation:      Repeat colonoscopy with Golytely prep      Conclusion:  The colonoscopy was aborted due to unprepped colon with solid stool.

## 2025-03-03 NOTE — ANESTHESIA POSTPROCEDURE EVALUATION
Patient: Frank Barger    Procedure Summary       Date: 03/03/25 Room / Location: [facility] ENDOSCOPY 4 / [facility]    Anesthesia Start: 0936 Anesthesia Stop: 0957    Procedure: COLONOSCOPY (Rectum) Diagnosis:       Encounter for screening colonoscopy      (Encounter for screening colonoscopy [Z12.11])    Surgeons: Musa Odell MD Provider: Jean Carlos Nava MD    Anesthesia Type: MAC ASA Status: 3            Anesthesia Type: MAC    Vitals  Vitals Value Taken Time   BP     Temp     Pulse 75 03/03/25 1013   Resp     SpO2 99 % 03/03/25 1013   Vitals shown include unfiled device data.        Post Anesthesia Care and Evaluation    Patient location during evaluation: PACU  Patient participation: complete - patient participated  Level of consciousness: awake  Pain scale: See nurse's notes for pain score.  Pain management: adequate    Airway patency: patent  Anesthetic complications: No anesthetic complications  PONV Status: none  Cardiovascular status: acceptable  Respiratory status: acceptable and spontaneous ventilation  Hydration status: acceptable    Comments: Patient seen and examined postoperatively; vital signs stable; SpO2 greater than or equal to 90%; cardiopulmonary status stable; nausea/vomiting adequately controlled; pain adequately controlled; no apparent anesthesia complications; patient discharged from anesthesia care when discharge criteria were met

## 2025-03-03 NOTE — OP NOTE
[facility] [place] Colorectal Surgery  Colonoscopy Examination     Name: Frank Barger  : 1970  Date of Colonoscopy: 3/3/2025  Procedure: Average Risk Screening Colonoscopy  Surgeon: Musa Odell MD   Anesthesia: Sedation   IV Fluids: refer to anesthesia record    Procedure Details:    Prior to the procedure, history and physical exam was performed. Patient's medication and allergies were reviewed. The risk and benefits of the procedure and sedation options and risks were discussed with the patient. All questions were answered and informed consent was obtained.    The patient was brought to the endoscopy suite and placed in the left lateral decubitus position. Conscious sedation was administered by the anesthesia team. Vital signs including blood pressure, heart rate, and oxygen saturation were monitored continuously throughout the procedure.    The colonoscope was introduced through the rectum and advanced through the sigmoid and descending colon. The colon was completely unprepped with solid stool and was worst as we went proximally.     Findings:    Un prepped colon with solid stool. Unable to navigate the scope safely    Procedure Images:      Attached in a separate files.       Interventions:  No intervention     Specimens:  No specimen     Recommendation:     Repeat colonoscopy with Golytely prep     Conclusion:  The colonoscopy was aborted due to unprepped colon with solid stool.     Musa Odell MD  Colon and Rectal Surgery   [facility] [place]-[address], [address]  T: [phone]

## 2025-03-14 ENCOUNTER — TELEPHONE (OUTPATIENT)
Dept: FAMILY MEDICINE CLINIC | Facility: CLINIC | Age: 55
End: 2025-03-14
Payer: MEDICARE

## 2025-03-14 RX ORDER — PREGABALIN 300 MG/1
300 CAPSULE ORAL 2 TIMES DAILY
Qty: 60 CAPSULE | Refills: 0 | Status: SHIPPED | OUTPATIENT
Start: 2025-03-14

## 2025-03-14 NOTE — TELEPHONE ENCOUNTER
"WILL RUN OUT THIS WEEKEND       Caller: Frank Barger \"Mon[place]\"    Relationship: Self    Best call back number:     [phone] (Mobile)       Requested Prescriptions:   Requested Prescriptions     Pending Prescriptions Disp Refills    pregabalin (LYRICA) 300 MG capsule 60 capsule 0     Sig: Take 1 capsule by mouth 2 (Two) Times a Day.        Pharmacy where request should be sent: [facility]/PHARMACY #[id number] - [address] [phone] [facility] [phone]      Last office visit with prescribing clinician: 11/21/2024   Last telemedicine visit with prescribing clinician: Visit date not found   Next office visit with prescribing clinician: 5/22/2025     Additional details provided by patient:     Does the patient have less than a 3 day supply:  [x] Yes  [] No    Would you like a call back once the refill request has been completed: [] Yes [] No    If the office needs to give you a call back, can they leave a voicemail: [] Yes [] No    Kevin Richmond Rep   03/14/25 09:04 EDT           "

## 2025-03-17 ENCOUNTER — TELEPHONE (OUTPATIENT)
Dept: PSYCHIATRY | Facility: CLINIC | Age: 55
End: 2025-03-17
Payer: MEDICARE

## 2025-03-17 RX ORDER — ARIPIPRAZOLE 5 MG/1
5 TABLET ORAL DAILY
Qty: 30 TABLET | Refills: 4 | Status: SHIPPED | OUTPATIENT
Start: 2025-03-17

## 2025-03-17 NOTE — TELEPHONE ENCOUNTER
Pt says he was able to transfer all meds to [address], with the exception of Abilify 5 mg.  He has been taking it every morning and it really helps, and he only has a few pills left.  I don't see it active in his med list to select.  Please resend.  Thanks.

## 2025-04-08 RX ORDER — ONDANSETRON 4 MG/1
4 TABLET, FILM COATED ORAL EVERY 8 HOURS PRN
Qty: 42 TABLET | Refills: 0 | Status: SHIPPED | OUTPATIENT
Start: 2025-04-08

## 2025-04-14 ENCOUNTER — TELEPHONE (OUTPATIENT)
Dept: FAMILY MEDICINE CLINIC | Facility: CLINIC | Age: 55
End: 2025-04-14

## 2025-04-14 DIAGNOSIS — Z87.891 PERSONAL HISTORY OF TOBACCO USE, PRESENTING HAZARDS TO HEALTH: Primary | ICD-10-CM

## 2025-04-14 NOTE — TELEPHONE ENCOUNTER
"Caller: Frank Barger \"[place]\"    Relationship: Self    Best call back number: [phone]    What orders are you requesting (i.e. lab or imaging): LUNG CANCER SCREENING     Where will you receive your lab/imaging services: [facility]     Additional notes: PT RECEIVED LETTER FROM [facility] THAT IT IS TIME FOR HIS EXAM.     "

## 2025-05-08 DIAGNOSIS — I25.9 ISCHEMIC HEART DISEASE DUE TO CORONARY ARTERY OBSTRUCTION: ICD-10-CM

## 2025-05-08 DIAGNOSIS — E78.2 MIXED HYPERLIPIDEMIA: ICD-10-CM

## 2025-05-08 DIAGNOSIS — I24.0 ISCHEMIC HEART DISEASE DUE TO CORONARY ARTERY OBSTRUCTION: ICD-10-CM

## 2025-05-08 RX ORDER — ATORVASTATIN CALCIUM 20 MG/1
20 TABLET, FILM COATED ORAL
Qty: 90 TABLET | Refills: 1 | Status: SHIPPED | OUTPATIENT
Start: 2025-05-08

## 2025-05-09 ENCOUNTER — EXTERNAL PBMM DATA (OUTPATIENT)
Dept: PHARMACY | Facility: OTHER | Age: 55
End: 2025-05-09
Payer: MEDICARE

## 2025-05-12 RX ORDER — MELOXICAM 15 MG/1
15 TABLET ORAL DAILY
Qty: 90 TABLET | Refills: 1 | Status: SHIPPED | OUTPATIENT
Start: 2025-05-12

## 2025-05-12 RX ORDER — PANTOPRAZOLE SODIUM 40 MG/1
40 TABLET, DELAYED RELEASE ORAL DAILY
Qty: 90 TABLET | Refills: 1 | OUTPATIENT
Start: 2025-05-12

## 2025-05-12 RX ORDER — MONTELUKAST SODIUM 10 MG/1
10 TABLET ORAL
Qty: 90 TABLET | Refills: 1 | Status: SHIPPED | OUTPATIENT
Start: 2025-05-12

## 2025-05-15 ENCOUNTER — HOSPITAL ENCOUNTER (OUTPATIENT)
Dept: CT IMAGING | Facility: HOSPITAL | Age: 55
Discharge: HOME | End: 2025-05-15
Admitting: NURSE PRACTITIONER
Payer: MEDICARE

## 2025-05-15 DIAGNOSIS — Z87.891 PERSONAL HISTORY OF TOBACCO USE, PRESENTING HAZARDS TO HEALTH: ICD-10-CM

## 2025-05-15 PROCEDURE — 71271 CT THORAX LUNG CANCER SCR C-: CPT

## 2025-05-23 ENCOUNTER — OFFICE VISIT (OUTPATIENT)
Dept: FAMILY MEDICINE CLINIC | Facility: CLINIC | Age: 55
End: 2025-05-23
Payer: MEDICARE

## 2025-05-23 VITALS
DIASTOLIC BLOOD PRESSURE: 81 MMHG | WEIGHT: 186 LBS | BODY MASS INDEX: 23.88 KG/M2 | TEMPERATURE: 97.7 F | HEART RATE: 60 BPM | OXYGEN SATURATION: 97 % | SYSTOLIC BLOOD PRESSURE: 130 MMHG

## 2025-05-23 DIAGNOSIS — I10 ESSENTIAL HYPERTENSION: ICD-10-CM

## 2025-05-23 DIAGNOSIS — I24.0 ISCHEMIC HEART DISEASE DUE TO CORONARY ARTERY OBSTRUCTION: ICD-10-CM

## 2025-05-23 DIAGNOSIS — F41.1 GENERALIZED ANXIETY DISORDER: ICD-10-CM

## 2025-05-23 DIAGNOSIS — G89.4 CHRONIC PAIN DISORDER: ICD-10-CM

## 2025-05-23 DIAGNOSIS — E78.2 MIXED HYPERLIPIDEMIA: Primary | ICD-10-CM

## 2025-05-23 DIAGNOSIS — L40.50 PSORIATIC ARTHRITIS: ICD-10-CM

## 2025-05-23 DIAGNOSIS — R05.1 ACUTE COUGH: ICD-10-CM

## 2025-05-23 DIAGNOSIS — I25.9 ISCHEMIC HEART DISEASE DUE TO CORONARY ARTERY OBSTRUCTION: ICD-10-CM

## 2025-05-23 DIAGNOSIS — F51.05 INSOMNIA DUE TO MENTAL CONDITION: ICD-10-CM

## 2025-05-23 RX ORDER — ONDANSETRON 4 MG/1
4 TABLET, FILM COATED ORAL EVERY 8 HOURS PRN
Qty: 42 TABLET | Refills: 0 | Status: SHIPPED | OUTPATIENT
Start: 2025-05-23

## 2025-05-23 RX ORDER — GUAIFENESIN 600 MG/1
1200 TABLET, EXTENDED RELEASE ORAL 2 TIMES DAILY PRN
Qty: 60 TABLET | Refills: 0 | Status: SHIPPED | OUTPATIENT
Start: 2025-05-23

## 2025-05-23 RX ORDER — ESCITALOPRAM OXALATE 20 MG/1
1 TABLET ORAL DAILY
COMMUNITY
Start: 2025-04-06

## 2025-05-23 NOTE — PROGRESS NOTES
Subjective     Frank Barger is a 54 y.o. male.     History of Present Illness  Patient is here today for routine follow-up on chronic medical conditions.  He has been smoking since he was 10 yo.   He does not work at this time  He tries to walk  He is smoking 1/2-1 ppd  He has had a cough for the last 4-5 days.      Hypertension with CAD-patient is currently on metoprolol 25 mg twice daily.  He has had heart stents placed. He is seen by cardiology- Dr. Silva. Denies CP,  HA. Has occasional SOA. He states his plavix was stopped last year. He take 81mg ASA daily.      Iron deficiency- pt has been on oral iron. Sees hematology.      Hyperlipidemia- patient is currently on Lipitor 20 mg daily and ASA 81mg daily.      Depression-patient is currently on abilify 5mg daily, seroquel 50nightly,  and Wellbutrin  mg daily. Doing well on meds. Denies SI or HI. See psych.      Psoriatic arthritis- pt is not currently on any meds. Was previously on humira. Saw rheum in the past. Will be seeing a new rheum in Sept.      Chronic pain-  chronic back pain. Seeing Malou Pacheco with spine. Will be starting PT. takes Lyrica 300 mg twice daily. Previously went to [facility] but was dismissed due to failed drug screen. He is seeing a new pain management. He switched his muscle relaxant.     Insomnia-patient is currently on seroquel 50mg nightly. Doing well     GERD-patient is currently on Protonix 40 mg daily.     IBS- pt is on dicyclomine 10mg QID. He has been taking it 4 times daily and not just as needed. It helps with his symptoms.      Allergies- pt is currently on singulair daily.      Labs- due  Colonoscopy- 3/3/25  PSA- Lab Results       Component                Value               Date                       PSA                      2.610               01/05/2022                 PSA                      1.50                04/13/2017                  Vaccines:  Flu- due- will get at  [facility]  Tdap-  Shingles-  PNA-  Covid-19    Cough  Associated symptoms: myalgias    Associated symptoms: no chest pain, no chills, no fever, no rash, no shortness of breath and no sore throat         The following portions of the patient's history were reviewed and updated as appropriate: allergies, current medications, past family history, past medical history, past social history, past surgical history, and problem list.    Review of Systems   Constitutional:  Negative for chills, diaphoresis, fatigue and fever.   HENT:  Positive for congestion. Negative for sore throat and swollen glands.    Respiratory:  Positive for cough. Negative for chest tightness and shortness of breath.    Cardiovascular:  Negative for chest pain and palpitations.   Gastrointestinal:  Positive for nausea. Negative for abdominal pain and vomiting.   Genitourinary:  Negative for dysuria.   Musculoskeletal:  Positive for myalgias. Negative for neck pain.   Skin:  Negative for rash.   Neurological:  Negative for dizziness, weakness, numbness and headache.   Psychiatric/Behavioral:  Negative for depressed mood and stress. The patient is not nervous/anxious.        Objective     /81 (BP Location: Left arm, Patient Position: Sitting, Cuff Size: Large Adult)   Pulse 60   Temp 97.7 °F (36.5 °C) (Tympanic)   Wt 84.4 kg (186 lb)   SpO2 97%   BMI 23.88 kg/m²     Current Outpatient Medications on File Prior to Visit   Medication Sig Dispense Refill    escitalopram (LEXAPRO) 20 MG tablet Take 1 tablet by mouth Daily.      tiZANidine (ZANAFLEX) 4 MG tablet Take 1 tablet by mouth Every 8 (Eight) Hours As Needed for Muscle Spasms.      ARIPiprazole (ABILIFY) 5 MG tablet Take 1 tablet by mouth Daily. 30 tablet 4    aspirin 81 MG EC tablet 0      atorvastatin (LIPITOR) 20 MG tablet TAKE 1 TABLET BY MOUTH EVERY DAY AT NIGHT 90 tablet 1    buPROPion XL (WELLBUTRIN XL) 300 MG 24 hr tablet Take 1 tablet by mouth Every Morning. 90 tablet 3     dicyclomine (BENTYL) 10 MG capsule Take 1 capsule by mouth 4 (Four) Times a Day Before Meals & at Bedtime. 120 capsule 5    ferrous sulfate 325 (65 FE) MG tablet Take 1 tablet by mouth Daily With Breakfast. 30 tablet 1    folic acid (FOLVITE) 1 MG tablet Take 1 tablet by mouth Daily. 90 tablet 1    meclizine (ANTIVERT) 25 MG tablet Take 1 tablet by mouth 3 (Three) Times a Day As Needed for Dizziness. 30 tablet 0    meloxicam (MOBIC) 15 MG tablet TAKE 1 TABLET BY MOUTH EVERY DAY 90 tablet 1    metoprolol tartrate (LOPRESSOR) 25 MG tablet TAKE 1 TABLET BY MOUTH TWICE A  tablet 1    montelukast (SINGULAIR) 10 MG tablet TAKE 1 TABLET BY MOUTH EVERY DAY AT NIGHT 90 tablet 1    omeprazole (priLOSEC) 40 MG capsule Take 1 capsule by mouth Daily. 90 capsule 3    pregabalin (LYRICA) 300 MG capsule Take 1 capsule by mouth 2 (Two) Times a Day. 60 capsule 0    QUEtiapine (SEROquel) 50 MG tablet Take 1 tablet by mouth Every Night. 90 tablet 3    vitamin B-12 (CYANOCOBALAMIN) 1000 MCG tablet Take 1 tablet by mouth Daily. 90 tablet 1    [DISCONTINUED] cyclobenzaprine (FLEXERIL) 10 MG tablet TAKE 1 TABLET BY MOUTH THREE TIMES DAILY AS NEEDED FOR MUSCLE SPASMS 90 tablet 11    [DISCONTINUED] nitroglycerin (NITROSTAT) 0.4 MG SL tablet Take 1 tablet by mouth Every 5 (Five) Minutes As Needed for Chest Pain. Take no more than 3 doses in 15 minutes. 25 tablet 4    [DISCONTINUED] ondansetron (ZOFRAN) 4 MG tablet TAKE 1 TABLET BY MOUTH EVERY 8 HOURS AS NEEDED FOR NAUSEA OR VOMITING. 42 tablet 0     No current facility-administered medications on file prior to visit.        BMI is within normal parameters. No other follow-up for BMI required.       Physical Exam  Constitutional:       General: He is not in acute distress.     Appearance: Normal appearance. He is not ill-appearing.   HENT:      Head: Normocephalic and atraumatic.      Nose: Congestion present.   Eyes:      Extraocular Movements: Extraocular movements intact.    Cardiovascular:      Rate and Rhythm: Normal rate and regular rhythm.      Heart sounds: No murmur heard.  Pulmonary:      Effort: Pulmonary effort is normal. No respiratory distress.   Neurological:      General: No focal deficit present.      Mental Status: He is alert and oriented to person, place, and time.   Psychiatric:         Mood and Affect: Mood normal.         Behavior: Behavior normal.         Thought Content: Thought content normal.         Judgment: Judgment normal.           Assessment & Plan     Diagnoses and all orders for this visit:    1. Mixed hyperlipidemia (Primary)  Comments:  stable  cont statin and ASA  work on diet and exercise  Orders:  -     Comprehensive Metabolic Panel; Future  -     Lipid Panel; Future    2. Ischemic heart disease due to coronary artery obstruction  Comments:  stable  sees cardio  cont statin and ASA  Orders:  -     Comprehensive Metabolic Panel; Future  -     Lipid Panel; Future    3. Psoriatic arthritis  Comments:  waiting to see rheum  stable  sees PM    4. Insomnia due to mental condition  Comments:  stable  sees psych  cont seroquel    5. Generalized anxiety disorder  Comments:  stable  sees psych  cont meds  denies SI or HI    6. Chronic pain disorder  Comments:  sees PM   cont lyrica    7. Essential hypertension  Comments:  stable  cont meds  sees cardio  Orders:  -     Comprehensive Metabolic Panel; Future  -     Lipid Panel; Future    8. Acute cough  Comments:  likely allergy or cold  mucinex prn  Orders:  -     guaiFENesin (Mucinex) 600 MG 12 hr tablet; Take 2 tablets by mouth 2 (Two) Times a Day As Needed for Cough.  Dispense: 60 tablet; Refill: 0    Other orders  -     ondansetron (ZOFRAN) 4 MG tablet; Take 1 tablet by mouth Every 8 (Eight) Hours As Needed for Nausea or Vomiting.  Dispense: 42 tablet; Refill: 0

## 2025-07-15 RX ORDER — DICYCLOMINE HYDROCHLORIDE 10 MG/1
CAPSULE ORAL
Qty: 120 CAPSULE | Refills: 5 | Status: SHIPPED | OUTPATIENT
Start: 2025-07-15

## 2025-07-28 ENCOUNTER — TELEPHONE (OUTPATIENT)
Dept: FAMILY MEDICINE CLINIC | Facility: CLINIC | Age: 55
End: 2025-07-28

## 2025-07-28 RX ORDER — ARIPIPRAZOLE 5 MG/1
5 TABLET ORAL DAILY
Qty: 30 TABLET | Refills: 4 | Status: SHIPPED | OUTPATIENT
Start: 2025-07-28

## 2025-07-28 RX ORDER — ONDANSETRON 4 MG/1
4 TABLET, FILM COATED ORAL EVERY 8 HOURS PRN
Qty: 42 TABLET | Refills: 0 | Status: SHIPPED | OUTPATIENT
Start: 2025-07-28 | End: 2025-08-29

## 2025-07-28 NOTE — TELEPHONE ENCOUNTER
Rx Refill Note  Requested Prescriptions     Pending Prescriptions Disp Refills    ARIPiprazole (ABILIFY) 5 MG tablet [Pharmacy Med Name: ARIPIPRAZOLE 5 MG TABLET] 30 tablet 4     Sig: TAKE 1 TABLET BY MOUTH EVERY DAY        Last office visit with prescribing clinician: 2/19/2025     Next office visit with prescribing clinician:     Office Visit with Ramila Rodriguez APRN (02/19/2025)       Mildred Barrow MA  07/28/25, 10:00 EDT

## 2025-08-29 DIAGNOSIS — E78.2 MIXED HYPERLIPIDEMIA: ICD-10-CM

## 2025-08-29 DIAGNOSIS — F41.1 GENERALIZED ANXIETY DISORDER: Chronic | ICD-10-CM

## 2025-08-29 DIAGNOSIS — I24.0 ISCHEMIC HEART DISEASE DUE TO CORONARY ARTERY OBSTRUCTION: ICD-10-CM

## 2025-08-29 DIAGNOSIS — I25.9 ISCHEMIC HEART DISEASE DUE TO CORONARY ARTERY OBSTRUCTION: ICD-10-CM

## 2025-08-29 DIAGNOSIS — F33.1 MAJOR DEPRESSIVE DISORDER, RECURRENT EPISODE, MODERATE: Chronic | ICD-10-CM

## 2025-08-29 DIAGNOSIS — F33.1 MAJOR DEPRESSIVE DISORDER, RECURRENT, MODERATE: ICD-10-CM

## 2025-08-29 DIAGNOSIS — F51.05 INSOMNIA DUE TO MENTAL CONDITION: Chronic | ICD-10-CM

## 2025-08-29 RX ORDER — ONDANSETRON 4 MG/1
4 TABLET, FILM COATED ORAL EVERY 8 HOURS PRN
Qty: 42 TABLET | Refills: 0 | Status: SHIPPED | OUTPATIENT
Start: 2025-08-29

## 2025-08-29 RX ORDER — OMEPRAZOLE 40 MG/1
40 CAPSULE, DELAYED RELEASE ORAL DAILY
Qty: 90 CAPSULE | Refills: 3 | Status: SHIPPED | OUTPATIENT
Start: 2025-08-29

## 2025-08-29 RX ORDER — METOPROLOL TARTRATE 25 MG/1
25 TABLET, FILM COATED ORAL 2 TIMES DAILY
Qty: 180 TABLET | Refills: 1 | Status: SHIPPED | OUTPATIENT
Start: 2025-08-29

## 2025-08-29 RX ORDER — ATORVASTATIN CALCIUM 20 MG/1
20 TABLET, FILM COATED ORAL
Qty: 90 TABLET | Refills: 1 | Status: SHIPPED | OUTPATIENT
Start: 2025-08-29

## 2025-08-29 RX ORDER — PANTOPRAZOLE SODIUM 40 MG/1
40 TABLET, DELAYED RELEASE ORAL DAILY
Qty: 90 TABLET | Refills: 1 | OUTPATIENT
Start: 2025-08-29

## 2025-08-29 RX ORDER — MELOXICAM 15 MG/1
15 TABLET ORAL DAILY
Qty: 90 TABLET | Refills: 1 | Status: SHIPPED | OUTPATIENT
Start: 2025-08-29

## 2025-08-30 RX ORDER — BUPROPION HYDROCHLORIDE 300 MG/1
300 TABLET ORAL EVERY MORNING
Qty: 90 TABLET | Refills: 0 | Status: SHIPPED | OUTPATIENT
Start: 2025-08-30

## 2025-08-30 RX ORDER — QUETIAPINE FUMARATE 50 MG/1
50 TABLET, FILM COATED ORAL
Qty: 90 TABLET | Refills: 0 | Status: SHIPPED | OUTPATIENT
Start: 2025-08-30

## 2025-08-30 RX ORDER — ESCITALOPRAM OXALATE 20 MG/1
20 TABLET ORAL DAILY
Qty: 90 TABLET | Refills: 0 | Status: SHIPPED | OUTPATIENT
Start: 2025-08-30

## (undated) DEVICE — GUIDE CATHETER: Brand: MACH1™

## (undated) DEVICE — SKIN PREP TRAY W/CHG: Brand: MEDLINE INDUSTRIES, INC.

## (undated) DEVICE — BOWL PLSTC MD 16OZ BLU STRL

## (undated) DEVICE — BANDAGE,GAUZE,BULKEE II,4.5"X4.1YD,STRL: Brand: MEDLINE

## (undated) DEVICE — PAD UNDERCAST WYTEX 4IN 4YD LF STRL

## (undated) DEVICE — GAUZE,SPONGE,FLUFF,6"X6.75",STRL,5/TRAY: Brand: MEDLINE

## (undated) DEVICE — SUT ETHLN 3/0 FS1 30IN 669H

## (undated) DEVICE — BNDG ESMARK 4IN 9FT LF STRL BLU

## (undated) DEVICE — PK EXTREM 50

## (undated) DEVICE — SLV SCD CALF HEMOFORCE DVT THERP REPROC MD

## (undated) DEVICE — 6F .070 XB 3.5 100CM: Brand: VISTA BRITE TIP

## (undated) DEVICE — CATH DIAG IMPULSE FR4 5F 100CM

## (undated) DEVICE — UNDERGLV SURG BIOGEL INDICATOR LTX PF 7

## (undated) DEVICE — CUFF TOURNI 1BLADDER 1PRT 18IN STRL

## (undated) DEVICE — GOWN,REINFORCE,POLY,SIRUS,BREATH SLV,XLG: Brand: MEDLINE

## (undated) DEVICE — TRAP,MUCUS SPECIMEN, 80CC: Brand: MEDLINE

## (undated) DEVICE — GLV SURG BIOGEL LTX PF 7

## (undated) DEVICE — STAPLER, SKIN, 35W, A: Brand: MEDLINE INDUSTRIES, INC.

## (undated) DEVICE — STPCK 3WY HP ROT

## (undated) DEVICE — OCCLUSIVE GAUZE STRIP OVERWRAP,3% BISMUTH TRIBROMOPHENATE IN PETROLATUM BLEND: Brand: XEROFORM

## (undated) DEVICE — PINNACLE INTRODUCER SHEATH: Brand: PINNACLE

## (undated) DEVICE — CATH DIAG IMPULSE FL4 5F 100CM

## (undated) DEVICE — SPNG GZ AVANT 6PLY 4X4IN STRL PK/2

## (undated) DEVICE — STCKNT IMPERV 9X36IN STRL

## (undated) DEVICE — CATH IV INSYTE AUTOGARD 14G 1 1/2IN ORNG

## (undated) DEVICE — C-ARM: Brand: UNBRANDED

## (undated) DEVICE — TBG NAMIC PRESS MONTR A/ F/M 12IN

## (undated) DEVICE — BIT DRL SLD SD CUT 2.7X40MM

## (undated) DEVICE — GLV SURG SENSICARE PI ORTHO SZ8 LF STRL

## (undated) DEVICE — 3M™ STERI-DRAPE™ INSTRUMENT POUCH 1018: Brand: STERI-DRAPE™

## (undated) DEVICE — UNDERGLV SURG BIOGEL INDICAT PI SZ8 BLU

## (undated) DEVICE — THIN OFFSET (13.0 X 0.38 X 39.0MM)

## (undated) DEVICE — 3M™ STERI-DRAPE™ U-DRAPE 1015: Brand: STERI-DRAPE™

## (undated) DEVICE — TBG IRRI TUR Y/TYP NONVENT 98IN LF

## (undated) DEVICE — 3M™ STERI-DRAPE™ INSTRUMENT POUCH 1018L: Brand: STERI-DRAPE™

## (undated) DEVICE — CATH DIAG IMPULSE PIG 5F 100CM

## (undated) DEVICE — APPL CHLORAPREP HI/LITE 26ML ORNG

## (undated) DEVICE — TOWEL,OR,DSP,ST,WHITE,DLX,4/PK,20PK/CS: Brand: MEDLINE

## (undated) DEVICE — WET SKIN PREP TRAY: Brand: MEDLINE INDUSTRIES, INC.

## (undated) DEVICE — UNDRGLV SURG BIOGEL PIMICROINDICATOR SYNTH SZ8 LF STRL

## (undated) DEVICE — SOL ISO/ALC RUB 70PCT 4OZ

## (undated) DEVICE — ELECTRD BLD EZ CLN MOD XLNG 2.75IN

## (undated) DEVICE — PK ENDO GI 50

## (undated) DEVICE — BIT DRL SLD SD CUT 2.7X80MM

## (undated) DEVICE — DEV INFL COMPAK W/ACCESSPLUS IN4530

## (undated) DEVICE — PAD,ABDOMINAL,5"X9",STERILE,LF,1/PK: Brand: MEDLINE INDUSTRIES, INC.

## (undated) DEVICE — APPL CHLORAPREP HI/LITE TINTED 10.5ML ORNG

## (undated) DEVICE — NDL HYPO PRECISIONGLIDE REG 25G 1 1/2

## (undated) DEVICE — GAUZE,SPONGE,4"X4",16PLY,XRAY,STRL,LF: Brand: MEDLINE

## (undated) DEVICE — GW DIAG EMERALD HEPCOAT MOVE JTIP STD .035 3MM 150CM

## (undated) DEVICE — HI-TORQUE BALANCE MIDDLEWEIGHT UNIVERSAL II GUIDE WIRE STRAIGHT TIP PAK  190 CM: Brand: HI-TORQUE BALANCE MIDDLEWEIGHT UNIVERSAL II

## (undated) DEVICE — INTENDED FOR TISSUE SEPARATION, AND OTHER PROCEDURES THAT REQUIRE A SHARP SURGICAL BLADE TO PUNCTURE OR CUT.: Brand: BARD-PARKER ® CARBON RIB-BACK BLADES

## (undated) DEVICE — COVER,TABLE,HEAVY DUTY,79"X110",STRL: Brand: MEDLINE

## (undated) DEVICE — CONN TBG Y 5 IN 1 LF STRL

## (undated) DEVICE — DRAPE,U/ SHT,SPLIT,PLAS,STERIL: Brand: MEDLINE

## (undated) DEVICE — SUT VIC 0 CT1 36IN J946H

## (undated) DEVICE — ELECTRD DEFIB M/FUNC PROPADZ RADIOL 2PK

## (undated) DEVICE — PREP SOL DYNA-HEX CHG LIQ 4% BT 4OZ

## (undated) DEVICE — KT SURG TURNOVER 050

## (undated) DEVICE — BNDG ELAS ELITE V/CLOSE 4IN 5YD LF STRL

## (undated) DEVICE — T-DRAPE,EXTREMITY,STERILE: Brand: MEDLINE

## (undated) DEVICE — PK TRY HEART CATH 50

## (undated) DEVICE — BANDAGE,GAUZE,CONFORMING,1"X75",STRL,LF: Brand: MEDLINE

## (undated) DEVICE — VESSEL LOOPS,MINI, RED: Brand: DEVON

## (undated) DEVICE — SOL IRRIG NACL 1000ML

## (undated) DEVICE — SYR LL TP 10ML STRL

## (undated) DEVICE — SUT PDS2 3/0 CT2 27IN

## (undated) DEVICE — CONTRST ISOVUE300 61PCT 50ML

## (undated) DEVICE — SOLUTION,WATER,IRRIGATION,1000ML,STERILE: Brand: MEDLINE